# Patient Record
Sex: MALE | Race: WHITE | NOT HISPANIC OR LATINO | URBAN - METROPOLITAN AREA
[De-identification: names, ages, dates, MRNs, and addresses within clinical notes are randomized per-mention and may not be internally consistent; named-entity substitution may affect disease eponyms.]

---

## 2017-02-03 ENCOUNTER — EMERGENCY (EMERGENCY)
Facility: HOSPITAL | Age: 62
LOS: 1 days | Discharge: HOME | End: 2017-02-03
Attending: EMERGENCY MEDICINE | Admitting: EMERGENCY MEDICINE
Payer: MEDICARE

## 2017-02-03 VITALS
RESPIRATION RATE: 14 BRPM | HEIGHT: 65 IN | DIASTOLIC BLOOD PRESSURE: 56 MMHG | TEMPERATURE: 99 F | WEIGHT: 179.9 LBS | HEART RATE: 111 BPM | SYSTOLIC BLOOD PRESSURE: 106 MMHG

## 2017-02-03 VITALS
HEART RATE: 100 BPM | DIASTOLIC BLOOD PRESSURE: 67 MMHG | OXYGEN SATURATION: 96 % | RESPIRATION RATE: 14 BRPM | SYSTOLIC BLOOD PRESSURE: 137 MMHG

## 2017-02-03 DIAGNOSIS — M25.562 PAIN IN LEFT KNEE: ICD-10-CM

## 2017-02-03 DIAGNOSIS — L40.50 ARTHROPATHIC PSORIASIS, UNSPECIFIED: ICD-10-CM

## 2017-02-03 DIAGNOSIS — Z88.2 ALLERGY STATUS TO SULFONAMIDES: ICD-10-CM

## 2017-02-03 DIAGNOSIS — F72 SEVERE INTELLECTUAL DISABILITIES: ICD-10-CM

## 2017-02-03 DIAGNOSIS — Z88.0 ALLERGY STATUS TO PENICILLIN: ICD-10-CM

## 2017-02-03 LAB
ALBUMIN SERPL ELPH-MCNC: 3 G/DL — LOW (ref 3.3–5)
ALP SERPL-CCNC: 75 U/L — SIGNIFICANT CHANGE UP (ref 40–120)
ALT FLD-CCNC: 33 U/L — SIGNIFICANT CHANGE UP (ref 12–78)
ANION GAP SERPL CALC-SCNC: 10 MMOL/L — SIGNIFICANT CHANGE UP (ref 5–17)
AST SERPL-CCNC: 44 U/L — HIGH (ref 15–37)
BASOPHILS # BLD AUTO: 0.1 K/UL — SIGNIFICANT CHANGE UP (ref 0–0.2)
BASOPHILS NFR BLD AUTO: 1.5 % — SIGNIFICANT CHANGE UP (ref 0–2)
BILIRUB SERPL-MCNC: 0.4 MG/DL — SIGNIFICANT CHANGE UP (ref 0.2–1.2)
BUN SERPL-MCNC: 14 MG/DL — SIGNIFICANT CHANGE UP (ref 7–23)
CALCIUM SERPL-MCNC: 8.5 MG/DL — SIGNIFICANT CHANGE UP (ref 8.5–10.1)
CHLORIDE SERPL-SCNC: 103 MMOL/L — SIGNIFICANT CHANGE UP (ref 96–108)
CO2 SERPL-SCNC: 25 MMOL/L — SIGNIFICANT CHANGE UP (ref 22–31)
CREAT SERPL-MCNC: 0.89 MG/DL — SIGNIFICANT CHANGE UP (ref 0.5–1.3)
CRP SERPL-MCNC: 0.27 MG/DL — SIGNIFICANT CHANGE UP (ref 0–0.4)
EOSINOPHIL # BLD AUTO: 0.2 K/UL — SIGNIFICANT CHANGE UP (ref 0–0.5)
EOSINOPHIL NFR BLD AUTO: 2.3 % — SIGNIFICANT CHANGE UP (ref 0–6)
ERYTHROCYTE [SEDIMENTATION RATE] IN BLOOD: 19 MM/HR — SIGNIFICANT CHANGE UP (ref 0–20)
GLUCOSE SERPL-MCNC: 121 MG/DL — HIGH (ref 70–99)
HCT VFR BLD CALC: 43.7 % — SIGNIFICANT CHANGE UP (ref 39–50)
HGB BLD-MCNC: 14.4 G/DL — SIGNIFICANT CHANGE UP (ref 13–17)
INR BLD: 1.21 RATIO — HIGH (ref 0.88–1.16)
LACTATE SERPL-SCNC: 1.8 MMOL/L — SIGNIFICANT CHANGE UP (ref 0.7–2)
LYMPHOCYTES # BLD AUTO: 3.8 K/UL — HIGH (ref 1–3.3)
LYMPHOCYTES # BLD AUTO: 47.9 % — HIGH (ref 13–44)
MCHC RBC-ENTMCNC: 30.7 PG — SIGNIFICANT CHANGE UP (ref 27–34)
MCHC RBC-ENTMCNC: 33 GM/DL — SIGNIFICANT CHANGE UP (ref 32–36)
MCV RBC AUTO: 93.1 FL — SIGNIFICANT CHANGE UP (ref 80–100)
MONOCYTES # BLD AUTO: 0.9 K/UL — SIGNIFICANT CHANGE UP (ref 0–0.9)
MONOCYTES NFR BLD AUTO: 11.6 % — HIGH (ref 1–9)
NEUTROPHILS # BLD AUTO: 2.9 K/UL — SIGNIFICANT CHANGE UP (ref 1.8–7.4)
NEUTROPHILS NFR BLD AUTO: 36.7 % — LOW (ref 43–77)
PLATELET # BLD AUTO: 197 K/UL — SIGNIFICANT CHANGE UP (ref 150–400)
POTASSIUM SERPL-MCNC: 4.2 MMOL/L — SIGNIFICANT CHANGE UP (ref 3.5–5.3)
POTASSIUM SERPL-SCNC: 4.2 MMOL/L — SIGNIFICANT CHANGE UP (ref 3.5–5.3)
PROT SERPL-MCNC: 7.8 G/DL — SIGNIFICANT CHANGE UP (ref 6–8.3)
PROTHROM AB SERPL-ACNC: 13.5 SEC — HIGH (ref 10–13.1)
RBC # BLD: 4.69 M/UL — SIGNIFICANT CHANGE UP (ref 4.2–5.8)
RBC # FLD: 12.8 % — SIGNIFICANT CHANGE UP (ref 10.3–14.5)
SODIUM SERPL-SCNC: 138 MMOL/L — SIGNIFICANT CHANGE UP (ref 135–145)
WBC # BLD: 7.9 K/UL — SIGNIFICANT CHANGE UP (ref 3.8–10.5)
WBC # FLD AUTO: 7.9 K/UL — SIGNIFICANT CHANGE UP (ref 3.8–10.5)

## 2017-02-03 PROCEDURE — 87040 BLOOD CULTURE FOR BACTERIA: CPT

## 2017-02-03 PROCEDURE — 85610 PROTHROMBIN TIME: CPT

## 2017-02-03 PROCEDURE — 99284 EMERGENCY DEPT VISIT MOD MDM: CPT

## 2017-02-03 PROCEDURE — 73562 X-RAY EXAM OF KNEE 3: CPT | Mod: 26,LT

## 2017-02-03 PROCEDURE — 80053 COMPREHEN METABOLIC PANEL: CPT

## 2017-02-03 PROCEDURE — 73562 X-RAY EXAM OF KNEE 3: CPT

## 2017-02-03 PROCEDURE — 83605 ASSAY OF LACTIC ACID: CPT

## 2017-02-03 PROCEDURE — 85652 RBC SED RATE AUTOMATED: CPT

## 2017-02-03 PROCEDURE — 99284 EMERGENCY DEPT VISIT MOD MDM: CPT | Mod: 25

## 2017-02-03 PROCEDURE — 86140 C-REACTIVE PROTEIN: CPT

## 2017-02-03 PROCEDURE — 85027 COMPLETE CBC AUTOMATED: CPT

## 2017-02-03 NOTE — ED PROVIDER NOTE - OBJECTIVE STATEMENT
61 male presents to ER with aide, h/o MR, non verbal, information obtained from aide and chart. Patient had a fall last week, has a broken nose, noted to have a rash on his right knee 3 days ago, seems to be worsening and came to ER. Patient has been ambulatory, no report of fever. Patient currently on humara for arthritis.

## 2017-02-08 LAB
CULTURE RESULTS: SIGNIFICANT CHANGE UP
CULTURE RESULTS: SIGNIFICANT CHANGE UP
SPECIMEN SOURCE: SIGNIFICANT CHANGE UP
SPECIMEN SOURCE: SIGNIFICANT CHANGE UP

## 2017-07-27 NOTE — ED ADULT NURSE NOTE - AS TEMP SITE
November 15, 2021    Patient: Micheal Mcmahan   Date of Visit: 11/15/2021       To Whom It May Concern:    Bon Hassan was seen and treated in our emergency department on 11/15/2021. She should not return to work until 11/22/21.     If you have any
Abdomen soft, non-tender, no guarding.
forehead

## 2017-07-29 ENCOUNTER — EMERGENCY (EMERGENCY)
Facility: HOSPITAL | Age: 62
LOS: 1 days | Discharge: HOME | End: 2017-07-29
Attending: EMERGENCY MEDICINE | Admitting: EMERGENCY MEDICINE
Payer: MEDICARE

## 2017-07-29 VITALS
WEIGHT: 166.89 LBS | OXYGEN SATURATION: 96 % | SYSTOLIC BLOOD PRESSURE: 154 MMHG | TEMPERATURE: 99 F | RESPIRATION RATE: 16 BRPM | HEART RATE: 112 BPM | DIASTOLIC BLOOD PRESSURE: 80 MMHG

## 2017-07-29 VITALS
TEMPERATURE: 99 F | DIASTOLIC BLOOD PRESSURE: 78 MMHG | SYSTOLIC BLOOD PRESSURE: 138 MMHG | HEART RATE: 102 BPM | RESPIRATION RATE: 16 BRPM

## 2017-07-29 DIAGNOSIS — F72 SEVERE INTELLECTUAL DISABILITIES: ICD-10-CM

## 2017-07-29 DIAGNOSIS — Z88.2 ALLERGY STATUS TO SULFONAMIDES: ICD-10-CM

## 2017-07-29 DIAGNOSIS — Z88.0 ALLERGY STATUS TO PENICILLIN: ICD-10-CM

## 2017-07-29 DIAGNOSIS — R50.9 FEVER, UNSPECIFIED: ICD-10-CM

## 2017-07-29 DIAGNOSIS — J06.9 ACUTE UPPER RESPIRATORY INFECTION, UNSPECIFIED: ICD-10-CM

## 2017-07-29 LAB
ALBUMIN SERPL ELPH-MCNC: 3.1 G/DL — LOW (ref 3.3–5)
ALP SERPL-CCNC: 86 U/L — SIGNIFICANT CHANGE UP (ref 40–120)
ALT FLD-CCNC: 17 U/L — SIGNIFICANT CHANGE UP (ref 12–78)
ANION GAP SERPL CALC-SCNC: 13 MMOL/L — SIGNIFICANT CHANGE UP (ref 5–17)
APPEARANCE UR: CLEAR — SIGNIFICANT CHANGE UP
AST SERPL-CCNC: 21 U/L — SIGNIFICANT CHANGE UP (ref 15–37)
BASOPHILS # BLD AUTO: 0.1 K/UL — SIGNIFICANT CHANGE UP (ref 0–0.2)
BASOPHILS NFR BLD AUTO: 1.5 % — SIGNIFICANT CHANGE UP (ref 0–2)
BILIRUB SERPL-MCNC: 0.2 MG/DL — SIGNIFICANT CHANGE UP (ref 0.2–1.2)
BILIRUB UR-MCNC: NEGATIVE — SIGNIFICANT CHANGE UP
BUN SERPL-MCNC: 8 MG/DL — SIGNIFICANT CHANGE UP (ref 7–23)
CALCIUM SERPL-MCNC: 8.8 MG/DL — SIGNIFICANT CHANGE UP (ref 8.5–10.1)
CHLORIDE SERPL-SCNC: 103 MMOL/L — SIGNIFICANT CHANGE UP (ref 96–108)
CO2 SERPL-SCNC: 24 MMOL/L — SIGNIFICANT CHANGE UP (ref 22–31)
COLOR SPEC: SIGNIFICANT CHANGE UP
CREAT SERPL-MCNC: 1 MG/DL — SIGNIFICANT CHANGE UP (ref 0.5–1.3)
DIFF PNL FLD: NEGATIVE — SIGNIFICANT CHANGE UP
EOSINOPHIL # BLD AUTO: 0.3 K/UL — SIGNIFICANT CHANGE UP (ref 0–0.5)
EOSINOPHIL NFR BLD AUTO: 3 % — SIGNIFICANT CHANGE UP (ref 0–6)
GLUCOSE SERPL-MCNC: 136 MG/DL — HIGH (ref 70–99)
GLUCOSE UR QL: NEGATIVE — SIGNIFICANT CHANGE UP
HCT VFR BLD CALC: 44.4 % — SIGNIFICANT CHANGE UP (ref 39–50)
HGB BLD-MCNC: 15.3 G/DL — SIGNIFICANT CHANGE UP (ref 13–17)
KETONES UR-MCNC: NEGATIVE — SIGNIFICANT CHANGE UP
LACTATE SERPL-SCNC: 1.1 MMOL/L — SIGNIFICANT CHANGE UP (ref 0.7–2)
LACTATE SERPL-SCNC: 3.5 MMOL/L — HIGH (ref 0.7–2)
LEUKOCYTE ESTERASE UR-ACNC: NEGATIVE — SIGNIFICANT CHANGE UP
LYMPHOCYTES # BLD AUTO: 2.3 K/UL — SIGNIFICANT CHANGE UP (ref 1–3.3)
LYMPHOCYTES # BLD AUTO: 25.8 % — SIGNIFICANT CHANGE UP (ref 13–44)
MCHC RBC-ENTMCNC: 33.6 PG — SIGNIFICANT CHANGE UP (ref 27–34)
MCHC RBC-ENTMCNC: 34.5 GM/DL — SIGNIFICANT CHANGE UP (ref 32–36)
MCV RBC AUTO: 97.4 FL — SIGNIFICANT CHANGE UP (ref 80–100)
MONOCYTES # BLD AUTO: 1 K/UL — HIGH (ref 0–0.9)
MONOCYTES NFR BLD AUTO: 11 % — HIGH (ref 1–9)
NEUTROPHILS # BLD AUTO: 5.2 K/UL — SIGNIFICANT CHANGE UP (ref 1.8–7.4)
NEUTROPHILS NFR BLD AUTO: 58.7 % — SIGNIFICANT CHANGE UP (ref 43–77)
NITRITE UR-MCNC: NEGATIVE — SIGNIFICANT CHANGE UP
PH UR: 8 — SIGNIFICANT CHANGE UP (ref 5–8)
PLATELET # BLD AUTO: 237 K/UL — SIGNIFICANT CHANGE UP (ref 150–400)
POTASSIUM SERPL-MCNC: 3.9 MMOL/L — SIGNIFICANT CHANGE UP (ref 3.5–5.3)
POTASSIUM SERPL-SCNC: 3.9 MMOL/L — SIGNIFICANT CHANGE UP (ref 3.5–5.3)
PROT SERPL-MCNC: 8.3 G/DL — SIGNIFICANT CHANGE UP (ref 6–8.3)
PROT UR-MCNC: NEGATIVE — SIGNIFICANT CHANGE UP
RBC # BLD: 4.56 M/UL — SIGNIFICANT CHANGE UP (ref 4.2–5.8)
RBC # FLD: 11.6 % — SIGNIFICANT CHANGE UP (ref 10.3–14.5)
SODIUM SERPL-SCNC: 140 MMOL/L — SIGNIFICANT CHANGE UP (ref 135–145)
SP GR SPEC: 1.01 — SIGNIFICANT CHANGE UP (ref 1.01–1.02)
UROBILINOGEN FLD QL: NEGATIVE — SIGNIFICANT CHANGE UP
WBC # BLD: 8.9 K/UL — SIGNIFICANT CHANGE UP (ref 3.8–10.5)
WBC # FLD AUTO: 8.9 K/UL — SIGNIFICANT CHANGE UP (ref 3.8–10.5)

## 2017-07-29 PROCEDURE — 80053 COMPREHEN METABOLIC PANEL: CPT

## 2017-07-29 PROCEDURE — 71046 X-RAY EXAM CHEST 2 VIEWS: CPT

## 2017-07-29 PROCEDURE — 81003 URINALYSIS AUTO W/O SCOPE: CPT

## 2017-07-29 PROCEDURE — 96365 THER/PROPH/DIAG IV INF INIT: CPT

## 2017-07-29 PROCEDURE — 99284 EMERGENCY DEPT VISIT MOD MDM: CPT | Mod: 25

## 2017-07-29 PROCEDURE — 71020: CPT | Mod: 26

## 2017-07-29 PROCEDURE — 99285 EMERGENCY DEPT VISIT HI MDM: CPT

## 2017-07-29 PROCEDURE — 83605 ASSAY OF LACTIC ACID: CPT

## 2017-07-29 PROCEDURE — 87040 BLOOD CULTURE FOR BACTERIA: CPT

## 2017-07-29 PROCEDURE — 87086 URINE CULTURE/COLONY COUNT: CPT

## 2017-07-29 PROCEDURE — 85027 COMPLETE CBC AUTOMATED: CPT

## 2017-07-29 RX ORDER — ACETAMINOPHEN 500 MG
650 TABLET ORAL ONCE
Qty: 0 | Refills: 0 | Status: COMPLETED | OUTPATIENT
Start: 2017-07-29 | End: 2017-07-29

## 2017-07-29 RX ORDER — SODIUM CHLORIDE 9 MG/ML
3 INJECTION INTRAMUSCULAR; INTRAVENOUS; SUBCUTANEOUS ONCE
Qty: 0 | Refills: 0 | Status: COMPLETED | OUTPATIENT
Start: 2017-07-29 | End: 2017-07-29

## 2017-07-29 RX ORDER — SODIUM CHLORIDE 9 MG/ML
1000 INJECTION INTRAMUSCULAR; INTRAVENOUS; SUBCUTANEOUS
Qty: 0 | Refills: 0 | Status: COMPLETED | OUTPATIENT
Start: 2017-07-29 | End: 2017-07-29

## 2017-07-29 RX ADMIN — Medication 650 MILLIGRAM(S): at 08:50

## 2017-07-29 RX ADMIN — SODIUM CHLORIDE 1000 MILLILITER(S): 9 INJECTION INTRAMUSCULAR; INTRAVENOUS; SUBCUTANEOUS at 10:33

## 2017-07-29 RX ADMIN — SODIUM CHLORIDE 1000 MILLILITER(S): 9 INJECTION INTRAMUSCULAR; INTRAVENOUS; SUBCUTANEOUS at 08:51

## 2017-07-29 RX ADMIN — SODIUM CHLORIDE 3 MILLILITER(S): 9 INJECTION INTRAMUSCULAR; INTRAVENOUS; SUBCUTANEOUS at 08:52

## 2017-07-29 RX ADMIN — SODIUM CHLORIDE 1000 MILLILITER(S): 9 INJECTION INTRAMUSCULAR; INTRAVENOUS; SUBCUTANEOUS at 08:52

## 2017-07-29 NOTE — ED ADULT NURSE NOTE - OBJECTIVE STATEMENT
Pt nonverbal resident from [facility], as per male staff member accompanying pt, the pt had a cough for past 4-5 days, cough worsen, and woke this am with a oral temp of 100.4 at the facility. Staff member confirmed that pt didn't have nausea, vomiting, diarrhea, chills, sob, or dyspnea and only symptoms were fever cough, and running nose. No acute s/s of distress noted.

## 2017-07-29 NOTE — ED PROVIDER NOTE - OBJECTIVE STATEMENT
Referred from [facility] for cough , runny nose x 3-4 days, c fever today as per aide. [facility] , run[facility] nose x 3-4 days, c fever today as per aide. pt is nonverbal , ambulate s assistance.

## 2017-07-30 LAB
CULTURE RESULTS: SIGNIFICANT CHANGE UP
SPECIMEN SOURCE: SIGNIFICANT CHANGE UP

## 2017-08-01 ENCOUNTER — EMERGENCY (EMERGENCY)
Facility: HOSPITAL | Age: 62
LOS: 1 days | Discharge: HOME | End: 2017-08-01
Attending: EMERGENCY MEDICINE | Admitting: EMERGENCY MEDICINE
Payer: MEDICARE

## 2017-08-01 VITALS
HEIGHT: 65 IN | HEART RATE: 94 BPM | RESPIRATION RATE: 12 BRPM | WEIGHT: 145.06 LBS | TEMPERATURE: 98 F | OXYGEN SATURATION: 96 % | SYSTOLIC BLOOD PRESSURE: 118 MMHG | DIASTOLIC BLOOD PRESSURE: 69 MMHG

## 2017-08-01 VITALS
SYSTOLIC BLOOD PRESSURE: 115 MMHG | OXYGEN SATURATION: 98 % | DIASTOLIC BLOOD PRESSURE: 70 MMHG | HEART RATE: 76 BPM | TEMPERATURE: 98 F | RESPIRATION RATE: 14 BRPM

## 2017-08-01 DIAGNOSIS — Z88.2 ALLERGY STATUS TO SULFONAMIDES: ICD-10-CM

## 2017-08-01 DIAGNOSIS — I10 ESSENTIAL (PRIMARY) HYPERTENSION: ICD-10-CM

## 2017-08-01 DIAGNOSIS — R05 COUGH: ICD-10-CM

## 2017-08-01 DIAGNOSIS — F72 SEVERE INTELLECTUAL DISABILITIES: ICD-10-CM

## 2017-08-01 DIAGNOSIS — S70.02XA CONTUSION OF LEFT HIP, INITIAL ENCOUNTER: ICD-10-CM

## 2017-08-01 DIAGNOSIS — J18.9 PNEUMONIA, UNSPECIFIED ORGANISM: ICD-10-CM
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
## 2017-08-01 DIAGNOSIS — Z88.0 ALLERGY STATUS TO PENICILLIN: ICD-10-CM

## 2017-08-01 LAB
ANISOCYTOSIS BLD QL: SLIGHT — SIGNIFICANT CHANGE UP
BASO STIPL BLD QL SMEAR: PRESENT — SIGNIFICANT CHANGE UP
EOSINOPHIL NFR BLD AUTO: 1 % — SIGNIFICANT CHANGE UP (ref 0–6)
HCT VFR BLD CALC: 40.6 % — SIGNIFICANT CHANGE UP (ref 39–50)
HGB BLD-MCNC: 13.9 G/DL — SIGNIFICANT CHANGE UP (ref 13–17)
HYPOCHROMIA BLD QL: SLIGHT — SIGNIFICANT CHANGE UP
LACTATE SERPL-SCNC: 1.6 MMOL/L — SIGNIFICANT CHANGE UP (ref 0.7–2)
LYMPHOCYTES # BLD AUTO: 42 % — SIGNIFICANT CHANGE UP (ref 13–44)
MCHC RBC-ENTMCNC: 33.3 PG — SIGNIFICANT CHANGE UP (ref 27–34)
MCHC RBC-ENTMCNC: 34.2 GM/DL — SIGNIFICANT CHANGE UP (ref 32–36)
MCV RBC AUTO: 97.3 FL — SIGNIFICANT CHANGE UP (ref 80–100)
MONOCYTES NFR BLD AUTO: 10 % — HIGH (ref 1–9)
NEUTROPHILS NFR BLD AUTO: 43 % — SIGNIFICANT CHANGE UP (ref 43–77)
NEUTS BAND # BLD: 3 % — SIGNIFICANT CHANGE UP (ref 0–8)
PLAT MORPH BLD: NORMAL — SIGNIFICANT CHANGE UP
PLATELET # BLD AUTO: 210 K/UL — SIGNIFICANT CHANGE UP (ref 150–400)
POIKILOCYTOSIS BLD QL AUTO: SLIGHT — SIGNIFICANT CHANGE UP
POLYCHROMASIA BLD QL SMEAR: SLIGHT — SIGNIFICANT CHANGE UP
RBC # BLD: 4.17 M/UL — LOW (ref 4.2–5.8)
RBC # FLD: 11.7 % — SIGNIFICANT CHANGE UP (ref 10.3–14.5)
RBC BLD AUTO: ABNORMAL
SMUDGE CELLS # BLD: PRESENT — SIGNIFICANT CHANGE UP
VARIANT LYMPHS # BLD: 1 % — SIGNIFICANT CHANGE UP (ref 0–6)
WBC # BLD: 9.8 K/UL — SIGNIFICANT CHANGE UP (ref 3.8–10.5)
WBC # FLD AUTO: 9.8 K/UL — SIGNIFICANT CHANGE UP (ref 3.8–10.5)

## 2017-08-01 PROCEDURE — 85027 COMPLETE CBC AUTOMATED: CPT

## 2017-08-01 PROCEDURE — 99284 EMERGENCY DEPT VISIT MOD MDM: CPT | Mod: 25

## 2017-08-01 PROCEDURE — 71046 X-RAY EXAM CHEST 2 VIEWS: CPT

## 2017-08-01 PROCEDURE — 83605 ASSAY OF LACTIC ACID: CPT

## 2017-08-01 PROCEDURE — 71020: CPT | Mod: 26

## 2017-08-01 PROCEDURE — 73502 X-RAY EXAM HIP UNI 2-3 VIEWS: CPT

## 2017-08-01 PROCEDURE — 99284 EMERGENCY DEPT VISIT MOD MDM: CPT

## 2017-08-01 PROCEDURE — 73502 X-RAY EXAM HIP UNI 2-3 VIEWS: CPT | Mod: 26,LT

## 2017-08-01 PROCEDURE — 87040 BLOOD CULTURE FOR BACTERIA: CPT

## 2017-08-01 RX ORDER — SODIUM CHLORIDE 9 MG/ML
1000 INJECTION INTRAMUSCULAR; INTRAVENOUS; SUBCUTANEOUS ONCE
Qty: 0 | Refills: 0 | Status: COMPLETED | OUTPATIENT
Start: 2017-08-01 | End: 2017-08-01

## 2017-08-01 RX ORDER — CIPROFLOXACIN LACTATE 400MG/40ML
1 VIAL (ML) INTRAVENOUS
Qty: 7 | Refills: 0 | OUTPATIENT
Start: 2017-08-01 | End: 2017-08-08

## 2017-08-01 RX ORDER — AZITHROMYCIN 500 MG/1
1 TABLET, FILM COATED ORAL
Qty: 1 | Refills: 0 | OUTPATIENT
Start: 2017-08-01 | End: 2017-08-06

## 2017-08-01 RX ORDER — AZITHROMYCIN 500 MG/1
500 TABLET, FILM COATED ORAL ONCE
Qty: 0 | Refills: 0 | Status: DISCONTINUED | OUTPATIENT
Start: 2017-08-01 | End: 2017-08-01

## 2017-08-01 RX ADMIN — SODIUM CHLORIDE 1000 MILLILITER(S): 9 INJECTION INTRAMUSCULAR; INTRAVENOUS; SUBCUTANEOUS at 18:45

## 2017-08-01 NOTE — ED PROVIDER NOTE - OBJECTIVE STATEMENT
63 y/o M presents from [facility] with cough over the past few days.  Aide also states they noticed a bruise on his L hip today.  Unsure if he fell.   Ambulating without difficulty.   Staff denies fever, SOB, or any other complaints.

## 2017-08-01 NOTE — ED ADULT NURSE NOTE - CHIEF COMPLAINT QUOTE
patient with persistent cough and left hip bruise patient with persistent productive white phlagm cough and left hip bruise  ecchymosis left hip

## 2017-08-01 NOTE — ED PROVIDER NOTE - MEDICAL DECISION MAKING DETAILS
Pt with ? RUL infiltrate on CXR.  No respiratory distress. Afebrile.  Normal vital signs and exam.  Will give ABX.  F/U with pmd. Pt with ? RUL infiltrate on CXR.  No respiratory distress. Afebrile.  Normal vital signs and exam.  Normal WBC and lactate. Will give ABX.  F/U with pmd.

## 2017-08-01 NOTE — ED ADULT NURSE NOTE - PMH
Blind  right eye  DD (diastrophic dysplasia)    HTN (hypertension)    MR (mental retardation), severe    Psoriasis    Seizure

## 2018-07-21 ENCOUNTER — INPATIENT (INPATIENT)
Facility: HOSPITAL | Age: 63
LOS: 9 days | Discharge: NURSING, REHAB OR LONG-TERM CARE | DRG: 871 | End: 2018-07-31
Attending: FAMILY MEDICINE | Admitting: FAMILY MEDICINE
Payer: MEDICARE

## 2018-07-21 VITALS
HEART RATE: 125 BPM | SYSTOLIC BLOOD PRESSURE: 118 MMHG | RESPIRATION RATE: 17 BRPM | OXYGEN SATURATION: 98 % | TEMPERATURE: 100 F | DIASTOLIC BLOOD PRESSURE: 70 MMHG
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
## 2018-07-21 LAB
ALBUMIN SERPL ELPH-MCNC: 3.4 G/DL — SIGNIFICANT CHANGE UP (ref 3.3–5)
ALP SERPL-CCNC: 79 U/L — SIGNIFICANT CHANGE UP (ref 40–120)
ALT FLD-CCNC: 26 U/L — SIGNIFICANT CHANGE UP (ref 12–78)
ANION GAP SERPL CALC-SCNC: 9 MMOL/L — SIGNIFICANT CHANGE UP (ref 5–17)
APPEARANCE UR: CLEAR — SIGNIFICANT CHANGE UP
AST SERPL-CCNC: 32 U/L — SIGNIFICANT CHANGE UP (ref 15–37)
BACTERIA # UR AUTO: NEGATIVE — SIGNIFICANT CHANGE UP
BASOPHILS # BLD AUTO: 0.02 K/UL — SIGNIFICANT CHANGE UP (ref 0–0.2)
BASOPHILS NFR BLD AUTO: 0.4 % — SIGNIFICANT CHANGE UP (ref 0–2)
BILIRUB SERPL-MCNC: 0.2 MG/DL — SIGNIFICANT CHANGE UP (ref 0.2–1.2)
BILIRUB UR-MCNC: NEGATIVE — SIGNIFICANT CHANGE UP
BUN SERPL-MCNC: 11 MG/DL — SIGNIFICANT CHANGE UP (ref 7–23)
CALCIUM SERPL-MCNC: 9 MG/DL — SIGNIFICANT CHANGE UP (ref 8.5–10.1)
CHLORIDE SERPL-SCNC: 103 MMOL/L — SIGNIFICANT CHANGE UP (ref 96–108)
CO2 SERPL-SCNC: 29 MMOL/L — SIGNIFICANT CHANGE UP (ref 22–31)
COLOR SPEC: YELLOW — SIGNIFICANT CHANGE UP
CREAT SERPL-MCNC: 1 MG/DL — SIGNIFICANT CHANGE UP (ref 0.5–1.3)
DIFF PNL FLD: ABNORMAL
EOSINOPHIL # BLD AUTO: 0.02 K/UL — SIGNIFICANT CHANGE UP (ref 0–0.5)
EOSINOPHIL NFR BLD AUTO: 0.4 % — SIGNIFICANT CHANGE UP (ref 0–6)
EPI CELLS # UR: NEGATIVE — SIGNIFICANT CHANGE UP
GLUCOSE SERPL-MCNC: 115 MG/DL — HIGH (ref 70–99)
GLUCOSE UR QL: NEGATIVE — SIGNIFICANT CHANGE UP
HCT VFR BLD CALC: 41.9 % — SIGNIFICANT CHANGE UP (ref 39–50)
HGB BLD-MCNC: 14.9 G/DL — SIGNIFICANT CHANGE UP (ref 13–17)
IMM GRANULOCYTES NFR BLD AUTO: 0.4 % — SIGNIFICANT CHANGE UP (ref 0–1.5)
KETONES UR-MCNC: NEGATIVE — SIGNIFICANT CHANGE UP
LACTATE SERPL-SCNC: 1.7 MMOL/L — SIGNIFICANT CHANGE UP (ref 0.7–2)
LACTATE SERPL-SCNC: 2.6 MMOL/L — HIGH (ref 0.7–2)
LEUKOCYTE ESTERASE UR-ACNC: NEGATIVE — SIGNIFICANT CHANGE UP
LYMPHOCYTES # BLD AUTO: 1.22 K/UL — SIGNIFICANT CHANGE UP (ref 1–3.3)
LYMPHOCYTES # BLD AUTO: 25.8 % — SIGNIFICANT CHANGE UP (ref 13–44)
MCHC RBC-ENTMCNC: 33.2 PG — SIGNIFICANT CHANGE UP (ref 27–34)
MCHC RBC-ENTMCNC: 35.6 GM/DL — SIGNIFICANT CHANGE UP (ref 32–36)
MCV RBC AUTO: 93.3 FL — SIGNIFICANT CHANGE UP (ref 80–100)
MONOCYTES # BLD AUTO: 0.66 K/UL — SIGNIFICANT CHANGE UP (ref 0–0.9)
MONOCYTES NFR BLD AUTO: 14 % — SIGNIFICANT CHANGE UP (ref 2–14)
NEUTROPHILS # BLD AUTO: 2.78 K/UL — SIGNIFICANT CHANGE UP (ref 1.8–7.4)
NEUTROPHILS NFR BLD AUTO: 59 % — SIGNIFICANT CHANGE UP (ref 43–77)
NITRITE UR-MCNC: NEGATIVE — SIGNIFICANT CHANGE UP
NRBC # BLD: 0 /100 WBCS — SIGNIFICANT CHANGE UP (ref 0–0)
PH UR: 9 — HIGH (ref 5–8)
PLATELET # BLD AUTO: 182 K/UL — SIGNIFICANT CHANGE UP (ref 150–400)
POTASSIUM SERPL-MCNC: 4 MMOL/L — SIGNIFICANT CHANGE UP (ref 3.5–5.3)
POTASSIUM SERPL-SCNC: 4 MMOL/L — SIGNIFICANT CHANGE UP (ref 3.5–5.3)
PROT SERPL-MCNC: 8 G/DL — SIGNIFICANT CHANGE UP (ref 6–8.3)
PROT UR-MCNC: NEGATIVE — SIGNIFICANT CHANGE UP
RBC # BLD: 4.49 M/UL — SIGNIFICANT CHANGE UP (ref 4.2–5.8)
RBC # FLD: 12.3 % — SIGNIFICANT CHANGE UP (ref 10.3–14.5)
RBC CASTS # UR COMP ASSIST: SIGNIFICANT CHANGE UP /HPF (ref 0–4)
SODIUM SERPL-SCNC: 141 MMOL/L — SIGNIFICANT CHANGE UP (ref 135–145)
SP GR SPEC: 1.01 — SIGNIFICANT CHANGE UP (ref 1.01–1.02)
UROBILINOGEN FLD QL: NEGATIVE — SIGNIFICANT CHANGE UP
WBC # BLD: 4.72 K/UL — SIGNIFICANT CHANGE UP (ref 3.8–10.5)
WBC # FLD AUTO: 4.72 K/UL — SIGNIFICANT CHANGE UP (ref 3.8–10.5)
WBC UR QL: NEGATIVE — SIGNIFICANT CHANGE UP

## 2018-07-21 PROCEDURE — 71045 X-RAY EXAM CHEST 1 VIEW: CPT | Mod: 26

## 2018-07-21 PROCEDURE — 70450 CT HEAD/BRAIN W/O DYE: CPT | Mod: 26

## 2018-07-21 PROCEDURE — 93010 ELECTROCARDIOGRAM REPORT: CPT

## 2018-07-21 PROCEDURE — 72125 CT NECK SPINE W/O DYE: CPT | Mod: 26

## 2018-07-21 PROCEDURE — 74177 CT ABD & PELVIS W/CONTRAST: CPT | Mod: 26

## 2018-07-21 PROCEDURE — 99285 EMERGENCY DEPT VISIT HI MDM: CPT

## 2018-07-21 RX ORDER — SODIUM CHLORIDE 9 MG/ML
1000 INJECTION INTRAMUSCULAR; INTRAVENOUS; SUBCUTANEOUS
Qty: 0 | Refills: 0 | Status: COMPLETED | OUTPATIENT
Start: 2018-07-21 | End: 2018-07-21

## 2018-07-21 RX ORDER — KETOROLAC TROMETHAMINE 30 MG/ML
30 SYRINGE (ML) INJECTION ONCE
Qty: 0 | Refills: 0 | Status: DISCONTINUED | OUTPATIENT
Start: 2018-07-21 | End: 2018-07-21

## 2018-07-21 RX ORDER — ACETAMINOPHEN 500 MG
650 TABLET ORAL ONCE
Qty: 0 | Refills: 0 | Status: COMPLETED | OUTPATIENT
Start: 2018-07-21 | End: 2018-07-21

## 2018-07-21 RX ORDER — CEFTRIAXONE 500 MG/1
1 INJECTION, POWDER, FOR SOLUTION INTRAMUSCULAR; INTRAVENOUS ONCE
Qty: 0 | Refills: 0 | Status: COMPLETED | OUTPATIENT
Start: 2018-07-21 | End: 2018-07-21

## 2018-07-21 RX ADMIN — Medication 650 MILLIGRAM(S): at 21:22

## 2018-07-21 RX ADMIN — SODIUM CHLORIDE 2000 MILLILITER(S): 9 INJECTION INTRAMUSCULAR; INTRAVENOUS; SUBCUTANEOUS at 21:30

## 2018-07-21 RX ADMIN — CEFTRIAXONE 100 GRAM(S): 500 INJECTION, POWDER, FOR SOLUTION INTRAMUSCULAR; INTRAVENOUS at 21:31

## 2018-07-21 RX ADMIN — SODIUM CHLORIDE 2000 MILLILITER(S): 9 INJECTION INTRAMUSCULAR; INTRAVENOUS; SUBCUTANEOUS at 21:10

## 2018-07-21 RX ADMIN — Medication 30 MILLIGRAM(S): at 23:58

## 2018-07-21 NOTE — H&P ADULT - HISTORY OF PRESENT ILLNESS
fell at home  with fever 101  brought in fell at home  with fever 101  brought in  nurse states he has had episode of diarrhea

## 2018-07-21 NOTE — ED PROVIDER NOTE - CONSTITUTIONAL, MLM
normal... mrcp, well nourished, awake, alert, oriented to person, place, time/situation and in no apparent distress. fights exam

## 2018-07-21 NOTE — ED ADULT NURSE REASSESSMENT NOTE - NS ED NURSE REASSESS COMMENT FT1
JOCELYNN pt was seen at bedside by Dr Mendez.  IV inserted, blood work drawn and sent to lab as ordered.  PO tylenol given with applesauce without incident.  pt voided freely into urinal, urine obtained and sent to lab as ordered.  pt given IVF and IV Ceftriaxone.  lab called with elevated lactate 2.6.  repeat lactate to be drawn at completion of IVF.  will continue to monitor.

## 2018-07-21 NOTE — ED PROVIDER NOTE - MEDICAL DECISION MAKING DETAILS
pt with unwitnessed fall, flank hematoma with fever and tachy,  elevated lactate, culture,abx, cts ro injury neg, admit for iv abx remote tele

## 2018-07-21 NOTE — ED ADULT NURSE NOTE - OBJECTIVE STATEMENT
[facility] sustained an unwitnessed fall +abrasion to left flank.  incidental finding, pt febrile 100.9 R 125HR

## 2018-07-21 NOTE — ED PROVIDER NOTE - OBJECTIVE STATEMENT
Pt is a 62 yo male hx mrdd. pt sp unwitnessed fall at [facility].  pt Pt is a 64 yo male hx mrdd. pt sp unwitnessed fall at [facility].  pt with bruise on back and low grade temp and sent to er. pt acting normally

## 2018-07-21 NOTE — H&P ADULT - NSHPPHYSICALEXAM_GEN_ALL_CORE
temp 100 temp 100  lungs dec bs bases  hrt reg abd soft  ext no c/c/e  moving all ext  alert  in no distress

## 2018-07-22 PROBLEM — Q77.5: Chronic | Status: ACTIVE | Noted: 2017-08-01

## 2018-07-22 PROBLEM — H54.0 BLINDNESS, BOTH EYES: Chronic | Status: ACTIVE | Noted: 2017-08-01

## 2018-07-22 PROBLEM — L40.9 PSORIASIS, UNSPECIFIED: Chronic | Status: ACTIVE | Noted: 2017-08-01

## 2018-07-22 PROBLEM — I10 ESSENTIAL (PRIMARY) HYPERTENSION: Chronic | Status: ACTIVE | Noted: 2017-08-01

## 2018-07-22 PROBLEM — R56.9 UNSPECIFIED CONVULSIONS: Chronic | Status: ACTIVE | Noted: 2017-08-01

## 2018-07-22 LAB
ALBUMIN SERPL ELPH-MCNC: 2.7 G/DL — LOW (ref 3.3–5)
ALP SERPL-CCNC: 65 U/L — SIGNIFICANT CHANGE UP (ref 40–120)
ALT FLD-CCNC: 28 U/L — SIGNIFICANT CHANGE UP (ref 12–78)
ANION GAP SERPL CALC-SCNC: 7 MMOL/L — SIGNIFICANT CHANGE UP (ref 5–17)
AST SERPL-CCNC: 37 U/L — SIGNIFICANT CHANGE UP (ref 15–37)
BASOPHILS # BLD AUTO: 0.01 K/UL — SIGNIFICANT CHANGE UP (ref 0–0.2)
BASOPHILS # BLD AUTO: 0.01 K/UL — SIGNIFICANT CHANGE UP (ref 0–0.2)
BASOPHILS NFR BLD AUTO: 0.2 % — SIGNIFICANT CHANGE UP (ref 0–2)
BASOPHILS NFR BLD AUTO: 0.3 % — SIGNIFICANT CHANGE UP (ref 0–2)
BILIRUB SERPL-MCNC: 0.2 MG/DL — SIGNIFICANT CHANGE UP (ref 0.2–1.2)
BLD GP AB SCN SERPL QL: SIGNIFICANT CHANGE UP
BUN SERPL-MCNC: 9 MG/DL — SIGNIFICANT CHANGE UP (ref 7–23)
CALCIUM SERPL-MCNC: 8.3 MG/DL — LOW (ref 8.5–10.1)
CHLORIDE SERPL-SCNC: 111 MMOL/L — HIGH (ref 96–108)
CK SERPL-CCNC: 228 U/L — SIGNIFICANT CHANGE UP (ref 26–308)
CK SERPL-CCNC: 263 U/L — SIGNIFICANT CHANGE UP (ref 26–308)
CO2 SERPL-SCNC: 27 MMOL/L — SIGNIFICANT CHANGE UP (ref 22–31)
CREAT SERPL-MCNC: 0.9 MG/DL — SIGNIFICANT CHANGE UP (ref 0.5–1.3)
CULTURE RESULTS: NO GROWTH — SIGNIFICANT CHANGE UP
EOSINOPHIL # BLD AUTO: 0.01 K/UL — SIGNIFICANT CHANGE UP (ref 0–0.5)
EOSINOPHIL # BLD AUTO: 0.04 K/UL — SIGNIFICANT CHANGE UP (ref 0–0.5)
EOSINOPHIL NFR BLD AUTO: 0.3 % — SIGNIFICANT CHANGE UP (ref 0–6)
EOSINOPHIL NFR BLD AUTO: 1 % — SIGNIFICANT CHANGE UP (ref 0–6)
GLUCOSE SERPL-MCNC: 95 MG/DL — SIGNIFICANT CHANGE UP (ref 70–99)
HCT VFR BLD CALC: 35.1 % — LOW (ref 39–50)
HCT VFR BLD CALC: 38 % — LOW (ref 39–50)
HGB BLD-MCNC: 12.6 G/DL — LOW (ref 13–17)
HGB BLD-MCNC: 13.7 G/DL — SIGNIFICANT CHANGE UP (ref 13–17)
IMM GRANULOCYTES NFR BLD AUTO: 1 % — SIGNIFICANT CHANGE UP (ref 0–1.5)
IMM GRANULOCYTES NFR BLD AUTO: 1 % — SIGNIFICANT CHANGE UP (ref 0–1.5)
LYMPHOCYTES # BLD AUTO: 0.87 K/UL — LOW (ref 1–3.3)
LYMPHOCYTES # BLD AUTO: 1.12 K/UL — SIGNIFICANT CHANGE UP (ref 1–3.3)
LYMPHOCYTES # BLD AUTO: 21.6 % — SIGNIFICANT CHANGE UP (ref 13–44)
LYMPHOCYTES # BLD AUTO: 28 % — SIGNIFICANT CHANGE UP (ref 13–44)
MCHC RBC-ENTMCNC: 33.4 PG — SIGNIFICANT CHANGE UP (ref 27–34)
MCHC RBC-ENTMCNC: 33.7 PG — SIGNIFICANT CHANGE UP (ref 27–34)
MCHC RBC-ENTMCNC: 35.9 GM/DL — SIGNIFICANT CHANGE UP (ref 32–36)
MCHC RBC-ENTMCNC: 36.1 GM/DL — HIGH (ref 32–36)
MCV RBC AUTO: 93.1 FL — SIGNIFICANT CHANGE UP (ref 80–100)
MCV RBC AUTO: 93.4 FL — SIGNIFICANT CHANGE UP (ref 80–100)
MONOCYTES # BLD AUTO: 0.51 K/UL — SIGNIFICANT CHANGE UP (ref 0–0.9)
MONOCYTES # BLD AUTO: 0.55 K/UL — SIGNIFICANT CHANGE UP (ref 0–0.9)
MONOCYTES NFR BLD AUTO: 12.7 % — SIGNIFICANT CHANGE UP (ref 2–14)
MONOCYTES NFR BLD AUTO: 13.8 % — SIGNIFICANT CHANGE UP (ref 2–14)
NEUTROPHILS # BLD AUTO: 2.27 K/UL — SIGNIFICANT CHANGE UP (ref 1.8–7.4)
NEUTROPHILS # BLD AUTO: 2.56 K/UL — SIGNIFICANT CHANGE UP (ref 1.8–7.4)
NEUTROPHILS NFR BLD AUTO: 56.6 % — SIGNIFICANT CHANGE UP (ref 43–77)
NEUTROPHILS NFR BLD AUTO: 63.5 % — SIGNIFICANT CHANGE UP (ref 43–77)
NRBC # BLD: 0 /100 WBCS — SIGNIFICANT CHANGE UP (ref 0–0)
NRBC # BLD: 0 /100 WBCS — SIGNIFICANT CHANGE UP (ref 0–0)
PLATELET # BLD AUTO: 143 K/UL — LOW (ref 150–400)
PLATELET # BLD AUTO: 146 K/UL — LOW (ref 150–400)
POTASSIUM SERPL-MCNC: 4.1 MMOL/L — SIGNIFICANT CHANGE UP (ref 3.5–5.3)
POTASSIUM SERPL-SCNC: 4.1 MMOL/L — SIGNIFICANT CHANGE UP (ref 3.5–5.3)
PROT SERPL-MCNC: 6.4 G/DL — SIGNIFICANT CHANGE UP (ref 6–8.3)
RAPID RVP RESULT: SIGNIFICANT CHANGE UP
RBC # BLD: 3.77 M/UL — LOW (ref 4.2–5.8)
RBC # BLD: 4.07 M/UL — LOW (ref 4.2–5.8)
RBC # FLD: 12.2 % — SIGNIFICANT CHANGE UP (ref 10.3–14.5)
RBC # FLD: 12.3 % — SIGNIFICANT CHANGE UP (ref 10.3–14.5)
SODIUM SERPL-SCNC: 145 MMOL/L — SIGNIFICANT CHANGE UP (ref 135–145)
SPECIMEN SOURCE: SIGNIFICANT CHANGE UP
TROPONIN I SERPL-MCNC: <.015 NG/ML — SIGNIFICANT CHANGE UP (ref 0.01–0.04)
TROPONIN I SERPL-MCNC: <.015 NG/ML — SIGNIFICANT CHANGE UP (ref 0.01–0.04)
WBC # BLD: 4 K/UL — SIGNIFICANT CHANGE UP (ref 3.8–10.5)
WBC # BLD: 4.03 K/UL — SIGNIFICANT CHANGE UP (ref 3.8–10.5)
WBC # FLD AUTO: 4 K/UL — SIGNIFICANT CHANGE UP (ref 3.8–10.5)
WBC # FLD AUTO: 4.03 K/UL — SIGNIFICANT CHANGE UP (ref 3.8–10.5)

## 2018-07-22 PROCEDURE — 93010 ELECTROCARDIOGRAM REPORT: CPT

## 2018-07-22 PROCEDURE — 73522 X-RAY EXAM HIPS BI 3-4 VIEWS: CPT | Mod: 26

## 2018-07-22 RX ORDER — ACETAMINOPHEN 500 MG
650 TABLET ORAL EVERY 4 HOURS
Qty: 0 | Refills: 0 | Status: COMPLETED | OUTPATIENT
Start: 2018-07-22 | End: 2018-07-23

## 2018-07-22 RX ORDER — FLUVOXAMINE MALEATE 25 MG/1
25 TABLET ORAL DAILY
Qty: 0 | Refills: 0 | Status: DISCONTINUED | OUTPATIENT
Start: 2018-07-22 | End: 2018-07-27

## 2018-07-22 RX ORDER — AZTREONAM 2 G
1000 VIAL (EA) INJECTION EVERY 8 HOURS
Qty: 0 | Refills: 0 | Status: DISCONTINUED | OUTPATIENT
Start: 2018-07-22 | End: 2018-07-24

## 2018-07-22 RX ORDER — AZTREONAM 2 G
1000 VIAL (EA) INJECTION ONCE
Qty: 0 | Refills: 0 | Status: COMPLETED | OUTPATIENT
Start: 2018-07-22 | End: 2018-07-22

## 2018-07-22 RX ORDER — OLANZAPINE 15 MG/1
20 TABLET, FILM COATED ORAL DAILY
Qty: 0 | Refills: 0 | Status: DISCONTINUED | OUTPATIENT
Start: 2018-07-22 | End: 2018-07-27

## 2018-07-22 RX ORDER — ACETAMINOPHEN 500 MG
325 TABLET ORAL EVERY 4 HOURS
Qty: 0 | Refills: 0 | Status: DISCONTINUED | OUTPATIENT
Start: 2018-07-22 | End: 2018-07-31

## 2018-07-22 RX ORDER — AZTREONAM 2 G
VIAL (EA) INJECTION
Qty: 0 | Refills: 0 | Status: DISCONTINUED | OUTPATIENT
Start: 2018-07-22 | End: 2018-07-24

## 2018-07-22 RX ORDER — SODIUM CHLORIDE 9 MG/ML
1000 INJECTION INTRAMUSCULAR; INTRAVENOUS; SUBCUTANEOUS
Qty: 0 | Refills: 0 | Status: DISCONTINUED | OUTPATIENT
Start: 2018-07-22 | End: 2018-07-23

## 2018-07-22 RX ORDER — SODIUM CHLORIDE 9 MG/ML
500 INJECTION INTRAMUSCULAR; INTRAVENOUS; SUBCUTANEOUS ONCE
Qty: 0 | Refills: 0 | Status: COMPLETED | OUTPATIENT
Start: 2018-07-22 | End: 2018-07-22

## 2018-07-22 RX ORDER — IBUPROFEN 200 MG
400 TABLET ORAL ONCE
Qty: 0 | Refills: 0 | Status: COMPLETED | OUTPATIENT
Start: 2018-07-22 | End: 2018-07-22

## 2018-07-22 RX ORDER — LOSARTAN POTASSIUM 100 MG/1
25 TABLET, FILM COATED ORAL DAILY
Qty: 0 | Refills: 0 | Status: DISCONTINUED | OUTPATIENT
Start: 2018-07-22 | End: 2018-07-24

## 2018-07-22 RX ORDER — VANCOMYCIN HCL 1 G
1000 VIAL (EA) INTRAVENOUS ONCE
Qty: 0 | Refills: 0 | Status: COMPLETED | OUTPATIENT
Start: 2018-07-22 | End: 2018-07-22

## 2018-07-22 RX ORDER — PANTOPRAZOLE SODIUM 20 MG/1
40 TABLET, DELAYED RELEASE ORAL
Qty: 0 | Refills: 0 | Status: DISCONTINUED | OUTPATIENT
Start: 2018-07-22 | End: 2018-07-31

## 2018-07-22 RX ADMIN — OLANZAPINE 20 MILLIGRAM(S): 15 TABLET, FILM COATED ORAL at 12:03

## 2018-07-22 RX ADMIN — Medication 250 MILLIGRAM(S): at 23:06

## 2018-07-22 RX ADMIN — SODIUM CHLORIDE 500 MILLILITER(S): 9 INJECTION INTRAMUSCULAR; INTRAVENOUS; SUBCUTANEOUS at 21:23

## 2018-07-22 RX ADMIN — PANTOPRAZOLE SODIUM 40 MILLIGRAM(S): 20 TABLET, DELAYED RELEASE ORAL at 05:48

## 2018-07-22 RX ADMIN — Medication 400 MILLIGRAM(S): at 21:25

## 2018-07-22 RX ADMIN — Medication 400 MILLIGRAM(S): at 22:00

## 2018-07-22 RX ADMIN — Medication 650 MILLIGRAM(S): at 21:25

## 2018-07-22 RX ADMIN — LOSARTAN POTASSIUM 25 MILLIGRAM(S): 100 TABLET, FILM COATED ORAL at 05:49

## 2018-07-22 RX ADMIN — Medication 30 MILLIGRAM(S): at 00:28

## 2018-07-22 RX ADMIN — Medication 50 MILLIGRAM(S): at 02:00

## 2018-07-22 RX ADMIN — Medication 50 MILLIGRAM(S): at 14:12

## 2018-07-22 RX ADMIN — Medication 50 MILLIGRAM(S): at 06:30

## 2018-07-22 RX ADMIN — Medication 50 MILLIGRAM(S): at 21:25

## 2018-07-22 RX ADMIN — Medication 325 MILLIGRAM(S): at 12:03

## 2018-07-22 RX ADMIN — SODIUM CHLORIDE 100 MILLILITER(S): 9 INJECTION INTRAMUSCULAR; INTRAVENOUS; SUBCUTANEOUS at 05:51

## 2018-07-22 RX ADMIN — FLUVOXAMINE MALEATE 25 MILLIGRAM(S): 25 TABLET ORAL at 12:03

## 2018-07-22 NOTE — ED ADULT NURSE REASSESSMENT NOTE - NS ED NURSE REASSESS COMMENT FT1
0130 No reaction to antibiotic therapy. Pt requesting to urinate, provided urinal, but refused, pt assisted out of stretcher by his aides & ambulated to bathroom with his aides

## 2018-07-22 NOTE — ED ADULT NURSE REASSESSMENT NOTE - NS ED NURSE REASSESS COMMENT FT1
[id number] Pt in no distress, comfortable, vital signs stable, afebrile. Azactam 1 G IVBP in progress, verbal report to  Nichol RN

## 2018-07-22 NOTE — ED ADULT NURSE REASSESSMENT NOTE - NS ED NURSE REASSESS COMMENT FT1
2315 -2320 [facility] pt in ED s/p fall, noted large red & purple blue abrasion to left back ( mid/lower). Pt awake & alert, on cardiopulmonary monitor, -460, vital signs taken. Repeat Lactate after second liter 0.9 NS drawn & sent to lab.  Neuro checks done, Pt moves all extremities. Safety precautions observed. Further evaluation, pt with blotches to arms & legs  Hx Psoriasis. Personnel, nurses aides from facility at bedside. Monitored.

## 2018-07-22 NOTE — ED ADULT NURSE REASSESSMENT NOTE - NS ED NURSE REASSESS COMMENT FT1
[id number] Dr. Mendez was notified regarding temp & prescribed Toradol 30 mg ivp & Levaquin 500mg IVPB, both meds administered as prescribed.  Monitored.

## 2018-07-22 NOTE — PROGRESS NOTE ADULT - SUBJECTIVE AND OBJECTIVE BOX
SYLVAIN VELOZ [facility]    ALLERGY Pcn sulfa   CONTACT Naima Bryant  self    REASON FOR VISIT   Initial evaluation/Int medicine coverage  requested 7/22/2018   by Dr Kamille Meyer  from Dr Damon   Patient examined chart reviewed  HOSPITAL ADMISSION [facility] 7/21/2018   PATIENT CAME  FROM (if information available)      PATIENT DESCRIPTION 7/21/2018 ADMISSION [facility]   64 yo male hx mrdd. pt sp unwitnessed fall at [facility].  pt with bruise on back and low grade temp and sent to er. pt acting normally     Past Medical History:  Blind  right eye  DD (diastrophic dysplasia)    HTN (hypertension)    MR (mental retardation), severe    Psoriasis    Seizure.       IVF  1/2  (7/22)     VITALS/LABS                           7/22/2018 102f  102 20 91% 70  7/22/2018 W 4 Hb 12.6 Plt 143 Na 145 K 4.1 CO2 27 Cr .9     REVIEW OF SYMPTOMS    Able to give ROS   NO     PHYSICAL EXAM    HEENT Unremarkable PERRLA atraumatic   RESP Fair air entry EXP prolonged    Harsh breath sound Resp distres mild   CARDIAC S1 S2 No S3     NO JVD    ABDOMEN SOFT BS PRESENT NOT DISTENDED No hepatosplenomegaly PEDAL EDEMA present No calf tenderness  NO rash   GENERAL Not TOXIC looking    PATIENT DATA ASSESSMENT RECOMMENDATIONS 7/21/2018   ADMISSION   [facility]                        FEVER   7/22/2018 102f  102 20 91% 70  7/22/2018 W 4   7/22/2018 CTAP IC No organ injury   7/21/2018 CXR Mild opacn l diaphragm   Azactam 1.3 (7/22)   ASSESSMENT/RECOMMENDATIONS    7/22/2018 Cont Rx  Check pct in am If fever add Flagyl for anerobic coverage       CAD   7/22/2018 Tr 1 n   Losartan 25 (7/22)     HO FALL  7/21/2018 CT H n CT Neck n     NEUROPSYCHIATRIC MEDS   Luvox 25 (7/22)   Olanzapine 20 (7/22)     GLOBAL ISSUE/BEST PRACTICE:      PROBLEM: HOB elevation:   y            PROBLEM: Stress ulcer proph:    protonix 40 (7/22)                       PROBLEM: VTE prophylaxis:      Compr device (7/22)   PROBLEM: Glycemic control:    na  PROBLEM: Nutrition:    Dys 2 mech soft  Honey (7/22)   PROBLEM: Advanced directive: na     PROBLEM: Allergies:  na      TIME SPENT Over 25 minutes aggregate care time spent on encounter; activities included   direct patient care, counseling and/or coordinating care reviewing notes, lab data/ imaging , discussion with multidisciplinary team/ patient  /family

## 2018-07-23 DIAGNOSIS — R50.9 FEVER, UNSPECIFIED: ICD-10-CM

## 2018-07-23 DIAGNOSIS — R13.10 DYSPHAGIA, UNSPECIFIED: ICD-10-CM
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
## 2018-07-23 LAB
ALBUMIN SERPL ELPH-MCNC: 2.7 G/DL — LOW (ref 3.3–5)
ALP SERPL-CCNC: 74 U/L — SIGNIFICANT CHANGE UP (ref 40–120)
ALT FLD-CCNC: 58 U/L — SIGNIFICANT CHANGE UP (ref 12–78)
ANION GAP SERPL CALC-SCNC: 8 MMOL/L — SIGNIFICANT CHANGE UP (ref 5–17)
APPEARANCE UR: CLEAR — SIGNIFICANT CHANGE UP
AST SERPL-CCNC: 74 U/L — HIGH (ref 15–37)
BACTERIA # UR AUTO: NEGATIVE — SIGNIFICANT CHANGE UP
BILIRUB SERPL-MCNC: 0.3 MG/DL — SIGNIFICANT CHANGE UP (ref 0.2–1.2)
BILIRUB UR-MCNC: NEGATIVE — SIGNIFICANT CHANGE UP
BUN SERPL-MCNC: 9 MG/DL — SIGNIFICANT CHANGE UP (ref 7–23)
CALCIUM SERPL-MCNC: 8 MG/DL — LOW (ref 8.5–10.1)
CHLORIDE SERPL-SCNC: 111 MMOL/L — HIGH (ref 96–108)
CK SERPL-CCNC: 289 U/L — SIGNIFICANT CHANGE UP (ref 26–308)
CO2 SERPL-SCNC: 27 MMOL/L — SIGNIFICANT CHANGE UP (ref 22–31)
COLOR SPEC: YELLOW — SIGNIFICANT CHANGE UP
CREAT SERPL-MCNC: 0.91 MG/DL — SIGNIFICANT CHANGE UP (ref 0.5–1.3)
CULTURE RESULTS: NO GROWTH — SIGNIFICANT CHANGE UP
DIFF PNL FLD: ABNORMAL
EPI CELLS # UR: NEGATIVE — SIGNIFICANT CHANGE UP
GLUCOSE SERPL-MCNC: 91 MG/DL — SIGNIFICANT CHANGE UP (ref 70–99)
GLUCOSE UR QL: NEGATIVE — SIGNIFICANT CHANGE UP
HCT VFR BLD CALC: 36.1 % — LOW (ref 39–50)
HGB BLD-MCNC: 12.8 G/DL — LOW (ref 13–17)
INR BLD: 1.27 RATIO — HIGH (ref 0.88–1.16)
KETONES UR-MCNC: NEGATIVE — SIGNIFICANT CHANGE UP
LEUKOCYTE ESTERASE UR-ACNC: NEGATIVE — SIGNIFICANT CHANGE UP
MCHC RBC-ENTMCNC: 32.6 PG — SIGNIFICANT CHANGE UP (ref 27–34)
MCHC RBC-ENTMCNC: 35.5 GM/DL — SIGNIFICANT CHANGE UP (ref 32–36)
MCV RBC AUTO: 91.9 FL — SIGNIFICANT CHANGE UP (ref 80–100)
NITRITE UR-MCNC: NEGATIVE — SIGNIFICANT CHANGE UP
NRBC # BLD: 0 /100 WBCS — SIGNIFICANT CHANGE UP (ref 0–0)
PH UR: 8 — SIGNIFICANT CHANGE UP (ref 5–8)
PLATELET # BLD AUTO: 129 K/UL — LOW (ref 150–400)
POTASSIUM SERPL-MCNC: 3.7 MMOL/L — SIGNIFICANT CHANGE UP (ref 3.5–5.3)
POTASSIUM SERPL-SCNC: 3.7 MMOL/L — SIGNIFICANT CHANGE UP (ref 3.5–5.3)
PROCALCITONIN SERPL-MCNC: 0.14 NG/ML — HIGH (ref 0–0.04)
PROT SERPL-MCNC: 6.5 G/DL — SIGNIFICANT CHANGE UP (ref 6–8.3)
PROT UR-MCNC: NEGATIVE — SIGNIFICANT CHANGE UP
PROTHROM AB SERPL-ACNC: 13.9 SEC — HIGH (ref 9.8–12.7)
RBC # BLD: 3.93 M/UL — LOW (ref 4.2–5.8)
RBC # FLD: 12.2 % — SIGNIFICANT CHANGE UP (ref 10.3–14.5)
RBC CASTS # UR COMP ASSIST: ABNORMAL /HPF (ref 0–4)
SODIUM SERPL-SCNC: 146 MMOL/L — HIGH (ref 135–145)
SP GR SPEC: 1.01 — SIGNIFICANT CHANGE UP (ref 1.01–1.02)
SPECIMEN SOURCE: SIGNIFICANT CHANGE UP
TROPONIN I SERPL-MCNC: <.015 NG/ML — SIGNIFICANT CHANGE UP (ref 0.01–0.04)
UROBILINOGEN FLD QL: NEGATIVE — SIGNIFICANT CHANGE UP
WBC # BLD: 4.49 K/UL — SIGNIFICANT CHANGE UP (ref 3.8–10.5)
WBC # FLD AUTO: 4.49 K/UL — SIGNIFICANT CHANGE UP (ref 3.8–10.5)
WBC UR QL: NEGATIVE — SIGNIFICANT CHANGE UP

## 2018-07-23 RX ORDER — IBUPROFEN 200 MG
200 TABLET ORAL ONCE
Qty: 0 | Refills: 0 | Status: COMPLETED | OUTPATIENT
Start: 2018-07-23 | End: 2018-07-23

## 2018-07-23 RX ORDER — SODIUM CHLORIDE 9 MG/ML
1000 INJECTION, SOLUTION INTRAVENOUS
Qty: 0 | Refills: 0 | Status: DISCONTINUED | OUTPATIENT
Start: 2018-07-23 | End: 2018-07-24

## 2018-07-23 RX ORDER — ACETAMINOPHEN 500 MG
650 TABLET ORAL EVERY 6 HOURS
Qty: 0 | Refills: 0 | Status: COMPLETED | OUTPATIENT
Start: 2018-07-23 | End: 2018-07-24

## 2018-07-23 RX ORDER — HEPARIN SODIUM 5000 [USP'U]/ML
5000 INJECTION INTRAVENOUS; SUBCUTANEOUS EVERY 12 HOURS
Qty: 0 | Refills: 0 | Status: DISCONTINUED | OUTPATIENT
Start: 2018-07-23 | End: 2018-07-31

## 2018-07-23 RX ADMIN — PANTOPRAZOLE SODIUM 40 MILLIGRAM(S): 20 TABLET, DELAYED RELEASE ORAL at 05:05

## 2018-07-23 RX ADMIN — SODIUM CHLORIDE 40 MILLILITER(S): 9 INJECTION, SOLUTION INTRAVENOUS at 17:13

## 2018-07-23 RX ADMIN — Medication 50 MILLIGRAM(S): at 21:14

## 2018-07-23 RX ADMIN — Medication 650 MILLIGRAM(S): at 17:22

## 2018-07-23 RX ADMIN — HEPARIN SODIUM 5000 UNIT(S): 5000 INJECTION INTRAVENOUS; SUBCUTANEOUS at 17:22

## 2018-07-23 RX ADMIN — LOSARTAN POTASSIUM 25 MILLIGRAM(S): 100 TABLET, FILM COATED ORAL at 05:06

## 2018-07-23 RX ADMIN — Medication 650 MILLIGRAM(S): at 12:03

## 2018-07-23 RX ADMIN — OLANZAPINE 20 MILLIGRAM(S): 15 TABLET, FILM COATED ORAL at 12:05

## 2018-07-23 RX ADMIN — Medication 50 MILLIGRAM(S): at 14:14

## 2018-07-23 RX ADMIN — SODIUM CHLORIDE 100 MILLILITER(S): 9 INJECTION INTRAMUSCULAR; INTRAVENOUS; SUBCUTANEOUS at 02:51

## 2018-07-23 RX ADMIN — Medication 650 MILLIGRAM(S): at 02:51

## 2018-07-23 RX ADMIN — Medication 50 MILLIGRAM(S): at 05:06

## 2018-07-23 RX ADMIN — Medication 650 MILLIGRAM(S): at 21:14

## 2018-07-23 RX ADMIN — Medication 200 MILLIGRAM(S): at 21:14

## 2018-07-23 RX ADMIN — Medication 650 MILLIGRAM(S): at 06:05

## 2018-07-23 RX ADMIN — FLUVOXAMINE MALEATE 25 MILLIGRAM(S): 25 TABLET ORAL at 12:05

## 2018-07-23 NOTE — CONSULT NOTE ADULT - SUBJECTIVE AND OBJECTIVE BOX
HPI:  62 YO M nonverbal MRDD admitted from [facility] sp fall. Found to be febrile.  Work up including CT imaging and cultures unremarkable. No documented n/v/d.  Pt unable to provide history. Spiked temp to 104 last night. No rash. No wounds  or trauma.    Infectious Disease consult was called to evaluate pt due to fevers.    Past Medical & Surgical Hx:  PAST MEDICAL & SURGICAL HISTORY:  Blind: right eye  DD (diastrophic dysplasia)  Psoriasis  HTN (hypertension)  Seizure  MR (mental retardation), severe  No significant past surgical history      Social History--  EtOH: denies   Tobacco: denies   Drug Use: denies   Resident of [facility]    FAMILY HISTORY:  No pertinent family history in first degree relatives      Allergies  penicillin (Unknown)  sulfa drugs (Unknown)    Home Medications:  losartan 25 mg oral tablet: 1 tab(s) orally once a day (14 Jul 2016 22:11)  ZyPREXA 20 mg oral tablet: 1 tab(s) orally once a day (14 Jul 2016 22:11)      Current Inpatient Medications :    ANTIBIOTICS:   aztreonam  IVPB 1000 milliGRAM(s) IV Intermittent every 8 hours  aztreonam  IVPB          OTHER RELEVANT MEDICATIONS :  acetaminophen   Tablet 325 milliGRAM(s) Oral every 4 hours PRN  acetaminophen   Tablet 650 milliGRAM(s) Oral every 4 hours  fluvoxaMINE 25 milliGRAM(s) Oral daily  losartan 25 milliGRAM(s) Oral daily  OLANZapine 20 milliGRAM(s) Oral daily  pantoprazole    Tablet 40 milliGRAM(s) Oral before breakfast  sodium chloride 0.9%. 1000 milliLiter(s) IV Continuous <Continuous>      ROS:  Unable to obtain due to : NONVERBAL MRDD      I&O's Detail    22 Jul 2018 07:01  -  23 Jul 2018 07:00  --------------------------------------------------------  IN:    Oral Fluid: 120 mL    sodium chloride 0.9%.: 2300 mL    Solution: 250 mL    Solution: 100 mL  Total IN: 2770 mL    OUT:  Total OUT: 0 mL    Total NET: 2770 mL      23 Jul 2018 07:01  -  23 Jul 2018 13:04  --------------------------------------------------------  IN:    Oral Fluid: 480 mL  Total IN: 480 mL    OUT:  Total OUT: 0 mL    Total NET: 480 mL    Physical Exam:  Vital Signs Last 24 Hrs  T(C): 37.4 (23 Jul 2018 12:28), Max: 40.1 (22 Jul 2018 20:11)  T(F): 99.4 (23 Jul 2018 12:28), Max: 104.1 (22 Jul 2018 20:11)  HR: 81 (23 Jul 2018 12:28) (70 - 101)  BP: 116/72 (23 Jul 2018 12:28) (111/68 - 151/87)  BP(mean): --  RR: 15 (23 Jul 2018 12:28) (15 - 18)  SpO2: 91% (23 Jul 2018 12:28) (91% - 97%)      General:  no acute distress nonerbal  Eyes: sclera anicteric, pupils equal and reactive to light  ENMT: buccal mucosa moist, pharynx not injected  Neck: supple, trachea midline  Lungs: clear, no wheeze/rhonchi  Cardiovascular: regular rate and rhythm, S1 S2  Abdomen: soft, suprpubic area tense and full with tenderness, no organomegaly present, bowel sounds normal  Neurological:  awake MRDD  Skin:no increased ecchymosis/petechiae/purpura  Lymph Nodes: no palpable cervical/supraclavicular lymph nodes enlargements  Extremities: no cyanosis/clubbing/edema    Labs:                           12.8   4.49  )-----------( 129      ( 23 Jul 2018 04:48 )             36.1   07-23    146<H>  |  111<H>  |  9   ----------------------------<  91  3.7   |  27  |  0.91    Ca    8.0<L>      23 Jul 2018 04:48    TPro  6.5  /  Alb  2.7<L>  /  TBili  0.3  /  DBili  x   /  AST  74<H>  /  ALT  58  /  AlkPhos  74  07-23    RECENT CULTURES:    Culture - Blood (collected 22 Jul 2018 10:30)  Source: .Blood Blood-Peripheral  Preliminary Report (23 Jul 2018 11:01):    No growth to date.    Culture - Blood (collected 22 Jul 2018 10:30)  Source: .Blood Blood-Venous  Preliminary Report (23 Jul 2018 11:01):    No growth to date.    Culture - Blood (collected 22 Jul 2018 10:29)  Source: .Blood Blood-Venous  Preliminary Report (23 Jul 2018 11:01):    No growth to date.    Culture - Urine (collected 22 Jul 2018 10:08)  Source: .Urine Clean Catch (Midstream)  Final Report (23 Jul 2018 11:27):    No growth    Culture - Urine (collected 22 Jul 2018 00:26)  Source: .Urine Clean Catch (Midstream)  Final Report (22 Jul 2018 23:17):    No growth      RADIOLOGY & ADDITIONAL STUDIES:  EXAM:  CT ABDOMEN AND PELVIS IC                            PROCEDURE DATE:  07/21/2018          INTERPRETATION:  CLINICAL INFORMATION: Trauma. Fall. Fever    TECHNIQUE: CT imaging of the abdomen and pelvis was performed with IV and   oral contrast.90 mL of Omnipaque 350 was injected intravenously. 10 mL   was discarded.    COMPARISON: There are no prior studies available for comparison.      FINDINGS:    The heart and lung bases are unremarkable.    The liver, spleen, pancreas, and biliary tree are unremarkable. There is   cholelithiasis.    The kidneys enhance symmetrically without hydronephrosis. Subcentimeter   hypodense lesions are too small to characterize. The adrenal glands are   unremarkable.    There is no bowel obstruction or ascites. The appendix is normal.    The urinary bladder is unremarkable. There is no pelvic mass.    There is no abdominal or pelvic lymphadenopathy.    The aorta and IVC are unremarkable.    Chronic rib fractures.      IMPRESSION:    No visceral organ injury.        EXAM:  CT CERVICAL SPINE                          EXAM:  CT BRAIN                            PROCEDURE DATE:  07/21/2018          INTERPRETATION:  CLINICAL INFORMATION: Fever. Trauma. Fall.    TECHNIQUE:    Axial CT images were acquired through thehead and cervical spine.  Intravenous contrast: None  Two-dimensional reformats were generated.    COMPARISON STUDY: None    FINDINGS:     Diagnostic accuracy is limited secondary to patient motion.    CT head:    There is no CT evidence of acute intracranial hemorrhage, mass effect,   midline shift, or acute territorial infarct.    The ventricles and sulci   are prominent consistent with age-related parenchymal volume loss. The   basal cisterns are patent.  There are periventricular white matter  hypodensities that are nonspecific in nature but may reflect chronic   ischemic microvascular disease.      The visualized paranasal sinuses are clear. Right ocular prosthesis.    The mastoid air cells and middle ear cavities are grossly clear.    The calvarium, skull base and extracranial soft tissues are grossly   intact.      CT cervical spine:    There is nonspecific straightening of the normal cervical lordosis.  There is no CT evidence of an acute cervical spine fracture or traumatic   malalignment.  There is no suspicious lytic or blastic lesion.  The paraspinous soft tissues are unremarkable within limits of CT scan.    Degenerative changes:  There are multilevel degenerative changes characterized by disc   osteophyte complexes and facet and uncinate hypertrophy as well as   ossification of the posterior longitudinal ligament with resultant   moderate multilevel central canal and neural foraminal stenosis.    Incidental findings:  Visualized soft tissues of the neck are unremarkable.  Visualized lung apices are unremarkable.        Assessment :   62 YO M nonverbal MRDD admitted from group home sp fall with persisent fevers.  Rule out urinary retention as pt has pain supra pubic region and its distended and  tense  No evidence for pna  Cultures thus far ngtd    Plan :   Azactam for now  Bladder scan now  Fu cultures  Trend temps and wbc  Aspiration precautions    D/w Nursing  Dr Dr Meyer    Continue with present regime .  Approptiate use of antibiotics and adverse effects reviewed.      I have discussed the above plan of care with patient/family in detail. They expressed understanding of the treatment plan . Risks, benefits and alternatives discussed in detail. I have asked if they have any questions or concerns and appropriately addressed them to the best of my ability .      > 45 minutes spent in direct patient care reviewing  the notes, lab data/ imaging , discussion with multidisciplinary team. All questions were addressed and answered to the best of my capacity .    Thank you for allowing me to participate in the care of your patient .      Olivier Daniels MD  Infectious Disease  [phone]

## 2018-07-23 NOTE — DIETITIAN INITIAL EVALUATION ADULT. - ADHERENCE
no history available no admit papers patient tolerating mech soft dysphagia 2 honey thick diet at this time/n/a

## 2018-07-23 NOTE — DIETITIAN INITIAL EVALUATION ADULT. - OTHER INFO
patient seen with good PO intake at breakfast. from [facility] with fall and fever.

## 2018-07-23 NOTE — PROGRESS NOTE ADULT - SUBJECTIVE AND OBJECTIVE BOX
PAST MEDICAL & SURGICAL HISTORY:  Blind: right eye  DD (diastrophic dysplasia)  Psoriasis  HTN (hypertension)  Seizure  MR (mental retardation), severe  No significant past surgical history      MEDICATIONS  (STANDING):  acetaminophen   Tablet 650 milliGRAM(s) Oral every 4 hours  aztreonam  IVPB 1000 milliGRAM(s) IV Intermittent every 8 hours  aztreonam  IVPB      fluvoxaMINE 25 milliGRAM(s) Oral daily  losartan 25 milliGRAM(s) Oral daily  OLANZapine 20 milliGRAM(s) Oral daily  pantoprazole    Tablet 40 milliGRAM(s) Oral before breakfast  sodium chloride 0.9%. 1000 milliLiter(s) (100 mL/Hr) IV Continuous <Continuous>    MEDICATIONS  (PRN):  acetaminophen   Tablet 325 milliGRAM(s) Oral every 4 hours PRN For Temp greater than 38 C (100.4 F)      Patient is a 63y old  Male who presents with a chief complaint of fever   fall (2018 23:57)      Vital Signs Last 24 Hrs  T(C): 38 (2018 14:21), Max: 40.1 (2018 20:11)  T(F): 100.4 (2018 14:21), Max: 104.1 (2018 20:11)  HR: 81 (2018 12:28) (70 - 101)  BP: 116/72 (2018 12:28) (111/68 - 151/87)  BP(mean): --  RR: 15 (2018 12:28) (15 - 18)  SpO2: 91% (2018 12:28) (91% - 97%)           @ : @ 07:00  --------------------------------------------------------  IN: 2770 mL / OUT: 0 mL / NET: 2770 mL     @ 07: @ 15:08  --------------------------------------------------------  IN: 480 mL / OUT: 1050 mL / NET: -570 mL        REVIEW OF SYSTEMS:    Constitutional: No fever, weight loss or fatigue  Eyes: No eye pain, visual disturbances, or discharge  ENT:  No difficulty hearing, tinnitus, vertigo; No sinus or throat pain  Neck: No pain or stiffness  Breasts: No pain, masses or nipple discharge  Respiratory: No cough, wheezing, chills or hemoptysis  Cardiovascular: No chest pain, palpitations, shortness of breath, dizziness or leg swelling  Gastrointestinal: No abdominal or epigastric pain. No nausea, vomiting or hematemesis; No diarrhea or constipation. No melena or hematochezia.  Genitourinary: No dysuria, frequency, hematuria or incontinence  Rectal: No pain, hemorrhoids or incontinence  Neurological: No headaches, memory loss, loss of strength, numbness or tremors  Skin: No itching, burning, rashes or lesions   Lymph Nodes: No enlarged glands  Endocrine: No heat or cold intolerance; No hair loss  Musculoskeletal: No joint pain or swelling; No muscle, back or extremity pain  Psychiatric: No depression, anxiety, mood swings or difficulty sleeping  Heme/Lymph: No easy bruising or bleeding gums  Allergy and Immunologic: No hives or eczema    PHYSICAL EXAM:  alert,,,,tmax 104  in no distress,,,ate all his breakfast  some lunch  urine output 1000cc  rodriguez was inserted  Constitutional: NAD  Neck: No LAD, No JVD  Respiratory: dec bs bases   Cardiovascular: S1 and S2, RRR, no M/G/R  Gastrointestinal: BS+, soft, NT/ND  Extremities: No peripheral edema  Neurological: A/O  no focal deficits  Skin: eccymosis post skin smaller      decubiti: none                          12.8   4.49  )-----------( 129      ( 2018 04:48 )             36.1         146<H>  |  111<H>  |  9   ----------------------------<  91  3.7   |  27  |  0.91    Ca    8.0<L>      2018 04:48    TPro  6.5  /  Alb  2.7<L>  /  TBili  0.3  /  DBili  x   /  AST  74<H>  /  ALT  58  /  AlkPhos  74      PT/INR - ( 2018 04:48 )   PT: 13.9 sec;   INR: 1.27 ratio           Urinalysis Basic - ( 2018 13:22 )    Color: Yellow / Appearance: Clear / S.010 / pH: x  Gluc: x / Ketone: Negative  / Bili: Negative / Urobili: Negative   Blood: x / Protein: Negative / Nitrite: Negative   Leuk Esterase: Negative / RBC: 3-5 /HPF / WBC Negative   Sq Epi: x / Non Sq Epi: Negative / Bacteria: Negative      CARDIAC MARKERS ( 2018 04:48 )  <.015 ng/mL / x     / 289 U/L / x     / x      CARDIAC MARKERS ( 2018 20:49 )  <.015 ng/mL / x     / 263 U/L / x     / x      CARDIAC MARKERS ( 2018 12:36 )  <.015 ng/mL / x     / 228 U/L / x     / x           @ 04:48    TSH: --  B12:  --   Folate: --   SUE: --   Rheumatoid: --   Ammonia:  --   CPK:  --  Total PSA:  --  Free PSA: --  Cortisol: --  C-Diff:  --  BNP:  --  SPEP: --  Troponin:  <.015  CKMB: --  Valproic acid level:  --  tegretol level: --  dilantin level:  --  phenobarb level: --  keppra level:  --   @ 20:49    TSH: --  B12:  --   Folate: --   SUE: --   Rheumatoid: --   Ammonia:  --   CPK:  --  Total PSA:  --  Free PSA: --  Cortisol: --  C-Diff:  --  BNP:  --  SPEP: --  Troponin:  <.015  CKMB: --  Valproic acid level:  --  tegretol level: --  dilantin level:  --  phenobarb level: --  keppra level:  --    .Blood Blood-Venous   @ 10:30   No growth to date.  --  --      .Blood Blood-Venous   @ 10:29   No growth to date.  --  --      .Urine Clean Catch (Midstream)   @ 10:08   No growth  --  --      .Urine Clean Catch (Midstream)   @ 00:26   No growth  --  --        Urine Culture:   @ 10:30    --        No growth to date.  Urine Culture:   @ 10:29    --        No growth to date.  Urine Culture:   @ 10:08    --        No growth  Urine Culture:   @ 00:26    --        No growth        Radiology:

## 2018-07-24 DIAGNOSIS — R33.9 RETENTION OF URINE, UNSPECIFIED: ICD-10-CM

## 2018-07-24 DIAGNOSIS — A41.9 SEPSIS, UNSPECIFIED ORGANISM: ICD-10-CM

## 2018-07-24 LAB
ALBUMIN SERPL ELPH-MCNC: 2.9 G/DL — LOW (ref 3.3–5)
ALP SERPL-CCNC: 94 U/L — SIGNIFICANT CHANGE UP (ref 40–120)
ALT FLD-CCNC: 94 U/L — HIGH (ref 12–78)
ANION GAP SERPL CALC-SCNC: 9 MMOL/L — SIGNIFICANT CHANGE UP (ref 5–17)
AST SERPL-CCNC: 101 U/L — HIGH (ref 15–37)
BASOPHILS # BLD AUTO: 0 K/UL — SIGNIFICANT CHANGE UP (ref 0–0.2)
BASOPHILS NFR BLD AUTO: 0 % — SIGNIFICANT CHANGE UP (ref 0–2)
BILIRUB SERPL-MCNC: 0.3 MG/DL — SIGNIFICANT CHANGE UP (ref 0.2–1.2)
BUN SERPL-MCNC: 10 MG/DL — SIGNIFICANT CHANGE UP (ref 7–23)
CALCIUM SERPL-MCNC: 8.6 MG/DL — SIGNIFICANT CHANGE UP (ref 8.5–10.1)
CHLORIDE SERPL-SCNC: 106 MMOL/L — SIGNIFICANT CHANGE UP (ref 96–108)
CO2 SERPL-SCNC: 29 MMOL/L — SIGNIFICANT CHANGE UP (ref 22–31)
CREAT SERPL-MCNC: 0.99 MG/DL — SIGNIFICANT CHANGE UP (ref 0.5–1.3)
CULTURE RESULTS: NO GROWTH — SIGNIFICANT CHANGE UP
EOSINOPHIL # BLD AUTO: 0.16 K/UL — SIGNIFICANT CHANGE UP (ref 0–0.5)
EOSINOPHIL NFR BLD AUTO: 3 % — SIGNIFICANT CHANGE UP (ref 0–6)
GIANT PLATELETS BLD QL SMEAR: PRESENT — SIGNIFICANT CHANGE UP
GLUCOSE SERPL-MCNC: 98 MG/DL — SIGNIFICANT CHANGE UP (ref 70–99)
HCT VFR BLD CALC: 40.6 % — SIGNIFICANT CHANGE UP (ref 39–50)
HGB BLD-MCNC: 14.5 G/DL — SIGNIFICANT CHANGE UP (ref 13–17)
LYMPHOCYTES # BLD AUTO: 2.23 K/UL — SIGNIFICANT CHANGE UP (ref 1–3.3)
LYMPHOCYTES # BLD AUTO: 42 % — SIGNIFICANT CHANGE UP (ref 13–44)
MANUAL SMEAR VERIFICATION: YES — SIGNIFICANT CHANGE UP
MCHC RBC-ENTMCNC: 32.7 PG — SIGNIFICANT CHANGE UP (ref 27–34)
MCHC RBC-ENTMCNC: 35.7 GM/DL — SIGNIFICANT CHANGE UP (ref 32–36)
MCV RBC AUTO: 91.6 FL — SIGNIFICANT CHANGE UP (ref 80–100)
MONOCYTES # BLD AUTO: 0.43 K/UL — SIGNIFICANT CHANGE UP (ref 0–0.9)
MONOCYTES NFR BLD AUTO: 8 % — SIGNIFICANT CHANGE UP (ref 2–14)
NEUTROPHILS # BLD AUTO: 2.5 K/UL — SIGNIFICANT CHANGE UP (ref 1.8–7.4)
NEUTROPHILS NFR BLD AUTO: 44 % — SIGNIFICANT CHANGE UP (ref 43–77)
NEUTS BAND # BLD: 3 % — SIGNIFICANT CHANGE UP (ref 0–8)
NRBC # BLD: 0 — SIGNIFICANT CHANGE UP
NRBC # BLD: SIGNIFICANT CHANGE UP /100 WBCS (ref 0–0)
NT-PROBNP SERPL-SCNC: 68 PG/ML — SIGNIFICANT CHANGE UP (ref 0–125)
PLAT MORPH BLD: NORMAL — SIGNIFICANT CHANGE UP
PLATELET # BLD AUTO: 143 K/UL — LOW (ref 150–400)
POTASSIUM SERPL-MCNC: 3.7 MMOL/L — SIGNIFICANT CHANGE UP (ref 3.5–5.3)
POTASSIUM SERPL-SCNC: 3.7 MMOL/L — SIGNIFICANT CHANGE UP (ref 3.5–5.3)
PROT SERPL-MCNC: 7.3 G/DL — SIGNIFICANT CHANGE UP (ref 6–8.3)
PSA FREE FLD-MCNC: 0.64 NG/ML — SIGNIFICANT CHANGE UP
PSA FREE FLD-MCNC: 42.4 — SIGNIFICANT CHANGE UP
PSA FREE MFR FLD: 1.51 NG/ML — SIGNIFICANT CHANGE UP (ref 0–4)
RBC # BLD: 4.43 M/UL — SIGNIFICANT CHANGE UP (ref 4.2–5.8)
RBC # FLD: 12.4 % — SIGNIFICANT CHANGE UP (ref 10.3–14.5)
RBC BLD AUTO: SIGNIFICANT CHANGE UP
SODIUM SERPL-SCNC: 144 MMOL/L — SIGNIFICANT CHANGE UP (ref 135–145)
SPECIMEN SOURCE: SIGNIFICANT CHANGE UP
WBC # BLD: 5.32 K/UL — SIGNIFICANT CHANGE UP (ref 3.8–10.5)
WBC # FLD AUTO: 5.32 K/UL — SIGNIFICANT CHANGE UP (ref 3.8–10.5)

## 2018-07-24 PROCEDURE — 93010 ELECTROCARDIOGRAM REPORT: CPT

## 2018-07-24 PROCEDURE — 71045 X-RAY EXAM CHEST 1 VIEW: CPT | Mod: 26

## 2018-07-24 RX ORDER — VANCOMYCIN HCL 1 G
1000 VIAL (EA) INTRAVENOUS ONCE
Qty: 0 | Refills: 0 | Status: COMPLETED | OUTPATIENT
Start: 2018-07-24 | End: 2018-07-24

## 2018-07-24 RX ORDER — IBUPROFEN 200 MG
200 TABLET ORAL ONCE
Qty: 0 | Refills: 0 | Status: COMPLETED | OUTPATIENT
Start: 2018-07-24 | End: 2018-07-24

## 2018-07-24 RX ORDER — METOPROLOL TARTRATE 50 MG
12.5 TABLET ORAL DAILY
Qty: 0 | Refills: 0 | Status: DISCONTINUED | OUTPATIENT
Start: 2018-07-24 | End: 2018-07-30

## 2018-07-24 RX ORDER — DIPHENHYDRAMINE HCL 50 MG
25 CAPSULE ORAL ONCE
Qty: 0 | Refills: 0 | Status: COMPLETED | OUTPATIENT
Start: 2018-07-24 | End: 2018-07-24

## 2018-07-24 RX ORDER — IPRATROPIUM/ALBUTEROL SULFATE 18-103MCG
3 AEROSOL WITH ADAPTER (GRAM) INHALATION EVERY 8 HOURS
Qty: 0 | Refills: 0 | Status: DISCONTINUED | OUTPATIENT
Start: 2018-07-24 | End: 2018-07-31

## 2018-07-24 RX ORDER — DOXAZOSIN MESYLATE 4 MG
1 TABLET ORAL AT BEDTIME
Qty: 0 | Refills: 0 | Status: DISCONTINUED | OUTPATIENT
Start: 2018-07-25 | End: 2018-07-27

## 2018-07-24 RX ORDER — BUDESONIDE, MICRONIZED 100 %
0.5 POWDER (GRAM) MISCELLANEOUS
Qty: 0 | Refills: 0 | Status: DISCONTINUED | OUTPATIENT
Start: 2018-07-24 | End: 2018-07-31

## 2018-07-24 RX ADMIN — Medication 325 MILLIGRAM(S): at 21:33

## 2018-07-24 RX ADMIN — Medication 250 MILLIGRAM(S): at 21:40

## 2018-07-24 RX ADMIN — FLUVOXAMINE MALEATE 25 MILLIGRAM(S): 25 TABLET ORAL at 12:36

## 2018-07-24 RX ADMIN — Medication 25 MILLIGRAM(S): at 23:59

## 2018-07-24 RX ADMIN — Medication 50 MILLIGRAM(S): at 21:35

## 2018-07-24 RX ADMIN — Medication 50 MILLIGRAM(S): at 14:21

## 2018-07-24 RX ADMIN — Medication 650 MILLIGRAM(S): at 03:36

## 2018-07-24 RX ADMIN — Medication 650 MILLIGRAM(S): at 09:28

## 2018-07-24 RX ADMIN — OLANZAPINE 20 MILLIGRAM(S): 15 TABLET, FILM COATED ORAL at 12:38

## 2018-07-24 RX ADMIN — Medication 12.5 MILLIGRAM(S): at 17:41

## 2018-07-24 RX ADMIN — Medication 200 MILLIGRAM(S): at 21:33

## 2018-07-24 RX ADMIN — LOSARTAN POTASSIUM 25 MILLIGRAM(S): 100 TABLET, FILM COATED ORAL at 06:35

## 2018-07-24 RX ADMIN — Medication 50 MILLIGRAM(S): at 06:35

## 2018-07-24 RX ADMIN — HEPARIN SODIUM 5000 UNIT(S): 5000 INJECTION INTRAVENOUS; SUBCUTANEOUS at 17:41

## 2018-07-24 RX ADMIN — Medication 40 MILLIGRAM(S): at 23:59

## 2018-07-24 RX ADMIN — Medication 3 MILLILITER(S): at 23:08

## 2018-07-24 RX ADMIN — Medication 650 MILLIGRAM(S): at 14:21

## 2018-07-24 RX ADMIN — PANTOPRAZOLE SODIUM 40 MILLIGRAM(S): 20 TABLET, DELAYED RELEASE ORAL at 06:35

## 2018-07-24 RX ADMIN — HEPARIN SODIUM 5000 UNIT(S): 5000 INJECTION INTRAVENOUS; SUBCUTANEOUS at 06:35

## 2018-07-24 NOTE — PROGRESS NOTE ADULT - SUBJECTIVE AND OBJECTIVE BOX
ROSELIA NARAYAN is a 63yMale , patient examined and chart reviewed.     INTERVAL HPI/ OVERNIGHT EVENTS:   Wade placed sec urinary retention yesterday.  Afebrile. Eating by himself.  Nonverbal.    PAST MEDICAL & SURGICAL HISTORY:  Blind: right eye  DD (diastrophic dysplasia)  Psoriasis  HTN (hypertension)  Seizure  MR (mental retardation), severe  No significant past surgical history      For details regarding the patient's social history, family history, and other miscellaneous elements, please refer the initial infectious diseases consultation and/or the admitting history and physical examination for this admission.    ROS:  Unable to obtain due to : Nonverbal MRDD    ALLERGIES  penicillin (Unknown)  sulfa drugs (Unknown)      Current inpatient medications :    ANTIBIOTICS/RELEVANT:  aztreonam  IVPB 1000 milliGRAM(s) IV Intermittent every 8 hours  aztreonam  IVPB          acetaminophen   Tablet 325 milliGRAM(s) Oral every 4 hours PRN  dextrose 5%. 1000 milliLiter(s) IV Continuous <Continuous>  fluvoxaMINE 25 milliGRAM(s) Oral daily  heparin  Injectable 5000 Unit(s) SubCutaneous every 12 hours  losartan 25 milliGRAM(s) Oral daily  OLANZapine 20 milliGRAM(s) Oral daily  pantoprazole    Tablet 40 milliGRAM(s) Oral before breakfast      Objective:     @ :  -   @ 07:00  --------------------------------------------------------  IN: 1920 mL / OUT: 3150 mL / NET: -1230 mL     @ 07:  -   @ 16:56  --------------------------------------------------------  IN: 320 mL / OUT: 1000 mL / NET: -680 mL      T(C): 36.8 (18 @ 16:27), Max: 38.8 (18 @ 20:29)  HR: 90 (18 @ 16:27) (73 - 101)  BP: 135/89 (18 @ 16:27) (124/78 - 135/89)  RR: 16 (18 @ 16:27) (16 - 17)  SpO2: 94% (18 @ 16:27) (92% - 97%)  Wt(kg): --      Physical Exam:  General: no acute distress  Eyes: sclera anicteric, pupils equal and reactive to light  ENMT: buccal mucosa moist, pharynx not injected  Neck: supple, trachea midline  Lungs: clear, no wheeze/rhonchi  Cardiovascular: regular rate and rhythm, S1 S2  Abdomen: soft, nontender, no organomegaly present, bowel sounds normal  Neurological: awake MRDD  Skin: no increased ecchymosis/petechiae/purpura  Lymph Nodes: no palpable cervical/supraclavicular lymph nodes enlargements  Extremities: no cyanosis/clubbing/edema      LABS:                          14.5   5.32  )-----------( 143      ( 2018 05:50 )             40.6           144  |  106  |  10  ----------------------------<  98  3.7   |  29  |  0.99    Ca    8.6      2018 05:50    TPro  7.3  /  Alb  2.9<L>  /  TBili  0.3  /  DBili  x   /  AST  101<H>  /  ALT  94<H>  /  AlkPhos  94        PT/INR - ( 2018 04:48 )   PT: 13.9 sec;   INR: 1.27 ratio           Urinalysis Basic - ( 2018 13:22 )    Color: Yellow / Appearance: Clear / S.010 / pH: x  Gluc: x / Ketone: Negative  / Bili: Negative / Urobili: Negative   Blood: x / Protein: Negative / Nitrite: Negative   Leuk Esterase: Negative / RBC: 3-5 /HPF / WBC Negative   Sq Epi: x / Non Sq Epi: Negative / Bacteria: Negative    MICROBIOLOGY:    Culture - Urine (collected 2018 18:57)  Source: .Urine Catheterized  Final Report (2018 15:27):    No growth    Culture - Blood (collected 2018 08:36)  Source: .Blood Blood  Preliminary Report (2018 09:00):    No growth to date.    Culture - Blood (collected 2018 08:36)  Source: .Blood Blood  Preliminary Report (2018 09:00):    No growth to date.    Culture - Blood (collected 2018 10:30)  Source: .Blood Blood-Peripheral  Preliminary Report (2018 11:01):    No growth to date.    Culture - Blood (collected 2018 10:30)  Source: .Blood Blood-Venous  Preliminary Report (2018 11:01):    No growth to date.    Culture - Blood (collected 2018 10:29)  Source: .Blood Blood-Venous  Preliminary Report (2018 11:01):    No growth to date.    Culture - Urine (collected 2018 10:08)  Source: .Urine Clean Catch (Midstream)  Final Report (2018 11:27):    No growth    Culture - Urine (collected 2018 00:26)  Source: .Urine Clean Catch (Midstream)  Final Report (2018 23:17):    No growth    RADIOLOGY & ADDITIONAL STUDIES:  EXAM:  CT ABDOMEN AND PELVIS IC                            PROCEDURE DATE:  2018          INTERPRETATION:  CLINICAL INFORMATION: Trauma. Fall. Fever    TECHNIQUE: CT imaging of the abdomen and pelvis was performed with IV and   oral contrast.90 mL of Omnipaque 350 was injected intravenously. 10 mL   was discarded.    COMPARISON: There are no prior studies available for comparison.      FINDINGS:    The heart and lung bases are unremarkable.    The liver, spleen, pancreas, and biliary tree are unremarkable. There is   cholelithiasis.    The kidneys enhance symmetrically without hydronephrosis. Subcentimeter   hypodense lesions are too small to characterize. The adrenal glands are   unremarkable.    There is no bowel obstruction or ascites. The appendix is normal.    The urinary bladder is unremarkable. There is no pelvic mass.    There is no abdominal or pelvic lymphadenopathy.    The aorta and IVC are unremarkable.    Chronic rib fractures.      IMPRESSION:    No visceral organ injury.        EXAM:  CT CERVICAL SPINE                          EXAM:  CT BRAIN                            PROCEDURE DATE:  2018          INTERPRETATION:  CLINICAL INFORMATION: Fever. Trauma. Fall.    TECHNIQUE:    Axial CT images were acquired through thehead and cervical spine.  Intravenous contrast: None  Two-dimensional reformats were generated.    COMPARISON STUDY: None    FINDINGS:     Diagnostic accuracy is limited secondary to patient motion.    CT head:    There is no CT evidence of acute intracranial hemorrhage, mass effect,   midline shift, or acute territorial infarct.    The ventricles and sulci   are prominent consistent with age-related parenchymal volume loss. The   basal cisterns are patent.  There are periventricular white matter  hypodensities that are nonspecific in nature but may reflect chronic   ischemic microvascular disease.      The visualized paranasal sinuses are clear. Right ocular prosthesis.    The mastoid air cells and middle ear cavities are grossly clear.    The calvarium, skull base and extracranial soft tissues are grossly   intact.      CT cervical spine:    There is nonspecific straightening of the normal cervical lordosis.  There is no CT evidence of an acute cervical spine fracture or traumatic   malalignment.  There is no suspicious lytic or blastic lesion.  The paraspinous soft tissues are unremarkable within limits of CT scan.    Degenerative changes:  There are multilevel degenerative changes characterized by disc   osteophyte complexes and facet and uncinate hypertrophy as well as   ossification of the posterior longitudinal ligament with resultant   moderate multilevel central canal and neural foraminal stenosis.    Incidental findings:  Visualized soft tissues of the neck are unremarkable.  Visualized lung apices are unremarkable.      Assessment :   62 YO M nonverbal MRDD admitted from group home sp fall with persisent fevers.  Sec urinary retention   No evidence for pna  Cultures thus far ngtd  CT unimpressive    Plan :   Azactam for now  If cultures remains negative dc Azactam  Trend temps and wbc  Wade per primary team  Aspiration precautions    Continue with present regiment.  Appropriate use of antibiotics and adverse effects reviewed.      I have discussed the above plan of care with patient's caregiver in detail. They expressed understanding of the  treatment plan . Risks, benefits and alternatives discussed in detail. I have asked if they have any questions or concerns and appropriately addressed them to the best of my ability .    > 35 minutes were spent in direct patient care reviewing notes, medications ,labs data/ imaging , discussion with multidisciplinary team.    Thank you for allowing me to participate in care of your patient .    Olivier Daniels MD  Infectious Disease  [phone]

## 2018-07-24 NOTE — PROGRESS NOTE ADULT - SUBJECTIVE AND OBJECTIVE BOX
PAST MEDICAL & SURGICAL HISTORY:  Blind: right eye  DD (diastrophic dysplasia)  Psoriasis  HTN (hypertension)  Seizure  MR (mental retardation), severe  No significant past surgical history      MEDICATIONS  (STANDING):  ALBUTerol/ipratropium for Nebulization 3 milliLiter(s) Nebulizer every 8 hours  aztreonam  IVPB 1000 milliGRAM(s) IV Intermittent every 8 hours  aztreonam  IVPB      buDESOnide   0.5 milliGRAM(s) Respule 0.5 milliGRAM(s) Inhalation two times a day  fluvoxaMINE 25 milliGRAM(s) Oral daily  heparin  Injectable 5000 Unit(s) SubCutaneous every 12 hours  metoprolol tartrate 12.5 milliGRAM(s) Oral daily  OLANZapine 20 milliGRAM(s) Oral daily  pantoprazole    Tablet 40 milliGRAM(s) Oral before breakfast  vancomycin  IVPB 1000 milliGRAM(s) IV Intermittent once    MEDICATIONS  (PRN):  acetaminophen   Tablet 325 milliGRAM(s) Oral every 4 hours PRN For Temp greater than 38 C (100.4 F)      Patient is a 63y old  Male who presents with a chief complaint of fever   fall (2018 23:57)      Vital Signs Last 24 Hrs  T(C): 38.9 (2018 19:22), Max: 38.9 (2018 19:22)  T(F): 102.1 (2018 19:22), Max: 102.1 (2018 19:22)  HR: 109 (2018 19:22) (73 - 109)  BP: 130/77 (2018 19:22) (124/78 - 135/89)  BP(mean): --  RR: 16 (2018 19:22) (16 - 17)  SpO2: 93% (2018 19:22) (92% - 97%)           @ 07: @ 07:00  --------------------------------------------------------  IN: 1920 mL / OUT: 3150 mL / NET: -1230 mL     @ 07: @ 20:19  --------------------------------------------------------  IN: 520 mL / OUT: 1000 mL / NET: -480 mL        REVIEW OF SYSTEMS:    Constitutional: No fever, weight loss or fatigue  Eyes: No eye pain, visual disturbances, or discharge  ENT:  No difficulty hearing, tinnitus, vertigo; No sinus or throat pain  Neck: No pain or stiffness  Breasts: No pain, masses or nipple discharge  Respiratory: No cough, wheezing, chills or hemoptysis  Cardiovascular: No chest pain, palpitations, shortness of breath, dizziness or leg swelling  Gastrointestinal: No abdominal or epigastric pain. No nausea, vomiting or hematemesis; No diarrhea or constipation. No melena or hematochezia.  Genitourinary: No dysuria, frequency, hematuria or incontinence  Rectal: No pain, hemorrhoids or incontinence  Neurological: No headaches, memory loss, loss of strength, numbness or tremors  Skin: No itching, burning, rashes or lesions   Lymph Nodes: No enlarged glands  Endocrine: No heat or cold intolerance; No hair loss  Musculoskeletal: No joint pain or swelling; No muscle, back or extremity pain  Psychiatric: No depression, anxiety, mood swings or difficulty sleeping  Heme/Lymph: No easy bruising or bleeding gums  Allergy and Immunologic: No hives or eczema    PHYSICAL EXAM:  temp 102  Constitutional: diaphoretic  Neck: No LAD, No JVD  Back: Normal spine flexure, No CVA tenderness  Respiratory: dec bs bases   Cardiovascular: S1 and S2, RRR, no M/G/R  Gastrointestinal: BS+, soft, NT/ND  Extremities: No peripheral edema  Neurological: A/O no focal deficits  Skin: No rashes      decubiti: none                          14.5   5.32  )-----------( 143      ( 2018 05:50 )             40.6     07-    144  |  106  |  10  ----------------------------<  98  3.7   |  29  |  0.99    Ca    8.6      2018 05:50    TPro  7.3  /  Alb  2.9<L>  /  TBili  0.3  /  DBili  x   /  AST  101<H>  /  ALT  94<H>  /  AlkPhos  94      PT/INR - ( 2018 04:48 )   PT: 13.9 sec;   INR: 1.27 ratio           Urinalysis Basic - ( 2018 13:22 )    Color: Yellow / Appearance: Clear / S.010 / pH: x  Gluc: x / Ketone: Negative  / Bili: Negative / Urobili: Negative   Blood: x / Protein: Negative / Nitrite: Negative   Leuk Esterase: Negative / RBC: 3-5 /HPF / WBC Negative   Sq Epi: x / Non Sq Epi: Negative / Bacteria: Negative      CARDIAC MARKERS ( 2018 04:48 )  <.015 ng/mL / x     / 289 U/L / x     / x      CARDIAC MARKERS ( 2018 20:49 )  <.015 ng/mL / x     / 263 U/L / x     / x          07 @ 07:38    TSH: --  B12:  --   Folate: --   SUE: --   Rheumatoid: --   Ammonia:  --   CPK:  --  Total PSA:  1.51  Free PSA: 42.4  Cortisol: --  C-Diff:  --  BNP:  --  SPEP: --  Troponin:  --  CKMB: --  Valproic acid level:  --  tegretol level: --  dilantin level:  --  phenobarb level: --  keppra level:  --    .Urine Catheterized   @ 18:57   No growth  --  --      .Blood Blood   @ 08:36   No growth to date.  --  --      .Blood Blood-Venous   @ 10:30   No growth to date.  --  --      .Blood Blood-Venous   @ 10:29   No growth to date.  --  --      .Urine Clean Catch (Midstream)   @ 10:08   No growth  --  --      .Urine Clean Catch (Midstream)   @ 00:26   No growth  --  --        Urine Culture:   @ 18:57    --        No growth  Urine Culture:   @ 08:36    --        No growth to date.  Urine Culture:   @ 10:30    --        No growth to date.  Urine Culture:   @ 10:29    --        No growth to date.  Urine Culture:   @ 10:08    --        No growth  Urine Culture:   @ 00:26    --        No growth        Radiology:  < from: Xray Chest 1 View- PORTABLE-Routine (18 @ 17:29) >  EXAM:  XR CHEST PORTABLE ROUTINE 1V                            PROCEDURE DATE:  2018          INTERPRETATION:  Clinical information: Pneumonia    Portable study, 5:25 PM    Comparison exam dated 2018    Elevated diaphragm, shallow inspiration. Vague streaky airspace disease   visible, left lower lung. Right lung shows no evidence of airspace   disease. No vascular congestion or effusion evident. Heart size cannot be   evaluated due to shallow inspiration and portable technique. No acute   osseous abnormalities. Patient's chin obscures portions of lung apices.   Cardiac monitor leads overlie the lower chest.    IMPRESSION: See above report    < end of copied text >

## 2018-07-24 NOTE — CHART NOTE - NSCHARTNOTEFT_GEN_A_CORE
Called by RN for patient with new rash. Patient seen and examined at bedside. Pt recently completed dose of Vancomycin. Rash is papular macular diffuse on patient's back. Patient is MRDD, nonverbal and unable to communicate symptoms. Suspect red man syndrome. RN called attending, Dr. Anne. Discussed giving patient benadryl 25mg po x1, solumderol 40mg x1,  d/c'ing Azactam as patient has continued to have fevers while on Azactam with negative cultures and starting Levaquin 250mg qd to begin tomorrow.     Vital Signs Last 24 Hrs  T(C): 38.9 (24 Jul 2018 19:22), Max: 38.9 (24 Jul 2018 19:22)  T(F): 102.1 (24 Jul 2018 19:22), Max: 102.1 (24 Jul 2018 19:22)  HR: 110 (24 Jul 2018 23:08) (73 - 112)  BP: 130/77 (24 Jul 2018 19:22) (124/78 - 135/89)  RR: 16 (24 Jul 2018 19:22) (16 - 17)  SpO2: 98% (24 Jul 2018 23:08) (92% - 98%)    physical exam:   Skin: diffuse macular papular rash on back Called by RN for patient with new rash. Patient seen and examined at bedside. Pt recently completed dose of Vancomycin. Rash is papular macular diffuse on patient's back. Patient is MRDD, nonverbal and unable to communicate symptoms. Suspect drug reaction. RN called attending, Dr. Anne. Discussed giving patient benadryl 25mg po x1, solumderol 40mg x1,  d/c'ing Azactam as patient has continued to have fevers while on Azactam with negative cultures and starting Levaquin 250mg qd to begin tomorrow.     Vital Signs Last 24 Hrs  T(C): 38.9 (24 Jul 2018 19:22), Max: 38.9 (24 Jul 2018 19:22)  T(F): 102.1 (24 Jul 2018 19:22), Max: 102.1 (24 Jul 2018 19:22)  HR: 110 (24 Jul 2018 23:08) (73 - 112)  BP: 130/77 (24 Jul 2018 19:22) (124/78 - 135/89)  RR: 16 (24 Jul 2018 19:22) (16 - 17)  SpO2: 98% (24 Jul 2018 23:08) (92% - 98%)    physical exam:   Skin: diffuse macular papular rash on back Called by RN for patient with new rash. Patient seen and examined at bedside. Pt recently completed dose of Vancomycin. Rash is papular macular diffuse on patient's back. Patient is MRDD, nonverbal and unable to communicate symptoms. Suspect drug reaction. RN called attending, Dr. Anne. Discussed giving patient benadryl 25mg po x1, solumedrol 40mg x1,  d/c'ing Azactam as patient has continued to have fevers while on Azactam with negative cultures and starting Levaquin 250mg qd to begin tomorrow.     Vital Signs Last 24 Hrs  T(C): 38.9 (24 Jul 2018 19:22), Max: 38.9 (24 Jul 2018 19:22)  T(F): 102.1 (24 Jul 2018 19:22), Max: 102.1 (24 Jul 2018 19:22)  HR: 110 (24 Jul 2018 23:08) (73 - 112)  BP: 130/77 (24 Jul 2018 19:22) (124/78 - 135/89)  RR: 16 (24 Jul 2018 19:22) (16 - 17)  SpO2: 98% (24 Jul 2018 23:08) (92% - 98%)    physical exam:   Skin: diffuse macular papular rash on back

## 2018-07-24 NOTE — SWALLOW BEDSIDE ASSESSMENT ADULT - ORAL PREPARATORY PHASE
Reduced oral grading/Decreased mastication ability/Within functional limits Decreased mastication ability/Reduced oral grading

## 2018-07-24 NOTE — SWALLOW BEDSIDE ASSESSMENT ADULT - COMMENTS
62 y/o male admitted c fever ,c PMHX of MR, Blindness, HTN c cognitive-linguistic deficits, Pt c slow bolus prep/propulsion c untimely trigger of swallow c thin and nectar thick liquids c delayed/ weak throat clear and cough reflex response indicative of aspiration

## 2018-07-24 NOTE — SWALLOW BEDSIDE ASSESSMENT ADULT - PHARYNGEAL PHASE
Within functional limits Delayed pharyngeal swallow Cough post oral intake/Delayed pharyngeal swallow/Multiple swallows

## 2018-07-25 DIAGNOSIS — Z88.9 ALLERGY STATUS TO UNSPECIFIED DRUGS, MEDICAMENTS AND BIOLOGICAL SUBSTANCES: ICD-10-CM

## 2018-07-25 DIAGNOSIS — R00.0 TACHYCARDIA, UNSPECIFIED: ICD-10-CM

## 2018-07-25 LAB
ALBUMIN SERPL ELPH-MCNC: 2.9 G/DL — LOW (ref 3.3–5)
ALP SERPL-CCNC: 92 U/L — SIGNIFICANT CHANGE UP (ref 40–120)
ALT FLD-CCNC: 88 U/L — HIGH (ref 12–78)
ANION GAP SERPL CALC-SCNC: 8 MMOL/L — SIGNIFICANT CHANGE UP (ref 5–17)
AST SERPL-CCNC: 70 U/L — HIGH (ref 15–37)
BASOPHILS # BLD AUTO: 0.02 K/UL — SIGNIFICANT CHANGE UP (ref 0–0.2)
BASOPHILS NFR BLD AUTO: 0.2 % — SIGNIFICANT CHANGE UP (ref 0–2)
BILIRUB SERPL-MCNC: 0.2 MG/DL — SIGNIFICANT CHANGE UP (ref 0.2–1.2)
BUN SERPL-MCNC: 18 MG/DL — SIGNIFICANT CHANGE UP (ref 7–23)
CALCIUM SERPL-MCNC: 8.3 MG/DL — LOW (ref 8.5–10.1)
CHLORIDE SERPL-SCNC: 109 MMOL/L — HIGH (ref 96–108)
CO2 SERPL-SCNC: 26 MMOL/L — SIGNIFICANT CHANGE UP (ref 22–31)
CREAT SERPL-MCNC: 1.1 MG/DL — SIGNIFICANT CHANGE UP (ref 0.5–1.3)
EOSINOPHIL # BLD AUTO: 0.03 K/UL — SIGNIFICANT CHANGE UP (ref 0–0.5)
EOSINOPHIL NFR BLD AUTO: 0.3 % — SIGNIFICANT CHANGE UP (ref 0–6)
GLUCOSE SERPL-MCNC: 105 MG/DL — HIGH (ref 70–99)
HCT VFR BLD CALC: 37.1 % — LOW (ref 39–50)
HGB BLD-MCNC: 13.1 G/DL — SIGNIFICANT CHANGE UP (ref 13–17)
IMM GRANULOCYTES NFR BLD AUTO: 0.3 % — SIGNIFICANT CHANGE UP (ref 0–1.5)
LYMPHOCYTES # BLD AUTO: 3.55 K/UL — HIGH (ref 1–3.3)
LYMPHOCYTES # BLD AUTO: 35.6 % — SIGNIFICANT CHANGE UP (ref 13–44)
MCHC RBC-ENTMCNC: 32.6 PG — SIGNIFICANT CHANGE UP (ref 27–34)
MCHC RBC-ENTMCNC: 35.3 GM/DL — SIGNIFICANT CHANGE UP (ref 32–36)
MCV RBC AUTO: 92.3 FL — SIGNIFICANT CHANGE UP (ref 80–100)
MONOCYTES # BLD AUTO: 1.07 K/UL — HIGH (ref 0–0.9)
MONOCYTES NFR BLD AUTO: 10.7 % — SIGNIFICANT CHANGE UP (ref 2–14)
NEUTROPHILS # BLD AUTO: 5.28 K/UL — SIGNIFICANT CHANGE UP (ref 1.8–7.4)
NEUTROPHILS NFR BLD AUTO: 52.9 % — SIGNIFICANT CHANGE UP (ref 43–77)
NRBC # BLD: 0 /100 WBCS — SIGNIFICANT CHANGE UP (ref 0–0)
PLATELET # BLD AUTO: 198 K/UL — SIGNIFICANT CHANGE UP (ref 150–400)
POTASSIUM SERPL-MCNC: 4.3 MMOL/L — SIGNIFICANT CHANGE UP (ref 3.5–5.3)
POTASSIUM SERPL-SCNC: 4.3 MMOL/L — SIGNIFICANT CHANGE UP (ref 3.5–5.3)
PROT SERPL-MCNC: 7.2 G/DL — SIGNIFICANT CHANGE UP (ref 6–8.3)
RBC # BLD: 4.02 M/UL — LOW (ref 4.2–5.8)
RBC # FLD: 12.4 % — SIGNIFICANT CHANGE UP (ref 10.3–14.5)
SODIUM SERPL-SCNC: 143 MMOL/L — SIGNIFICANT CHANGE UP (ref 135–145)
TSH SERPL-MCNC: 2.57 UIU/ML — SIGNIFICANT CHANGE UP (ref 0.36–3.74)
WBC # BLD: 9.98 K/UL — SIGNIFICANT CHANGE UP (ref 3.8–10.5)
WBC # FLD AUTO: 9.98 K/UL — SIGNIFICANT CHANGE UP (ref 3.8–10.5)

## 2018-07-25 PROCEDURE — 73630 X-RAY EXAM OF FOOT: CPT | Mod: 26,50

## 2018-07-25 PROCEDURE — 93010 ELECTROCARDIOGRAM REPORT: CPT

## 2018-07-25 PROCEDURE — 73562 X-RAY EXAM OF KNEE 3: CPT | Mod: 26,50

## 2018-07-25 RX ORDER — SODIUM CHLORIDE 9 MG/ML
1000 INJECTION INTRAMUSCULAR; INTRAVENOUS; SUBCUTANEOUS
Qty: 0 | Refills: 0 | Status: DISCONTINUED | OUTPATIENT
Start: 2018-07-25 | End: 2018-07-25

## 2018-07-25 RX ORDER — DIPHENHYDRAMINE HCL 50 MG
25 CAPSULE ORAL ONCE
Qty: 0 | Refills: 0 | Status: COMPLETED | OUTPATIENT
Start: 2018-07-25 | End: 2018-07-25

## 2018-07-25 RX ORDER — IBUPROFEN 200 MG
200 TABLET ORAL
Qty: 0 | Refills: 0 | Status: DISCONTINUED | OUTPATIENT
Start: 2018-07-25 | End: 2018-07-27

## 2018-07-25 RX ORDER — SODIUM CHLORIDE 9 MG/ML
1000 INJECTION INTRAMUSCULAR; INTRAVENOUS; SUBCUTANEOUS
Qty: 0 | Refills: 0 | Status: DISCONTINUED | OUTPATIENT
Start: 2018-07-25 | End: 2018-07-26

## 2018-07-25 RX ADMIN — Medication 0.5 MILLIGRAM(S): at 07:31

## 2018-07-25 RX ADMIN — FLUVOXAMINE MALEATE 25 MILLIGRAM(S): 25 TABLET ORAL at 12:15

## 2018-07-25 RX ADMIN — Medication 1 MILLIGRAM(S): at 21:59

## 2018-07-25 RX ADMIN — Medication 325 MILLIGRAM(S): at 20:52

## 2018-07-25 RX ADMIN — Medication 25 MILLIGRAM(S): at 22:00

## 2018-07-25 RX ADMIN — HEPARIN SODIUM 5000 UNIT(S): 5000 INJECTION INTRAVENOUS; SUBCUTANEOUS at 17:41

## 2018-07-25 RX ADMIN — OLANZAPINE 20 MILLIGRAM(S): 15 TABLET, FILM COATED ORAL at 12:15

## 2018-07-25 RX ADMIN — Medication 200 MILLIGRAM(S): at 20:52

## 2018-07-25 RX ADMIN — Medication 325 MILLIGRAM(S): at 12:16

## 2018-07-25 RX ADMIN — Medication 0.5 MILLIGRAM(S): at 23:29

## 2018-07-25 RX ADMIN — PANTOPRAZOLE SODIUM 40 MILLIGRAM(S): 20 TABLET, DELAYED RELEASE ORAL at 06:35

## 2018-07-25 RX ADMIN — HEPARIN SODIUM 5000 UNIT(S): 5000 INJECTION INTRAVENOUS; SUBCUTANEOUS at 06:36

## 2018-07-25 RX ADMIN — Medication 3 MILLILITER(S): at 23:29

## 2018-07-25 RX ADMIN — Medication 3 MILLILITER(S): at 07:31

## 2018-07-25 RX ADMIN — SODIUM CHLORIDE 75 MILLILITER(S): 9 INJECTION INTRAMUSCULAR; INTRAVENOUS; SUBCUTANEOUS at 22:25

## 2018-07-25 RX ADMIN — Medication 3 MILLILITER(S): at 16:06

## 2018-07-25 RX ADMIN — SODIUM CHLORIDE 150 MILLILITER(S): 9 INJECTION INTRAMUSCULAR; INTRAVENOUS; SUBCUTANEOUS at 12:18

## 2018-07-25 NOTE — PROGRESS NOTE ADULT - PROBLEM SELECTOR PLAN 2
component of aspiration post fall  s/p speech swallow  continue dysphagia diet  finish levaquin  chest p.t.

## 2018-07-25 NOTE — CONSULT NOTE ADULT - ASSESSMENT
Patient is a 63y Male with MR, admitted with fever, urinary retention, sepsis.  With tachycardia 120-140 started in past 24 hours.    -  Sepsis,   Getting Abx  Probable allergic rxn.    -  Tachycardia  -140 bpm range.  Most likely secondary to infectious process and/or allergic rxn.  Re-check EKG and check echo.  Check trop to make sure there is no demand ischemia.  Consider IVF support      -  Urinary retention Patient is a 63y Male with MR, admitted with fever, urinary retention, sepsis.  With tachycardia 120-140 started in past 24 hours.    -  Sepsis,   Getting Abx  Probable allergic rxn.    -  Tachycardia  -140 bpm range.  Most likely secondary to infectious process and/or allergic rxn.  Re-check EKG and check echo.  Check trop to make sure there is no demand ischemia.  Had received some IVF.  Recommend resume IVF.      -  Urinary retention

## 2018-07-25 NOTE — CONSULT NOTE ADULT - SUBJECTIVE AND OBJECTIVE BOX
CARDIOLOGY CONSULT NOTE    Patient is a 63y Male with a known history of :  Urinary retention (R33.9)  Sepsis, due to unspecified organism (A41.9)  Dysphagia, unspecified type (R13.10)  Fever, unspecified fever cause (R50.9)  Fall, initial encounter (W19.XXXA)        HPI:  fell at home  with fever 101  brought in (2018 23:57)    Since admission, pt still with waxing waning temp.  Last night and this am, HR has spiked up to 130-140 bpm.  Pt unable to provide information.  No known cardiac hx.      REVIEW OF SYSTEMS:    CONSTITUTIONAL: No weight loss, or fatigue  NECK: No pain or stiffness  RESPIRATORY: No cough, wheezing, chills or hemoptysis; No shortness of breath  CARDIOVASCULAR: No apparent chest pain or leg swelling  GASTROINTESTINAL: No abdominal pain. No nausea, vomiting, or hematemesis  MUSCULOSKELETAL: No apparent joint pain or swelling;  ALLERGY AND IMMUNOLOGIC: No hives or eczema      MEDICATIONS  (STANDING):  ALBUTerol/ipratropium for Nebulization 3 milliLiter(s) Nebulizer every 8 hours  buDESOnide   0.5 milliGRAM(s) Respule 0.5 milliGRAM(s) Inhalation two times a day  doxazosin 1 milliGRAM(s) Oral at bedtime  fluvoxaMINE 25 milliGRAM(s) Oral daily  heparin  Injectable 5000 Unit(s) SubCutaneous every 12 hours  levoFLOXacin  Tablet 250 milliGRAM(s) Oral every 24 hours  metoprolol tartrate 12.5 milliGRAM(s) Oral daily  OLANZapine 20 milliGRAM(s) Oral daily  pantoprazole    Tablet 40 milliGRAM(s) Oral before breakfast    MEDICATIONS  (PRN):  acetaminophen   Tablet 325 milliGRAM(s) Oral every 4 hours PRN For Temp greater than 38 C (100.4 F)      ALLERGIES: penicillin (Unknown)  sulfa drugs (Unknown)      FAMILY HISTORY:  No pertinent family history in first degree relatives      PHYSICAL EXAMINATION:  -----------------------------  T(C): 37.2 (18 @ 07:41), Max: 38.9 (18 @ 19:22)  HR: 95 (18 @ 07:41) (70 - 112)  BP: 137/83 (18 @ 07:41) (115/77 - 146/83)  RR: 19 (18 @ 07:41) (16 - 19)  SpO2: 97% (18 @ 07:41) (92% - 98%)  Wt(kg): --     @ 07:01  -   @ 07:00  --------------------------------------------------------  IN:    Oral Fluid: 690 mL    Solution: 250 mL    Solution: 100 mL  Total IN: 1040 mL    OUT:    Indwelling Catheter - Urethral: 1600 mL    Voided: 200 mL  Total OUT: 1800 mL    Total NET: -760 mL      Constitutional: diaphoretic  Neck: No LAD, No JVD  Back: Normal spine flexure, No CVA tenderness  Respiratory: dec bs bases  Cardiovascular: S1 and S2, RRR, BECCA  Gastrointestinal: BS+, soft, NT/ND  Extremities: No peripheral edema  Neurological: A/O no focal deficits  Skin: No rashes        LABS:   --------      144  |  106  |  10  ----------------------------<  98  3.7   |  29  |  0.99    Ca    8.6      2018 05:50    TPro  7.3  /  Alb  2.9<L>  /  TBili  0.3  /  DBili  x   /  AST  101<H>  /  ALT  94<H>  /  AlkPhos  94                           14.5   5.32  )-----------( 143      ( 2018 05:50 )             40.6        @ 20:18 BNP: 68 pg/mL        Culture Results:   No growth ( @ 18:57)  Culture Results:   No growth to date. ( @ 08:36)  Culture Results:   No growth to date. ( @ 08:36)      RADIOLOGY:  -----------------        EC2018 NSR no acute abn

## 2018-07-25 NOTE — PROGRESS NOTE ADULT - SUBJECTIVE AND OBJECTIVE BOX
SYLVAIN ROSELIA is a 63yMale , patient examined and chart reviewed.     INTERVAL HPI/ OVERNIGHT EVENTS:   Events noted. Azactam dc due to rash,  Also with fevers.    PAST MEDICAL & SURGICAL HISTORY:  Blind: right eye  DD (diastrophic dysplasia)  Psoriasis  HTN (hypertension)  Seizure  MR (mental retardation), severe  No significant past surgical history      For details regarding the patient's social history, family history, and other miscellaneous elements, please refer the initial infectious diseases consultation and/or the admitting history and physical examination for this admission.    ROS:  Unable to obtain due to : Nonverbal MRDD    ALLERGIES  aztreonam (Rash)  penicillin (Unknown)  sulfa drugs (Unknown)      Current inpatient medications :    ANTIBIOTICS/RELEVANT:  levoFLOXacin  Tablet 250 milliGRAM(s) Oral every 24 hours      acetaminophen   Tablet 325 milliGRAM(s) Oral every 4 hours PRN  ALBUTerol/ipratropium for Nebulization 3 milliLiter(s) Nebulizer every 8 hours  buDESOnide   0.5 milliGRAM(s) Respule 0.5 milliGRAM(s) Inhalation two times a day  doxazosin 1 milliGRAM(s) Oral at bedtime  fluvoxaMINE 25 milliGRAM(s) Oral daily  heparin  Injectable 5000 Unit(s) SubCutaneous every 12 hours  ibuprofen  Tablet 200 milliGRAM(s) Oral two times a day  metoprolol tartrate 12.5 milliGRAM(s) Oral daily  OLANZapine 20 milliGRAM(s) Oral daily  pantoprazole    Tablet 40 milliGRAM(s) Oral before breakfast  sodium chloride 0.9%. 1000 milliLiter(s) IV Continuous <Continuous>      Objective:    07-24 @ 07:01 - 07-25 @ 07:00  --------------------------------------------------------  IN: 1040 mL / OUT: 1800 mL / NET: -760 mL    07-25 @ 07:01 - 07-25 @ 23:43  --------------------------------------------------------  IN: 1035 mL / OUT: 575 mL / NET: 460 mL      T(C): 38.3 (07-25-18 @ 20:08), Max: 38.7 (07-25-18 @ 00:35)  HR: 89 (07-25-18 @ 23:29) (70 - 111)  BP: 159/68 (07-25-18 @ 19:35) (115/77 - 159/68)  RR: 16 (07-25-18 @ 19:35) (16 - 19)  SpO2: 98% (07-25-18 @ 23:29) (94% - 98%)  Wt(kg): --      Physical Exam:  General: no acute distress  Eyes: sclera anicteric, pupils equal and reactive to light  ENMT: buccal mucosa moist, pharynx not injected  Neck: supple, trachea midline  Lungs: Decreased, no wheeze/rhonchi  Cardiovascular: regular rate and rhythm, S1 S2  Abdomen: soft, nontender, no organomegaly present, bowel sounds normal  Neurological: awake nonverbal MRDD  Skin: no increased ecchymosis/petechiae/purpura  Lymph Nodes: no palpable cervical/supraclavicular lymph nodes enlargements  Extremities: no cyanosis/clubbing/edema            LABS:                          13.1   9.98  )-----------( 198      ( 25 Jul 2018 22:55 )             37.1       07-25    143  |  109<H>  |  18  ----------------------------<  105<H>  4.3   |  26  |  1.10    Ca    8.3<L>      25 Jul 2018 23:09    TPro  7.2  /  Alb  2.9<L>  /  TBili  0.2  /  DBili  x   /  AST  70<H>  /  ALT  88<H>  /  AlkPhos  92  07-25      MICROBIOLOGY:    Culture - Urine (collected 23 Jul 2018 18:57)  Source: .Urine Catheterized  Final Report (24 Jul 2018 15:27):    No growth    Culture - Blood (collected 23 Jul 2018 08:36)  Source: .Blood Blood  Preliminary Report (24 Jul 2018 09:00):    No growth to date.    Culture - Blood (collected 23 Jul 2018 08:36)  Source: .Blood Blood  Preliminary Report (24 Jul 2018 09:00):    No growth to date.    RADIOLOGY & ADDITIONAL STUDIES:      EXAM:  XR CHEST PORTABLE ROUTINE 1V                            PROCEDURE DATE:  07/24/2018          INTERPRETATION:  Clinical information: Pneumonia    Portable study, 5:25 PM    Comparison exam dated 7/21/2018    Elevated diaphragm, shallow inspiration. Vague streaky airspace disease   visible, left lower lung. Right lung shows no evidence of airspace   disease. No vascular congestion or effusion evident. Heart size cannot be   evaluated due to shallow inspiration and portable technique. No acute   osseous abnormalities. Patient's chin obscures portions of lung apices.   Cardiac monitor leads overlie the lower chest.    IMPRESSION: See above report    Assessment :  62 YO M nonverbal MRDD admitted from group home sp fall with  fevers  Sec urinary retention Sp rdoriguez  Rodriguez dc this am  Now febrile again ?aspiration   Azactam stopped due to rash but only limited to his back ? contact dermatitis    Plan :   On Levaquin  Trend temps and wbc  Aspiration precautions  Bladder scan  Aspiration precautions      Continue with present regiment.  Appropriate use of antibiotics and adverse effects reviewed.      > 35 minutes were spent in direct patient care reviewing notes, medications ,labs data/ imaging , discussion with multidisciplinary team.    Thank you for allowing me to participate in care of your patient .    Olivier Daniels MD  Infectious Disease  [phone]

## 2018-07-25 NOTE — PROGRESS NOTE ADULT - PROBLEM SELECTOR PLAN 3
psa wnl  urine culture negative  added cardura  cannot add flomax due to sulfur allergy  rodriguez to dc  check bladder scan

## 2018-07-25 NOTE — PROGRESS NOTE ADULT - PROBLEM SELECTOR PLAN 1
p.t. eval  will xray feet and knees  neck hips all wnl  patient scared to walk post fall  motrin for pain

## 2018-07-26 PROCEDURE — 71045 X-RAY EXAM CHEST 1 VIEW: CPT | Mod: 26

## 2018-07-26 RX ORDER — SENNA PLUS 8.6 MG/1
2 TABLET ORAL AT BEDTIME
Qty: 0 | Refills: 0 | Status: DISCONTINUED | OUTPATIENT
Start: 2018-07-26 | End: 2018-07-31

## 2018-07-26 RX ORDER — LACTULOSE 10 G/15ML
10 SOLUTION ORAL
Qty: 0 | Refills: 0 | Status: DISCONTINUED | OUTPATIENT
Start: 2018-07-26 | End: 2018-07-31

## 2018-07-26 RX ORDER — GLYCERIN ADULT
1 SUPPOSITORY, RECTAL RECTAL ONCE
Qty: 0 | Refills: 0 | Status: COMPLETED | OUTPATIENT
Start: 2018-07-26 | End: 2018-07-26

## 2018-07-26 RX ADMIN — FLUVOXAMINE MALEATE 25 MILLIGRAM(S): 25 TABLET ORAL at 11:32

## 2018-07-26 RX ADMIN — Medication 0.5 MILLIGRAM(S): at 07:21

## 2018-07-26 RX ADMIN — Medication 1 SUPPOSITORY(S): at 11:32

## 2018-07-26 RX ADMIN — PANTOPRAZOLE SODIUM 40 MILLIGRAM(S): 20 TABLET, DELAYED RELEASE ORAL at 05:18

## 2018-07-26 RX ADMIN — LACTULOSE 10 GRAM(S): 10 SOLUTION ORAL at 19:25

## 2018-07-26 RX ADMIN — Medication 200 MILLIGRAM(S): at 11:32

## 2018-07-26 RX ADMIN — HEPARIN SODIUM 5000 UNIT(S): 5000 INJECTION INTRAVENOUS; SUBCUTANEOUS at 05:18

## 2018-07-26 RX ADMIN — Medication 3 MILLILITER(S): at 07:21

## 2018-07-26 RX ADMIN — Medication 12.5 MILLIGRAM(S): at 05:18

## 2018-07-26 RX ADMIN — OLANZAPINE 20 MILLIGRAM(S): 15 TABLET, FILM COATED ORAL at 11:32

## 2018-07-26 RX ADMIN — Medication 0.5 MILLIGRAM(S): at 20:04

## 2018-07-26 RX ADMIN — SENNA PLUS 2 TABLET(S): 8.6 TABLET ORAL at 21:32

## 2018-07-26 RX ADMIN — HEPARIN SODIUM 5000 UNIT(S): 5000 INJECTION INTRAVENOUS; SUBCUTANEOUS at 17:39

## 2018-07-26 RX ADMIN — Medication 3 MILLILITER(S): at 15:24

## 2018-07-26 RX ADMIN — Medication 200 MILLIGRAM(S): at 12:02

## 2018-07-26 RX ADMIN — Medication 1 MILLIGRAM(S): at 21:32

## 2018-07-26 RX ADMIN — Medication 200 MILLIGRAM(S): at 17:39

## 2018-07-26 RX ADMIN — Medication 3 MILLILITER(S): at 20:04

## 2018-07-26 RX ADMIN — Medication 200 MILLIGRAM(S): at 18:06

## 2018-07-26 NOTE — PROGRESS NOTE ADULT - SUBJECTIVE AND OBJECTIVE BOX
[facility] Cardiology    CHIEF COMPLAINT: Patient is a 63y old  Male who presents with a chief complaint of fever   fall (21 Jul 2018 23:57)      Follow Up: [ ] Chest Pain      [ ] Dyspnea     [ ] Palpitations    [ ] Atrial Fibrillation     [ ] Ventricular Dysrhythmia    [ ] Abnormal EKG                      [ ] Abnormal Cardiac Enzymes     [ ] Valvular Disease    HPI:  fell at home  with fever 101  brought in (21 Jul 2018 23:57)    mild sinus tach on tele overnight    PAST MEDICAL & SURGICAL HISTORY:  Blind: right eye  DD (diastrophic dysplasia)  Psoriasis  HTN (hypertension)  Seizure  MR (mental retardation), severe  No significant past surgical history      MEDICATIONS  (STANDING):  ALBUTerol/ipratropium for Nebulization 3 milliLiter(s) Nebulizer every 8 hours  buDESOnide   0.5 milliGRAM(s) Respule 0.5 milliGRAM(s) Inhalation two times a day  doxazosin 1 milliGRAM(s) Oral at bedtime  fluvoxaMINE 25 milliGRAM(s) Oral daily  glycerin Suppository - Adult 1 Suppository(s) Rectal once  heparin  Injectable 5000 Unit(s) SubCutaneous every 12 hours  ibuprofen  Tablet 200 milliGRAM(s) Oral two times a day  lactulose Syrup 10 Gram(s) Oral two times a day  levoFLOXacin  Tablet 250 milliGRAM(s) Oral every 24 hours  metoprolol tartrate 12.5 milliGRAM(s) Oral daily  OLANZapine 20 milliGRAM(s) Oral daily  pantoprazole    Tablet 40 milliGRAM(s) Oral before breakfast  senna 2 Tablet(s) Oral at bedtime  sodium chloride 0.9%. 1000 milliLiter(s) (75 mL/Hr) IV Continuous <Continuous>    MEDICATIONS  (PRN):  acetaminophen   Tablet 325 milliGRAM(s) Oral every 4 hours PRN For Temp greater than 38 C (100.4 F)      Allergies    aztreonam (Rash)  penicillin (Unknown)  sulfa drugs (Unknown)    Intolerances        REVIEW OF SYSTEMS:    CONSTITUTIONAL: No weakness, fevers or chills.   EYES/ENT: No visual changes;  No vertigo or throat pain   NECK: No pain or stiffness  RESPIRATORY: No cough, wheezing, hemoptysis; No shortness of breath  CARDIOVASCULAR: No chest pain or palpitations  GASTROINTESTINAL: No abdominal or epigastric pain. No nausea, vomiting, or hematemesis; No diarrhea or constipation. No melena or hematochezia.  GENITOURINARY: No dysuria, frequency or hematuria  NEUROLOGICAL: No numbness or weakness  SKIN: No itching, burning, rashes, or lesions   All other review of systems is negative unless indicated above    Vital Signs Last 24 Hrs  T(C): 37.3 (26 Jul 2018 08:44), Max: 38.3 (25 Jul 2018 20:08)  T(F): 99.1 (26 Jul 2018 08:44), Max: 100.9 (25 Jul 2018 20:08)  HR: 104 (26 Jul 2018 08:44) (85 - 111)  BP: 139/60 (26 Jul 2018 08:44) (128/72 - 159/68)  BP(mean): --  RR: 20 (26 Jul 2018 08:44) (16 - 20)  SpO2: 92% (26 Jul 2018 08:44) (92% - 98%)    I&O's Summary    25 Jul 2018 07:01  -  26 Jul 2018 07:00  --------------------------------------------------------  IN: 1635 mL / OUT: 1389 mL / NET: 246 mL    26 Jul 2018 07:01  -  26 Jul 2018 09:10  --------------------------------------------------------  IN: 480 mL / OUT: 0 mL / NET: 480 mL        PHYSICAL EXAM:  Constitutional: diaphoretic  Neck: No LAD, No JVD  Back: Normal spine flexure, No CVA tenderness  Respiratory: dec bs bases  Cardiovascular: S1 and S2, RRR, BECCA  Gastrointestinal: BS+, soft, NT/ND  Extremities: No peripheral edema  Neurological: A/O no focal deficits  Skin: No rashes    LABS: All Labs Reviewed:                        13.1   9.98  )-----------( 198      ( 25 Jul 2018 22:55 )             37.1     07-25    143  |  109<H>  |  18  ----------------------------<  105<H>  4.3   |  26  |  1.10    Ca    8.3<L>      25 Jul 2018 23:09    TPro  7.2  /  Alb  2.9<L>  /  TBili  0.2  /  DBili  x   /  AST  70<H>  /  ALT  88<H>  /  AlkPhos  92  07-25      CARDIAC MARKERS ( 25 Jul 2018 08:21 )  <.015 ng/mL / x     / x     / x     / x          Organism --  Gram Stain Blood -- Gram Stain --  Specimen Source .Blood Blood-Venous  Culture-Blood --    Organism --  Gram Stain Blood -- Gram Stain --  Specimen Source .Blood Blood-Venous  Culture-Blood --    Organism --  Gram Stain Blood -- Gram Stain --  Specimen Source .Urine Clean Catch (Midstream)  Culture-Blood --    Organism --  Gram Stain Blood -- Gram Stain --  Specimen Source .Urine Clean Catch (Midstream)  Culture-Blood --    · Assessment		  Patient is a 63y Male with MR, admitted with fever, urinary retention, sepsis.  With tachycardia 110-120 past 24 hours.    -  Sepsis,   Getting Abx  Probable allergic rxn.    -  Tachycardia   bpm range.  Most likely secondary to infectious process and/or allergic rxn.  Re-peat EKG OK, echo ordered.   troponin negative, no demand ischemia.  Had received some IVF.     -  Urinary retention     07-24 @ 20:18  Pro Bnp 68    07-25 @ 08:21  TSH: 2.57    RADIOLOGY/EKG:Diagnosis Line Sinus tachycardia  premature ventricular complexes 7/25/2018 8:20:55 PM    2D ECHO:pending

## 2018-07-26 NOTE — PROGRESS NOTE ADULT - SUBJECTIVE AND OBJECTIVE BOX
SYLVAINROSELIA is a 63yMale , patient examined and chart reviewed.     INTERVAL HPI/ OVERNIGHT EVENTS:   Low grade temps. Wade reinserted due to retention.    PAST MEDICAL & SURGICAL HISTORY:  Blind: right eye  DD (diastrophic dysplasia)  Psoriasis  HTN (hypertension)  Seizure  MR (mental retardation), severe  No significant past surgical history      For details regarding the patient's social history, family history, and other miscellaneous elements, please refer the initial infectious diseases consultation and/or the admitting history and physical examination for this admission.    ROS:  Unable to obtain due to : Nonverbal MRDD    ALLERGIES:  aztreonam (Rash)  penicillin (Unknown)  sulfa drugs (Unknown)      Current inpatient medications :    ANTIBIOTICS/RELEVANT:  levoFLOXacin  Tablet 250 milliGRAM(s) Oral every 24 hours      acetaminophen   Tablet 325 milliGRAM(s) Oral every 4 hours PRN  ALBUTerol/ipratropium for Nebulization 3 milliLiter(s) Nebulizer every 8 hours  buDESOnide   0.5 milliGRAM(s) Respule 0.5 milliGRAM(s) Inhalation two times a day  doxazosin 1 milliGRAM(s) Oral at bedtime  fluvoxaMINE 25 milliGRAM(s) Oral daily  heparin  Injectable 5000 Unit(s) SubCutaneous every 12 hours  ibuprofen  Tablet 200 milliGRAM(s) Oral two times a day  lactulose Syrup 10 Gram(s) Oral two times a day  metoprolol tartrate 12.5 milliGRAM(s) Oral daily  OLANZapine 20 milliGRAM(s) Oral daily  pantoprazole    Tablet 40 milliGRAM(s) Oral before breakfast  senna 2 Tablet(s) Oral at bedtime      Objective:    07-25 @ 07:01 - 07-26 @ 07:00  --------------------------------------------------------  IN: 1635 mL / OUT: 1389 mL / NET: 246 mL    07-26 @ 07:01 - 07-26 @ 22:55  --------------------------------------------------------  IN: 1080 mL / OUT: 1900 mL / NET: -820 mL      T(C): 36.7 (07-26-18 @ 19:33), Max: 38.3 (07-26-18 @ 11:50)  HR: 107 (07-26-18 @ 20:05) (83 - 109)  BP: 138/71 (07-26-18 @ 19:33) (123/72 - 144/80)  RR: 17 (07-26-18 @ 19:33) (16 - 20)  SpO2: 95% (07-26-18 @ 20:05) (92% - 98%)  Wt(kg): --      Physical Exam:  General: no acute distress  Eyes: sclera anicteric, pupils equal and reactive to light  ENMT: buccal mucosa moist, pharynx not injected  Neck: supple, trachea midline  Lungs: clear, no wheeze/rhonchi  Cardiovascular: regular rate and rhythm, S1 S2  Abdomen: soft, nontender, no organomegaly present, bowel sounds normal  Neurological: awake nonverbal MRDD  Skin: no increased ecchymosis/petechiae/purpura  Lymph Nodes: no palpable cervical/supraclavicular lymph nodes enlargements  Extremities: no cyanosis/clubbing/edema      LABS:                          13.1   9.98  )-----------( 198      ( 25 Jul 2018 22:55 )             37.1       07-25    143  |  109<H>  |  18  ----------------------------<  105<H>  4.3   |  26  |  1.10    Ca    8.3<L>      25 Jul 2018 23:09    TPro  7.2  /  Alb  2.9<L>  /  TBili  0.2  /  DBili  x   /  AST  70<H>  /  ALT  88<H>  /  AlkPhos  92  07-25    MICROBIOLOGY:    Culture - Urine (07.23.18 @ 18:57)    Specimen Source: .Urine Catheterized    Culture Results:   No growth    Culture - Blood (07.23.18 @ 08:36)    Specimen Source: .Blood Blood    Culture Results:   No growth to date.    RADIOLOGY & ADDITIONAL STUDIES:  EXAM:  XR CHEST PORTABLE ROUTINE 1V                            PROCEDURE DATE:  07/26/2018          INTERPRETATION:  Portable AP chest    Clinical information:     20/4/2018.    Findings: Low lung volumes limit evaluation. Cardiomediastinal silhouette   remains enlarged. No pleural effusion. Mild pulmonary vascular   congestion. Question airspace opacity in the left lung base.    IMPRESSION:    Question developing left lower lobe opacity, atelectasis versus pneumonia.    Mild pulmonary vascular congestion.    Assessment :  62 YO M nonverbal MRDD admitted from group home sp fall with  fevers  Sec urinary retention  Wade reinserted as he failed TOV  Fevers persistent ?Asp Pna     Plan :   Cont Levaquin  Trend temps and wbc  Wade per primary team   Urology to see  Aspiration precautions    Continue with present regiment.  Appropriate use of antibiotics and adverse effects reviewed.    > 35 minutes were spent in direct patient care reviewing notes, medications ,labs data/ imaging , discussion with multidisciplinary team.    Thank you for allowing me to participate in care of your patient .    Olivier Daniels MD  Infectious Disease  [phone]

## 2018-07-26 NOTE — PROGRESS NOTE ADULT - SUBJECTIVE AND OBJECTIVE BOX
PAST MEDICAL & SURGICAL HISTORY:  Blind: right eye  DD (diastrophic dysplasia)  Psoriasis  HTN (hypertension)  Seizure  MR (mental retardation), severe  No significant past surgical history      MEDICATIONS  (STANDING):  ALBUTerol/ipratropium for Nebulization 3 milliLiter(s) Nebulizer every 8 hours  buDESOnide   0.5 milliGRAM(s) Respule 0.5 milliGRAM(s) Inhalation two times a day  doxazosin 1 milliGRAM(s) Oral at bedtime  fluvoxaMINE 25 milliGRAM(s) Oral daily  glycerin Suppository - Adult 1 Suppository(s) Rectal once  heparin  Injectable 5000 Unit(s) SubCutaneous every 12 hours  ibuprofen  Tablet 200 milliGRAM(s) Oral two times a day  lactulose Syrup 10 Gram(s) Oral two times a day  levoFLOXacin  Tablet 250 milliGRAM(s) Oral every 24 hours  metoprolol tartrate 12.5 milliGRAM(s) Oral daily  OLANZapine 20 milliGRAM(s) Oral daily  pantoprazole    Tablet 40 milliGRAM(s) Oral before breakfast  senna 2 Tablet(s) Oral at bedtime  sodium chloride 0.9%. 1000 milliLiter(s) (75 mL/Hr) IV Continuous <Continuous>    MEDICATIONS  (PRN):  acetaminophen   Tablet 325 milliGRAM(s) Oral every 4 hours PRN For Temp greater than 38 C (100.4 F)      Patient is a 63y old  Male who presents with a chief complaint of fever   fall (21 Jul 2018 23:57)      Vital Signs Last 24 Hrs  T(C): 37.1 (26 Jul 2018 05:15), Max: 38.3 (25 Jul 2018 20:08)  T(F): 98.7 (26 Jul 2018 05:15), Max: 100.9 (25 Jul 2018 20:08)  HR: 104 (26 Jul 2018 07:23) (85 - 111)  BP: 144/80 (26 Jul 2018 04:47) (128/72 - 159/68)  BP(mean): --  RR: 16 (26 Jul 2018 04:47) (16 - 16)  SpO2: 94% (26 Jul 2018 07:23) (94% - 98%)          07-25 @ 07:01  -  07-26 @ 07:00  --------------------------------------------------------  IN: 1635 mL / OUT: 1389 mL / NET: 246 mL        REVIEW OF SYSTEMS:    Constitutional: No fever, weight loss or fatigue  Eyes: No eye pain, visual disturbances, or discharge  ENT:  No difficulty hearing, tinnitus, vertigo; No sinus or throat pain  Neck: No pain or stiffness  Breasts: No pain, masses or nipple discharge  Respiratory: No cough, wheezing, chills or hemoptysis  Cardiovascular: No chest pain, palpitations, shortness of breath, dizziness or leg swelling  Gastrointestinal: No abdominal or epigastric pain. No nausea, vomiting or hematemesis; No diarrhea or constipation. No melena or hematochezia.  Genitourinary: No dysuria, frequency, hematuria or incontinence  Rectal: No pain, hemorrhoids or incontinence  Neurological: No headaches, memory loss, loss of strength, numbness or tremors  Skin: No itching, burning, rashes or lesions   Lymph Nodes: No enlarged glands  Endocrine: No heat or cold intolerance; No hair loss  Musculoskeletal: No joint pain or swelling; No muscle, back or extremity pain  Psychiatric: No depression, anxiety, mood swings or difficulty sleeping  Heme/Lymph: No easy bruising or bleeding gums  Allergy and Immunologic: No hives or eczema    PHYSICAL EXAM:pulse 100 or lower  urinary retention over night 850cc after trial with out rodriguez  Constitutional: NAD  Neck: No LAD, No JVD  Back: Normal spine flexure, No CVA tenderness  Respiratory: CTAB/L upper lobes   Cardiovascular: S1 and S2, RRR, no M/G/R  Gastrointestinal: BS+, soft, NT/ND  Extremities: No peripheral edema  Neurological: A/O x  no focal deficits  Skin: No rashes      decubiti: none                          13.1   9.98  )-----------( 198      ( 25 Jul 2018 22:55 )             37.1     07-25    143  |  109<H>  |  18  ----------------------------<  105<H>  4.3   |  26  |  1.10    Ca    8.3<L>      25 Jul 2018 23:09    TPro  7.2  /  Alb  2.9<L>  /  TBili  0.2  /  DBili  x   /  AST  70<H>  /  ALT  88<H>  /  AlkPhos  92  07-25        CARDIAC MARKERS ( 25 Jul 2018 08:21 )  <.015 ng/mL / x     / x     / x     / x          07-25 @ 08:21    TSH: --  B12:  --   Folate: --   SUE: --   Rheumatoid: --   Ammonia:  --   CPK:  --  Total PSA:  --  Free PSA: --  Cortisol: --  C-Diff:  --  BNP:  --  SPEP: --  Troponin:  <.015  CKMB: --  Valproic acid level:  --  tegretol level: --  dilantin level:  --  phenobarb level: --  keppra level:  --    .Urine Catheterized  07-23 @ 18:57   No growth  --  --      .Blood Blood  07-23 @ 08:36   No growth to date.  --  --      .Blood Blood-Venous  07-22 @ 10:30   No growth to date.  --  --      .Blood Blood-Venous  07-22 @ 10:29   No growth to date.  --  --      .Urine Clean Catch (Midstream)  07-22 @ 10:08   No growth  --  --      .Urine Clean Catch (Midstream)  07-22 @ 00:26   No growth  --  --        Urine Culture:  07-23 @ 18:57    --        No growth  Urine Culture:  07-23 @ 08:36    --        No growth to date.  Urine Culture:  07-22 @ 10:30    --        No growth to date.  Urine Culture:  07-22 @ 10:29    --        No growth to date.  Urine Culture:  07-22 @ 10:08    --        No growth  Urine Culture:  07-22 @ 00:26    --        No growth        Radiology:

## 2018-07-27 DIAGNOSIS — R79.89 OTHER SPECIFIED ABNORMAL FINDINGS OF BLOOD CHEMISTRY: ICD-10-CM

## 2018-07-27 LAB
ALBUMIN SERPL ELPH-MCNC: 3.1 G/DL — LOW (ref 3.3–5)
ALP SERPL-CCNC: 97 U/L — SIGNIFICANT CHANGE UP (ref 40–120)
ALT FLD-CCNC: 105 U/L — HIGH (ref 12–78)
ANION GAP SERPL CALC-SCNC: 9 MMOL/L — SIGNIFICANT CHANGE UP (ref 5–17)
AST SERPL-CCNC: 82 U/L — HIGH (ref 15–37)
BASOPHILS # BLD AUTO: 0.08 K/UL — SIGNIFICANT CHANGE UP (ref 0–0.2)
BASOPHILS NFR BLD AUTO: 1 % — SIGNIFICANT CHANGE UP (ref 0–2)
BILIRUB SERPL-MCNC: 0.3 MG/DL — SIGNIFICANT CHANGE UP (ref 0.2–1.2)
BUN SERPL-MCNC: 17 MG/DL — SIGNIFICANT CHANGE UP (ref 7–23)
CALCIUM SERPL-MCNC: 9 MG/DL — SIGNIFICANT CHANGE UP (ref 8.5–10.1)
CHLORIDE SERPL-SCNC: 108 MMOL/L — SIGNIFICANT CHANGE UP (ref 96–108)
CO2 SERPL-SCNC: 26 MMOL/L — SIGNIFICANT CHANGE UP (ref 22–31)
CREAT SERPL-MCNC: 0.98 MG/DL — SIGNIFICANT CHANGE UP (ref 0.5–1.3)
CULTURE RESULTS: NO GROWTH — SIGNIFICANT CHANGE UP
CULTURE RESULTS: SIGNIFICANT CHANGE UP
EOSINOPHIL # BLD AUTO: 0.16 K/UL — SIGNIFICANT CHANGE UP (ref 0–0.5)
EOSINOPHIL NFR BLD AUTO: 2 % — SIGNIFICANT CHANGE UP (ref 0–6)
GLUCOSE SERPL-MCNC: 99 MG/DL — SIGNIFICANT CHANGE UP (ref 70–99)
HCT VFR BLD CALC: 40 % — SIGNIFICANT CHANGE UP (ref 39–50)
HGB BLD-MCNC: 14 G/DL — SIGNIFICANT CHANGE UP (ref 13–17)
LYMPHOCYTES # BLD AUTO: 2.26 K/UL — SIGNIFICANT CHANGE UP (ref 1–3.3)
LYMPHOCYTES # BLD AUTO: 28 % — SIGNIFICANT CHANGE UP (ref 13–44)
MANUAL SMEAR VERIFICATION: SIGNIFICANT CHANGE UP
MCHC RBC-ENTMCNC: 32.6 PG — SIGNIFICANT CHANGE UP (ref 27–34)
MCHC RBC-ENTMCNC: 35 GM/DL — SIGNIFICANT CHANGE UP (ref 32–36)
MCV RBC AUTO: 93.2 FL — SIGNIFICANT CHANGE UP (ref 80–100)
MONOCYTES # BLD AUTO: 0.56 K/UL — SIGNIFICANT CHANGE UP (ref 0–0.9)
MONOCYTES NFR BLD AUTO: 7 % — SIGNIFICANT CHANGE UP (ref 2–14)
NEUTROPHILS # BLD AUTO: 4.36 K/UL — SIGNIFICANT CHANGE UP (ref 1.8–7.4)
NEUTROPHILS NFR BLD AUTO: 48 % — SIGNIFICANT CHANGE UP (ref 43–77)
NEUTS BAND # BLD: 6 % — SIGNIFICANT CHANGE UP (ref 0–8)
NRBC # BLD: 0 — SIGNIFICANT CHANGE UP
NRBC # BLD: SIGNIFICANT CHANGE UP /100 WBCS (ref 0–0)
PLAT MORPH BLD: NORMAL — SIGNIFICANT CHANGE UP
PLATELET # BLD AUTO: 237 K/UL — SIGNIFICANT CHANGE UP (ref 150–400)
POTASSIUM SERPL-MCNC: 4.4 MMOL/L — SIGNIFICANT CHANGE UP (ref 3.5–5.3)
POTASSIUM SERPL-SCNC: 4.4 MMOL/L — SIGNIFICANT CHANGE UP (ref 3.5–5.3)
PROT SERPL-MCNC: 7.7 G/DL — SIGNIFICANT CHANGE UP (ref 6–8.3)
RBC # BLD: 4.29 M/UL — SIGNIFICANT CHANGE UP (ref 4.2–5.8)
RBC # FLD: 12.7 % — SIGNIFICANT CHANGE UP (ref 10.3–14.5)
RBC BLD AUTO: SIGNIFICANT CHANGE UP
SODIUM SERPL-SCNC: 143 MMOL/L — SIGNIFICANT CHANGE UP (ref 135–145)
SPECIMEN SOURCE: SIGNIFICANT CHANGE UP
VARIANT LYMPHS # BLD: 8 % — HIGH (ref 0–6)
WBC # BLD: 8.07 K/UL — SIGNIFICANT CHANGE UP (ref 3.8–10.5)
WBC # FLD AUTO: 8.07 K/UL — SIGNIFICANT CHANGE UP (ref 3.8–10.5)

## 2018-07-27 RX ORDER — OLANZAPINE 15 MG/1
10 TABLET, FILM COATED ORAL DAILY
Qty: 0 | Refills: 0 | Status: DISCONTINUED | OUTPATIENT
Start: 2018-07-27 | End: 2018-07-31

## 2018-07-27 RX ORDER — DOXAZOSIN MESYLATE 4 MG
2 TABLET ORAL AT BEDTIME
Qty: 0 | Refills: 0 | Status: DISCONTINUED | OUTPATIENT
Start: 2018-07-27 | End: 2018-07-31

## 2018-07-27 RX ORDER — IBUPROFEN 200 MG
400 TABLET ORAL
Qty: 0 | Refills: 0 | Status: DISCONTINUED | OUTPATIENT
Start: 2018-07-27 | End: 2018-07-31

## 2018-07-27 RX ORDER — TAMSULOSIN HYDROCHLORIDE 0.4 MG/1
0.4 CAPSULE ORAL AT BEDTIME
Qty: 0 | Refills: 0 | Status: DISCONTINUED | OUTPATIENT
Start: 2018-07-27 | End: 2018-07-31

## 2018-07-27 RX ORDER — NYSTATIN CREAM 100000 [USP'U]/G
1 CREAM TOPICAL
Qty: 0 | Refills: 0 | Status: DISCONTINUED | OUTPATIENT
Start: 2018-07-27 | End: 2018-07-31

## 2018-07-27 RX ORDER — GLYCERIN ADULT
1 SUPPOSITORY, RECTAL RECTAL DAILY
Qty: 0 | Refills: 0 | Status: DISCONTINUED | OUTPATIENT
Start: 2018-07-27 | End: 2018-07-31

## 2018-07-27 RX ORDER — CLONAZEPAM 1 MG
0.5 TABLET ORAL AT BEDTIME
Qty: 0 | Refills: 0 | Status: DISCONTINUED | OUTPATIENT
Start: 2018-07-27 | End: 2018-07-31

## 2018-07-27 RX ORDER — OXYBUTYNIN CHLORIDE 5 MG
5 TABLET ORAL
Qty: 0 | Refills: 0 | Status: DISCONTINUED | OUTPATIENT
Start: 2018-07-27 | End: 2018-07-30

## 2018-07-27 RX ADMIN — Medication 200 MILLIGRAM(S): at 05:14

## 2018-07-27 RX ADMIN — Medication 200 MILLIGRAM(S): at 05:29

## 2018-07-27 RX ADMIN — NYSTATIN CREAM 1 APPLICATION(S): 100000 CREAM TOPICAL at 17:43

## 2018-07-27 RX ADMIN — Medication 0.5 MILLIGRAM(S): at 19:31

## 2018-07-27 RX ADMIN — Medication 3 MILLILITER(S): at 19:30

## 2018-07-27 RX ADMIN — HEPARIN SODIUM 5000 UNIT(S): 5000 INJECTION INTRAVENOUS; SUBCUTANEOUS at 17:42

## 2018-07-27 RX ADMIN — Medication 325 MILLIGRAM(S): at 08:26

## 2018-07-27 RX ADMIN — Medication 12.5 MILLIGRAM(S): at 05:22

## 2018-07-27 RX ADMIN — PANTOPRAZOLE SODIUM 40 MILLIGRAM(S): 20 TABLET, DELAYED RELEASE ORAL at 05:14

## 2018-07-27 RX ADMIN — LACTULOSE 10 GRAM(S): 10 SOLUTION ORAL at 17:43

## 2018-07-27 RX ADMIN — Medication 0.5 MILLIGRAM(S): at 23:58

## 2018-07-27 RX ADMIN — Medication 2 MILLIGRAM(S): at 21:12

## 2018-07-27 RX ADMIN — LACTULOSE 10 GRAM(S): 10 SOLUTION ORAL at 05:16

## 2018-07-27 RX ADMIN — OLANZAPINE 20 MILLIGRAM(S): 15 TABLET, FILM COATED ORAL at 11:43

## 2018-07-27 RX ADMIN — SENNA PLUS 2 TABLET(S): 8.6 TABLET ORAL at 21:12

## 2018-07-27 RX ADMIN — FLUVOXAMINE MALEATE 25 MILLIGRAM(S): 25 TABLET ORAL at 11:43

## 2018-07-27 RX ADMIN — Medication 3 MILLILITER(S): at 15:06

## 2018-07-27 RX ADMIN — HEPARIN SODIUM 5000 UNIT(S): 5000 INJECTION INTRAVENOUS; SUBCUTANEOUS at 05:14

## 2018-07-27 RX ADMIN — TAMSULOSIN HYDROCHLORIDE 0.4 MILLIGRAM(S): 0.4 CAPSULE ORAL at 23:58

## 2018-07-27 NOTE — PROGRESS NOTE ADULT - PROBLEM SELECTOR PLAN 3
dc luvox  decrease zyprexa  behaviouraly stable  labs for ferritin lina esr heopatitis  s/p abd ct gall bladder normal continue rodriguez  on terazosin inc dose  allergy to sulfur cannot be on flomax  I called urology  either way will need dc home with rodriguez and f/u urology out patient continue rodriguez  on terazosin,, inc dose  add flomax and detrol  will make sure has bowel movemnets  I called urology  either way will need dc home with rodriguez and f/u urology out patient if by monday still unable to urinate on his own

## 2018-07-27 NOTE — PROGRESS NOTE ADULT - PROBLEM SELECTOR PLAN 1
resolved  atelectasis vs aspiration  complete oral levaquin s/p fall  no fractures  patient hesitant to ambulate  physical therapy tried,,patient refused  may need rehab  add motrin bid for pain  may need low dose klonopin for anxiety

## 2018-07-27 NOTE — PROGRESS NOTE ADULT - PROBLEM SELECTOR PLAN 4
resolved
due to fever  seen by cardilogy  echo ordered  iv fluids  s/p drug allergy to azactam,,was dc
dc luvox  decrease zyprexa  behaviouraly stable  labs for ferritin lina esr heopatitis  s/p abd ct gall bladder normal

## 2018-07-27 NOTE — PROGRESS NOTE ADULT - SUBJECTIVE AND OBJECTIVE BOX
PAST MEDICAL & SURGICAL HISTORY:  Blind: right eye  DD (diastrophic dysplasia)  Psoriasis  HTN (hypertension)  Seizure  MR (mental retardation), severe  No significant past surgical history      MEDICATIONS  (STANDING):  ALBUTerol/ipratropium for Nebulization 3 milliLiter(s) Nebulizer every 8 hours  buDESOnide   0.5 milliGRAM(s) Respule 0.5 milliGRAM(s) Inhalation two times a day  doxazosin 1 milliGRAM(s) Oral at bedtime  heparin  Injectable 5000 Unit(s) SubCutaneous every 12 hours  lactulose Syrup 10 Gram(s) Oral two times a day  levoFLOXacin  Tablet 250 milliGRAM(s) Oral every 24 hours  metoprolol tartrate 12.5 milliGRAM(s) Oral daily  nystatin Cream 1 Application(s) Topical two times a day  OLANZapine 10 milliGRAM(s) Oral daily  pantoprazole    Tablet 40 milliGRAM(s) Oral before breakfast  senna 2 Tablet(s) Oral at bedtime    MEDICATIONS  (PRN):  acetaminophen   Tablet 325 milliGRAM(s) Oral every 4 hours PRN For Temp greater than 38 C (100.4 F)      Patient is a 63y old  Male who presents with a chief complaint of fever   fall (21 Jul 2018 23:57)      Vital Signs Last 24 Hrs  T(C): 36.9 (27 Jul 2018 15:33), Max: 38.3 (27 Jul 2018 08:05)  T(F): 98.4 (27 Jul 2018 15:33), Max: 100.9 (27 Jul 2018 08:05)  HR: 90 (27 Jul 2018 15:33) (79 - 109)  BP: 131/77 (27 Jul 2018 15:33) (120/78 - 150/76)  BP(mean): --  RR: 16 (27 Jul 2018 15:33) (16 - 20)  SpO2: 92% (27 Jul 2018 15:33) (92% - 95%)          07-26 @ 07:01  -  07-27 @ 07:00  --------------------------------------------------------  IN: 1080 mL / OUT: 2400 mL / NET: -1320 mL    07-27 @ 07:01 - 07-27 @ 17:23  --------------------------------------------------------  IN: 540 mL / OUT: 650 mL / NET: -110 mL        REVIEW OF SYSTEMS:    Constitutional: No fever, weight loss or fatigue  Eyes: No eye pain, visual disturbances, or discharge  ENT:  No difficulty hearing, tinnitus, vertigo; No sinus or throat pain  Neck: No pain or stiffness  Breasts: No pain, masses or nipple discharge  Respiratory: No cough, wheezing, chills or hemoptysis  Cardiovascular: No chest pain, palpitations, shortness of breath, dizziness or leg swelling  Gastrointestinal: No abdominal or epigastric pain. No nausea, vomiting or hematemesis; No diarrhea or constipation. No melena or hematochezia.  Genitourinary: No dysuria, frequency, hematuria or incontinence  Rectal: No pain, hemorrhoids or incontinence  Neurological: No headaches, memory loss, loss of strength, numbness or tremors  Skin: No itching, burning, rashes or lesions   Lymph Nodes: No enlarged glands  Endocrine: No heat or cold intolerance; No hair loss  Musculoskeletal: No joint pain or swelling; No muscle, back or extremity pain  Psychiatric: No depression, anxiety, mood swings or difficulty sleeping  Heme/Lymph: No easy bruising or bleeding gums  Allergy and Immunologic: No hives or eczema    PHYSICAL EXAM:  eating dinner  alert rodriguez draining  interactive  Constitutional: NAD  Neck: No LAD, No JVD  Back: Normal spine flexure, No CVA tenderness  Respiratory: CTAB/L upper lobes   Cardiovascular: S1 and S2, RRR, no M/G/R  Gastrointestinal: BS+, soft, NT/ND  Extremities: No peripheral edema  Neurological: A/O  no focal deficits  Skin: No rashes moves all ext      decubiti: none                          14.0   8.07  )-----------( 237      ( 27 Jul 2018 09:01 )             40.0     07-27    143  |  108  |  17  ----------------------------<  99  4.4   |  26  |  0.98    Ca    9.0      27 Jul 2018 09:01    TPro  7.7  /  Alb  3.1<L>  /  TBili  0.3  /  DBili  x   /  AST  82<H>  /  ALT  105<H>  /  AlkPhos  97  07-27              .Urine Catheterized  07-26 @ 12:14   No growth  --  --      .Urine Catheterized  07-23 @ 18:57   No growth  --  --      .Blood Blood  07-23 @ 08:36   No growth to date.  --  --      .Blood Blood-Venous  07-22 @ 10:30   No growth at 5 days.  --  --      .Blood Blood-Venous  07-22 @ 10:29   No growth at 5 days.  --  --      .Urine Clean Catch (Midstream)  07-22 @ 10:08   No growth  --  --      .Urine Clean Catch (Midstream)  07-22 @ 00:26   No growth  --  --        Urine Culture:  07-26 @ 12:14    --        No growth  Urine Culture:  07-23 @ 18:57    --        No growth  Urine Culture:  07-23 @ 08:36    --        No growth to date.  Urine Culture:  07-22 @ 10:30    --        No growth at 5 days.  Urine Culture:  07-22 @ 10:29    --        No growth at 5 days.  Urine Culture:  07-22 @ 10:08    --        No growth  Urine Culture:  07-22 @ 00:26    --        No growth        Radiology: PAST MEDICAL & SURGICAL HISTORY:  Blind: right eye  DD (diastrophic dysplasia)  Psoriasis  HTN (hypertension)  Seizure  MR (mental retardation), severe  No significant past surgical history      MEDICATIONS  (STANDING):  ALBUTerol/ipratropium for Nebulization 3 milliLiter(s) Nebulizer every 8 hours  buDESOnide   0.5 milliGRAM(s) Respule 0.5 milliGRAM(s) Inhalation two times a day  doxazosin 1 milliGRAM(s) Oral at bedtime  heparin  Injectable 5000 Unit(s) SubCutaneous every 12 hours  lactulose Syrup 10 Gram(s) Oral two times a day  levoFLOXacin  Tablet 250 milliGRAM(s) Oral every 24 hours  metoprolol tartrate 12.5 milliGRAM(s) Oral daily  nystatin Cream 1 Application(s) Topical two times a day  OLANZapine 10 milliGRAM(s) Oral daily  pantoprazole    Tablet 40 milliGRAM(s) Oral before breakfast  senna 2 Tablet(s) Oral at bedtime    MEDICATIONS  (PRN):  acetaminophen   Tablet 325 milliGRAM(s) Oral every 4 hours PRN For Temp greater than 38 C (100.4 F)      Patient is a 63y old  Male who presents with a chief complaint of fever   fall (21 Jul 2018 23:57)      Vital Signs Last 24 Hrs  T(C): 36.9 (27 Jul 2018 15:33), Max: 38.3 (27 Jul 2018 08:05)  T(F): 98.4 (27 Jul 2018 15:33), Max: 100.9 (27 Jul 2018 08:05)  HR: 90 (27 Jul 2018 15:33) (79 - 109)  BP: 131/77 (27 Jul 2018 15:33) (120/78 - 150/76)  BP(mean): --  RR: 16 (27 Jul 2018 15:33) (16 - 20)  SpO2: 92% (27 Jul 2018 15:33) (92% - 95%)          07-26 @ 07:01  -  07-27 @ 07:00  --------------------------------------------------------  IN: 1080 mL / OUT: 2400 mL / NET: -1320 mL    07-27 @ 07:01 - 07-27 @ 17:23  --------------------------------------------------------  IN: 540 mL / OUT: 650 mL / NET: -110 mL        REVIEW OF SYSTEMS:    Constitutional: No fever, weight loss or fatigue  Eyes: No eye pain, visual disturbances, or discharge  ENT:  No difficulty hearing, tinnitus, vertigo; No sinus or throat pain  Neck: No pain or stiffness  Breasts: No pain, masses or nipple discharge  Respiratory: No cough, wheezing, chills or hemoptysis  Cardiovascular: No chest pain, palpitations, shortness of breath, dizziness or leg swelling  Gastrointestinal: No abdominal or epigastric pain. No nausea, vomiting or hematemesis; No diarrhea or constipation. No melena or hematochezia.  Genitourinary: No dysuria, frequency, hematuria or incontinence  Rectal: No pain, hemorrhoids or incontinence  Neurological: No headaches, memory loss, loss of strength, numbness or tremors  Skin: No itching, burning, rashes or lesions   Lymph Nodes: No enlarged glands  Endocrine: No heat or cold intolerance; No hair loss  Musculoskeletal: No joint pain or swelling; No muscle, back or extremity pain  Psychiatric: No depression, anxiety, mood swings or difficulty sleeping  Heme/Lymph: No easy bruising or bleeding gums  Allergy and Immunologic: No hives or eczema    PHYSICAL EXAM:  eating dinner  alert rodriguez draining  interactive  Constitutional: NAD  Neck: No LAD, No JVD  Back: Normal spine flexure, No CVA tenderness  Respiratory: CTAB/L upper lobes   Cardiovascular: S1 and S2, RRR, no M/G/R  Gastrointestinal: BS+, soft, NT/ND  Extremities: No peripheral edema  Neurological: A/O  no focal deficits  Skin: No rashes moves all ext      decubiti: none                          14.0   8.07  )-----------( 237      ( 27 Jul 2018 09:01 )             40.0     07-27    143  |  108  |  17  ----------------------------<  99  4.4   |  26  |  0.98    Ca    9.0      27 Jul 2018 09:01    TPro  7.7  /  Alb  3.1<L>  /  TBili  0.3  /  DBili  x   /  AST  82<H>  /  ALT  105<H>  /  AlkPhos  97  07-27              .Urine Catheterized  07-26 @ 12:14   No growth  --  --      .Urine Catheterized  07-23 @ 18:57   No growth  --  --      .Blood Blood  07-23 @ 08:36   No growth to date.  --  --      .Blood Blood-Venous  07-22 @ 10:30   No growth at 5 days.  --  --      .Blood Blood-Venous  07-22 @ 10:29   No growth at 5 days.  --  --      .Urine Clean Catch (Midstream)  07-22 @ 10:08   No growth  --  --      .Urine Clean Catch (Midstream)  07-22 @ 00:26   No growth  --  --        Urine Culture:  07-26 @ 12:14    --        No growth  Urine Culture:  07-23 @ 18:57    --        No growth  Urine Culture:  07-23 @ 08:36    --        No growth to date.  Urine Culture:  07-22 @ 10:30    --        No growth at 5 days.  Urine Culture:  07-22 @ 10:29    --        No growth at 5 days.  Urine Culture:  07-22 @ 10:08    --        No growth  Urine Culture:  07-22 @ 00:26    --        No growth        Radiology:  < from: Xray Foot AP + Lateral + Oblique, Bilat (07.25.18 @ 21:29) >  EXAM:  FOOT BILATERAL (MINIMUM 3 V)                            PROCEDURE DATE:  07/25/2018          INTERPRETATION:  Clinical information: Bilateral foot pain following fall.    3 views each of the right and left foot.    There are hammertoe deformities of the bilateral second through fifth   toes limiting evaluation.    The alignment at the tarsometatarsal joints remains within normal limits.  No acute fracture is noted.    Calcaneal enthesopathy is present.    There are soft tissue vascular calcifications.    Impression:    Findings as discussed above.      < end of copied text >  < from: Xray Knee 3 Views, Bilateral (07.25.18 @ 21:28) >    EXAM:  KNEE AP LAT & OBL. BILAT                            PROCEDURE DATE:  07/25/2018          INTERPRETATION:  Clinical information: Bilateral knee pain following fall.    3 views each of the right and left knee.    No acute fracture or dislocation is noted.    Impression:    Findings as discussed above.      < end of copied text >

## 2018-07-27 NOTE — PROGRESS NOTE ADULT - SUBJECTIVE AND OBJECTIVE BOX
[facility] Cardiology    CHIEF COMPLAINT: Patient is a 63y old  Male who presents with a chief complaint of fever   fall (21 Jul 2018 23:57)      Follow Up: [ ] Chest Pain      [ ] Dyspnea     [ ] Palpitations    [ ] Atrial Fibrillation     [ ] Ventricular Dysrhythmia    [ ] Abnormal EKG                      [ ] Abnormal Cardiac Enzymes     [ ] Valvular Disease    HPI:  fell at home  with fever 101  brought in (21 Jul 2018 23:57)    mild sinus tach on tele overnight      PAST MEDICAL & SURGICAL HISTORY:  Blind: right eye  DD (diastrophic dysplasia)  Psoriasis  HTN (hypertension)  Seizure  MR (mental retardation), severe  No significant past surgical history      MEDICATIONS  (STANDING):  ALBUTerol/ipratropium for Nebulization 3 milliLiter(s) Nebulizer every 8 hours  buDESOnide   0.5 milliGRAM(s) Respule 0.5 milliGRAM(s) Inhalation two times a day  doxazosin 1 milliGRAM(s) Oral at bedtime  fluvoxaMINE 25 milliGRAM(s) Oral daily  heparin  Injectable 5000 Unit(s) SubCutaneous every 12 hours  ibuprofen  Tablet 200 milliGRAM(s) Oral two times a day  lactulose Syrup 10 Gram(s) Oral two times a day  levoFLOXacin  Tablet 250 milliGRAM(s) Oral every 24 hours  metoprolol tartrate 12.5 milliGRAM(s) Oral daily  OLANZapine 20 milliGRAM(s) Oral daily  pantoprazole    Tablet 40 milliGRAM(s) Oral before breakfast  senna 2 Tablet(s) Oral at bedtime    MEDICATIONS  (PRN):  acetaminophen   Tablet 325 milliGRAM(s) Oral every 4 hours PRN For Temp greater than 38 C (100.4 F)      Allergies    aztreonam (Rash)  penicillin (Unknown)  sulfa drugs (Unknown)    Intolerances        REVIEW OF SYSTEMS:    CONSTITUTIONAL: No weakness, fevers or chills.   EYES/ENT: No visual changes;  No vertigo or throat pain   NECK: No pain or stiffness  RESPIRATORY: No cough, wheezing, hemoptysis; No shortness of breath  CARDIOVASCULAR: No chest pain or palpitations  GASTROINTESTINAL: No abdominal or epigastric pain. No nausea, vomiting, or hematemesis; No diarrhea or constipation. No melena or hematochezia.  GENITOURINARY: No dysuria, frequency or hematuria  NEUROLOGICAL: No numbness or weakness  SKIN: No itching, burning, rashes, or lesions   All other review of systems is negative unless indicated above    Vital Signs Last 24 Hrs  T(C): 36.8 (27 Jul 2018 11:45), Max: 38.3 (27 Jul 2018 08:05)  T(F): 98.2 (27 Jul 2018 11:45), Max: 100.9 (27 Jul 2018 08:05)  HR: 88 (27 Jul 2018 11:45) (79 - 109)  BP: 120/78 (27 Jul 2018 11:45) (120/78 - 150/76)  BP(mean): --  RR: 16 (27 Jul 2018 11:45) (16 - 20)  SpO2: 94% (27 Jul 2018 11:45) (92% - 95%)    I&O's Summary    26 Jul 2018 07:01  -  27 Jul 2018 07:00  --------------------------------------------------------  IN: 1080 mL / OUT: 2400 mL / NET: -1320 mL    27 Jul 2018 07:01  -  27 Jul 2018 11:51  --------------------------------------------------------  IN: 360 mL / OUT: 0 mL / NET: 360 mL    PHYSICAL EXAM:  Constitutional: diaphoretic  Neck: No LAD, No JVD  Back: Normal spine flexure, No CVA tenderness  Respiratory: dec bs bases  Cardiovascular: S1 and S2, RRR, BECCA  Gastrointestinal: BS+, soft, NT/ND  Extremities: No peripheral edema  Neurological: A/O no focal deficits  Skin: No rashes    LABS: All Labs Reviewed:                        14.0   8.07  )-----------( 237      ( 27 Jul 2018 09:01 )             40.0     07-27    143  |  108  |  17  ----------------------------<  99  4.4   |  26  |  0.98    Ca    9.0      27 Jul 2018 09:01    TPro  7.7  /  Alb  3.1<L>  /  TBili  0.3  /  DBili  x   /  AST  82<H>  /  ALT  105<H>  /  AlkPhos  97  07-27    Organism --  Gram Stain Blood -- Gram Stain --  Specimen Source .Blood Blood  Culture-Blood --    · Assessment		  Patient is a 63y Male with MR, admitted with fever, urinary retention, sepsis.  With tachycardia 110-120 past 24 hours.    -  Sepsis,   Getting Abx  Probable allergic rxn.    -  Tachycardia   bpm range.  Most likely secondary to infectious process and/or allergic rxn.  Re-peat EKG OK, echo; Normal LV SF, no significant valve disease   troponin negative, no demand ischemia.  s/p IVF.     -  Urinary retention     07-24 @ 20:18  Pro Bnp 68    07-25 @ 08:21  TSH: 2.57    RADIOLOGY/EKG:Diagnosis Line Sinus tachycardia  premature ventricular complexes 7/25/2018 8:20:55 PM    2D ECHO: OK    07-24 @ 20:18  Pro Bnp 68    07-25 @ 08:21  TSH: 2.57    Please reconsult if needed.  Dr. Wiggins covering this weekend

## 2018-07-27 NOTE — PROGRESS NOTE ADULT - SUBJECTIVE AND OBJECTIVE BOX
SYLVAINROSELIA is a 63yMale , patient examined and chart reviewed.     INTERVAL HPI/ OVERNIGHT EVENTS:   Pt seen earlier. Fevers better.   No events.    PAST MEDICAL & SURGICAL HISTORY:  Blind: right eye  DD (diastrophic dysplasia)  Psoriasis  HTN (hypertension)  Seizure  MR (mental retardation), severe  No significant past surgical history      For details regarding the patient's social history, family history, and other miscellaneous elements, please refer the initial infectious diseases consultation and/or the admitting history and physical examination for this admission.    ROS:  Unable to obtain due to : nonverbal MRDD    ALLERGIES  aztreonam (Rash)  penicillin (Unknown)  sulfa drugs (Unknown)      Current inpatient medications :    ANTIBIOTICS/RELEVANT:  levoFLOXacin  Tablet 250 milliGRAM(s) Oral every 24 hours      acetaminophen   Tablet 325 milliGRAM(s) Oral every 4 hours PRN  ALBUTerol/ipratropium for Nebulization 3 milliLiter(s) Nebulizer every 8 hours  buDESOnide   0.5 milliGRAM(s) Respule 0.5 milliGRAM(s) Inhalation two times a day  doxazosin 2 milliGRAM(s) Oral at bedtime  heparin  Injectable 5000 Unit(s) SubCutaneous every 12 hours  lactulose Syrup 10 Gram(s) Oral two times a day  metoprolol tartrate 12.5 milliGRAM(s) Oral daily  nystatin Cream 1 Application(s) Topical two times a day  OLANZapine 10 milliGRAM(s) Oral daily  pantoprazole    Tablet 40 milliGRAM(s) Oral before breakfast  senna 2 Tablet(s) Oral at bedtime      Objective:    07-26 @ 07:01 - 07-27 @ 07:00  --------------------------------------------------------  IN: 1080 mL / OUT: 2400 mL / NET: -1320 mL    07-27 @ 07:01 - 07-27 @ 21:58  --------------------------------------------------------  IN: 540 mL / OUT: 650 mL / NET: -110 mL      T(C): 36.8 (07-27-18 @ 20:07), Max: 38.3 (07-27-18 @ 08:05)  HR: 90 (07-27-18 @ 20:07) (79 - 106)  BP: 157/89 (07-27-18 @ 20:07) (120/78 - 157/89)  RR: 16 (07-27-18 @ 20:07) (16 - 20)  SpO2: 93% (07-27-18 @ 20:07) (92% - 94%)  Wt(kg): --      Physical Exam:  General:  no acute distress  Eyes: sclera anicteric, pupils equal and reactive to light  ENMT: buccal mucosa moist, pharynx not injected  Neck: supple, trachea midline  Lungs: clear, no wheeze/rhonchi  Cardiovascular: regular rate and rhythm, S1 S2  Abdomen: soft, nontender, no organomegaly present, bowel sounds normal  Neurological: awake MRDD nonverbal  Skin: no increased ecchymosis/petechiae/purpura  Lymph Nodes: no palpable cervical/supraclavicular lymph nodes enlargements  Extremities: no cyanosis/clubbing/edema      LABS:                          14.0   8.07  )-----------( 237      ( 27 Jul 2018 09:01 )             40.0       07-27    143  |  108  |  17  ----------------------------<  99  4.4   |  26  |  0.98    Ca    9.0      27 Jul 2018 09:01    TPro  7.7  /  Alb  3.1<L>  /  TBili  0.3  /  DBili  x   /  AST  82<H>  /  ALT  105<H>  /  AlkPhos  97  07-27    MICROBIOLOGY:    Culture - Urine (collected 26 Jul 2018 12:14)  Source: .Urine Catheterized  Final Report (27 Jul 2018 14:08):    No growth    RADIOLOGY & ADDITIONAL STUDIES:  EXAM:  XR CHEST PORTABLE ROUTINE 1V                            PROCEDURE DATE:  07/26/2018      INTERPRETATION:  Portable AP chest    Clinical information: MR.    20/4/2018.    Findings: Low lung volumes limit evaluation. Cardiomediastinal silhouette   remains enlarged. No pleural effusion. Mild pulmonary vascular   congestion. Question airspace opacity in the left lung base.    IMPRESSION:    Question developing left lower lobe opacity, atelectasis versus pneumonia.    Mild pulmonary vascular congestion.    Assessment :  62 YO M nonverbal MRDD admitted from group home sp fall with  fevers  Sec urinary retention  Wade reinserted as he failed TOV  Fevers persistent ?Asp Pna     Plan :   Cont Levaquin x 5 days  Trend temps and wbc  Wade per primary team   Urology to see  Aspiration precautions        Continue with present regiment.  Appropriate use of antibiotics and adverse effects reviewed.      I have discussed the above plan of care with patient/ family in detail. They expressed understanding of the  treatment plan . Risks, benefits and alternatives discussed in detail. I have asked if they have any questions or concerns and appropriately addressed them to the best of my ability .    > 35 minutes were spent in direct patient care reviewing notes, medications ,labs data/ imaging , discussion with multidisciplinary team.    Thank you for allowing me to participate in care of your patient .    Olivier Daniels MD  Infectious Disease  [phone]

## 2018-07-27 NOTE — PROGRESS NOTE ADULT - PROBLEM SELECTOR PLAN 2
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

## 2018-07-28 LAB
ALBUMIN SERPL ELPH-MCNC: 3.4 G/DL — SIGNIFICANT CHANGE UP (ref 3.3–5)
ALP SERPL-CCNC: 100 U/L — SIGNIFICANT CHANGE UP (ref 40–120)
ALT FLD-CCNC: 100 U/L — HIGH (ref 12–78)
ANION GAP SERPL CALC-SCNC: 9 MMOL/L — SIGNIFICANT CHANGE UP (ref 5–17)
AST SERPL-CCNC: 76 U/L — HIGH (ref 15–37)
BASOPHILS # BLD AUTO: 0.04 K/UL — SIGNIFICANT CHANGE UP (ref 0–0.2)
BASOPHILS NFR BLD AUTO: 0.5 % — SIGNIFICANT CHANGE UP (ref 0–2)
BILIRUB SERPL-MCNC: 0.4 MG/DL — SIGNIFICANT CHANGE UP (ref 0.2–1.2)
BUN SERPL-MCNC: 14 MG/DL — SIGNIFICANT CHANGE UP (ref 7–23)
CALCIUM SERPL-MCNC: 9.4 MG/DL — SIGNIFICANT CHANGE UP (ref 8.5–10.1)
CHLORIDE SERPL-SCNC: 106 MMOL/L — SIGNIFICANT CHANGE UP (ref 96–108)
CO2 SERPL-SCNC: 28 MMOL/L — SIGNIFICANT CHANGE UP (ref 22–31)
CREAT SERPL-MCNC: 1 MG/DL — SIGNIFICANT CHANGE UP (ref 0.5–1.3)
CULTURE RESULTS: SIGNIFICANT CHANGE UP
CULTURE RESULTS: SIGNIFICANT CHANGE UP
EOSINOPHIL # BLD AUTO: 0.36 K/UL — SIGNIFICANT CHANGE UP (ref 0–0.5)
EOSINOPHIL NFR BLD AUTO: 4.1 % — SIGNIFICANT CHANGE UP (ref 0–6)
ERYTHROCYTE [SEDIMENTATION RATE] IN BLOOD: 40 MM/HR — HIGH (ref 0–20)
FERRITIN SERPL-MCNC: 474 NG/ML — HIGH (ref 30–400)
GLUCOSE SERPL-MCNC: 103 MG/DL — HIGH (ref 70–99)
HAV IGM SER-ACNC: SIGNIFICANT CHANGE UP
HBV CORE IGM SER-ACNC: SIGNIFICANT CHANGE UP
HBV SURFACE AG SER-ACNC: SIGNIFICANT CHANGE UP
HCT VFR BLD CALC: 41.7 % — SIGNIFICANT CHANGE UP (ref 39–50)
HCV AB S/CO SERPL IA: 0.29 S/CO — SIGNIFICANT CHANGE UP
HCV AB SERPL-IMP: SIGNIFICANT CHANGE UP
HGB BLD-MCNC: 14.4 G/DL — SIGNIFICANT CHANGE UP (ref 13–17)
IMM GRANULOCYTES NFR BLD AUTO: 0.6 % — SIGNIFICANT CHANGE UP (ref 0–1.5)
LYMPHOCYTES # BLD AUTO: 3.9 K/UL — HIGH (ref 1–3.3)
LYMPHOCYTES # BLD AUTO: 44.4 % — HIGH (ref 13–44)
MCHC RBC-ENTMCNC: 32.3 PG — SIGNIFICANT CHANGE UP (ref 27–34)
MCHC RBC-ENTMCNC: 34.5 GM/DL — SIGNIFICANT CHANGE UP (ref 32–36)
MCV RBC AUTO: 93.5 FL — SIGNIFICANT CHANGE UP (ref 80–100)
MONOCYTES # BLD AUTO: 0.71 K/UL — SIGNIFICANT CHANGE UP (ref 0–0.9)
MONOCYTES NFR BLD AUTO: 8.1 % — SIGNIFICANT CHANGE UP (ref 2–14)
NEUTROPHILS # BLD AUTO: 3.73 K/UL — SIGNIFICANT CHANGE UP (ref 1.8–7.4)
NEUTROPHILS NFR BLD AUTO: 42.3 % — LOW (ref 43–77)
NRBC # BLD: 0 /100 WBCS — SIGNIFICANT CHANGE UP (ref 0–0)
PLATELET # BLD AUTO: 324 K/UL — SIGNIFICANT CHANGE UP (ref 150–400)
POTASSIUM SERPL-MCNC: 3.9 MMOL/L — SIGNIFICANT CHANGE UP (ref 3.5–5.3)
POTASSIUM SERPL-SCNC: 3.9 MMOL/L — SIGNIFICANT CHANGE UP (ref 3.5–5.3)
PROT SERPL-MCNC: 8.2 G/DL — SIGNIFICANT CHANGE UP (ref 6–8.3)
RBC # BLD: 4.46 M/UL — SIGNIFICANT CHANGE UP (ref 4.2–5.8)
RBC # FLD: 12.4 % — SIGNIFICANT CHANGE UP (ref 10.3–14.5)
SODIUM SERPL-SCNC: 143 MMOL/L — SIGNIFICANT CHANGE UP (ref 135–145)
SPECIMEN SOURCE: SIGNIFICANT CHANGE UP
SPECIMEN SOURCE: SIGNIFICANT CHANGE UP
WBC # BLD: 8.79 K/UL — SIGNIFICANT CHANGE UP (ref 3.8–10.5)
WBC # FLD AUTO: 8.79 K/UL — SIGNIFICANT CHANGE UP (ref 3.8–10.5)

## 2018-07-28 RX ORDER — URSODIOL 250 MG/1
300 TABLET, FILM COATED ORAL EVERY 12 HOURS
Qty: 0 | Refills: 0 | Status: DISCONTINUED | OUTPATIENT
Start: 2018-07-28 | End: 2018-07-31

## 2018-07-28 RX ADMIN — Medication 5 MILLIGRAM(S): at 05:11

## 2018-07-28 RX ADMIN — Medication 5 MILLIGRAM(S): at 17:19

## 2018-07-28 RX ADMIN — Medication 3 MILLILITER(S): at 15:13

## 2018-07-28 RX ADMIN — NYSTATIN CREAM 1 APPLICATION(S): 100000 CREAM TOPICAL at 17:19

## 2018-07-28 RX ADMIN — Medication 12.5 MILLIGRAM(S): at 05:11

## 2018-07-28 RX ADMIN — Medication 3 MILLILITER(S): at 07:01

## 2018-07-28 RX ADMIN — LACTULOSE 10 GRAM(S): 10 SOLUTION ORAL at 05:12

## 2018-07-28 RX ADMIN — OLANZAPINE 10 MILLIGRAM(S): 15 TABLET, FILM COATED ORAL at 11:21

## 2018-07-28 RX ADMIN — Medication 400 MILLIGRAM(S): at 06:20

## 2018-07-28 RX ADMIN — Medication 10 MILLIGRAM(S): at 06:38

## 2018-07-28 RX ADMIN — TAMSULOSIN HYDROCHLORIDE 0.4 MILLIGRAM(S): 0.4 CAPSULE ORAL at 21:28

## 2018-07-28 RX ADMIN — Medication 0.5 MILLIGRAM(S): at 07:01

## 2018-07-28 RX ADMIN — SENNA PLUS 2 TABLET(S): 8.6 TABLET ORAL at 21:28

## 2018-07-28 RX ADMIN — NYSTATIN CREAM 1 APPLICATION(S): 100000 CREAM TOPICAL at 06:38

## 2018-07-28 RX ADMIN — PANTOPRAZOLE SODIUM 40 MILLIGRAM(S): 20 TABLET, DELAYED RELEASE ORAL at 05:11

## 2018-07-28 RX ADMIN — HEPARIN SODIUM 5000 UNIT(S): 5000 INJECTION INTRAVENOUS; SUBCUTANEOUS at 17:19

## 2018-07-28 RX ADMIN — Medication 3 MILLILITER(S): at 23:05

## 2018-07-28 RX ADMIN — Medication 400 MILLIGRAM(S): at 17:19

## 2018-07-28 RX ADMIN — Medication 400 MILLIGRAM(S): at 05:11

## 2018-07-28 RX ADMIN — URSODIOL 300 MILLIGRAM(S): 250 TABLET, FILM COATED ORAL at 17:19

## 2018-07-28 RX ADMIN — Medication 0.5 MILLIGRAM(S): at 21:28

## 2018-07-28 RX ADMIN — Medication 0.5 MILLIGRAM(S): at 23:05

## 2018-07-28 RX ADMIN — Medication 2 MILLIGRAM(S): at 21:28

## 2018-07-28 RX ADMIN — HEPARIN SODIUM 5000 UNIT(S): 5000 INJECTION INTRAVENOUS; SUBCUTANEOUS at 05:12

## 2018-07-28 NOTE — CHART NOTE - NSCHARTNOTEFT_GEN_A_CORE
Assessment:     Factors impacting intake: [ ] none [ ] nausea  [ ] vomiting [ ] diarrhea [ ] constipation  [ ]chewing problems [ ] swallowing issues  [ ] other:     Diet Presciption: Diet, Dysphagia 2 Mechanical Soft-Honey Consistency Fluid (07-22-18 @ 00:09)    Intake:     Current Weight: Weight (kg): 70 (07-22 @ 00:12)  % Weight Change    Pertinent Medications: MEDICATIONS  (STANDING):  ALBUTerol/ipratropium for Nebulization 3 milliLiter(s) Nebulizer every 8 hours  buDESOnide   0.5 milliGRAM(s) Respule 0.5 milliGRAM(s) Inhalation two times a day  clonazePAM Tablet 0.5 milliGRAM(s) Oral at bedtime  doxazosin 2 milliGRAM(s) Oral at bedtime  heparin  Injectable 5000 Unit(s) SubCutaneous every 12 hours  ibuprofen  Tablet 400 milliGRAM(s) Oral two times a day  lactulose Syrup 10 Gram(s) Oral two times a day  levoFLOXacin  Tablet 250 milliGRAM(s) Oral every 24 hours  metoprolol tartrate 12.5 milliGRAM(s) Oral daily  nystatin Cream 1 Application(s) Topical two times a day  OLANZapine 10 milliGRAM(s) Oral daily  oxybutynin 5 milliGRAM(s) Oral two times a day  pantoprazole    Tablet 40 milliGRAM(s) Oral before breakfast  senna 2 Tablet(s) Oral at bedtime  tamsulosin 0.4 milliGRAM(s) Oral at bedtime    MEDICATIONS  (PRN):  acetaminophen   Tablet 325 milliGRAM(s) Oral every 4 hours PRN For Temp greater than 38 C (100.4 F)  glycerin Suppository - Adult 1 Suppository(s) Rectal daily PRN Constipation    Pertinent Labs: 07-28 Na143 mmol/L Glu 103 mg/dL<H> K+ 3.9 mmol/L Cr  1.00 mg/dL BUN 14 mg/dL 07-28 Alb 3.4 g/dL     CAPILLARY BLOOD GLUCOSE      POCT Blood Glucose.: 119 mg/dL (28 Jul 2018 07:15)    Skin:     Estimated Needs:   [ ] no change since previous assessment  [ ] recalculated:     Previous Nutrition Diagnosis:   [ ] Inadequate Energy Intake [ ]Inadequate Oral Intake [ ] Excessive Energy Intake   [ ] Underweight [ ] Increased Nutrient Needs [ ] Overweight/Obesity   [ ] Altered GI Function [ ] Unintended Weight Loss [ ] Food & Nutrition Related Knowledge Deficit [ ] Malnutrition     Nutrition Diagnosis is [ ] ongoing  [ ] resolved [ ] not applicable     New Nutrition Diagnosis: [ ] not applicable       Interventions:   Recommend  [ ] Change Diet To:  [ ] Nutrition Supplement  [ ] Nutrition Support  [ ] Other:     Monitoring and Evaluation:   [ ] PO intake [ x ] Tolerance to diet prescription [ x ] weights [ x ] labs[ x ] follow up per protocol  [ ] other: Assessment: 63 yr old pt with DDMR dysphagia     Factors impacting intake: [ ] none [ ] nausea  [ ] vomiting [ ] diarrhea [ ] constipation  [ ]chewing problems [ ] swallowing issues  [ ] other:     Diet Presciption: Diet, Dysphagia 2 Mechanical Soft-Honey Consistency Fluid (07-22-18 @ 00:09)    Intake:     Current Weight: Weight (kg): 70 (07-22 @ 00:12)  % Weight Change    Pertinent Medications: MEDICATIONS  (STANDING):  ALBUTerol/ipratropium for Nebulization 3 milliLiter(s) Nebulizer every 8 hours  buDESOnide   0.5 milliGRAM(s) Respule 0.5 milliGRAM(s) Inhalation two times a day  clonazePAM Tablet 0.5 milliGRAM(s) Oral at bedtime  doxazosin 2 milliGRAM(s) Oral at bedtime  heparin  Injectable 5000 Unit(s) SubCutaneous every 12 hours  ibuprofen  Tablet 400 milliGRAM(s) Oral two times a day  lactulose Syrup 10 Gram(s) Oral two times a day  levoFLOXacin  Tablet 250 milliGRAM(s) Oral every 24 hours  metoprolol tartrate 12.5 milliGRAM(s) Oral daily  nystatin Cream 1 Application(s) Topical two times a day  OLANZapine 10 milliGRAM(s) Oral daily  oxybutynin 5 milliGRAM(s) Oral two times a day  pantoprazole    Tablet 40 milliGRAM(s) Oral before breakfast  senna 2 Tablet(s) Oral at bedtime  tamsulosin 0.4 milliGRAM(s) Oral at bedtime    MEDICATIONS  (PRN):  acetaminophen   Tablet 325 milliGRAM(s) Oral every 4 hours PRN For Temp greater than 38 C (100.4 F)  glycerin Suppository - Adult 1 Suppository(s) Rectal daily PRN Constipation    Pertinent Labs: 07-28 Na143 mmol/L Glu 103 mg/dL<H> K+ 3.9 mmol/L Cr  1.00 mg/dL BUN 14 mg/dL 07-28 Alb 3.4 g/dL     CAPILLARY BLOOD GLUCOSE      POCT Blood Glucose.: 119 mg/dL (28 Jul 2018 07:15)    Skin:     Estimated Needs:   [ ] no change since previous assessment  [ ] recalculated:     Previous Nutrition Diagnosis:   [ ] Inadequate Energy Intake [ ]Inadequate Oral Intake [ ] Excessive Energy Intake   [ ] Underweight [ ] Increased Nutrient Needs [ ] Overweight/Obesity   [ ] Altered GI Function [ ] Unintended Weight Loss [ ] Food & Nutrition Related Knowledge Deficit [ ] Malnutrition     Nutrition Diagnosis is [ ] ongoing  [ ] resolved [ ] not applicable     New Nutrition Diagnosis: [ ] not applicable       Interventions:   Recommend  [ ] Change Diet To:  [ ] Nutrition Supplement  [ ] Nutrition Support  [ ] Other:     Monitoring and Evaluation:   [ ] PO intake [ x ] Tolerance to diet prescription [ x ] weights [ x ] labs[ x ] follow up per protocol  [ ] other: Assessment: 63 yr old pt with severe MRDD admitted s/p fall resulting in no fractures also was treated for sepsis, now resolved. Per agency staff, pt ate all of cereal this morning and thickened coffee, was not interested in eggs, juice or applesauce .    Factors impacting intake: [ ] none [ ] nausea  [ ] vomiting [ ] diarrhea [ ] constipation  [ ]chewing problems [ X] swallowing issues  [ ] other:     Diet Prescription: Diet, Dysphagia 2 Mechanical Soft-Honey Consistency Fluid (07-22-18 @ 00:09)    Intake: this am ~ 35% served, flowsheets over the last few days document 75-95% served     Current Weight: Weight (kg): 70 (07-22 @ 00:12) 7-28-18 wt 153.2#  % Weight Change - 1/2 % ( if above accurate), not significant, possible decrease due to accuracy in weighing    Pertinent Medications: MEDICATIONS  (STANDING):  ALBUTerol/ipratropium for Nebulization 3 milliLiter(s) Nebulizer every 8 hours  buDESOnide   0.5 milliGRAM(s) Respule 0.5 milliGRAM(s) Inhalation two times a day  clonazePAM Tablet 0.5 milliGRAM(s) Oral at bedtime  doxazosin 2 milliGRAM(s) Oral at bedtime  heparin  Injectable 5000 Unit(s) SubCutaneous every 12 hours  ibuprofen  Tablet 400 milliGRAM(s) Oral two times a day  lactulose Syrup 10 Gram(s) Oral two times a day  levoFLOXacin  Tablet 250 milliGRAM(s) Oral every 24 hours  metoprolol tartrate 12.5 milliGRAM(s) Oral daily  nystatin Cream 1 Application(s) Topical two times a day  OLANZapine 10 milliGRAM(s) Oral daily  oxybutynin 5 milliGRAM(s) Oral two times a day  pantoprazole    Tablet 40 milliGRAM(s) Oral before breakfast  senna 2 Tablet(s) Oral at bedtime  tamsulosin 0.4 milliGRAM(s) Oral at bedtime    MEDICATIONS  (PRN):  acetaminophen   Tablet 325 milliGRAM(s) Oral every 4 hours PRN For Temp greater than 38 C (100.4 F)  glycerin Suppository - Adult 1 Suppository(s) Rectal daily PRN Constipation    Pertinent Labs: 07-28 Na143 mmol/L Glu 103 mg/dL<H> K+ 3.9 mmol/L Cr  1.00 mg/dL BUN 14 mg/dL 07-28 Alb 3.4 g/dL     CAPILLARY BLOOD GLUCOSE      POCT Blood Glucose.: 119 mg/dL (28 Jul 2018 07:15)    Skin: ecchymosis, L elbow abrasion    Estimated Needs:   [X ] no change since previous assessment  [ ] recalculated:     Previous Nutrition Diagnosis:   [ ] Inadequate Energy Intake [ ]Inadequate Oral Intake [ ] Excessive Energy Intake   [ ] Underweight [ ] Increased Nutrient Needs [ ] Overweight/Obesity   [ ] Altered GI Function [ ] Unintended Weight Loss [ ] Food & Nutrition Related Knowledge Deficit [ ] Malnutrition   [x} none identified    Nutrition Diagnosis is [ ] ongoing  [ ] resolved [ ] not applicable   [x] dysphagia  per 7-24-18 swallow eval as evidenced slow bolus prep/propulsion c untimely trigger of swallow c thin and nectar thick liquids c delayed/ weak throat clear and cough reflex response        Nutrition Diagnosis: [ ] not applicable       Interventions:   Recommend  [ ] Change Diet To:  [ ] Nutrition Supplement  [ ] Nutrition Support  [X ] Other: continue present diet     Monitoring and Evaluation:   [X ] PO intake [ x ] Tolerance to diet prescription [ x ] weights [ x ] labs[ x ] follow up per protocol  [ ] other:

## 2018-07-28 NOTE — PROGRESS NOTE ADULT - PROBLEM SELECTOR PLAN 3
will follow  labs numbers  may be transient hepatitis will follow  labs numbers  may be transient hepatitis  add actigal 300mg bid

## 2018-07-28 NOTE — PROGRESS NOTE ADULT - PROBLEM SELECTOR PLAN 1
will continue attempting oob chair ambulate  I added small dose klonopin bed time  if no success ,may need acute rehab

## 2018-07-28 NOTE — PROGRESS NOTE ADULT - SUBJECTIVE AND OBJECTIVE BOX
PAST MEDICAL & SURGICAL HISTORY:  Blind: right eye  DD (diastrophic dysplasia)  Psoriasis  HTN (hypertension)  Seizure  MR (mental retardation), severe  No significant past surgical history      MEDICATIONS  (STANDING):  ALBUTerol/ipratropium for Nebulization 3 milliLiter(s) Nebulizer every 8 hours  buDESOnide   0.5 milliGRAM(s) Respule 0.5 milliGRAM(s) Inhalation two times a day  clonazePAM Tablet 0.5 milliGRAM(s) Oral at bedtime  doxazosin 2 milliGRAM(s) Oral at bedtime  heparin  Injectable 5000 Unit(s) SubCutaneous every 12 hours  ibuprofen  Tablet 400 milliGRAM(s) Oral two times a day  lactulose Syrup 10 Gram(s) Oral two times a day  levoFLOXacin  Tablet 250 milliGRAM(s) Oral every 24 hours  metoprolol tartrate 12.5 milliGRAM(s) Oral daily  nystatin Cream 1 Application(s) Topical two times a day  OLANZapine 10 milliGRAM(s) Oral daily  oxybutynin 5 milliGRAM(s) Oral two times a day  pantoprazole    Tablet 40 milliGRAM(s) Oral before breakfast  senna 2 Tablet(s) Oral at bedtime  tamsulosin 0.4 milliGRAM(s) Oral at bedtime    MEDICATIONS  (PRN):  acetaminophen   Tablet 325 milliGRAM(s) Oral every 4 hours PRN For Temp greater than 38 C (100.4 F)  glycerin Suppository - Adult 1 Suppository(s) Rectal daily PRN Constipation      Patient is a 63y old  Male who presents with a chief complaint of fever   fall (21 Jul 2018 23:57)      Vital Signs Last 24 Hrs  T(C): 36.9 (28 Jul 2018 11:15), Max: 37.4 (28 Jul 2018 00:25)  T(F): 98.4 (28 Jul 2018 11:15), Max: 99.3 (28 Jul 2018 00:25)  HR: 89 (28 Jul 2018 11:15) (89 - 140)  BP: 127/72 (28 Jul 2018 11:15) (121/73 - 178/92)  BP(mean): --  RR: 18 (28 Jul 2018 11:15) (16 - 22)  SpO2: 93% (28 Jul 2018 11:15) (92% - 97%)          07-27 @ 07:01  -  07-28 @ 07:00  --------------------------------------------------------  IN: 540 mL / OUT: 1300 mL / NET: -760 mL        REVIEW OF SYSTEMS:    Constitutional: No fever, weight loss or fatigue  Eyes: No eye pain, visual disturbances, or discharge  ENT:  No difficulty hearing, tinnitus, vertigo; No sinus or throat pain  Neck: No pain or stiffness  Breasts: No pain, masses or nipple discharge  Respiratory: No cough, wheezing, chills or hemoptysis  Cardiovascular: No chest pain, palpitations, shortness of breath, dizziness or leg swelling  Gastrointestinal: No abdominal or epigastric pain. No nausea, vomiting or hematemesis; No diarrhea or constipation. No melena or hematochezia.  Genitourinary: No dysuria, frequency, hematuria or incontinence  Rectal: No pain, hemorrhoids or incontinence  Neurological: No headaches, memory loss, loss of strength, numbness or tremors  Skin: No itching, burning, rashes or lesions   Lymph Nodes: No enlarged glands  Endocrine: No heat or cold intolerance; No hair loss  Musculoskeletal: No joint pain or swelling; No muscle, back or extremity pain  Psychiatric: No depression, anxiety, mood swings or difficulty sleeping  Heme/Lymph: No easy bruising or bleeding gums  Allergy and Immunologic: No hives or eczema    PHYSICAL EXAM:  alert  eating very well  good large bm  super urine out put via rodriguez  Constitutional: NAD  Neck: No LAD, No JVD  Back: Normal spine flexure, No CVA tenderness  Respiratory: CTAB/L   Cardiovascular: S1 and S2, RRR, no M/G/R  Gastrointestinal: BS+, soft, NT/ND  Extremities: No peripheral edema  Neurological: A/O  no focal deficits  Skin: No rashes      decubiti: none                          14.4   8.79  )-----------( 324      ( 28 Jul 2018 08:12 )             41.7     07-28    143  |  106  |  14  ----------------------------<  103<H>  3.9   |  28  |  1.00    Ca    9.4      28 Jul 2018 08:12    TPro  8.2  /  Alb  3.4  /  TBili  0.4  /  DBili  x   /  AST  76<H>  /  ALT  100<H>  /  AlkPhos  100  07-28              .Urine Catheterized  07-26 @ 12:14   No growth  --  --      .Urine Catheterized  07-23 @ 18:57   No growth  --  --      .Blood Blood  07-23 @ 08:36   No growth at 5 days.  --  --      .Blood Blood-Venous  07-22 @ 10:30   No growth at 5 days.  --  --      .Blood Blood-Venous  07-22 @ 10:29   No growth at 5 days.  --  --      .Urine Clean Catch (Midstream)  07-22 @ 10:08   No growth  --  --      .Urine Clean Catch (Midstream)  07-22 @ 00:26   No growth  --  --        Urine Culture:  07-26 @ 12:14    --        No growth  Urine Culture:  07-23 @ 18:57    --        No growth  Urine Culture:  07-23 @ 08:36    --        No growth at 5 days.  Urine Culture:  07-22 @ 10:30    --        No growth at 5 days.  Urine Culture:  07-22 @ 10:29    --        No growth at 5 days.  Urine Culture:  07-22 @ 10:08    --        No growth  Urine Culture:  07-22 @ 00:26    --        No growth        Radiology:

## 2018-07-28 NOTE — PROGRESS NOTE ADULT - SUBJECTIVE AND OBJECTIVE BOX
SYLVAIN ROSELIA is a 63yMale , patient examined and chart reviewed.      INTERVAL HPI/ OVERNIGHT EVENTS:   Afebrile. Awake.  Nonverbal    PAST MEDICAL & SURGICAL HISTORY:  Blind: right eye  DD (diastrophic dysplasia)  Psoriasis  HTN (hypertension)  Seizure  MR (mental retardation), severe  No significant past surgical history      For details regarding the patient's social history, family history, and other miscellaneous elements, please refer the initial infectious diseases consultation and/or the admitting history and physical examination for this admission.    ROS:  Unable to obtain due to : Nonverbal MRDD    ALLERGIES  aztreonam (Rash)  penicillin (Unknown)  sulfa drugs (Unknown)      Current inpatient medications :    ANTIBIOTICS/RELEVANT:  levoFLOXacin  Tablet 250 milliGRAM(s) Oral every 24 hours      acetaminophen   Tablet 325 milliGRAM(s) Oral every 4 hours PRN  ALBUTerol/ipratropium for Nebulization 3 milliLiter(s) Nebulizer every 8 hours  buDESOnide   0.5 milliGRAM(s) Respule 0.5 milliGRAM(s) Inhalation two times a day  clonazePAM Tablet 0.5 milliGRAM(s) Oral at bedtime  doxazosin 2 milliGRAM(s) Oral at bedtime  glycerin Suppository - Adult 1 Suppository(s) Rectal daily PRN  heparin  Injectable 5000 Unit(s) SubCutaneous every 12 hours  ibuprofen  Tablet 400 milliGRAM(s) Oral two times a day  lactulose Syrup 10 Gram(s) Oral two times a day  metoprolol tartrate 12.5 milliGRAM(s) Oral daily  nystatin Cream 1 Application(s) Topical two times a day  OLANZapine 10 milliGRAM(s) Oral daily  oxybutynin 5 milliGRAM(s) Oral two times a day  pantoprazole    Tablet 40 milliGRAM(s) Oral before breakfast  senna 2 Tablet(s) Oral at bedtime  tamsulosin 0.4 milliGRAM(s) Oral at bedtime  ursodiol Capsule 300 milliGRAM(s) Oral every 12 hours      Objective:    07-27 @ 07:01  -  07-28 @ 07:00  --------------------------------------------------------  IN: 540 mL / OUT: 1300 mL / NET: -760 mL      T(C): 37.3 (07-28-18 @ 20:17), Max: 37.4 (07-28-18 @ 00:25)  HR: 109 (07-28-18 @ 20:17) (74 - 140)  BP: 128/85 (07-28-18 @ 20:17) (109/67 - 178/92)  RR: 19 (07-28-18 @ 20:17) (16 - 22)  SpO2: 94% (07-28-18 @ 20:17) (93% - 97%)  Wt(kg): --      Physical Exam:  General: no acute distress  Eyes: sclera anicteric, pupils equal and reactive to light  ENMT: buccal mucosa moist, pharynx not injected  Neck: supple, trachea midline  Lungs: Decreased, no wheeze/rhonchi  Cardiovascular: regular rate and rhythm, S1 S2  Abdomen: soft, nontender, no organomegaly present, bowel sounds normal  Neurological: awake nonverbal MRDD  Skin: no increased ecchymosis/petechiae/purpura  Lymph Nodes: no palpable cervical/supraclavicular lymph nodes enlargements  Extremities: no cyanosis/clubbing/edema      LABS:                          14.4   8.79  )-----------( 324      ( 28 Jul 2018 08:12 )             41.7       07-28    143  |  106  |  14  ----------------------------<  103<H>  3.9   |  28  |  1.00    Ca    9.4      28 Jul 2018 08:12    TPro  8.2  /  Alb  3.4  /  TBili  0.4  /  DBili  x   /  AST  76<H>  /  ALT  100<H>  /  AlkPhos  100  07-28        Assessment :  62 YO M nonverbal MRDD admitted from group home sp fall with  fevers  Sec urinary retention  Wade reinserted as he failed TOV  Asp Pna  Overall appears better     Plan :   Cont Levaquin x 5 days  Trend temps and wbc  Wade per primary team   Aspiration precautions  Stable from ID standpoint      Continue with present regiment.  Appropriate use of antibiotics and adverse effects reviewed.    > 35 minutes were spent in direct patient care reviewing notes, medications ,labs data/ imaging , discussion with multidisciplinary team.    Thank you for allowing me to participate in care of your patient .    Olivier Daniels MD  Infectious Disease  [phone]

## 2018-07-29 RX ADMIN — PANTOPRAZOLE SODIUM 40 MILLIGRAM(S): 20 TABLET, DELAYED RELEASE ORAL at 05:51

## 2018-07-29 RX ADMIN — NYSTATIN CREAM 1 APPLICATION(S): 100000 CREAM TOPICAL at 17:44

## 2018-07-29 RX ADMIN — Medication 0.5 MILLIGRAM(S): at 22:24

## 2018-07-29 RX ADMIN — Medication 3 MILLILITER(S): at 07:00

## 2018-07-29 RX ADMIN — HEPARIN SODIUM 5000 UNIT(S): 5000 INJECTION INTRAVENOUS; SUBCUTANEOUS at 05:51

## 2018-07-29 RX ADMIN — LACTULOSE 10 GRAM(S): 10 SOLUTION ORAL at 17:43

## 2018-07-29 RX ADMIN — Medication 400 MILLIGRAM(S): at 17:43

## 2018-07-29 RX ADMIN — Medication 0.5 MILLIGRAM(S): at 07:00

## 2018-07-29 RX ADMIN — Medication 2 MILLIGRAM(S): at 22:24

## 2018-07-29 RX ADMIN — HEPARIN SODIUM 5000 UNIT(S): 5000 INJECTION INTRAVENOUS; SUBCUTANEOUS at 17:43

## 2018-07-29 RX ADMIN — Medication 400 MILLIGRAM(S): at 05:51

## 2018-07-29 RX ADMIN — Medication 12.5 MILLIGRAM(S): at 05:51

## 2018-07-29 RX ADMIN — Medication 5 MILLIGRAM(S): at 17:44

## 2018-07-29 RX ADMIN — Medication 3 MILLILITER(S): at 15:01

## 2018-07-29 RX ADMIN — NYSTATIN CREAM 1 APPLICATION(S): 100000 CREAM TOPICAL at 05:52

## 2018-07-29 RX ADMIN — SENNA PLUS 2 TABLET(S): 8.6 TABLET ORAL at 22:24

## 2018-07-29 RX ADMIN — Medication 0.5 MILLIGRAM(S): at 22:32

## 2018-07-29 RX ADMIN — URSODIOL 300 MILLIGRAM(S): 250 TABLET, FILM COATED ORAL at 05:51

## 2018-07-29 RX ADMIN — Medication 3 MILLILITER(S): at 22:32

## 2018-07-29 RX ADMIN — URSODIOL 300 MILLIGRAM(S): 250 TABLET, FILM COATED ORAL at 17:44

## 2018-07-29 RX ADMIN — Medication 400 MILLIGRAM(S): at 06:30

## 2018-07-29 RX ADMIN — LACTULOSE 10 GRAM(S): 10 SOLUTION ORAL at 05:50

## 2018-07-29 RX ADMIN — Medication 5 MILLIGRAM(S): at 05:51

## 2018-07-29 RX ADMIN — TAMSULOSIN HYDROCHLORIDE 0.4 MILLIGRAM(S): 0.4 CAPSULE ORAL at 22:24

## 2018-07-29 RX ADMIN — OLANZAPINE 10 MILLIGRAM(S): 15 TABLET, FILM COATED ORAL at 11:24

## 2018-07-29 NOTE — PROGRESS NOTE ADULT - SUBJECTIVE AND OBJECTIVE BOX
SYLVAIN VELOZ [facility]    ALLERGY Pcn sulfa   CONTACT Naima Bryant  self    REASON FOR VISIT   Initial evaluation/Int medicine coverage  requested 7/29/2018   by Dr Kamille Meyer  from Dr Damon   Patient examined chart reviewed  HOSPITAL ADMISSION [facility]  P 7/21/2018   PATIENT CAME  FROM (if information available)      PATIENT DESCRIPTION 7/21/2018 ADMISSION [facility]   62 yo male hx mrdd. pt sp unwitnessed fall at [facility].  pt with bruise on back and low grade temp and sent to er. pt acting normally Fever was felt to be sec to urine retention Filed TOV and rodriguez reinserted Rxed with Levaquin       VITALS/LABS                           7/29/2018 99f 111 120/70 18 94%   7/28/2018 W 8.7 Hb 14.4 Plt 324k Na 143 K 3.9 CO2 28 Cr 1     REVIEW OF SYMPTOMS    Able to give ROS   NO     PHYSICAL EXAM    HEENT Unremarkable PERRLA atraumatic   RESP Fair air entry EXP prolonged    Harsh breath sound Resp distres mild   CARDIAC S1 S2 No S3     NO JVD    ABDOMEN SOFT BS PRESENT NOT DISTENDED No hepatosplenomegaly PEDAL EDEMA present No calf tenderness  NO rash   GENERAL Not TOXIC looking      PATIENT DATA ASSESSMENT RECOMMENDATIONS 7/21/2018   ADMISSION   [facility]                        FEVER UTI   7/22-7/28/2018 W 4 - 8.9   7/22/2018 CTAP IC No organ injury   7/21/2018 CXR Mild opacn l diaphragm   Azactam 1.3 (7/22) --> Levaqin (7/24)   ASSESSMENT/RECOMMENDATIONS    7/22/2018 Cont Rx        COPD   Duoneb.3 (7/24)   Pulmicort (7/27)     CAD   7/22/2018 Tr 1 n   Losartan 25 (7/22)     HO FALL  7/21/2018 CT H n CT Neck n     NEUROPSYCHIATRIC MEDS   Luvox 25 (7/22)   Olanzapine 20 (7/22) --> Olanzapine 10 (7/27)   Clonazepam .5 HS (7/27)     URINE RETN   Doxazosin 2 (7/27)   Oxybutynin 5.2 (7/27)   Tamsulosin .4 (7/27)   ASSESSMENT/RECOMMENDATIONS    7/29/2018 Voiding trial     ELEVATED LFTS   Ursodiol 300.2 (7/28)     GLOBAL ISSUE/BEST PRACTICE:      PROBLEM: HOB elevation:   y            PROBLEM: Stress ulcer proph:    protonix 40 (7/22)                       PROBLEM: VTE prophylaxis:      Compr device (7/22) hsc (7/23)   PROBLEM: Glycemic control:    na  PROBLEM: Nutrition:    Dys 2 mech soft  Honey (7/22)   PROBLEM: Advanced directive: na     PROBLEM: Allergies:  na      TIME SPENT Over 25 minutes aggregate care time spent on encounter; activities included   direct patient care, counseling and/or coordinating care reviewing notes, lab data/ imaging , discussion with multidisciplinary team/ patient  /family

## 2018-07-29 NOTE — PROGRESS NOTE ADULT - SUBJECTIVE AND OBJECTIVE BOX
SYLVAIN ROSELIA is a 63yMale , patient examined and chart reviewed.     INTERVAL HPI/ OVERNIGHT EVENTS:   Afebrile. No events.    PAST MEDICAL & SURGICAL HISTORY:  Blind: right eye  DD (diastrophic dysplasia)  Psoriasis  HTN (hypertension)  Seizure  MR (mental retardation), severe  No significant past surgical history      For details regarding the patient's social history, family history, and other miscellaneous elements, please refer the initial infectious diseases consultation and/or the admitting history and physical examination for this admission.    ROS:  Unable to obtain due to : Nonverbal MRDD    ALLERGIES:  aztreonam (Rash)  penicillin (Unknown)  sulfa drugs (Unknown)    Current inpatient medications :    ANTIBIOTICS/RELEVANT:  levoFLOXacin  Tablet 250 milliGRAM(s) Oral every 24 hours      acetaminophen   Tablet 325 milliGRAM(s) Oral every 4 hours PRN  ALBUTerol/ipratropium for Nebulization 3 milliLiter(s) Nebulizer every 8 hours  bisacodyl Suppository 10 milliGRAM(s) Rectal daily  buDESOnide   0.5 milliGRAM(s) Respule 0.5 milliGRAM(s) Inhalation two times a day  clonazePAM Tablet 0.5 milliGRAM(s) Oral at bedtime  doxazosin 2 milliGRAM(s) Oral at bedtime  glycerin Suppository - Adult 1 Suppository(s) Rectal daily PRN  heparin  Injectable 5000 Unit(s) SubCutaneous every 12 hours  ibuprofen  Tablet 400 milliGRAM(s) Oral two times a day  lactulose Syrup 10 Gram(s) Oral two times a day  metoprolol tartrate 12.5 milliGRAM(s) Oral daily  nystatin Cream 1 Application(s) Topical two times a day  OLANZapine 10 milliGRAM(s) Oral daily  oxybutynin 5 milliGRAM(s) Oral two times a day  pantoprazole    Tablet 40 milliGRAM(s) Oral before breakfast  senna 2 Tablet(s) Oral at bedtime  tamsulosin 0.4 milliGRAM(s) Oral at bedtime  ursodiol Capsule 300 milliGRAM(s) Oral every 12 hours      Objective:    07-28 @ 07:01  -  07-29 @ 07:00  --------------------------------------------------------  IN: 0 mL / OUT: 1300 mL / NET: -1300 mL      T(C): 37.2 (07-29-18 @ 20:38), Max: 37.4 (07-29-18 @ 11:23)  HR: 85 (07-29-18 @ 22:34) (76 - 118)  BP: 145/90 (07-29-18 @ 20:38) (119/71 - 145/90)  RR: 20 (07-29-18 @ 20:38) (18 - 20)  SpO2: 94% (07-29-18 @ 22:34) (93% - 96%)  Wt(kg): --      Physical Exam:  General: No acute distress  Eyes: sclera anicteric, pupils equal and reactive to light  ENMT: buccal mucosa moist, pharynx not injected  Neck: supple, trachea midline  Lungs: clear, no wheeze/rhonchi  Cardiovascular: regular rate and rhythm, S1 S2  Abdomen: soft, nontender, no organomegaly present, bowel sounds normal  Neurological: awake nonverbal MRDD  Skin: no increased ecchymosis/petechiae/purpura  Lymph Nodes: no palpable cervical/supraclavicular lymph nodes enlargements  Extremities: no cyanosis/clubbing/edema      LABS:                          14.4   8.79  )-----------( 324      ( 28 Jul 2018 08:12 )             41.7       07-28    143  |  106  |  14  ----------------------------<  103<H>  3.9   |  28  |  1.00    Ca    9.4      28 Jul 2018 08:12    TPro  8.2  /  Alb  3.4  /  TBili  0.4  /  DBili  x   /  AST  76<H>  /  ALT  100<H>  /  AlkPhos  100  07-28            Assessment :  64 YO M nonverbal MRDD admitted from group home sp fall with  fevers  Sec urinary retention  Wade dc today  Voiding  Asp Pna  Overall appears better     Plan :   Cont Levaquin x 5 days  Trend temps and wbc  Aspiration precautions  Monitor closely for urinary retention  Stable from ID standpoint    Continue with present regiment.  Appropriate use of antibiotics and adverse effects reviewed.    > 35 minutes were spent in direct patient care reviewing notes, medications ,labs data/ imaging , discussion with multidisciplinary team.    Thank you for allowing me to participate in care of your patient .    Olivier Daniels MD  Infectious Disease  [phone]

## 2018-07-30 RX ORDER — IBUPROFEN 200 MG
200 TABLET ORAL
Qty: 0 | Refills: 0 | Status: DISCONTINUED | OUTPATIENT
Start: 2018-07-30 | End: 2018-07-30

## 2018-07-30 RX ORDER — METOPROLOL TARTRATE 50 MG
25 TABLET ORAL
Qty: 0 | Refills: 0 | Status: DISCONTINUED | OUTPATIENT
Start: 2018-07-30 | End: 2018-07-31

## 2018-07-30 RX ADMIN — HEPARIN SODIUM 5000 UNIT(S): 5000 INJECTION INTRAVENOUS; SUBCUTANEOUS at 17:14

## 2018-07-30 RX ADMIN — Medication 3 MILLILITER(S): at 07:21

## 2018-07-30 RX ADMIN — Medication 2 MILLIGRAM(S): at 21:25

## 2018-07-30 RX ADMIN — Medication 0.5 MILLIGRAM(S): at 20:18

## 2018-07-30 RX ADMIN — NYSTATIN CREAM 1 APPLICATION(S): 100000 CREAM TOPICAL at 17:14

## 2018-07-30 RX ADMIN — Medication 400 MILLIGRAM(S): at 17:14

## 2018-07-30 RX ADMIN — HEPARIN SODIUM 5000 UNIT(S): 5000 INJECTION INTRAVENOUS; SUBCUTANEOUS at 05:28

## 2018-07-30 RX ADMIN — Medication 0.5 MILLIGRAM(S): at 07:21

## 2018-07-30 RX ADMIN — Medication 3 MILLILITER(S): at 15:43

## 2018-07-30 RX ADMIN — PANTOPRAZOLE SODIUM 40 MILLIGRAM(S): 20 TABLET, DELAYED RELEASE ORAL at 05:28

## 2018-07-30 RX ADMIN — URSODIOL 300 MILLIGRAM(S): 250 TABLET, FILM COATED ORAL at 05:28

## 2018-07-30 RX ADMIN — URSODIOL 300 MILLIGRAM(S): 250 TABLET, FILM COATED ORAL at 17:14

## 2018-07-30 RX ADMIN — Medication 5 MILLIGRAM(S): at 05:28

## 2018-07-30 RX ADMIN — OLANZAPINE 10 MILLIGRAM(S): 15 TABLET, FILM COATED ORAL at 11:31

## 2018-07-30 RX ADMIN — Medication 10 MILLIGRAM(S): at 11:31

## 2018-07-30 RX ADMIN — NYSTATIN CREAM 1 APPLICATION(S): 100000 CREAM TOPICAL at 05:39

## 2018-07-30 RX ADMIN — TAMSULOSIN HYDROCHLORIDE 0.4 MILLIGRAM(S): 0.4 CAPSULE ORAL at 21:26

## 2018-07-30 RX ADMIN — Medication 400 MILLIGRAM(S): at 05:28

## 2018-07-30 RX ADMIN — Medication 0.5 MILLIGRAM(S): at 21:25

## 2018-07-30 RX ADMIN — LACTULOSE 10 GRAM(S): 10 SOLUTION ORAL at 05:30

## 2018-07-30 RX ADMIN — Medication 400 MILLIGRAM(S): at 17:15

## 2018-07-30 RX ADMIN — SENNA PLUS 2 TABLET(S): 8.6 TABLET ORAL at 21:26

## 2018-07-30 RX ADMIN — Medication 3 MILLILITER(S): at 20:20

## 2018-07-30 RX ADMIN — LACTULOSE 10 GRAM(S): 10 SOLUTION ORAL at 17:14

## 2018-07-30 RX ADMIN — Medication 25 MILLIGRAM(S): at 17:14

## 2018-07-30 NOTE — PROGRESS NOTE ADULT - SUBJECTIVE AND OBJECTIVE BOX
Patient is a 63y Male with a known history of :  Elevated LFTs (R79.89)  Drug allergy (Z88.9)  Tachycardia (R00.0)  Urinary retention (R33.9)  Sepsis, due to unspecified organism (A41.9)  Dysphagia, unspecified type (R13.10)  Fever, unspecified fever cause (R50.9)  Fall, initial encounter (W19.XXXA)      Follow up today pt appears comfortable with no distress.        HPI:  fell at home  with fever 101  brought in (21 Jul 2018 23:57)      REVIEW OF SYSTEMS:    CONSTITUTIONAL: No weakness, fevers or chills.   EYES/ENT: No visual changes;  No vertigo or throat pain   NECK: No pain or stiffness  RESPIRATORY: No cough, wheezing, hemoptysis; No shortness of breath  CARDIOVASCULAR: No chest pain or palpitations  GASTROINTESTINAL: No abdominal or epigastric pain. No nausea, vomiting, or hematemesis; No diarrhea or constipation. No melena or hematochezia.  GENITOURINARY: No dysuria, frequency or hematuria  NEUROLOGICAL: No numbness or weakness  SKIN: No itching, burning, rashes, or lesions         MEDICATIONS  (STANDING):  ALBUTerol/ipratropium for Nebulization 3 milliLiter(s) Nebulizer every 8 hours  bisacodyl Suppository 10 milliGRAM(s) Rectal daily  buDESOnide   0.5 milliGRAM(s) Respule 0.5 milliGRAM(s) Inhalation two times a day  clonazePAM Tablet 0.5 milliGRAM(s) Oral at bedtime  doxazosin 2 milliGRAM(s) Oral at bedtime  heparin  Injectable 5000 Unit(s) SubCutaneous every 12 hours  ibuprofen  Tablet 400 milliGRAM(s) Oral two times a day  lactulose Syrup 10 Gram(s) Oral two times a day  levoFLOXacin  Tablet 250 milliGRAM(s) Oral every 24 hours  metoprolol tartrate 12.5 milliGRAM(s) Oral daily  nystatin Cream 1 Application(s) Topical two times a day  OLANZapine 10 milliGRAM(s) Oral daily  oxybutynin 5 milliGRAM(s) Oral two times a day  pantoprazole    Tablet 40 milliGRAM(s) Oral before breakfast  senna 2 Tablet(s) Oral at bedtime  tamsulosin 0.4 milliGRAM(s) Oral at bedtime  ursodiol Capsule 300 milliGRAM(s) Oral every 12 hours    MEDICATIONS  (PRN):  acetaminophen   Tablet 325 milliGRAM(s) Oral every 4 hours PRN For Temp greater than 38 C (100.4 F)  glycerin Suppository - Adult 1 Suppository(s) Rectal daily PRN Constipation      ALLERGIES: aztreonam (Rash)  penicillin (Unknown)  sulfa drugs (Unknown)      FAMILY HISTORY:  No pertinent family history in first degree relatives      PHYSICAL EXAMINATION:  -----------------------------  T(C): 37.3 (07-30-18 @ 07:12), Max: 37.4 (07-29-18 @ 11:23)  HR: 110 (07-30-18 @ 07:21) (84 - 124)  BP: 127/76 (07-30-18 @ 07:12) (111/66 - 145/90)  RR: 20 (07-30-18 @ 07:12) (18 - 20)  SpO2: 95% (07-30-18 @ 07:21) (93% - 96%)  Wt(kg): --    07-29 @ 07:01  -  07-30 @ 07:00  --------------------------------------------------------  IN:  Total IN: 0 mL    OUT:    Indwelling Catheter - Urethral: 450 mL  Total OUT: 450 mL    Total NET: -450 mL      Constitutional: diaphoretic  Neck: No LAD, No JVD  Back: Normal spine flexure, No CVA tenderness  Respiratory: dec bs bases  Cardiovascular: S1 and S2, RRR, BECCA  Gastrointestinal: BS+, soft, NT/ND  Extremities: No peripheral edema  Neurological: A/O no focal deficits  Skin: No rashes        LABS:   --------

## 2018-07-30 NOTE — CONSULT NOTE ADULT - SUBJECTIVE AND OBJECTIVE BOX
CHIEF COMPLAINT: urinary retention    HISTORY OF PRESENT ILLNESS: Pt is unable to communicate. he is admitted for pneumonia, and has failed multiple voiding trials despite alpha blocker (flomax & doxazosin). His past  hx is not documented here, but he is on oxybutynin, an anti cholinergic.    PAST MEDICAL & SURGICAL HISTORY:  Blind: right eye  DD (diastrophic dysplasia)  Psoriasis  HTN (hypertension)  Seizure  MR (mental retardation), severe  No significant past surgical history      REVIEW OF SYSTEMS:    The pt is unable to provide 2/2 Mental Retardation        MEDICATIONS  (STANDING):  ALBUTerol/ipratropium for Nebulization 3 milliLiter(s) Nebulizer every 8 hours  bisacodyl Suppository 10 milliGRAM(s) Rectal daily  buDESOnide   0.5 milliGRAM(s) Respule 0.5 milliGRAM(s) Inhalation two times a day  clonazePAM Tablet 0.5 milliGRAM(s) Oral at bedtime  doxazosin 2 milliGRAM(s) Oral at bedtime  heparin  Injectable 5000 Unit(s) SubCutaneous every 12 hours  ibuprofen  Tablet 400 milliGRAM(s) Oral two times a day  lactulose Syrup 10 Gram(s) Oral two times a day  levoFLOXacin  Tablet 250 milliGRAM(s) Oral every 24 hours  metoprolol tartrate 25 milliGRAM(s) Oral two times a day  nystatin Cream 1 Application(s) Topical two times a day  OLANZapine 10 milliGRAM(s) Oral daily  oxybutynin 5 milliGRAM(s) Oral two times a day  pantoprazole    Tablet 40 milliGRAM(s) Oral before breakfast  senna 2 Tablet(s) Oral at bedtime  tamsulosin 0.4 milliGRAM(s) Oral at bedtime  ursodiol Capsule 300 milliGRAM(s) Oral every 12 hours    MEDICATIONS  (PRN):  acetaminophen   Tablet 325 milliGRAM(s) Oral every 4 hours PRN For Temp greater than 38 C (100.4 F)  glycerin Suppository - Adult 1 Suppository(s) Rectal daily PRN Constipation      Allergies    aztreonam (Rash)  penicillin (Unknown)  sulfa drugs (Unknown)    Intolerances        SOCIAL HISTORY:    FAMILY HISTORY:  No pertinent family history in first degree relatives      Vital Signs Last 24 Hrs  T(C): 37.1 (30 Jul 2018 11:21), Max: 37.3 (30 Jul 2018 07:12)  T(F): 98.7 (30 Jul 2018 11:21), Max: 99.1 (30 Jul 2018 07:12)  HR: 114 (30 Jul 2018 11:21) (84 - 124)  BP: 145/77 (30 Jul 2018 11:21) (111/66 - 145/90)  BP(mean): --  RR: 18 (30 Jul 2018 11:21) (18 - 20)  SpO2: 93% (30 Jul 2018 11:21) (93% - 95%)    PHYSICAL EXAM:    The urine is clear via the rodriguez, the pt refused any further exam by pushing me away.    Labs reviewed. Creat 1.0,               Urine Culture:   Culture - Urine (07.26.18 @ 12:14)    Specimen Source: .Urine Catheterized    Culture Results:   No growth      RADIOLOGY & ADDITIONAL STUDIES:

## 2018-07-30 NOTE — PHYSICAL THERAPY INITIAL EVALUATION ADULT - MUSCLE TONE ASSESSMENT, REHAB EVAL
bilateral lower extremities/severely increased tone/rigidity/bilateral upper extremities/associated with being afraid

## 2018-07-30 NOTE — CONSULT NOTE ADULT - PROBLEM SELECTOR RECOMMENDATION 9
It would appear that pt has previous  hx, as his admission labs include oxybutynin and doxazosin. In the setting of retention, the oxybutynin should be stopped. His alpha blockade would be better limited to one Rx, stopping either the doxazosin or the flomax. The dose of the remaining rx can be raised (flomax 8 QHS or doxazosin 4 mg QHS). I understand he will go to rehab soon. he should go with the rodriguez and be given tov there.

## 2018-07-30 NOTE — PROGRESS NOTE ADULT - SUBJECTIVE AND OBJECTIVE BOX
SYLVAINROSELIA is a 63yMale , patient examined and chart reviewed.     INTERVAL HPI/ OVERNIGHT EVENTS:   Had urinary retention again and rodriguez reinserted.  Afebrile.    PAST MEDICAL & SURGICAL HISTORY:  Blind: right eye  DD (diastrophic dysplasia)  Psoriasis  HTN (hypertension)  Seizure  MR (mental retardation), severe  No significant past surgical history      For details regarding the patient's social history, family history, and other miscellaneous elements, please refer the initial infectious diseases consultation and/or the admitting history and physical examination for this admission.    ROS:  Unable to obtain due to : Nonverbal MRDD    ALLERGEIS:  aztreonam (Rash)  penicillin (Unknown)  sulfa drugs (Unknown)      Current inpatient medications :    ANTIBIOTICS/RELEVANT:      acetaminophen   Tablet 325 milliGRAM(s) Oral every 4 hours PRN  ALBUTerol/ipratropium for Nebulization 3 milliLiter(s) Nebulizer every 8 hours  bisacodyl Suppository 10 milliGRAM(s) Rectal daily  buDESOnide   0.5 milliGRAM(s) Respule 0.5 milliGRAM(s) Inhalation two times a day  clonazePAM Tablet 0.5 milliGRAM(s) Oral at bedtime  doxazosin 2 milliGRAM(s) Oral at bedtime  glycerin Suppository - Adult 1 Suppository(s) Rectal daily PRN  heparin  Injectable 5000 Unit(s) SubCutaneous every 12 hours  ibuprofen  Tablet 400 milliGRAM(s) Oral two times a day  lactulose Syrup 10 Gram(s) Oral two times a day  metoprolol tartrate 25 milliGRAM(s) Oral two times a day  nystatin Cream 1 Application(s) Topical two times a day  OLANZapine 10 milliGRAM(s) Oral daily  pantoprazole    Tablet 40 milliGRAM(s) Oral before breakfast  senna 2 Tablet(s) Oral at bedtime  tamsulosin 0.4 milliGRAM(s) Oral at bedtime  ursodiol Capsule 300 milliGRAM(s) Oral every 12 hours      Objective:    07-29 @ 07:01  -  07-30 @ 07:00  --------------------------------------------------------  IN: 0 mL / OUT: 450 mL / NET: -450 mL    07-30 @ 07:01  -  07-30 @ 15:45  --------------------------------------------------------  IN: 250 mL / OUT: 350 mL / NET: -100 mL      T(C): 37.1 (07-30-18 @ 11:21), Max: 37.3 (07-30-18 @ 07:12)  HR: 114 (07-30-18 @ 11:21) (85 - 124)  BP: 145/77 (07-30-18 @ 11:21) (111/66 - 145/90)  RR: 18 (07-30-18 @ 11:21) (18 - 20)  SpO2: 93% (07-30-18 @ 11:21) (93% - 95%)  Wt(kg): --      Physical Exam:  General: well developed well nourished, in no acute distress  Eyes: sclera anicteric, pupils equal and reactive to light  ENMT: buccal mucosa moist, pharynx not injected  Neck: supple, trachea midline  Lungs: Decreased no wheeze/rhonchi  Cardiovascular: regular rate and rhythm, S1 S2  Abdomen: soft, nontender, no organomegaly present, bowel sounds normal  Neurological: awake nonverbal MRDD  Skin: no increased ecchymosis/petechiae/purpura  Lymph Nodes: no palpable cervical/supraclavicular lymph nodes enlargements  Extremities: no cyanosis/clubbing/edema      Assessment :  64 YO M nonverbal MRDD admitted from group home sp fall with  fevers  Recurrent urinary retention with Rodriguez  Sp Asp Pna  Overall stable     Plan :   Off Levaquin   Monitor off antibiotics  Aspiration precautions  Rodriguez per urology  Stable from ID standpoint      Continue with present regiment.  Appropriate use of antibiotics and adverse effects reviewed.      > 35 minutes were spent in direct patient care reviewing notes, medications ,labs data/ imaging , discussion with multidisciplinary team.    Thank you for allowing me to participate in care of your patient .    Olivier Daniels MD  Infectious Disease  [phone]

## 2018-07-30 NOTE — PROGRESS NOTE ADULT - SUBJECTIVE AND OBJECTIVE BOX
PAST MEDICAL & SURGICAL HISTORY:  Blind: right eye  DD (diastrophic dysplasia)  Psoriasis  HTN (hypertension)  Seizure  MR (mental retardation), severe  No significant past surgical history      MEDICATIONS  (STANDING):  ALBUTerol/ipratropium for Nebulization 3 milliLiter(s) Nebulizer every 8 hours  bisacodyl Suppository 10 milliGRAM(s) Rectal daily  buDESOnide   0.5 milliGRAM(s) Respule 0.5 milliGRAM(s) Inhalation two times a day  clonazePAM Tablet 0.5 milliGRAM(s) Oral at bedtime  doxazosin 2 milliGRAM(s) Oral at bedtime  heparin  Injectable 5000 Unit(s) SubCutaneous every 12 hours  ibuprofen  Tablet 400 milliGRAM(s) Oral two times a day  lactulose Syrup 10 Gram(s) Oral two times a day  metoprolol tartrate 25 milliGRAM(s) Oral two times a day  nystatin Cream 1 Application(s) Topical two times a day  OLANZapine 10 milliGRAM(s) Oral daily  pantoprazole    Tablet 40 milliGRAM(s) Oral before breakfast  senna 2 Tablet(s) Oral at bedtime  tamsulosin 0.4 milliGRAM(s) Oral at bedtime  ursodiol Capsule 300 milliGRAM(s) Oral every 12 hours    MEDICATIONS  (PRN):  acetaminophen   Tablet 325 milliGRAM(s) Oral every 4 hours PRN For Temp greater than 38 C (100.4 F)  glycerin Suppository - Adult 1 Suppository(s) Rectal daily PRN Constipation      Patient is a 63y old  Male who presents with a chief complaint of fever   fall (21 Jul 2018 23:57)      Vital Signs Last 24 Hrs  T(C): 37.1 (30 Jul 2018 11:21), Max: 37.3 (30 Jul 2018 07:12)  T(F): 98.7 (30 Jul 2018 11:21), Max: 99.1 (30 Jul 2018 07:12)  HR: 114 (30 Jul 2018 11:21) (85 - 124)  BP: 145/77 (30 Jul 2018 11:21) (111/66 - 145/90)  BP(mean): --  RR: 18 (30 Jul 2018 11:21) (18 - 20)  SpO2: 93% (30 Jul 2018 11:21) (93% - 95%)          07-29 @ 07:01  -  07-30 @ 07:00  --------------------------------------------------------  IN: 0 mL / OUT: 450 mL / NET: -450 mL    07-30 @ 07:01  -  07-30 @ 15:58  --------------------------------------------------------  IN: 250 mL / OUT: 350 mL / NET: -100 mL        REVIEW OF SYSTEMS:    Constitutional: No fever, weight loss or fatigue  Eyes: No eye pain, visual disturbances, or discharge  ENT:  No difficulty hearing, tinnitus, vertigo; No sinus or throat pain  Neck: No pain or stiffness  Breasts: No pain, masses or nipple discharge  Respiratory: No cough, wheezing, chills or hemoptysis  Cardiovascular: No chest pain, palpitations, shortness of breath, dizziness or leg swelling  Gastrointestinal: No abdominal or epigastric pain. No nausea, vomiting or hematemesis; No diarrhea or constipation. No melena or hematochezia.  Genitourinary: No dysuria, frequency, hematuria or incontinence  Rectal: No pain, hemorrhoids or incontinence  Neurological: No headaches, memory loss, loss of strength, numbness or tremors  Skin: No itching, burning, rashes or lesions   Lymph Nodes: No enlarged glands  Endocrine: No heat or cold intolerance; No hair loss  Musculoskeletal: No joint pain or swelling; No muscle, back or extremity pain  Psychiatric: No depression, anxiety, mood swings or difficulty sleeping  Heme/Lymph: No easy bruising or bleeding gums  Allergy and Immunologic: No hives or eczema    PHYSICAL EXAM:  alert  in no distress  attempt 3 tirals off rodriguez no success,,,urine retntion over 1 litre  Constitutional: NAD  walked few steps with 2 aids from his house  Respiratory: CTAB/L   Cardiovascular: S1 and S2, RRR, no M/G/R  Gastrointestinal: BS+, soft, NT/ND  Extremities: No peripheral edema  Neurological: A/O  no focal deficits  Skin: No rashes      decubiti: none                      .Urine Catheterized  07-26 @ 12:14   No growth  --  --      .Urine Catheterized  07-23 @ 18:57   No growth  --  --        Urine Culture:  07-26 @ 12:14    --        No growth  Urine Culture:  07-23 @ 18:57    --        No growth        Radiology:

## 2018-07-30 NOTE — PROGRESS NOTE ADULT - PROVIDER SPECIALTY LIST ADULT
Cardiology
Infectious Disease
Internal Medicine
Infectious Disease
Internal Medicine

## 2018-07-30 NOTE — PROGRESS NOTE ADULT - ASSESSMENT
· Assessment		  Patient is a 63y Male with MR, admitted with fever, urinary retention, sepsis.  With tachycardia 110-120 past 24 hours.    -  Sepsis,   Getting Abx  Probable s/p allergic rxn, should be mostly resolved now.    -  Tachycardia   bpm range.  Most likely secondary to infectious process.    However still with HR>100 without fever and after 5 days abx.  Repeat EKG was OK, echo; Normal LV SF, no significant valve disease  Troponin negative, no demand ischemia.  s/p IVF.  TSH ok.  Possibly from Albuterol neb tx.  On metoprolol 12.5 QD, will increase to BID.      -  Urinary retention

## 2018-07-30 NOTE — PROVIDER CONTACT NOTE (OTHER) - ACTION/TREATMENT ORDERED:
D/C Azactam, Benadryl 25mg po x1, Solumedrol 40mg IVP X1, Levaquin 250mg po starting 07/25/18. Dr Christensen notified and evaluated pt. Will continue to monitor.
Motrin 200mg po x1. Tylenol 650mg po Q6H X24H. Cooling methods enforced. Call bell within reach. Will continue to monitor.
Motrin 400mg po x1 and Vancomycin 1mg po x1 ordered. Tylenol 325mg po given. Will continue to monitor.
Dr Meyer notified. Vancomycin 1gm IVPB X1, Motrin 400mg po X1, Tylenol 650mg po Q4H X24H, NS 500cc bolus x1, Blood cultures x2 and hypothermic blanket as per protocol. No signs of distress noted.
WILL ADD METOPROLOL PO.
Wade to SBD and urine culture ordered. Urology consult to be placed by attending physician. Will continue to monitor.
pt is retaining urine . rodriguez reinserted and 450mls of urine came out. MD made aware - ordered wants to keep the rodriguez in - don't remove - Discharge rehab with rodriguez

## 2018-07-30 NOTE — PHYSICAL THERAPY INITIAL EVALUATION ADULT - GENERAL OBSERVATIONS, REHAB EVAL
Pt presents in semi-[place]rosco position In bed telemetry and Wade cathether intact.. Staff member Mckinley from [facility] came to encourage pt to walk

## 2018-07-30 NOTE — PHYSICAL THERAPY INITIAL EVALUATION ADULT - PERTINENT HX OF CURRENT PROBLEM, REHAB EVAL
Pt admitted 7/21 due to a fall at home and seizures. PMH includes MRDD, chronic seizures, dysphagia, MRDD, blind in one eye

## 2018-07-31 ENCOUNTER — TRANSCRIPTION ENCOUNTER (OUTPATIENT)
Age: 63
End: 2018-07-31

## 2018-07-31 VITALS
SYSTOLIC BLOOD PRESSURE: 144 MMHG | HEART RATE: 108 BPM | RESPIRATION RATE: 18 BRPM | DIASTOLIC BLOOD PRESSURE: 80 MMHG | TEMPERATURE: 98 F | OXYGEN SATURATION: 94 %

## 2018-07-31 LAB
ALBUMIN SERPL ELPH-MCNC: 3.3 G/DL — SIGNIFICANT CHANGE UP (ref 3.3–5)
ALP SERPL-CCNC: 88 U/L — SIGNIFICANT CHANGE UP (ref 40–120)
ALT FLD-CCNC: 68 U/L — SIGNIFICANT CHANGE UP (ref 12–78)
ANION GAP SERPL CALC-SCNC: 8 MMOL/L — SIGNIFICANT CHANGE UP (ref 5–17)
AST SERPL-CCNC: 49 U/L — HIGH (ref 15–37)
BILIRUB SERPL-MCNC: 0.4 MG/DL — SIGNIFICANT CHANGE UP (ref 0.2–1.2)
BUN SERPL-MCNC: 26 MG/DL — HIGH (ref 7–23)
CALCIUM SERPL-MCNC: 8.9 MG/DL — SIGNIFICANT CHANGE UP (ref 8.5–10.1)
CHLORIDE SERPL-SCNC: 110 MMOL/L — HIGH (ref 96–108)
CO2 SERPL-SCNC: 25 MMOL/L — SIGNIFICANT CHANGE UP (ref 22–31)
CREAT SERPL-MCNC: 0.96 MG/DL — SIGNIFICANT CHANGE UP (ref 0.5–1.3)
GLUCOSE SERPL-MCNC: 110 MG/DL — HIGH (ref 70–99)
HCT VFR BLD CALC: 41.4 % — SIGNIFICANT CHANGE UP (ref 39–50)
HGB BLD-MCNC: 14.6 G/DL — SIGNIFICANT CHANGE UP (ref 13–17)
MCHC RBC-ENTMCNC: 32.9 PG — SIGNIFICANT CHANGE UP (ref 27–34)
MCHC RBC-ENTMCNC: 35.3 GM/DL — SIGNIFICANT CHANGE UP (ref 32–36)
MCV RBC AUTO: 93.2 FL — SIGNIFICANT CHANGE UP (ref 80–100)
NRBC # BLD: 0 /100 WBCS — SIGNIFICANT CHANGE UP (ref 0–0)
PLATELET # BLD AUTO: 449 K/UL — HIGH (ref 150–400)
POTASSIUM SERPL-MCNC: 4.3 MMOL/L — SIGNIFICANT CHANGE UP (ref 3.5–5.3)
POTASSIUM SERPL-SCNC: 4.3 MMOL/L — SIGNIFICANT CHANGE UP (ref 3.5–5.3)
PROT SERPL-MCNC: 7.9 G/DL — SIGNIFICANT CHANGE UP (ref 6–8.3)
RBC # BLD: 4.44 M/UL — SIGNIFICANT CHANGE UP (ref 4.2–5.8)
RBC # FLD: 12.6 % — SIGNIFICANT CHANGE UP (ref 10.3–14.5)
SODIUM SERPL-SCNC: 143 MMOL/L — SIGNIFICANT CHANGE UP (ref 135–145)
WBC # BLD: 11.55 K/UL — HIGH (ref 3.8–10.5)
WBC # FLD AUTO: 11.55 K/UL — HIGH (ref 3.8–10.5)

## 2018-07-31 RX ORDER — DOXAZOSIN MESYLATE 4 MG
1 TABLET ORAL
Qty: 0 | Refills: 0 | DISCHARGE
Start: 2018-07-31

## 2018-07-31 RX ORDER — TAMSULOSIN HYDROCHLORIDE 0.4 MG/1
1 CAPSULE ORAL
Qty: 0 | Refills: 0 | DISCHARGE
Start: 2018-07-31

## 2018-07-31 RX ORDER — URSODIOL 250 MG/1
1 TABLET, FILM COATED ORAL
Qty: 0 | Refills: 0 | DISCHARGE
Start: 2018-07-31

## 2018-07-31 RX ORDER — METOPROLOL TARTRATE 50 MG
1 TABLET ORAL
Qty: 0 | Refills: 0 | DISCHARGE
Start: 2018-07-31

## 2018-07-31 RX ADMIN — PANTOPRAZOLE SODIUM 40 MILLIGRAM(S): 20 TABLET, DELAYED RELEASE ORAL at 05:10

## 2018-07-31 RX ADMIN — Medication 0.5 MILLIGRAM(S): at 07:43

## 2018-07-31 RX ADMIN — HEPARIN SODIUM 5000 UNIT(S): 5000 INJECTION INTRAVENOUS; SUBCUTANEOUS at 05:10

## 2018-07-31 RX ADMIN — NYSTATIN CREAM 1 APPLICATION(S): 100000 CREAM TOPICAL at 05:12

## 2018-07-31 RX ADMIN — URSODIOL 300 MILLIGRAM(S): 250 TABLET, FILM COATED ORAL at 05:10

## 2018-07-31 RX ADMIN — Medication 3 MILLILITER(S): at 07:43

## 2018-07-31 RX ADMIN — OLANZAPINE 10 MILLIGRAM(S): 15 TABLET, FILM COATED ORAL at 11:40

## 2018-07-31 RX ADMIN — Medication 400 MILLIGRAM(S): at 05:10

## 2018-07-31 RX ADMIN — LACTULOSE 10 GRAM(S): 10 SOLUTION ORAL at 05:10

## 2018-07-31 RX ADMIN — Medication 10 MILLIGRAM(S): at 11:40

## 2018-07-31 RX ADMIN — Medication 25 MILLIGRAM(S): at 05:10

## 2018-07-31 NOTE — DISCHARGE NOTE ADULT - PATIENT PORTAL LINK FT
You can access the [facility], offered by [facility], by registering with the following website: [email or web address]

## 2018-07-31 NOTE — DISCHARGE NOTE ADULT - INSTRUCTIONS
new medications  cardura 2mg qhs,flomax .4mg qhs lopressor 25mg bid,actigal 300mg bid  stop losartan  all other medications from home to resume  resume same diet and same activity

## 2018-07-31 NOTE — DISCHARGE NOTE ADULT - MEDICATION SUMMARY - MEDICATIONS TO CHANGE
I will SWITCH the dose or number of times a day I take the medications listed below when I get home from the hospital:    fluvoxaMINE  --  by mouth    Humira Pen 40 mg/0.8 mL subcutaneous kit  --  subcutaneous    ZyPREXA 20 mg oral tablet  -- 1 tab(s) by mouth once a day

## 2018-07-31 NOTE — DISCHARGE NOTE ADULT - MEDICATION SUMMARY - MEDICATIONS TO TAKE
I will START or STAY ON the medications listed below when I get home from the hospital:    doxazosin 2 mg oral tablet  -- 1 tab(s) by mouth once a day (at bedtime)  -- Indication: For Fall, initial encounter    tamsulosin 0.4 mg oral capsule  -- 1 cap(s) by mouth once a day (at bedtime)  -- Indication: For Fall    ZyPREXA 20 mg oral tablet  -- 1 tab(s) by mouth once a day  -- Indication: For Fall    metoprolol tartrate 25 mg oral tablet  -- 1 tab(s) by mouth 2 times a day  -- Indication: For Fall    ursodiol 300 mg oral capsule  -- 1 cap(s) by mouth every 12 hours  -- Indication: For Fall

## 2018-07-31 NOTE — DISCHARGE NOTE ADULT - MEDICATION SUMMARY - MEDICATIONS TO STOP TAKING
I will STOP taking the medications listed below when I get home from the hospital:    losartan 25 mg oral tablet  -- 1 tab(s) by mouth once a day    Levaquin 500 mg oral tablet  -- 1 tab(s) by mouth every 24 hours  -- Avoid prolonged or excessive exposure to direct and/or artificial sunlight while taking this medication.  Do not take dairy products, antacids, or iron preparations within one hour of this medication.  Finish all this medication unless otherwise directed by prescriber.  May cause drowsiness or dizziness.  Medication should be taken with plenty of water.

## 2018-07-31 NOTE — DISCHARGE NOTE ADULT - ADDITIONAL INSTRUCTIONS
new medications are  lopressor/cardura/flomax/actigal  dc losartan  resume all other medications at home  resume previous diet and activity  fu with urologist dr rodriguez  continue rodriguez for now

## 2018-07-31 NOTE — DISCHARGE NOTE ADULT - CARE PLAN
Principal Discharge DX:	Fall, initial encounter  Goal:	w/u all negative for fractures  Assessment and plan of treatment:	house staff excellent with the patient  they were able to get him to ambulate  physical therapy ,out patient with house  Secondary Diagnosis:	Sepsis, due to unspecified organism  Goal:	cleared aspiration uti  Assessment and plan of treatment:	completed abxs  Secondary Diagnosis:	Urinary retention  Goal:	dc with rodriguez  Assessment and plan of treatment:	continue cardura flomax  seen by urology   agreed with management  seen out pt  retrial /dc rodriguez out patient  I have tried 3 x in the hospital without success  psa was normall  Secondary Diagnosis:	Elevated LFTs  Goal:	cholestatsis,no cholycystitis  Assessment and plan of treatment:	actigal 300mg bid for 1 month then dc  Secondary Diagnosis:	Hypertension, unspecified type  Goal:	stable  Assessment and plan of treatment:	continue lopressor and cardura  off losartan

## 2018-07-31 NOTE — DISCHARGE NOTE ADULT - HOSPITAL COURSE
patient was sent post fall fever diarrhea  found to have small uti,,atelectasis/pn,,,all resolved completed abxs  had difficulty urination  found to have urinary retention over 1 litre,,placed rodriguez  we attempted 3 trials off rodriguez with no success  ct abd pelvis no pathology,,psa normal  urology saw patient ,no change,,,trial dc rodriguez as out patient in few weeks,,,house nurse states patient has had difficulty urinating,as out pt,,,they had to use much prompting with difficulty  mild elevation lft,,,no hepatitis,no cholycystitis,,all offending meds removed  continue actigal 1 month  stable dc home  send all new meds out pt pharmacy

## 2018-07-31 NOTE — DISCHARGE NOTE ADULT - CARE PROVIDER_API CALL
Kamille Mcelroy (DO), Medicine  [address] [place] [address]  Phone: [phone]  Fax: [phone]

## 2018-07-31 NOTE — DISCHARGE NOTE ADULT - PLAN OF CARE
w/u all negative for fractures house staff excellent with the patient  they were able to get him to ambulate  physical therapy ,out patient with house cleared aspiration uti completed abxs dc with rodriguez continue cardura flomax  seen by urology   agreed with management  seen out pt  retrial /dc rodriguez out patient  I have tried 3 x in the hospital without success  psa was normall cholestatsis,no cholycystitis actigal 300mg bid for 1 month then dc stable continue lopressor and cardura  off losartan

## 2018-08-01 LAB — ANA TITR SER: NEGATIVE — SIGNIFICANT CHANGE UP

## 2018-10-24 PROCEDURE — 73562 X-RAY EXAM OF KNEE 3: CPT

## 2018-10-24 PROCEDURE — 72125 CT NECK SPINE W/O DYE: CPT

## 2018-10-24 PROCEDURE — 83605 ASSAY OF LACTIC ACID: CPT

## 2018-10-24 PROCEDURE — 87798 DETECT AGENT NOS DNA AMP: CPT

## 2018-10-24 PROCEDURE — 86038 ANTINUCLEAR ANTIBODIES: CPT

## 2018-10-24 PROCEDURE — 73522 X-RAY EXAM HIPS BI 3-4 VIEWS: CPT

## 2018-10-24 PROCEDURE — 84153 ASSAY OF PSA TOTAL: CPT

## 2018-10-24 PROCEDURE — 94760 N-INVAS EAR/PLS OXIMETRY 1: CPT

## 2018-10-24 PROCEDURE — 80074 ACUTE HEPATITIS PANEL: CPT

## 2018-10-24 PROCEDURE — 97162 PT EVAL MOD COMPLEX 30 MIN: CPT

## 2018-10-24 PROCEDURE — 82962 GLUCOSE BLOOD TEST: CPT

## 2018-10-24 PROCEDURE — 87086 URINE CULTURE/COLONY COUNT: CPT

## 2018-10-24 PROCEDURE — 87040 BLOOD CULTURE FOR BACTERIA: CPT

## 2018-10-24 PROCEDURE — 70450 CT HEAD/BRAIN W/O DYE: CPT

## 2018-10-24 PROCEDURE — 71045 X-RAY EXAM CHEST 1 VIEW: CPT

## 2018-10-24 PROCEDURE — 73630 X-RAY EXAM OF FOOT: CPT

## 2018-10-24 PROCEDURE — 82550 ASSAY OF CK (CPK): CPT

## 2018-10-24 PROCEDURE — 85610 PROTHROMBIN TIME: CPT

## 2018-10-24 PROCEDURE — 99285 EMERGENCY DEPT VISIT HI MDM: CPT | Mod: 25

## 2018-10-24 PROCEDURE — 87486 CHLMYD PNEUM DNA AMP PROBE: CPT

## 2018-10-24 PROCEDURE — 83880 ASSAY OF NATRIURETIC PEPTIDE: CPT

## 2018-10-24 PROCEDURE — 85027 COMPLETE CBC AUTOMATED: CPT

## 2018-10-24 PROCEDURE — 87633 RESP VIRUS 12-25 TARGETS: CPT

## 2018-10-24 PROCEDURE — 85652 RBC SED RATE AUTOMATED: CPT

## 2018-10-24 PROCEDURE — 94640 AIRWAY INHALATION TREATMENT: CPT

## 2018-10-24 PROCEDURE — 82728 ASSAY OF FERRITIN: CPT

## 2018-10-24 PROCEDURE — 99221 1ST HOSP IP/OBS SF/LOW 40: CPT

## 2018-10-24 PROCEDURE — 84443 ASSAY THYROID STIM HORMONE: CPT

## 2018-10-24 PROCEDURE — 86850 RBC ANTIBODY SCREEN: CPT

## 2018-10-24 PROCEDURE — 81001 URINALYSIS AUTO W/SCOPE: CPT

## 2018-10-24 PROCEDURE — 80053 COMPREHEN METABOLIC PANEL: CPT

## 2018-10-24 PROCEDURE — 84145 PROCALCITONIN (PCT): CPT

## 2018-10-24 PROCEDURE — 84484 ASSAY OF TROPONIN QUANT: CPT

## 2018-10-24 PROCEDURE — 93005 ELECTROCARDIOGRAM TRACING: CPT

## 2018-10-24 PROCEDURE — 86901 BLOOD TYPING SEROLOGIC RH(D): CPT

## 2018-10-24 PROCEDURE — 93306 TTE W/DOPPLER COMPLETE: CPT

## 2018-10-24 PROCEDURE — 94664 DEMO&/EVAL PT USE INHALER: CPT

## 2018-10-24 PROCEDURE — 86900 BLOOD TYPING SEROLOGIC ABO: CPT

## 2018-10-24 PROCEDURE — 84154 ASSAY OF PSA FREE: CPT

## 2018-10-24 PROCEDURE — 99231 SBSQ HOSP IP/OBS SF/LOW 25: CPT

## 2018-10-24 PROCEDURE — 74177 CT ABD & PELVIS W/CONTRAST: CPT

## 2018-10-24 PROCEDURE — 87581 M.PNEUMON DNA AMP PROBE: CPT

## 2018-12-30 ENCOUNTER — TRANSCRIPTION ENCOUNTER (OUTPATIENT)
Age: 63
End: 2018-12-30

## 2019-05-08 ENCOUNTER — TRANSCRIPTION ENCOUNTER (OUTPATIENT)
Age: 64
End: 2019-05-08

## 2019-06-08 NOTE — ED PROVIDER NOTE - ENMT, MLM
Airway patent, Nasal mucosa clear. Mouth with normal mucosa. Throat has no vesicles, no oropharyngeal exudates and uvula is midline.
Attending Only

## 2019-08-23 ENCOUNTER — EMERGENCY (EMERGENCY)
Facility: HOSPITAL | Age: 64
LOS: 1 days | Discharge: HOME | End: 2019-08-23
Attending: EMERGENCY MEDICINE | Admitting: EMERGENCY MEDICINE
Payer: MEDICARE

## 2019-08-23 ENCOUNTER — TRANSCRIPTION ENCOUNTER (OUTPATIENT)
Age: 64
End: 2019-08-23

## 2019-08-23 VITALS
TEMPERATURE: 97 F | HEART RATE: 71 BPM | RESPIRATION RATE: 13 BRPM | DIASTOLIC BLOOD PRESSURE: 98 MMHG | WEIGHT: 179.9 LBS | SYSTOLIC BLOOD PRESSURE: 144 MMHG

## 2019-08-23 VITALS
OXYGEN SATURATION: 100 % | HEART RATE: 70 BPM | DIASTOLIC BLOOD PRESSURE: 78 MMHG | SYSTOLIC BLOOD PRESSURE: 150 MMHG | RESPIRATION RATE: 14 BRPM | TEMPERATURE: 98 F

## 2019-08-23 LAB
ALBUMIN SERPL ELPH-MCNC: 3.3 G/DL — SIGNIFICANT CHANGE UP (ref 3.3–5)
ALP SERPL-CCNC: 76 U/L — SIGNIFICANT CHANGE UP (ref 40–120)
ALT FLD-CCNC: 24 U/L — SIGNIFICANT CHANGE UP (ref 12–78)
AMYLASE P1 CFR SERPL: 44 U/L — SIGNIFICANT CHANGE UP (ref 25–125)
ANION GAP SERPL CALC-SCNC: 9 MMOL/L — SIGNIFICANT CHANGE UP (ref 5–17)
AST SERPL-CCNC: 24 U/L — SIGNIFICANT CHANGE UP (ref 15–37)
BILIRUB SERPL-MCNC: 0.2 MG/DL — SIGNIFICANT CHANGE UP (ref 0.2–1.2)
BUN SERPL-MCNC: 7 MG/DL — SIGNIFICANT CHANGE UP (ref 7–23)
CALCIUM SERPL-MCNC: 9 MG/DL — SIGNIFICANT CHANGE UP (ref 8.5–10.1)
CHLORIDE SERPL-SCNC: 108 MMOL/L — SIGNIFICANT CHANGE UP (ref 96–108)
CO2 SERPL-SCNC: 26 MMOL/L — SIGNIFICANT CHANGE UP (ref 22–31)
CREAT SERPL-MCNC: 0.95 MG/DL — SIGNIFICANT CHANGE UP (ref 0.5–1.3)
GLUCOSE SERPL-MCNC: 90 MG/DL — SIGNIFICANT CHANGE UP (ref 70–99)
HCT VFR BLD CALC: 41.3 % — SIGNIFICANT CHANGE UP (ref 39–50)
HGB BLD-MCNC: 14.4 G/DL — SIGNIFICANT CHANGE UP (ref 13–17)
LACTATE SERPL-SCNC: 1.7 MMOL/L — SIGNIFICANT CHANGE UP (ref 0.7–2)
LIDOCAIN IGE QN: 94 U/L — SIGNIFICANT CHANGE UP (ref 73–393)
MCHC RBC-ENTMCNC: 31.2 PG — SIGNIFICANT CHANGE UP (ref 27–34)
MCHC RBC-ENTMCNC: 34.9 GM/DL — SIGNIFICANT CHANGE UP (ref 32–36)
MCV RBC AUTO: 89.4 FL — SIGNIFICANT CHANGE UP (ref 80–100)
NRBC # BLD: 0 /100 WBCS — SIGNIFICANT CHANGE UP (ref 0–0)
PLATELET # BLD AUTO: 200 K/UL — SIGNIFICANT CHANGE UP (ref 150–400)
POTASSIUM SERPL-MCNC: 4.1 MMOL/L — SIGNIFICANT CHANGE UP (ref 3.5–5.3)
POTASSIUM SERPL-SCNC: 4.1 MMOL/L — SIGNIFICANT CHANGE UP (ref 3.5–5.3)
PROT SERPL-MCNC: 7.7 G/DL — SIGNIFICANT CHANGE UP (ref 6–8.3)
RBC # BLD: 4.62 M/UL — SIGNIFICANT CHANGE UP (ref 4.2–5.8)
RBC # FLD: 12.9 % — SIGNIFICANT CHANGE UP (ref 10.3–14.5)
SODIUM SERPL-SCNC: 143 MMOL/L — SIGNIFICANT CHANGE UP (ref 135–145)
WBC # BLD: 9.47 K/UL — SIGNIFICANT CHANGE UP (ref 3.8–10.5)
WBC # FLD AUTO: 9.47 K/UL — SIGNIFICANT CHANGE UP (ref 3.8–10.5)

## 2019-08-23 PROCEDURE — 36415 COLL VENOUS BLD VENIPUNCTURE: CPT

## 2019-08-23 PROCEDURE — 74176 CT ABD & PELVIS W/O CONTRAST: CPT | Mod: 26

## 2019-08-23 PROCEDURE — 96375 TX/PRO/DX INJ NEW DRUG ADDON: CPT

## 2019-08-23 PROCEDURE — 99284 EMERGENCY DEPT VISIT MOD MDM: CPT | Mod: 25

## 2019-08-23 PROCEDURE — 82150 ASSAY OF AMYLASE: CPT

## 2019-08-23 PROCEDURE — 96374 THER/PROPH/DIAG INJ IV PUSH: CPT

## 2019-08-23 PROCEDURE — 99284 EMERGENCY DEPT VISIT MOD MDM: CPT

## 2019-08-23 PROCEDURE — 85027 COMPLETE CBC AUTOMATED: CPT

## 2019-08-23 PROCEDURE — 80053 COMPREHEN METABOLIC PANEL: CPT

## 2019-08-23 PROCEDURE — 83605 ASSAY OF LACTIC ACID: CPT

## 2019-08-23 PROCEDURE — 83690 ASSAY OF LIPASE: CPT

## 2019-08-23 PROCEDURE — 74176 CT ABD & PELVIS W/O CONTRAST: CPT

## 2019-08-23 RX ORDER — METRONIDAZOLE 500 MG
1 TABLET ORAL
Qty: 30 | Refills: 0
Start: 2019-08-23 | End: 2019-09-01

## 2019-08-23 RX ORDER — CIPROFLOXACIN LACTATE 400MG/40ML
400 VIAL (ML) INTRAVENOUS ONCE
Refills: 0 | Status: COMPLETED | OUTPATIENT
Start: 2019-08-23 | End: 2019-08-23

## 2019-08-23 RX ORDER — IOHEXOL 300 MG/ML
30 INJECTION, SOLUTION INTRAVENOUS ONCE
Refills: 0 | Status: COMPLETED | OUTPATIENT
Start: 2019-08-23 | End: 2019-08-23

## 2019-08-23 RX ORDER — MOXIFLOXACIN HYDROCHLORIDE TABLETS, 400 MG 400 MG/1
1 TABLET, FILM COATED ORAL
Qty: 20 | Refills: 0
Start: 2019-08-23 | End: 2019-09-01

## 2019-08-23 RX ORDER — METRONIDAZOLE 500 MG
500 TABLET ORAL ONCE
Refills: 0 | Status: COMPLETED | OUTPATIENT
Start: 2019-08-23 | End: 2019-08-23

## 2019-08-23 RX ADMIN — Medication 100 MILLIGRAM(S): at 15:51

## 2019-08-23 RX ADMIN — Medication 200 MILLIGRAM(S): at 15:12

## 2019-08-23 RX ADMIN — IOHEXOL 30 MILLILITER(S): 300 INJECTION, SOLUTION INTRAVENOUS at 12:00

## 2019-08-23 NOTE — ED PROVIDER NOTE - ATTENDING CONTRIBUTION TO CARE
I have personally performed a face to face bedside history and physical examination of this patient. I have discussed the history, examination, review of systems, assessment and plan of management with the resident. I have reviewed the electronic medical record and amended it to reflect my history, review of systems, physical exam, assessment and plan. Patient sent for evaluation of diarrhea, distended abdomen, patient awake, non verbal, f/u labs, ct abdomen/pelvis.

## 2019-08-23 NOTE — ED ADULT NURSE NOTE - OBJECTIVE STATEMENT
JOCELYNN from [facility]. Pt is non verbal. As per caregiver, pt is having abdominal pain and diarrhea. Pt was sent by PMD for further evaluation.

## 2019-08-23 NOTE — ED ADULT NURSE NOTE - NSIMPLEMENTINTERV_GEN_ALL_ED
Implemented All Universal Safety Interventions:  [place] to call system. Call bell, personal items and telephone within reach. Instruct patient to call for assistance. Room bathroom lighting operational. Non-slip footwear when patient is off stretcher. Physically safe environment: no spills, clutter or unnecessary equipment. Stretcher in lowest position, wheels locked, appropriate side rails in place.

## 2019-08-23 NOTE — ED PROVIDER NOTE - PMH
Blind  right eye  Constipation, unspecified constipation type    DD (diastrophic dysplasia)    HTN (hypertension)    MR (mental retardation), severe    Psoriasis    Seizure

## 2019-08-23 NOTE — ED PROVIDER NOTE - OBJECTIVE STATEMENT
Pt is a 65 yo M w PMHx mental retardation, blindness, HTN, seizure, constipation who presents with diarrhea for the past two days. Pt is nonverbal and is accompanied by nursing staff from [facility]. The staff states that he has had multiple episodes of loose, watery, yellow brown stools since Wednesday, which they describe as foul smelling. They went to urgent care this morning, and were told to come to the ED due to the patient's symptoms. Patient has never had diarrhea like this before. No blood in stool. No sick contacts or contact with animals. No prior abdominal surgeries. Pt has been eating and drinking well, no episodes of nausea or emesis noted by staff. Takes lactulose for constipation.

## 2019-11-25 NOTE — ED ADULT NURSE NOTE - PLAN OF CARE
Mother reports a fever and cough times two days. Decreased PO intake.
Call bell/Explanation of exam/test

## 2020-07-24 ENCOUNTER — TRANSCRIPTION ENCOUNTER (OUTPATIENT)
Age: 65
End: 2020-07-24

## 2020-07-26 ENCOUNTER — INPATIENT (INPATIENT)
Facility: HOSPITAL | Age: 65
LOS: 8 days | Discharge: HOME | DRG: 853 | End: 2020-08-04
Attending: FAMILY MEDICINE | Admitting: FAMILY MEDICINE
Payer: MEDICARE

## 2020-07-26 VITALS
DIASTOLIC BLOOD PRESSURE: 62 MMHG | TEMPERATURE: 99 F | HEART RATE: 96 BPM | RESPIRATION RATE: 18 BRPM | OXYGEN SATURATION: 89 % | SYSTOLIC BLOOD PRESSURE: 128 MMHG

## 2020-07-26 DIAGNOSIS — R50.9 FEVER, UNSPECIFIED: ICD-10-CM

## 2020-07-26 PROBLEM — K59.00 CONSTIPATION, UNSPECIFIED: Chronic | Status: ACTIVE | Noted: 2019-08-23

## 2020-07-26 LAB
ALBUMIN SERPL ELPH-MCNC: 2.8 G/DL — LOW (ref 3.3–5)
ALP SERPL-CCNC: 60 U/L — SIGNIFICANT CHANGE UP (ref 40–120)
ALT FLD-CCNC: 24 U/L — SIGNIFICANT CHANGE UP (ref 12–78)
ANION GAP SERPL CALC-SCNC: 7 MMOL/L — SIGNIFICANT CHANGE UP (ref 5–17)
APPEARANCE UR: CLEAR — SIGNIFICANT CHANGE UP
APTT BLD: 30.3 SEC — SIGNIFICANT CHANGE UP (ref 27.5–35.5)
AST SERPL-CCNC: 29 U/L — SIGNIFICANT CHANGE UP (ref 15–37)
BACTERIA # UR AUTO: ABNORMAL
BASOPHILS # BLD AUTO: 0.03 K/UL — SIGNIFICANT CHANGE UP (ref 0–0.2)
BASOPHILS NFR BLD AUTO: 0.1 % — SIGNIFICANT CHANGE UP (ref 0–2)
BILIRUB SERPL-MCNC: 0.6 MG/DL — SIGNIFICANT CHANGE UP (ref 0.2–1.2)
BILIRUB UR-MCNC: NEGATIVE — SIGNIFICANT CHANGE UP
BUN SERPL-MCNC: 19 MG/DL — SIGNIFICANT CHANGE UP (ref 7–23)
CALCIUM SERPL-MCNC: 8.7 MG/DL — SIGNIFICANT CHANGE UP (ref 8.5–10.1)
CHLORIDE SERPL-SCNC: 104 MMOL/L — SIGNIFICANT CHANGE UP (ref 96–108)
CK SERPL-CCNC: 529 U/L — HIGH (ref 26–308)
CO2 SERPL-SCNC: 26 MMOL/L — SIGNIFICANT CHANGE UP (ref 22–31)
COLOR SPEC: YELLOW — SIGNIFICANT CHANGE UP
CREAT SERPL-MCNC: 1.2 MG/DL — SIGNIFICANT CHANGE UP (ref 0.5–1.3)
CRP SERPL-MCNC: 27.76 MG/DL — HIGH (ref 0–0.4)
D DIMER BLD IA.RAPID-MCNC: 715 NG/ML DDU — HIGH
DIFF PNL FLD: ABNORMAL
EOSINOPHIL # BLD AUTO: 0.01 K/UL — SIGNIFICANT CHANGE UP (ref 0–0.5)
EOSINOPHIL NFR BLD AUTO: 0 % — SIGNIFICANT CHANGE UP (ref 0–6)
EPI CELLS # UR: SIGNIFICANT CHANGE UP
FERRITIN SERPL-MCNC: 293 NG/ML — SIGNIFICANT CHANGE UP (ref 30–400)
FIBRINOGEN PPP-MCNC: 889 MG/DL — HIGH (ref 290–520)
GLUCOSE SERPL-MCNC: 116 MG/DL — HIGH (ref 70–99)
GLUCOSE UR QL: NEGATIVE — SIGNIFICANT CHANGE UP
HCT VFR BLD CALC: 36.9 % — LOW (ref 39–50)
HGB BLD-MCNC: 13 G/DL — SIGNIFICANT CHANGE UP (ref 13–17)
IMM GRANULOCYTES NFR BLD AUTO: 0.7 % — SIGNIFICANT CHANGE UP (ref 0–1.5)
INR BLD: 1.26 RATIO — HIGH (ref 0.88–1.16)
KETONES UR-MCNC: NEGATIVE — SIGNIFICANT CHANGE UP
LACTATE SERPL-SCNC: 1.9 MMOL/L — SIGNIFICANT CHANGE UP (ref 0.7–2)
LDH SERPL L TO P-CCNC: 236 U/L — SIGNIFICANT CHANGE UP (ref 50–242)
LEUKOCYTE ESTERASE UR-ACNC: NEGATIVE — SIGNIFICANT CHANGE UP
LYMPHOCYTES # BLD AUTO: 14 % — SIGNIFICANT CHANGE UP (ref 13–44)
LYMPHOCYTES # BLD AUTO: 2.88 K/UL — SIGNIFICANT CHANGE UP (ref 1–3.3)
MANUAL SMEAR VERIFICATION: SIGNIFICANT CHANGE UP
MCHC RBC-ENTMCNC: 31.8 PG — SIGNIFICANT CHANGE UP (ref 27–34)
MCHC RBC-ENTMCNC: 35.2 GM/DL — SIGNIFICANT CHANGE UP (ref 32–36)
MCV RBC AUTO: 90.2 FL — SIGNIFICANT CHANGE UP (ref 80–100)
MONOCYTES # BLD AUTO: 2.52 K/UL — HIGH (ref 0–0.9)
MONOCYTES NFR BLD AUTO: 12.3 % — SIGNIFICANT CHANGE UP (ref 2–14)
NEUTROPHILS # BLD AUTO: 14.93 K/UL — HIGH (ref 1.8–7.4)
NEUTROPHILS NFR BLD AUTO: 72.9 % — SIGNIFICANT CHANGE UP (ref 43–77)
NITRITE UR-MCNC: NEGATIVE — SIGNIFICANT CHANGE UP
NRBC # BLD: 0 /100 WBCS — SIGNIFICANT CHANGE UP (ref 0–0)
NT-PROBNP SERPL-SCNC: 1322 PG/ML — HIGH (ref 0–125)
PH UR: 6 — SIGNIFICANT CHANGE UP (ref 5–8)
PLAT MORPH BLD: NORMAL — SIGNIFICANT CHANGE UP
PLATELET # BLD AUTO: 174 K/UL — SIGNIFICANT CHANGE UP (ref 150–400)
POTASSIUM SERPL-MCNC: 3.5 MMOL/L — SIGNIFICANT CHANGE UP (ref 3.5–5.3)
POTASSIUM SERPL-SCNC: 3.5 MMOL/L — SIGNIFICANT CHANGE UP (ref 3.5–5.3)
PROCALCITONIN SERPL-MCNC: 1.1 NG/ML — HIGH (ref 0–0.04)
PROT SERPL-MCNC: 7.3 G/DL — SIGNIFICANT CHANGE UP (ref 6–8.3)
PROT UR-MCNC: 15
PROTHROM AB SERPL-ACNC: 14.6 SEC — HIGH (ref 10.6–13.6)
RBC # BLD: 4.09 M/UL — LOW (ref 4.2–5.8)
RBC # FLD: 12.9 % — SIGNIFICANT CHANGE UP (ref 10.3–14.5)
RBC BLD AUTO: SIGNIFICANT CHANGE UP
RBC CASTS # UR COMP ASSIST: ABNORMAL /HPF (ref 0–4)
SARS-COV-2 IGG SERPL QL IA: POSITIVE
SARS-COV-2 IGM SERPL IA-ACNC: 89.1 INDEX — HIGH
SARS-COV-2 RNA SPEC QL NAA+PROBE: SIGNIFICANT CHANGE UP
SODIUM SERPL-SCNC: 137 MMOL/L — SIGNIFICANT CHANGE UP (ref 135–145)
SP GR SPEC: 1.01 — SIGNIFICANT CHANGE UP (ref 1.01–1.02)
TROPONIN I SERPL-MCNC: <.015 NG/ML — SIGNIFICANT CHANGE UP (ref 0.01–0.04)
UROBILINOGEN FLD QL: NEGATIVE — SIGNIFICANT CHANGE UP
WBC # BLD: 20.51 K/UL — HIGH (ref 3.8–10.5)
WBC # FLD AUTO: 20.51 K/UL — HIGH (ref 3.8–10.5)
WBC UR QL: SIGNIFICANT CHANGE UP

## 2020-07-26 PROCEDURE — 93010 ELECTROCARDIOGRAM REPORT: CPT

## 2020-07-26 PROCEDURE — 93970 EXTREMITY STUDY: CPT | Mod: 26

## 2020-07-26 PROCEDURE — 99223 1ST HOSP IP/OBS HIGH 75: CPT

## 2020-07-26 PROCEDURE — 76705 ECHO EXAM OF ABDOMEN: CPT | Mod: 26

## 2020-07-26 PROCEDURE — 71045 X-RAY EXAM CHEST 1 VIEW: CPT | Mod: 26

## 2020-07-26 PROCEDURE — 71250 CT THORAX DX C-: CPT | Mod: 26

## 2020-07-26 PROCEDURE — 99285 EMERGENCY DEPT VISIT HI MDM: CPT

## 2020-07-26 PROCEDURE — 74176 CT ABD & PELVIS W/O CONTRAST: CPT | Mod: 26

## 2020-07-26 RX ORDER — VANCOMYCIN HCL 1 G
1000 VIAL (EA) INTRAVENOUS ONCE
Refills: 0 | Status: COMPLETED | OUTPATIENT
Start: 2020-07-26 | End: 2020-07-26

## 2020-07-26 RX ORDER — ACETAMINOPHEN 500 MG
650 TABLET ORAL ONCE
Refills: 0 | Status: COMPLETED | OUTPATIENT
Start: 2020-07-26 | End: 2020-07-26

## 2020-07-26 RX ORDER — TAMSULOSIN HYDROCHLORIDE 0.4 MG/1
0.4 CAPSULE ORAL AT BEDTIME
Refills: 0 | Status: DISCONTINUED | OUTPATIENT
Start: 2020-07-26 | End: 2020-07-28

## 2020-07-26 RX ORDER — METOPROLOL TARTRATE 50 MG
25 TABLET ORAL
Refills: 0 | Status: DISCONTINUED | OUTPATIENT
Start: 2020-07-26 | End: 2020-07-28

## 2020-07-26 RX ORDER — DEXTROSE 50 % IN WATER 50 %
25 SYRINGE (ML) INTRAVENOUS ONCE
Refills: 0 | Status: DISCONTINUED | OUTPATIENT
Start: 2020-07-26 | End: 2020-07-28

## 2020-07-26 RX ORDER — FLUVOXAMINE MALEATE 25 MG/1
100 TABLET ORAL
Refills: 0 | Status: DISCONTINUED | OUTPATIENT
Start: 2020-07-26 | End: 2020-07-28

## 2020-07-26 RX ORDER — GENTAMICIN SULFATE 40 MG/ML
80 VIAL (ML) INJECTION ONCE
Refills: 0 | Status: COMPLETED | OUTPATIENT
Start: 2020-07-26 | End: 2020-07-26

## 2020-07-26 RX ORDER — CLONAZEPAM 1 MG
0.5 TABLET ORAL
Refills: 0 | Status: DISCONTINUED | OUTPATIENT
Start: 2020-07-26 | End: 2020-07-28

## 2020-07-26 RX ORDER — SODIUM CHLORIDE 9 MG/ML
1000 INJECTION, SOLUTION INTRAVENOUS
Refills: 0 | Status: DISCONTINUED | OUTPATIENT
Start: 2020-07-26 | End: 2020-07-28

## 2020-07-26 RX ORDER — DEXTROSE 50 % IN WATER 50 %
12.5 SYRINGE (ML) INTRAVENOUS ONCE
Refills: 0 | Status: DISCONTINUED | OUTPATIENT
Start: 2020-07-26 | End: 2020-07-28

## 2020-07-26 RX ORDER — DIVALPROEX SODIUM 500 MG/1
250 TABLET, DELAYED RELEASE ORAL THREE TIMES A DAY
Refills: 0 | Status: DISCONTINUED | OUTPATIENT
Start: 2020-07-26 | End: 2020-07-28

## 2020-07-26 RX ORDER — ASPIRIN/CALCIUM CARB/MAGNESIUM 324 MG
81 TABLET ORAL DAILY
Refills: 0 | Status: DISCONTINUED | OUTPATIENT
Start: 2020-07-26 | End: 2020-07-28

## 2020-07-26 RX ORDER — ACETAMINOPHEN 500 MG
650 TABLET ORAL EVERY 4 HOURS
Refills: 0 | Status: DISCONTINUED | OUTPATIENT
Start: 2020-07-26 | End: 2020-07-28

## 2020-07-26 RX ORDER — ENOXAPARIN SODIUM 100 MG/ML
40 INJECTION SUBCUTANEOUS DAILY
Refills: 0 | Status: DISCONTINUED | OUTPATIENT
Start: 2020-07-26 | End: 2020-07-28

## 2020-07-26 RX ORDER — GLUCAGON INJECTION, SOLUTION 0.5 MG/.1ML
1 INJECTION, SOLUTION SUBCUTANEOUS ONCE
Refills: 0 | Status: DISCONTINUED | OUTPATIENT
Start: 2020-07-26 | End: 2020-07-28

## 2020-07-26 RX ORDER — SODIUM CHLORIDE 9 MG/ML
1000 INJECTION INTRAMUSCULAR; INTRAVENOUS; SUBCUTANEOUS
Refills: 0 | Status: COMPLETED | OUTPATIENT
Start: 2020-07-26 | End: 2020-07-26

## 2020-07-26 RX ORDER — DOXAZOSIN MESYLATE 4 MG
1 TABLET ORAL AT BEDTIME
Refills: 0 | Status: DISCONTINUED | OUTPATIENT
Start: 2020-07-26 | End: 2020-07-28

## 2020-07-26 RX ORDER — PANTOPRAZOLE SODIUM 20 MG/1
40 TABLET, DELAYED RELEASE ORAL DAILY
Refills: 0 | Status: DISCONTINUED | OUTPATIENT
Start: 2020-07-26 | End: 2020-07-28

## 2020-07-26 RX ORDER — MEROPENEM 1 G/30ML
1000 INJECTION INTRAVENOUS EVERY 8 HOURS
Refills: 0 | Status: DISCONTINUED | OUTPATIENT
Start: 2020-07-26 | End: 2020-07-28

## 2020-07-26 RX ORDER — TAMSULOSIN HYDROCHLORIDE 0.4 MG/1
0.4 CAPSULE ORAL AT BEDTIME
Refills: 0 | Status: DISCONTINUED | OUTPATIENT
Start: 2020-07-26 | End: 2020-07-26

## 2020-07-26 RX ORDER — LEVOTHYROXINE SODIUM 125 MCG
75 TABLET ORAL DAILY
Refills: 0 | Status: DISCONTINUED | OUTPATIENT
Start: 2020-07-26 | End: 2020-07-28

## 2020-07-26 RX ORDER — IPRATROPIUM/ALBUTEROL SULFATE 18-103MCG
3 AEROSOL WITH ADAPTER (GRAM) INHALATION EVERY 6 HOURS
Refills: 0 | Status: DISCONTINUED | OUTPATIENT
Start: 2020-07-26 | End: 2020-07-28

## 2020-07-26 RX ORDER — DEXTROSE 50 % IN WATER 50 %
15 SYRINGE (ML) INTRAVENOUS ONCE
Refills: 0 | Status: DISCONTINUED | OUTPATIENT
Start: 2020-07-26 | End: 2020-07-28

## 2020-07-26 RX ADMIN — Medication 1000 MILLIGRAM(S): at 15:40

## 2020-07-26 RX ADMIN — SODIUM CHLORIDE 80 MILLILITER(S): 9 INJECTION, SOLUTION INTRAVENOUS at 18:33

## 2020-07-26 RX ADMIN — DIVALPROEX SODIUM 250 MILLIGRAM(S): 500 TABLET, DELAYED RELEASE ORAL at 21:43

## 2020-07-26 RX ADMIN — MEROPENEM 100 MILLIGRAM(S): 1 INJECTION INTRAVENOUS at 21:43

## 2020-07-26 RX ADMIN — SODIUM CHLORIDE 1000 MILLILITER(S): 9 INJECTION INTRAMUSCULAR; INTRAVENOUS; SUBCUTANEOUS at 12:19

## 2020-07-26 RX ADMIN — Medication 80 MILLIGRAM(S): at 14:46

## 2020-07-26 RX ADMIN — Medication 650 MILLIGRAM(S): at 20:30

## 2020-07-26 RX ADMIN — Medication 25 MILLIGRAM(S): at 18:28

## 2020-07-26 RX ADMIN — Medication 250 MILLIGRAM(S): at 13:45

## 2020-07-26 RX ADMIN — Medication 0.5 MILLIGRAM(S): at 18:28

## 2020-07-26 RX ADMIN — Medication 1 MILLIGRAM(S): at 21:43

## 2020-07-26 RX ADMIN — SODIUM CHLORIDE 1000 MILLILITER(S): 9 INJECTION INTRAMUSCULAR; INTRAVENOUS; SUBCUTANEOUS at 13:45

## 2020-07-26 RX ADMIN — SODIUM CHLORIDE 1000 MILLILITER(S): 9 INJECTION INTRAMUSCULAR; INTRAVENOUS; SUBCUTANEOUS at 13:30

## 2020-07-26 RX ADMIN — MEROPENEM 100 MILLIGRAM(S): 1 INJECTION INTRAVENOUS at 17:24

## 2020-07-26 RX ADMIN — TAMSULOSIN HYDROCHLORIDE 0.4 MILLIGRAM(S): 0.4 CAPSULE ORAL at 21:43

## 2020-07-26 RX ADMIN — Medication 204 MILLIGRAM(S): at 13:45

## 2020-07-26 RX ADMIN — SODIUM CHLORIDE 1000 MILLILITER(S): 9 INJECTION INTRAMUSCULAR; INTRAVENOUS; SUBCUTANEOUS at 15:40

## 2020-07-26 RX ADMIN — FLUVOXAMINE MALEATE 100 MILLIGRAM(S): 25 TABLET ORAL at 18:28

## 2020-07-26 RX ADMIN — Medication 650 MILLIGRAM(S): at 20:26

## 2020-07-26 NOTE — ED PROVIDER NOTE - CARE PLAN
Principal Discharge DX:	Febrile illness, acute  Secondary Diagnosis:	Leukocytosis, unspecified type  Secondary Diagnosis:	Hypoxia

## 2020-07-26 NOTE — ED PROVIDER NOTE - CHPI ED SYMPTOMS NEG
no fever/no headache/no vomiting/no rash/no diarrhea/no abdominal pain/no cough/no decreased eating/drinking

## 2020-07-26 NOTE — H&P ADULT - NSICDXPASTMEDICALHX_GEN_ALL_CORE_FT
PAST MEDICAL HISTORY:  Blind right eye    Constipation, unspecified constipation type     DD (diastrophic dysplasia)     HTN (hypertension)     MR (mental retardation), severe     Psoriasis     Seizure

## 2020-07-26 NOTE — ED ADULT NURSE NOTE - CHIEF COMPLAINT QUOTE
brought in by caregivers from group home for 100.8 temperature and low spO2 92%. caregivers reports two positive COVID cases at nearby [facility]. patient was seen at urgent care of Friday for the same symptoms but not results from COVID swab that was done there

## 2020-07-26 NOTE — CONSULT NOTE ADULT - SUBJECTIVE AND OBJECTIVE BOX
HPI:  66YO M Phx of MRDD, HTN, resident of group home presented to the hospital with CC O fevers x2 days with n/v and poor po intake.. Pt unable to provide history. CT chest/abd/pelv with findings or pneumonia and possible acute cholecystitis.      Infectious Disease consult was called to evaluate pt and for antibiotic management    Past Medical & Surgical Hx:  PAST MEDICAL & SURGICAL HISTORY:  Constipation, unspecified constipation type  Blind: right eye  DD (diastrophic dysplasia)  Psoriasis  HTN (hypertension)  Seizure  MR (mental retardation), severe  No significant past surgical history      Social History--  EtOH: denies   Tobacco: denies  Drug Use: denies     FAMILY HISTORY:  No pertinent family history in first degree relatives      Allergies  Augmentin (Unknown)  aztreonam (Rash)  penicillin (Unknown)  sulfa drugs (Unknown)    Intolerances  NONE    Home Medications:  Aspir 81:  (23 Aug 2019 11:18)  clonazePAM:  (23 Aug 2019 11:18)  Cosentyx:  (23 Aug 2019 11:18)  Depakote:  (23 Aug 2019 11:18)  doxazosin 2 mg oral tablet: 1 tab(s) orally once a day (at bedtime) (23 Aug 2019 11:18)  hydrOXYzine:  (23 Aug 2019 11:18)  levothyroxine:  (23 Aug 2019 11:18)  metoprolol tartrate 25 mg oral tablet: 1 tab(s) orally 2 times a day (23 Aug 2019 11:18)  OLANZapine:  (23 Aug 2019 11:18)  tamsulosin 0.4 mg oral capsule: 1 cap(s) orally once a day (at bedtime) (23 Aug 2019 11:18)  ursodiol 300 mg oral capsule: 1 cap(s) orally every 12 hours (23 Aug 2019 11:18)  ZyPREXA 20 mg oral tablet: 1 tab(s) orally once a day (23 Aug 2019 11:18)      Current Inpatient Medications :    ANTIBIOTICS:   meropenem  IVPB 1000 milliGRAM(s) IV Intermittent every 8 hours      OTHER RELEVANT MEDICATIONS :  acetaminophen   Tablet .. 650 milliGRAM(s) Oral every 4 hours PRN  albuterol/ipratropium for Nebulization 3 milliLiter(s) Nebulizer every 6 hours  aspirin enteric coated 81 milliGRAM(s) Oral daily  clonazePAM  Tablet 0.5 milliGRAM(s) Oral two times a day  dextrose 40% Gel 15 Gram(s) Oral once PRN  dextrose 5% + sodium chloride 0.9%. 1000 milliLiter(s) IV Continuous <Continuous>  dextrose 5%. 1000 milliLiter(s) IV Continuous <Continuous>  dextrose 50% Injectable 12.5 Gram(s) IV Push once  dextrose 50% Injectable 25 Gram(s) IV Push once  dextrose 50% Injectable 25 Gram(s) IV Push once  diVALproex  milliGRAM(s) Oral three times a day  doxazosin 1 milliGRAM(s) Oral at bedtime  enoxaparin Injectable 40 milliGRAM(s) SubCutaneous daily  fluvoxaMINE 100 milliGRAM(s) Oral two times a day  glucagon  Injectable 1 milliGRAM(s) IntraMuscular once PRN  levothyroxine 75 MICROGram(s) Oral daily  metoprolol tartrate 25 milliGRAM(s) Oral two times a day  pantoprazole  Injectable 40 milliGRAM(s) IV Push daily  tamsulosin 0.4 milliGRAM(s) Oral at bedtime      ROS:  Unable to obtain due to : MRDD     I&O's Detail    26 Jul 2020 07:01  -  26 Jul 2020 22:53  --------------------------------------------------------  IN:    dextrose 5% + sodium chloride 0.9%.: 160 mL  Total IN: 160 mL    OUT:  Total OUT: 0 mL    Total NET: 160 mL      Physical Exam:  Vital Signs Last 24 Hrs  T(C): 38.2 (26 Jul 2020 20:45), Max: 38.2 (26 Jul 2020 20:45)  T(F): 100.7 (26 Jul 2020 20:45), Max: 100.7 (26 Jul 2020 20:45)  HR: 86 (26 Jul 2020 20:45) (77 - 96)  BP: 128/69 (26 Jul 2020 20:45) (115/60 - 128/69)  RR: 17 (26 Jul 2020 20:45) (16 - 18)  SpO2: 93% (26 Jul 2020 20:45) (89% - 97%)  Height (cm): 150 (07-26 @ 16:49)  Weight (kg): 70 (07-26 @ 16:49)  BMI (kg/m2): 31.1 (07-26 @ 16:49)  BSA (m2): 1.65 (07-26 @ 16:49)    General: no acute distress  Eyes: sclera anicteric, pupils equal and reactive to light  ENMT: buccal mucosa moist, pharynx not injected  Neck: supple, trachea midline  Lungs: Decreased no wheeze/rhonchi  Cardiovascular: regular rate and rhythm, S1 S2  Abdomen: soft, tender, ND bowel sounds normal  Neurological:  awake MRDD  Skin:no increased ecchymosis/petechiae/purpura  Lymph Nodes: no palpable cervical/supraclavicular lymph nodes enlargements  Extremities: no cyanosis/clubbing/edema    Labs:                        13.0   20.51 )-----------( 174      ( 26 Jul 2020 12:41 )             36.9   07-26    137  |  104  |  19  ----------------------------<  116<H>  3.5   |  26  |  1.20    Ca    8.7      26 Jul 2020 12:41    TPro  7.3  /  Alb  2.8<L>  /  TBili  0.6  /  DBili  x   /  AST  29  /  ALT  24  /  AlkPhos  60  07-26       RECENT CULTURES:  pending      RADIOLOGY & ADDITIONAL STUDIES:    EXAM:  CT ABDOMEN AND PELVIS                          EXAM:  CT CHEST                            PROCEDURE DATE:  07/26/2020          INTERPRETATION:  CLINICAL INFORMATION: Fever, leukocytosis    COMPARISON: CT abdomen and pelvis dated 08/23/2019    PROCEDURE:  CT of the Chest, Abdomen and Pelvis was performed without intravenous contrast.  Intravenous contrast: None.  Oral contrast: None.  Sagittal and coronal reformats were performed.    FINDINGS:  CHEST:  LUNGS AND LARGE AIRWAYS: Suspect bronchial plugging in the lower lobes. Bilateral lower lobe, predominantly right-sided patchy opacities that could be related to atelectasis or possibly infiltrates.  PLEURA: No pleural effusion.  VESSELS: Coronary artery calcification  HEART: Heart sizeis normal. No pericardial effusion.  MEDIASTINUM AND GREYSON: No lymphadenopathy.  CHEST WALL AND LOWER NECK: Within normal limits.    ABDOMEN AND PELVIS:  LIVER: Within normal limits.  BILE DUCTS: Normal caliber.  GALLBLADDER: Evidence of cholelithiasis as well as pericholecystic haziness and stranding consistent with acute cholecystitis.  SPLEEN: Within normal limits.  PANCREAS: Within normal limits.  ADRENALS: Within normal limits.  KIDNEYS/URETERS: Small hyperdensities involving the lower pole cortex of both kidneys that could be related to nephrocalcinosis or hyperdense cysts.    BLADDER: Mild mural thickening concerning for chronic outlet obstruction.  REPRODUCTIVE ORGANS: Prostate is enlarged.    BOWEL: No bowel obstruction. Appendix is normal.  PERITONEUM: No ascites.  VESSELS: Within normal limits.  RETROPERITONEUM/LYMPH NODES: No lymphadenopathy.  ABDOMINAL WALL: Within normal limits.  BONES: Degenerative disc disease and spondylosis lower lumbar spine.    IMPRESSION:  Cholelithiasis and probable acute cholecystitis.    Patchy bilateral lower lobe pulmonary opacities, predominantly involving the right lower lobe.      Assessment :   66YO M Phx of MRDD, HTN, resident of group home presented to the hospital with CC O fevers x2 days with n/v and poor po intake.. Pt unable to provide history. CT chest/abd/pelv with findings or pneumonia and possible acute cholecystitis.  WBC 20K.      Plan :   Cont Meropenam  Trend temps and cbc  Fu cultures  Serial abd exams  Surgery consulted  HIDA scan  Asp precuations    D/w Dr Mcelroy    Continue with present regime .  Approptiate use of antibiotics and adverse effects reviewed.      I have discussed the above plan of care with patient/family in detail. They expressed understanding of the treatment plan . Risks, benefits and alternatives discussed in detail. I have asked if they have any questions or concerns and appropriately addressed them to the best of my ability .      > 45 minutes spent in direct patient care reviewing  the notes, lab data/ imaging , discussion with multidisciplinary team. All questions were addressed and answered to the best of my capacity .    Thank you for allowing me to participate in the care of your patient .      Olivier Daniels MD  Infectious Disease  [phone]

## 2020-07-26 NOTE — ED PROVIDER NOTE - OBJECTIVE STATEMENT
64 yo M p/w BIB staff with fever x 2d. no known cough. no vomiting / diarrhea. pt with reported borderline O2 sat (states 91% ra at home). No evid of pain. 2 sick contacts in nearby [facility]. no known direct contact. no other co.

## 2020-07-26 NOTE — ED ADULT NURSE NOTE - NSIMPLEMENTINTERV_GEN_ALL_ED
Implemented All Fall Risk Interventions:  [place] to call system. Call bell, personal items and telephone within reach. Instruct patient to call for assistance. Room bathroom lighting operational. Non-slip footwear when patient is off stretcher. Physically safe environment: no spills, clutter or unnecessary equipment. Stretcher in lowest position, wheels locked, appropriate side rails in place. Provide visual cue, wrist band, yellow gown, etc. Monitor gait and stability. Monitor for mental status changes and reorient to person, place, and time. Review medications for side effects contributing to fall risk. Reinforce activity limits and safety measures with patient and family.

## 2020-07-26 NOTE — CONSULT NOTE ADULT - SUBJECTIVE AND OBJECTIVE BOX
64 y/o M hx of MR, HTN, brought in from nursing home for work up of fevers x2 days. Pt underwent CT chest/abd/pelv with gallbladder findings concerning for acute cholecystitis. Per bedside aid pt had one episode of emesis Friday and seemed to have decreased PO intake since. Of note pt was noticed to desaturate to the low 90's on room air. Surgery consulted to r/o acute cholecystitis.     PAST MEDICAL & SURGICAL HISTORY:  Constipation, unspecified constipation type  Blind: right eye  DD (diastrophic dysplasia)  Psoriasis  HTN (hypertension)  Seizure  MR (mental retardation), severe  No significant past surgical history      MEDICATIONS  (STANDING):  aspirin enteric coated 81 milliGRAM(s) Oral daily  clonazePAM  Tablet 0.5 milliGRAM(s) Oral two times a day  dextrose 5% + sodium chloride 0.9%. 1000 milliLiter(s) (80 mL/Hr) IV Continuous <Continuous>  dextrose 5%. 1000 milliLiter(s) (50 mL/Hr) IV Continuous <Continuous>  dextrose 50% Injectable 12.5 Gram(s) IV Push once  dextrose 50% Injectable 25 Gram(s) IV Push once  dextrose 50% Injectable 25 Gram(s) IV Push once  diVALproex  milliGRAM(s) Oral three times a day  doxazosin 1 milliGRAM(s) Oral at bedtime  enoxaparin Injectable 40 milliGRAM(s) SubCutaneous daily  fluvoxaMINE 100 milliGRAM(s) Oral two times a day  levothyroxine 75 MICROGram(s) Oral daily  meropenem  IVPB 1000 milliGRAM(s) IV Intermittent every 8 hours  metoprolol tartrate 25 milliGRAM(s) Oral two times a day  pantoprazole  Injectable 40 milliGRAM(s) IV Push daily  tamsulosin Oral Tab/Cap - Peds 0.4 milliGRAM(s) Oral at bedtime    MEDICATIONS  (PRN):  dextrose 40% Gel 15 Gram(s) Oral once PRN Blood Glucose LESS THAN 70 milliGRAM(s)/deciliter  glucagon  Injectable 1 milliGRAM(s) IntraMuscular once PRN Glucose LESS THAN 70 milligrams/deciliter      aspirin enteric coated 81 milliGRAM(s) Oral daily  clonazePAM  Tablet 0.5 milliGRAM(s) Oral two times a day  dextrose 40% Gel 15 Gram(s) Oral once PRN  dextrose 5% + sodium chloride 0.9%. 1000 milliLiter(s) IV Continuous <Continuous>  dextrose 5%. 1000 milliLiter(s) IV Continuous <Continuous>  dextrose 50% Injectable 12.5 Gram(s) IV Push once  dextrose 50% Injectable 25 Gram(s) IV Push once  dextrose 50% Injectable 25 Gram(s) IV Push once  diVALproex  milliGRAM(s) Oral three times a day  doxazosin 1 milliGRAM(s) Oral at bedtime  enoxaparin Injectable 40 milliGRAM(s) SubCutaneous daily  fluvoxaMINE 100 milliGRAM(s) Oral two times a day  glucagon  Injectable 1 milliGRAM(s) IntraMuscular once PRN  levothyroxine 75 MICROGram(s) Oral daily  meropenem  IVPB 1000 milliGRAM(s) IV Intermittent every 8 hours  metoprolol tartrate 25 milliGRAM(s) Oral two times a day  pantoprazole  Injectable 40 milliGRAM(s) IV Push daily  tamsulosin Oral Tab/Cap - Peds 0.4 milliGRAM(s) Oral at bedtime    Augmentin (Unknown)  aztreonam (Rash)  penicillin (Unknown)  sulfa drugs (Unknown)      FAMILY HISTORY:  No pertinent family history in first degree relatives     Vital Signs Last 24 Hrs  T(C): 37.6 (26 Jul 2020 12:20), Max: 37.6 (26 Jul 2020 12:20)  T(F): 99.7 (26 Jul 2020 12:20), Max: 99.7 (26 Jul 2020 12:20)  HR: 96 (26 Jul 2020 11:41) (96 - 96)  BP: 128/62 (26 Jul 2020 11:41) (128/62 - 128/62)  BP(mean): --  RR: 18 (26 Jul 2020 11:41) (18 - 18)  SpO2: 89% (26 Jul 2020 11:41) (89% - 89%)    ROS  Unable to obtain     PHYSICAL EXAM:  GENERAL: NAD  HEAD:  Atraumatic, Normocephalic  EYES: EOMI, PERRLA, conjunctiva and sclera clear  CHEST/LUNG: CTABL, no ronchi, rales or wheezing  HEART: RRR, no MRG  ABDOMEN: Soft, non tenderness to palpation, nondistended, +bs  EXTREMITIES:  2+ Peripheral Pulses, No clubbing, cyanosis, or edema      LABS:                        13.0   20.51 )-----------( 174      ( 26 Jul 2020 12:41 )             36.9     07-26    137  |  104  |  19  ----------------------------<  116<H>  3.5   |  26  |  1.20    Ca    8.7      26 Jul 2020 12:41    TPro  7.3  /  Alb  2.8<L>  /  TBili  0.6  /  DBili  x   /  AST  29  /  ALT  24  /  AlkPhos  60  07-26    PT/INR - ( 26 Jul 2020 12:41 )   PT: 14.6 sec;   INR: 1.26 ratio         PTT - ( 26 Jul 2020 12:41 )  PTT:30.3 sec    Imaging:  < from: CT Abdomen and Pelvis No Cont (07.26.20 @ 14:44) >    EXAM:  CT ABDOMEN AND PELVIS                          EXAM:  CT CHEST                            PROCEDURE DATE:  07/26/2020          INTERPRETATION:  CLINICAL INFORMATION: Fever, leukocytosis    COMPARISON: CT abdomen and pelvis dated 08/23/2019    PROCEDURE:  CT of the Chest, Abdomen and Pelvis was performed without intravenous contrast.  Intravenous contrast: None.  Oral contrast: None.  Sagittal and coronal reformats were performed.    FINDINGS:  CHEST:  LUNGS AND LARGE AIRWAYS: Suspect bronchial plugging in the lower lobes. Bilateral lower lobe, predominantly right-sided patchy opacities that could be related to atelectasis or possibly infiltrates.  PLEURA: No pleural effusion.  VESSELS: Coronary artery calcification  HEART: Heart sizeis normal. No pericardial effusion.  MEDIASTINUM AND GREYSON: No lymphadenopathy.  CHEST WALL AND LOWER NECK: Within normal limits.    ABDOMEN AND PELVIS:  LIVER: Within normal limits.  BILE DUCTS: Normal caliber.  GALLBLADDER: Evidence of cholelithiasis as well as pericholecystic haziness and stranding consistent with acute cholecystitis.  SPLEEN: Within normal limits.  PANCREAS: Within normal limits.  ADRENALS: Within normal limits.  KIDNEYS/URETERS: Small hyperdensities involving the lower pole cortex of both kidneys that could be related to nephrocalcinosis or hyperdense cysts.    BLADDER: Mild mural thickening concerning for chronic outlet obstruction.  REPRODUCTIVE ORGANS: Prostate is enlarged.    BOWEL: No bowel obstruction. Appendix is normal.  PERITONEUM: No ascites.  VESSELS: Within normal limits.  RETROPERITONEUM/LYMPH NODES: No lymphadenopathy.  ABDOMINAL WALL: Within normal limits.  BONES: Degenerative disc disease and spondylosis lower lumbar spine.    IMPRESSION:  Cholelithiasis and probable acute cholecystitis.    Patchy bilateral lower lobe pulmonary opacities, predominantly involving the right lower lobe.    Findings were discussed with Dr. TRUONG KEBEDE [phone] 7/26/2020 3:19 PM by Dr. Elder with read back confirmation.    JUAREZ ELDER M.D., ATTENDING RADIOLOGIST  This document has been electronically signed. Jul 26 2020  3:20PM       end of copied text >    Assessment:  64 y/o M pmhx of MR brought in from nursing home for r/o covid, with CT abd/pelv findings concerning for acute ena,     PlaN:  - GB US ordered, f/u  - admit to medicine, will follow  - care per primary  - NPO, IVF  - pain control, supportive care  - discussed with Dr. Fischer

## 2020-07-26 NOTE — CONSULT NOTE ADULT - SUBJECTIVE AND OBJECTIVE BOX
ROSELIA NARAYAN    V APER 22    Patient is a 65y old  Male who presents with a chief complaint of      Allergies    Augmentin (Unknown)  aztreonam (Rash)  penicillin (Unknown)  sulfa drugs (Unknown)    Intolerances        HPI:      PAST MEDICAL & SURGICAL HISTORY:  Constipation, unspecified constipation type  Blind: right eye  DD (diastrophic dysplasia)  Psoriasis  HTN (hypertension)  Seizure  MR (mental retardation), severe  No significant past surgical history      FAMILY HISTORY:  No pertinent family history in first degree relatives        MEDICATIONS       Vital Signs Last 24 Hrs  T(C): 37.6 (2020 12:20), Max: 37.6 (2020 12:20)  T(F): 99.7 (2020 12:20), Max: 99.7 (2020 12:20)  HR: 96 (2020 11:41) (96 - 96)  BP: 128/62 (2020 11:41) (128/62 - 128/62)  BP(mean): --  RR: 18 (2020 11:41) (18 - 18)  SpO2: 89% (2020 11:41) (89% - 89%)            LABS:                        13.0   20.51 )-----------( 174      ( 2020 12:41 )             36.9     07-26    137  |  104  |  19  ----------------------------<  116<H>  3.5   |  26  |  1.20    Ca    8.7      2020 12:41    TPro  7.3  /  Alb  2.8<L>  /  TBili  0.6  /  DBili  x   /  AST  29  /  ALT  24  /  AlkPhos  60  07-26    PT/INR - ( 2020 12:41 )   PT: 14.6 sec;   INR: 1.26 ratio         PTT - ( 2020 12:41 )  PTT:30.3 sec  Urinalysis Basic - ( 2020 15:37 )    Color: Yellow / Appearance: Clear / S.010 / pH: x  Gluc: x / Ketone: Negative  / Bili: Negative / Urobili: Negative   Blood: x / Protein: 15 / Nitrite: Negative   Leuk Esterase: Negative / RBC: 3-5 /HPF / WBC 3-5   Sq Epi: x / Non Sq Epi: Few / Bacteria: Few            WBC:  WBC Count: 20.51 K/uL ( @ 12:41)      MICROBIOLOGY:  RECENT CULTURES:        CARDIAC MARKERS ( 2020 12:41 )  <.015 ng/mL / x     / 529 U/L / x     / x            PT/INR - ( 2020 12:41 )   PT: 14.6 sec;   INR: 1.26 ratio         PTT - ( 2020 12:41 )  PTT:30.3 sec    Sodium:  Sodium, Serum: 137 mmol/L ( @ 12:41)      1.20 mg/dL  @ 12:41      Hemoglobin:  Hemoglobin: 13.0 g/dL ( @ 12:41)      Platelets: Platelet Count - Automated: 174 K/uL ( @ 12:41)      LIVER FUNCTIONS - ( 2020 12:41 )  Alb: 2.8 g/dL / Pro: 7.3 g/dL / ALK PHOS: 60 U/L / ALT: 24 U/L / AST: 29 U/L / GGT: x             Urinalysis Basic - ( 2020 15:37 )    Color: Yellow / Appearance: Clear / S.010 / pH: x  Gluc: x / Ketone: Negative  / Bili: Negative / Urobili: Negative   Blood: x / Protein: 15 / Nitrite: Negative   Leuk Esterase: Negative / RBC: 3-5 /HPF / WBC 3-5   Sq Epi: x / Non Sq Epi: Few / Bacteria: Few        RADIOLOGY & ADDITIONAL STUDIES:

## 2020-07-26 NOTE — ED ADULT TRIAGE NOTE - CHIEF COMPLAINT QUOTE
brought in by caregivers from group home for 100.8 temperature and low spO2 92%. caregivers reports two positive COVID cases at nearby [facility]. patient was seen at urgent care of Friday for the same symptoms but not results from COVID swab that was done there

## 2020-07-26 NOTE — CONSULT NOTE ADULT - ASSESSMENT
SYLVAIN VELOZ [facility] P  DOA 7/26/2020  ALLERGY Pcn sulfa azactam augmentin   CONTACT Naima Bryant  self              Initial evaluation/Pulmonary Critical Care consultation requested on 7/26/2020   by Dr Dr Meyer from Dr Damon   Patient examined chart reviewed    HOSPITAL ADMISSION   PATIENT CAME  FROM (if information available)      [facility].      65 m brought in by caregivers from group home for 100.8 temperature and low spO2 92%. caregivers reports two positive COVID cases at nearby [facility]. patient was seen at urgent care of Friday for the same symptoms  Ct cap showed basal pneumonia and ac cholecytitis Pt given genta anad vanco in er and alredy got fc 2l   Pulm consulted 7/26/2020   COVID pcr 7/26/2020 pcr Neg-ric     Past Medical History:  Blind  right eye  Constipation, unspecified constipation type    DD (diastrophic dysplasia)    HTN (hypertension)    MR (mental retardation), severe    Psoriasis    Seizure.    home meds  Flagyl 500 Cipro 500 ursodiol 300 metoprolol tartrate 25 tamsulosin 0.4 doxazosin 2 ZyPREXA 20 mg OLANZapine: Cosentyx:   Depakote: Aspir 81: hydrOXYzine: levothyroxine: clonazePAM:       RELEVANT INFO   W 7/26/2020 W 20  PROCAL.  7/26/2020 pc 1.1  CT 7/26/2020 CT ch Probable ac ena Patchy bl lower lobe pulm opac   ELEVATED D-dimr poa 7/26/2020 D-dimr 715     V duplx 7/26/2020 v duplx Neg-ric                               BNP 7/26/2020 BNP 1322          SYLVAIN VELOZ [facility]  DOA 7/26/2020  ALLERGY Pcn sulfa azactam augmentin      REVIEW OF SYMPTOMS      Able to give ROS  NO     PHYSICAL EXAM    HEENT Unremarkable PERRLA atraumatic   RESP Fair air entry EXP prolonged    Harsh breath sound Resp distres mild   CARDIAC S1 S2 No S3     NO JVD    ABDOMEN SOFT BS PRESENT NOT DISTENDED No hepatosplenomegaly PEDAL EDEMA present No calf tenderness  NO rash   GENERAL Not TOXIC looking    PATIENT DATA/ASSESSMENT/RECOMMENDATIONS       INFECTION  AC CHOLECYSTITIS   ASP PNEUMONIA  7/26/2020 Got 1 dose vanco andgenta in er  7/26/2020 Will keep on flagyl and cipro pending id eval  7/26/2020 Will likely need meropenem which can be used in spite of pnclk allergy if ID agrees   7/26/2020 Surgery eval was called   7/26/2020 Doubt incentive spirometry will be done by pt   A/R npo iv fluids surg eval iv ab    ELEVATED D-dimer   ELEVATED D-dimr poa 7/26/2020 D-dimr 715     V duplx 7/26/2020 v duplx Neg-ric                               BNP 7/26/2020 BNP 1322    A/R Likely false pos+ric  If desaturates will get cta ch     RESP   Monitor po   Target po 90-95%    CHF  BNP 7/26/2020 BNP 1322    Check echo           HEMODYNAMICS   Stable                                NEURO PSYCH   Resume meds         TIME SPENT Over 55 minutes aggregate care time spent on encounter; activities included combinations of  direct pt care, counseling and/or coordinating care reviewing notes, lab data/ imaging, discussion with multidisciplinary team/ pt /family. Risks, benefits, alternatives of planned interventions when applicable were discussed in detail and questions answered as best as possible    AYANNA VELOZ [facility]  DOA 7/26/2020  ALLERGY Pcn sulfa azactam augmentin       ADDEND Pt cleared for urgent surgery Is in optimal pulm shape

## 2020-07-26 NOTE — CONSULT NOTE ADULT - ATTENDING COMMENTS
I have personally participated in the care of this patient. I have reviewed all pertinent clinical information, including history, physical exam, plan, and the PA's note and agree except as noted.    Case discussed with Dr Hunter Curran.  Pt to be optimized for cholecystectomy.  Continue NPO, IV abx.

## 2020-07-26 NOTE — ED PROVIDER NOTE - PROGRESS NOTE DETAILS
Pt doing well, no acute changes. Dw Dr Garcia, will see pt to admit. Wants CT CAP, bl LE doppler. States no CTA at this time Chetan harley re CT findings, use on call surgery

## 2020-07-27 ENCOUNTER — TRANSCRIPTION ENCOUNTER (OUTPATIENT)
Age: 65
End: 2020-07-27

## 2020-07-27 DIAGNOSIS — K81.0 ACUTE CHOLECYSTITIS: ICD-10-CM

## 2020-07-27 LAB
A1C WITH ESTIMATED AVERAGE GLUCOSE RESULT: 5.2 % — SIGNIFICANT CHANGE UP (ref 4–5.6)
ALBUMIN SERPL ELPH-MCNC: 2.6 G/DL — LOW (ref 3.3–5)
ALP SERPL-CCNC: 67 U/L — SIGNIFICANT CHANGE UP (ref 40–120)
ALT FLD-CCNC: 24 U/L — SIGNIFICANT CHANGE UP (ref 12–78)
ANION GAP SERPL CALC-SCNC: 6 MMOL/L — SIGNIFICANT CHANGE UP (ref 5–17)
AST SERPL-CCNC: 34 U/L — SIGNIFICANT CHANGE UP (ref 15–37)
BILIRUB SERPL-MCNC: 0.5 MG/DL — SIGNIFICANT CHANGE UP (ref 0.2–1.2)
BLD GP AB SCN SERPL QL: SIGNIFICANT CHANGE UP
BUN SERPL-MCNC: 12 MG/DL — SIGNIFICANT CHANGE UP (ref 7–23)
CALCIUM SERPL-MCNC: 8.5 MG/DL — SIGNIFICANT CHANGE UP (ref 8.5–10.1)
CHLORIDE SERPL-SCNC: 111 MMOL/L — HIGH (ref 96–108)
CO2 SERPL-SCNC: 28 MMOL/L — SIGNIFICANT CHANGE UP (ref 22–31)
CREAT SERPL-MCNC: 0.85 MG/DL — SIGNIFICANT CHANGE UP (ref 0.5–1.3)
CULTURE RESULTS: NO GROWTH — SIGNIFICANT CHANGE UP
ESTIMATED AVERAGE GLUCOSE: 103 MG/DL — SIGNIFICANT CHANGE UP (ref 68–114)
GLUCOSE SERPL-MCNC: 101 MG/DL — HIGH (ref 70–99)
HCT VFR BLD CALC: 37.3 % — LOW (ref 39–50)
HCV AB S/CO SERPL IA: 0.09 S/CO — SIGNIFICANT CHANGE UP (ref 0–0.99)
HCV AB SERPL-IMP: SIGNIFICANT CHANGE UP
HGB BLD-MCNC: 13.1 G/DL — SIGNIFICANT CHANGE UP (ref 13–17)
INR BLD: 1.14 RATIO — SIGNIFICANT CHANGE UP (ref 0.88–1.16)
MCHC RBC-ENTMCNC: 31.7 PG — SIGNIFICANT CHANGE UP (ref 27–34)
MCHC RBC-ENTMCNC: 35.1 GM/DL — SIGNIFICANT CHANGE UP (ref 32–36)
MCV RBC AUTO: 90.3 FL — SIGNIFICANT CHANGE UP (ref 80–100)
NRBC # BLD: 0 /100 WBCS — SIGNIFICANT CHANGE UP (ref 0–0)
PHOSPHATE SERPL-MCNC: 1.6 MG/DL — LOW (ref 2.5–4.5)
PLATELET # BLD AUTO: 156 K/UL — SIGNIFICANT CHANGE UP (ref 150–400)
POTASSIUM SERPL-MCNC: 3.5 MMOL/L — SIGNIFICANT CHANGE UP (ref 3.5–5.3)
POTASSIUM SERPL-SCNC: 3.5 MMOL/L — SIGNIFICANT CHANGE UP (ref 3.5–5.3)
PROT SERPL-MCNC: 7.3 G/DL — SIGNIFICANT CHANGE UP (ref 6–8.3)
PROTHROM AB SERPL-ACNC: 13.2 SEC — SIGNIFICANT CHANGE UP (ref 10.6–13.6)
RBC # BLD: 4.13 M/UL — LOW (ref 4.2–5.8)
RBC # FLD: 13.1 % — SIGNIFICANT CHANGE UP (ref 10.3–14.5)
SODIUM SERPL-SCNC: 145 MMOL/L — SIGNIFICANT CHANGE UP (ref 135–145)
SPECIMEN SOURCE: SIGNIFICANT CHANGE UP
WBC # BLD: 16.04 K/UL — HIGH (ref 3.8–10.5)
WBC # FLD AUTO: 16.04 K/UL — HIGH (ref 3.8–10.5)

## 2020-07-27 PROCEDURE — 99232 SBSQ HOSP IP/OBS MODERATE 35: CPT | Mod: 57

## 2020-07-27 PROCEDURE — 71045 X-RAY EXAM CHEST 1 VIEW: CPT | Mod: 26

## 2020-07-27 RX ORDER — POTASSIUM PHOSPHATE, MONOBASIC POTASSIUM PHOSPHATE, DIBASIC 236; 224 MG/ML; MG/ML
30 INJECTION, SOLUTION INTRAVENOUS ONCE
Refills: 0 | Status: COMPLETED | OUTPATIENT
Start: 2020-07-27 | End: 2020-07-27

## 2020-07-27 RX ADMIN — MEROPENEM 100 MILLIGRAM(S): 1 INJECTION INTRAVENOUS at 22:57

## 2020-07-27 RX ADMIN — Medication 75 MICROGRAM(S): at 05:01

## 2020-07-27 RX ADMIN — Medication 81 MILLIGRAM(S): at 12:09

## 2020-07-27 RX ADMIN — Medication 3 MILLILITER(S): at 13:00

## 2020-07-27 RX ADMIN — Medication 3 MILLILITER(S): at 08:10

## 2020-07-27 RX ADMIN — Medication 25 MILLIGRAM(S): at 05:01

## 2020-07-27 RX ADMIN — Medication 650 MILLIGRAM(S): at 05:01

## 2020-07-27 RX ADMIN — SODIUM CHLORIDE 80 MILLILITER(S): 9 INJECTION, SOLUTION INTRAVENOUS at 12:08

## 2020-07-27 RX ADMIN — PANTOPRAZOLE SODIUM 40 MILLIGRAM(S): 20 TABLET, DELAYED RELEASE ORAL at 12:09

## 2020-07-27 RX ADMIN — Medication 650 MILLIGRAM(S): at 17:29

## 2020-07-27 RX ADMIN — Medication 1 MILLIGRAM(S): at 22:57

## 2020-07-27 RX ADMIN — Medication 0.5 MILLIGRAM(S): at 17:32

## 2020-07-27 RX ADMIN — DIVALPROEX SODIUM 250 MILLIGRAM(S): 500 TABLET, DELAYED RELEASE ORAL at 13:33

## 2020-07-27 RX ADMIN — MEROPENEM 100 MILLIGRAM(S): 1 INJECTION INTRAVENOUS at 05:01

## 2020-07-27 RX ADMIN — Medication 25 MILLIGRAM(S): at 17:29

## 2020-07-27 RX ADMIN — Medication 3 MILLILITER(S): at 19:18

## 2020-07-27 RX ADMIN — ENOXAPARIN SODIUM 40 MILLIGRAM(S): 100 INJECTION SUBCUTANEOUS at 12:09

## 2020-07-27 RX ADMIN — Medication 0.5 MILLIGRAM(S): at 05:00

## 2020-07-27 RX ADMIN — TAMSULOSIN HYDROCHLORIDE 0.4 MILLIGRAM(S): 0.4 CAPSULE ORAL at 22:57

## 2020-07-27 RX ADMIN — DIVALPROEX SODIUM 250 MILLIGRAM(S): 500 TABLET, DELAYED RELEASE ORAL at 22:57

## 2020-07-27 RX ADMIN — MEROPENEM 100 MILLIGRAM(S): 1 INJECTION INTRAVENOUS at 13:32

## 2020-07-27 RX ADMIN — DIVALPROEX SODIUM 250 MILLIGRAM(S): 500 TABLET, DELAYED RELEASE ORAL at 05:01

## 2020-07-27 RX ADMIN — FLUVOXAMINE MALEATE 100 MILLIGRAM(S): 25 TABLET ORAL at 05:00

## 2020-07-27 RX ADMIN — POTASSIUM PHOSPHATE, MONOBASIC POTASSIUM PHOSPHATE, DIBASIC 83.33 MILLIMOLE(S): 236; 224 INJECTION, SOLUTION INTRAVENOUS at 15:56

## 2020-07-27 RX ADMIN — FLUVOXAMINE MALEATE 100 MILLIGRAM(S): 25 TABLET ORAL at 17:29

## 2020-07-27 NOTE — PROGRESS NOTE ADULT - SUBJECTIVE AND OBJECTIVE BOX
SYLVAIN ROSELIA is a 65yMale , patient examined and chart reviewed.      INTERVAL HPI/ OVERNIGHT EVENTS:   Febrile. NAD.    PAST MEDICAL & SURGICAL HISTORY:  Constipation, unspecified constipation type  Blind: right eye  DD (diastrophic dysplasia)  Psoriasis  HTN (hypertension)  Seizure  MR (mental retardation), severe  No significant past surgical history      For details regarding the patient's social history, family history, and other miscellaneous elements, please refer the initial infectious diseases consultation and/or the admitting history and physical examination for this admission.    ROS:  Unable to obtain due to : MRDD    ALLERGIES:  Augmentin (Unknown)  aztreonam (Rash)  penicillin (Unknown)  sulfa drugs (Unknown)      Current inpatient medications :    ANTIBIOTICS/RELEVANT:  meropenem  IVPB 1000 milliGRAM(s) IV Intermittent every 8 hours      acetaminophen   Tablet .. 650 milliGRAM(s) Oral every 4 hours PRN  albuterol/ipratropium for Nebulization 3 milliLiter(s) Nebulizer every 6 hours  aspirin enteric coated 81 milliGRAM(s) Oral daily  clonazePAM  Tablet 0.5 milliGRAM(s) Oral two times a day  dextrose 40% Gel 15 Gram(s) Oral once PRN  dextrose 5% + sodium chloride 0.9%. 1000 milliLiter(s) IV Continuous <Continuous>  dextrose 5%. 1000 milliLiter(s) IV Continuous <Continuous>  dextrose 50% Injectable 12.5 Gram(s) IV Push once  dextrose 50% Injectable 25 Gram(s) IV Push once  dextrose 50% Injectable 25 Gram(s) IV Push once  diVALproex  milliGRAM(s) Oral three times a day  doxazosin 1 milliGRAM(s) Oral at bedtime  enoxaparin Injectable 40 milliGRAM(s) SubCutaneous daily  fluvoxaMINE 100 milliGRAM(s) Oral two times a day  glucagon  Injectable 1 milliGRAM(s) IntraMuscular once PRN  levothyroxine 75 MICROGram(s) Oral daily  metoprolol tartrate 25 milliGRAM(s) Oral two times a day  pantoprazole  Injectable 40 milliGRAM(s) IV Push daily  potassium phosphate IVPB 30 milliMole(s) IV Intermittent once  tamsulosin 0.4 milliGRAM(s) Oral at bedtime      Objective:     @ 07:  -  - @ 07:00  --------------------------------------------------------  IN: 160 mL / OUT: 1200 mL / NET: -1040 mL     @ 07:01  -   @ 15:47  --------------------------------------------------------  IN: 640 mL / OUT: 600 mL / NET: 40 mL      T(C): 37.5 (20 @ 13:11), Max: 38.2 (20 @ 20:45)  HR: 110 (20 @ 13:11) (77 - 110)  BP: 152/75 (20 @ 13:11) (115/60 - 153/79)  RR: 19 (20 @ 13:11) (16 - 20)  SpO2: 91% (20 @ 13:11) (91% - 97%)      Physical Exam:  General: no acute distress  Eyes: sclera anicteric, pupils equal and reactive to light  ENMT: buccal mucosa moist, pharynx not injected  Neck: supple, trachea midline  Lungs: Decreased, no wheeze/rhonchi  Cardiovascular: regular rate and rhythm, S1 S2  Abdomen: soft, tender, no organomegaly present, bowel sounds normal  Neurological: Awake MRDD nonverbal  Skin: no increased ecchymosis/petechiae/purpura  Lymph Nodes: no palpable cervical/supraclavicular lymph nodes enlargements  Extremities: no cyanosis/clubbing/edema      LABS:                          13.1   16.04 )-----------( 156      ( 2020 07:17 )             37.3           145  |  111<H>  |  12  ----------------------------<  101<H>  3.5   |  28  |  0.85    Ca    8.5      2020 07:17  Phos  1.6         TPro  7.3  /  Alb  2.6<L>  /  TBili  0.5  /  DBili  x   /  AST  34  /  ALT  24  /  AlkPhos  67  07-27      PT/INR - ( 2020 07:18 )   PT: 13.2 sec;   INR: 1.14 ratio         PTT - ( 2020 12:41 )  PTT:30.3 sec  Urinalysis Basic - ( 2020 15:37 )    Color: Yellow / Appearance: Clear / S.010 / pH: x  Gluc: x / Ketone: Negative  / Bili: Negative / Urobili: Negative   Blood: x / Protein: 15 / Nitrite: Negative   Leuk Esterase: Negative / RBC: 3-5 /HPF / WBC 3-5   Sq Epi: x / Non Sq Epi: Few / Bacteria: Few    MICROBIOLOGY:  pending    RADIOLOGY & ADDITIONAL STUDIES:    EXAM:  CT ABDOMEN AND PELVIS                          EXAM:  CT CHEST                            PROCEDURE DATE:  2020          INTERPRETATION:  CLINICAL INFORMATION: Fever, leukocytosis    COMPARISON: CT abdomen and pelvis dated 2019    PROCEDURE:  CT of the Chest, Abdomen and Pelvis was performed without intravenous contrast.  Intravenous contrast: None.  Oral contrast: None.  Sagittal and coronal reformats were performed.    FINDINGS:  CHEST:  LUNGS AND LARGE AIRWAYS: Suspect bronchial plugging in the lower lobes. Bilateral lower lobe, predominantly right-sided patchy opacities that could be related to atelectasis or possibly infiltrates.  PLEURA: No pleural effusion.  VESSELS: Coronary artery calcification  HEART: Heart sizeis normal. No pericardial effusion.  MEDIASTINUM AND GREYSON: No lymphadenopathy.  CHEST WALL AND LOWER NECK: Within normal limits.    ABDOMEN AND PELVIS:  LIVER: Within normal limits.  BILE DUCTS: Normal caliber.  GALLBLADDER: Evidence of cholelithiasis as well as pericholecystic haziness and stranding consistent with acute cholecystitis.  SPLEEN: Within normal limits.  PANCREAS: Within normal limits.  ADRENALS: Within normal limits.  KIDNEYS/URETERS: Small hyperdensities involving the lower pole cortex of both kidneys that could be related to nephrocalcinosis or hyperdense cysts.    BLADDER: Mild mural thickening concerning for chronic outlet obstruction.  REPRODUCTIVE ORGANS: Prostate is enlarged.    BOWEL: No bowel obstruction. Appendix is normal.  PERITONEUM: No ascites.  VESSELS: Within normal limits.  RETROPERITONEUM/LYMPH NODES: No lymphadenopathy.  ABDOMINAL WALL: Within normal limits.  BONES: Degenerative disc disease and spondylosis lower lumbar spine.    IMPRESSION:  Cholelithiasis and probable acute cholecystitis.    Patchy bilateral lower lobe pulmonary opacities, predominantly involving the right lower lobe.      EXAM:  US GALLBLADDER                            PROCEDURE DATE:  2020          INTERPRETATION:  EXAMINATION: US GALLBLADDER    DATE OF EXAM: 2020 7:42 PM    HISTORY: Fever and leukocytosis, abnormal gallbladder appearance on CT    COMPARISON: CT performed the same day.    FINDINGS:    The gallbladder is distended with a transverse measurement of 4.9 cm. In the fundus, the gallbladder wall thickness is 3 mm. In the body, the wall thickness measures up to 4 mm. Cholelithiasis in the gallbladder neck. Common duct measures 5-6 mm, which is within normal limits. No right upper quadrant ascites seen.      IMPRESSION:    1.  Near hydropic distention of the gallbladder, in the setting of cholelithiasis and mild mural edema, suggestive of acute cholecystitis. Consider hepatobiliary imaging to confirm cystic duct obstruction.      Assessment :   66YO M Phx of MRDD, HTN, resident of [facility]way admitted with fevers n/v likely sec acute cholecystitis.  RUQ sono noted suggestive of acute cholecystitis. Also with possible asp pneumonia. Still febrile.    Plan :   Cont Meropenam  Trend temps and cbc  Fu cultures  Serial abd exams  Surgery following  Asp precautions    D/w Dr Mcelroy      Continue with present regiment.  Appropriate use of antibiotics and adverse effects reviewed.      I have discussed the above plan of care with patient/ family in detail. They expressed understanding of the  treatment plan . Risks, benefits and alternatives discussed in detail. I have asked if they have any questions or concerns and appropriately addressed them to the best of my ability .    > 35 minutes were spent in direct patient care reviewing notes, medications ,labs data/ imaging , discussion with multidisciplinary team.    Thank you for allowing me to participate in care of your patient .    Olivier Daniels MD  Infectious Disease  [phone]

## 2020-07-27 NOTE — CONSULT NOTE ADULT - ASSESSMENT
65M with HTN. 65M with HTN. Cardiac optimized for ena 65M with HTN. Cardiac optimized for cholecystectomy.      Suggest:  1. Continue with abx    2. DVT prohylaxis    3. IVF as necessary    4. Will follow

## 2020-07-27 NOTE — CONSULT NOTE ADULT - SUBJECTIVE AND OBJECTIVE BOX
[facility] [place] Cardiology Consult - Julieta Portillo Alex Vallejo [phone]    CHIEF COMPLAINT: Patient is a 65y old  Male who presents with a chief complaint of fever 101F and low SPO2 of 92% (27 Jul 2020 18:03).  He is non-verbal and history obtained from the chart.  Patient admitted and found with cholecystitis. He has a known history of MRDD, seizure, HTN, and psoriasis.  He has been found with COVID 19 antibodies.  He is negative for COVID 19 at this time.  He has been found by CT and abdominal ultrasound to have an abnormal gallbladder consistent with cholecystitis.  His BNP was also elevated at 1322.      PAST MEDICAL & SURGICAL HISTORY:  Constipation, unspecified constipation type  Blind: right eye  DD (diastrophic dysplasia)  Psoriasis  HTN (hypertension)  Seizure  MR (mental retardation), severe  No significant past surgical history      SOCIAL HISTORY: Alochol: Denied  Smoking: Nonsmoker  Drug Use: Denied  Marital Status:         FAMILY HISTORY: FAMILY HISTORY:  No pertinent family history in first degree relatives      MEDICATIONS  (STANDING):  albuterol/ipratropium for Nebulization 3 milliLiter(s) Nebulizer every 6 hours  aspirin enteric coated 81 milliGRAM(s) Oral daily  clonazePAM  Tablet 0.5 milliGRAM(s) Oral two times a day  dextrose 5% + sodium chloride 0.9%. 1000 milliLiter(s) (80 mL/Hr) IV Continuous <Continuous>  dextrose 5%. 1000 milliLiter(s) (50 mL/Hr) IV Continuous <Continuous>  dextrose 50% Injectable 12.5 Gram(s) IV Push once  dextrose 50% Injectable 25 Gram(s) IV Push once  dextrose 50% Injectable 25 Gram(s) IV Push once  diVALproex  milliGRAM(s) Oral three times a day  doxazosin 1 milliGRAM(s) Oral at bedtime  enoxaparin Injectable 40 milliGRAM(s) SubCutaneous daily  fluvoxaMINE 100 milliGRAM(s) Oral two times a day  levothyroxine 75 MICROGram(s) Oral daily  meropenem  IVPB 1000 milliGRAM(s) IV Intermittent every 8 hours  metoprolol tartrate 25 milliGRAM(s) Oral two times a day  pantoprazole  Injectable 40 milliGRAM(s) IV Push daily  tamsulosin 0.4 milliGRAM(s) Oral at bedtime    MEDICATIONS  (PRN):  acetaminophen   Tablet .. 650 milliGRAM(s) Oral every 4 hours PRN Temp greater or equal to 38C (100.4F), Mild Pain (1 - 3)  dextrose 40% Gel 15 Gram(s) Oral once PRN Blood Glucose LESS THAN 70 milliGRAM(s)/deciliter  glucagon  Injectable 1 milliGRAM(s) IntraMuscular once PRN Glucose LESS THAN 70 milligrams/deciliter      Allergies    Augmentin (Unknown)  aztreonam (Rash)  penicillin (Unknown)  sulfa drugs (Unknown)    Intolerances        REVIEW OF SYSTEMS:  CONSTITUTIONAL: No weakness, fevers or chills  EYES/ENT: No visual changes;  No vertigo or throat pain   NECK: No pain or stiffness  RESPIRATORY: No cough, wheezing, hemoptysis; No shortness of breath  CARDIOVASCULAR: No chest pain or palpitations  GASTROINTESTINAL: No abdominal pain. No nausea, vomiting, or hematemesis; No diarrhea or constipation. No melena or hematochezia.  GENITOURINARY: No dysuria, frequency or hematuria  NEUROLOGICAL: No numbness or weakness  SKIN: No itching or rash  All other review of systems is negative unless indicated above    VITAL SIGNS:   Vital Signs Last 24 Hrs  T(C): 38.9 (27 Jul 2020 16:11), Max: 38.9 (27 Jul 2020 16:11)  T(F): 102 (27 Jul 2020 16:11), Max: 102 (27 Jul 2020 16:11)  HR: 85 (27 Jul 2020 19:20) (77 - 113)  BP: 143/65 (27 Jul 2020 17:37) (122/63 - 153/79)  BP(mean): --  RR: 19 (27 Jul 2020 16:11) (17 - 20)  SpO2: 91% (27 Jul 2020 19:20) (91% - 96%)    I&O's Summary    26 Jul 2020 07:01  -  27 Jul 2020 07:00  --------------------------------------------------------  IN: 160 mL / OUT: 1200 mL / NET: -1040 mL    27 Jul 2020 07:01  -  27 Jul 2020 20:23  --------------------------------------------------------  IN: 966.6 mL / OUT: 600 mL / NET: 366.6 mL        PHYSICAL EXAM:  Constitutional: Awake in NAD  HEENT:  HERBERTH, EOMI  Neck: No JVD  Pulmonary: CTA B/L No R/R/W  Cardiovascular: PMI not palpable non-displaced Regular S1 and S2, no murmurs, rubs, gallops or clicks  Gastrointestinal: Bowel Sounds present, soft, nontender.   Extremities: Trace peripheral edema.   Neuro: No gross focal deficits    LABS: All Labs Reviewed:                        13.1   16.04 )-----------( 156      ( 27 Jul 2020 07:17 )             37.3                         13.0   20.51 )-----------( 174      ( 26 Jul 2020 12:41 )             36.9     27 Jul 2020 07:17    145    |  111    |  12     ----------------------------<  101    3.5     |  28     |  0.85   26 Jul 2020 12:41    137    |  104    |  19     ----------------------------<  116    3.5     |  26     |  1.20     Ca    8.5        27 Jul 2020 07:17  Ca    8.7        26 Jul 2020 12:41  Phos  1.6       27 Jul 2020 07:17    TPro  7.3    /  Alb  2.6    /  TBili  0.5    /  DBili  x      /  AST  34     /  ALT  24     /  AlkPhos  67     27 Jul 2020 07:17  TPro  7.3    /  Alb  2.8    /  TBili  0.6    /  DBili  x      /  AST  29     /  ALT  24     /  AlkPhos  60     26 Jul 2020 12:41    PT/INR - ( 27 Jul 2020 07:18 )   PT: 13.2 sec;   INR: 1.14 ratio         PTT - ( 26 Jul 2020 12:41 )  PTT:30.3 sec  CARDIAC MARKERS ( 26 Jul 2020 12:41 )  <.015 ng/mL / x     / 529 U/L / x     / x          Blood Culture: Organism --  Gram Stain Blood -- Gram Stain --  Specimen Source .Urine Clean Catch (Midstream)  Culture-Blood --    Organism --  Gram Stain Blood -- Gram Stain --  Specimen Source .Blood Blood-Peripheral  Culture-Blood --      07-26 @ 12:41  Pro Bnp 1322        RADIOLOGY/EKG: [facility] [place] Cardiology Consult - Julieta Portillo Alex Vallejo [phone]    CHIEF COMPLAINT: Patient is a 65y old  Male who presents with a chief complaint of fever 101F and low SPO2 of 92% (27 Jul 2020 18:03).  He is non-verbal and history obtained from the chart.  Patient admitted and found with cholecystitis. He has a known history of MRDD, seizure, HTN, and psoriasis.  He has been found with COVID 19 antibodies.  He is negative for COVID 19 at this time.  He has been found by CT and abdominal ultrasound to have an abnormal gallbladder consistent with cholecystitis.  His BNP was also elevated at 1322.      PAST MEDICAL & SURGICAL HISTORY:  Constipation, unspecified constipation type  Blind: right eye  DD (diastrophic dysplasia)  Psoriasis  HTN (hypertension)  Seizure  MR (mental retardation), severe  No significant past surgical history      SOCIAL HISTORY: Alochol: Denied  Smoking: Nonsmoker  Drug Use: Denied  Marital Status:         FAMILY HISTORY: FAMILY HISTORY:  No pertinent family history in first degree relatives      MEDICATIONS  (STANDING):  albuterol/ipratropium for Nebulization 3 milliLiter(s) Nebulizer every 6 hours  aspirin enteric coated 81 milliGRAM(s) Oral daily  clonazePAM  Tablet 0.5 milliGRAM(s) Oral two times a day  dextrose 5% + sodium chloride 0.9%. 1000 milliLiter(s) (80 mL/Hr) IV Continuous <Continuous>  dextrose 5%. 1000 milliLiter(s) (50 mL/Hr) IV Continuous <Continuous>  dextrose 50% Injectable 12.5 Gram(s) IV Push once  dextrose 50% Injectable 25 Gram(s) IV Push once  dextrose 50% Injectable 25 Gram(s) IV Push once  diVALproex  milliGRAM(s) Oral three times a day  doxazosin 1 milliGRAM(s) Oral at bedtime  enoxaparin Injectable 40 milliGRAM(s) SubCutaneous daily  fluvoxaMINE 100 milliGRAM(s) Oral two times a day  levothyroxine 75 MICROGram(s) Oral daily  meropenem  IVPB 1000 milliGRAM(s) IV Intermittent every 8 hours  metoprolol tartrate 25 milliGRAM(s) Oral two times a day  pantoprazole  Injectable 40 milliGRAM(s) IV Push daily  tamsulosin 0.4 milliGRAM(s) Oral at bedtime    MEDICATIONS  (PRN):  acetaminophen   Tablet .. 650 milliGRAM(s) Oral every 4 hours PRN Temp greater or equal to 38C (100.4F), Mild Pain (1 - 3)  dextrose 40% Gel 15 Gram(s) Oral once PRN Blood Glucose LESS THAN 70 milliGRAM(s)/deciliter  glucagon  Injectable 1 milliGRAM(s) IntraMuscular once PRN Glucose LESS THAN 70 milligrams/deciliter      Allergies    Augmentin (Unknown)  aztreonam (Rash)  penicillin (Unknown)  sulfa drugs (Unknown)    Intolerances        REVIEW OF SYSTEMS:  CONSTITUTIONAL: Weakness, Positive fevers   EYES/ENT: No visual changes;  No vertigo or throat pain   NECK: No pain or stiffness  RESPIRATORY: No cough, wheezing, hemoptysis; No shortness of breath, positive hypoxia  CARDIOVASCULAR: No chest pain or palpitations  GASTROINTESTINAL: No abdominal pain. No nausea, vomiting, or hematemesis; No diarrhea or constipation. No melena or hematochezia.  GENITOURINARY: No dysuria, frequency or hematuria  NEUROLOGICAL: No numbness or weakness  SKIN: No itching or rash  All other review of systems is negative unless indicated above    VITAL SIGNS:   Vital Signs Last 24 Hrs  T(C): 38.9 (27 Jul 2020 16:11), Max: 38.9 (27 Jul 2020 16:11)  T(F): 102 (27 Jul 2020 16:11), Max: 102 (27 Jul 2020 16:11)  HR: 85 (27 Jul 2020 19:20) (77 - 113)  BP: 143/65 (27 Jul 2020 17:37) (122/63 - 153/79)  BP(mean): --  RR: 19 (27 Jul 2020 16:11) (17 - 20)  SpO2: 91% (27 Jul 2020 19:20) (91% - 96%)    I&O's Summary    26 Jul 2020 07:01  -  27 Jul 2020 07:00  --------------------------------------------------------  IN: 160 mL / OUT: 1200 mL / NET: -1040 mL    27 Jul 2020 07:01  -  27 Jul 2020 20:23  --------------------------------------------------------  IN: 966.6 mL / OUT: 600 mL / NET: 366.6 mL        PHYSICAL EXAM:  Constitutional: Awake in NAD  HEENT:  HERBERTH, EOMI  Neck: No JVD  Pulmonary: CTA B/L No R/R/W  Cardiovascular: PMI not palpable non-displaced Regular S1 and S2, no murmurs, rubs, gallops or clicks  Gastrointestinal: Bowel Sounds present, soft, nontender.   Extremities: Trace peripheral edema.   Neuro: No gross focal deficits    LABS: All Labs Reviewed:                        13.1   16.04 )-----------( 156      ( 27 Jul 2020 07:17 )             37.3                         13.0   20.51 )-----------( 174      ( 26 Jul 2020 12:41 )             36.9     27 Jul 2020 07:17    145    |  111    |  12     ----------------------------<  101    3.5     |  28     |  0.85   26 Jul 2020 12:41    137    |  104    |  19     ----------------------------<  116    3.5     |  26     |  1.20     Ca    8.5        27 Jul 2020 07:17  Ca    8.7        26 Jul 2020 12:41  Phos  1.6       27 Jul 2020 07:17    TPro  7.3    /  Alb  2.6    /  TBili  0.5    /  DBili  x      /  AST  34     /  ALT  24     /  AlkPhos  67     27 Jul 2020 07:17  TPro  7.3    /  Alb  2.8    /  TBili  0.6    /  DBili  x      /  AST  29     /  ALT  24     /  AlkPhos  60     26 Jul 2020 12:41    PT/INR - ( 27 Jul 2020 07:18 )   PT: 13.2 sec;   INR: 1.14 ratio         PTT - ( 26 Jul 2020 12:41 )  PTT:30.3 sec  CARDIAC MARKERS ( 26 Jul 2020 12:41 )  <.015 ng/mL / x     / 529 U/L / x     / x          Blood Culture: Organism --  Gram Stain Blood -- Gram Stain --  Specimen Source .Urine Clean Catch (Midstream)  Culture-Blood --    Organism --  Gram Stain Blood -- Gram Stain --  Specimen Source .Blood Blood-Peripheral  Culture-Blood --      07-26 @ 12:41  Pro Bnp 1322        RADIOLOGY/EKG:    SR with non-specific ST T changes [facility] Cardiology Consult - Julieta Portillo Alex Vallejo [phone]    CHIEF COMPLAINT: Patient is a 65y old  Male who presents with a chief complaint of fever 101F and low SPO2 of 92% (27 Jul 2020 18:03).  He is non-verbal and history obtained from the chart.  Patient admitted and found with cholecystitis. He has a known history of MRDD, seizure, HTN, and psoriasis.  He has been found with COVID 19 antibodies.  He is negative for COVID 19 at this time.  He has been found by CT and abdominal ultrasound to have an abnormal gallbladder consistent with cholecystitis.  His BNP was also elevated at 1322.      PAST MEDICAL & SURGICAL HISTORY:  Constipation, unspecified constipation type  Blind: right eye  DD (diastrophic dysplasia)  Psoriasis  HTN (hypertension)  Seizure  MR (mental retardation), severe  No significant past surgical history      SOCIAL HISTORY: Alochol: Denied  Smoking: Nonsmoker  Drug Use: Denied  Marital Status:         FAMILY HISTORY: FAMILY HISTORY:  No pertinent family history in first degree relatives      MEDICATIONS  (STANDING):  albuterol/ipratropium for Nebulization 3 milliLiter(s) Nebulizer every 6 hours  aspirin enteric coated 81 milliGRAM(s) Oral daily  clonazePAM  Tablet 0.5 milliGRAM(s) Oral two times a day  dextrose 5% + sodium chloride 0.9%. 1000 milliLiter(s) (80 mL/Hr) IV Continuous <Continuous>  dextrose 5%. 1000 milliLiter(s) (50 mL/Hr) IV Continuous <Continuous>  dextrose 50% Injectable 12.5 Gram(s) IV Push once  dextrose 50% Injectable 25 Gram(s) IV Push once  dextrose 50% Injectable 25 Gram(s) IV Push once  diVALproex  milliGRAM(s) Oral three times a day  doxazosin 1 milliGRAM(s) Oral at bedtime  enoxaparin Injectable 40 milliGRAM(s) SubCutaneous daily  fluvoxaMINE 100 milliGRAM(s) Oral two times a day  levothyroxine 75 MICROGram(s) Oral daily  meropenem  IVPB 1000 milliGRAM(s) IV Intermittent every 8 hours  metoprolol tartrate 25 milliGRAM(s) Oral two times a day  pantoprazole  Injectable 40 milliGRAM(s) IV Push daily  tamsulosin 0.4 milliGRAM(s) Oral at bedtime    MEDICATIONS  (PRN):  acetaminophen   Tablet .. 650 milliGRAM(s) Oral every 4 hours PRN Temp greater or equal to 38C (100.4F), Mild Pain (1 - 3)  dextrose 40% Gel 15 Gram(s) Oral once PRN Blood Glucose LESS THAN 70 milliGRAM(s)/deciliter  glucagon  Injectable 1 milliGRAM(s) IntraMuscular once PRN Glucose LESS THAN 70 milligrams/deciliter      Allergies    Augmentin (Unknown)  aztreonam (Rash)  penicillin (Unknown)  sulfa drugs (Unknown)    Intolerances        REVIEW OF SYSTEMS:  CONSTITUTIONAL: Weakness, Positive fevers   EYES/ENT: No visual changes;  No vertigo or throat pain   NECK: No pain or stiffness  RESPIRATORY: No cough, wheezing, hemoptysis; No shortness of breath, positive hypoxia  CARDIOVASCULAR: No chest pain or palpitations  GASTROINTESTINAL: No abdominal pain. No nausea, vomiting, or hematemesis; No diarrhea or constipation. No melena or hematochezia.  GENITOURINARY: No dysuria, frequency or hematuria  NEUROLOGICAL: No numbness or weakness  SKIN: No itching or rash  All other review of systems is negative unless indicated above    VITAL SIGNS:   Vital Signs Last 24 Hrs  T(C): 38.9 (27 Jul 2020 16:11), Max: 38.9 (27 Jul 2020 16:11)  T(F): 102 (27 Jul 2020 16:11), Max: 102 (27 Jul 2020 16:11)  HR: 85 (27 Jul 2020 19:20) (77 - 113)  BP: 143/65 (27 Jul 2020 17:37) (122/63 - 153/79)  BP(mean): --  RR: 19 (27 Jul 2020 16:11) (17 - 20)  SpO2: 91% (27 Jul 2020 19:20) (91% - 96%)    I&O's Summary    26 Jul 2020 07:01  -  27 Jul 2020 07:00  --------------------------------------------------------  IN: 160 mL / OUT: 1200 mL / NET: -1040 mL    27 Jul 2020 07:01  -  27 Jul 2020 20:23  --------------------------------------------------------  IN: 966.6 mL / OUT: 600 mL / NET: 366.6 mL        PHYSICAL EXAM:  Constitutional: Awake in NAD  HEENT:  HERBERTH, EOMI  Neck: No JVD  Pulmonary: CTA B/L No R/R/W  Cardiovascular: PMI not palpable non-displaced Regular S1 and S2, no murmurs, rubs, gallops or clicks  Gastrointestinal: Bowel Sounds present, soft, nontender.   Extremities: Trace peripheral edema.   Neuro: No gross focal deficits    LABS: All Labs Reviewed:                        13.1   16.04 )-----------( 156      ( 27 Jul 2020 07:17 )             37.3                         13.0   20.51 )-----------( 174      ( 26 Jul 2020 12:41 )             36.9     27 Jul 2020 07:17    145    |  111    |  12     ----------------------------<  101    3.5     |  28     |  0.85   26 Jul 2020 12:41    137    |  104    |  19     ----------------------------<  116    3.5     |  26     |  1.20     Ca    8.5        27 Jul 2020 07:17  Ca    8.7        26 Jul 2020 12:41  Phos  1.6       27 Jul 2020 07:17    TPro  7.3    /  Alb  2.6    /  TBili  0.5    /  DBili  x      /  AST  34     /  ALT  24     /  AlkPhos  67     27 Jul 2020 07:17  TPro  7.3    /  Alb  2.8    /  TBili  0.6    /  DBili  x      /  AST  29     /  ALT  24     /  AlkPhos  60     26 Jul 2020 12:41    PT/INR - ( 27 Jul 2020 07:18 )   PT: 13.2 sec;   INR: 1.14 ratio         PTT - ( 26 Jul 2020 12:41 )  PTT:30.3 sec  CARDIAC MARKERS ( 26 Jul 2020 12:41 )  <.015 ng/mL / x     / 529 U/L / x     / x          Blood Culture: Organism --  Gram Stain Blood -- Gram Stain --  Specimen Source .Urine Clean Catch (Midstream)  Culture-Blood --    Organism --  Gram Stain Blood -- Gram Stain --  Specimen Source .Blood Blood-Peripheral  Culture-Blood --      07-26 @ 12:41  Pro Bnp 1322        RADIOLOGY/EKG:    SR with non-specific ST T changes    Prelim Echo shows normal LVEF with trace MR

## 2020-07-27 NOTE — PROGRESS NOTE ADULT - ASSESSMENT
HPI/PMH.      65 m brought in by caregivers from group home for 100.8 temperature and low spO2 92%. caregivers reports two positive COVID cases at nearby [facility] house. patient was seen at urgent care of Friday for the same symptoms  Ct cap showed basal pneumonia and ac cholecytitis Pt given genta diogenes steinbergo in er and devonte got fc 2l   Pulm consulted 7/26/2020   COVID pcr 7/26/2020 pcr Neg-ric     Past Medical History:  Blind  right eye  Constipation, unspecified constipation type    DD (diastrophic dysplasia)    HTN (hypertension)    MR (mental retardation), severe    Psoriasis    Seizure.    home meds  Flagyl 500 Cipro 500 ursodiol 300 metoprolol tartrate 25 tamsulosin 0.4 doxazosin 2 ZyPREXA 20 mg OLANZapine: Cosentyx:   Depakote: Aspir 81: hydrOXYzine: levothyroxine: clonazePAM:         OXYGENATION      7/27/2020 4l 91%     VITALS/LABS       7/27/2020 99 90 150/70     RELEVANT INFO   W. 7/26-7/27/2020 w 20 - 16   PROCAL.  7/26/2020 pc 1.1  CT 7/26/2020 CT ch Probable ac ena Patchy bl lower lobe pulm opac  US US abd 7/26 near hydropic distentiob gbin setting of cholelithiasis and mild mural edema    MEROPENEM meropenem 1.3 (7/26)   ELEVATED D-dimr poa 7/26/2020 D-dimr 715     V duplx 7/26/2020 v duplx Neg-ric                               BNP 7/26/2020 BNP 1322          SYLVAIN ROSELIA [facility] P  DOA 7/26/2020  ALLERGY Pcn sulfa azactam augmentin      REVIEW OF SYMPTOMS      Able to give ROS  NO     PHYSICAL EXAM    HEENT Unremarkable PERRLA atraumatic   RESP Fair air entry EXP prolonged    Harsh breath sound Resp distres mild   CARDIAC S1 S2 No S3     NO JVD    ABDOMEN SOFT BS PRESENT NOT DISTENDED No hepatosplenomegaly PEDAL EDEMA present No calf tenderness  NO rash   GENERAL Not TOXIC looking      PATIENT DATA/ASSESSMENT/RECOMMENDATIONS       INFECTION  AC CHOLECYSTITIS   ASP PNEUMONIA  7/26/2020 Surgery eval was called   7/26/2020 Doubt incentive spirometry will be done by pt   Pt has ac chol based on us and ct and is on meropenem (7/26)   A/R npo iv fluids surg eval iv ab    PULMONARY CLEARANCE   7/27/2020 Patient cleared for surgery fromn Pulm standpoint In optimal shape     ELEVATED D-dimer   ELEVATED D-dimr poa 7/26/2020 D-dimr 715     V duplx 7/26/2020 v duplx Neg-ric                               BNP 7/26/2020 BNP 1322    A/R Likely false pos+ric  If desaturates will get cta ch     RESP   Monitor po   Target po 90-95%    CHF  BNP 7/26/2020 BNP 1322    Check echo           HEMODYNAMICS   Stable                                NEURO PSYCH   Resume meds       TIME SPENT Over 25 minutes aggregate care time spent on encounter; activities included combinations of  direct pt care, counseling and/or coordinating care reviewing notes, lab data/ imaging, discussion with multidisciplinary team/ pt /family. Risks, benefits, alternatives of planned interventions when applicable were discussed in detail and questions answered as best as possible    AYANNA VELOZ [facility]  DOA 7/26/2020  ALLERGY Pcn sulfa azactam augmentin

## 2020-07-27 NOTE — PROGRESS NOTE ADULT - SUBJECTIVE AND OBJECTIVE BOX
HOD # 1    SUBJECTIVE:  Patient seen and examined at bedside, no acute overnight events noted. Pt nonverbal    Vital Signs Last 24 Hrs  T(C): 37.7 (2020 07:57), Max: 38.2 (2020 20:45)  T(F): 99.9 (2020 07:57), Max: 100.8 (2020 04:55)  HR: 90 (2020 08:10) (77 - 106)  BP: 153/79 (2020 07:57) (115/60 - 153/79)  BP(mean): --  RR: 20 (2020 07:57) (16 - 20)  SpO2: 96% (2020 08:10) (89% - 97%)    PHYSICAL EXAM:  GENERAL: Pt combative against physical examination    I&O's Summary    2020 07:  -  2020 07:00  --------------------------------------------------------  IN: 160 mL / OUT: 1200 mL / NET: -1040 mL    :  -  2020 10:20  --------------------------------------------------------  IN: 240 mL / OUT: 0 mL / NET: 240 mL      I&O's Detail    :  -  2020 07:00  --------------------------------------------------------  IN:    dextrose 5% + sodium chloride 0.9%.: 160 mL  Total IN: 160 mL    OUT:    Voided: 1200 mL  Total OUT: 1200 mL    Total NET: -1040 mL      2020 07:  -  2020 10:20  --------------------------------------------------------  IN:    dextrose 5% + sodium chloride 0.9%.: 240 mL  Total IN: 240 mL    OUT:  Total OUT: 0 mL    Total NET: 240 mL        MEDICATIONS  (STANDING):  albuterol/ipratropium for Nebulization 3 milliLiter(s) Nebulizer every 6 hours  aspirin enteric coated 81 milliGRAM(s) Oral daily  clonazePAM  Tablet 0.5 milliGRAM(s) Oral two times a day  dextrose 5% + sodium chloride 0.9%. 1000 milliLiter(s) (80 mL/Hr) IV Continuous <Continuous>  dextrose 5%. 1000 milliLiter(s) (50 mL/Hr) IV Continuous <Continuous>  dextrose 50% Injectable 12.5 Gram(s) IV Push once  dextrose 50% Injectable 25 Gram(s) IV Push once  dextrose 50% Injectable 25 Gram(s) IV Push once  diVALproex  milliGRAM(s) Oral three times a day  doxazosin 1 milliGRAM(s) Oral at bedtime  enoxaparin Injectable 40 milliGRAM(s) SubCutaneous daily  fluvoxaMINE 100 milliGRAM(s) Oral two times a day  levothyroxine 75 MICROGram(s) Oral daily  meropenem  IVPB 1000 milliGRAM(s) IV Intermittent every 8 hours  metoprolol tartrate 25 milliGRAM(s) Oral two times a day  pantoprazole  Injectable 40 milliGRAM(s) IV Push daily  tamsulosin 0.4 milliGRAM(s) Oral at bedtime    MEDICATIONS  (PRN):  acetaminophen   Tablet .. 650 milliGRAM(s) Oral every 4 hours PRN Temp greater or equal to 38C (100.4F), Mild Pain (1 - 3)  dextrose 40% Gel 15 Gram(s) Oral once PRN Blood Glucose LESS THAN 70 milliGRAM(s)/deciliter  glucagon  Injectable 1 milliGRAM(s) IntraMuscular once PRN Glucose LESS THAN 70 milligrams/deciliter    LABS:                        13.1   16.04 )-----------( 156      ( 2020 07:17 )             37.3     07-27    145  |  111<H>  |  12  ----------------------------<  101<H>  3.5   |  28  |  0.85    Ca    8.5      2020 07:17  Phos  1.6         TPro  7.3  /  Alb  2.6<L>  /  TBili  0.5  /  DBili  x   /  AST  34  /  ALT  24  /  AlkPhos  67      PT/INR - ( 2020 07:18 )   PT: 13.2 sec;   INR: 1.14 ratio         PTT - ( 2020 12:41 )  PTT:30.3 sec  Urinalysis Basic - ( 2020 15:37 )    Color: Yellow / Appearance: Clear / S.010 / pH: x  Gluc: x / Ketone: Negative  / Bili: Negative / Urobili: Negative   Blood: x / Protein: 15 / Nitrite: Negative   Leuk Esterase: Negative / RBC: 3-5 /HPF / WBC 3-5   Sq Epi: x / Non Sq Epi: Few / Bacteria: Few      RADIOLOGY & ADDITIONAL STUDIES:  < from: US Gallbladder (20 @ 19:42) >    EXAM:  US GALLBLADDER                            PROCEDURE DATE:  2020          INTERPRETATION:  EXAMINATION: US GALLBLADDER    DATE OF EXAM: 2020 7:42 PM    HISTORY: Fever and leukocytosis, abnormal gallbladder appearance on CT    COMPARISON: CT performed the same day.    FINDINGS:    The gallbladder is distended with a transverse measurement of 4.9 cm. In the fundus, the gallbladder wall thickness is 3 mm. In the body, the wall thickness measures up to 4 mm. Cholelithiasis in the gallbladder neck. Common duct measures 5-6 mm, which is within normal limits. No right upper quadrant ascites seen.      IMPRESSION:    1.  Near hydropic distention of the gallbladder, in the setting of cholelithiasis and mild mural edema, suggestive of acute cholecystitis. Consider hepatobiliary imaging to confirm cystic duct obstruction.    DANNIELLE DAVIS M.D., ATTENDING RADIOLOGIST  This document has been electronically signed. 2020  2:12AM          < end of copied text >    ASSESSMENT  66 y/o M HOD # 1, admitted for fevers, with US and CT abd/pelv findings concerning for acute cholecystitis,     PLAN  - planning for OR, will reach out to health care proxy for consent   - pain control, supportive care  - NPO, IVF   - serial abdominal exams  - labs in am  - to be discussed with Dr. Fischer    Surgical Team Contact Information  Spectralink: Ext: [phone] or [phone]  Pager: [phone]

## 2020-07-27 NOTE — PROGRESS NOTE ADULT - SUBJECTIVE AND OBJECTIVE BOX
PAST MEDICAL & SURGICAL HISTORY:  Constipation, unspecified constipation type  Blind: right eye  DD (diastrophic dysplasia)  Psoriasis  HTN (hypertension)  Seizure  MR (mental retardation), severe  No significant past surgical history      MEDICATIONS  (STANDING):  albuterol/ipratropium for Nebulization 3 milliLiter(s) Nebulizer every 6 hours  aspirin enteric coated 81 milliGRAM(s) Oral daily  clonazePAM  Tablet 0.5 milliGRAM(s) Oral two times a day  dextrose 5% + sodium chloride 0.9%. 1000 milliLiter(s) (80 mL/Hr) IV Continuous <Continuous>  dextrose 5%. 1000 milliLiter(s) (50 mL/Hr) IV Continuous <Continuous>  dextrose 50% Injectable 12.5 Gram(s) IV Push once  dextrose 50% Injectable 25 Gram(s) IV Push once  dextrose 50% Injectable 25 Gram(s) IV Push once  diVALproex  milliGRAM(s) Oral three times a day  doxazosin 1 milliGRAM(s) Oral at bedtime  enoxaparin Injectable 40 milliGRAM(s) SubCutaneous daily  fluvoxaMINE 100 milliGRAM(s) Oral two times a day  levothyroxine 75 MICROGram(s) Oral daily  meropenem  IVPB 1000 milliGRAM(s) IV Intermittent every 8 hours  metoprolol tartrate 25 milliGRAM(s) Oral two times a day  pantoprazole  Injectable 40 milliGRAM(s) IV Push daily  tamsulosin 0.4 milliGRAM(s) Oral at bedtime    MEDICATIONS  (PRN):  acetaminophen   Tablet .. 650 milliGRAM(s) Oral every 4 hours PRN Temp greater or equal to 38C (100.4F), Mild Pain (1 - 3)  dextrose 40% Gel 15 Gram(s) Oral once PRN Blood Glucose LESS THAN 70 milliGRAM(s)/deciliter  glucagon  Injectable 1 milliGRAM(s) IntraMuscular once PRN Glucose LESS THAN 70 milligrams/deciliter      Patient is a 65y old  Male who presents with a chief complaint of fever 101 (2020 14:47)      Vital Signs Last 24 Hrs  T(C): 38.9 (2020 16:11), Max: 38.9 (2020 16:11)  T(F): 102 (2020 16:11), Max: 102 (2020 16:11)  HR: 113 (2020 17:37) (77 - 113)  BP: 143/65 (2020 17:37) (122/63 - 153/79)  BP(mean): --  RR: 19 (2020 16:11) (17 - 20)  SpO2: 91% (2020 16:11) (91% - 96%)           @ 07: @ 07:00  --------------------------------------------------------  IN: 160 mL / OUT: 1200 mL / NET: -1040 mL     @ : @ 18:03  --------------------------------------------------------  IN: 966.6 mL / OUT: 600 mL / NET: 366.6 mL        REVIEW OF SYSTEMS:    Constitutional: No fever, weight loss or fatigue  Eyes: No eye pain, visual disturbances, or discharge  ENT:  No difficulty hearing, tinnitus, vertigo; No sinus or throat pain  Neck: No pain or stiffness  Breasts: No pain, masses or nipple discharge  Respiratory: No cough, wheezing, chills or hemoptysis  Cardiovascular: No chest pain, palpitations, shortness of breath, dizziness or leg swelling  Gastrointestinal: No abdominal or epigastric pain. No nausea, vomiting or hematemesis; No diarrhea or constipation. No melena or hematochezia.  Genitourinary: No dysuria, frequency, hematuria or incontinence  Rectal: No pain, hemorrhoids or incontinence  Neurological: No headaches, memory loss, loss of strength, numbness or tremors  Skin: No itching, burning, rashes or lesions   Lymph Nodes: No enlarged glands  Endocrine: No heat or cold intolerance; No hair loss  Musculoskeletal: No joint pain or swelling; No muscle, back or extremity pain  Psychiatric: No depression, anxiety, mood swings or difficulty sleeping  Heme/Lymph: No easy bruising or bleeding gums  Allergy and Immunologic: No hives or eczema    PHYSICAL EXAM:house aid at bedside  temp 102  alert  Constitutional: NAD,  Respiratory: dec bs   Cardiovascular: S1 and S2, RRR, no M/G/R  Gastrointestinal: distended  refusing to allow for palpation  Extremities: No peripheral edema  Neurological: A no focal deficits  moving all ext  Skin: No rashes      decubiti: none                          13.1   16.04 )-----------( 156      ( 2020 07:17 )             37.3         145  |  111<H>  |  12  ----------------------------<  101<H>  3.5   |  28  |  0.85    Ca    8.5      2020 07:17  Phos  1.6         TPro  7.3  /  Alb  2.6<L>  /  TBili  0.5  /  DBili  x   /  AST  34  /  ALT  24  /  AlkPhos  67  27    PT/INR - ( 2020 07:18 )   PT: 13.2 sec;   INR: 1.14 ratio         PTT - ( 2020 12:41 )  PTT:30.3 sec  Urinalysis Basic - ( 2020 15:37 )    Color: Yellow / Appearance: Clear / S.010 / pH: x  Gluc: x / Ketone: Negative  / Bili: Negative / Urobili: Negative   Blood: x / Protein: 15 / Nitrite: Negative   Leuk Esterase: Negative / RBC: 3-5 /HPF / WBC 3-5   Sq Epi: x / Non Sq Epi: Few / Bacteria: Few      CARDIAC MARKERS ( 2020 12:41 )  <.015 ng/mL / x     / 529 U/L / x     / x            .Blood Blood-Peripheral   @ 17:55   No growth to date.  --  --        Urine Culture:   @ 17:55    --        No growth to date.        Radiology:  < from: US Gallbladder (20 @ 19:42) >    EXAM:  US GALLBLADDER                            PROCEDURE DATE:  2020          INTERPRETATION:  EXAMINATION: US GALLBLADDER    DATE OF EXAM: 2020 7:42 PM    HISTORY: Fever and leukocytosis, abnormal gallbladder appearance on CT    COMPARISON: CT performed the same day.    FINDINGS:    The gallbladder is distended with a transverse measurement of 4.9 cm. In the fundus, the gallbladder wall thickness is 3 mm. In the body, the wall thickness measures up to 4 mm. Cholelithiasis in the gallbladder neck. Common duct measures 5-6 mm, which is within normal limits. No right upper quadrant ascites seen.      IMPRESSION:    1.  Near hydropic distention of the gallbladder, in the setting of cholelithiasis and mild mural edema, suggestive of acute cholecystitis. Consider hepatobiliary imaging to confirm cystic duct obstruction.          < end of copied text >  < from: CT Abdomen and Pelvis No Cont (20 @ 14:44) >    EXAM:  CT ABDOMEN AND PELVIS                          EXAM:  CT CHEST                            PROCEDURE DATE:  2020          INTERPRETATION:  CLINICAL INFORMATION: Fever, leukocytosis    COMPARISON: CT abdomen and pelvis dated 2019    PROCEDURE:  CT of the Chest, Abdomen and Pelvis was performed without intravenous contrast.  Intravenous contrast: None.  Oral contrast: None.  Sagittal and coronal reformats were performed.    FINDINGS:  CHEST:  LUNGS AND LARGE AIRWAYS: Suspect bronchial plugging in the lower lobes. Bilateral lower lobe, predominantly right-sided patchy opacities that could be related to atelectasis or possibly infiltrates.  PLEURA: No pleural effusion.  VESSELS: Coronary artery calcification  HEART: Heart sizeis normal. No pericardial effusion.  MEDIASTINUM AND GREYSON: No lymphadenopathy.  CHEST WALL AND LOWER NECK: Within normal limits.    ABDOMEN AND PELVIS:  LIVER: Within normal limits.  BILE DUCTS: Normal caliber.  GALLBLADDER: Evidence of cholelithiasis as well as pericholecystic haziness and stranding consistent with acute cholecystitis.  SPLEEN: Within normal limits.  PANCREAS: Within normal limits.  ADRENALS: Within normal limits.  KIDNEYS/URETERS: Small hyperdensities involving the lower pole cortex of both kidneys that could be related to nephrocalcinosis or hyperdense cysts.    BLADDER: Mild mural thickening concerning for chronic outlet obstruction.  REPRODUCTIVE ORGANS: Prostate is enlarged.    BOWEL: No bowel obstruction. Appendix is normal.  PERITONEUM: No ascites.  VESSELS: Within normal limits.  RETROPERITONEUM/LYMPH NODES: No lymphadenopathy.  ABDOMINAL WALL: Within normal limits.  BONES: Degenerative disc disease and spondylosis lower lumbar spine.    IMPRESSION:    < end of copied text >  < from: US Duplex Venous Lower Ext Complete, Bilateral (20 @ 15:01) >    EXAM:  US DPLX LWR EXT VEINS COMPL BI                            PROCEDURE DATE:  2020          INTERPRETATION:  CLINICAL INFORMATION: Fever and leukocytosis, evaluate for DVT.    COMPARISON: None available.    TECHNIQUE: Duplex sonography ofthe BILATERAL LOWER extremity veins with color and spectral Doppler, with and without compression.    FINDINGS:    There is normal compressibility of the bilateral common femoral, femoral and popliteal veins.  Doppler examination shows normal spontaneous and phasic flow.    No calf vein thrombosis is detected.    IMPRESSION:  No evidence of deep venous thrombosis in either lower extremity.    < end of copied text >

## 2020-07-27 NOTE — PROGRESS NOTE ADULT - PROBLEM SELECTOR PLAN 1
scheduled for the OR tomorrow  at this point its an emegency   otherwise risk peritonitis/death  I spoke to  Shar Doyle [phone]  states they understand clearly  there is no family for consent  continue iv fluids  continue iv antibiotics  I have spoken to dr braeden huston  cardiac clearance scheduled for the OR tomorrow  at this point its an emegency   otherwise risk peritonitis/death  I spoke to  Shar Doyle [phone]  states they understand clearly  there is no family for consent  continue iv fluids  continue iv antibiotics  I have spoken to dr braeden huston  cardiac clearance  I spoke to Rita from social work  to please look into the matter,tomorrow

## 2020-07-28 ENCOUNTER — RESULT REVIEW (OUTPATIENT)
Age: 65
End: 2020-07-28

## 2020-07-28 LAB
ALBUMIN SERPL ELPH-MCNC: 2.3 G/DL — LOW (ref 3.3–5)
ALP SERPL-CCNC: 71 U/L — SIGNIFICANT CHANGE UP (ref 40–120)
ALT FLD-CCNC: 33 U/L — SIGNIFICANT CHANGE UP (ref 12–78)
ANION GAP SERPL CALC-SCNC: 4 MMOL/L — LOW (ref 5–17)
AST SERPL-CCNC: 45 U/L — HIGH (ref 15–37)
BILIRUB SERPL-MCNC: 0.6 MG/DL — SIGNIFICANT CHANGE UP (ref 0.2–1.2)
BUN SERPL-MCNC: 13 MG/DL — SIGNIFICANT CHANGE UP (ref 7–23)
CALCIUM SERPL-MCNC: 8.5 MG/DL — SIGNIFICANT CHANGE UP (ref 8.5–10.1)
CHLORIDE SERPL-SCNC: 112 MMOL/L — HIGH (ref 96–108)
CO2 SERPL-SCNC: 30 MMOL/L — SIGNIFICANT CHANGE UP (ref 22–31)
CREAT SERPL-MCNC: 0.78 MG/DL — SIGNIFICANT CHANGE UP (ref 0.5–1.3)
GLUCOSE SERPL-MCNC: 91 MG/DL — SIGNIFICANT CHANGE UP (ref 70–99)
HCT VFR BLD CALC: 35.1 % — LOW (ref 39–50)
HGB BLD-MCNC: 12.2 G/DL — LOW (ref 13–17)
MCHC RBC-ENTMCNC: 31.8 PG — SIGNIFICANT CHANGE UP (ref 27–34)
MCHC RBC-ENTMCNC: 34.8 GM/DL — SIGNIFICANT CHANGE UP (ref 32–36)
MCV RBC AUTO: 91.4 FL — SIGNIFICANT CHANGE UP (ref 80–100)
NRBC # BLD: 0 /100 WBCS — SIGNIFICANT CHANGE UP (ref 0–0)
PLATELET # BLD AUTO: 188 K/UL — SIGNIFICANT CHANGE UP (ref 150–400)
POTASSIUM SERPL-MCNC: 3.6 MMOL/L — SIGNIFICANT CHANGE UP (ref 3.5–5.3)
POTASSIUM SERPL-SCNC: 3.6 MMOL/L — SIGNIFICANT CHANGE UP (ref 3.5–5.3)
PROT SERPL-MCNC: 6.8 G/DL — SIGNIFICANT CHANGE UP (ref 6–8.3)
RBC # BLD: 3.84 M/UL — LOW (ref 4.2–5.8)
RBC # FLD: 13 % — SIGNIFICANT CHANGE UP (ref 10.3–14.5)
SARS-COV-2 RNA SPEC QL NAA+PROBE: SIGNIFICANT CHANGE UP
SODIUM SERPL-SCNC: 146 MMOL/L — HIGH (ref 135–145)
WBC # BLD: 11.95 K/UL — HIGH (ref 3.8–10.5)
WBC # FLD AUTO: 11.95 K/UL — HIGH (ref 3.8–10.5)

## 2020-07-28 PROCEDURE — 47562 LAPAROSCOPIC CHOLECYSTECTOMY: CPT

## 2020-07-28 PROCEDURE — 99231 SBSQ HOSP IP/OBS SF/LOW 25: CPT | Mod: 57

## 2020-07-28 PROCEDURE — 47562 LAPAROSCOPIC CHOLECYSTECTOMY: CPT | Mod: 80

## 2020-07-28 PROCEDURE — 88304 TISSUE EXAM BY PATHOLOGIST: CPT | Mod: 26

## 2020-07-28 RX ORDER — HYDROMORPHONE HYDROCHLORIDE 2 MG/ML
0.5 INJECTION INTRAMUSCULAR; INTRAVENOUS; SUBCUTANEOUS
Refills: 0 | Status: DISCONTINUED | OUTPATIENT
Start: 2020-07-28 | End: 2020-07-28

## 2020-07-28 RX ORDER — ASPIRIN/CALCIUM CARB/MAGNESIUM 324 MG
81 TABLET ORAL DAILY
Refills: 0 | Status: DISCONTINUED | OUTPATIENT
Start: 2020-07-29 | End: 2020-08-04

## 2020-07-28 RX ORDER — VALPROIC ACID (AS SODIUM SALT) 250 MG/5ML
250 SOLUTION, ORAL ORAL THREE TIMES A DAY
Refills: 0 | Status: DISCONTINUED | OUTPATIENT
Start: 2020-07-28 | End: 2020-08-03

## 2020-07-28 RX ORDER — ACETAMINOPHEN 500 MG
650 TABLET ORAL EVERY 4 HOURS
Refills: 0 | Status: DISCONTINUED | OUTPATIENT
Start: 2020-07-28 | End: 2020-08-04

## 2020-07-28 RX ORDER — CLONAZEPAM 1 MG
0.5 TABLET ORAL
Refills: 0 | Status: COMPLETED | OUTPATIENT
Start: 2020-07-28 | End: 2020-08-04

## 2020-07-28 RX ORDER — GLUCAGON INJECTION, SOLUTION 0.5 MG/.1ML
1 INJECTION, SOLUTION SUBCUTANEOUS ONCE
Refills: 0 | Status: DISCONTINUED | OUTPATIENT
Start: 2020-07-28 | End: 2020-08-04

## 2020-07-28 RX ORDER — VALPROIC ACID (AS SODIUM SALT) 250 MG/5ML
250 SOLUTION, ORAL ORAL ONCE
Refills: 0 | Status: COMPLETED | OUTPATIENT
Start: 2020-07-28 | End: 2020-07-28

## 2020-07-28 RX ORDER — ENOXAPARIN SODIUM 100 MG/ML
40 INJECTION SUBCUTANEOUS DAILY
Refills: 0 | Status: DISCONTINUED | OUTPATIENT
Start: 2020-07-29 | End: 2020-08-04

## 2020-07-28 RX ORDER — FLUVOXAMINE MALEATE 25 MG/1
100 TABLET ORAL
Refills: 0 | Status: DISCONTINUED | OUTPATIENT
Start: 2020-07-28 | End: 2020-08-04

## 2020-07-28 RX ORDER — LEVOTHYROXINE SODIUM 125 MCG
37.5 TABLET ORAL AT BEDTIME
Refills: 0 | Status: DISCONTINUED | OUTPATIENT
Start: 2020-07-28 | End: 2020-08-03

## 2020-07-28 RX ORDER — VALPROIC ACID (AS SODIUM SALT) 250 MG/5ML
250 SOLUTION, ORAL ORAL THREE TIMES A DAY
Refills: 0 | Status: DISCONTINUED | OUTPATIENT
Start: 2020-07-28 | End: 2020-07-28

## 2020-07-28 RX ORDER — ONDANSETRON 8 MG/1
4 TABLET, FILM COATED ORAL ONCE
Refills: 0 | Status: DISCONTINUED | OUTPATIENT
Start: 2020-07-28 | End: 2020-07-28

## 2020-07-28 RX ORDER — LEVOTHYROXINE SODIUM 125 MCG
37.5 TABLET ORAL AT BEDTIME
Refills: 0 | Status: DISCONTINUED | OUTPATIENT
Start: 2020-07-28 | End: 2020-07-28

## 2020-07-28 RX ORDER — METOPROLOL TARTRATE 50 MG
25 TABLET ORAL
Refills: 0 | Status: DISCONTINUED | OUTPATIENT
Start: 2020-07-28 | End: 2020-07-31

## 2020-07-28 RX ORDER — DOXAZOSIN MESYLATE 4 MG
1 TABLET ORAL AT BEDTIME
Refills: 0 | Status: DISCONTINUED | OUTPATIENT
Start: 2020-07-28 | End: 2020-08-04

## 2020-07-28 RX ORDER — SODIUM CHLORIDE 9 MG/ML
1000 INJECTION, SOLUTION INTRAVENOUS
Refills: 0 | Status: DISCONTINUED | OUTPATIENT
Start: 2020-07-28 | End: 2020-07-28

## 2020-07-28 RX ORDER — PANTOPRAZOLE SODIUM 20 MG/1
40 TABLET, DELAYED RELEASE ORAL DAILY
Refills: 0 | Status: DISCONTINUED | OUTPATIENT
Start: 2020-07-28 | End: 2020-08-04

## 2020-07-28 RX ORDER — MEROPENEM 1 G/30ML
1000 INJECTION INTRAVENOUS EVERY 8 HOURS
Refills: 0 | Status: DISCONTINUED | OUTPATIENT
Start: 2020-07-28 | End: 2020-08-03

## 2020-07-28 RX ORDER — TAMSULOSIN HYDROCHLORIDE 0.4 MG/1
0.4 CAPSULE ORAL AT BEDTIME
Refills: 0 | Status: DISCONTINUED | OUTPATIENT
Start: 2020-07-28 | End: 2020-08-04

## 2020-07-28 RX ADMIN — Medication 0.5 MILLIGRAM(S): at 06:47

## 2020-07-28 RX ADMIN — Medication 3 MILLILITER(S): at 14:19

## 2020-07-28 RX ADMIN — MEROPENEM 100 MILLIGRAM(S): 1 INJECTION INTRAVENOUS at 23:31

## 2020-07-28 RX ADMIN — Medication 26.25 MILLIGRAM(S): at 23:32

## 2020-07-28 RX ADMIN — PANTOPRAZOLE SODIUM 40 MILLIGRAM(S): 20 TABLET, DELAYED RELEASE ORAL at 12:43

## 2020-07-28 RX ADMIN — Medication 25 MILLIGRAM(S): at 06:42

## 2020-07-28 RX ADMIN — MEROPENEM 100 MILLIGRAM(S): 1 INJECTION INTRAVENOUS at 06:41

## 2020-07-28 RX ADMIN — SODIUM CHLORIDE 75 MILLILITER(S): 9 INJECTION, SOLUTION INTRAVENOUS at 22:12

## 2020-07-28 RX ADMIN — Medication 3 MILLILITER(S): at 08:06

## 2020-07-28 RX ADMIN — MEROPENEM 100 MILLIGRAM(S): 1 INJECTION INTRAVENOUS at 14:15

## 2020-07-28 RX ADMIN — Medication 37.5 MICROGRAM(S): at 23:32

## 2020-07-28 RX ADMIN — SODIUM CHLORIDE 80 MILLILITER(S): 9 INJECTION, SOLUTION INTRAVENOUS at 09:58

## 2020-07-28 RX ADMIN — Medication 26.25 MILLIGRAM(S): at 16:33

## 2020-07-28 RX ADMIN — FLUVOXAMINE MALEATE 100 MILLIGRAM(S): 25 TABLET ORAL at 06:43

## 2020-07-28 RX ADMIN — DIVALPROEX SODIUM 250 MILLIGRAM(S): 500 TABLET, DELAYED RELEASE ORAL at 06:45

## 2020-07-28 RX ADMIN — Medication 75 MICROGRAM(S): at 06:47

## 2020-07-28 NOTE — PROGRESS NOTE ADULT - SUBJECTIVE AND OBJECTIVE BOX
SYLVAIN ROSELIA is a 65yMale , patient examined and chart reviewed.     INTERVAL HPI/ OVERNIGHT EVENTS:   Afebrile. No events.    PAST MEDICAL & SURGICAL HISTORY:  Constipation, unspecified constipation type  Blind: right eye  DD (diastrophic dysplasia)  Psoriasis  HTN (hypertension)  Seizure  MR (mental retardation), severe  No significant past surgical history      For details regarding the patient's social history, family history, and other miscellaneous elements, please refer the initial infectious diseases consultation and/or the admitting history and physical examination for this admission.    ROS:  Unable to obtain due to : MRDD nonverbal    ALLERGIES:  Augmentin (Unknown)  aztreonam (Rash)  penicillin (Unknown)  sulfa drugs (Unknown)      Current inpatient medications :    ANTIBIOTICS/RELEVANT:  clindamycin IVPB 900 milliGRAM(s) IV Intermittent once  meropenem  IVPB 1000 milliGRAM(s) IV Intermittent every 8 hours      acetaminophen   Tablet .. 650 milliGRAM(s) Oral every 4 hours PRN  albuterol/ipratropium for Nebulization 3 milliLiter(s) Nebulizer every 6 hours  aspirin enteric coated 81 milliGRAM(s) Oral daily  clonazePAM  Tablet 0.5 milliGRAM(s) Oral two times a day  dextrose 40% Gel 15 Gram(s) Oral once PRN  dextrose 5% + sodium chloride 0.9%. 1000 milliLiter(s) IV Continuous <Continuous>  dextrose 5%. 1000 milliLiter(s) IV Continuous <Continuous>  dextrose 50% Injectable 12.5 Gram(s) IV Push once  dextrose 50% Injectable 25 Gram(s) IV Push once  dextrose 50% Injectable 25 Gram(s) IV Push once  doxazosin 1 milliGRAM(s) Oral at bedtime  enoxaparin Injectable 40 milliGRAM(s) SubCutaneous daily  fluvoxaMINE 100 milliGRAM(s) Oral two times a day  glucagon  Injectable 1 milliGRAM(s) IntraMuscular once PRN  levothyroxine 75 MICROGram(s) Oral daily  metoprolol tartrate 25 milliGRAM(s) Oral two times a day  pantoprazole  Injectable 40 milliGRAM(s) IV Push daily  tamsulosin 0.4 milliGRAM(s) Oral at bedtime      Objective:    07-27 @ 07:01  -  07-28 @ 07:00  --------------------------------------------------------  IN: 1046.6 mL / OUT: 1500 mL / NET: -453.4 mL    07-28 @ 07:01  -  07-28 @ 17:14  --------------------------------------------------------  IN: 530 mL / OUT: 150 mL / NET: 380 mL      T(C): 36.9 (07-28-20 @ 15:44), Max: 37.1 (07-28-20 @ 05:05)  HR: 87 (07-28-20 @ 15:44) (71 - 113)  BP: 135/71 (07-28-20 @ 15:44) (121/69 - 148/71)  RR: 18 (07-28-20 @ 15:44) (18 - 19)  SpO2: 92% (07-28-20 @ 15:44) (91% - 95%)      Physical Exam:  General:  no acute distress  Eyes: sclera anicteric, pupils equal and reactive to light  ENMT: buccal mucosa moist, pharynx not injected  Neck: supple, trachea midline  Lungs: clear, no wheeze/rhonchi  Cardiovascular: regular rate and rhythm, S1 S2  Abdomen: soft, tender, distended, bowel sounds normal  Neurological: awake MRDD nonverbal  Skin: no increased ecchymosis/petechiae/purpura  Lymph Nodes: no palpable cervical/supraclavicular lymph nodes enlargements  Extremities: no cyanosis/clubbing/edema      LABS:                        12.2   11.95 )-----------( 188      ( 28 Jul 2020 07:06 )             35.1       07-28    146<H>  |  112<H>  |  13  ----------------------------<  91  3.6   |  30  |  0.78    Ca    8.5      28 Jul 2020 07:06  Phos  1.6     07-27    TPro  6.8  /  Alb  2.3<L>  /  TBili  0.6  /  DBili  x   /  AST  45<H>  /  ALT  33  /  AlkPhos  71  07-28      PT/INR - ( 27 Jul 2020 07:18 )   PT: 13.2 sec;   INR: 1.14 ratio        MICROBIOLOGY:    Culture - Urine (collected 26 Jul 2020 21:04)  Source: .Urine Clean Catch (Midstream)  Final Report (27 Jul 2020 18:17):    No growth    Culture - Blood (collected 26 Jul 2020 17:55)  Source: .Blood Blood-Peripheral  Preliminary Report (27 Jul 2020 18:01):    No growth to date.    Culture - Blood (collected 26 Jul 2020 17:55)  Source: .Blood Blood-Peripheral  Preliminary Report (27 Jul 2020 18:01):    No growth to date.    RADIOLOGY & ADDITIONAL STUDIES:    EXAM:  CT ABDOMEN AND PELVIS                          EXAM:  CT CHEST                            PROCEDURE DATE:  07/26/2020          INTERPRETATION:  CLINICAL INFORMATION: Fever, leukocytosis    COMPARISON: CT abdomen and pelvis dated 08/23/2019    PROCEDURE:  CT of the Chest, Abdomen and Pelvis was performed without intravenous contrast.  Intravenous contrast: None.  Oral contrast: None.  Sagittal and coronal reformats were performed.    FINDINGS:  CHEST:  LUNGS AND LARGE AIRWAYS: Suspect bronchial plugging in the lower lobes. Bilateral lower lobe, predominantly right-sided patchy opacities that could be related to atelectasis or possibly infiltrates.  PLEURA: No pleural effusion.  VESSELS: Coronary artery calcification  HEART: Heart sizeis normal. No pericardial effusion.  MEDIASTINUM AND GREYSON: No lymphadenopathy.  CHEST WALL AND LOWER NECK: Within normal limits.    ABDOMEN AND PELVIS:  LIVER: Within normal limits.  BILE DUCTS: Normal caliber.  GALLBLADDER: Evidence of cholelithiasis as well as pericholecystic haziness and stranding consistent with acute cholecystitis.  SPLEEN: Within normal limits.  PANCREAS: Within normal limits.  ADRENALS: Within normal limits.  KIDNEYS/URETERS: Small hyperdensities involving the lower pole cortex of both kidneys that could be related to nephrocalcinosis or hyperdense cysts.    BLADDER: Mild mural thickening concerning for chronic outlet obstruction.  REPRODUCTIVE ORGANS: Prostate is enlarged.    BOWEL: No bowel obstruction. Appendix is normal.  PERITONEUM: No ascites.  VESSELS: Within normal limits.  RETROPERITONEUM/LYMPH NODES: No lymphadenopathy.  ABDOMINAL WALL: Within normal limits.  BONES: Degenerative disc disease and spondylosis lower lumbar spine.    IMPRESSION:  Cholelithiasis and probable acute cholecystitis.    Patchy bilateral lower lobe pulmonary opacities, predominantly involving the right lower lobe.      EXAM:  US GALLBLADDER                            PROCEDURE DATE:  07/26/2020          INTERPRETATION:  EXAMINATION: US GALLBLADDER    DATE OF EXAM: 7/26/2020 7:42 PM    HISTORY: Fever and leukocytosis, abnormal gallbladder appearance on CT    COMPARISON: CT performed the same day.    FINDINGS:    The gallbladder is distended with a transverse measurement of 4.9 cm. In the fundus, the gallbladder wall thickness is 3 mm. In the body, the wall thickness measures up to 4 mm. Cholelithiasis in the gallbladder neck. Common duct measures 5-6 mm, which is within normal limits. No right upper quadrant ascites seen.      IMPRESSION:    1.  Near hydropic distention of the gallbladder, in the setting of cholelithiasis and mild mural edema, suggestive of acute cholecystitis. Consider hepatobiliary imaging to confirm cystic duct obstruction.      Assessment :   66YO M Phx of MRDD, HTN, [facility] admitted with fevers n/v likely sec acute cholecystitis.  RUQ sono noted suggestive of acute cholecystitis. Also with possible asp pneumonia. Sp fever. For lap ena today.    Plan :   Cont Meropenam  Trend temps and cbc  Fu cultures  Serial abd exams  Surgery following- for lap ena  Asp precautions    D/w Dr Mcelroy    Continue with present regiment.  Appropriate use of antibiotics and adverse effects reviewed.      I have discussed the above plan of care with patient/ family in detail. They expressed understanding of the  treatment plan . Risks, benefits and alternatives discussed in detail. I have asked if they have any questions or concerns and appropriately addressed them to the best of my ability .    > 35 minutes were spent in direct patient care reviewing notes, medications ,labs data/ imaging , discussion with multidisciplinary team.    Thank you for allowing me to participate in care of your patient .    Olivier Daniels MD  Infectious Disease  [phone]

## 2020-07-28 NOTE — CHART NOTE - NSCHARTNOTEFT_GEN_A_CORE
II have reviewed this record.  I concur with the determination that this is an emergent procedure and that a delay would potentially negatively affect this patients health.  I concur with proceeding with a cholecystectomy  Roddy Martel MD, Medical Director

## 2020-07-28 NOTE — PROGRESS NOTE ADULT - SUBJECTIVE AND OBJECTIVE BOX
Admitted with fever, RUQ pain, found to have Distended with stones consistent with acute cholecystitis.    Vital Signs Last 24 Hrs  T(C): 36.7 (28 Jul 2020 12:35), Max: 38.9 (27 Jul 2020 16:11)  T(F): 98.1 (28 Jul 2020 12:35), Max: 102 (27 Jul 2020 16:11)  HR: 71 (28 Jul 2020 12:35) (71 - 113)  BP: 121/69 (28 Jul 2020 12:35) (121/69 - 149/82)  BP(mean): --  RR: 18 (28 Jul 2020 12:35) (18 - 19)  SpO2: 92% (28 Jul 2020 12:35) (91% - 95%)    07-27 @ 07:01  -  07-28 @ 07:00  --------------------------------------------------------  IN: 966.6 mL / OUT: 1500 mL / NET: -533.4 mL    07-28 @ 07:01  -  07-28 @ 14:08  --------------------------------------------------------  IN: 0 mL / OUT: 150 mL / NET: -150 mL                              12.2   11.95 )-----------( 188      ( 28 Jul 2020 07:06 )             35.1                         13.1   16.04 )-----------( 156      ( 27 Jul 2020 07:17 )             37.3                         13.0   20.51 )-----------( 174      ( 26 Jul 2020 12:41 )             36.9       07-28    146<H>  |  112<H>  |  13  ----------------------------<  91  3.6   |  30  |  0.78    Ca    8.5      28 Jul 2020 07:06  Phos  1.6     07-27    TPro  6.8  /  Alb  2.3<L>  /  TBili  0.6  /  DBili  x   /  AST  45<H>  /  ALT  33  /  AlkPhos  71  07-28      Physical Exam:  Baseline MR.  Alert.  Blind in one eye.  Lungs clear bilaterally without wheezes rhonchi or rales.  Regular rate and rhythm  Abdomen soft, RUQ tender, Rebound and guarding.  Extremities without edema or tenderness.

## 2020-07-28 NOTE — PROGRESS NOTE ADULT - ASSESSMENT
cont rx cont rx      HPI/PMH.      65 m brought in by caregivers from group home for 100.8 temperature and low spO2 92%. caregivers reports two positive COVID cases at nearby [facility] house. patient was seen at urgent care of Friday for the same symptoms  Ct cap showed basal pneumonia and ac cholecytitis Pt given genta diogenes steinbergo in er and alredy got fc 2l   Pulm consulted 7/26/2020   COVID pcr 7/26/2020 pcr Neg-ric     Past Medical History:  Blind  right eye  Constipation, unspecified constipation type    DD (diastrophic dysplasia)    HTN (hypertension)    MR (mental retardation), severe    Psoriasis    Seizure.    home meds  Flagyl 500 Cipro 500 ursodiol 300 metoprolol tartrate 25 tamsulosin 0.4 doxazosin 2 ZyPREXA 20 mg OLANZapine: Cosentyx:   Depakote: Aspir 81: hydrOXYzine: levothyroxine: clonazePAM:         OXYGENATION      7/27/2020 4l 91%     VITALS/LABS       7/27/2020 99 90 150/70     RELEVANT INFO   W. 7/26-7/27-7/28/2020 w 20 - 16 - 11   PROCAL.  7/26/2020 pc 1.1  CXR 7/72 basal oapc   CT 7/26/2020 CT ch Probable ac ena Patchy bl lower lobe pulm opac  US US abd 7/26 near hydropic distentiob gbin setting of cholelithiasis and mild mural edema    MEROPENEM meropenem 1.3 (7/26)   ECHO 7/27 ef 60% dd   ELEVATED D-dimr poa 7/26/2020 D-dimr 715     V duplx 7/26/2020 v duplx Neg-ric                               BNP 7/26/2020 BNP 1322            SYLVAIN VELOZ [facility]  DOA 7/26/2020  ALLERGY Pcn sulfa azactam augmentin      REVIEW OF SYMPTOMS      Able to give ROS  NO     PHYSICAL EXAM    HEENT Unremarkable PERRLA atraumatic   RESP Fair air entry EXP prolonged    Harsh breath sound Resp distres mild   CARDIAC S1 S2 No S3     NO JVD    ABDOMEN SOFT BS PRESENT NOT DISTENDED No hepatosplenomegaly PEDAL EDEMA present No calf tenderness  NO rash   GENERAL Not TOXIC looking    PT DATA/BEST PRACTICE  ALLERGY  Pcn sulfa azactam augmentin                   WT  7/27/2020 70                                    BMI    7/27/2020 31                         CrCl                                  ADVANCED DIRECTIVE     LINES TUBES POA.               HEAD OF BED ELEVATION Yes      DVT PROPHYLAXIS           SAUER PROPHYLAXIS          DYSPHAGIA EVAL     DIET.     npo (7/27/2020)                                                                       IV F          d5 ns 80 (7/26)     PATIENT DATA/ASSESSMENT/RECOMMENDATIONS       INFECTION  AC CHOLECYSTITIS   ASP PNEUMONIA  7/26/2020 Surgery eval was called   7/26/2020 Doubt incentive spirometry will be done by pt   Pt has ac chol based on us and ct and is on meropenem (7/26)   A/R npo iv fluids surg eval iv ab    PULMONARY CLEARANCE   7/27/2020 Patient cleared for surgery fromn Pulm standpoint In optimal shape     ELEVATED D-dimer   ELEVATED D-dimr poa 7/26/2020 D-dimr 715     V duplx 7/26/2020 v duplx Neg-ric                               BNP 7/26/2020 BNP 1322    A/R Likely false pos+ric  If desaturates will get cta ch     RESP   Monitor po   Target po 90-95%    CHF  BNP 7/26/2020 BNP 1322    Check echo           HEMODYNAMICS   Stable                                NEURO PSYCH   Resume meds       TIME SPENT Over 25 minutes aggregate care time spent on encounter; activities included combinations of  direct pt care, counseling and/or coordinating care reviewing notes, lab data/ imaging, discussion with multidisciplinary team/ pt /family. Risks, benefits, alternatives of planned interventions when applicable were discussed in detail and questions answered as best as possible    AYANNA VELOZ [facility]  DOA 7/26/2020  ALLERGY Pcn sulfa azactam augmentin

## 2020-07-28 NOTE — BRIEF OPERATIVE NOTE - NSICDXBRIEFPREOP_GEN_ALL_CORE_FT
PRE-OP DIAGNOSIS:  Cholelithiasis with acute on chronic cholecystitis without biliary obstruction 28-Jul-2020 21:59:56  Bandar Fischer

## 2020-07-28 NOTE — PROGRESS NOTE ADULT - ASSESSMENT
65M with HTN. Cardiac optimized for cholecystectomy.  Prelim Echo shows normal LVEF with trace MR    Suggest:  1. Continue with abx    2. DVT prohylaxis    3. IVF as necessary    4. Will follow

## 2020-07-28 NOTE — BRIEF OPERATIVE NOTE - NSICDXBRIEFPOSTOP_GEN_ALL_CORE_FT
POST-OP DIAGNOSIS:  Cholelithiasis with acute on chronic cholecystitis without biliary obstruction 28-Jul-2020 22:00:06  Bandar Fischer

## 2020-07-28 NOTE — PROGRESS NOTE ADULT - SUBJECTIVE AND OBJECTIVE BOX
Post Operative Note  Patient: ROSELIA NARAYAN 65y (1955) Male   MRN: [id number]  Location: [facility] [place] [facility] [address]  Visit: 07-26-20 Inpatient  Date: 07-28-20 @ 23:39    Procedure: S/P laparoscopic cholecystectomy     Subjective:   Patient seen and examined at bedside. As per RN, patient doing well. Patient nonverbal.     Objective:  Vitals: T(F): 99 (07-28-20 @ 23:23), Max: 102.1 (07-28-20 @ 09:22)  HR: 89 (07-28-20 @ 23:23)  BP: 131/74 (07-28-20 @ 23:23) (121/69 - 148/71)  RR: 20 (07-28-20 @ 23:23)  SpO2: 93% (07-28-20 @ 23:23)  Vent Settings:     In:   07-27-20 @ 07:01  -  07-28-20 @ 07:00  --------------------------------------------------------  IN: 1046.6 mL    07-28-20 @ 07:01  -  07-28-20 @ 23:39  --------------------------------------------------------  IN: 1055 mL      IV Fluids:     Out:   07-27-20 @ 07:01  -  07-28-20 @ 07:00  --------------------------------------------------------  OUT: 1500 mL    07-28-20 @ 07:01  -  07-28-20 @ 23:39  --------------------------------------------------------  OUT: 550 mL      EBL:     Voided Urine:   07-27-20 @ 07:01  -  07-28-20 @ 07:00  --------------------------------------------------------  OUT: 1500 mL    07-28-20 @ 07:01  -  07-28-20 @ 23:39  --------------------------------------------------------  OUT: 550 mL      PHYSICAL EXAM  GENERAL: NAD, resting in bed, appears comfortable  HEAD:  Normocephalic, atraumatic  ABDOMEN: Surgical incisions with steri-strips and bandaids - clean, dry and intact. Abdomen soft, nondistended. Appears nontender at this time.      Medications: [Standing]  acetaminophen   Tablet .. 650 milliGRAM(s) Oral every 4 hours PRN  clonazePAM  Tablet 0.5 milliGRAM(s) Oral two times a day  doxazosin 1 milliGRAM(s) Oral at bedtime  fluvoxaMINE 100 milliGRAM(s) Oral two times a day  glucagon  Injectable 1 milliGRAM(s) IntraMuscular once PRN  levothyroxine Injectable 37.5 MICROGram(s) IV Push at bedtime  meropenem  IVPB 1000 milliGRAM(s) IV Intermittent every 8 hours  metoprolol tartrate 25 milliGRAM(s) Oral two times a day  pantoprazole  Injectable 40 milliGRAM(s) IV Push daily  tamsulosin 0.4 milliGRAM(s) Oral at bedtime  valproate sodium IVPB 250 milliGRAM(s) IV Intermittent three times a day    Medications: [PRN]  acetaminophen   Tablet .. 650 milliGRAM(s) Oral every 4 hours PRN  clonazePAM  Tablet 0.5 milliGRAM(s) Oral two times a day  doxazosin 1 milliGRAM(s) Oral at bedtime  fluvoxaMINE 100 milliGRAM(s) Oral two times a day  glucagon  Injectable 1 milliGRAM(s) IntraMuscular once PRN  levothyroxine Injectable 37.5 MICROGram(s) IV Push at bedtime  meropenem  IVPB 1000 milliGRAM(s) IV Intermittent every 8 hours  metoprolol tartrate 25 milliGRAM(s) Oral two times a day  pantoprazole  Injectable 40 milliGRAM(s) IV Push daily  tamsulosin 0.4 milliGRAM(s) Oral at bedtime  valproate sodium IVPB 250 milliGRAM(s) IV Intermittent three times a day    Assessment:  65yMale patient S/P laparoscopic cholecystectomy     Plan:  - Continue current care as per medicine  - Advance diet as tolerated  - Continue IV abx  - Pain control, supportive care, OOB/Ambulate as tolerated with assistance   - SCDs, DVT ppx  - Follow up AM labs  - Will continue to monitor

## 2020-07-28 NOTE — PROGRESS NOTE ADULT - SUBJECTIVE AND OBJECTIVE BOX
[facility] Cardiology Progress Note [phone] (Dr. Portillo, Julieta, Genevieve, Alex)    CHIEF COMPLAINT: Patient is a 65y old  Male who presents with a chief complaint of fever 101 (27 Jul 2020 20:23)      Follow Up Today: The patient denies any chest discomfort or shortness of breath.    HPI:      PAST MEDICAL & SURGICAL HISTORY:  Constipation, unspecified constipation type  Blind: right eye  DD (diastrophic dysplasia)  Psoriasis  HTN (hypertension)  Seizure  MR (mental retardation), severe  No significant past surgical history      MEDICATIONS  (STANDING):  albuterol/ipratropium for Nebulization 3 milliLiter(s) Nebulizer every 6 hours  aspirin enteric coated 81 milliGRAM(s) Oral daily  clindamycin IVPB 900 milliGRAM(s) IV Intermittent once  clonazePAM  Tablet 0.5 milliGRAM(s) Oral two times a day  dextrose 5% + sodium chloride 0.9%. 1000 milliLiter(s) (80 mL/Hr) IV Continuous <Continuous>  dextrose 5%. 1000 milliLiter(s) (50 mL/Hr) IV Continuous <Continuous>  dextrose 50% Injectable 12.5 Gram(s) IV Push once  dextrose 50% Injectable 25 Gram(s) IV Push once  dextrose 50% Injectable 25 Gram(s) IV Push once  diVALproex  milliGRAM(s) Oral three times a day  doxazosin 1 milliGRAM(s) Oral at bedtime  enoxaparin Injectable 40 milliGRAM(s) SubCutaneous daily  fluvoxaMINE 100 milliGRAM(s) Oral two times a day  levothyroxine 75 MICROGram(s) Oral daily  meropenem  IVPB 1000 milliGRAM(s) IV Intermittent every 8 hours  metoprolol tartrate 25 milliGRAM(s) Oral two times a day  pantoprazole  Injectable 40 milliGRAM(s) IV Push daily  tamsulosin 0.4 milliGRAM(s) Oral at bedtime    MEDICATIONS  (PRN):  acetaminophen   Tablet .. 650 milliGRAM(s) Oral every 4 hours PRN Temp greater or equal to 38C (100.4F), Mild Pain (1 - 3)  dextrose 40% Gel 15 Gram(s) Oral once PRN Blood Glucose LESS THAN 70 milliGRAM(s)/deciliter  glucagon  Injectable 1 milliGRAM(s) IntraMuscular once PRN Glucose LESS THAN 70 milligrams/deciliter      Allergies    Augmentin (Unknown)  aztreonam (Rash)  penicillin (Unknown)  sulfa drugs (Unknown)    Intolerances        REVIEW OF SYSTEMS:    All other review of systems is negative unless indicated above    Vital Signs Last 24 Hrs  T(C): 36.8 (28 Jul 2020 08:07), Max: 38.9 (27 Jul 2020 16:11)  T(F): 98.2 (28 Jul 2020 08:07), Max: 102 (27 Jul 2020 16:11)  HR: 71 (28 Jul 2020 08:07) (71 - 113)  BP: 148/71 (28 Jul 2020 08:07) (127/77 - 152/75)  BP(mean): --  RR: 18 (28 Jul 2020 08:07) (18 - 19)  SpO2: 92% (28 Jul 2020 08:07) (91% - 95%)    I&O's Summary    27 Jul 2020 07:01  -  28 Jul 2020 07:00  --------------------------------------------------------  IN: 966.6 mL / OUT: 1500 mL / NET: -533.4 mL        PHYSICAL EXAM:    Constitutional: NAD, awake and alert, well-developed  Eyes:  EOMI,  Pupils round, No oral cyanosis.  HEENT: No exudate or erythema  Pulmonary: Non-labored, breath sounds are clear bilaterally, No wheezing, rales or rhonchi  Cardiovascular: Regular, S1 and S2, No murmurs, rubs, gallops oir clicks  Gastrointestinal: Bowel Sounds present, soft, nontender.   Ext: No significant LE edema with good pulses x 4  Neurological: Alert, no gross focal motor deficits  Skin: No rashes.  Psych:  Mood & affect appropriate    LABS: All Labs Reviewed:                        12.2   11.95 )-----------( 188      ( 28 Jul 2020 07:06 )             35.1                         13.1   16.04 )-----------( 156      ( 27 Jul 2020 07:17 )             37.3                         13.0   20.51 )-----------( 174      ( 26 Jul 2020 12:41 )             36.9     28 Jul 2020 07:06    146    |  112    |  13     ----------------------------<  91     3.6     |  30     |  0.78   27 Jul 2020 07:17    145    |  111    |  12     ----------------------------<  101    3.5     |  28     |  0.85   26 Jul 2020 12:41    137    |  104    |  19     ----------------------------<  116    3.5     |  26     |  1.20     Ca    8.5        28 Jul 2020 07:06  Ca    8.5        27 Jul 2020 07:17  Ca    8.7        26 Jul 2020 12:41  Phos  1.6       27 Jul 2020 07:17    TPro  6.8    /  Alb  2.3    /  TBili  0.6    /  DBili  x      /  AST  45     /  ALT  33     /  AlkPhos  71     28 Jul 2020 07:06  TPro  7.3    /  Alb  2.6    /  TBili  0.5    /  DBili  x      /  AST  34     /  ALT  24     /  AlkPhos  67     27 Jul 2020 07:17  TPro  7.3    /  Alb  2.8    /  TBili  0.6    /  DBili  x      /  AST  29     /  ALT  24     /  AlkPhos  60     26 Jul 2020 12:41    PT/INR - ( 27 Jul 2020 07:18 )   PT: 13.2 sec;   INR: 1.14 ratio         PTT - ( 26 Jul 2020 12:41 )  PTT:30.3 sec  CARDIAC MARKERS ( 26 Jul 2020 12:41 )  <.015 ng/mL / x     / 529 U/L / x     / x          Blood Culture: Organism --  Gram Stain Blood -- Gram Stain --  Specimen Source .Urine Clean Catch (Midstream)  Culture-Blood --    Organism --  Gram Stain Blood -- Gram Stain --  Specimen Source .Blood Blood-Peripheral  Culture-Blood --      07-26 @ 12:41  Pro Bnp 1322        RADIOLOGY/EKG:    Attending Attestation:   20 minutes spent on total encounter; more than 50% of the visit was spent counseling and/or coordinating care by the attending physician.     ASSESSMENT AND PLAN [facility] Card[facility] Progress Note [phone] (Dr. Portillo, Julieta, Genevieve, Alex)    CHIEF COMPLAINT: Patient is a 65y old  Male who presents with a chief complaint of fever 101 (27 Jul 2020 20:23)      Follow Up Today: The patient is non-verbal        PAST MEDICAL & SURGICAL HISTORY:  Constipation, unspecified constipation type  Blind: right eye  DD (diastrophic dysplasia)  Psoriasis  HTN (hypertension)  Seizure  MR (mental retardation), severe  No significant past surgical history      MEDICATIONS  (STANDING):  albuterol/ipratropium for Nebulization 3 milliLiter(s) Nebulizer every 6 hours  aspirin enteric coated 81 milliGRAM(s) Oral daily  clindamycin IVPB 900 milliGRAM(s) IV Intermittent once  clonazePAM  Tablet 0.5 milliGRAM(s) Oral two times a day  dextrose 5% + sodium chloride 0.9%. 1000 milliLiter(s) (80 mL/Hr) IV Continuous <Continuous>  dextrose 5%. 1000 milliLiter(s) (50 mL/Hr) IV Continuous <Continuous>  dextrose 50% Injectable 12.5 Gram(s) IV Push once  dextrose 50% Injectable 25 Gram(s) IV Push once  dextrose 50% Injectable 25 Gram(s) IV Push once  diVALproex  milliGRAM(s) Oral three times a day  doxazosin 1 milliGRAM(s) Oral at bedtime  enoxaparin Injectable 40 milliGRAM(s) SubCutaneous daily  fluvoxaMINE 100 milliGRAM(s) Oral two times a day  levothyroxine 75 MICROGram(s) Oral daily  meropenem  IVPB 1000 milliGRAM(s) IV Intermittent every 8 hours  metoprolol tartrate 25 milliGRAM(s) Oral two times a day  pantoprazole  Injectable 40 milliGRAM(s) IV Push daily  tamsulosin 0.4 milliGRAM(s) Oral at bedtime    MEDICATIONS  (PRN):  acetaminophen   Tablet .. 650 milliGRAM(s) Oral every 4 hours PRN Temp greater or equal to 38C (100.4F), Mild Pain (1 - 3)  dextrose 40% Gel 15 Gram(s) Oral once PRN Blood Glucose LESS THAN 70 milliGRAM(s)/deciliter  glucagon  Injectable 1 milliGRAM(s) IntraMuscular once PRN Glucose LESS THAN 70 milligrams/deciliter      Allergies    Augmentin (Unknown)  aztreonam (Rash)  penicillin (Unknown)  sulfa drugs (Unknown)    Intolerances        REVIEW OF SYSTEMS:    All other review of systems is negative unless indicated above    Vital Signs Last 24 Hrs  T(C): 36.8 (28 Jul 2020 08:07), Max: 38.9 (27 Jul 2020 16:11)  T(F): 98.2 (28 Jul 2020 08:07), Max: 102 (27 Jul 2020 16:11)  HR: 71 (28 Jul 2020 08:07) (71 - 113)  BP: 148/71 (28 Jul 2020 08:07) (127/77 - 152/75)  BP(mean): --  RR: 18 (28 Jul 2020 08:07) (18 - 19)  SpO2: 92% (28 Jul 2020 08:07) (91% - 95%)    I&O's Summary    27 Jul 2020 07:01  -  28 Jul 2020 07:00  --------------------------------------------------------  IN: 966.6 mL / OUT: 1500 mL / NET: -533.4 mL        PHYSICAL EXAM:    Constitutional: NAD, awake and alert, well-developed  Eyes:  EOMI,  Pupils round, No oral cyanosis.  HEENT: No exudate or erythema  Pulmonary: Non-labored, breath sounds are clear bilaterally, No wheezing, rales or rhonchi  Cardiovascular: Regular, S1 and S2, No murmurs  Gastrointestinal: Bowel Sounds present, soft, nontender.   Ext: No significant LE edema with good pulses x 4  Neurological: Alert, no gross focal motor deficits  Skin: No rashes.  Psych:  Mood & affect appropriate    LABS: All Labs Reviewed:                        12.2   11.95 )-----------( 188      ( 28 Jul 2020 07:06 )             35.1                         13.1   16.04 )-----------( 156      ( 27 Jul 2020 07:17 )             37.3                         13.0   20.51 )-----------( 174      ( 26 Jul 2020 12:41 )             36.9     28 Jul 2020 07:06    146    |  112    |  13     ----------------------------<  91     3.6     |  30     |  0.78   27 Jul 2020 07:17    145    |  111    |  12     ----------------------------<  101    3.5     |  28     |  0.85   26 Jul 2020 12:41    137    |  104    |  19     ----------------------------<  116    3.5     |  26     |  1.20     Ca    8.5        28 Jul 2020 07:06  Ca    8.5        27 Jul 2020 07:17  Ca    8.7        26 Jul 2020 12:41  Phos  1.6       27 Jul 2020 07:17    TPro  6.8    /  Alb  2.3    /  TBili  0.6    /  DBili  x      /  AST  45     /  ALT  33     /  AlkPhos  71     28 Jul 2020 07:06  TPro  7.3    /  Alb  2.6    /  TBili  0.5    /  DBili  x      /  AST  34     /  ALT  24     /  AlkPhos  67     27 Jul 2020 07:17  TPro  7.3    /  Alb  2.8    /  TBili  0.6    /  DBili  x      /  AST  29     /  ALT  24     /  AlkPhos  60     26 Jul 2020 12:41    PT/INR - ( 27 Jul 2020 07:18 )   PT: 13.2 sec;   INR: 1.14 ratio         PTT - ( 26 Jul 2020 12:41 )  PTT:30.3 sec  CARDIAC MARKERS ( 26 Jul 2020 12:41 )  <.015 ng/mL / x     / 529 U/L / x     / x          Blood Culture: Organism --  Gram Stain Blood -- Gram Stain --  Specimen Source .Urine Clean Catch (Midstream)  Culture-Blood --    Organism --  Gram Stain Blood -- Gram Stain --  Specimen Source .Blood Blood-Peripheral  Culture-Blood --      07-26 @ 12:41  Pro Bnp 1322        RADIOLOGY/EKG:    Attending Attestation:   20 minutes spent on total encounter; more than 50% of the visit was spent counseling and/or coordinating care by the attending physician.     ASSESSMENT AND PLAN

## 2020-07-28 NOTE — PROVIDER CONTACT NOTE (OTHER) - DATE AND TIME:
Reason for Call:  Other note    Detailed comments: patient is calling to ask provider to fax a note allowing her to return to work without restrictions  Fax number is [phone] attn: Veronica Keita    Phone Number Patient can be reached at: Home number on file [phone] (home)    Best Time: any    Can we leave a detailed message on this number? YES    Call taken on 3/3/2020 at 12:21 PM by Antonella Pichardo     28-Jul-2020 08:00

## 2020-07-28 NOTE — PROGRESS NOTE ADULT - SUBJECTIVE AND OBJECTIVE BOX
ROSELIA NARAYAN    [facility] [address]    Patient is a 65y old  Male who presents with a chief complaint of fever 101 (2020 08:17)       Allergies    Augmentin (Unknown)  aztreonam (Rash)  penicillin (Unknown)  sulfa drugs (Unknown)    Intolerances        HPI:      PAST MEDICAL & SURGICAL HISTORY:  Constipation, unspecified constipation type  Blind: right eye  DD (diastrophic dysplasia)  Psoriasis  HTN (hypertension)  Seizure  MR (mental retardation), severe  No significant past surgical history      FAMILY HISTORY:  No pertinent family history in first degree relatives        MEDICATIONS   acetaminophen   Tablet .. 650 milliGRAM(s) Oral every 4 hours PRN  albuterol/ipratropium for Nebulization 3 milliLiter(s) Nebulizer every 6 hours  aspirin enteric coated 81 milliGRAM(s) Oral daily  clindamycin IVPB 900 milliGRAM(s) IV Intermittent once  clonazePAM  Tablet 0.5 milliGRAM(s) Oral two times a day  dextrose 40% Gel 15 Gram(s) Oral once PRN  dextrose 5% + sodium chloride 0.9%. 1000 milliLiter(s) IV Continuous <Continuous>  dextrose 5%. 1000 milliLiter(s) IV Continuous <Continuous>  dextrose 50% Injectable 12.5 Gram(s) IV Push once  dextrose 50% Injectable 25 Gram(s) IV Push once  dextrose 50% Injectable 25 Gram(s) IV Push once  diVALproex  milliGRAM(s) Oral three times a day  doxazosin 1 milliGRAM(s) Oral at bedtime  enoxaparin Injectable 40 milliGRAM(s) SubCutaneous daily  fluvoxaMINE 100 milliGRAM(s) Oral two times a day  glucagon  Injectable 1 milliGRAM(s) IntraMuscular once PRN  levothyroxine 75 MICROGram(s) Oral daily  meropenem  IVPB 1000 milliGRAM(s) IV Intermittent every 8 hours  metoprolol tartrate 25 milliGRAM(s) Oral two times a day  pantoprazole  Injectable 40 milliGRAM(s) IV Push daily  tamsulosin 0.4 milliGRAM(s) Oral at bedtime      Vital Signs Last 24 Hrs  T(C): 36.7 (2020 12:35), Max: 38.9 (2020 16:11)  T(F): 98.1 (2020 12:35), Max: 102 (2020 16:11)  HR: 71 (2020 12:35) (71 - 113)  BP: 121/69 (2020 12:35) (121/69 - 152/75)  BP(mean): --  RR: 18 (2020 12:35) (18 - 19)  SpO2: 92% (2020 12:35) (91% - 95%)      20 @ 07:01  -  20 @ 07:00  --------------------------------------------------------  IN: 966.6 mL / OUT: 1500 mL / NET: -533.4 mL    20 @ 07:01  -  20 @ 12:48  --------------------------------------------------------  IN: 0 mL / OUT: 150 mL / NET: -150 mL            LABS:                        12.2   11.95 )-----------( 188      ( 2020 07:06 )             35.1     07-28    146<H>  |  112<H>  |  13  ----------------------------<  91  3.6   |  30  |  0.78    Ca    8.5      2020 07:06  Phos  1.6     07-27    TPro  6.8  /  Alb  2.3<L>  /  TBili  0.6  /  DBili  x   /  AST  45<H>  /  ALT  33  /  AlkPhos  71  07-28    PT/INR - ( 2020 07:18 )   PT: 13.2 sec;   INR: 1.14 ratio           Urinalysis Basic - ( 2020 15:37 )    Color: Yellow / Appearance: Clear / S.010 / pH: x  Gluc: x / Ketone: Negative  / Bili: Negative / Urobili: Negative   Blood: x / Protein: 15 / Nitrite: Negative   Leuk Esterase: Negative / RBC: 3-5 /HPF / WBC 3-5   Sq Epi: x / Non Sq Epi: Few / Bacteria: Few            WBC:  WBC Count: 11.95 K/uL ( @ 07:06)  WBC Count: 16.04 K/uL ( @ 07:17)  WBC Count: 20.51 K/uL ( @ 12:41)      MICROBIOLOGY:  RECENT CULTURES:   .Urine Clean Catch (Midstream) XXXX XXXX   No growth     .Blood Blood-Peripheral XXXX XXXX   No growth to date.                PT/INR - ( 2020 07:18 )   PT: 13.2 sec;   INR: 1.14 ratio             Sodium:  Sodium, Serum: 146 mmol/L ( @ 07:06)  Sodium, Serum: 145 mmol/L ( @ 07:17)  Sodium, Serum: 137 mmol/L ( @ 12:41)      0.78 mg/dL  @ 07:06  0.85 mg/dL  @ 07:17  1.20 mg/dL  @ 12:41      Hemoglobin:  Hemoglobin: 12.2 g/dL ( @ 07:06)  Hemoglobin: 13.1 g/dL ( @ 07:17)  Hemoglobin: 13.0 g/dL ( @ 12:41)      Platelets: Platelet Count - Automated: 188 K/uL ( @ 07:06)  Platelet Count - Automated: 156 K/uL ( @ 07:17)  Platelet Count - Automated: 174 K/uL ( @ 12:41)      LIVER FUNCTIONS - ( 2020 07:06 )  Alb: 2.3 g/dL / Pro: 6.8 g/dL / ALK PHOS: 71 U/L / ALT: 33 U/L / AST: 45 U/L / GGT: x             Urinalysis Basic - ( 2020 15:37 )    Color: Yellow / Appearance: Clear / S.010 / pH: x  Gluc: x / Ketone: Negative  / Bili: Negative / Urobili: Negative   Blood: x / Protein: 15 / Nitrite: Negative   Leuk Esterase: Negative / RBC: 3-5 /HPF / WBC 3-5   Sq Epi: x / Non Sq Epi: Few / Bacteria: Few        RADIOLOGY & ADDITIONAL STUDIES:

## 2020-07-28 NOTE — PROGRESS NOTE ADULT - ASSESSMENT
Acute cholecystitis with cholelithiasis.  Will need Laparoscopic Cholecystectomy.  Attempted to gain consent from [facility] agency, unfortunately unable to provide appropriate documentation for ability to consent on patient's behalf.  Given radiographic finding, physical examination and laboratory findings, this patient needs Cholecystectomy urgently as delay in care can lead to worsening cholecystitis, gangrenous gallbladder, perforation and sepsis.  Will proceed with surgery this afternoon.

## 2020-07-29 LAB
ALBUMIN SERPL ELPH-MCNC: 2.2 G/DL — LOW (ref 3.3–5)
ALP SERPL-CCNC: 62 U/L — SIGNIFICANT CHANGE UP (ref 40–120)
ALT FLD-CCNC: 65 U/L — SIGNIFICANT CHANGE UP (ref 12–78)
ANION GAP SERPL CALC-SCNC: 4 MMOL/L — LOW (ref 5–17)
AST SERPL-CCNC: 75 U/L — HIGH (ref 15–37)
BASOPHILS # BLD AUTO: 0.01 K/UL — SIGNIFICANT CHANGE UP (ref 0–0.2)
BASOPHILS NFR BLD AUTO: 0.1 % — SIGNIFICANT CHANGE UP (ref 0–2)
BILIRUB SERPL-MCNC: 0.4 MG/DL — SIGNIFICANT CHANGE UP (ref 0.2–1.2)
BUN SERPL-MCNC: 25 MG/DL — HIGH (ref 7–23)
CALCIUM SERPL-MCNC: 8.3 MG/DL — LOW (ref 8.5–10.1)
CHLORIDE SERPL-SCNC: 116 MMOL/L — HIGH (ref 96–108)
CO2 SERPL-SCNC: 29 MMOL/L — SIGNIFICANT CHANGE UP (ref 22–31)
CREAT SERPL-MCNC: 0.97 MG/DL — SIGNIFICANT CHANGE UP (ref 0.5–1.3)
EOSINOPHIL # BLD AUTO: 0 K/UL — SIGNIFICANT CHANGE UP (ref 0–0.5)
EOSINOPHIL NFR BLD AUTO: 0 % — SIGNIFICANT CHANGE UP (ref 0–6)
GLUCOSE SERPL-MCNC: 132 MG/DL — HIGH (ref 70–99)
HCT VFR BLD CALC: 32.2 % — LOW (ref 39–50)
HGB BLD-MCNC: 11.1 G/DL — LOW (ref 13–17)
IMM GRANULOCYTES NFR BLD AUTO: 0.5 % — SIGNIFICANT CHANGE UP (ref 0–1.5)
LYMPHOCYTES # BLD AUTO: 1.31 K/UL — SIGNIFICANT CHANGE UP (ref 1–3.3)
LYMPHOCYTES # BLD AUTO: 12.2 % — LOW (ref 13–44)
MCHC RBC-ENTMCNC: 31.5 PG — SIGNIFICANT CHANGE UP (ref 27–34)
MCHC RBC-ENTMCNC: 34.5 GM/DL — SIGNIFICANT CHANGE UP (ref 32–36)
MCV RBC AUTO: 91.5 FL — SIGNIFICANT CHANGE UP (ref 80–100)
MONOCYTES # BLD AUTO: 1.01 K/UL — HIGH (ref 0–0.9)
MONOCYTES NFR BLD AUTO: 9.4 % — SIGNIFICANT CHANGE UP (ref 2–14)
NEUTROPHILS # BLD AUTO: 8.36 K/UL — HIGH (ref 1.8–7.4)
NEUTROPHILS NFR BLD AUTO: 77.8 % — HIGH (ref 43–77)
NRBC # BLD: 0 /100 WBCS — SIGNIFICANT CHANGE UP (ref 0–0)
PLATELET # BLD AUTO: 204 K/UL — SIGNIFICANT CHANGE UP (ref 150–400)
POTASSIUM SERPL-MCNC: 4 MMOL/L — SIGNIFICANT CHANGE UP (ref 3.5–5.3)
POTASSIUM SERPL-SCNC: 4 MMOL/L — SIGNIFICANT CHANGE UP (ref 3.5–5.3)
PROT SERPL-MCNC: 6.9 G/DL — SIGNIFICANT CHANGE UP (ref 6–8.3)
RBC # BLD: 3.52 M/UL — LOW (ref 4.2–5.8)
RBC # FLD: 13.1 % — SIGNIFICANT CHANGE UP (ref 10.3–14.5)
SODIUM SERPL-SCNC: 149 MMOL/L — HIGH (ref 135–145)
WBC # BLD: 10.74 K/UL — HIGH (ref 3.8–10.5)
WBC # FLD AUTO: 10.74 K/UL — HIGH (ref 3.8–10.5)

## 2020-07-29 RX ORDER — SODIUM CHLORIDE 9 MG/ML
1000 INJECTION, SOLUTION INTRAVENOUS
Refills: 0 | Status: DISCONTINUED | OUTPATIENT
Start: 2020-07-29 | End: 2020-07-30

## 2020-07-29 RX ADMIN — TAMSULOSIN HYDROCHLORIDE 0.4 MILLIGRAM(S): 0.4 CAPSULE ORAL at 22:32

## 2020-07-29 RX ADMIN — ENOXAPARIN SODIUM 40 MILLIGRAM(S): 100 INJECTION SUBCUTANEOUS at 12:17

## 2020-07-29 RX ADMIN — Medication 26.25 MILLIGRAM(S): at 13:41

## 2020-07-29 RX ADMIN — Medication 25 MILLIGRAM(S): at 05:48

## 2020-07-29 RX ADMIN — Medication 1 MILLIGRAM(S): at 22:32

## 2020-07-29 RX ADMIN — MEROPENEM 100 MILLIGRAM(S): 1 INJECTION INTRAVENOUS at 22:31

## 2020-07-29 RX ADMIN — Medication 26.25 MILLIGRAM(S): at 22:32

## 2020-07-29 RX ADMIN — PANTOPRAZOLE SODIUM 40 MILLIGRAM(S): 20 TABLET, DELAYED RELEASE ORAL at 12:16

## 2020-07-29 RX ADMIN — Medication 25 MILLIGRAM(S): at 18:02

## 2020-07-29 RX ADMIN — Medication 37.5 MICROGRAM(S): at 22:34

## 2020-07-29 RX ADMIN — SODIUM CHLORIDE 75 MILLILITER(S): 9 INJECTION, SOLUTION INTRAVENOUS at 13:09

## 2020-07-29 RX ADMIN — Medication 650 MILLIGRAM(S): at 18:15

## 2020-07-29 RX ADMIN — Medication 0.5 MILLIGRAM(S): at 18:05

## 2020-07-29 RX ADMIN — FLUVOXAMINE MALEATE 100 MILLIGRAM(S): 25 TABLET ORAL at 05:49

## 2020-07-29 RX ADMIN — SODIUM CHLORIDE 75 MILLILITER(S): 9 INJECTION, SOLUTION INTRAVENOUS at 06:07

## 2020-07-29 RX ADMIN — Medication 81 MILLIGRAM(S): at 12:17

## 2020-07-29 RX ADMIN — Medication 650 MILLIGRAM(S): at 12:22

## 2020-07-29 RX ADMIN — Medication 0.5 MILLIGRAM(S): at 05:44

## 2020-07-29 RX ADMIN — FLUVOXAMINE MALEATE 100 MILLIGRAM(S): 25 TABLET ORAL at 18:02

## 2020-07-29 RX ADMIN — Medication 26.25 MILLIGRAM(S): at 05:48

## 2020-07-29 RX ADMIN — MEROPENEM 100 MILLIGRAM(S): 1 INJECTION INTRAVENOUS at 13:09

## 2020-07-29 RX ADMIN — MEROPENEM 100 MILLIGRAM(S): 1 INJECTION INTRAVENOUS at 06:07

## 2020-07-29 NOTE — DIETITIAN INITIAL EVALUATION ADULT. - ADD RECOMMEND
1. advance to clear liquids per surgery  2. follow diet tolerance and diet advancement per MD discretion will need cut up dash tlc restrictions as diet advanced

## 2020-07-29 NOTE — PROGRESS NOTE ADULT - ASSESSMENT
65M with HTN.  Echo shows normal LVEF.   S/p cholecystectomy POD 1.      Suggest:  1. Continue with abx.  Low grade temp today.  Stable HR and BP.   2. Continue ASA, metoprolol 25 mg BID; DVT prohylaxis  3. IVF as necessary    4. Will follow

## 2020-07-29 NOTE — PROGRESS NOTE ADULT - SUBJECTIVE AND OBJECTIVE BOX
SYLVAIN ROSELIA is a 65yMale , patient examined and chart reviewed.     INTERVAL HPI/ OVERNIGHT EVENTS:   Afebrile. Sp Lap ena pod 1.  NAD. Has AUR,    PAST MEDICAL & SURGICAL HISTORY:  Constipation, unspecified constipation type  Blind: right eye  DD (diastrophic dysplasia)  Psoriasis  HTN (hypertension)  Seizure  MR (mental retardation), severe  No significant past surgical history      For details regarding the patient's social history, family history, and other miscellaneous elements, please refer the initial infectious diseases consultation and/or the admitting history and physical examination for this admission.    ROS:  Unable to obtain due to : MRDD nonverbal    ALLERGIES:  Augmentin (Unknown)  aztreonam (Rash)  penicillin (Unknown)  sulfa drugs (Unknown)      Current inpatient medications :    ANTIBIOTICS/RELEVANT:  meropenem  IVPB 1000 milliGRAM(s) IV Intermittent every 8 hours    MEDICATIONS  (STANDING):  aspirin enteric coated 81 milliGRAM(s) Oral daily  clonazePAM  Tablet 0.5 milliGRAM(s) Oral two times a day  doxazosin 1 milliGRAM(s) Oral at bedtime  enoxaparin Injectable 40 milliGRAM(s) SubCutaneous daily  fluvoxaMINE 100 milliGRAM(s) Oral two times a day  lactated ringers. 1000 milliLiter(s) (75 mL/Hr) IV Continuous <Continuous>  levothyroxine Injectable 37.5 MICROGram(s) IV Push at bedtime  metoprolol tartrate 25 milliGRAM(s) Oral two times a day  pantoprazole  Injectable 40 milliGRAM(s) IV Push daily  tamsulosin 0.4 milliGRAM(s) Oral at bedtime  valproate sodium IVPB 250 milliGRAM(s) IV Intermittent three times a day    MEDICATIONS  (PRN):  acetaminophen   Tablet .. 650 milliGRAM(s) Oral every 4 hours PRN Temp greater or equal to 38C (100.4F), Mild Pain (1 - 3)  glucagon  Injectable 1 milliGRAM(s) IntraMuscular once PRN Glucose LESS THAN 70 milligrams/deciliter      Objective:    Vital Signs Last 24 Hrs  T(C): 38.3 (29 Jul 2020 12:26), Max: 38.3 (29 Jul 2020 12:26)  T(F): 100.9 (29 Jul 2020 12:26), Max: 100.9 (29 Jul 2020 12:26)  HR: 71 (29 Jul 2020 12:26) (71 - 90)  BP: 132/74 (29 Jul 2020 12:26) (124/52 - 145/68)  RR: 18 (29 Jul 2020 12:26) (15 - 20)  SpO2: 90% (29 Jul 2020 12:26) (90% - 96%)    Physical Exam:  General:  no acute distress  Eyes: sclera anicteric, pupils equal and reactive to light  ENMT: buccal mucosa moist, pharynx not injected  Neck: supple, trachea midline  Lungs: clear, no wheeze/rhonchi  Cardiovascular: regular rate and rhythm, S1 S2  Abdomen: soft, tender, distended, bowel sounds normal  Neurological: awake MRDD nonverbal  Skin: no increased ecchymosis/petechiae/purpura  Lymph Nodes: no palpable cervical/supraclavicular lymph nodes enlargements  Extremities: no cyanosis/clubbing/edema      LABS:                        12.2   11.95 )-----------( 188      ( 28 Jul 2020 07:06 )             35.1       07-28    146<H>  |  112<H>  |  13  ----------------------------<  91  3.6   |  30  |  0.78    Ca    8.5      28 Jul 2020 07:06  Phos  1.6     07-27    TPro  6.8  /  Alb  2.3<L>  /  TBili  0.6  /  DBili  x   /  AST  45<H>  /  ALT  33  /  AlkPhos  71  07-28      PT/INR - ( 27 Jul 2020 07:18 )   PT: 13.2 sec;   INR: 1.14 ratio        MICROBIOLOGY:    Culture - Urine (collected 26 Jul 2020 21:04)  Source: .Urine Clean Catch (Midstream)  Final Report (27 Jul 2020 18:17):    No growth    Culture - Blood (collected 26 Jul 2020 17:55)  Source: .Blood Blood-Peripheral  Preliminary Report (27 Jul 2020 18:01):    No growth to date.    Culture - Blood (collected 26 Jul 2020 17:55)  Source: .Blood Blood-Peripheral  Preliminary Report (27 Jul 2020 18:01):    No growth to date.    RADIOLOGY & ADDITIONAL STUDIES:    EXAM:  CT ABDOMEN AND PELVIS                          EXAM:  CT CHEST                            PROCEDURE DATE:  07/26/2020          INTERPRETATION:  CLINICAL INFORMATION: Fever, leukocytosis    COMPARISON: CT abdomen and pelvis dated 08/23/2019    PROCEDURE:  CT of the Chest, Abdomen and Pelvis was performed without intravenous contrast.  Intravenous contrast: None.  Oral contrast: None.  Sagittal and coronal reformats were performed.    FINDINGS:  CHEST:  LUNGS AND LARGE AIRWAYS: Suspect bronchial plugging in the lower lobes. Bilateral lower lobe, predominantly right-sided patchy opacities that could be related to atelectasis or possibly infiltrates.  PLEURA: No pleural effusion.  VESSELS: Coronary artery calcification  HEART: Heart sizeis normal. No pericardial effusion.  MEDIASTINUM AND GREYSON: No lymphadenopathy.  CHEST WALL AND LOWER NECK: Within normal limits.    ABDOMEN AND PELVIS:  LIVER: Within normal limits.  BILE DUCTS: Normal caliber.  GALLBLADDER: Evidence of cholelithiasis as well as pericholecystic haziness and stranding consistent with acute cholecystitis.  SPLEEN: Within normal limits.  PANCREAS: Within normal limits.  ADRENALS: Within normal limits.  KIDNEYS/URETERS: Small hyperdensities involving the lower pole cortex of both kidneys that could be related to nephrocalcinosis or hyperdense cysts.    BLADDER: Mild mural thickening concerning for chronic outlet obstruction.  REPRODUCTIVE ORGANS: Prostate is enlarged.    BOWEL: No bowel obstruction. Appendix is normal.  PERITONEUM: No ascites.  VESSELS: Within normal limits.  RETROPERITONEUM/LYMPH NODES: No lymphadenopathy.  ABDOMINAL WALL: Within normal limits.  BONES: Degenerative disc disease and spondylosis lower lumbar spine.    IMPRESSION:  Cholelithiasis and probable acute cholecystitis.    Patchy bilateral lower lobe pulmonary opacities, predominantly involving the right lower lobe.      EXAM:  US GALLBLADDER                            PROCEDURE DATE:  07/26/2020          INTERPRETATION:  EXAMINATION: US GALLBLADDER    DATE OF EXAM: 7/26/2020 7:42 PM    HISTORY: Fever and leukocytosis, abnormal gallbladder appearance on CT    COMPARISON: CT performed the same day.    FINDINGS:    The gallbladder is distended with a transverse measurement of 4.9 cm. In the fundus, the gallbladder wall thickness is 3 mm. In the body, the wall thickness measures up to 4 mm. Cholelithiasis in the gallbladder neck. Common duct measures 5-6 mm, which is within normal limits. No right upper quadrant ascites seen.      IMPRESSION:    1.  Near hydropic distention of the gallbladder, in the setting of cholelithiasis and mild mural edema, suggestive of acute cholecystitis. Consider hepatobiliary imaging to confirm cystic duct obstruction.      Assessment :   64YO M Phx of MRDD, HTN, resident of [facility] admitted with fevers n/v likely sec acute cholecystitis.  RUQ sono noted suggestive of acute cholecystitis. Also with possible asp pneumonia. Sp fever. Sp lap ena POD 1. Now with AUR.    Plan :   Cont Meropenam x 48 hours then dc  Trend temps and cbc  Serial abd exams  Asp precautions  AUR management per primary team    D/w Dr Hunter Curran    Continue with present regiment.  Appropriate use of antibiotics and adverse effects reviewed.      I have discussed the above plan of care with patient/ family in detail. They expressed understanding of the  treatment plan . Risks, benefits and alternatives discussed in detail. I have asked if they have any questions or concerns and appropriately addressed them to the best of my ability .    > 35 minutes were spent in direct patient care reviewing notes, medications ,labs data/ imaging , discussion with multidisciplinary team.    Thank you for allowing me to participate in care of your patient .    Olivier Daniels MD  Infectious Disease  [phone]

## 2020-07-29 NOTE — DIETITIAN INITIAL EVALUATION ADULT. - PHYSICAL APPEARANCE
other (specify)/bmi 31 Based on given ht and wt at admit appears well nourished . no pressure injury

## 2020-07-29 NOTE — PROGRESS NOTE ADULT - SUBJECTIVE AND OBJECTIVE BOX
PAST MEDICAL & SURGICAL HISTORY:  Constipation, unspecified constipation type  Blind: right eye  DD (diastrophic dysplasia)  Psoriasis  HTN (hypertension)  Seizure  MR (mental retardation), severe  No significant past surgical history      MEDICATIONS  (STANDING):  aspirin enteric coated 81 milliGRAM(s) Oral daily  clonazePAM  Tablet 0.5 milliGRAM(s) Oral two times a day  doxazosin 1 milliGRAM(s) Oral at bedtime  enoxaparin Injectable 40 milliGRAM(s) SubCutaneous daily  fluvoxaMINE 100 milliGRAM(s) Oral two times a day  lactated ringers. 1000 milliLiter(s) (75 mL/Hr) IV Continuous <Continuous>  levothyroxine Injectable 37.5 MICROGram(s) IV Push at bedtime  meropenem  IVPB 1000 milliGRAM(s) IV Intermittent every 8 hours  metoprolol tartrate 25 milliGRAM(s) Oral two times a day  pantoprazole  Injectable 40 milliGRAM(s) IV Push daily  tamsulosin 0.4 milliGRAM(s) Oral at bedtime  valproate sodium IVPB 250 milliGRAM(s) IV Intermittent three times a day    MEDICATIONS  (PRN):  acetaminophen   Tablet .. 650 milliGRAM(s) Oral every 4 hours PRN Temp greater or equal to 38C (100.4F), Mild Pain (1 - 3)  glucagon  Injectable 1 milliGRAM(s) IntraMuscular once PRN Glucose LESS THAN 70 milligrams/deciliter      Patient is a 65y old  Male who presents with a chief complaint of fever 101 (29 Jul 2020 13:21)      Vital Signs Last 24 Hrs  T(C): 38.3 (29 Jul 2020 12:26), Max: 38.3 (29 Jul 2020 12:26)  T(F): 100.9 (29 Jul 2020 12:26), Max: 100.9 (29 Jul 2020 12:26)  HR: 71 (29 Jul 2020 12:26) (71 - 90)  BP: 132/74 (29 Jul 2020 12:26) (124/52 - 145/68)  BP(mean): --  RR: 18 (29 Jul 2020 12:26) (15 - 20)  SpO2: 90% (29 Jul 2020 12:26) (90% - 96%)          07-28 @ 07:01  -  07-29 @ 07:00  --------------------------------------------------------  IN: 1480 mL / OUT: 550 mL / NET: 930 mL        REVIEW OF SYSTEMS:    Constitutional: No fever, weight loss or fatigue  Eyes: No eye pain, visual disturbances, or discharge  ENT:  No difficulty hearing, tinnitus, vertigo; No sinus or throat pain  Neck: No pain or stiffness  Breasts: No pain, masses or nipple discharge  Respiratory: No cough, wheezing, chills or hemoptysis  Cardiovascular: No chest pain, palpitations, shortness of breath, dizziness or leg swelling  Gastrointestinal: No abdominal or epigastric pain. No nausea, vomiting or hematemesis; No diarrhea or constipation. No melena or hematochezia.  Genitourinary: No dysuria, frequency, hematuria or incontinence  Rectal: No pain, hemorrhoids or incontinence  Neurological: No headaches, memory loss, loss of strength, numbness or tremors  Skin: No itching, burning, rashes or lesions   Lymph Nodes: No enlarged glands  Endocrine: No heat or cold intolerance; No hair loss  Musculoskeletal: No joint pain or swelling; No muscle, back or extremity pain  Psychiatric: No depression, anxiety, mood swings or difficulty sleeping  Heme/Lymph: No easy bruising or bleeding gums  Allergy and Immunologic: No hives or eczema    PHYSICAL EXAM:    Constitutional: NAD, well-groomed, well-developed  HEENT: PERRLA, EOMI, Normal Hearing, MMM  Neck: No LAD, No JVD  Back: Normal spine flexure, No CVA tenderness  Respiratory: CTAB/L   Cardiovascular: S1 and S2, RRR, no M/G/R  Gastrointestinal: BS+, soft, NT/ND  Extremities: No peripheral edema  Vascular: 2+ peripheral pulses  Neurological: A/O x 3, no focal deficits  Skin: No rashes      decubiti:                           12.2   11.95 )-----------( 188      ( 28 Jul 2020 07:06 )             35.1     07-28    146<H>  |  112<H>  |  13  ----------------------------<  91  3.6   |  30  |  0.78    Ca    8.5      28 Jul 2020 07:06    TPro  6.8  /  Alb  2.3<L>  /  TBili  0.6  /  DBili  x   /  AST  45<H>  /  ALT  33  /  AlkPhos  71  07-28              .Urine Clean Catch (Midstream)  07-26 @ 21:04   No growth  --  --      .Blood Blood-Peripheral  07-26 @ 17:55   No growth to date.  --  --        Urine Culture:  07-26 @ 21:04    --        No growth  Urine Culture:  07-26 @ 17:55    --        No growth to date.        Radiology:

## 2020-07-29 NOTE — PROGRESS NOTE ADULT - SUBJECTIVE AND OBJECTIVE BOX
Patient is a 65y Male with a known history of :  Acute cholecystitis (K81.0)    HPI:      REVIEW OF SYSTEMS:      MEDICATIONS  (STANDING):  aspirin enteric coated 81 milliGRAM(s) Oral daily  clonazePAM  Tablet 0.5 milliGRAM(s) Oral two times a day  doxazosin 1 milliGRAM(s) Oral at bedtime  enoxaparin Injectable 40 milliGRAM(s) SubCutaneous daily  fluvoxaMINE 100 milliGRAM(s) Oral two times a day  lactated ringers. 1000 milliLiter(s) (75 mL/Hr) IV Continuous <Continuous>  levothyroxine Injectable 37.5 MICROGram(s) IV Push at bedtime  meropenem  IVPB 1000 milliGRAM(s) IV Intermittent every 8 hours  metoprolol tartrate 25 milliGRAM(s) Oral two times a day  pantoprazole  Injectable 40 milliGRAM(s) IV Push daily  tamsulosin 0.4 milliGRAM(s) Oral at bedtime  valproate sodium IVPB 250 milliGRAM(s) IV Intermittent three times a day    MEDICATIONS  (PRN):  acetaminophen   Tablet .. 650 milliGRAM(s) Oral every 4 hours PRN Temp greater or equal to 38C (100.4F), Mild Pain (1 - 3)  glucagon  Injectable 1 milliGRAM(s) IntraMuscular once PRN Glucose LESS THAN 70 milligrams/deciliter      ALLERGIES: Augmentin (Unknown)  aztreonam (Rash)  penicillin (Unknown)  sulfa drugs (Unknown)      FAMILY HISTORY:  No pertinent family history in first degree relatives      PHYSICAL EXAMINATION:  -----------------------------  T(C): 38.3 (07-29-20 @ 12:26), Max: 38.3 (07-29-20 @ 12:26)  HR: 71 (07-29-20 @ 12:26) (71 - 90)  BP: 132/74 (07-29-20 @ 12:26) (124/52 - 145/68)  RR: 18 (07-29-20 @ 12:26) (15 - 20)  SpO2: 90% (07-29-20 @ 12:26) (90% - 96%)  Wt(kg): --    07-28 @ 07:01  -  07-29 @ 07:00  --------------------------------------------------------  IN:    dextrose 5% + sodium chloride 0.9%: 880 mL    lactated ringers.: 150 mL    lactated ringers.: 150 mL    Solution: 150 mL    Solution: 150 mL  Total IN: 1480 mL    OUT:    Incontinent per Condom Catheter: 550 mL  Total OUT: 550 mL    Total NET: 930 mL      07-29 @ 07:01  -  07-29 @ 17:10  --------------------------------------------------------  IN:  Total IN: 0 mL    OUT:    Intermittent Catheterization - Urethral: 650 mL  Total OUT: 650 mL    Total NET: -650 mL          Weight (kg): 70 (07-28 @ 19:33)      LABS:   --------  07-29    149<H>  |  116<H>  |  25<H>  ----------------------------<  132<H>  4.0   |  29  |  0.97    Ca    8.3<L>      29 Jul 2020 14:43    TPro  6.9  /  Alb  2.2<L>  /  TBili  0.4  /  DBili  x   /  AST  75<H>  /  ALT  65  /  AlkPhos  62  07-29                         11.1   10.74 )-----------( 204      ( 29 Jul 2020 14:43 )             32.2 Patient is a 65y Male with a known history of :  Acute cholecystitis (K81.0)    HPI:    follow up today pt is awake and alert with no distress.    REVIEW OF SYSTEMS:    All other review of systems is negative unless indicated above    MEDICATIONS  (STANDING):  aspirin enteric coated 81 milliGRAM(s) Oral daily  clonazePAM  Tablet 0.5 milliGRAM(s) Oral two times a day  doxazosin 1 milliGRAM(s) Oral at bedtime  enoxaparin Injectable 40 milliGRAM(s) SubCutaneous daily  fluvoxaMINE 100 milliGRAM(s) Oral two times a day  lactated ringers. 1000 milliLiter(s) (75 mL/Hr) IV Continuous <Continuous>  levothyroxine Injectable 37.5 MICROGram(s) IV Push at bedtime  meropenem  IVPB 1000 milliGRAM(s) IV Intermittent every 8 hours  metoprolol tartrate 25 milliGRAM(s) Oral two times a day  pantoprazole  Injectable 40 milliGRAM(s) IV Push daily  tamsulosin 0.4 milliGRAM(s) Oral at bedtime  valproate sodium IVPB 250 milliGRAM(s) IV Intermittent three times a day    MEDICATIONS  (PRN):  acetaminophen   Tablet .. 650 milliGRAM(s) Oral every 4 hours PRN Temp greater or equal to 38C (100.4F), Mild Pain (1 - 3)  glucagon  Injectable 1 milliGRAM(s) IntraMuscular once PRN Glucose LESS THAN 70 milligrams/deciliter      ALLERGIES: Augmentin (Unknown)  aztreonam (Rash)  penicillin (Unknown)  sulfa drugs (Unknown)      FAMILY HISTORY:  No pertinent family history in first degree relatives      PHYSICAL EXAMINATION:  -----------------------------  T(C): 38.3 (07-29-20 @ 12:26), Max: 38.3 (07-29-20 @ 12:26)  HR: 71 (07-29-20 @ 12:26) (71 - 90)  BP: 132/74 (07-29-20 @ 12:26) (124/52 - 145/68)  RR: 18 (07-29-20 @ 12:26) (15 - 20)  SpO2: 90% (07-29-20 @ 12:26) (90% - 96%)  Wt(kg): --    07-28 @ 07:01  -  07-29 @ 07:00  --------------------------------------------------------  IN:    dextrose 5% + sodium chloride 0.9%: 880 mL    lactated ringers.: 150 mL    lactated ringers.: 150 mL    Solution: 150 mL    Solution: 150 mL  Total IN: 1480 mL    OUT:    Incontinent per Condom Catheter: 550 mL  Total OUT: 550 mL    Total NET: 930 mL      07-29 @ 07:01 - 07-29 @ 17:10  --------------------------------------------------------  IN:  Total IN: 0 mL    OUT:    Intermittent Catheterization - Urethral: 650 mL  Total OUT: 650 mL    Total NET: -650 mL    PHYSICAL EXAM:    Constitutional: NAD, awake and alert, well-developed  Eyes:  EOMI,  Pupils round, No oral cyanosis.  HEENT: No exudate or erythema  Pulmonary: Non-labored, breath sounds are clear bilaterally, No wheezing, rales or rhonchi  Cardiovascular: Regular, S1 and S2, No murmurs  Gastrointestinal: Bowel Sounds present, soft, nontender.   Ext: No significant LE edema with good pulses x 4  Neurological: Alert, no gross focal motor deficits  Skin: No rashes.  Psych:  Mood & affect appropriate    LABS: All Labs Reviewed:                 Weight (kg): 70 (07-28 @ 19:33)      LABS:   --------  07-29    149<H>  |  116<H>  |  25<H>  ----------------------------<  132<H>  4.0   |  29  |  0.97    Ca    8.3<L>      29 Jul 2020 14:43    TPro  6.9  /  Alb  2.2<L>  /  TBili  0.4  /  DBili  x   /  AST  75<H>  /  ALT  65  /  AlkPhos  62  07-29                         11.1   10.74 )-----------( 204 ( 29 Jul 2020 14:43 )             32.2

## 2020-07-29 NOTE — PROGRESS NOTE ADULT - ASSESSMENT
cont rx cont rx      HPI/PMH.      65 m brought in by caregivers from group home for 100.8 temperature and low spO2 92%. caregivers reports two positive COVID cases at nearby [facility] house. patient was seen at urgent care of Friday for the same symptoms  Ct cap showed basal pneumonia and ac cholecytitis Pt given genta linad vanco in er and alredy got fc 2l   Pulm consulted 7/26/2020   COVID pcr 7/26/2020 pcr Neg-ric     Past Medical History:  Blind  right eye  Constipation, unspecified constipation type    DD (diastrophic dysplasia)    HTN (hypertension)    MR (mental retardation), severe    Psoriasis    Seizure.    home meds  Flagyl 500 Cipro 500 ursodiol 300 metoprolol tartrate 25 tamsulosin 0.4 doxazosin 2 ZyPREXA 20 mg OLANZapine: Cosentyx:   Depakote: Aspir 81: hydrOXYzine: levothyroxine: clonazePAM:         OXYGENATION      7/27/2020 4l 91%     VITALS/LABS       7/29/2020 100f 71 130/70     RELEVANT INFO   W. 7/26-7/27-7/28/2020 w 20 - 16 - 11   PROCAL.  7/26/2020 pc 1.1  CXR 7/72 basal oapc   CT 7/26/2020 CT ch Probable ac dorie Patchy bl lower lobe pulm opac  US US abd 7/26 near hydropic distentiob gbin setting of cholelithiasis and mild mural edema    MEROPENEM meropenem 1.3 (7/26)   ECHO 7/27 ef 60% dd   ELEVATED D-dimr poa 7/26/2020 D-dimr 715     V duplx 7/26/2020 v duplx Neg-ric                               BNP 7/26/2020 BNP 1322    Na 7/29/2020 Na 149         SYLVAIN BRICEREY [facility]  DOA 7/26/2020  ALLERGY Pcn sulfa azactam augmentin      REVIEW OF SYMPTOMS      Able to give ROS  NO     PHYSICAL EXAM    HEENT Unremarkable PERRLA atraumatic   RESP Fair air entry EXP prolonged    Harsh breath sound Resp distres mild   CARDIAC S1 S2 No S3     NO JVD    ABDOMEN SOFT BS PRESENT NOT DISTENDED No hepatosplenomegaly PEDAL EDEMA present No calf tenderness  NO rash   GENERAL Not TOXIC looking    PT DATA/BEST PRACTICE  ALLERGY  Pcn sulfa azactam augmentin                   WT  7/27/2020 70                                    BMI    7/27/2020 31                         CrCl                                  ADVANCED DIRECTIVE     LINES TUBES POA.               HEAD OF BED ELEVATION Yes      INFECTION PPLX        DVT PROPHYLAXIS           SAUER PROPHYLAXIS          DYSPHAGIA EVAL     DIET.     npo (7/27/2020)  --> clear (7/29)                                                                      IV F     lr 75 (7/29)     PATIENT DATA/ASSESSMENT/RECOMMENDATIONS       INFECTION  AC CHOLECYSTITIS SP DORIE 7/28  ASP PNEUMONIA  7/26/2020 Surgery eval was called   7/26/2020 Doubt incentive spirometry will be done by pt   Pt has ac chol based on us and ct and is on meropenem (7/26)   A/R npo iv fluids surg eval iv ab      ELEVATED D-dimer   ELEVATED D-dimr poa 7/26/2020 D-dimr 715     V duplx 7/26/2020 v duplx Neg-ric                               BNP 7/26/2020 BNP 1322    A/R Likely false pos+ric  If desaturates will get cta ch     RESP   Monitor po   Target po 90-95%    CHF  BNP 7/26/2020 BNP 1322    Check echo           HEMODYNAMICS   Stable                                NEURO PSYCH   Resume meds         TIME SPENT Over 25 minutes aggregate care time spent on encounter; activities included combinations of  direct pt care, counseling and/or coordinating care reviewing notes, lab data/ imaging, discussion with multidisciplinary team/ pt /family. Risks, benefits, alternatives of planned interventions when applicable were discussed in detail and questions answered as best as possible    AYANNA VELOZ [facility]  DOA 7/26/2020  ALLERGY Pcn sulfa azactam augmentin

## 2020-07-29 NOTE — DIETITIAN INITIAL EVALUATION ADULT. - OTHER INFO
65 year old male seen in bed with aide from facility at bedside. patient is NPO status post lap ena yesterday. patient is Non verbal with history MRDD, HTN  per group home patient on dash tlc 1" cut up thin liquids diet PTA. no edema no food allergies on LR 75ml hr Na 146.

## 2020-07-29 NOTE — PROGRESS NOTE ADULT - SUBJECTIVE AND OBJECTIVE BOX
ROSELIA NARAYAN    \[facility] [place] [facility]\"" TELN [address]    Patient is a 65y old  Male who presents with a chief complaint of fever 101 (28 Jul 2020 23:39)       Allergies    Augmentin (Unknown)  aztreonam (Rash)  penicillin (Unknown)  sulfa drugs (Unknown)    Intolerances        HPI:      PAST MEDICAL & SURGICAL HISTORY:  Constipation, unspecified constipation type  Blind: right eye  DD (diastrophic dysplasia)  Psoriasis  HTN (hypertension)  Seizure  MR (mental retardation), severe  No significant past surgical history      FAMILY HISTORY:  No pertinent family history in first degree relatives        MEDICATIONS   acetaminophen   Tablet .. 650 milliGRAM(s) Oral every 4 hours PRN  aspirin enteric coated 81 milliGRAM(s) Oral daily  clonazePAM  Tablet 0.5 milliGRAM(s) Oral two times a day  doxazosin 1 milliGRAM(s) Oral at bedtime  enoxaparin Injectable 40 milliGRAM(s) SubCutaneous daily  fluvoxaMINE 100 milliGRAM(s) Oral two times a day  glucagon  Injectable 1 milliGRAM(s) IntraMuscular once PRN  lactated ringers. 1000 milliLiter(s) IV Continuous <Continuous>  levothyroxine Injectable 37.5 MICROGram(s) IV Push at bedtime  meropenem  IVPB 1000 milliGRAM(s) IV Intermittent every 8 hours  metoprolol tartrate 25 milliGRAM(s) Oral two times a day  pantoprazole  Injectable 40 milliGRAM(s) IV Push daily  tamsulosin 0.4 milliGRAM(s) Oral at bedtime  valproate sodium IVPB 250 milliGRAM(s) IV Intermittent three times a day      Vital Signs Last 24 Hrs  T(C): 36.8 (29 Jul 2020 04:56), Max: 38.9 (28 Jul 2020 09:22)  T(F): 98.2 (29 Jul 2020 04:56), Max: 102.1 (28 Jul 2020 09:22)  HR: 72 (29 Jul 2020 04:56) (71 - 90)  BP: 136/77 (29 Jul 2020 04:56) (121/69 - 148/71)  BP(mean): --  RR: 20 (29 Jul 2020 04:56) (15 - 20)  SpO2: 92% (29 Jul 2020 04:56) (89% - 96%)      07-27-20 @ 07:01  -  07-28-20 @ 07:00  --------------------------------------------------------  IN: 1046.6 mL / OUT: 1500 mL / NET: -453.4 mL    07-28-20 @ 07:01  -  07-29-20 @ 06:54  --------------------------------------------------------  IN: 1480 mL / OUT: 550 mL / NET: 930 mL            LABS:                        12.2   11.95 )-----------( 188      ( 28 Jul 2020 07:06 )             35.1     07-28    146<H>  |  112<H>  |  13  ----------------------------<  91  3.6   |  30  |  0.78    Ca    8.5      28 Jul 2020 07:06  Phos  1.6     07-27    TPro  6.8  /  Alb  2.3<L>  /  TBili  0.6  /  DBili  x   /  AST  45<H>  /  ALT  33  /  AlkPhos  71  07-28    PT/INR - ( 27 Jul 2020 07:18 )   PT: 13.2 sec;   INR: 1.14 ratio                   WBC:  WBC Count: 11.95 K/uL (07-28 @ 07:06)  WBC Count: 16.04 K/uL (07-27 @ 07:17)  WBC Count: 20.51 K/uL (07-26 @ 12:41)      MICROBIOLOGY:  RECENT CULTURES:  07-26 .Urine Clean Catch (Midstream) XXXX XXXX   No growth    07-26 .Blood Blood-Peripheral XXXX XXXX   No growth to date.                PT/INR - ( 27 Jul 2020 07:18 )   PT: 13.2 sec;   INR: 1.14 ratio             Sodium:  Sodium, Serum: 146 mmol/L (07-28 @ 07:06)  Sodium, Serum: 145 mmol/L (07-27 @ 07:17)  Sodium, Serum: 137 mmol/L (07-26 @ 12:41)      0.78 mg/dL 07-28 @ 07:06  0.85 mg/dL 07-27 @ 07:17  1.20 mg/dL 07-26 @ 12:41      Hemoglobin:  Hemoglobin: 12.2 g/dL (07-28 @ 07:06)  Hemoglobin: 13.1 g/dL (07-27 @ 07:17)  Hemoglobin: 13.0 g/dL (07-26 @ 12:41)      Platelets: Platelet Count - Automated: 188 K/uL (07-28 @ 07:06)  Platelet Count - Automated: 156 K/uL (07-27 @ 07:17)  Platelet Count - Automated: 174 K/uL (07-26 @ 12:41)      LIVER FUNCTIONS - ( 28 Jul 2020 07:06 )  Alb: 2.3 g/dL / Pro: 6.8 g/dL / ALK PHOS: 71 U/L / ALT: 33 U/L / AST: 45 U/L / GGT: x                 RADIOLOGY & ADDITIONAL STUDIES:

## 2020-07-29 NOTE — PROGRESS NOTE ADULT - SUBJECTIVE AND OBJECTIVE BOX
SURGERY    SUBJECTIVE:   Admitted with fever, RUQ pain, found to have distended gallbladder with stones consistent with acute cholecystitis.  POD #1 for lap cholecystectomy.  Patient with severe MR, non-contributory to ROS.    OBJECTIVE:   T(F): 100.9 (07-29-20 @ 12:26), Max: 100.9 (07-29-20 @ 12:26)  HR: 71 (07-29-20 @ 12:26) (71 - 90)  BP: 132/74 (07-29-20 @ 12:26) (124/52 - 145/68)  RR: 18 (07-29-20 @ 12:26) (15 - 20)  SpO2: 90% (07-29-20 @ 12:26) (90% - 96%)  CAPILLARY BLOOD GLUCOSE      POCT Blood Glucose.: 137 mg/dL (29 Jul 2020 11:51)  POCT Blood Glucose.: 158 mg/dL (29 Jul 2020 07:52)  POCT Blood Glucose.: 145 mg/dL (28 Jul 2020 23:54)  POCT Blood Glucose.: 108 mg/dL (28 Jul 2020 17:56)    I&O's Detail    28 Jul 2020 07:01  -  29 Jul 2020 07:00  --------------------------------------------------------  IN:    dextrose 5% + sodium chloride 0.9%: 880 mL    lactated ringers.: 150 mL    lactated ringers.: 150 mL    Solution: 150 mL    Solution: 150 mL  Total IN: 1480 mL    OUT:    Incontinent per Condom Catheter: 550 mL  Total OUT: 550 mL    Total NET: 930 mL          Physical exam:  General: NAD  Abdomen: soft, distended (though does not represent a change per nursing), +BS  Incision: clean, dry and intact, no s/sx of infection  Extremities: no edema noted, warm,  no calf tenderness     MEDICATIONS  (STANDING):  aspirin enteric coated 81 milliGRAM(s) Oral daily  clonazePAM  Tablet 0.5 milliGRAM(s) Oral two times a day  doxazosin 1 milliGRAM(s) Oral at bedtime  enoxaparin Injectable 40 milliGRAM(s) SubCutaneous daily  fluvoxaMINE 100 milliGRAM(s) Oral two times a day  lactated ringers. 1000 milliLiter(s) (75 mL/Hr) IV Continuous <Continuous>  levothyroxine Injectable 37.5 MICROGram(s) IV Push at bedtime  meropenem  IVPB 1000 milliGRAM(s) IV Intermittent every 8 hours  metoprolol tartrate 25 milliGRAM(s) Oral two times a day  pantoprazole  Injectable 40 milliGRAM(s) IV Push daily  tamsulosin 0.4 milliGRAM(s) Oral at bedtime  valproate sodium IVPB 250 milliGRAM(s) IV Intermittent three times a day    MEDICATIONS  (PRN):  acetaminophen   Tablet .. 650 milliGRAM(s) Oral every 4 hours PRN Temp greater or equal to 38C (100.4F), Mild Pain (1 - 3)  glucagon  Injectable 1 milliGRAM(s) IntraMuscular once PRN Glucose LESS THAN 70 milligrams/deciliter      LABS:                        12.2   11.95 )-----------( 188      ( 28 Jul 2020 07:06 )             35.1     07-28    146<H>  |  112<H>  |  13  ----------------------------<  91  3.6   |  30  |  0.78    Ca    8.5      28 Jul 2020 07:06    TPro  6.8  /  Alb  2.3<L>  /  TBili  0.6  /  DBili  x   /  AST  45<H>  /  ALT  33  /  AlkPhos  71  07-28      Assessment  Patient is a 65y old  Male POD #1 for laparoscopic cholecystectomy  - continue DVT prophylaxis  - continue abx as per ID  - Ok to start diet from surgical standpoint, recommend speech and swallow evaluation.  -Case D/W Dr. Fischer    Surgical Team Contact Information [phone]    SUBJECTIVE:   Admitted with fever, RUQ pain, found to have distended gallbladder with stones consistent with acute cholecystitis.  POD #1 for lap cholecystectomy.  Patient with severe MR, non-contributory to ROS.    OBJECTIVE:   T(F): 100.9 (07-29-20 @ 12:26), Max: 100.9 (07-29-20 @ 12:26)  HR: 71 (07-29-20 @ 12:26) (71 - 90)  BP: 132/74 (07-29-20 @ 12:26) (124/52 - 145/68)  RR: 18 (07-29-20 @ 12:26) (15 - 20)  SpO2: 90% (07-29-20 @ 12:26) (90% - 96%)  CAPILLARY BLOOD GLUCOSE      POCT Blood Glucose.: 137 mg/dL (29 Jul 2020 11:51)  POCT Blood Glucose.: 158 mg/dL (29 Jul 2020 07:52)  POCT Blood Glucose.: 145 mg/dL (28 Jul 2020 23:54)  POCT Blood Glucose.: 108 mg/dL (28 Jul 2020 17:56)    I&O's Detail    28 Jul 2020 07:01  -  29 Jul 2020 07:00  --------------------------------------------------------  IN:    dextrose 5% + sodium chloride 0.9%: 880 mL    lactated ringers.: 150 mL    lactated ringers.: 150 mL    Solution: 150 mL    Solution: 150 mL  Total IN: 1480 mL    OUT:    Incontinent per Condom Catheter: 550 mL  Total OUT: 550 mL    Total NET: 930 mL          Physical exam:  General: NAD  Abdomen: soft, distended (though does not represent a change per nursing), +BS  Incision: clean, dry and intact, no s/sx of infection  Extremities: no edema noted, warm,  no calf tenderness     MEDICATIONS  (STANDING):  aspirin enteric coated 81 milliGRAM(s) Oral daily  clonazePAM  Tablet 0.5 milliGRAM(s) Oral two times a day  doxazosin 1 milliGRAM(s) Oral at bedtime  enoxaparin Injectable 40 milliGRAM(s) SubCutaneous daily  fluvoxaMINE 100 milliGRAM(s) Oral two times a day  lactated ringers. 1000 milliLiter(s) (75 mL/Hr) IV Continuous <Continuous>  levothyroxine Injectable 37.5 MICROGram(s) IV Push at bedtime  meropenem  IVPB 1000 milliGRAM(s) IV Intermittent every 8 hours  metoprolol tartrate 25 milliGRAM(s) Oral two times a day  pantoprazole  Injectable 40 milliGRAM(s) IV Push daily  tamsulosin 0.4 milliGRAM(s) Oral at bedtime  valproate sodium IVPB 250 milliGRAM(s) IV Intermittent three times a day    MEDICATIONS  (PRN):  acetaminophen   Tablet .. 650 milliGRAM(s) Oral every 4 hours PRN Temp greater or equal to 38C (100.4F), Mild Pain (1 - 3)  glucagon  Injectable 1 milliGRAM(s) IntraMuscular once PRN Glucose LESS THAN 70 milligrams/deciliter      LABS:                        12.2   11.95 )-----------( 188      ( 28 Jul 2020 07:06 )             35.1     07-28    146<H>  |  112<H>  |  13  ----------------------------<  91  3.6   |  30  |  0.78    Ca    8.5      28 Jul 2020 07:06    TPro  6.8  /  Alb  2.3<L>  /  TBili  0.6  /  DBili  x   /  AST  45<H>  /  ALT  33  /  AlkPhos  71  07-28      Assessment  Patient is a 65y old  Male POD #1 for laparoscopic cholecystectomy  - continue DVT prophylaxis  - continue abx as per ID  - Ok to start diet from surgical standpoint  -Case D/W Dr. Fischer    Surgical Team Contact Information [phone]

## 2020-07-29 NOTE — DIETITIAN INITIAL EVALUATION ADULT. - REASON INDICATOR FOR ASSESSMENT
October 26, 2017      Elsy Melgoza MD  [address] [facility] [address] [place] [address]           [facility]  [address] [facility] [address] [place] [address]  Phone: [phone]  Fax: [phone]          Patient: Rhona An   MR Number: [id number]   YOB: 1954   Date of Visit: 10/26/2017       Dear Dr. Elsy Melgoza:    Thank you for referring Rhona An to me for evaluation. Attached you will find relevant portions of my assessment and plan of care.    If you have questions, please do not hesitate to call me. I look forward to following Rhona An along with you.    Sincerely,    DO Rashi Augustineosure  CC:  No Recipients    If you would like to receive this communication electronically, please contact [email or web address] or [phone] to request more information on [employer] access.    For providers and/or their staff who would like to refer a patient to [facility], please contact us through our one-stop-shop provider referral line, [facility], at [phone].    If you feel you have received this communication in error or would no longer like to receive these types of communications, please e-mail [email or web address]         
NPO for 3 days

## 2020-07-30 LAB
ALBUMIN SERPL ELPH-MCNC: 2.2 G/DL — LOW (ref 3.3–5)
ALP SERPL-CCNC: 60 U/L — SIGNIFICANT CHANGE UP (ref 40–120)
ALT FLD-CCNC: 58 U/L — SIGNIFICANT CHANGE UP (ref 12–78)
ANION GAP SERPL CALC-SCNC: 6 MMOL/L — SIGNIFICANT CHANGE UP (ref 5–17)
AST SERPL-CCNC: 60 U/L — HIGH (ref 15–37)
BILIRUB SERPL-MCNC: 0.3 MG/DL — SIGNIFICANT CHANGE UP (ref 0.2–1.2)
BUN SERPL-MCNC: 29 MG/DL — HIGH (ref 7–23)
CALCIUM SERPL-MCNC: 8.4 MG/DL — LOW (ref 8.5–10.1)
CHLORIDE SERPL-SCNC: 111 MMOL/L — HIGH (ref 96–108)
CO2 SERPL-SCNC: 27 MMOL/L — SIGNIFICANT CHANGE UP (ref 22–31)
CREAT SERPL-MCNC: 0.91 MG/DL — SIGNIFICANT CHANGE UP (ref 0.5–1.3)
CULTURE RESULTS: NO GROWTH — SIGNIFICANT CHANGE UP
GLUCOSE SERPL-MCNC: 123 MG/DL — HIGH (ref 70–99)
POTASSIUM SERPL-MCNC: 3.7 MMOL/L — SIGNIFICANT CHANGE UP (ref 3.5–5.3)
POTASSIUM SERPL-SCNC: 3.7 MMOL/L — SIGNIFICANT CHANGE UP (ref 3.5–5.3)
PROT SERPL-MCNC: 6.8 G/DL — SIGNIFICANT CHANGE UP (ref 6–8.3)
SODIUM SERPL-SCNC: 144 MMOL/L — SIGNIFICANT CHANGE UP (ref 135–145)
SPECIMEN SOURCE: SIGNIFICANT CHANGE UP

## 2020-07-30 RX ORDER — IPRATROPIUM/ALBUTEROL SULFATE 18-103MCG
1.5 AEROSOL WITH ADAPTER (GRAM) INHALATION EVERY 8 HOURS
Refills: 0 | Status: DISCONTINUED | OUTPATIENT
Start: 2020-07-30 | End: 2020-08-04

## 2020-07-30 RX ADMIN — PANTOPRAZOLE SODIUM 40 MILLIGRAM(S): 20 TABLET, DELAYED RELEASE ORAL at 11:30

## 2020-07-30 RX ADMIN — FLUVOXAMINE MALEATE 100 MILLIGRAM(S): 25 TABLET ORAL at 17:22

## 2020-07-30 RX ADMIN — Medication 650 MILLIGRAM(S): at 06:15

## 2020-07-30 RX ADMIN — Medication 650 MILLIGRAM(S): at 20:36

## 2020-07-30 RX ADMIN — Medication 25 MILLIGRAM(S): at 17:22

## 2020-07-30 RX ADMIN — Medication 37.5 MICROGRAM(S): at 22:00

## 2020-07-30 RX ADMIN — MEROPENEM 100 MILLIGRAM(S): 1 INJECTION INTRAVENOUS at 05:46

## 2020-07-30 RX ADMIN — FLUVOXAMINE MALEATE 100 MILLIGRAM(S): 25 TABLET ORAL at 05:46

## 2020-07-30 RX ADMIN — TAMSULOSIN HYDROCHLORIDE 0.4 MILLIGRAM(S): 0.4 CAPSULE ORAL at 22:00

## 2020-07-30 RX ADMIN — Medication 1 MILLIGRAM(S): at 22:00

## 2020-07-30 RX ADMIN — MEROPENEM 100 MILLIGRAM(S): 1 INJECTION INTRAVENOUS at 13:50

## 2020-07-30 RX ADMIN — Medication 26.25 MILLIGRAM(S): at 05:46

## 2020-07-30 RX ADMIN — Medication 26.25 MILLIGRAM(S): at 13:50

## 2020-07-30 RX ADMIN — Medication 81 MILLIGRAM(S): at 11:30

## 2020-07-30 RX ADMIN — Medication 1.5 MILLILITER(S): at 20:19

## 2020-07-30 RX ADMIN — Medication 26.25 MILLIGRAM(S): at 22:00

## 2020-07-30 RX ADMIN — Medication 650 MILLIGRAM(S): at 05:46

## 2020-07-30 RX ADMIN — Medication 0.5 MILLIGRAM(S): at 05:46

## 2020-07-30 RX ADMIN — ENOXAPARIN SODIUM 40 MILLIGRAM(S): 100 INJECTION SUBCUTANEOUS at 11:30

## 2020-07-30 RX ADMIN — Medication 0.5 MILLIGRAM(S): at 17:22

## 2020-07-30 RX ADMIN — MEROPENEM 100 MILLIGRAM(S): 1 INJECTION INTRAVENOUS at 22:00

## 2020-07-30 NOTE — PROGRESS NOTE ADULT - ASSESSMENT
65M with HTN.  Echo shows normal LVEF.   S/p cholecystectomy POD 2.      Suggest:  1. Continue with abx.  Low grade temp 100.1.  Stable HR and BP.   2. Continue ASA, metoprolol 25 mg BID; DVT prohylaxis  3. IVF as necessary    4. Will follow

## 2020-07-30 NOTE — PROGRESS NOTE ADULT - ATTENDING COMMENTS
I have personally seen, examined, and participated in the care of this patient. I have reviewed all pertinent clinical information, including history, physical exam, plan, and the PA's note and agree except as noted.    S/P Lap Tram.  Appears to have returned to baseline.  Tolerating clears.  Did not want solid food today as per aide at bedside.  Surgical sites remain clean.  Labs appropriate.    Stable from surgical standpoint.  Advance diet as tolerated  No contraindication to discharge home when deemed appropriate by Medical service.  Return to [facility].
I have personally seen, examined, and participated in the care of this patient. I have reviewed all pertinent clinical information, including history, physical exam, plan, and the PA's note and agree except as noted.    Pt seen and examined by me this afternoon.  Uncooperative with examination but not different than baseline.  Surgical sites clean and dry.  No erythema, induration or fluctuance.   Vitals remain stable.  Abdomen soft.  Good bowel sounds.  WBC and Chemistry stable.  Labs as expected without significant increase.    Advance diet as tolerated.  Antibiotic management as per ID.  With regards to cholecystitis, he should not need additional antibiotics beyond today's doses.  No indication additional surgical intervention is indicated.    Discharge planning.
lidds number [id number]

## 2020-07-30 NOTE — PROGRESS NOTE ADULT - PROBLEM SELECTOR PLAN 1
post op day 2  eating  low grade temp  most likely atelectasis  physical therapy saw patient,,,refused to get out bed  will reattempt tomorrow post op day 2  eating  low grade temp  most likely atelectasis  albuterol half strength 3x day  physical therapy saw patient,,,refused to get out bed  will reattempt tomorrow

## 2020-07-30 NOTE — PROGRESS NOTE ADULT - SUBJECTIVE AND OBJECTIVE BOX
SURGERY PA NOTE ON BEHALF OF DR. PRETTY / GENERAL SURGERY:    S: Patient seen and examined at bedside.  Unable to elicit responses 2/2   Appears comfortable, in NAD.      MEDICATIONS:  acetaminophen   Tablet .. 650 milliGRAM(s) Oral every 4 hours PRN  aspirin enteric coated 81 milliGRAM(s) Oral daily  clonazePAM  Tablet 0.5 milliGRAM(s) Oral two times a day  doxazosin 1 milliGRAM(s) Oral at bedtime  enoxaparin Injectable 40 milliGRAM(s) SubCutaneous daily  fluvoxaMINE 100 milliGRAM(s) Oral two times a day  glucagon  Injectable 1 milliGRAM(s) IntraMuscular once PRN  levothyroxine Injectable 37.5 MICROGram(s) IV Push at bedtime  meropenem  IVPB 1000 milliGRAM(s) IV Intermittent every 8 hours  metoprolol tartrate 25 milliGRAM(s) Oral two times a day  pantoprazole  Injectable 40 milliGRAM(s) IV Push daily  tamsulosin 0.4 milliGRAM(s) Oral at bedtime  valproate sodium IVPB 250 milliGRAM(s) IV Intermittent three times a day      O:  Vital Signs Last 24 Hrs  T(C): 36.6 (30 Jul 2020 12:05), Max: 38.1 (29 Jul 2020 18:13)  T(F): 97.8 (30 Jul 2020 12:05), Max: 100.6 (29 Jul 2020 18:13)  HR: 80 (30 Jul 2020 12:05) (60 - 96)  BP: 138/78 (30 Jul 2020 12:05) (105/63 - 141/83)  BP(mean): --  RR: 18 (30 Jul 2020 12:05) (18 - 18)  SpO2: 94% (30 Jul 2020 12:05) (91% - 94%)    I&O SUMMARY:    07-29-20 @ 07:01  -  07-30-20 @ 07:00  --------------------------------------------------------  IN: 1990 mL / OUT: 650 mL / NET: 1340 mL        PHYSICAL EXAM:  Lungs: CTA bilat  Card: S1S2  Abd: Soft, NT, ND.  Wounds C/D/I without discoloration/drainage/erythema.  +BS x 4.   Ext: Calves soft, NT without edema bilat LE    LABS:                        10.8   10.66 )-----------( 212      ( 30 Jul 2020 08:42 )             31.7     07-30    144  |  111<H>  |  29<H>  ----------------------------<  123<H>  3.7   |  27  |  0.91    Ca    8.4<L>      30 Jul 2020 07:28    TPro  6.8  /  Alb  2.2<L>  /  TBili  0.3  /  DBili  x   /  AST  60<H>  /  ALT  58  /  AlkPhos  60  07-30            A: 65-yo male with severe MR, POD#2 s/p laparoscopic cholecystectomy    P: Stable & doing well from surgical perspective - no further intervention necessary      S&S consult pending - diet per medicine      Continue care per medicine      Discussed with Dr. Pretty       D/C planning

## 2020-07-30 NOTE — PROGRESS NOTE ADULT - SUBJECTIVE AND OBJECTIVE BOX
PAST MEDICAL & SURGICAL HISTORY:  Constipation, unspecified constipation type  Blind: right eye  DD (diastrophic dysplasia)  Psoriasis  HTN (hypertension)  Seizure  MR (mental retardation), severe  No significant past surgical history      MEDICATIONS  (STANDING):  aspirin enteric coated 81 milliGRAM(s) Oral daily  clonazePAM  Tablet 0.5 milliGRAM(s) Oral two times a day  doxazosin 1 milliGRAM(s) Oral at bedtime  enoxaparin Injectable 40 milliGRAM(s) SubCutaneous daily  fluvoxaMINE 100 milliGRAM(s) Oral two times a day  levothyroxine Injectable 37.5 MICROGram(s) IV Push at bedtime  meropenem  IVPB 1000 milliGRAM(s) IV Intermittent every 8 hours  metoprolol tartrate 25 milliGRAM(s) Oral two times a day  pantoprazole  Injectable 40 milliGRAM(s) IV Push daily  tamsulosin 0.4 milliGRAM(s) Oral at bedtime  valproate sodium IVPB 250 milliGRAM(s) IV Intermittent three times a day    MEDICATIONS  (PRN):  acetaminophen   Tablet .. 650 milliGRAM(s) Oral every 4 hours PRN Temp greater or equal to 38C (100.4F), Mild Pain (1 - 3)  glucagon  Injectable 1 milliGRAM(s) IntraMuscular once PRN Glucose LESS THAN 70 milligrams/deciliter      Patient is a 65y old  Male who presents with a chief complaint of fever 101 (30 Jul 2020 17:51)      Vital Signs Last 24 Hrs  T(C): 37.8 (30 Jul 2020 15:50), Max: 38.1 (30 Jul 2020 05:55)  T(F): 100.1 (30 Jul 2020 15:50), Max: 100.5 (30 Jul 2020 05:55)  HR: 111 (30 Jul 2020 15:50) (60 - 111)  BP: 151/77 (30 Jul 2020 15:50) (107/56 - 151/77)  BP(mean): --  RR: 18 (30 Jul 2020 15:50) (18 - 18)  SpO2: 94% (30 Jul 2020 15:50) (91% - 94%)          07-29 @ 07:01  -  07-30 @ 07:00  --------------------------------------------------------  IN: 1990 mL / OUT: 650 mL / NET: 1340 mL    07-30 @ 07:01 - 07-30 @ 18:36  --------------------------------------------------------  IN: 650 mL / OUT: 500 mL / NET: 150 mL        REVIEW OF SYSTEMS:    Constitutional: No fever, weight loss or fatigue  Eyes: No eye pain, visual disturbances, or discharge  ENT:  No difficulty hearing, tinnitus, vertigo; No sinus or throat pain  Neck: No pain or stiffness  Breasts: No pain, masses or nipple discharge  Respiratory: No cough, wheezing, chills or hemoptysis  Cardiovascular: No chest pain, palpitations, shortness of breath, dizziness or leg swelling  Gastrointestinal: No abdominal or epigastric pain. No nausea, vomiting or hematemesis; No diarrhea or constipation. No melena or hematochezia.  Genitourinary: No dysuria, frequency, hematuria or incontinence  Rectal: No pain, hemorrhoids or incontinence  Neurological: No headaches, memory loss, loss of strength, numbness or tremors  Skin: No itching, burning, rashes or lesions   Lymph Nodes: No enlarged glands  Endocrine: No heat or cold intolerance; No hair loss  Musculoskeletal: No joint pain or swelling; No muscle, back or extremity pain  Psychiatric: No depression, anxiety, mood swings or difficulty sleeping  Heme/Lymph: No easy bruising or bleeding gums  Allergy and Immunologic: No hives or eczema    PHYSICAL EXAM:    Constitutional: NAD, well-groomed, well-developed  HEENT: PERRLA, EOMI, Normal Hearing, MMM  Neck: No LAD, No JVD  Back: Normal spine flexure, No CVA tenderness  Respiratory: CTAB/L   Cardiovascular: S1 and S2, RRR, no M/G/R  Gastrointestinal: BS+, soft, NT/ND  Extremities: No peripheral edema  Vascular: 2+ peripheral pulses  Neurological: A/O x 3, no focal deficits  Skin: No rashes      decubiti:                           10.8   10.66 )-----------( 212      ( 30 Jul 2020 08:42 )             31.7     07-30    144  |  111<H>  |  29<H>  ----------------------------<  123<H>  3.7   |  27  |  0.91    Ca    8.4<L>      30 Jul 2020 07:28    TPro  6.8  /  Alb  2.2<L>  /  TBili  0.3  /  DBili  x   /  AST  60<H>  /  ALT  58  /  AlkPhos  60  07-30              .Urine Catheterized  07-29 @ 21:49   No growth  --  --      .Urine Clean Catch (Midstream)  07-26 @ 21:04   No growth  --  --      .Blood Blood-Peripheral  07-26 @ 17:55   No growth to date.  --  --        Urine Culture:  07-29 @ 21:49    --        No growth  Urine Culture:  07-26 @ 21:04    --        No growth  Urine Culture:  07-26 @ 17:55    --        No growth to date.        Radiology: PAST MEDICAL & SURGICAL HISTORY:  Constipation, unspecified constipation type  Blind: right eye  DD (diastrophic dysplasia)  Psoriasis  HTN (hypertension)  Seizure  MR (mental retardation), severe  No significant past surgical history      MEDICATIONS  (STANDING):  aspirin enteric coated 81 milliGRAM(s) Oral daily  clonazePAM  Tablet 0.5 milliGRAM(s) Oral two times a day  doxazosin 1 milliGRAM(s) Oral at bedtime  enoxaparin Injectable 40 milliGRAM(s) SubCutaneous daily  fluvoxaMINE 100 milliGRAM(s) Oral two times a day  levothyroxine Injectable 37.5 MICROGram(s) IV Push at bedtime  meropenem  IVPB 1000 milliGRAM(s) IV Intermittent every 8 hours  metoprolol tartrate 25 milliGRAM(s) Oral two times a day  pantoprazole  Injectable 40 milliGRAM(s) IV Push daily  tamsulosin 0.4 milliGRAM(s) Oral at bedtime  valproate sodium IVPB 250 milliGRAM(s) IV Intermittent three times a day    MEDICATIONS  (PRN):  acetaminophen   Tablet .. 650 milliGRAM(s) Oral every 4 hours PRN Temp greater or equal to 38C (100.4F), Mild Pain (1 - 3)  glucagon  Injectable 1 milliGRAM(s) IntraMuscular once PRN Glucose LESS THAN 70 milligrams/deciliter      Patient is a 65y old  Male who presents with a chief complaint of fever 101 (30 Jul 2020 17:51)      Vital Signs Last 24 Hrs  T(C): 37.8 (30 Jul 2020 15:50), Max: 38.1 (30 Jul 2020 05:55)  T(F): 100.1 (30 Jul 2020 15:50), Max: 100.5 (30 Jul 2020 05:55)  HR: 111 (30 Jul 2020 15:50) (60 - 111)  BP: 151/77 (30 Jul 2020 15:50) (107/56 - 151/77)  BP(mean): --  RR: 18 (30 Jul 2020 15:50) (18 - 18)  SpO2: 94% (30 Jul 2020 15:50) (91% - 94%)          07-29 @ 07:01  -  07-30 @ 07:00  --------------------------------------------------------  IN: 1990 mL / OUT: 650 mL / NET: 1340 mL    07-30 @ 07:01 - 07-30 @ 18:36  --------------------------------------------------------  IN: 650 mL / OUT: 500 mL / NET: 150 mL        REVIEW OF SYSTEMS:    Constitutional: No fever, weight loss or fatigue  Eyes: No eye pain, visual disturbances, or discharge  ENT:  No difficulty hearing, tinnitus, vertigo; No sinus or throat pain  Neck: No pain or stiffness  Breasts: No pain, masses or nipple discharge  Respiratory: No cough, wheezing, chills or hemoptysis  Cardiovascular: No chest pain, palpitations, shortness of breath, dizziness or leg swelling  Gastrointestinal: No abdominal or epigastric pain. No nausea, vomiting or hematemesis; No diarrhea or constipation. No melena or hematochezia.  Genitourinary: No dysuria, frequency, hematuria or incontinence  Rectal: No pain, hemorrhoids or incontinence  Neurological: No headaches, memory loss, loss of strength, numbness or tremors  Skin: No itching, burning, rashes or lesions   Lymph Nodes: No enlarged glands  Endocrine: No heat or cold intolerance; No hair loss  Musculoskeletal: No joint pain or swelling; No muscle, back or extremity pain  Psychiatric: No depression, anxiety, mood swings or difficulty sleeping  Heme/Lymph: No easy bruising or bleeding gums  Allergy and Immunologic: No hives or eczema    PHYSICAL EXAM:  temp 100.1  refusing to get outbed  ate his lunch dinner///voraciously  Constitutional: NAD,   Respiratory: dec breath sounds   Cardiovascular: S1 and S2, RRR, no M/G/R  Gastrointestinal: BS+, soft, NT/ND  Extremities: No peripheral edema  Neurological: A, no focal deficits  Skin: No rashes      decubiti: none                          10.8   10.66 )-----------( 212      ( 30 Jul 2020 08:42 )             31.7     07-30    144  |  111<H>  |  29<H>  ----------------------------<  123<H>  3.7   |  27  |  0.91    Ca    8.4<L>      30 Jul 2020 07:28    TPro  6.8  /  Alb  2.2<L>  /  TBili  0.3  /  DBili  x   /  AST  60<H>  /  ALT  58  /  AlkPhos  60  07-30              .Urine Catheterized  07-29 @ 21:49   No growth  --  --      .Urine Clean Catch (Midstream)  07-26 @ 21:04   No growth  --  --      .Blood Blood-Peripheral  07-26 @ 17:55   No growth to date.  --  --        Urine Culture:  07-29 @ 21:49    --        No growth  Urine Culture:  07-26 @ 21:04    --        No growth  Urine Culture:  07-26 @ 17:55    --        No growth to date.        Radiology:

## 2020-07-30 NOTE — PROGRESS NOTE ADULT - SUBJECTIVE AND OBJECTIVE BOX
SYLVAINROSELIA is a 65yMale , patient examined and chart reviewed.     INTERVAL HPI/ OVERNIGHT EVENTS:   Febrile. Tmax 100.6. NAD.     PAST MEDICAL & SURGICAL HISTORY:  Constipation, unspecified constipation type  Blind: right eye  DD (diastrophic dysplasia)  Psoriasis  HTN (hypertension)  Seizure  MR (mental retardation), severe  No significant past surgical history      For details regarding the patient's social history, family history, and other miscellaneous elements, please refer the initial infectious diseases consultation and/or the admitting history and physical examination for this admission.    ROS:  Unable to obtain due to : MRDD nonverbal    ALLERGIES:  Augmentin (Unknown)  aztreonam (Rash)  penicillin (Unknown)  sulfa drugs (Unknown)      Current inpatient medications :    ANTIBIOTICS/RELEVANT:  meropenem  IVPB 1000 milliGRAM(s) IV Intermittent every 8 hours    MEDICATIONS  (STANDING):  aspirin enteric coated 81 milliGRAM(s) Oral daily  clonazePAM  Tablet 0.5 milliGRAM(s) Oral two times a day  doxazosin 1 milliGRAM(s) Oral at bedtime  enoxaparin Injectable 40 milliGRAM(s) SubCutaneous daily  fluvoxaMINE 100 milliGRAM(s) Oral two times a day  lactated ringers. 1000 milliLiter(s) (75 mL/Hr) IV Continuous <Continuous>  levothyroxine Injectable 37.5 MICROGram(s) IV Push at bedtime  metoprolol tartrate 25 milliGRAM(s) Oral two times a day  pantoprazole  Injectable 40 milliGRAM(s) IV Push daily  tamsulosin 0.4 milliGRAM(s) Oral at bedtime  valproate sodium IVPB 250 milliGRAM(s) IV Intermittent three times a day    MEDICATIONS  (PRN):  acetaminophen   Tablet .. 650 milliGRAM(s) Oral every 4 hours PRN Temp greater or equal to 38C (100.4F), Mild Pain (1 - 3)  glucagon  Injectable 1 milliGRAM(s) IntraMuscular once PRN Glucose LESS THAN 70 milligrams/deciliter      Objective:  Vital Signs Last 24 Hrs  T(C): 36.6 (30 Jul 2020 12:05), Max: 38.1 (29 Jul 2020 18:13)  T(F): 97.8 (30 Jul 2020 12:05), Max: 100.6 (29 Jul 2020 18:13)  HR: 80 (30 Jul 2020 12:05) (60 - 96)  BP: 138/78 (30 Jul 2020 12:05) (105/63 - 141/83)  RR: 18 (30 Jul 2020 12:05) (18 - 18)  SpO2: 94% (30 Jul 2020 12:05) (91% - 94%)    Physical Exam:  General:  no acute distress  Eyes: sclera anicteric, pupils equal and reactive to light  ENMT: buccal mucosa moist, pharynx not injected  Neck: supple, trachea midline  Lungs: clear, no wheeze/rhonchi  Cardiovascular: regular rate and rhythm, S1 S2  Abdomen: soft, tender, distended, bowel sounds normal  Neurological: awake MRDD nonverbal  Skin: no increased ecchymosis/petechiae/purpura  Lymph Nodes: no palpable cervical/supraclavicular lymph nodes enlargements  Extremities: no cyanosis/clubbing/edema      LABS:                        12.2   11.95 )-----------( 188      ( 28 Jul 2020 07:06 )             35.1       07-28    146<H>  |  112<H>  |  13  ----------------------------<  91  3.6   |  30  |  0.78    Ca    8.5      28 Jul 2020 07:06  Phos  1.6     07-27    TPro  6.8  /  Alb  2.3<L>  /  TBili  0.6  /  DBili  x   /  AST  45<H>  /  ALT  33  /  AlkPhos  71  07-28      PT/INR - ( 27 Jul 2020 07:18 )   PT: 13.2 sec;   INR: 1.14 ratio        MICROBIOLOGY:    Culture - Urine (collected 26 Jul 2020 21:04)  Source: .Urine Clean Catch (Midstream)  Final Report (27 Jul 2020 18:17):    No growth    Culture - Blood (collected 26 Jul 2020 17:55)  Source: .Blood Blood-Peripheral  Preliminary Report (27 Jul 2020 18:01):    No growth to date.    Culture - Blood (collected 26 Jul 2020 17:55)  Source: .Blood Blood-Peripheral  Preliminary Report (27 Jul 2020 18:01):    No growth to date.    RADIOLOGY & ADDITIONAL STUDIES:    EXAM:  CT ABDOMEN AND PELVIS                          EXAM:  CT CHEST                            PROCEDURE DATE:  07/26/2020          INTERPRETATION:  CLINICAL INFORMATION: Fever, leukocytosis    COMPARISON: CT abdomen and pelvis dated 08/23/2019    PROCEDURE:  CT of the Chest, Abdomen and Pelvis was performed without intravenous contrast.  Intravenous contrast: None.  Oral contrast: None.  Sagittal and coronal reformats were performed.    FINDINGS:  CHEST:  LUNGS AND LARGE AIRWAYS: Suspect bronchial plugging in the lower lobes. Bilateral lower lobe, predominantly right-sided patchy opacities that could be related to atelectasis or possibly infiltrates.  PLEURA: No pleural effusion.  VESSELS: Coronary artery calcification  HEART: Heart sizeis normal. No pericardial effusion.  MEDIASTINUM AND GREYSON: No lymphadenopathy.  CHEST WALL AND LOWER NECK: Within normal limits.    ABDOMEN AND PELVIS:  LIVER: Within normal limits.  BILE DUCTS: Normal caliber.  GALLBLADDER: Evidence of cholelithiasis as well as pericholecystic haziness and stranding consistent with acute cholecystitis.  SPLEEN: Within normal limits.  PANCREAS: Within normal limits.  ADRENALS: Within normal limits.  KIDNEYS/URETERS: Small hyperdensities involving the lower pole cortex of both kidneys that could be related to nephrocalcinosis or hyperdense cysts.    BLADDER: Mild mural thickening concerning for chronic outlet obstruction.  REPRODUCTIVE ORGANS: Prostate is enlarged.    BOWEL: No bowel obstruction. Appendix is normal.  PERITONEUM: No ascites.  VESSELS: Within normal limits.  RETROPERITONEUM/LYMPH NODES: No lymphadenopathy.  ABDOMINAL WALL: Within normal limits.  BONES: Degenerative disc disease and spondylosis lower lumbar spine.    IMPRESSION:  Cholelithiasis and probable acute cholecystitis.    Patchy bilateral lower lobe pulmonary opacities, predominantly involving the right lower lobe.      EXAM:  US GALLBLADDER                            PROCEDURE DATE:  07/26/2020          INTERPRETATION:  EXAMINATION: US GALLBLADDER    DATE OF EXAM: 7/26/2020 7:42 PM    HISTORY: Fever and leukocytosis, abnormal gallbladder appearance on CT    COMPARISON: CT performed the same day.    FINDINGS:    The gallbladder is distended with a transverse measurement of 4.9 cm. In the fundus, the gallbladder wall thickness is 3 mm. In the body, the wall thickness measures up to 4 mm. Cholelithiasis in the gallbladder neck. Common duct measures 5-6 mm, which is within normal limits. No right upper quadrant ascites seen.      IMPRESSION:    1.  Near hydropic distention of the gallbladder, in the setting of cholelithiasis and mild mural edema, suggestive of acute cholecystitis. Consider hepatobiliary imaging to confirm cystic duct obstruction.      Assessment :   66YO M Phx of MRDD, HTN, resident of [facility] admitted with fevers n/v likely sec acute cholecystitis.  RUQ sono noted suggestive of acute cholecystitis. Also with possible asp pneumonia. Sp fever. Sp lap ena 7/28/2020. Now with AUR.    Plan :   Cont Meropenam day 2  Trend temps and cbc   Serial abd exams  Asp precautions  AUR management per primary team    D/w Dr Hunter Curran    Continue with present regiment.  Appropriate use of antibiotics and adverse effects reviewed.      I have discussed the above plan of care with patient/ family in detail. They expressed understanding of the  treatment plan . Risks, benefits and alternatives discussed in detail. I have asked if they have any questions or concerns and appropriately addressed them to the best of my ability .    > 35 minutes were spent in direct patient care reviewing notes, medications ,labs data/ imaging , discussion with multidisciplinary team.    Thank you for allowing me to participate in care of your patient .    Olivier Daniels MD  Infectious Disease  [phone]

## 2020-07-30 NOTE — PROGRESS NOTE ADULT - SUBJECTIVE AND OBJECTIVE BOX
Patient is a 65y Male with a known history of :  Acute cholecystitis (K81.0)    HPI:    Pt in no distress. Awake and alert    REVIEW OF SYSTEMS:    All other review of systems is negative unless indicated above    MEDICATIONS  (STANDING):  aspirin enteric coated 81 milliGRAM(s) Oral daily  clonazePAM  Tablet 0.5 milliGRAM(s) Oral two times a day  doxazosin 1 milliGRAM(s) Oral at bedtime  enoxaparin Injectable 40 milliGRAM(s) SubCutaneous daily  fluvoxaMINE 100 milliGRAM(s) Oral two times a day  levothyroxine Injectable 37.5 MICROGram(s) IV Push at bedtime  meropenem  IVPB 1000 milliGRAM(s) IV Intermittent every 8 hours  metoprolol tartrate 25 milliGRAM(s) Oral two times a day  pantoprazole  Injectable 40 milliGRAM(s) IV Push daily  tamsulosin 0.4 milliGRAM(s) Oral at bedtime  valproate sodium IVPB 250 milliGRAM(s) IV Intermittent three times a day    MEDICATIONS  (PRN):  acetaminophen   Tablet .. 650 milliGRAM(s) Oral every 4 hours PRN Temp greater or equal to 38C (100.4F), Mild Pain (1 - 3)  glucagon  Injectable 1 milliGRAM(s) IntraMuscular once PRN Glucose LESS THAN 70 milligrams/deciliter      ALLERGIES: Augmentin (Unknown)  aztreonam (Rash)  penicillin (Unknown)  sulfa drugs (Unknown)      FAMILY HISTORY:  No pertinent family history in first degree relatives      PHYSICAL EXAMINATION:  -----------------------------  T(C): 37.8 (07-30-20 @ 15:50), Max: 38.1 (07-29-20 @ 18:13)  HR: 111 (07-30-20 @ 15:50) (60 - 111)  BP: 151/77 (07-30-20 @ 15:50) (107/56 - 151/77)  RR: 18 (07-30-20 @ 15:50) (18 - 18)  SpO2: 94% (07-30-20 @ 15:50) (91% - 94%)  Wt(kg): --    07-29 @ 07:01  -  07-30 @ 07:00  --------------------------------------------------------  IN:    lactated ringers.: 750 mL    Oral Fluid: 1040 mL    Solution: 100 mL    Solution: 100 mL  Total IN: 1990 mL    OUT:    Intermittent Catheterization - Urethral: 650 mL  Total OUT: 650 mL    Total NET: 1340 mL      07-30 @ 07:01  -  07-30 @ 17:51  --------------------------------------------------------  IN:    Oral Fluid: 500 mL    Solution: 100 mL    Solution: 50 mL  Total IN: 650 mL    OUT:    Incontinent per Condom Catheter: 500 mL  Total OUT: 500 mL    Total NET: 150 mL      PHYSICAL EXAM:    Constitutional: NAD, awake and alert, well-developed  Eyes:  EOMI,  Pupils round, No oral cyanosis.  HEENT: No exudate or erythema  Pulmonary: Non-labored, breath sounds are clear bilaterally, No wheezing, rales or rhonchi  Cardiovascular: Regular, S1 and S2, No murmurs  Gastrointestinal: Bowel Sounds present, soft, nontender.   Ext: No significant LE edema with good pulses x 4  Neurological: Alert, no gross focal motor deficits  Skin: No rashes.  Psych:  Mood & affect appropriate    LABS: All Labs Reviewed:        LABS:   --------  07-30    144  |  111<H>  |  29<H>  ----------------------------<  123<H>  3.7   |  27  |  0.91    Ca    8.4<L>      30 Jul 2020 07:28    TPro  6.8  /  Alb  2.2<L>  /  TBili  0.3  /  DBili  x   /  AST  60<H>  /  ALT  58  /  AlkPhos  60  07-30                         10.8   10.66 )-----------( 212      ( 30 Jul 2020 08:42 )             31.7             Culture Results:   No growth (07-29 @ 21:49)

## 2020-07-30 NOTE — PROGRESS NOTE ADULT - SUBJECTIVE AND OBJECTIVE BOX
ROSELIA NARAYAN    [facility] [address]    Patient is a 65y old  Male who presents with a chief complaint of fever 101 (29 Jul 2020 17:10)       Allergies    Augmentin (Unknown)  aztreonam (Rash)  penicillin (Unknown)  sulfa drugs (Unknown)    Intolerances        HPI:      PAST MEDICAL & SURGICAL HISTORY:  Constipation, unspecified constipation type  Blind: right eye  DD (diastrophic dysplasia)  Psoriasis  HTN (hypertension)  Seizure  MR (mental retardation), severe  No significant past surgical history      FAMILY HISTORY:  No pertinent family history in first degree relatives        MEDICATIONS   acetaminophen   Tablet .. 650 milliGRAM(s) Oral every 4 hours PRN  aspirin enteric coated 81 milliGRAM(s) Oral daily  clonazePAM  Tablet 0.5 milliGRAM(s) Oral two times a day  doxazosin 1 milliGRAM(s) Oral at bedtime  enoxaparin Injectable 40 milliGRAM(s) SubCutaneous daily  fluvoxaMINE 100 milliGRAM(s) Oral two times a day  glucagon  Injectable 1 milliGRAM(s) IntraMuscular once PRN  lactated ringers. 1000 milliLiter(s) IV Continuous <Continuous>  levothyroxine Injectable 37.5 MICROGram(s) IV Push at bedtime  meropenem  IVPB 1000 milliGRAM(s) IV Intermittent every 8 hours  metoprolol tartrate 25 milliGRAM(s) Oral two times a day  pantoprazole  Injectable 40 milliGRAM(s) IV Push daily  tamsulosin 0.4 milliGRAM(s) Oral at bedtime  valproate sodium IVPB 250 milliGRAM(s) IV Intermittent three times a day      Vital Signs Last 24 Hrs  T(C): 37.3 (30 Jul 2020 06:45), Max: 38.3 (29 Jul 2020 12:26)  T(F): 99.2 (30 Jul 2020 06:45), Max: 100.9 (29 Jul 2020 12:26)  HR: 93 (30 Jul 2020 05:05) (63 - 96)  BP: 107/56 (30 Jul 2020 05:05) (105/63 - 141/83)  BP(mean): --  RR: 18 (30 Jul 2020 05:05) (18 - 18)  SpO2: 91% (30 Jul 2020 05:05) (90% - 93%)      07-29-20 @ 07:01  -  07-30-20 @ 07:00  --------------------------------------------------------  IN: 1990 mL / OUT: 650 mL / NET: 1340 mL            LABS:                        11.1   10.74 )-----------( 204      ( 29 Jul 2020 14:43 )             32.2     07-30    144  |  111<H>  |  29<H>  ----------------------------<  123<H>  3.7   |  27  |  0.91    Ca    8.4<L>      30 Jul 2020 07:28    TPro  6.8  /  Alb  2.2<L>  /  TBili  0.3  /  DBili  x   /  AST  60<H>  /  ALT  58  /  AlkPhos  60  07-30              WBC:  WBC Count: 10.74 K/uL (07-29 @ 14:43)  WBC Count: 11.95 K/uL (07-28 @ 07:06)  WBC Count: 16.04 K/uL (07-27 @ 07:17)  WBC Count: 20.51 K/uL (07-26 @ 12:41)      MICROBIOLOGY:  RECENT CULTURES:  07-26 .Urine Clean Catch (Midstream) XXXX XXXX   No growth    07-26 .Blood Blood-Peripheral XXXX XXXX   No growth to date.                    Sodium:  Sodium, Serum: 144 mmol/L (07-30 @ 07:28)  Sodium, Serum: 149 mmol/L (07-29 @ 14:43)  Sodium, Serum: 146 mmol/L (07-28 @ 07:06)  Sodium, Serum: 145 mmol/L (07-27 @ 07:17)  Sodium, Serum: 137 mmol/L (07-26 @ 12:41)      0.91 mg/dL 07-30 @ 07:28  0.97 mg/dL 07-29 @ 14:43  0.78 mg/dL 07-28 @ 07:06  0.85 mg/dL 07-27 @ 07:17  1.20 mg/dL 07-26 @ 12:41      Hemoglobin:  Hemoglobin: 11.1 g/dL (07-29 @ 14:43)  Hemoglobin: 12.2 g/dL (07-28 @ 07:06)  Hemoglobin: 13.1 g/dL (07-27 @ 07:17)  Hemoglobin: 13.0 g/dL (07-26 @ 12:41)      Platelets: Platelet Count - Automated: 204 K/uL (07-29 @ 14:43)  Platelet Count - Automated: 188 K/uL (07-28 @ 07:06)  Platelet Count - Automated: 156 K/uL (07-27 @ 07:17)  Platelet Count - Automated: 174 K/uL (07-26 @ 12:41)      LIVER FUNCTIONS - ( 30 Jul 2020 07:28 )  Alb: 2.2 g/dL / Pro: 6.8 g/dL / ALK PHOS: 60 U/L / ALT: 58 U/L / AST: 60 U/L / GGT: x                 RADIOLOGY & ADDITIONAL STUDIES:

## 2020-07-30 NOTE — PROGRESS NOTE ADULT - ASSESSMENT
HPI/PMH.      65 m brought in by caregivers from group home for 100.8 temperature and low spO2 92%. caregivers reports two positive COVID cases at nearby [facility] house. patient was seen at urgent care of Friday for the same symptoms  Ct cap showed basal pneumonia and ac cholecytitis Pt given genta anad vanco in er and alredy got fc 2l   Pulm consulted 7/26/2020   COVID pcr 7/26/2020 pcr Neg-ric     Past Medical History:  Blind  right eye  Constipation, unspecified constipation type    DD (diastrophic dysplasia)    HTN (hypertension)    MR (mental retardation), severe    Psoriasis    Seizure.    home meds  Flagyl 500 Cipro 500 ursodiol 300 metoprolol tartrate 25 tamsulosin 0.4 doxazosin 2 ZyPREXA 20 mg OLANZapine: Cosentyx:   Depakote: Aspir 81: hydrOXYzine: levothyroxine: clonazePAM:         OXYGENATION      7/27/2020 4l 91%     VITALS/LABS       7/30/2020 101 85 150/70     RELEVANT INFO   COVID pcr 7/28 Neg-ric     W. 7/26-7/27-7/28-7/30/2020 w 20 - 16 - 11- 10.6   PROCAL.  7/26/2020 pc 1.1  CXR 7/72 basal oapc   CT 7/26/2020 CT ch Probable ac ena Patchy bl lower lobe pulm opac  US US abd 7/26 near hydropic distentiob gbin setting of cholelithiasis and mild mural edema    MEROPENEM meropenem 1.3 (7/26)     ECHO 7/27 ef 60% dd   BNP 7/26/2020 BNP 1322      ELEVATED D-dimr poa 7/26/2020 D-dimr 715     V duplx 7/26/2020 v duplx Neg-ric         Na 7/29-7/30/2020 Na 149 - 144  Hb 7/30/2020 Hb 10.8      SYLVAIN VELOZ [facility] P  DOA 7/26/2020  ALLERGY Pcn sulfa azactam augmentin      REVIEW OF SYMPTOMS      Able to give ROS  NO     PHYSICAL EXAM    HEENT Unremarkable PERRLA atraumatic   RESP Fair air entry EXP prolonged    Harsh breath sound Resp distres mild   CARDIAC S1 S2 No S3     NO JVD    ABDOMEN SOFT BS PRESENT NOT DISTENDED No hepatosplenomegaly PEDAL EDEMA present No calf tenderness  NO rash   GENERAL Not TOXIC looking    PT DATA/BEST PRACTICE  ALLERGY  Pcn sulfa azactam augmentin                   WT  7/27/2020 70                                    BMI    7/27/2020 31                         CrCl                                  ADVANCED DIRECTIVE     LINES TUBES POA.               HEAD OF BED ELEVATION Yes      INFECTION PPLX     DVT PROPHYLAXIS         lvnx 40 (7/29)   SAUER PROPHYLAXIS          DYSPHAGIA EVAL     DIET.     npo (7/27/2020)  --> clear (7/29)  --> dys 1 purees honey (7/30)                                                                     IV F       PATIENT DATA/ASSESSMENT/RECOMMENDATIONS       INFECTION  AC CHOLECYSTITIS   ASP PNEUMONIA  7/26/2020 Surgery eval was called   7/26/2020 Doubt incentive spirometry will be done by pt   Pt has ac chol based on us and ct and is on meropenem (7/26)   Cholecystectomy done 7/28  on po diet (7/30)     FEVER 7/30/2020  pc cvxr ordered      ELEVATED D-dimer   ELEVATED D-dimr poa 7/26/2020 D-dimr 715     V duplx 7/26/2020 v duplx Neg-ric                               BNP 7/26/2020 BNP 1322    A/R Likely false pos+ric  If desaturates will get cta ch     RESP   Monitor po   Target po 90-95%    CHF  BNP 7/26/2020 BNP 1322    Check echo           HEMODYNAMICS   Stable                                NEURO PSYCH   Resume meds       TIME SPENT Over 25 minutes aggregate care time spent on encounter; activities included combinations of  direct pt care, counseling and/or coordinating care reviewing notes, lab data/ imaging, discussion with multidisciplinary team/ pt /family. Risks, benefits, alternatives of planned interventions when applicable were discussed in detail and questions answered as best as possible    AYANNA VELOZ [facility] P  DOA 7/26/2020  ALLERGY Pcn sulfa azactam augmentin

## 2020-07-31 DIAGNOSIS — A41.9 SEPSIS, UNSPECIFIED ORGANISM: ICD-10-CM

## 2020-07-31 LAB
ALBUMIN SERPL ELPH-MCNC: 2.1 G/DL — LOW (ref 3.3–5)
ALP SERPL-CCNC: 61 U/L — SIGNIFICANT CHANGE UP (ref 40–120)
ALT FLD-CCNC: 51 U/L — SIGNIFICANT CHANGE UP (ref 12–78)
ANION GAP SERPL CALC-SCNC: 5 MMOL/L — SIGNIFICANT CHANGE UP (ref 5–17)
AST SERPL-CCNC: 38 U/L — HIGH (ref 15–37)
BASOPHILS # BLD AUTO: 0.03 K/UL — SIGNIFICANT CHANGE UP (ref 0–0.2)
BASOPHILS NFR BLD AUTO: 0.3 % — SIGNIFICANT CHANGE UP (ref 0–2)
BILIRUB SERPL-MCNC: 0.2 MG/DL — SIGNIFICANT CHANGE UP (ref 0.2–1.2)
BUN SERPL-MCNC: 19 MG/DL — SIGNIFICANT CHANGE UP (ref 7–23)
CALCIUM SERPL-MCNC: 8.2 MG/DL — LOW (ref 8.5–10.1)
CHLORIDE SERPL-SCNC: 110 MMOL/L — HIGH (ref 96–108)
CO2 SERPL-SCNC: 30 MMOL/L — SIGNIFICANT CHANGE UP (ref 22–31)
CREAT SERPL-MCNC: 0.76 MG/DL — SIGNIFICANT CHANGE UP (ref 0.5–1.3)
CULTURE RESULTS: SIGNIFICANT CHANGE UP
CULTURE RESULTS: SIGNIFICANT CHANGE UP
EOSINOPHIL # BLD AUTO: 0.62 K/UL — HIGH (ref 0–0.5)
EOSINOPHIL NFR BLD AUTO: 6.9 % — HIGH (ref 0–6)
GLUCOSE SERPL-MCNC: 98 MG/DL — SIGNIFICANT CHANGE UP (ref 70–99)
HCT VFR BLD CALC: 33.2 % — LOW (ref 39–50)
HGB BLD-MCNC: 11.3 G/DL — LOW (ref 13–17)
IMM GRANULOCYTES NFR BLD AUTO: 1.1 % — SIGNIFICANT CHANGE UP (ref 0–1.5)
LYMPHOCYTES # BLD AUTO: 2.33 K/UL — SIGNIFICANT CHANGE UP (ref 1–3.3)
LYMPHOCYTES # BLD AUTO: 25.9 % — SIGNIFICANT CHANGE UP (ref 13–44)
MCHC RBC-ENTMCNC: 31.5 PG — SIGNIFICANT CHANGE UP (ref 27–34)
MCHC RBC-ENTMCNC: 34 GM/DL — SIGNIFICANT CHANGE UP (ref 32–36)
MCV RBC AUTO: 92.5 FL — SIGNIFICANT CHANGE UP (ref 80–100)
MONOCYTES # BLD AUTO: 0.87 K/UL — SIGNIFICANT CHANGE UP (ref 0–0.9)
MONOCYTES NFR BLD AUTO: 9.7 % — SIGNIFICANT CHANGE UP (ref 2–14)
NEUTROPHILS # BLD AUTO: 5.05 K/UL — SIGNIFICANT CHANGE UP (ref 1.8–7.4)
NEUTROPHILS NFR BLD AUTO: 56.1 % — SIGNIFICANT CHANGE UP (ref 43–77)
NRBC # BLD: 0 /100 WBCS — SIGNIFICANT CHANGE UP (ref 0–0)
PLATELET # BLD AUTO: 267 K/UL — SIGNIFICANT CHANGE UP (ref 150–400)
POTASSIUM SERPL-MCNC: 3.7 MMOL/L — SIGNIFICANT CHANGE UP (ref 3.5–5.3)
POTASSIUM SERPL-SCNC: 3.7 MMOL/L — SIGNIFICANT CHANGE UP (ref 3.5–5.3)
PROCALCITONIN SERPL-MCNC: 0.58 NG/ML — HIGH (ref 0–0.04)
PROT SERPL-MCNC: 6.6 G/DL — SIGNIFICANT CHANGE UP (ref 6–8.3)
RBC # BLD: 3.59 M/UL — LOW (ref 4.2–5.8)
RBC # FLD: 12.8 % — SIGNIFICANT CHANGE UP (ref 10.3–14.5)
SODIUM SERPL-SCNC: 145 MMOL/L — SIGNIFICANT CHANGE UP (ref 135–145)
SPECIMEN SOURCE: SIGNIFICANT CHANGE UP
SPECIMEN SOURCE: SIGNIFICANT CHANGE UP
WBC # BLD: 9 K/UL — SIGNIFICANT CHANGE UP (ref 3.8–10.5)
WBC # FLD AUTO: 9 K/UL — SIGNIFICANT CHANGE UP (ref 3.8–10.5)

## 2020-07-31 PROCEDURE — 71045 X-RAY EXAM CHEST 1 VIEW: CPT | Mod: 26

## 2020-07-31 RX ORDER — METOPROLOL TARTRATE 50 MG
25 TABLET ORAL
Refills: 0 | Status: DISCONTINUED | OUTPATIENT
Start: 2020-07-31 | End: 2020-08-04

## 2020-07-31 RX ADMIN — Medication 37.5 MICROGRAM(S): at 22:06

## 2020-07-31 RX ADMIN — Medication 25 MILLIGRAM(S): at 17:48

## 2020-07-31 RX ADMIN — TAMSULOSIN HYDROCHLORIDE 0.4 MILLIGRAM(S): 0.4 CAPSULE ORAL at 21:35

## 2020-07-31 RX ADMIN — MEROPENEM 100 MILLIGRAM(S): 1 INJECTION INTRAVENOUS at 13:04

## 2020-07-31 RX ADMIN — Medication 1.5 MILLILITER(S): at 15:17

## 2020-07-31 RX ADMIN — PANTOPRAZOLE SODIUM 40 MILLIGRAM(S): 20 TABLET, DELAYED RELEASE ORAL at 13:04

## 2020-07-31 RX ADMIN — Medication 1.5 MILLILITER(S): at 08:41

## 2020-07-31 RX ADMIN — Medication 26.25 MILLIGRAM(S): at 14:17

## 2020-07-31 RX ADMIN — FLUVOXAMINE MALEATE 100 MILLIGRAM(S): 25 TABLET ORAL at 17:48

## 2020-07-31 RX ADMIN — FLUVOXAMINE MALEATE 100 MILLIGRAM(S): 25 TABLET ORAL at 05:55

## 2020-07-31 RX ADMIN — Medication 0.5 MILLIGRAM(S): at 17:48

## 2020-07-31 RX ADMIN — Medication 0.5 MILLIGRAM(S): at 05:55

## 2020-07-31 RX ADMIN — Medication 26.25 MILLIGRAM(S): at 21:35

## 2020-07-31 RX ADMIN — Medication 25 MILLIGRAM(S): at 05:55

## 2020-07-31 RX ADMIN — Medication 81 MILLIGRAM(S): at 13:05

## 2020-07-31 RX ADMIN — Medication 1.5 MILLILITER(S): at 21:05

## 2020-07-31 RX ADMIN — ENOXAPARIN SODIUM 40 MILLIGRAM(S): 100 INJECTION SUBCUTANEOUS at 13:03

## 2020-07-31 RX ADMIN — MEROPENEM 100 MILLIGRAM(S): 1 INJECTION INTRAVENOUS at 21:35

## 2020-07-31 RX ADMIN — Medication 26.25 MILLIGRAM(S): at 05:56

## 2020-07-31 RX ADMIN — Medication 1 MILLIGRAM(S): at 21:35

## 2020-07-31 RX ADMIN — MEROPENEM 100 MILLIGRAM(S): 1 INJECTION INTRAVENOUS at 05:54

## 2020-07-31 NOTE — PROGRESS NOTE ADULT - ASSESSMENT
Clinically improving overall.  Non-suggestive vitals.  Benign abdomen and unremarkable surgical sites.  Reassuring labs.  As such, surgically stable.  Will defer issue of ? pneumonia to Medicine/ Hospitalist services, though overall progress encouraging with toleration of diet.  Surgically stable for discharge once medically optimized.  Follow-up with Dr. RADHA Fischer in ~2 weeks.

## 2020-07-31 NOTE — PROGRESS NOTE ADULT - SUBJECTIVE AND OBJECTIVE BOX
SYLVAIN ROSELIA is a 65yMale , patient examined and chart reviewed.     INTERVAL HPI/ OVERNIGHT EVENTS:   Febrile. Tmax 101.5 last night. NAD.     PAST MEDICAL & SURGICAL HISTORY:  Constipation, unspecified constipation type  Blind: right eye  DD (diastrophic dysplasia)  Psoriasis  HTN (hypertension)  Seizure  MR (mental retardation), severe  No significant past surgical history      For details regarding the patient's social history, family history, and other miscellaneous elements, please refer the initial infectious diseases consultation and/or the admitting history and physical examination for this admission.    ROS:  Unable to obtain due to : MRDD nonverbal    ALLERGIES:  Augmentin (Unknown)  aztreonam (Rash)  penicillin (Unknown)  sulfa drugs (Unknown)      Current inpatient medications :    ANTIBIOTICS/RELEVANT:  meropenem  IVPB 1000 milliGRAM(s) IV Intermittent every 8 hours    MEDICATIONS  (STANDING):  albuterol/ipratropium for Nebulization 1.5 milliLiter(s) Nebulizer every 8 hours  aspirin enteric coated 81 milliGRAM(s) Oral daily  clonazePAM  Tablet 0.5 milliGRAM(s) Oral two times a day  doxazosin 1 milliGRAM(s) Oral at bedtime  enoxaparin Injectable 40 milliGRAM(s) SubCutaneous daily  fluvoxaMINE 100 milliGRAM(s) Oral two times a day  levothyroxine Injectable 37.5 MICROGram(s) IV Push at bedtime  metoprolol tartrate 25 milliGRAM(s) Oral two times a day  pantoprazole  Injectable 40 milliGRAM(s) IV Push daily  tamsulosin 0.4 milliGRAM(s) Oral at bedtime  valproate sodium IVPB 250 milliGRAM(s) IV Intermittent three times a day    MEDICATIONS  (PRN):  acetaminophen   Tablet .. 650 milliGRAM(s) Oral every 4 hours PRN Temp greater or equal to 38C (100.4F), Mild Pain (1 - 3)  glucagon  Injectable 1 milliGRAM(s) IntraMuscular once PRN Glucose LESS THAN 70 milligrams/deciliter      Objective:  Vital Signs Last 24 Hrs  T(C): 36.8 (31 Jul 2020 16:36), Max: 38.6 (30 Jul 2020 20:06)  T(F): 98.3 (31 Jul 2020 16:36), Max: 101.5 (30 Jul 2020 20:06)  HR: 85 (31 Jul 2020 16:36) (64 - 86)  BP: 149/84 (31 Jul 2020 16:36) (133/67 - 169/75)  RR: 17 (31 Jul 2020 16:36) (17 - 18)  SpO2: 92% (31 Jul 2020 16:36) (90% - 96%)    Physical Exam:  General:  no acute distress  Eyes: sclera anicteric, pupils equal and reactive to light  ENMT: buccal mucosa moist, pharynx not injected  Neck: supple, trachea midline  Lungs: clear, no wheeze/rhonchi  Cardiovascular: regular rate and rhythm, S1 S2  Abdomen: soft, tender, distended, bowel sounds normal  Neurological: awake MRDD nonverbal  Skin: no increased ecchymosis/petechiae/purpura  Lymph Nodes: no palpable cervical/supraclavicular lymph nodes enlargements  Extremities: no cyanosis/clubbing/edema      LABS:                            11.3   9.00  )-----------( 267      ( 31 Jul 2020 07:05 )             33.2   07-31    145  |  110<H>  |  19  ----------------------------<  98  3.7   |  30  |  0.76    Ca    8.2<L>      31 Jul 2020 07:05    TPro  6.6  /  Alb  2.1<L>  /  TBili  0.2  /  DBili  x   /  AST  38<H>  /  ALT  51  /  AlkPhos  61  07-31      MICROBIOLOGY:    Culture - Urine (collected 26 Jul 2020 21:04)  Source: .Urine Clean Catch (Midstream)  Final Report (27 Jul 2020 18:17):    No growth    Culture - Blood (collected 26 Jul 2020 17:55)  Source: .Blood Blood-Peripheral  Preliminary Report (27 Jul 2020 18:01):    No growth to date.    Culture - Blood (collected 26 Jul 2020 17:55)  Source: .Blood Blood-Peripheral  Preliminary Report (27 Jul 2020 18:01):    No growth to date.    RADIOLOGY & ADDITIONAL STUDIES:    EXAM:  CT ABDOMEN AND PELVIS                          EXAM:  CT CHEST                            PROCEDURE DATE:  07/26/2020          INTERPRETATION:  CLINICAL INFORMATION: Fever, leukocytosis    COMPARISON: CT abdomen and pelvis dated 08/23/2019    PROCEDURE:  CT of the Chest, Abdomen and Pelvis was performed without intravenous contrast.  Intravenous contrast: None.  Oral contrast: None.  Sagittal and coronal reformats were performed.    FINDINGS:  CHEST:  LUNGS AND LARGE AIRWAYS: Suspect bronchial plugging in the lower lobes. Bilateral lower lobe, predominantly right-sided patchy opacities that could be related to atelectasis or possibly infiltrates.  PLEURA: No pleural effusion.  VESSELS: Coronary artery calcification  HEART: Heart sizeis normal. No pericardial effusion.  MEDIASTINUM AND GREYSON: No lymphadenopathy.  CHEST WALL AND LOWER NECK: Within normal limits.    ABDOMEN AND PELVIS:  LIVER: Within normal limits.  BILE DUCTS: Normal caliber.  GALLBLADDER: Evidence of cholelithiasis as well as pericholecystic haziness and stranding consistent with acute cholecystitis.  SPLEEN: Within normal limits.  PANCREAS: Within normal limits.  ADRENALS: Within normal limits.  KIDNEYS/URETERS: Small hyperdensities involving the lower pole cortex of both kidneys that could be related to nephrocalcinosis or hyperdense cysts.    BLADDER: Mild mural thickening concerning for chronic outlet obstruction.  REPRODUCTIVE ORGANS: Prostate is enlarged.    BOWEL: No bowel obstruction. Appendix is normal.  PERITONEUM: No ascites.  VESSELS: Within normal limits.  RETROPERITONEUM/LYMPH NODES: No lymphadenopathy.  ABDOMINAL WALL: Within normal limits.  BONES: Degenerative disc disease and spondylosis lower lumbar spine.    IMPRESSION:  Cholelithiasis and probable acute cholecystitis.    Patchy bilateral lower lobe pulmonary opacities, predominantly involving the right lower lobe.      EXAM:  US GALLBLADDER                            PROCEDURE DATE:  07/26/2020          INTERPRETATION:  EXAMINATION: US GALLBLADDER    DATE OF EXAM: 7/26/2020 7:42 PM    HISTORY: Fever and leukocytosis, abnormal gallbladder appearance on CT    COMPARISON: CT performed the same day.    FINDINGS:    The gallbladder is distended with a transverse measurement of 4.9 cm. In the fundus, the gallbladder wall thickness is 3 mm. In the body, the wall thickness measures up to 4 mm. Cholelithiasis in the gallbladder neck. Common duct measures 5-6 mm, which is within normal limits. No right upper quadrant ascites seen.      IMPRESSION:    1.  Near hydropic distention of the gallbladder, in the setting of cholelithiasis and mild mural edema, suggestive of acute cholecystitis. Consider hepatobiliary imaging to confirm cystic duct obstruction.      Assessment :   66YO M Phx of MRDD, HTN, resident of [facility] admitted with fevers n/v likely sec acute cholecystitis.  RUQ sono noted suggestive of acute cholecystitis. Also with possible asp pneumonia. Sp fever. Sp lap ena 7/28/2020. Now with AUR. Febrile last night to 101.5 likely post op fever.     Plan :   Cont Meropenam day 3/7  Trend temps and cbc   Serial abd exams  Asp precautions  AUR management per primary team    D/w Dr Hunter Curran    Continue with present regiment.  Appropriate use of antibiotics and adverse effects reviewed.      I have discussed the above plan of care with patient/ family in detail. They expressed understanding of the  treatment plan . Risks, benefits and alternatives discussed in detail. I have asked if they have any questions or concerns and appropriately addressed them to the best of my ability .    > 35 minutes were spent in direct patient care reviewing notes, medications ,labs data/ imaging , discussion with multidisciplinary team.    Thank you for allowing me to participate in care of your patient .    Olivier Daniels MD  Infectious Disease  [phone]

## 2020-07-31 NOTE — SWALLOW BEDSIDE ASSESSMENT ADULT - SWALLOW EVAL: DIAGNOSIS
Pt initially declining PO trials, pushing SLP's hand away with bolus presentation. Pt then agreeable to minimal PO trials of puree and honey thick liquids. Pt then refused additional trials of various PO textures despite max cues of encouragement. When given puree and honey thick liquids, pt p/w 1. Mild to moderate oral dysphagia marked by reduced utensil stripping, adequate containment, prolonged manipulation and transfer, and adequate clearance post swallow. 2. Mild pharyngeal dysphagia marked by suspected delayer swallow onset, +laryngeal elevation, and no overt s/sx of aspiration. 3. Recommend pt resume puree with honey thick liquids +aspiration precautions. Continue to monitor pulmonary status closely; if there is ongoing concern for ? PNA, consider MBS to r/o silent aspiration (however, concern pt may not participate in study given observed presentation today)

## 2020-07-31 NOTE — SWALLOW BEDSIDE ASSESSMENT ADULT - SLP PERTINENT HISTORY OF CURRENT PROBLEM
Per charting, 66 y/o M who p/w Sepsis with acute hypoxic respiratory failure without septic shock, due to unspecified organism from acute cholecystitis

## 2020-07-31 NOTE — PROGRESS NOTE ADULT - SUBJECTIVE AND OBJECTIVE BOX
[facility] Progress Note [phone] (Dr. Portillo, Julieta, Genevieve, Alex)    CHIEF COMPLAINT: Patient is a 65y old  Male who presents with a chief complaint of fever 101 (30 Jul 2020 18:36)      Follow Up Today: The patient denies any chest discomfort or shortness of breath.    HPI:      PAST MEDICAL & SURGICAL HISTORY:  Constipation, unspecified constipation type  Blind: right eye  DD (diastrophic dysplasia)  Psoriasis  HTN (hypertension)  Seizure  MR (mental retardation), severe  No significant past surgical history      MEDICATIONS  (STANDING):  albuterol/ipratropium for Nebulization 1.5 milliLiter(s) Nebulizer every 8 hours  aspirin enteric coated 81 milliGRAM(s) Oral daily  clonazePAM  Tablet 0.5 milliGRAM(s) Oral two times a day  doxazosin 1 milliGRAM(s) Oral at bedtime  enoxaparin Injectable 40 milliGRAM(s) SubCutaneous daily  fluvoxaMINE 100 milliGRAM(s) Oral two times a day  levothyroxine Injectable 37.5 MICROGram(s) IV Push at bedtime  meropenem  IVPB 1000 milliGRAM(s) IV Intermittent every 8 hours  metoprolol tartrate 25 milliGRAM(s) Oral two times a day  pantoprazole  Injectable 40 milliGRAM(s) IV Push daily  tamsulosin 0.4 milliGRAM(s) Oral at bedtime  valproate sodium IVPB 250 milliGRAM(s) IV Intermittent three times a day    MEDICATIONS  (PRN):  acetaminophen   Tablet .. 650 milliGRAM(s) Oral every 4 hours PRN Temp greater or equal to 38C (100.4F), Mild Pain (1 - 3)  glucagon  Injectable 1 milliGRAM(s) IntraMuscular once PRN Glucose LESS THAN 70 milligrams/deciliter      Allergies    Augmentin (Unknown)  aztreonam (Rash)  penicillin (Unknown)  sulfa drugs (Unknown)    Intolerances        REVIEW OF SYSTEMS:    All other review of systems is negative unless indicated above    Vital Signs Last 24 Hrs  T(C): 36.8 (31 Jul 2020 04:37), Max: 38.6 (30 Jul 2020 20:06)  T(F): 98.3 (31 Jul 2020 04:37), Max: 101.5 (30 Jul 2020 20:06)  HR: 78 (31 Jul 2020 04:37) (60 - 111)  BP: 133/67 (31 Jul 2020 04:37) (127/72 - 157/78)  BP(mean): --  RR: 18 (31 Jul 2020 04:37) (18 - 18)  SpO2: 91% (31 Jul 2020 04:37) (90% - 96%)    I&O's Summary    29 Jul 2020 07:01  -  30 Jul 2020 07:00  --------------------------------------------------------  IN: 1990 mL / OUT: 650 mL / NET: 1340 mL    30 Jul 2020 07:01  -  31 Jul 2020 06:39  --------------------------------------------------------  IN: 750 mL / OUT: 1000 mL / NET: -250 mL        PHYSICAL EXAM:    Constitutional: NAD, awake and alert, well-developed  Eyes:  EOMI,  Pupils round, No oral cyanosis.  HEENT: No exudate or erythema  Pulmonary: Non-labored, breath sounds are clear bilaterally, No wheezing, rales or rhonchi  Cardiovascular: Regular, S1 and S2, No murmurs, rubs, gallops oir clicks  Gastrointestinal: Bowel Sounds present, soft, nontender.   Ext: No significant LE edema with good pulses x 4  Neurological: Alert, no gross focal motor deficits  Skin: No rashes.  Psych:  Mood & affect appropriate    LABS: All Labs Reviewed:                        10.8   10.66 )-----------( 212      ( 30 Jul 2020 08:42 )             31.7                         11.1   10.74 )-----------( 204      ( 29 Jul 2020 14:43 )             32.2                         12.2   11.95 )-----------( 188      ( 28 Jul 2020 07:06 )             35.1     30 Jul 2020 07:28    144    |  111    |  29     ----------------------------<  123    3.7     |  27     |  0.91   29 Jul 2020 14:43    149    |  116    |  25     ----------------------------<  132    4.0     |  29     |  0.97   28 Jul 2020 07:06    146    |  112    |  13     ----------------------------<  91     3.6     |  30     |  0.78     Ca    8.4        30 Jul 2020 07:28  Ca    8.3        29 Jul 2020 14:43  Ca    8.5        28 Jul 2020 07:06    TPro  6.8    /  Alb  2.2    /  TBili  0.3    /  DBili  x      /  AST  60     /  ALT  58     /  AlkPhos  60     30 Jul 2020 07:28  TPro  6.9    /  Alb  2.2    /  TBili  0.4    /  DBili  x      /  AST  75     /  ALT  65     /  AlkPhos  62     29 Jul 2020 14:43  TPro  6.8    /  Alb  2.3    /  TBili  0.6    /  DBili  x      /  AST  45     /  ALT  33     /  AlkPhos  71     28 Jul 2020 07:06          Blood Culture: Organism --  Gram Stain Blood -- Gram Stain --  Specimen Source .Urine Catheterized  Culture-Blood --    Organism --  Gram Stain Blood -- Gram Stain --  Specimen Source .Urine Clean Catch (Midstream)  Culture-Blood --    Organism --  Gram Stain Blood -- Gram Stain --  Specimen Source .Blood Blood-Peripheral  Culture-Blood --            RADIOLOGY/EKG:    Attending Attestation:   20 minutes spent on total encounter; more than 50% of the visit was spent counseling and/or coordinating care by the attending physician.     ASSESSMENT AND PLAN [facility] Progress Note [phone] (Dr. Portillo, Julieta, Genevieve, Alex)    CHIEF COMPLAINT: Patient is a 65y old  Male who presents with a chief complaint of fever 101 (30 Jul 2020 18:36)      Follow Up Today: The patient is non-verbal. No events overnight    HPI:      PAST MEDICAL & SURGICAL HISTORY:  Constipation, unspecified constipation type  Blind: right eye  DD (diastrophic dysplasia)  Psoriasis  HTN (hypertension)  Seizure  MR (mental retardation), severe  No significant past surgical history      MEDICATIONS  (STANDING):  albuterol/ipratropium for Nebulization 1.5 milliLiter(s) Nebulizer every 8 hours  aspirin enteric coated 81 milliGRAM(s) Oral daily  clonazePAM  Tablet 0.5 milliGRAM(s) Oral two times a day  doxazosin 1 milliGRAM(s) Oral at bedtime  enoxaparin Injectable 40 milliGRAM(s) SubCutaneous daily  fluvoxaMINE 100 milliGRAM(s) Oral two times a day  levothyroxine Injectable 37.5 MICROGram(s) IV Push at bedtime  meropenem  IVPB 1000 milliGRAM(s) IV Intermittent every 8 hours  metoprolol tartrate 25 milliGRAM(s) Oral two times a day  pantoprazole  Injectable 40 milliGRAM(s) IV Push daily  tamsulosin 0.4 milliGRAM(s) Oral at bedtime  valproate sodium IVPB 250 milliGRAM(s) IV Intermittent three times a day    MEDICATIONS  (PRN):  acetaminophen   Tablet .. 650 milliGRAM(s) Oral every 4 hours PRN Temp greater or equal to 38C (100.4F), Mild Pain (1 - 3)  glucagon  Injectable 1 milliGRAM(s) IntraMuscular once PRN Glucose LESS THAN 70 milligrams/deciliter      Allergies    Augmentin (Unknown)  aztreonam (Rash)  penicillin (Unknown)  sulfa drugs (Unknown)    Intolerances        REVIEW OF SYSTEMS:    All other review of systems is negative unless indicated above    Vital Signs Last 24 Hrs  T(C): 36.8 (31 Jul 2020 04:37), Max: 38.6 (30 Jul 2020 20:06)  T(F): 98.3 (31 Jul 2020 04:37), Max: 101.5 (30 Jul 2020 20:06)  HR: 78 (31 Jul 2020 04:37) (60 - 111)  BP: 133/67 (31 Jul 2020 04:37) (127/72 - 157/78)  BP(mean): --  RR: 18 (31 Jul 2020 04:37) (18 - 18)  SpO2: 91% (31 Jul 2020 04:37) (90% - 96%)    I&O's Summary    29 Jul 2020 07:01  -  30 Jul 2020 07:00  --------------------------------------------------------  IN: 1990 mL / OUT: 650 mL / NET: 1340 mL    30 Jul 2020 07:01  -  31 Jul 2020 06:39  --------------------------------------------------------  IN: 750 mL / OUT: 1000 mL / NET: -250 mL        PHYSICAL EXAM:    Constitutional: NAD, awake and alert, well-developed  Eyes:  EOMI,  Pupils round, No oral cyanosis.  HEENT: No exudate or erythema  Pulmonary: Non-labored, breath sounds are clear bilaterally, No wheezing, rales or rhonchi  Cardiovascular: Regular, S1 and S2, No murmurs  Gastrointestinal: Bowel Sounds present, soft, nontender.   Ext: No significant LE edema  Neurological: Alert, no gross focal motor deficits  Skin: No rashes.  Psych:  Mood & affect appropriate    LABS: All Labs Reviewed:                        10.8   10.66 )-----------( 212      ( 30 Jul 2020 08:42 )             31.7                         11.1   10.74 )-----------( 204      ( 29 Jul 2020 14:43 )             32.2                         12.2   11.95 )-----------( 188      ( 28 Jul 2020 07:06 )             35.1     30 Jul 2020 07:28    144    |  111    |  29     ----------------------------<  123    3.7     |  27     |  0.91   29 Jul 2020 14:43    149    |  116    |  25     ----------------------------<  132    4.0     |  29     |  0.97   28 Jul 2020 07:06    146    |  112    |  13     ----------------------------<  91     3.6     |  30     |  0.78     Ca    8.4        30 Jul 2020 07:28  Ca    8.3        29 Jul 2020 14:43  Ca    8.5        28 Jul 2020 07:06    TPro  6.8    /  Alb  2.2    /  TBili  0.3    /  DBili  x      /  AST  60     /  ALT  58     /  AlkPhos  60     30 Jul 2020 07:28  TPro  6.9    /  Alb  2.2    /  TBili  0.4    /  DBili  x      /  AST  75     /  ALT  65     /  AlkPhos  62     29 Jul 2020 14:43  TPro  6.8    /  Alb  2.3    /  TBili  0.6    /  DBili  x      /  AST  45     /  ALT  33     /  AlkPhos  71     28 Jul 2020 07:06          Blood Culture: Organism --  Gram Stain Blood -- Gram Stain --  Specimen Source .Urine Catheterized  Culture-Blood --    Organism --  Gram Stain Blood -- Gram Stain --  Specimen Source .Urine Clean Catch (Midstream)  Culture-Blood --    Organism --  Gram Stain Blood -- Gram Stain --  Specimen Source .Blood Blood-Peripheral  Culture-Blood --            RADIOLOGY/EKG:    Attending Attestation:   20 minutes spent on total encounter; more than 50% of the visit was spent counseling and/or coordinating care by the attending physician.     ASSESSMENT AND PLAN

## 2020-07-31 NOTE — PROGRESS NOTE ADULT - ASSESSMENT
65M with HTN.  Echo shows normal LVEF.   S/p cholecystectomy POD 2.      Suggest:  1. Continue with abx.  Low grade temp 100.1.  Stable HR and BP.   2. Continue ASA, metoprolol 25 mg BID; DVT prohylaxis  3. IVF as necessary    4. Will follow 65M with HTN.  Echo shows normal LVEF.   S/p cholecystectomy POD 3      Suggest:  1. Continue with abx.   Stable HR and BP.   2. Continue ASA, metoprolol 25 mg BID; DVT prohylaxis  3. IVF as necessary  4. Will follow as needed

## 2020-07-31 NOTE — SWALLOW BEDSIDE ASSESSMENT ADULT - COMMENTS
Pt received upright in bed, awake. Pt was unable to follow low level directives despite max multimodal cues. Pt w/o vocalizations during session.  Per surgical note 7/31, "overall progress encouraging with toleration of diet."    CXR 7/31: "Suboptimal study. Possible resolving bibasilar pneumonia cannot be completely ruled out, lab correlation requested."  Pt's WBC is WFL

## 2020-07-31 NOTE — PROGRESS NOTE ADULT - SUBJECTIVE AND OBJECTIVE BOX
PAST MEDICAL & SURGICAL HISTORY:  Constipation, unspecified constipation type  Blind: right eye  DD (diastrophic dysplasia)  Psoriasis  HTN (hypertension)  Seizure  MR (mental retardation), severe  No significant past surgical history      MEDICATIONS  (STANDING):  albuterol/ipratropium for Nebulization 1.5 milliLiter(s) Nebulizer every 8 hours  aspirin enteric coated 81 milliGRAM(s) Oral daily  clonazePAM  Tablet 0.5 milliGRAM(s) Oral two times a day  doxazosin 1 milliGRAM(s) Oral at bedtime  enoxaparin Injectable 40 milliGRAM(s) SubCutaneous daily  fluvoxaMINE 100 milliGRAM(s) Oral two times a day  levothyroxine Injectable 37.5 MICROGram(s) IV Push at bedtime  meropenem  IVPB 1000 milliGRAM(s) IV Intermittent every 8 hours  metoprolol tartrate 25 milliGRAM(s) Oral two times a day  metoprolol tartrate 25 milliGRAM(s) Oral two times a day  pantoprazole  Injectable 40 milliGRAM(s) IV Push daily  tamsulosin 0.4 milliGRAM(s) Oral at bedtime  valproate sodium IVPB 250 milliGRAM(s) IV Intermittent three times a day    MEDICATIONS  (PRN):  acetaminophen   Tablet .. 650 milliGRAM(s) Oral every 4 hours PRN Temp greater or equal to 38C (100.4F), Mild Pain (1 - 3)  glucagon  Injectable 1 milliGRAM(s) IntraMuscular once PRN Glucose LESS THAN 70 milligrams/deciliter      Patient is a 65y old  Male who presents with a chief complaint of fever 101 (31 Jul 2020 09:15)      Vital Signs Last 24 Hrs  T(C): 37.4 (31 Jul 2020 11:10), Max: 38.6 (30 Jul 2020 20:06)  T(F): 99.4 (31 Jul 2020 11:10), Max: 101.5 (30 Jul 2020 20:06)  HR: 76 (31 Jul 2020 11:10) (64 - 111)  BP: 169/75 (31 Jul 2020 11:10) (133/67 - 169/75)  BP(mean): --  RR: 18 (31 Jul 2020 11:10) (18 - 18)  SpO2: 92% (31 Jul 2020 11:10) (90% - 96%)          07-30 @ 07:01 - 07-31 @ 07:00  --------------------------------------------------------  IN: 750 mL / OUT: 1000 mL / NET: -250 mL    07-31 @ 07:01 - 07-31 @ 14:50  --------------------------------------------------------  IN: 0 mL / OUT: 3 mL / NET: -3 mL        REVIEW OF SYSTEMS:    Constitutional: No fever, weight loss or fatigue  Eyes: No eye pain, visual disturbances, or discharge  ENT:  No difficulty hearing, tinnitus, vertigo; No sinus or throat pain  Neck: No pain or stiffness  Breasts: No pain, masses or nipple discharge  Respiratory: No cough, wheezing, chills or hemoptysis  Cardiovascular: No chest pain, palpitations, shortness of breath, dizziness or leg swelling  Gastrointestinal: No abdominal or epigastric pain. No nausea, vomiting or hematemesis; No diarrhea or constipation. No melena or hematochezia.  Genitourinary: No dysuria, frequency, hematuria or incontinence  Rectal: No pain, hemorrhoids or incontinence  Neurological: No headaches, memory loss, loss of strength, numbness or tremors  Skin: No itching, burning, rashes or lesions   Lymph Nodes: No enlarged glands  Endocrine: No heat or cold intolerance; No hair loss  Musculoskeletal: No joint pain or swelling; No muscle, back or extremity pain  Psychiatric: No depression, anxiety, mood swings or difficulty sleeping  Heme/Lymph: No easy bruising or bleeding gums  Allergy and Immunologic: No hives or eczema    PHYSICAL EXAM:  amazing apetite  Constitutional: NAD, well-groomed, well-developed  HEENT: PERRLA, EOMI, Normal Hearing, MMM  Neck: No LAD, No JVD  Back: Normal spine flexure, No CVA tenderness  Respiratory: CTAB/L   Cardiovascular: S1 and S2, RRR, no M/G/R  Gastrointestinal: BS+, soft, NT/ND  Extremities: No peripheral edema  Vascular: 2+ peripheral pulses  Neurological: A/O x 3, no focal deficits  Skin: No rashes      decubiti:                           11.3   9.00  )-----------( 267      ( 31 Jul 2020 07:05 )             33.2     07-31    145  |  110<H>  |  19  ----------------------------<  98  3.7   |  30  |  0.76    Ca    8.2<L>      31 Jul 2020 07:05    TPro  6.6  /  Alb  2.1<L>  /  TBili  0.2  /  DBili  x   /  AST  38<H>  /  ALT  51  /  AlkPhos  61  07-31              .Urine Catheterized  07-29 @ 21:49   No growth  --  --      .Urine Clean Catch (Midstream)  07-26 @ 21:04   No growth  --  --      .Blood Blood-Peripheral  07-26 @ 17:55   No growth to date.  --  --        Urine Culture:  07-29 @ 21:49    --        No growth  Urine Culture:  07-26 @ 21:04    --        No growth  Urine Culture:  07-26 @ 17:55    --        No growth to date.        Radiology:

## 2020-07-31 NOTE — PROGRESS NOTE ADULT - SUBJECTIVE AND OBJECTIVE BOX
ROSELIA NARAYAN    [facility] TELN [address]    Patient is a 65y old  Male who presents with a chief complaint of fever 101 (31 Jul 2020 06:39)       Allergies    Augmentin (Unknown)  aztreonam (Rash)  penicillin (Unknown)  sulfa drugs (Unknown)    Intolerances        HPI:      PAST MEDICAL & SURGICAL HISTORY:  Constipation, unspecified constipation type  Blind: right eye  DD (diastrophic dysplasia)  Psoriasis  HTN (hypertension)  Seizure  MR (mental retardation), severe  No significant past surgical history      FAMILY HISTORY:  No pertinent family history in first degree relatives        MEDICATIONS   acetaminophen   Tablet .. 650 milliGRAM(s) Oral every 4 hours PRN  albuterol/ipratropium for Nebulization 1.5 milliLiter(s) Nebulizer every 8 hours  aspirin enteric coated 81 milliGRAM(s) Oral daily  clonazePAM  Tablet 0.5 milliGRAM(s) Oral two times a day  doxazosin 1 milliGRAM(s) Oral at bedtime  enoxaparin Injectable 40 milliGRAM(s) SubCutaneous daily  fluvoxaMINE 100 milliGRAM(s) Oral two times a day  glucagon  Injectable 1 milliGRAM(s) IntraMuscular once PRN  levothyroxine Injectable 37.5 MICROGram(s) IV Push at bedtime  meropenem  IVPB 1000 milliGRAM(s) IV Intermittent every 8 hours  metoprolol tartrate 25 milliGRAM(s) Oral two times a day  pantoprazole  Injectable 40 milliGRAM(s) IV Push daily  tamsulosin 0.4 milliGRAM(s) Oral at bedtime  valproate sodium IVPB 250 milliGRAM(s) IV Intermittent three times a day      Vital Signs Last 24 Hrs  T(C): 36.8 (31 Jul 2020 04:37), Max: 38.6 (30 Jul 2020 20:06)  T(F): 98.3 (31 Jul 2020 04:37), Max: 101.5 (30 Jul 2020 20:06)  HR: 78 (31 Jul 2020 04:37) (60 - 111)  BP: 133/67 (31 Jul 2020 04:37) (127/72 - 157/78)  BP(mean): --  RR: 18 (31 Jul 2020 04:37) (18 - 18)  SpO2: 91% (31 Jul 2020 04:37) (90% - 96%)      07-30-20 @ 07:01  -  07-31-20 @ 07:00  --------------------------------------------------------  IN: 750 mL / OUT: 1000 mL / NET: -250 mL            LABS:                        11.3   9.00  )-----------( 267      ( 31 Jul 2020 07:05 )             33.2     07-31    145  |  110<H>  |  19  ----------------------------<  98  3.7   |  30  |  0.76    Ca    8.2<L>      31 Jul 2020 07:05    TPro  6.6  /  Alb  2.1<L>  /  TBili  0.2  /  DBili  x   /  AST  38<H>  /  ALT  51  /  AlkPhos  61  07-31              WBC:  WBC Count: 9.00 K/uL (07-31 @ 07:05)  WBC Count: 10.66 K/uL (07-30 @ 08:42)  WBC Count: 10.74 K/uL (07-29 @ 14:43)  WBC Count: 11.95 K/uL (07-28 @ 07:06)      MICROBIOLOGY:  RECENT CULTURES:  07-29 .Urine Catheterized XXXX XXXX   No growth    07-26 .Urine Clean Catch (Midstream) XXXX XXXX   No growth    07-26 .Blood Blood-Peripheral XXXX XXXX   No growth to date.                    Sodium:  Sodium, Serum: 145 mmol/L (07-31 @ 07:05)  Sodium, Serum: 144 mmol/L (07-30 @ 07:28)  Sodium, Serum: 149 mmol/L (07-29 @ 14:43)  Sodium, Serum: 146 mmol/L (07-28 @ 07:06)      0.76 mg/dL 07-31 @ 07:05  0.91 mg/dL 07-30 @ 07:28  0.97 mg/dL 07-29 @ 14:43  0.78 mg/dL 07-28 @ 07:06      Hemoglobin:  Hemoglobin: 11.3 g/dL (07-31 @ 07:05)  Hemoglobin: 10.8 g/dL (07-30 @ 08:42)  Hemoglobin: 11.1 g/dL (07-29 @ 14:43)  Hemoglobin: 12.2 g/dL (07-28 @ 07:06)      Platelets: Platelet Count - Automated: 267 K/uL (07-31 @ 07:05)  Platelet Count - Automated: 212 K/uL (07-30 @ 08:42)  Platelet Count - Automated: 204 K/uL (07-29 @ 14:43)  Platelet Count - Automated: 188 K/uL (07-28 @ 07:06)      LIVER FUNCTIONS - ( 31 Jul 2020 07:05 )  Alb: 2.1 g/dL / Pro: 6.6 g/dL / ALK PHOS: 61 U/L / ALT: 51 U/L / AST: 38 U/L / GGT: x                 RADIOLOGY & ADDITIONAL STUDIES:

## 2020-07-31 NOTE — SWALLOW BEDSIDE ASSESSMENT ADULT - ASR SWALLOW ASPIRATION MONITOR
upper respiratory infection/change of breathing pattern/cough/throat clearing/oral hygiene/position upright (90Y)/fever/pneumonia/gurgly voice

## 2020-07-31 NOTE — PROGRESS NOTE ADULT - SUBJECTIVE AND OBJECTIVE BOX
STATUS POST:  Laparoscopic Cholecystectomy      POST OPERATIVE DAY #: 3      Vital Signs Last 24 Hrs  T(F): 98.7 (31 Jul 2020 08:05), Max: 101.5 (30 Jul 2020 20:06)  HR: 65 (31 Jul 2020 08:41) (64 - 111)  BP: 155/72 (31 Jul 2020 08:05) (133/67 - 157/78)  RR: 18 (31 Jul 2020 08:05) (18 - 18)  SpO2: 93% (31 Jul 2020 08:41) (90% - 96%)      SUBJECTIVE: Pt seen resting comfortably in bed, non-verbal but responsive to voice and touch, though does not follow commands (reported as baseline).    Ate well for breakfast per RN and Aide  Nausea or Vomiting: NO           Flatus or Bowel Movement: YES                Void: YES      General Appearance: Appears well and in NAD  Neck: Stiff but non-rigid  Chest: Equal expansion bilaterally  CV: Pulse regular presently  Abdomen: Full/ obese, but soft, non tense, appropriate incisional tenderness, dressings clean and dry and intact  Extremities: Grossly symmetric with flexion/ contraction as baseline      I&O's Detail    30 Jul 2020 07:01  -  31 Jul 2020 07:00  --------------------------------------------------------  IN:    Oral Fluid: 500 mL    Solution: 50 mL    Solution: 200 mL  Total IN: 750 mL      OUT:    Incontinent per Condom Catheter: 1000 mL  Total OUT: 1000 mL    Total NET: -250 mL      MEDICATIONS  (STANDING):  albuterol/ipratropium for Nebulization 1.5 milliLiter(s) Nebulizer every 8 hours  aspirin enteric coated 81 milliGRAM(s) Oral daily  clonazePAM  Tablet 0.5 milliGRAM(s) Oral two times a day  doxazosin 1 milliGRAM(s) Oral at bedtime  enoxaparin Injectable 40 milliGRAM(s) SubCutaneous daily  fluvoxaMINE 100 milliGRAM(s) Oral two times a day  levothyroxine Injectable 37.5 MICROGram(s) IV Push at bedtime  meropenem  IVPB 1000 milliGRAM(s) IV Intermittent every 8 hours  metoprolol tartrate 25 milliGRAM(s) Oral two times a day  pantoprazole  Injectable 40 milliGRAM(s) IV Push daily  tamsulosin 0.4 milliGRAM(s) Oral at bedtime  valproate sodium IVPB 250 milliGRAM(s) IV Intermittent three times a day      MEDICATIONS  (PRN):  acetaminophen   Tablet .. 650 milliGRAM(s) Oral every 4 hours PRN Temp greater or equal to 38C (100.4F), Mild Pain (1 - 3)  glucagon  Injectable 1 milliGRAM(s) IntraMuscular once PRN Glucose LESS THAN 70 milligrams/deciliter      LABS:                        11.3   9.00  )-----------( 267      ( 31 Jul 2020 07:05 )             33.2     07-31    145  |  110<H>  |  19  ----------------------------<  98  3.7   |  30  |  0.76    Ca    8.2<L>      31 Jul 2020 07:05    TPro  6.6  /  Alb  2.1<L>  /  TBili  0.2  /  DBili  x   /  AST  38<H>  /  ALT  51  /  AlkPhos  61  07-31      RADIOLOGY & ADDITIONAL STUDIES:    EXAM:  XR CHEST PORTABLE ROUTINE 1V                        PROCEDURE DATE:  07/31/2020    INTERPRETATION:  Chest portable semierect single AP view:  Clinical history:  Low-grade temperature.  Findings:  Cardiac size appears normal. Atherosclerotic aorta. Mild pulmonary venous congestion. Bibasilar ill-defined airspace densities. CP angles appear normal. No pneumothorax. Electronic device vital connect overlying the left chest wall.  Study is partially limited due to kyphosis, body habitus, poor inspiratory effort.  Evidence suggestive cholecystectomy.  Compared to prior radiograph of 7/27/2020, partial resolution of bibasilar densities.  Impression:  Suboptimal study. Possible resolving bibasilar pneumonia cannot be completely ruled out, lab correlation requested. If clinical condition permits, chest PA and lateral views are requested.  JENISE SELBY M.D., ATTENDING RADIOLOGIST  This document has been electronically signed. Jul 31 2020  9:11AM

## 2020-07-31 NOTE — SWALLOW BEDSIDE ASSESSMENT ADULT - SWALLOW EVAL: RECOMMENDED FEEDING/EATING TECHNIQUES
alternate food with liquid/allow for swallow between intakes/check mouth frequently for oral residue/pocketing/crush medication (when feasible)/position upright (90 degrees)/small sips/bites/maintain upright posture during/after eating for 30 mins/oral hygiene

## 2020-07-31 NOTE — PROGRESS NOTE ADULT - ASSESSMENT
cont rx cont rx      HPI/PMH.      65 m brought in by caregivers from group home for 100.8 temperature and low spO2 92%. caregivers reports two positive COVID cases at nearby [facility] house. patient was seen at urgent care of Friday for the same symptoms  Ct cap showed basal pneumonia and ac cholecytitis Pt given genta anad vanco in er and alredy got fc 2l   Pulm consulted 7/26/2020   COVID pcr 7/26/2020 pcr Neg-ric     Past Medical History:  Blind  right eye  Constipation, unspecified constipation type    DD (diastrophic dysplasia)    HTN (hypertension)    MR (mental retardation), severe    Psoriasis    Seizure.    home meds  Flagyl 500 Cipro 500 ursodiol 300 metoprolol tartrate 25 tamsulosin 0.4 doxazosin 2 ZyPREXA 20 mg OLANZapine: Cosentyx:   Depakote: Aspir 81: hydrOXYzine: levothyroxine: clonazePAM:         OXYGENATION      7/27/2020 4l 91%     VITALS/LABS       7/31/2020 afeb 80 160/70     RELEVANT INFO   COVID pcr 7/28 Neg-ric     W. 7/26-7/27-7/28-7/30/2020 w 20 - 16 - 11- 10.6   PROCAL.  7/26/2020 pc 1.1  CXR 7/31/2020 cxr poss resolving basal pneu  CXR 7/72 basal oapc   CT 7/26/2020 CT ch Probable ac ena Patchy bl lower lobe pulm opac  US US abd 7/26 near hydropic distentiob gbin setting of cholelithiasis and mild mural edema    MEROPENEM meropenem 1.3 (7/26)     ECHO 7/27 ef 60% dd   BNP 7/26/2020 BNP 1322    METOPPROLOL metoprolol 25.2 (7/31)     ELEVATED D-dimr poa 7/26/2020 D-dimr 715     V duplx 7/26/2020 v duplx Neg-ric         Na 7/29-7/30/2020 Na 149 - 144  Hb 7/30/2020 Hb 10.8      SYLVAIN VELOZ [facility] P  DOA 7/26/2020  ALLERGY Pcn sulfa azactam augmentin      REVIEW OF SYMPTOMS      Able to give ROS  NO     PHYSICAL EXAM    HEENT Unremarkable PERRLA atraumatic   RESP Fair air entry EXP prolonged    Harsh breath sound Resp distres mild   CARDIAC S1 S2 No S3     NO JVD    ABDOMEN SOFT BS PRESENT NOT DISTENDED No hepatosplenomegaly PEDAL EDEMA present No calf tenderness  NO rash   GENERAL Not TOXIC looking    PT DATA/BEST PRACTICE  ALLERGY  Pcn sulfa azactam augmentin                   WT  7/27/2020 70                                    BMI    7/27/2020 31                         CrCl                                  ADVANCED DIRECTIVE     LINES TUBES POA.               HEAD OF BED ELEVATION Yes      INFECTION PPLX     DVT PROPHYLAXIS         lvnx 40 (7/29)   SAUER PROPHYLAXIS          DYSPHAGIA EVAL     DIET.     npo (7/27/2020)  --> clear (7/29)  --> dys 1 purees honey (7/30)                                                                     IV F       PATIENT DATA/ASSESSMENT/RECOMMENDATIONS       INFECTION  AC CHOLECYSTITIS   ASP PNEUMONIA  7/26/2020 Surgery eval was called   7/26/2020 Doubt incentive spirometry will be done by pt   Pt has ac chol based on us and ct and is on meropenem (7/26)   Cholecystectomy done 7/28  on po diet (7/30)     FEVER 7/30/2020  pc cvxr improved   Fever resolvd       ELEVATED D-dimer   ELEVATED D-dimr poa 7/26/2020 D-dimr 715     V duplx 7/26/2020 v duplx Neg-ric                               BNP 7/26/2020 BNP 1322    A/R Likely false pos+ric  If desaturates will get cta ch     RESP   Monitor po   Target po 90-95%    CHF  BNP 7/26/2020 BNP 1322    Check echo           HEMODYNAMICS   Stable                                NEURO PSYCH   Resume meds           TIME SPENT Over 25 minutes aggregate care time spent on encounter; activities included combinations of  direct pt care, counseling and/or coordinating care reviewing notes, lab data/ imaging, discussion with multidisciplinary team/ pt /family. Risks, benefits, alternatives of planned interventions when applicable were discussed in detail and questions answered as best as possible    AYANNA VELOZ [facility] P  DOA 7/26/2020  ALLERGY Pcn sulfa azactam augmentin

## 2020-07-31 NOTE — SWALLOW BEDSIDE ASSESSMENT ADULT - ASR SWALLOW RECOMMEND DIAG
if there is ongoing concern for ? PNA, consider MBS to r/o silent aspiration (however, suspect pt may not participate in study given observed presentation today

## 2020-08-01 LAB
ALBUMIN SERPL ELPH-MCNC: 2.5 G/DL — LOW (ref 3.3–5)
ALP SERPL-CCNC: 71 U/L — SIGNIFICANT CHANGE UP (ref 40–120)
ALT FLD-CCNC: 42 U/L — SIGNIFICANT CHANGE UP (ref 12–78)
ANION GAP SERPL CALC-SCNC: 7 MMOL/L — SIGNIFICANT CHANGE UP (ref 5–17)
AST SERPL-CCNC: 29 U/L — SIGNIFICANT CHANGE UP (ref 15–37)
BASOPHILS # BLD AUTO: 0 K/UL — SIGNIFICANT CHANGE UP (ref 0–0.2)
BASOPHILS NFR BLD AUTO: 0 % — SIGNIFICANT CHANGE UP (ref 0–2)
BILIRUB SERPL-MCNC: 0.2 MG/DL — SIGNIFICANT CHANGE UP (ref 0.2–1.2)
BUN SERPL-MCNC: 15 MG/DL — SIGNIFICANT CHANGE UP (ref 7–23)
CALCIUM SERPL-MCNC: 9.1 MG/DL — SIGNIFICANT CHANGE UP (ref 8.5–10.1)
CHLORIDE SERPL-SCNC: 106 MMOL/L — SIGNIFICANT CHANGE UP (ref 96–108)
CO2 SERPL-SCNC: 29 MMOL/L — SIGNIFICANT CHANGE UP (ref 22–31)
CREAT SERPL-MCNC: 0.73 MG/DL — SIGNIFICANT CHANGE UP (ref 0.5–1.3)
EOSINOPHIL # BLD AUTO: 0.3 K/UL — SIGNIFICANT CHANGE UP (ref 0–0.5)
EOSINOPHIL NFR BLD AUTO: 4 % — SIGNIFICANT CHANGE UP (ref 0–6)
GLUCOSE SERPL-MCNC: 102 MG/DL — HIGH (ref 70–99)
HCT VFR BLD CALC: 38.9 % — LOW (ref 39–50)
HGB BLD-MCNC: 13.4 G/DL — SIGNIFICANT CHANGE UP (ref 13–17)
LYMPHOCYTES # BLD AUTO: 2.04 K/UL — SIGNIFICANT CHANGE UP (ref 1–3.3)
LYMPHOCYTES # BLD AUTO: 27 % — SIGNIFICANT CHANGE UP (ref 13–44)
MANUAL SMEAR VERIFICATION: SIGNIFICANT CHANGE UP
MCHC RBC-ENTMCNC: 31.5 PG — SIGNIFICANT CHANGE UP (ref 27–34)
MCHC RBC-ENTMCNC: 34.4 GM/DL — SIGNIFICANT CHANGE UP (ref 32–36)
MCV RBC AUTO: 91.3 FL — SIGNIFICANT CHANGE UP (ref 80–100)
MONOCYTES # BLD AUTO: 0.6 K/UL — SIGNIFICANT CHANGE UP (ref 0–0.9)
MONOCYTES NFR BLD AUTO: 8 % — SIGNIFICANT CHANGE UP (ref 2–14)
NEUTROPHILS # BLD AUTO: 4.45 K/UL — SIGNIFICANT CHANGE UP (ref 1.8–7.4)
NEUTROPHILS NFR BLD AUTO: 57 % — SIGNIFICANT CHANGE UP (ref 43–77)
NEUTS BAND # BLD: 2 % — SIGNIFICANT CHANGE UP (ref 0–8)
NRBC # BLD: 0 — SIGNIFICANT CHANGE UP
NRBC # BLD: SIGNIFICANT CHANGE UP /100 WBCS (ref 0–0)
PLAT MORPH BLD: NORMAL — SIGNIFICANT CHANGE UP
PLATELET # BLD AUTO: 355 K/UL — SIGNIFICANT CHANGE UP (ref 150–400)
POLYCHROMASIA BLD QL SMEAR: SLIGHT — SIGNIFICANT CHANGE UP
POTASSIUM SERPL-MCNC: 3.9 MMOL/L — SIGNIFICANT CHANGE UP (ref 3.5–5.3)
POTASSIUM SERPL-SCNC: 3.9 MMOL/L — SIGNIFICANT CHANGE UP (ref 3.5–5.3)
PROT SERPL-MCNC: 7.5 G/DL — SIGNIFICANT CHANGE UP (ref 6–8.3)
RBC # BLD: 4.26 M/UL — SIGNIFICANT CHANGE UP (ref 4.2–5.8)
RBC # FLD: 12.6 % — SIGNIFICANT CHANGE UP (ref 10.3–14.5)
RBC BLD AUTO: SIGNIFICANT CHANGE UP
SODIUM SERPL-SCNC: 142 MMOL/L — SIGNIFICANT CHANGE UP (ref 135–145)
VARIANT LYMPHS # BLD: 2 % — SIGNIFICANT CHANGE UP (ref 0–6)
WBC # BLD: 7.55 K/UL — SIGNIFICANT CHANGE UP (ref 3.8–10.5)
WBC # FLD AUTO: 7.55 K/UL — SIGNIFICANT CHANGE UP (ref 3.8–10.5)

## 2020-08-01 RX ADMIN — Medication 1.5 MILLILITER(S): at 08:22

## 2020-08-01 RX ADMIN — MEROPENEM 100 MILLIGRAM(S): 1 INJECTION INTRAVENOUS at 13:43

## 2020-08-01 RX ADMIN — ENOXAPARIN SODIUM 40 MILLIGRAM(S): 100 INJECTION SUBCUTANEOUS at 11:18

## 2020-08-01 RX ADMIN — Medication 1.5 MILLILITER(S): at 15:20

## 2020-08-01 RX ADMIN — FLUVOXAMINE MALEATE 100 MILLIGRAM(S): 25 TABLET ORAL at 05:43

## 2020-08-01 RX ADMIN — Medication 81 MILLIGRAM(S): at 11:20

## 2020-08-01 RX ADMIN — Medication 25 MILLIGRAM(S): at 17:31

## 2020-08-01 RX ADMIN — MEROPENEM 100 MILLIGRAM(S): 1 INJECTION INTRAVENOUS at 05:43

## 2020-08-01 RX ADMIN — Medication 1.5 MILLILITER(S): at 22:00

## 2020-08-01 RX ADMIN — TAMSULOSIN HYDROCHLORIDE 0.4 MILLIGRAM(S): 0.4 CAPSULE ORAL at 21:14

## 2020-08-01 RX ADMIN — FLUVOXAMINE MALEATE 100 MILLIGRAM(S): 25 TABLET ORAL at 17:31

## 2020-08-01 RX ADMIN — Medication 26.25 MILLIGRAM(S): at 21:15

## 2020-08-01 RX ADMIN — MEROPENEM 100 MILLIGRAM(S): 1 INJECTION INTRAVENOUS at 21:14

## 2020-08-01 RX ADMIN — PANTOPRAZOLE SODIUM 40 MILLIGRAM(S): 20 TABLET, DELAYED RELEASE ORAL at 11:18

## 2020-08-01 RX ADMIN — Medication 1 MILLIGRAM(S): at 21:14

## 2020-08-01 RX ADMIN — Medication 26.25 MILLIGRAM(S): at 13:43

## 2020-08-01 RX ADMIN — Medication 0.5 MILLIGRAM(S): at 17:31

## 2020-08-01 RX ADMIN — Medication 0.5 MILLIGRAM(S): at 05:43

## 2020-08-01 RX ADMIN — Medication 25 MILLIGRAM(S): at 05:43

## 2020-08-01 RX ADMIN — Medication 37.5 MICROGRAM(S): at 21:14

## 2020-08-01 RX ADMIN — Medication 26.25 MILLIGRAM(S): at 05:44

## 2020-08-01 NOTE — PROGRESS NOTE ADULT - ASSESSMENT
cont rx cont rx      HPI/PMH.      65 m brought in by caregivers from group home for 100.8 temperature and low spO2 92%. caregivers reports two positive COVID cases at nearby [facility] house. patient was seen at urgent care of Friday for the same symptoms  Ct cap showed basal pneumonia and ac cholecytitis Pt given genta anad vanco in er and alredy got fc 2l   Pulm consulted 7/26/2020   COVID pcr 7/26/2020 pcr Neg-ric     Past Medical History:  Blind  right eye  Constipation, unspecified constipation type    DD (diastrophic dysplasia)    HTN (hypertension)    MR (mental retardation), severe    Psoriasis    Seizure.    home meds  Flagyl 500 Cipro 500 ursodiol 300 metoprolol tartrate 25 tamsulosin 0.4 doxazosin 2 ZyPREXA 20 mg OLANZapine: Cosentyx:   Depakote: Aspir 81: hydrOXYzine: levothyroxine: clonazePAM:         OXYGENATION      7/27/2020 4l 91%     VITALS/LABS       8/1/2020 100f 9- 120/70     RELEVANT INFO   COVID pcr 7/28 Neg-ric     W. 7/26-7/27-7/28-7/30/2020 w 20 - 16 - 11- 10.6   PROCAL.  7/26/2020 pc 1.1  CXR 7/31/2020 cxr poss resolving basal pneu  CXR 7/72 basal oapc   CT 7/26/2020 CT ch Probable ac ena Patchy bl lower lobe pulm opac  US US abd 7/26 near hydropic distentiob gbin setting of cholelithiasis and mild mural edema    MEROPENEM meropenem 1.3 (7/26)     ECHO 7/27 ef 60% dd   BNP 7/26/2020 BNP 1322    METOPPROLOL metoprolol 25.2 (7/31)     ELEVATED D-dimr poa 7/26/2020 D-dimr 715     V duplx 7/26/2020 v duplx Neg-ric         Na 7/29-7/30/2020 Na 149 - 144  Hb 7/30/2020 Hb 10.8    SYLVAIN VELOZ [facility] P  DOA 7/26/2020  ALLERGY Pcn sulfa azactam augmentin      REVIEW OF SYMPTOMS      Able to give ROS  NO     PHYSICAL EXAM    HEENT Unremarkable PERRLA atraumatic   RESP Fair air entry EXP prolonged    Harsh breath sound Resp distres mild   CARDIAC S1 S2 No S3     NO JVD    ABDOMEN SOFT BS PRESENT NOT DISTENDED No hepatosplenomegaly PEDAL EDEMA present No calf tenderness  NO rash   GENERAL Not TOXIC looking    PATIENT DATA/ASSESSMENT/RECOMMENDATIONS       INFECTION  AC CHOLECYSTITIS   ASP PNEUMONIA   Pt has ac chol based on us and ct and is on meropenem (7/26)   Cholecystectomy done 7/28  on po diet (7/30)     FEVER 7/30/2020  pc cvxr improved   Fever resolvd       ELEVATED D-dimer   ELEVATED D-dimr poa 7/26/2020 D-dimr 715     V duplx 7/26/2020 v duplx Neg-ric                               BNP 7/26/2020 BNP 1322    A/R Likely false pos+ric  If desaturates will get cta ch     RESP   Monitor po   Target po 90-95%    CHF  BNP 7/26/2020 BNP 1322    ECHO 7/27 ef 60% dd          HEMODYNAMICS   Stable                                NEURO PSYCH   Resume meds       TIME SPENT Over 25 minutes aggregate care time spent on encounter; activities included combinations of  direct pt care, counseling and/or coordinating care reviewing notes, lab data/ imaging, discussion with multidisciplinary team/ pt /family. Risks, benefits, alternatives of planned interventions when applicable were discussed in detail and questions answered as best as possible    AYANNA VELOZ [facility] P  DOA 7/26/2020  ALLERGY Pcn sulfa azactam augmentin

## 2020-08-01 NOTE — PROGRESS NOTE ADULT - SUBJECTIVE AND OBJECTIVE BOX
STATUS POST:  Laparoscopic cholecystectomy       POST OPERATIVE DAY #: 4      Vital Signs Last 24 Hrs  T(F): 98.4 (01 Aug 2020 07:56), Max: 100.1 (01 Aug 2020 00:00)  HR: 65 (01 Aug 2020 08:22) (65 - 85)  BP: 155/90 (01 Aug 2020 07:56) (128/82 - 157/83)  RR: 18 (01 Aug 2020 07:56) (17 - 18)  SpO2: 95% (01 Aug 2020 08:22) (92% - 96%)      SUBJECTIVE: Pt seen resting quietly in chair, remains non-verbal and not following commands (though reported as baseline).              Flatus or Bowel Movement: YES                Void: YES      General Appearance: Appears well and in NAD  Neck: Stiff but non-rigid  Chest: Equal expansion bilaterally  CV: Pulse regular presently  Abdomen: Full/ obese, but soft, non tense, appropriate incisional tenderness, dressings clean and dry and intact  Extremities: Grossly symmetric with flexion/ contraction as baseline      I&O's Detail    31 Jul 2020 07:01  -  01 Aug 2020 07:00  --------------------------------------------------------  IN:    Solution: 50 mL  Total IN: 50 mL    OUT:    Incontinent per Condom Catheter: 3 mL  Total OUT: 3 mL    Total NET: 47 mL      MEDICATIONS  (STANDING):  albuterol/ipratropium for Nebulization 1.5 milliLiter(s) Nebulizer every 8 hours  aspirin enteric coated 81 milliGRAM(s) Oral daily  clonazePAM  Tablet 0.5 milliGRAM(s) Oral two times a day  doxazosin 1 milliGRAM(s) Oral at bedtime  enoxaparin Injectable 40 milliGRAM(s) SubCutaneous daily  fluvoxaMINE 100 milliGRAM(s) Oral two times a day  levothyroxine Injectable 37.5 MICROGram(s) IV Push at bedtime  meropenem  IVPB 1000 milliGRAM(s) IV Intermittent every 8 hours  metoprolol tartrate 25 milliGRAM(s) Oral two times a day  pantoprazole  Injectable 40 milliGRAM(s) IV Push daily  tamsulosin 0.4 milliGRAM(s) Oral at bedtime  valproate sodium IVPB 250 milliGRAM(s) IV Intermittent three times a day      MEDICATIONS  (PRN):  acetaminophen   Tablet .. 650 milliGRAM(s) Oral every 4 hours PRN Temp greater or equal to 38C (100.4F), Mild Pain (1 - 3)  glucagon  Injectable 1 milliGRAM(s) IntraMuscular once PRN Glucose LESS THAN 70 milligrams/deciliter      LABS:                        11.3   9.00  )-----------( 267      ( 31 Jul 2020 07:05 )             33.2     07-31    145  |  110<H>  |  19  ----------------------------<  98  3.7   |  30  |  0.76    Ca    8.2<L>      31 Jul 2020 07:05    TPro  6.6  /  Alb  2.1<L>  /  TBili  0.2  /  DBili  x   /  AST  38<H>  /  ALT  51  /  AlkPhos  61  07-31      RADIOLOGY & ADDITIONAL STUDIES:    No new imaging results

## 2020-08-01 NOTE — PROGRESS NOTE ADULT - SUBJECTIVE AND OBJECTIVE BOX
ROSELIA NARAYAN    [facility] TELN [address]    Patient is a 65y old  Male who presents with a chief complaint of fever 101 (31 Jul 2020 15:53)       Allergies    Augmentin (Unknown)  aztreonam (Rash)  penicillin (Unknown)  sulfa drugs (Unknown)    Intolerances        HPI:      PAST MEDICAL & SURGICAL HISTORY:  Constipation, unspecified constipation type  Blind: right eye  DD (diastrophic dysplasia)  Psoriasis  HTN (hypertension)  Seizure  MR (mental retardation), severe  No significant past surgical history      FAMILY HISTORY:  No pertinent family history in first degree relatives        MEDICATIONS   acetaminophen   Tablet .. 650 milliGRAM(s) Oral every 4 hours PRN  albuterol/ipratropium for Nebulization 1.5 milliLiter(s) Nebulizer every 8 hours  aspirin enteric coated 81 milliGRAM(s) Oral daily  clonazePAM  Tablet 0.5 milliGRAM(s) Oral two times a day  doxazosin 1 milliGRAM(s) Oral at bedtime  enoxaparin Injectable 40 milliGRAM(s) SubCutaneous daily  fluvoxaMINE 100 milliGRAM(s) Oral two times a day  glucagon  Injectable 1 milliGRAM(s) IntraMuscular once PRN  levothyroxine Injectable 37.5 MICROGram(s) IV Push at bedtime  meropenem  IVPB 1000 milliGRAM(s) IV Intermittent every 8 hours  metoprolol tartrate 25 milliGRAM(s) Oral two times a day  pantoprazole  Injectable 40 milliGRAM(s) IV Push daily  tamsulosin 0.4 milliGRAM(s) Oral at bedtime  valproate sodium IVPB 250 milliGRAM(s) IV Intermittent three times a day      Vital Signs Last 24 Hrs  T(C): 36.9 (01 Aug 2020 07:56), Max: 37.8 (01 Aug 2020 00:00)  T(F): 98.4 (01 Aug 2020 07:56), Max: 100.1 (01 Aug 2020 00:00)  HR: 66 (01 Aug 2020 07:56) (65 - 85)  BP: 155/90 (01 Aug 2020 07:56) (128/82 - 169/75)  BP(mean): --  RR: 18 (01 Aug 2020 07:56) (17 - 18)  SpO2: 94% (01 Aug 2020 07:56) (92% - 96%)      07-31-20 @ 07:01  -  08-01-20 @ 07:00  --------------------------------------------------------  IN: 50 mL / OUT: 3 mL / NET: 47 mL            LABS:                        11.3   9.00  )-----------( 267      ( 31 Jul 2020 07:05 )             33.2     07-31    145  |  110<H>  |  19  ----------------------------<  98  3.7   |  30  |  0.76    Ca    8.2<L>      31 Jul 2020 07:05    TPro  6.6  /  Alb  2.1<L>  /  TBili  0.2  /  DBili  x   /  AST  38<H>  /  ALT  51  /  AlkPhos  61  07-31              WBC:  WBC Count: 9.00 K/uL (07-31 @ 07:05)  WBC Count: 10.66 K/uL (07-30 @ 08:42)  WBC Count: 10.74 K/uL (07-29 @ 14:43)      MICROBIOLOGY:  RECENT CULTURES:  07-29 .Urine Catheterized XXXX XXXX   No growth    07-26 .Urine Clean Catch (Midstream) XXXX XXXX   No growth    07-26 .Blood Blood-Peripheral XXXX XXXX   No Growth Final                    Sodium:  Sodium, Serum: 145 mmol/L (07-31 @ 07:05)  Sodium, Serum: 144 mmol/L (07-30 @ 07:28)  Sodium, Serum: 149 mmol/L (07-29 @ 14:43)      0.76 mg/dL 07-31 @ 07:05  0.91 mg/dL 07-30 @ 07:28  0.97 mg/dL 07-29 @ 14:43      Hemoglobin:  Hemoglobin: 11.3 g/dL (07-31 @ 07:05)  Hemoglobin: 10.8 g/dL (07-30 @ 08:42)  Hemoglobin: 11.1 g/dL (07-29 @ 14:43)      Platelets: Platelet Count - Automated: 267 K/uL (07-31 @ 07:05)  Platelet Count - Automated: 212 K/uL (07-30 @ 08:42)  Platelet Count - Automated: 204 K/uL (07-29 @ 14:43)      LIVER FUNCTIONS - ( 31 Jul 2020 07:05 )  Alb: 2.1 g/dL / Pro: 6.6 g/dL / ALK PHOS: 61 U/L / ALT: 51 U/L / AST: 38 U/L / GGT: x                 RADIOLOGY & ADDITIONAL STUDIES:

## 2020-08-01 NOTE — PROGRESS NOTE ADULT - ASSESSMENT
Continues to clinically improve overall.  Tolerating diet.  Non-suggestive vitals.  Still with benign abdomen and unremarkable surgical sites.  Remains surgically stable.  Will defer issue of ? pneumonia to Medicine/ Hospitalist services, though overall progress encouraging.  Surgically stable for discharge once medically optimized- awaiting further progress and plans for discharge to rehab versus group home setting.

## 2020-08-02 RX ADMIN — Medication 26.25 MILLIGRAM(S): at 14:12

## 2020-08-02 RX ADMIN — PANTOPRAZOLE SODIUM 40 MILLIGRAM(S): 20 TABLET, DELAYED RELEASE ORAL at 11:10

## 2020-08-02 RX ADMIN — Medication 0.5 MILLIGRAM(S): at 17:32

## 2020-08-02 RX ADMIN — FLUVOXAMINE MALEATE 100 MILLIGRAM(S): 25 TABLET ORAL at 17:32

## 2020-08-02 RX ADMIN — Medication 1.5 MILLILITER(S): at 14:09

## 2020-08-02 RX ADMIN — FLUVOXAMINE MALEATE 100 MILLIGRAM(S): 25 TABLET ORAL at 05:38

## 2020-08-02 RX ADMIN — MEROPENEM 100 MILLIGRAM(S): 1 INJECTION INTRAVENOUS at 05:38

## 2020-08-02 RX ADMIN — Medication 1 MILLIGRAM(S): at 21:19

## 2020-08-02 RX ADMIN — Medication 0.5 MILLIGRAM(S): at 05:38

## 2020-08-02 RX ADMIN — Medication 25 MILLIGRAM(S): at 05:38

## 2020-08-02 RX ADMIN — Medication 81 MILLIGRAM(S): at 11:11

## 2020-08-02 RX ADMIN — MEROPENEM 100 MILLIGRAM(S): 1 INJECTION INTRAVENOUS at 21:19

## 2020-08-02 RX ADMIN — Medication 25 MILLIGRAM(S): at 17:32

## 2020-08-02 RX ADMIN — Medication 1.5 MILLILITER(S): at 08:54

## 2020-08-02 RX ADMIN — Medication 26.25 MILLIGRAM(S): at 06:33

## 2020-08-02 RX ADMIN — Medication 26.25 MILLIGRAM(S): at 21:20

## 2020-08-02 RX ADMIN — Medication 1.5 MILLILITER(S): at 23:25

## 2020-08-02 RX ADMIN — ENOXAPARIN SODIUM 40 MILLIGRAM(S): 100 INJECTION SUBCUTANEOUS at 11:11

## 2020-08-02 RX ADMIN — TAMSULOSIN HYDROCHLORIDE 0.4 MILLIGRAM(S): 0.4 CAPSULE ORAL at 21:19

## 2020-08-02 RX ADMIN — Medication 37.5 MICROGRAM(S): at 21:20

## 2020-08-02 RX ADMIN — MEROPENEM 100 MILLIGRAM(S): 1 INJECTION INTRAVENOUS at 14:11

## 2020-08-02 NOTE — PROGRESS NOTE ADULT - SUBJECTIVE AND OBJECTIVE BOX
ROSELIA NARAYAN    [facility] [place] [address]    Patient is a 65y old  Male who presents with a chief complaint of fever 101 (02 Aug 2020 09:38)       Allergies    Augmentin (Unknown)  aztreonam (Rash)  penicillin (Unknown)  sulfa drugs (Unknown)    Intolerances        HPI:      PAST MEDICAL & SURGICAL HISTORY:  Constipation, unspecified constipation type  Blind: right eye  DD (diastrophic dysplasia)  Psoriasis  HTN (hypertension)  Seizure  MR (mental retardation), severe  No significant past surgical history      FAMILY HISTORY:  No pertinent family history in first degree relatives        MEDICATIONS   acetaminophen   Tablet .. 650 milliGRAM(s) Oral every 4 hours PRN  albuterol/ipratropium for Nebulization 1.5 milliLiter(s) Nebulizer every 8 hours  aspirin enteric coated 81 milliGRAM(s) Oral daily  clonazePAM  Tablet 0.5 milliGRAM(s) Oral two times a day  doxazosin 1 milliGRAM(s) Oral at bedtime  enoxaparin Injectable 40 milliGRAM(s) SubCutaneous daily  fluvoxaMINE 100 milliGRAM(s) Oral two times a day  glucagon  Injectable 1 milliGRAM(s) IntraMuscular once PRN  levothyroxine Injectable 37.5 MICROGram(s) IV Push at bedtime  meropenem  IVPB 1000 milliGRAM(s) IV Intermittent every 8 hours  metoprolol tartrate 25 milliGRAM(s) Oral two times a day  pantoprazole  Injectable 40 milliGRAM(s) IV Push daily  tamsulosin 0.4 milliGRAM(s) Oral at bedtime  valproate sodium IVPB 250 milliGRAM(s) IV Intermittent three times a day      Vital Signs Last 24 Hrs  T(C): 36.7 (02 Aug 2020 16:02), Max: 37.7 (01 Aug 2020 23:54)  T(F): 98.1 (02 Aug 2020 16:02), Max: 99.9 (01 Aug 2020 23:54)  HR: 90 (02 Aug 2020 16:02) (69 - 93)  BP: 151/77 (02 Aug 2020 16:02) (128/86 - 161/89)  BP(mean): --  RR: 18 (02 Aug 2020 16:02) (17 - 18)  SpO2: 92% (02 Aug 2020 16:02) (90% - 96%)      08-01-20 @ 07:01  -  08-02-20 @ 07:00  --------------------------------------------------------  IN: 450 mL / OUT: 0 mL / NET: 450 mL    08-02-20 @ 07:01  -  08-02-20 @ 18:51  --------------------------------------------------------  IN: 605 mL / OUT: 2 mL / NET: 603 mL            LABS:                        13.4   7.55  )-----------( 355      ( 01 Aug 2020 14:51 )             38.9     08-01    142  |  106  |  15  ----------------------------<  102<H>  3.9   |  29  |  0.73    Ca    9.1      01 Aug 2020 14:51    TPro  7.5  /  Alb  2.5<L>  /  TBili  0.2  /  DBili  x   /  AST  29  /  ALT  42  /  AlkPhos  71  08-01              WBC:  WBC Count: 7.55 K/uL (08-01 @ 14:51)  WBC Count: 9.00 K/uL (07-31 @ 07:05)  WBC Count: 10.66 K/uL (07-30 @ 08:42)      MICROBIOLOGY:  RECENT CULTURES:  07-29 .Urine Catheterized XXXX XXXX   No growth    07-26 .Urine Clean Catch (Midstream) XXXX XXXX   No growth                    Sodium:  Sodium, Serum: 142 mmol/L (08-01 @ 14:51)  Sodium, Serum: 145 mmol/L (07-31 @ 07:05)  Sodium, Serum: 144 mmol/L (07-30 @ 07:28)      0.73 mg/dL 08-01 @ 14:51  0.76 mg/dL 07-31 @ 07:05  0.91 mg/dL 07-30 @ 07:28      Hemoglobin:  Hemoglobin: 13.4 g/dL (08-01 @ 14:51)  Hemoglobin: 11.3 g/dL (07-31 @ 07:05)  Hemoglobin: 10.8 g/dL (07-30 @ 08:42)      Platelets: Platelet Count - Automated: 355 K/uL (08-01 @ 14:51)  Platelet Count - Automated: 267 K/uL (07-31 @ 07:05)  Platelet Count - Automated: 212 K/uL (07-30 @ 08:42)      LIVER FUNCTIONS - ( 01 Aug 2020 14:51 )  Alb: 2.5 g/dL / Pro: 7.5 g/dL / ALK PHOS: 71 U/L / ALT: 42 U/L / AST: 29 U/L / GGT: x                 RADIOLOGY & ADDITIONAL STUDIES:

## 2020-08-02 NOTE — PROGRESS NOTE ADULT - ASSESSMENT
HPI/PMH.      65 m brought in by caregivers from group home for 100.8 temperature and low spO2 92%. caregivers reports two positive COVID cases at nearby [facility] house. patient was seen at urgent care of Friday for the same symptoms  Ct cap showed basal pneumonia and ac cholecytitis Pt given genta linad vanco in er and alredy got fc 2l   Pulm consulted 7/26/2020   COVID pcr 7/26/2020 pcr Neg-ric     Past Medical History:  Blind  right eye  Constipation, unspecified constipation type    DD (diastrophic dysplasia)    HTN (hypertension)    MR (mental retardation), severe    Psoriasis    Seizure.    home meds  Flagyl 500 Cipro 500 ursodiol 300 metoprolol tartrate 25 tamsulosin 0.4 doxazosin 2 ZyPREXA 20 mg OLANZapine: Cosentyx:   Depakote: Aspir 81: hydrOXYzine: levothyroxine: clonazePAM:           OXYGENATION      7/27/2020 4l 91%     VITALS/LABS       8/2/2020 afeb 90 150/70     SYLVAIN ROSELIA [facility]  DOA 7/26/2020  ALLERGY Pcn sulfa azactam augmentin      REVIEW OF SYMPTOMS      Able to give ROS  NO     PHYSICAL EXAM    HEENT Unremarkable PERRLA atraumatic   RESP Fair air entry EXP prolonged    Harsh breath sound Resp distres mild   CARDIAC S1 S2 No S3     NO JVD    ABDOMEN SOFT BS PRESENT NOT DISTENDED No hepatosplenomegaly PEDAL EDEMA present No calf tenderness  NO rash   GENERAL Not TOXIC looking    PATIENT DATA/ASSESSMENT/RECOMMENDATIONS       INFECTION  AC CHOLECYSTITIS   ASP PNEUMONIA   Pt has ac chol based on us and ct and is on meropenem (7/26)   Cholecystectomy done 7/28  on po diet (7/30)     FEVER 7/30/2020  pc cvxr improved   Fever resolvd       ELEVATED D-dimer   ELEVATED D-dimr poa 7/26/2020 D-dimr 715     V duplx 7/26/2020 v duplx Neg-ric                               BNP 7/26/2020 BNP 1322    A/R Likely false pos+ric  If desaturates will get cta ch     RESP   Monitor po   Target po 90-95%    CHF  BNP 7/26/2020 BNP 1322    ECHO 7/27 ef 60% dd          HEMODYNAMICS   Stable                                NEURO PSYCH   Resume meds       TIME SPENT Over 25 minutes aggregate care time spent on encounter; activities included combinations of  direct pt care, counseling and/or coordinating care reviewing notes, lab data/ imaging, discussion with multidisciplinary team/ pt /family. Risks, benefits, alternatives of planned interventions when applicable were discussed in detail and questions answered as best as possible    AYANNA VELOZ [facility]  DOA 7/26/2020  ALLERGY Pcn sulfa azactam augmentin

## 2020-08-02 NOTE — PROGRESS NOTE ADULT - SUBJECTIVE AND OBJECTIVE BOX
SUBJECTIVE:  64 y/o M seen and examined at bedside. No overnight events. No changes in status. Nonverbal.     Vital Signs Last 24 Hrs  T(C): 37.2 (02 Aug 2020 09:03), Max: 37.9 (01 Aug 2020 16:11)  T(F): 98.9 (02 Aug 2020 09:03), Max: 100.3 (01 Aug 2020 16:11)  HR: 79 (02 Aug 2020 09:03) (67 - 96)  BP: 128/86 (02 Aug 2020 09:03) (120/70 - 161/89)  BP(mean): --  RR: 18 (02 Aug 2020 09:03) (17 - 18)  SpO2: 91% (02 Aug 2020 09:03) (90% - 96%)    PHYSICAL EXAM:  GENERAL: NAD, resting in bed, appears comfortable  HEAD:  Atraumatic, Normocephalic  ABDOMEN: Bandaids and steri-strips dry and intact. Abdomen soft, nondistended. Appears nontender. +BS      ASSESSMENT  64 y/o Male POD 5 s/p lap ena    PLAN  - continue care as per medicine  - D1 diet  - Surgically stable. Will sign off, but we remain available. Please reconsult if needed.   - Case discussed with Dr. Ortiz (covering for Dr. Fischer today)    Surgical Team Contact Information  Spectralink: Ext: [phone] or [phone]  Pager: [phone] SUBJECTIVE:  64 y/o M seen and examined at bedside. No overnight events. No changes in status. Nonverbal.       Vital Signs Last 24 Hrs  T(F): 98.9 (02 Aug 2020 09:03), Max: 100.3 (01 Aug 2020 16:11)  HR: 79 (02 Aug 2020 09:03) (67 - 96)  BP: 128/86 (02 Aug 2020 09:03) (120/70 - 161/89)  RR: 18 (02 Aug 2020 09:03) (17 - 18)  SpO2: 91% (02 Aug 2020 09:03) (90% - 96%)      PHYSICAL EXAM:  GENERAL: NAD, resting in bed, appears comfortable  HEAD:  Atraumatic, Normocephalic  ABDOMEN: Steri-strips dry and intact. Abdomen soft, nondistended. Appears nontender. +BS      ASSESSMENT  64 y/o Male POD 5 s/p lap ena      PLAN  - continue care as per medicine  - D1 diet  - Surgically stable. Will sign off, but we remain available. Please reconsult if needed.   - Case discussed with Dr. Ortiz (covering for Dr. Fischer today)    Surgical Team Contact Information  Spectralink: Ext: [phone] or [phone]  Pager: [phone]

## 2020-08-02 NOTE — PROGRESS NOTE ADULT - NSHPATTENDINGPLANDISCUSS_GEN_ALL_CORE
patient as able, Aide at bedside and Surgery PA
patient as able, Surgery PA team and today's RN.
patient as able, Surgery PA and today's RN.

## 2020-08-02 NOTE — PROGRESS NOTE ADULT - ASSESSMENT
All as above in PA notation.  Patient personally seen and examined.  He is eating well per staff report.  Vitals overall non-suggestive.  Abdomen soft and benign with unremarkable surgical sites.  Labs reassuring with normal WBCs, normal differential, unremarkable chemistry.  As such, clinically improving overall.  Surgically stable.  Disposition plans pending with ongoing PT support and assessment.  General Surgery remains available; however, will sign off for now...  Please recall, PRN.

## 2020-08-03 ENCOUNTER — TRANSCRIPTION ENCOUNTER (OUTPATIENT)
Age: 65
End: 2020-08-03

## 2020-08-03 RX ORDER — ASPIRIN/CALCIUM CARB/MAGNESIUM 324 MG
0 TABLET ORAL
Qty: 0 | Refills: 0 | DISCHARGE

## 2020-08-03 RX ORDER — FLUVOXAMINE MALEATE 25 MG/1
1 TABLET ORAL
Qty: 0 | Refills: 0 | DISCHARGE
Start: 2020-08-03

## 2020-08-03 RX ORDER — ASPIRIN/CALCIUM CARB/MAGNESIUM 324 MG
1 TABLET ORAL
Qty: 0 | Refills: 0 | DISCHARGE
Start: 2020-08-03

## 2020-08-03 RX ORDER — HYDROXYZINE HCL 10 MG
0 TABLET ORAL
Qty: 0 | Refills: 0 | DISCHARGE

## 2020-08-03 RX ORDER — AMLODIPINE BESYLATE 2.5 MG/1
2.5 TABLET ORAL DAILY
Refills: 0 | Status: DISCONTINUED | OUTPATIENT
Start: 2020-08-03 | End: 2020-08-04

## 2020-08-03 RX ORDER — DIVALPROEX SODIUM 500 MG/1
250 TABLET, DELAYED RELEASE ORAL
Refills: 0 | Status: DISCONTINUED | OUTPATIENT
Start: 2020-08-03 | End: 2020-08-04

## 2020-08-03 RX ORDER — DIVALPROEX SODIUM 500 MG/1
1 TABLET, DELAYED RELEASE ORAL
Qty: 0 | Refills: 0 | DISCHARGE
Start: 2020-08-03

## 2020-08-03 RX ORDER — AMLODIPINE BESYLATE 2.5 MG/1
1 TABLET ORAL
Qty: 0 | Refills: 0 | DISCHARGE
Start: 2020-08-03

## 2020-08-03 RX ORDER — CLONAZEPAM 1 MG
0 TABLET ORAL
Qty: 0 | Refills: 0 | DISCHARGE

## 2020-08-03 RX ORDER — LEVOTHYROXINE SODIUM 125 MCG
0 TABLET ORAL
Qty: 0 | Refills: 0 | DISCHARGE

## 2020-08-03 RX ORDER — CLONAZEPAM 1 MG
1 TABLET ORAL
Qty: 0 | Refills: 0 | DISCHARGE
Start: 2020-08-03

## 2020-08-03 RX ORDER — OLANZAPINE 15 MG/1
0 TABLET, FILM COATED ORAL
Qty: 0 | Refills: 0 | DISCHARGE

## 2020-08-03 RX ORDER — DIVALPROEX SODIUM 500 MG/1
0 TABLET, DELAYED RELEASE ORAL
Qty: 0 | Refills: 0 | DISCHARGE

## 2020-08-03 RX ORDER — SECUKINUMAB 150 MG/ML
0 INJECTION SUBCUTANEOUS
Qty: 0 | Refills: 0 | DISCHARGE

## 2020-08-03 RX ORDER — LEVOTHYROXINE SODIUM 125 MCG
75 TABLET ORAL DAILY
Refills: 0 | Status: DISCONTINUED | OUTPATIENT
Start: 2020-08-03 | End: 2020-08-04

## 2020-08-03 RX ORDER — METRONIDAZOLE 500 MG
500 TABLET ORAL EVERY 8 HOURS
Refills: 0 | Status: DISCONTINUED | OUTPATIENT
Start: 2020-08-03 | End: 2020-08-04

## 2020-08-03 RX ORDER — METRONIDAZOLE 500 MG
1 TABLET ORAL
Qty: 0 | Refills: 0 | DISCHARGE
Start: 2020-08-03

## 2020-08-03 RX ADMIN — Medication 81 MILLIGRAM(S): at 15:22

## 2020-08-03 RX ADMIN — Medication 26.25 MILLIGRAM(S): at 06:07

## 2020-08-03 RX ADMIN — ENOXAPARIN SODIUM 40 MILLIGRAM(S): 100 INJECTION SUBCUTANEOUS at 13:21

## 2020-08-03 RX ADMIN — Medication 1.5 MILLILITER(S): at 08:28

## 2020-08-03 RX ADMIN — Medication 1.5 MILLILITER(S): at 16:02

## 2020-08-03 RX ADMIN — Medication 25 MILLIGRAM(S): at 06:09

## 2020-08-03 RX ADMIN — MEROPENEM 100 MILLIGRAM(S): 1 INJECTION INTRAVENOUS at 06:07

## 2020-08-03 RX ADMIN — DIVALPROEX SODIUM 250 MILLIGRAM(S): 500 TABLET, DELAYED RELEASE ORAL at 18:29

## 2020-08-03 RX ADMIN — Medication 1 MILLIGRAM(S): at 21:53

## 2020-08-03 RX ADMIN — Medication 500 MILLIGRAM(S): at 21:53

## 2020-08-03 RX ADMIN — PANTOPRAZOLE SODIUM 40 MILLIGRAM(S): 20 TABLET, DELAYED RELEASE ORAL at 13:21

## 2020-08-03 RX ADMIN — AMLODIPINE BESYLATE 2.5 MILLIGRAM(S): 2.5 TABLET ORAL at 15:32

## 2020-08-03 RX ADMIN — FLUVOXAMINE MALEATE 100 MILLIGRAM(S): 25 TABLET ORAL at 06:09

## 2020-08-03 RX ADMIN — Medication 0.5 MILLIGRAM(S): at 06:09

## 2020-08-03 RX ADMIN — Medication 0.5 MILLIGRAM(S): at 18:28

## 2020-08-03 RX ADMIN — Medication 100 MILLIGRAM(S): at 18:28

## 2020-08-03 RX ADMIN — Medication 500 MILLIGRAM(S): at 15:22

## 2020-08-03 RX ADMIN — Medication 25 MILLIGRAM(S): at 18:29

## 2020-08-03 RX ADMIN — TAMSULOSIN HYDROCHLORIDE 0.4 MILLIGRAM(S): 0.4 CAPSULE ORAL at 21:53

## 2020-08-03 RX ADMIN — FLUVOXAMINE MALEATE 100 MILLIGRAM(S): 25 TABLET ORAL at 18:28

## 2020-08-03 NOTE — DISCHARGE NOTE PROVIDER - NSDCCPCAREPLAN_GEN_ALL_CORE_FT
PRINCIPAL DISCHARGE DIAGNOSIS  Diagnosis: Sepsis with acute hypoxic respiratory failure without septic shock, due to unspecified organism  Assessment and Plan of Treatment: Sepsis with acute hypoxic respiratory failure without septic shock, due to unspecified organism  due to aspiration pn and acute cholecystitis  he went to the OR,,,successfully  he is to complete doxy and flagyl 5 days  continue lopressor and norvasc for bp  all other home meds diet activity the same  he needs physical therapy at home PRINCIPAL DISCHARGE DIAGNOSIS  Diagnosis: Sepsis with acute hypoxic respiratory failure without septic shock, due to unspecified organism  Assessment and Plan of Treatment: Sepsis with acute hypoxic respiratory failure without septic shock, due to unspecified organism  sepsis poa  due to aspiration pn and acute cholecystitis  he went to the OR,,,successfully  he is to complete doxy and flagyl 5 days  continue lopressor and norvasc for bp  all other home meds diet activity the same  he needs physical therapy at home

## 2020-08-03 NOTE — PROGRESS NOTE ADULT - SUBJECTIVE AND OBJECTIVE BOX
PAST MEDICAL & SURGICAL HISTORY:  Constipation, unspecified constipation type  Blind: right eye  DD (diastrophic dysplasia)  Psoriasis  HTN (hypertension)  Seizure  MR (mental retardation), severe  No significant past surgical history      MEDICATIONS  (STANDING):  albuterol/ipratropium for Nebulization 1.5 milliLiter(s) Nebulizer every 8 hours  amLODIPine   Tablet 2.5 milliGRAM(s) Oral daily  aspirin enteric coated 81 milliGRAM(s) Oral daily  clonazePAM  Tablet 0.5 milliGRAM(s) Oral two times a day  diVALproex  milliGRAM(s) Oral two times a day  doxazosin 1 milliGRAM(s) Oral at bedtime  doxycycline hyclate Capsule 100 milliGRAM(s) Oral every 12 hours  enoxaparin Injectable 40 milliGRAM(s) SubCutaneous daily  fluvoxaMINE 100 milliGRAM(s) Oral two times a day  levothyroxine Injectable 37.5 MICROGram(s) IV Push at bedtime  metoprolol tartrate 25 milliGRAM(s) Oral two times a day  metroNIDAZOLE    Tablet 500 milliGRAM(s) Oral every 8 hours  pantoprazole  Injectable 40 milliGRAM(s) IV Push daily  tamsulosin 0.4 milliGRAM(s) Oral at bedtime    MEDICATIONS  (PRN):  acetaminophen   Tablet .. 650 milliGRAM(s) Oral every 4 hours PRN Temp greater or equal to 38C (100.4F), Mild Pain (1 - 3)  glucagon  Injectable 1 milliGRAM(s) IntraMuscular once PRN Glucose LESS THAN 70 milligrams/deciliter      Patient is a 65y old  Male who presents with a chief complaint of fever 101 (03 Aug 2020 11:26)      Vital Signs Last 24 Hrs  T(C): 36.5 (03 Aug 2020 15:58), Max: 37.4 (03 Aug 2020 12:56)  T(F): 97.7 (03 Aug 2020 15:58), Max: 99.4 (03 Aug 2020 12:56)  HR: 75 (03 Aug 2020 16:03) (69 - 100)  BP: 146/78 (03 Aug 2020 15:58) (133/74 - 149/59)  BP(mean): --  RR: 18 (03 Aug 2020 15:58) (17 - 18)  SpO2: 95% (03 Aug 2020 16:03) (90% - 98%)          08-02 @ 07:01  -  08-03 @ 07:00  --------------------------------------------------------  IN: 605 mL / OUT: 2 mL / NET: 603 mL    08-03 @ 07:01 - 08-03 @ 18:22  --------------------------------------------------------  IN: 0 mL / OUT: 900 mL / NET: -900 mL        REVIEW OF SYSTEMS:    Constitutional: No fever, weight loss or fatigue  Eyes: No eye pain, visual disturbances, or discharge  ENT:  No difficulty hearing, tinnitus, vertigo; No sinus or throat pain  Neck: No pain or stiffness  Breasts: No pain, masses or nipple discharge  Respiratory: No cough, wheezing, chills or hemoptysis  Cardiovascular: No chest pain, palpitations, shortness of breath, dizziness or leg swelling  Gastrointestinal: No abdominal or epigastric pain. No nausea, vomiting or hematemesis; No diarrhea or constipation. No melena or hematochezia.  Genitourinary: No dysuria, frequency, hematuria or incontinence  Rectal: No pain, hemorrhoids or incontinence  Neurological: No headaches, memory loss, loss of strength, numbness or tremors  Skin: No itching, burning, rashes or lesions   Lymph Nodes: No enlarged glands  Endocrine: No heat or cold intolerance; No hair loss  Musculoskeletal: No joint pain or swelling; No muscle, back or extremity pain  Psychiatric: No depression, anxiety, mood swings or difficulty sleeping  Heme/Lymph: No easy bruising or bleeding gums  Allergy and Immunologic: No hives or eczema    PHYSICAL EXAM:  alert  eating  refusing to sit in chair /walk  did walk with pt friday  Constitutional: NAD  Respiratory: CTAB/L   Cardiovascular: S1 and S2, RRR, no M/G/R  Gastrointestinal: BS+, soft, NT/ND  Extremities: No peripheral edema  Neurological: A/, no focal deficits  Skin: No rashes      decubiti: none                      .Urine Catheterized  07-29 @ 21:49   No growth  --  --        Urine Culture:  07-29 @ 21:49    --        No growth        Radiology:

## 2020-08-03 NOTE — PROGRESS NOTE ADULT - ASSESSMENT
65M with HTN.  Echo shows normal LVEF.   S/p cholecystectomy POD 6    Suggest:  1. Continue with abx.   Stable HR and BP.   2. Continue ASA, metoprolol 25 mg BID; DVT prohylaxis  3. IVF as necessary  4. Will follow as needed 65M with HTN.  Echo shows normal LVEF.   S/p cholecystectomy POD 6    Suggest:  1. Continue with abx.   Stable HR and BP.   2. Continue ASA, metoprolol 25 mg BID; DVT prohylaxis  3. IVF as necessary  4. Will follow

## 2020-08-03 NOTE — PROGRESS NOTE ADULT - SUBJECTIVE AND OBJECTIVE BOX
ROSELIA NARAYAN is a 65yMale , patient examined and chart reviewed.     INTERVAL HPI/ OVERNIGHT EVENTS:   Afebrile. NAD.     PAST MEDICAL & SURGICAL HISTORY:  Constipation, unspecified constipation type  Blind: right eye  DD (diastrophic dysplasia)  Psoriasis  HTN (hypertension)  Seizure  MR (mental retardation), severe  No significant past surgical history      For details regarding the patient's social history, family history, and other miscellaneous elements, please refer the initial infectious diseases consultation and/or the admitting history and physical examination for this admission.    ROS:  Unable to obtain due to : MRDD nonverbal    ALLERGIES:  Augmentin (Unknown)  aztreonam (Rash)  penicillin (Unknown)  sulfa drugs (Unknown)      Current inpatient medications :    ANTIBIOTICS/RELEVANT:  doxycycline hyclate Capsule 100 milliGRAM(s) Oral every 12 hours  metroNIDAZOLE    Tablet 500 milliGRAM(s) Oral every 8 hours    MEDICATIONS  (STANDING):  albuterol/ipratropium for Nebulization 1.5 milliLiter(s) Nebulizer every 8 hours  amLODIPine   Tablet 2.5 milliGRAM(s) Oral daily  aspirin enteric coated 81 milliGRAM(s) Oral daily  clonazePAM  Tablet 0.5 milliGRAM(s) Oral two times a day  diVALproex  milliGRAM(s) Oral two times a day  doxazosin 1 milliGRAM(s) Oral at bedtime  enoxaparin Injectable 40 milliGRAM(s) SubCutaneous daily  fluvoxaMINE 100 milliGRAM(s) Oral two times a day  levothyroxine Injectable 37.5 MICROGram(s) IV Push at bedtime  metoprolol tartrate 25 milliGRAM(s) Oral two times a day  pantoprazole  Injectable 40 milliGRAM(s) IV Push daily  tamsulosin 0.4 milliGRAM(s) Oral at bedtime    MEDICATIONS  (PRN):  acetaminophen   Tablet .. 650 milliGRAM(s) Oral every 4 hours PRN Temp greater or equal to 38C (100.4F), Mild Pain (1 - 3)  glucagon  Injectable 1 milliGRAM(s) IntraMuscular once PRN Glucose LESS THAN 70 milligrams/deciliter      Objective:  Vital Signs Last 24 Hrs  T(C): 36.8 (03 Aug 2020 08:09), Max: 37.2 (02 Aug 2020 12:34)  T(F): 98.3 (03 Aug 2020 08:09), Max: 99 (02 Aug 2020 12:34)  HR: 70 (03 Aug 2020 08:29) (69 - 100)  BP: 135/76 (03 Aug 2020 08:09) (135/76 - 151/77)  RR: 18 (03 Aug 2020 08:09) (18 - 18)  SpO2: 95% (03 Aug 2020 08:29) (90% - 95%)    Physical Exam:  General:  no acute distress  Eyes: sclera anicteric, pupils equal and reactive to light  ENMT: buccal mucosa moist, pharynx not injected  Neck: supple, trachea midline  Lungs: clear, no wheeze/rhonchi  Cardiovascular: regular rate and rhythm, S1 S2  Abdomen: soft, tender, distended, bowel sounds normal  Neurological: awake MRDD nonverbal  Skin: no increased ecchymosis/petechiae/purpura  Lymph Nodes: no palpable cervical/supraclavicular lymph nodes enlargements  Extremities: no cyanosis/clubbing/edema      LABS:                        13.4   7.55  )-----------( 355      ( 01 Aug 2020 14:51 )             38.9   08-01    142  |  106  |  15  ----------------------------<  102<H>  3.9   |  29  |  0.73    Ca    9.1      01 Aug 2020 14:51    TPro  7.5  /  Alb  2.5<L>  /  TBili  0.2  /  DBili  x   /  AST  29  /  ALT  42  /  AlkPhos  71  08-01    MICROBIOLOGY:    Culture - Urine (collected 26 Jul 2020 21:04)  Source: .Urine Clean Catch (Midstream)  Final Report (27 Jul 2020 18:17):    No growth    Culture - Blood (collected 26 Jul 2020 17:55)  Source: .Blood Blood-Peripheral  Preliminary Report (27 Jul 2020 18:01):    No growth to date.    Culture - Blood (collected 26 Jul 2020 17:55)  Source: .Blood Blood-Peripheral  Preliminary Report (27 Jul 2020 18:01):    No growth to date.    RADIOLOGY & ADDITIONAL STUDIES:    EXAM:  CT ABDOMEN AND PELVIS                          EXAM:  CT CHEST                            PROCEDURE DATE:  07/26/2020          INTERPRETATION:  CLINICAL INFORMATION: Fever, leukocytosis    COMPARISON: CT abdomen and pelvis dated 08/23/2019    PROCEDURE:  CT of the Chest, Abdomen and Pelvis was performed without intravenous contrast.  Intravenous contrast: None.  Oral contrast: None.  Sagittal and coronal reformats were performed.    FINDINGS:  CHEST:  LUNGS AND LARGE AIRWAYS: Suspect bronchial plugging in the lower lobes. Bilateral lower lobe, predominantly right-sided patchy opacities that could be related to atelectasis or possibly infiltrates.  PLEURA: No pleural effusion.  VESSELS: Coronary artery calcification  HEART: Heart sizeis normal. No pericardial effusion.  MEDIASTINUM AND GREYSON: No lymphadenopathy.  CHEST WALL AND LOWER NECK: Within normal limits.    ABDOMEN AND PELVIS:  LIVER: Within normal limits.  BILE DUCTS: Normal caliber.  GALLBLADDER: Evidence of cholelithiasis as well as pericholecystic haziness and stranding consistent with acute cholecystitis.  SPLEEN: Within normal limits.  PANCREAS: Within normal limits.  ADRENALS: Within normal limits.  KIDNEYS/URETERS: Small hyperdensities involving the lower pole cortex of both kidneys that could be related to nephrocalcinosis or hyperdense cysts.    BLADDER: Mild mural thickening concerning for chronic outlet obstruction.  REPRODUCTIVE ORGANS: Prostate is enlarged.    BOWEL: No bowel obstruction. Appendix is normal.  PERITONEUM: No ascites.  VESSELS: Within normal limits.  RETROPERITONEUM/LYMPH NODES: No lymphadenopathy.  ABDOMINAL WALL: Within normal limits.  BONES: Degenerative disc disease and spondylosis lower lumbar spine.    IMPRESSION:  Cholelithiasis and probable acute cholecystitis.    Patchy bilateral lower lobe pulmonary opacities, predominantly involving the right lower lobe.      EXAM:  US GALLBLADDER                            PROCEDURE DATE:  07/26/2020          INTERPRETATION:  EXAMINATION: US GALLBLADDER    DATE OF EXAM: 7/26/2020 7:42 PM    HISTORY: Fever and leukocytosis, abnormal gallbladder appearance on CT    COMPARISON: CT performed the same day.    FINDINGS:    The gallbladder is distended with a transverse measurement of 4.9 cm. In the fundus, the gallbladder wall thickness is 3 mm. In the body, the wall thickness measures up to 4 mm. Cholelithiasis in the gallbladder neck. Common duct measures 5-6 mm, which is within normal limits. No right upper quadrant ascites seen.      IMPRESSION:    1.  Near hydropic distention of the gallbladder, in the setting of cholelithiasis and mild mural edema, suggestive of acute cholecystitis. Consider hepatobiliary imaging to confirm cystic duct obstruction.      Assessment :   64YO M Phx of MRDD, HTN, resident of senior care admitted with fevers n/v likely sec acute cholecystitis.  RUQ sono noted suggestive of acute cholecystitis. Also with possible asp pneumonia. Sp fever. Sp lap ena 7/28/2020. AUR. Sp post op fever. Overall better    Plan :   Off Meropenam   Doxy and flagyl x 3 days  Trend temps and cbc   Serial abd exams  Asp precautions  AUR management per primary team  Dc planning    D/w Dr Hunter Curran    Continue with present regiment.  Appropriate use of antibiotics and adverse effects reviewed.      I have discussed the above plan of care with patient/ family in detail. They expressed understanding of the  treatment plan . Risks, benefits and alternatives discussed in detail. I have asked if they have any questions or concerns and appropriately addressed them to the best of my ability .    > 35 minutes were spent in direct patient care reviewing notes, medications ,labs data/ imaging , discussion with multidisciplinary team.    Thank you for allowing me to participate in care of your patient .    Olivier Daniels MD  Infectious Disease  [phone]

## 2020-08-03 NOTE — DISCHARGE NOTE PROVIDER - HOSPITAL COURSE
patient came in with fevers    given iv fluids and meropenem    found to have acute cholecystitis with aspiration pn    he was taken to the OR    he was treated for aspiration pn    bp was elevated,,,,he was given lopressor and norvasc    seen by pulmonary cardiology infectious disease surgery    he is to continue doxy and flagyl for 5 days    lopressor and norvasc    all other home medications diet activity to remain the same as before he came in to the hospital    he walked with physical therapy    will need physical therapy at home

## 2020-08-03 NOTE — PROGRESS NOTE ADULT - PROBLEM SELECTOR PROBLEM 1
Acute cholecystitis
Sepsis with acute hypoxic respiratory failure without septic shock, due to unspecified organism
Acute cholecystitis

## 2020-08-03 NOTE — PROGRESS NOTE ADULT - SUBJECTIVE AND OBJECTIVE BOX
[facility] Cardiology Progress Note [phone] (Dr. Portillo, Julieta, Genevieve, Alex)    CHIEF COMPLAINT: Patient is a 65y old  Male who presents with a chief complaint of fever 101 (02 Aug 2020 18:51)      Follow Up Today: The patient denies any chest discomfort or shortness of breath.    HPI:      PAST MEDICAL & SURGICAL HISTORY:  Constipation, unspecified constipation type  Blind: right eye  DD (diastrophic dysplasia)  Psoriasis  HTN (hypertension)  Seizure  MR (mental retardation), severe  No significant past surgical history      MEDICATIONS  (STANDING):  albuterol/ipratropium for Nebulization 1.5 milliLiter(s) Nebulizer every 8 hours  aspirin enteric coated 81 milliGRAM(s) Oral daily  clonazePAM  Tablet 0.5 milliGRAM(s) Oral two times a day  doxazosin 1 milliGRAM(s) Oral at bedtime  enoxaparin Injectable 40 milliGRAM(s) SubCutaneous daily  fluvoxaMINE 100 milliGRAM(s) Oral two times a day  levothyroxine Injectable 37.5 MICROGram(s) IV Push at bedtime  meropenem  IVPB 1000 milliGRAM(s) IV Intermittent every 8 hours  metoprolol tartrate 25 milliGRAM(s) Oral two times a day  pantoprazole  Injectable 40 milliGRAM(s) IV Push daily  tamsulosin 0.4 milliGRAM(s) Oral at bedtime  valproate sodium IVPB 250 milliGRAM(s) IV Intermittent three times a day    MEDICATIONS  (PRN):  acetaminophen   Tablet .. 650 milliGRAM(s) Oral every 4 hours PRN Temp greater or equal to 38C (100.4F), Mild Pain (1 - 3)  glucagon  Injectable 1 milliGRAM(s) IntraMuscular once PRN Glucose LESS THAN 70 milligrams/deciliter      Allergies    Augmentin (Unknown)  aztreonam (Rash)  penicillin (Unknown)  sulfa drugs (Unknown)    Intolerances        REVIEW OF SYSTEMS:    All other review of systems is negative unless indicated above    Vital Signs Last 24 Hrs  T(C): 36.8 (03 Aug 2020 04:36), Max: 37.2 (02 Aug 2020 09:03)  T(F): 98.2 (03 Aug 2020 04:36), Max: 99 (02 Aug 2020 12:34)  HR: 100 (03 Aug 2020 04:36) (69 - 100)  BP: 149/59 (03 Aug 2020 04:36) (128/86 - 151/77)  BP(mean): --  RR: 18 (03 Aug 2020 04:36) (18 - 18)  SpO2: 90% (03 Aug 2020 04:36) (90% - 93%)    I&O's Summary    02 Aug 2020 07:01  -  03 Aug 2020 07:00  --------------------------------------------------------  IN: 605 mL / OUT: 2 mL / NET: 603 mL        PHYSICAL EXAM:    Constitutional: NAD, awake and alert, well-developed  Eyes:  EOMI,  Pupils round, No oral cyanosis.  HEENT: No exudate or erythema  Pulmonary: Non-labored, breath sounds are clear bilaterally, No wheezing, rales or rhonchi  Cardiovascular: Regular, S1 and S2, No murmurs, rubs, gallops oir clicks  Gastrointestinal: Bowel Sounds present, soft, nontender.   Ext: No significant LE edema with good pulses x 4  Neurological: Alert, no gross focal motor deficits  Skin: No rashes.  Psych:  Mood & affect appropriate    LABS: All Labs Reviewed:                        13.4   7.55  )-----------( 355      ( 01 Aug 2020 14:51 )             38.9     01 Aug 2020 14:51    142    |  106    |  15     ----------------------------<  102    3.9     |  29     |  0.73     Ca    9.1        01 Aug 2020 14:51    TPro  7.5    /  Alb  2.5    /  TBili  0.2    /  DBili  x      /  AST  29     /  ALT  42     /  AlkPhos  71     01 Aug 2020 14:51          Blood Culture: Organism --  Gram Stain Blood -- Gram Stain --  Specimen Source .Urine Catheterized  Culture-Blood --            RADIOLOGY/EKG:    Attending Attestation:   20 minutes spent on total encounter; more than 50% of the visit was spent counseling and/or coordinating care by the attending physician.     ASSESSMENT AND PLAN [facility] Progress Note [phone] (Dr. Portillo, Julieta, Genevieve, Alex)    CHIEF COMPLAINT: Patient is a 65y old  Male who presents with a chief complaint of fever 101 (02 Aug 2020 18:51)      Follow Up Today: The patient is non-verbal    HPI:      PAST MEDICAL & SURGICAL HISTORY:  Constipation, unspecified constipation type  Blind: right eye  DD (diastrophic dysplasia)  Psoriasis  HTN (hypertension)  Seizure  MR (mental retardation), severe  No significant past surgical history      MEDICATIONS  (STANDING):  albuterol/ipratropium for Nebulization 1.5 milliLiter(s) Nebulizer every 8 hours  aspirin enteric coated 81 milliGRAM(s) Oral daily  clonazePAM  Tablet 0.5 milliGRAM(s) Oral two times a day  doxazosin 1 milliGRAM(s) Oral at bedtime  enoxaparin Injectable 40 milliGRAM(s) SubCutaneous daily  fluvoxaMINE 100 milliGRAM(s) Oral two times a day  levothyroxine Injectable 37.5 MICROGram(s) IV Push at bedtime  meropenem  IVPB 1000 milliGRAM(s) IV Intermittent every 8 hours  metoprolol tartrate 25 milliGRAM(s) Oral two times a day  pantoprazole  Injectable 40 milliGRAM(s) IV Push daily  tamsulosin 0.4 milliGRAM(s) Oral at bedtime  valproate sodium IVPB 250 milliGRAM(s) IV Intermittent three times a day    MEDICATIONS  (PRN):  acetaminophen   Tablet .. 650 milliGRAM(s) Oral every 4 hours PRN Temp greater or equal to 38C (100.4F), Mild Pain (1 - 3)  glucagon  Injectable 1 milliGRAM(s) IntraMuscular once PRN Glucose LESS THAN 70 milligrams/deciliter      Allergies    Augmentin (Unknown)  aztreonam (Rash)  penicillin (Unknown)  sulfa drugs (Unknown)    Intolerances        REVIEW OF SYSTEMS:    All other review of systems is negative unless indicated above    Vital Signs Last 24 Hrs  T(C): 36.8 (03 Aug 2020 04:36), Max: 37.2 (02 Aug 2020 09:03)  T(F): 98.2 (03 Aug 2020 04:36), Max: 99 (02 Aug 2020 12:34)  HR: 100 (03 Aug 2020 04:36) (69 - 100)  BP: 149/59 (03 Aug 2020 04:36) (128/86 - 151/77)  BP(mean): --  RR: 18 (03 Aug 2020 04:36) (18 - 18)  SpO2: 90% (03 Aug 2020 04:36) (90% - 93%)    I&O's Summary    02 Aug 2020 07:01  -  03 Aug 2020 07:00  --------------------------------------------------------  IN: 605 mL / OUT: 2 mL / NET: 603 mL        PHYSICAL EXAM:    Constitutional: NAD, awake and alert, well-developed  Eyes:  EOMI,  Pupils round, No oral cyanosis.  HEENT: No exudate or erythema  Pulmonary: Non-labored, breath sounds are clear bilaterally, No wheezing, rales or rhonchi  Cardiovascular: Regular, S1 and S2, No murmurs,   Gastrointestinal: Bowel Sounds present, soft, nontender.   Ext: No significant LE edema   Neurological: Alert, no gross focal motor deficits  Skin: No rashes.  Psych:  Mood & affect appropriate    LABS: All Labs Reviewed:                        13.4   7.55  )-----------( 355      ( 01 Aug 2020 14:51 )             38.9     01 Aug 2020 14:51    142    |  106    |  15     ----------------------------<  102    3.9     |  29     |  0.73     Ca    9.1        01 Aug 2020 14:51    TPro  7.5    /  Alb  2.5    /  TBili  0.2    /  DBili  x      /  AST  29     /  ALT  42     /  AlkPhos  71     01 Aug 2020 14:51          Blood Culture: Organism --  Gram Stain Blood -- Gram Stain --  Specimen Source .Urine Catheterized  Culture-Blood --            RADIOLOGY/EKG:    Attending Attestation:   20 minutes spent on total encounter; more than 50% of the visit was spent counseling and/or coordinating care by the attending physician.     ASSESSMENT AND PLAN

## 2020-08-03 NOTE — PROGRESS NOTE ADULT - ASSESSMENT
cont rx cont rx      HPI/PMH.      65 m brought in by caregivers from group home for 100.8 temperature and low spO2 92%. caregivers reports two positive COVID cases at nearby [facility] house. patient was seen at urgent care of Friday for the same symptoms  Ct cap showed basal pneumonia and ac cholecytitis Pt given genta anad vanco in er and alredy got fc 2l   Pulm consulted 7/26/2020   COVID pcr 7/26/2020 pcr Neg-ric     Past Medical History:  Blind  right eye  Constipation, unspecified constipation type    DD (diastrophic dysplasia)    HTN (hypertension)    MR (mental retardation), severe    Psoriasis    Seizure.    home meds  Flagyl 500 Cipro 500 ursodiol 300 metoprolol tartrate 25 tamsulosin 0.4 doxazosin 2 ZyPREXA 20 mg OLANZapine: Cosentyx:   Depakote: Aspir 81: hydrOXYzine: levothyroxine: clonazePAM:         OXYGENATION      7/27/2020 4l 91%     VITALS/LABS      8/3/2020 99f 80 130/70      RELEVANT INFO   COVID pcr 7/28 Neg-ric     W. 7/26-7/27-7/28-7/30/2020 w 20 - 16 - 11- 10.6   PROCAL.  7/26/2020 pc 1.1  CXR 7/31/2020 cxr poss resolving basal pneu  CXR 7/72 basal oapc   CT 7/26/2020 CT ch Probable ac ena Patchy bl lower lobe pulm opac  US US abd 7/26 near hydropic distentiob gbin setting of cholelithiasis and mild mural edema    MEROPENEM meropenem 1.3 (7/26)     ECHO 7/27 ef 60% dd   BNP 7/26/2020 BNP 1322    METOPPROLOL metoprolol 25.2 (7/31)     ELEVATED D-dimr poa 7/26/2020 D-dimr 715     V duplx 7/26/2020 v duplx Neg-ric         Na 7/29-7/30/2020 Na 149 - 144  Hb 7/30/2020 Hb 10.8      SYLVAIN VELOZ [facility] P  DOA 7/26/2020  ALLERGY Pcn sulfa azactam augmentin      REVIEW OF SYMPTOMS      Able to give ROS  NO     PHYSICAL EXAM    HEENT Unremarkable PERRLA atraumatic   RESP Fair air entry EXP prolonged    Harsh breath sound Resp distres mild   CARDIAC S1 S2 No S3     NO JVD    ABDOMEN SOFT BS PRESENT NOT DISTENDED No hepatosplenomegaly PEDAL EDEMA present No calf tenderness  NO rash   GENERAL Not TOXIC looking    PATIENT DATA/ASSESSMENT/RECOMMENDATIONS       INFECTION  AC CHOLECYSTITIS   ASP PNEUMONIA   Pt has ac chol based on us and ct and is on meropenem (7/26)   Cholecystectomy done 7/28  on po diet (7/30)     FEVER 7/30/2020  pc cvxr improved   Fever resolvd       ELEVATED D-dimer   ELEVATED D-dimr poa 7/26/2020 D-dimr 715     V duplx 7/26/2020 v duplx Neg-ric                               BNP 7/26/2020 BNP 1322    A/R Likely false pos+ric  If desaturates will get cta ch     RESP   Monitor po   Target po 90-95%    CHF  BNP 7/26/2020 BNP 1322    ECHO 7/27 ef 60% dd          HEMODYNAMICS   Stable                                NEURO PSYCH   Resume meds     TIME SPENT Over 25 minutes aggregate care time spent on encounter; activities included combinations of  direct pt care, counseling and/or coordinating care reviewing notes, lab data/ imaging, discussion with multidisciplinary team/ pt /family. Risks, benefits, alternatives of planned interventions when applicable were discussed in detail and questions answered as best as possible    AYANNA VELOZ [facility]  DOA 7/26/2020  ALLERGY Pcn sulfa azactam augmentin

## 2020-08-03 NOTE — DISCHARGE NOTE PROVIDER - NSDCMRMEDTOKEN_GEN_ALL_CORE_FT
Aspir 81:   Cipro 500 mg oral tablet: 1 tab(s) orally 2 times a day  clonazePAM:   Cosentyx:   Depakote:   doxazosin 2 mg oral tablet: 1 tab(s) orally once a day (at bedtime)  Flagyl 500 mg oral tablet: 1 tab(s) orally 3 times a day   hydrOXYzine:   levothyroxine:   metoprolol tartrate 25 mg oral tablet: 1 tab(s) orally 2 times a day  OLANZapine:   tamsulosin 0.4 mg oral capsule: 1 cap(s) orally once a day (at bedtime)  ursodiol 300 mg oral capsule: 1 cap(s) orally every 12 hours  ZyPREXA 20 mg oral tablet: 1 tab(s) orally once a day amLODIPine 2.5 mg oral tablet: 1 tab(s) orally once a day  aspirin 81 mg oral delayed release tablet: 1 tab(s) orally once a day  clonazePAM 0.5 mg oral tablet: 1 tab(s) orally 2 times a day  divalproex sodium 250 mg oral delayed release tablet: 1 tab(s) orally 2 times a day  doxazosin 2 mg oral tablet: 1 tab(s) orally once a day (at bedtime)  doxycycline monohydrate 100 mg oral capsule: 1 cap(s) orally every 12 hours 5 days  fluvoxaMINE 100 mg oral tablet: 1 tab(s) orally 2 times a day  levothyroxine: 75mcg po qam  metoprolol tartrate 25 mg oral tablet: 1 tab(s) orally 2 times a day  metroNIDAZOLE 500 mg oral tablet: 1 tab(s) orally every 8 hours 5 days  tamsulosin 0.4 mg oral capsule: 1 cap(s) orally once a day (at bedtime)  ursodiol 300 mg oral capsule: 1 cap(s) orally every 12 hours  ZyPREXA 20 mg oral tablet: 1 tab(s) orally once a day

## 2020-08-04 ENCOUNTER — TRANSCRIPTION ENCOUNTER (OUTPATIENT)
Age: 65
End: 2020-08-04

## 2020-08-04 VITALS — OXYGEN SATURATION: 94 %

## 2020-08-04 LAB — SARS-COV-2 RNA SPEC QL NAA+PROBE: SIGNIFICANT CHANGE UP

## 2020-08-04 PROCEDURE — 36415 COLL VENOUS BLD VENIPUNCTURE: CPT

## 2020-08-04 PROCEDURE — 82962 GLUCOSE BLOOD TEST: CPT

## 2020-08-04 PROCEDURE — 74176 CT ABD & PELVIS W/O CONTRAST: CPT

## 2020-08-04 PROCEDURE — 87086 URINE CULTURE/COLONY COUNT: CPT

## 2020-08-04 PROCEDURE — 86769 SARS-COV-2 COVID-19 ANTIBODY: CPT

## 2020-08-04 PROCEDURE — 87040 BLOOD CULTURE FOR BACTERIA: CPT

## 2020-08-04 PROCEDURE — 86901 BLOOD TYPING SEROLOGIC RH(D): CPT

## 2020-08-04 PROCEDURE — 99285 EMERGENCY DEPT VISIT HI MDM: CPT | Mod: 25

## 2020-08-04 PROCEDURE — 85730 THROMBOPLASTIN TIME PARTIAL: CPT

## 2020-08-04 PROCEDURE — 85379 FIBRIN DEGRADATION QUANT: CPT

## 2020-08-04 PROCEDURE — 85027 COMPLETE CBC AUTOMATED: CPT

## 2020-08-04 PROCEDURE — 84484 ASSAY OF TROPONIN QUANT: CPT

## 2020-08-04 PROCEDURE — 94640 AIRWAY INHALATION TREATMENT: CPT

## 2020-08-04 PROCEDURE — 94760 N-INVAS EAR/PLS OXIMETRY 1: CPT

## 2020-08-04 PROCEDURE — 93005 ELECTROCARDIOGRAM TRACING: CPT

## 2020-08-04 PROCEDURE — 71045 X-RAY EXAM CHEST 1 VIEW: CPT

## 2020-08-04 PROCEDURE — 86803 HEPATITIS C AB TEST: CPT

## 2020-08-04 PROCEDURE — 84100 ASSAY OF PHOSPHORUS: CPT

## 2020-08-04 PROCEDURE — 86900 BLOOD TYPING SEROLOGIC ABO: CPT

## 2020-08-04 PROCEDURE — 83036 HEMOGLOBIN GLYCOSYLATED A1C: CPT

## 2020-08-04 PROCEDURE — 82550 ASSAY OF CK (CPK): CPT

## 2020-08-04 PROCEDURE — 81001 URINALYSIS AUTO W/SCOPE: CPT

## 2020-08-04 PROCEDURE — 83880 ASSAY OF NATRIURETIC PEPTIDE: CPT

## 2020-08-04 PROCEDURE — 96365 THER/PROPH/DIAG IV INF INIT: CPT

## 2020-08-04 PROCEDURE — 96368 THER/DIAG CONCURRENT INF: CPT

## 2020-08-04 PROCEDURE — 83605 ASSAY OF LACTIC ACID: CPT

## 2020-08-04 PROCEDURE — 96361 HYDRATE IV INFUSION ADD-ON: CPT

## 2020-08-04 PROCEDURE — 93306 TTE W/DOPPLER COMPLETE: CPT

## 2020-08-04 PROCEDURE — 86140 C-REACTIVE PROTEIN: CPT

## 2020-08-04 PROCEDURE — 80053 COMPREHEN METABOLIC PANEL: CPT

## 2020-08-04 PROCEDURE — 85384 FIBRINOGEN ACTIVITY: CPT

## 2020-08-04 PROCEDURE — 82728 ASSAY OF FERRITIN: CPT

## 2020-08-04 PROCEDURE — 88304 TISSUE EXAM BY PATHOLOGIST: CPT

## 2020-08-04 PROCEDURE — 93970 EXTREMITY STUDY: CPT

## 2020-08-04 PROCEDURE — 97162 PT EVAL MOD COMPLEX 30 MIN: CPT

## 2020-08-04 PROCEDURE — 86850 RBC ANTIBODY SCREEN: CPT

## 2020-08-04 PROCEDURE — 85610 PROTHROMBIN TIME: CPT

## 2020-08-04 PROCEDURE — 87635 SARS-COV-2 COVID-19 AMP PRB: CPT

## 2020-08-04 PROCEDURE — 83615 LACTATE (LD) (LDH) ENZYME: CPT

## 2020-08-04 PROCEDURE — C1889: CPT

## 2020-08-04 PROCEDURE — 76705 ECHO EXAM OF ABDOMEN: CPT

## 2020-08-04 PROCEDURE — 92610 EVALUATE SWALLOWING FUNCTION: CPT

## 2020-08-04 PROCEDURE — 71250 CT THORAX DX C-: CPT

## 2020-08-04 PROCEDURE — 84145 PROCALCITONIN (PCT): CPT

## 2020-08-04 RX ADMIN — ENOXAPARIN SODIUM 40 MILLIGRAM(S): 100 INJECTION SUBCUTANEOUS at 11:37

## 2020-08-04 RX ADMIN — Medication 100 MILLIGRAM(S): at 05:57

## 2020-08-04 RX ADMIN — Medication 1.5 MILLILITER(S): at 14:54

## 2020-08-04 RX ADMIN — Medication 0.5 MILLIGRAM(S): at 06:54

## 2020-08-04 RX ADMIN — Medication 500 MILLIGRAM(S): at 05:57

## 2020-08-04 RX ADMIN — AMLODIPINE BESYLATE 2.5 MILLIGRAM(S): 2.5 TABLET ORAL at 05:57

## 2020-08-04 RX ADMIN — Medication 1.5 MILLILITER(S): at 08:01

## 2020-08-04 RX ADMIN — Medication 1.5 MILLILITER(S): at 00:09

## 2020-08-04 RX ADMIN — Medication 25 MILLIGRAM(S): at 05:56

## 2020-08-04 RX ADMIN — DIVALPROEX SODIUM 250 MILLIGRAM(S): 500 TABLET, DELAYED RELEASE ORAL at 05:57

## 2020-08-04 RX ADMIN — PANTOPRAZOLE SODIUM 40 MILLIGRAM(S): 20 TABLET, DELAYED RELEASE ORAL at 11:36

## 2020-08-04 RX ADMIN — Medication 81 MILLIGRAM(S): at 11:36

## 2020-08-04 RX ADMIN — Medication 75 MICROGRAM(S): at 05:56

## 2020-08-04 RX ADMIN — FLUVOXAMINE MALEATE 100 MILLIGRAM(S): 25 TABLET ORAL at 05:56

## 2020-08-04 RX ADMIN — Medication 500 MILLIGRAM(S): at 13:41

## 2020-08-04 NOTE — DISCHARGE NOTE NURSING/CASE MANAGEMENT/SOCIAL WORK - PATIENT PORTAL LINK FT
You can access the FollowMyHealth Patient Portal offered by [facility] by registering at the following website: [email or web address]. By joining [facility]’s FollowMyHealth portal, you will also be able to view your health information using other applications (apps) compatible with our system.

## 2020-08-04 NOTE — CHART NOTE - NSCHARTNOTEFT_GEN_A_CORE
Assessment: patient seen for follow up. per private aide at bedside patient ate hot cereal for breakfast did not like sc eggs . status post cholecystectomy. patient from [facility] . speech assessment this admit recommends puree honey thick diet.     Factors impacting intake: [ ] none [ ] nausea  [ ] vomiting [ ] diarrhea [ ] constipation  [ ]chewing problems [x ] swallowing issues  [ ] other: puree honey thick diet    Diet Presciption: Diet, Dysphagia 1 Pureed-Honey Consistency Fluid (07-30-20 @ 12:32)    Intake: 0-100% per flow sheets most meals 50% taken    Current Weight: Weight 8/4 166.8# 7/28 wt 154.3# will follow weights no edema noted  % Weight Change    Pertinent Medications: MEDICATIONS  (STANDING):  albuterol/ipratropium for Nebulization 1.5 milliLiter(s) Nebulizer every 8 hours  amLODIPine   Tablet 2.5 milliGRAM(s) Oral daily  aspirin enteric coated 81 milliGRAM(s) Oral daily  clonazePAM  Tablet 0.5 milliGRAM(s) Oral two times a day  diVALproex  milliGRAM(s) Oral two times a day  doxazosin 1 milliGRAM(s) Oral at bedtime  doxycycline hyclate Capsule 100 milliGRAM(s) Oral every 12 hours  enoxaparin Injectable 40 milliGRAM(s) SubCutaneous daily  fluvoxaMINE 100 milliGRAM(s) Oral two times a day  levothyroxine 75 MICROGram(s) Oral daily  metoprolol tartrate 25 milliGRAM(s) Oral two times a day  metroNIDAZOLE    Tablet 500 milliGRAM(s) Oral every 8 hours  pantoprazole  Injectable 40 milliGRAM(s) IV Push daily  tamsulosin 0.4 milliGRAM(s) Oral at bedtime    MEDICATIONS  (PRN):  acetaminophen   Tablet .. 650 milliGRAM(s) Oral every 4 hours PRN Temp greater or equal to 38C (100.4F), Mild Pain (1 - 3)  glucagon  Injectable 1 milliGRAM(s) IntraMuscular once PRN Glucose LESS THAN 70 milligrams/deciliter    Pertinent Labs: 08-01 Na142 mmol/L Glu 102 mg/dL<H> K+ 3.9 mmol/L Cr  0.73 mg/dL BUN 15 mg/dL 08-01 Alb 2.5 g/dL<L        Skin: no pressure injury     Estimated Needs:   [x ] no change since previous assessment  [ ] recalculated:     Previous Nutrition Diagnosis:   [ ] Inadequate Energy Intake [ ]Inadequate Oral Intake [ ] Excessive Energy Intake   [ ] Underweight [ ] Increased Nutrient Needs [ ] Overweight/Obesity   [x ] Altered GI Function [ ] Unintended Weight Loss [ ] Food & Nutrition Related Knowledge Deficit [ ] Malnutrition     Nutrition Diagnosis is [ ] ongoing  [x ] resolved status post ena  [ ] not applicable     New Nutrition Diagnosis: [x ] swallow difficulty related to motor causes as evidenced by abnormal swallow study with dysphagia      Interventions:   Recommend  [ ] Change Diet To:  [ ] Nutrition Supplement  [ ] Nutrition Support  [x ] Other: continue diet as ordered . recommend add ensure pudding BID if patient to remain in hospital     Monitoring and Evaluation:   [x ] PO intake [ x ] Tolerance to diet prescription [ x ] weights [ x ] labs[ x ] follow up per protocol  [ ] other:

## 2020-08-04 NOTE — PROGRESS NOTE ADULT - ASSESSMENT
cont rx cont rx      HPI/PMH.      65 m brought in by caregivers from group home for 100.8 temperature and low spO2 92%. caregivers reports two positive COVID cases at nearby [facility] house. patient was seen at urgent care of Friday for the same symptoms  Ct cap showed basal pneumonia and ac cholecytitis Pt given genta anad vanco in er and alredy got fc 2l   Pulm consulted 7/26/2020   COVID pcr 7/26/2020 pcr Neg-ric     Past Medical History:  Blind  right eye  Constipation, unspecified constipation type    DD (diastrophic dysplasia)    HTN (hypertension)    MR (mental retardation), severe    Psoriasis    Seizure.    home meds  Flagyl 500 Cipro 500 ursodiol 300 metoprolol tartrate 25 tamsulosin 0.4 doxazosin 2 ZyPREXA 20 mg OLANZapine: Cosentyx:   Depakote: Aspir 81: hydrOXYzine: levothyroxine: clonazePAM:         OXYGENATION      7/27/2020 4l 91%     VITALS/LABS      8/4/2020 afeb 93 147/90     SYLVAIN ROSELIA [facility] P  DOA 7/26/2020  ALLERGY Pcn sulfa azactam augmentin      REVIEW OF SYMPTOMS      Able to give ROS  NO     PHYSICAL EXAM    HEENT Unremarkable PERRLA atraumatic   RESP Fair air entry EXP prolonged    Harsh breath sound Resp distres mild   CARDIAC S1 S2 No S3     NO JVD    ABDOMEN SOFT BS PRESENT NOT DISTENDED No hepatosplenomegaly PEDAL EDEMA present No calf tenderness  NO rash   GENERAL Not TOXIC looking    PATIENT DATA/ASSESSMENT/RECOMMENDATIONS       INFECTION  AC CHOLECYSTITIS   ASP PNEUMONIA   Pt has ac chol based on us and ct and is on meropenem (7/26)   Cholecystectomy done 7/28  on po diet (7/30)     FEVER 7/30/2020  pc cvxr improved   Fever resolvd       ELEVATED D-dimer   ELEVATED D-dimr poa 7/26/2020 D-dimr 715     V duplx 7/26/2020 v duplx Neg-ric                               BNP 7/26/2020 BNP 1322    A/R Likely false pos+ric  If desaturates will get cta ch     RESP   Monitor po   Target po 90-95%    CHF  BNP 7/26/2020 BNP 1322    ECHO 7/27 ef 60% dd          HEMODYNAMICS   Stable                                NEURO PSYCH   Resume meds       TIME SPENT Over 25 minutes aggregate care time spent on encounter; activities included combinations of  direct pt care, counseling and/or coordinating care reviewing notes, lab data/ imaging, discussion with multidisciplinary team/ pt /family. Risks, benefits, alternatives of planned interventions when applicable were discussed in detail and questions answered as best as possible    AYANNA VELOZ [facility] P  DOA 7/26/2020  ALLERGY Pcn sulfa azactam augmentin

## 2020-08-04 NOTE — PROGRESS NOTE ADULT - SUBJECTIVE AND OBJECTIVE BOX
[facility] Cardiology Progress Note [phone] (Dr. Portillo, Julieta, Genevieve, Alex)    CHIEF COMPLAINT: Patient is a 65y old  Male who presents with a chief complaint of fever 101 (03 Aug 2020 18:22)      Follow Up Today: The patient denies any chest discomfort or shortness of breath.    HPI:      PAST MEDICAL & SURGICAL HISTORY:  Constipation, unspecified constipation type  Blind: right eye  DD (diastrophic dysplasia)  Psoriasis  HTN (hypertension)  Seizure  MR (mental retardation), severe  No significant past surgical history      MEDICATIONS  (STANDING):  albuterol/ipratropium for Nebulization 1.5 milliLiter(s) Nebulizer every 8 hours  amLODIPine   Tablet 2.5 milliGRAM(s) Oral daily  aspirin enteric coated 81 milliGRAM(s) Oral daily  clonazePAM  Tablet 0.5 milliGRAM(s) Oral two times a day  diVALproex  milliGRAM(s) Oral two times a day  doxazosin 1 milliGRAM(s) Oral at bedtime  doxycycline hyclate Capsule 100 milliGRAM(s) Oral every 12 hours  enoxaparin Injectable 40 milliGRAM(s) SubCutaneous daily  fluvoxaMINE 100 milliGRAM(s) Oral two times a day  levothyroxine 75 MICROGram(s) Oral daily  metoprolol tartrate 25 milliGRAM(s) Oral two times a day  metroNIDAZOLE    Tablet 500 milliGRAM(s) Oral every 8 hours  pantoprazole  Injectable 40 milliGRAM(s) IV Push daily  tamsulosin 0.4 milliGRAM(s) Oral at bedtime    MEDICATIONS  (PRN):  acetaminophen   Tablet .. 650 milliGRAM(s) Oral every 4 hours PRN Temp greater or equal to 38C (100.4F), Mild Pain (1 - 3)  glucagon  Injectable 1 milliGRAM(s) IntraMuscular once PRN Glucose LESS THAN 70 milligrams/deciliter      Allergies    Augmentin (Unknown)  aztreonam (Rash)  penicillin (Unknown)  sulfa drugs (Unknown)    Intolerances        REVIEW OF SYSTEMS:    All other review of systems is negative unless indicated above    Vital Signs Last 24 Hrs  T(C): 37.2 (04 Aug 2020 07:46), Max: 37.4 (03 Aug 2020 12:56)  T(F): 99 (04 Aug 2020 07:46), Max: 99.4 (03 Aug 2020 12:56)  HR: 91 (04 Aug 2020 08:01) (75 - 93)  BP: 138/76 (04 Aug 2020 07:46) (133/74 - 147/90)  BP(mean): --  RR: 18 (04 Aug 2020 07:46) (17 - 18)  SpO2: 93% (04 Aug 2020 08:01) (92% - 98%)    I&O's Summary    03 Aug 2020 07:01  -  04 Aug 2020 07:00  --------------------------------------------------------  IN: 0 mL / OUT: 1200 mL / NET: -1200 mL        PHYSICAL EXAM:    Constitutional: NAD, awake and alert, well-developed  Eyes:  EOMI,  Pupils round, No oral cyanosis.  HEENT: No exudate or erythema  Pulmonary: Non-labored, breath sounds are clear bilaterally, No wheezing, rales or rhonchi  Cardiovascular: Regular, S1 and S2, No murmurs, rubs, gallops oir clicks  Gastrointestinal: Bowel Sounds present, soft, nontender.   Ext: No significant LE edema with good pulses x 4  Neurological: Alert, no gross focal motor deficits  Skin: No rashes.  Psych:  Mood & affect appropriate    LABS: All Labs Reviewed:                        13.4   7.55  )-----------( 355      ( 01 Aug 2020 14:51 )             38.9     01 Aug 2020 14:51    142    |  106    |  15     ----------------------------<  102    3.9     |  29     |  0.73     Ca    9.1        01 Aug 2020 14:51    TPro  7.5    /  Alb  2.5    /  TBili  0.2    /  DBili  x      /  AST  29     /  ALT  42     /  AlkPhos  71     01 Aug 2020 14:51          Blood Culture:         RADIOLOGY/EKG:    Attending Attestation:   20 minutes spent on total encounter; more than 50% of the visit was spent counseling and/or coordinating care by the attending physician.     ASSESSMENT AND PLAN

## 2020-08-04 NOTE — PROGRESS NOTE ADULT - ASSESSMENT
65M with HTN.  Echo shows normal LVEF.   S/p cholecystectomy POD 6    Suggest:  1. Continue with abx.   Stable HR and BP.   2. Continue ASA, metoprolol 25 mg BID; DVT prohylaxis  3. IVF as necessary  4. Will follow

## 2020-08-04 NOTE — PROGRESS NOTE ADULT - REASON FOR ADMISSION
fever 101

## 2020-08-04 NOTE — PROGRESS NOTE ADULT - SUBJECTIVE AND OBJECTIVE BOX
ROSELIA NARAYAN is a 65yMale , patient examined and chart reviewed.     INTERVAL HPI/ OVERNIGHT EVENTS:   Afebrile. NAD.     PAST MEDICAL & SURGICAL HISTORY:  Constipation, unspecified constipation type  Blind: right eye  DD (diastrophic dysplasia)  Psoriasis  HTN (hypertension)  Seizure  MR (mental retardation), severe  No significant past surgical history      For details regarding the patient's social history, family history, and other miscellaneous elements, please refer the initial infectious diseases consultation and/or the admitting history and physical examination for this admission.    ROS:  Unable to obtain due to : MRDD nonverbal    ALLERGIES:  Augmentin (Unknown)  aztreonam (Rash)  penicillin (Unknown)  sulfa drugs (Unknown)      Current inpatient medications :    ANTIBIOTICS/RELEVANT:  doxycycline hyclate Capsule 100 milliGRAM(s) Oral every 12 hours  metroNIDAZOLE    Tablet 500 milliGRAM(s) Oral every 8 hours    MEDICATIONS  (STANDING):  albuterol/ipratropium for Nebulization 1.5 milliLiter(s) Nebulizer every 8 hours  amLODIPine   Tablet 2.5 milliGRAM(s) Oral daily  aspirin enteric coated 81 milliGRAM(s) Oral daily  clonazePAM  Tablet 0.5 milliGRAM(s) Oral two times a day  diVALproex  milliGRAM(s) Oral two times a day  doxazosin 1 milliGRAM(s) Oral at bedtime  enoxaparin Injectable 40 milliGRAM(s) SubCutaneous daily  fluvoxaMINE 100 milliGRAM(s) Oral two times a day  levothyroxine Injectable 37.5 MICROGram(s) IV Push at bedtime  metoprolol tartrate 25 milliGRAM(s) Oral two times a day  pantoprazole  Injectable 40 milliGRAM(s) IV Push daily  tamsulosin 0.4 milliGRAM(s) Oral at bedtime    MEDICATIONS  (PRN):  acetaminophen   Tablet .. 650 milliGRAM(s) Oral every 4 hours PRN Temp greater or equal to 38C (100.4F), Mild Pain (1 - 3)  glucagon  Injectable 1 milliGRAM(s) IntraMuscular once PRN Glucose LESS THAN 70 milligrams/deciliter      Objective:  Vital Signs Last 24 Hrs  T(C): 37.2 (04 Aug 2020 07:46), Max: 37.2 (04 Aug 2020 07:46)  T(F): 99 (04 Aug 2020 07:46), Max: 99 (04 Aug 2020 07:46)  HR: 91 (04 Aug 2020 08:01) (75 - 93)  BP: 138/76 (04 Aug 2020 07:46) (138/76 - 147/90)  RR: 18 (04 Aug 2020 07:46) (17 - 18)  SpO2: 93% (04 Aug 2020 08:01) (92% - 95%)    Physical Exam:  General:  no acute distress  Eyes: sclera anicteric, pupils equal and reactive to light  ENMT: buccal mucosa moist, pharynx not injected  Neck: supple, trachea midline  Lungs: clear, no wheeze/rhonchi  Cardiovascular: regular rate and rhythm, S1 S2  Abdomen: soft, tender, distended, bowel sounds normal  Neurological: awake MRDD nonverbal  Skin: no increased ecchymosis/petechiae/purpura  Lymph Nodes: no palpable cervical/supraclavicular lymph nodes enlargements  Extremities: no cyanosis/clubbing/edema      LABS:    MICROBIOLOGY:    Culture - Urine (collected 26 Jul 2020 21:04)  Source: .Urine Clean Catch (Midstream)  Final Report (27 Jul 2020 18:17):    No growth    Culture - Blood (collected 26 Jul 2020 17:55)  Source: .Blood Blood-Peripheral  Preliminary Report (27 Jul 2020 18:01):    No growth to date.    Culture - Blood (collected 26 Jul 2020 17:55)  Source: .Blood Blood-Peripheral  Preliminary Report (27 Jul 2020 18:01):    No growth to date.    RADIOLOGY & ADDITIONAL STUDIES:    EXAM:  CT ABDOMEN AND PELVIS                          EXAM:  CT CHEST                            PROCEDURE DATE:  07/26/2020          INTERPRETATION:  CLINICAL INFORMATION: Fever, leukocytosis    COMPARISON: CT abdomen and pelvis dated 08/23/2019    PROCEDURE:  CT of the Chest, Abdomen and Pelvis was performed without intravenous contrast.  Intravenous contrast: None.  Oral contrast: None.  Sagittal and coronal reformats were performed.    FINDINGS:  CHEST:  LUNGS AND LARGE AIRWAYS: Suspect bronchial plugging in the lower lobes. Bilateral lower lobe, predominantly right-sided patchy opacities that could be related to atelectasis or possibly infiltrates.  PLEURA: No pleural effusion.  VESSELS: Coronary artery calcification  HEART: Heart sizeis normal. No pericardial effusion.  MEDIASTINUM AND GREYSON: No lymphadenopathy.  CHEST WALL AND LOWER NECK: Within normal limits.    ABDOMEN AND PELVIS:  LIVER: Within normal limits.  BILE DUCTS: Normal caliber.  GALLBLADDER: Evidence of cholelithiasis as well as pericholecystic haziness and stranding consistent with acute cholecystitis.  SPLEEN: Within normal limits.  PANCREAS: Within normal limits.  ADRENALS: Within normal limits.  KIDNEYS/URETERS: Small hyperdensities involving the lower pole cortex of both kidneys that could be related to nephrocalcinosis or hyperdense cysts.    BLADDER: Mild mural thickening concerning for chronic outlet obstruction.  REPRODUCTIVE ORGANS: Prostate is enlarged.    BOWEL: No bowel obstruction. Appendix is normal.  PERITONEUM: No ascites.  VESSELS: Within normal limits.  RETROPERITONEUM/LYMPH NODES: No lymphadenopathy.  ABDOMINAL WALL: Within normal limits.  BONES: Degenerative disc disease and spondylosis lower lumbar spine.    IMPRESSION:  Cholelithiasis and probable acute cholecystitis.    Patchy bilateral lower lobe pulmonary opacities, predominantly involving the right lower lobe.      EXAM:  US GALLBLADDER                            PROCEDURE DATE:  07/26/2020          INTERPRETATION:  EXAMINATION: US GALLBLADDER    DATE OF EXAM: 7/26/2020 7:42 PM    HISTORY: Fever and leukocytosis, abnormal gallbladder appearance on CT    COMPARISON: CT performed the same day.    FINDINGS:    The gallbladder is distended with a transverse measurement of 4.9 cm. In the fundus, the gallbladder wall thickness is 3 mm. In the body, the wall thickness measures up to 4 mm. Cholelithiasis in the gallbladder neck. Common duct measures 5-6 mm, which is within normal limits. No right upper quadrant ascites seen.      IMPRESSION:    1.  Near hydropic distention of the gallbladder, in the setting of cholelithiasis and mild mural edema, suggestive of acute cholecystitis. Consider hepatobiliary imaging to confirm cystic duct obstruction.      Assessment :   66YO M Phx of MRDD, HTN, resident of [facility]way admitted with fevers n/v likely sec acute cholecystitis.  RUQ sono noted suggestive of acute cholecystitis. Also with possible asp pneumonia. Sp fever. Sp lap ena 7/28/2020. AUR. Sp post op fever. Overall better    Plan :   Off Meropenam   Doxy and flagyl x 3 days  Trend temps and cbc   Serial abd exams  Asp precautions  AUR management per primary team  Dc planning    D/w Dr Hutner Curran    Continue with present regiment.  Appropriate use of antibiotics and adverse effects reviewed.      I have discussed the above plan of care with patient/ family in detail. They expressed understanding of the  treatment plan . Risks, benefits and alternatives discussed in detail. I have asked if they have any questions or concerns and appropriately addressed them to the best of my ability .    > 35 minutes were spent in direct patient care reviewing notes, medications ,labs data/ imaging , discussion with multidisciplinary team.    Thank you for allowing me to participate in care of your patient .    Olivier Daniels MD  Infectious Disease  [phone]

## 2020-08-04 NOTE — PROGRESS NOTE ADULT - SUBJECTIVE AND OBJECTIVE BOX
· With homogeneous pattern, with high titers  · Has elevated sed rate and CRP  · Concerns for vasculitis as a cause for encephalopathy  · Rheumatoid factor high positive  · ANCA panel negative    · Rheumatology considering vasculitis vs  Lupus of elderly  · Started on steroids ROSELIA NARAYAN    [facility] [address]    Patient is a 65y old  Male who presents with a chief complaint of fever 101 (04 Aug 2020 08:53)       Allergies    Augmentin (Unknown)  aztreonam (Rash)  penicillin (Unknown)  sulfa drugs (Unknown)    Intolerances        HPI:      PAST MEDICAL & SURGICAL HISTORY:  Constipation, unspecified constipation type  Blind: right eye  DD (diastrophic dysplasia)  Psoriasis  HTN (hypertension)  Seizure  MR (mental retardation), severe  No significant past surgical history      FAMILY HISTORY:  No pertinent family history in first degree relatives        MEDICATIONS   acetaminophen   Tablet .. 650 milliGRAM(s) Oral every 4 hours PRN  albuterol/ipratropium for Nebulization 1.5 milliLiter(s) Nebulizer every 8 hours  amLODIPine   Tablet 2.5 milliGRAM(s) Oral daily  aspirin enteric coated 81 milliGRAM(s) Oral daily  clonazePAM  Tablet 0.5 milliGRAM(s) Oral two times a day  diVALproex  milliGRAM(s) Oral two times a day  doxazosin 1 milliGRAM(s) Oral at bedtime  doxycycline hyclate Capsule 100 milliGRAM(s) Oral every 12 hours  enoxaparin Injectable 40 milliGRAM(s) SubCutaneous daily  fluvoxaMINE 100 milliGRAM(s) Oral two times a day  glucagon  Injectable 1 milliGRAM(s) IntraMuscular once PRN  levothyroxine 75 MICROGram(s) Oral daily  metoprolol tartrate 25 milliGRAM(s) Oral two times a day  metroNIDAZOLE    Tablet 500 milliGRAM(s) Oral every 8 hours  pantoprazole  Injectable 40 milliGRAM(s) IV Push daily  tamsulosin 0.4 milliGRAM(s) Oral at bedtime      Vital Signs Last 24 Hrs  T(C): 37.2 (04 Aug 2020 07:46), Max: 37.4 (03 Aug 2020 12:56)  T(F): 99 (04 Aug 2020 07:46), Max: 99.4 (03 Aug 2020 12:56)  HR: 91 (04 Aug 2020 08:01) (75 - 93)  BP: 138/76 (04 Aug 2020 07:46) (133/74 - 147/90)  BP(mean): --  RR: 18 (04 Aug 2020 07:46) (17 - 18)  SpO2: 93% (04 Aug 2020 08:01) (92% - 98%)      08-03-20 @ 07:01  -  08-04-20 @ 07:00  --------------------------------------------------------  IN: 0 mL / OUT: 1200 mL / NET: -1200 mL            LABS:                    WBC:  WBC Count: 7.55 K/uL (08-01 @ 14:51)      MICROBIOLOGY:  RECENT CULTURES:  07-29 .Urine Catheterized XXXX XXXX   No growth                    Sodium:  Sodium, Serum: 142 mmol/L (08-01 @ 14:51)      0.73 mg/dL 08-01 @ 14:51      Hemoglobin:  Hemoglobin: 13.4 g/dL (08-01 @ 14:51)      Platelets: Platelet Count - Automated: 355 K/uL (08-01 @ 14:51)              RADIOLOGY & ADDITIONAL STUDIES:

## 2020-10-18 ENCOUNTER — EMERGENCY (EMERGENCY)
Facility: HOSPITAL | Age: 65
LOS: 1 days | Discharge: HOME | End: 2020-10-18
Attending: EMERGENCY MEDICINE | Admitting: EMERGENCY MEDICINE
Payer: MEDICARE

## 2020-10-18 VITALS
HEART RATE: 73 BPM | WEIGHT: 184.97 LBS | DIASTOLIC BLOOD PRESSURE: 65 MMHG | TEMPERATURE: 98 F | OXYGEN SATURATION: 98 % | RESPIRATION RATE: 16 BRPM | HEIGHT: 65 IN | SYSTOLIC BLOOD PRESSURE: 106 MMHG

## 2020-10-18 PROCEDURE — 99284 EMERGENCY DEPT VISIT MOD MDM: CPT | Mod: 25

## 2020-10-18 PROCEDURE — 73080 X-RAY EXAM OF ELBOW: CPT | Mod: 26,RT

## 2020-10-18 PROCEDURE — 73090 X-RAY EXAM OF FOREARM: CPT | Mod: 26,LT

## 2020-10-18 PROCEDURE — 99284 EMERGENCY DEPT VISIT MOD MDM: CPT

## 2020-10-18 PROCEDURE — 73080 X-RAY EXAM OF ELBOW: CPT

## 2020-10-18 PROCEDURE — 73090 X-RAY EXAM OF FOREARM: CPT

## 2020-10-18 PROCEDURE — 90715 TDAP VACCINE 7 YRS/> IM: CPT

## 2020-10-18 PROCEDURE — 90471 IMMUNIZATION ADMIN: CPT

## 2020-10-18 RX ORDER — BACITRACIN ZINC 500 UNIT/G
1 OINTMENT IN PACKET (EA) TOPICAL ONCE
Refills: 0 | Status: COMPLETED | OUTPATIENT
Start: 2020-10-18 | End: 2020-10-18

## 2020-10-18 RX ORDER — TETANUS TOXOID, REDUCED DIPHTHERIA TOXOID AND ACELLULAR PERTUSSIS VACCINE, ADSORBED 5; 2.5; 8; 8; 2.5 [IU]/.5ML; [IU]/.5ML; UG/.5ML; UG/.5ML; UG/.5ML
0.5 SUSPENSION INTRAMUSCULAR ONCE
Refills: 0 | Status: COMPLETED | OUTPATIENT
Start: 2020-10-18 | End: 2020-10-18

## 2020-10-18 RX ADMIN — TETANUS TOXOID, REDUCED DIPHTHERIA TOXOID AND ACELLULAR PERTUSSIS VACCINE, ADSORBED 0.5 MILLILITER(S): 5; 2.5; 8; 8; 2.5 SUSPENSION INTRAMUSCULAR at 22:33

## 2020-10-18 RX ADMIN — Medication 1 APPLICATION(S): at 22:33

## 2020-10-18 NOTE — ED PROVIDER NOTE - PHYSICAL EXAMINATION
+2 radial pulse on R wrist, cap refill , 2 seconds in all fingers, no obvious deformity, superficial abrasion noted to R forearm, bleeding controlled, pelvis stable, moves all extremities but guards R arm    patient awake, nonverbal, acting per baseline per staff

## 2020-10-18 NOTE — ED PROVIDER NOTE - PROGRESS NOTE DETAILS
Xrays negative, arthritic changes noted but no fracture, pt eating crackers  pt to be d/c back to group home with follow up with PCP , staff member aware.

## 2020-10-18 NOTE — ED PROVIDER NOTE - NSFOLLOWUPINSTRUCTIONS_ED_ALL_ED_FT
1. TAKE ALL MEDICATIONS AS DIRECTED.  FOR PAIN YOU CAN TAKE IBUPROFEN (MOTRIN, ADVIL) OR ACETAMINOPHEN (TYLENOL) AS NEEDED, AS DIRECTED ON PACKAGING.  2. FOLLOW UP WITH ___YOUR PRIMARY CARE DOCTOR_______ AS DIRECTED.  YOU WERE GIVEN COPIES OF ALL LABS AND IMAGING RESULTS FROM YOUR ER VISIT--PLEASE TAKE THEM WITH YOU TO YOUR APPOINTMENT.  3. Rest, ICE ARM AS NEEDED. APPLY THIN LAYER OR BACITRACIN TO WOUND TWO TIMES A DAY AS NEEDED.  4. RETURN TO THE ER FOR ANY NEW OR WORSENING SYMPTOMS.

## 2020-10-18 NOTE — ED ADULT TRIAGE NOTE - CHIEF COMPLAINT QUOTE
Pt brought in by staff of group home - fell during fire drill - fell onto right arm - abrasions and bruising to right arm-witnessed by staff -didn't hit head no loc

## 2020-10-18 NOTE — ED PROVIDER NOTE - OBJECTIVE STATEMENT
66 y/o M with PMH MR, psoriasis, seizure, DD, blind in R eye presents to ED s/p fall. Pt brought in by staff of group [place] who states was walking out of building during fire drill when he and the staff member fell. Pt fell onto right arm, did not hit head, no LOC, not on blood thinners per staff member.  Patient acting per baseline per staff. Abrasions noted to R forearm. Did not take any pain meds prior to arrival.

## 2020-10-18 NOTE — ED ADULT NURSE NOTE - OBJECTIVE STATEMENT
65 yr old male presents to the ED with c/o fall. Abrasion noted to right forearm/elbow. 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 Staff denies he hit his head.

## 2020-10-18 NOTE — ED PROVIDER NOTE - ATTENDING CONTRIBUTION TO CARE
hx as per group home, 64yo male with right arm pain s/p fall out of wheelchair, no head trauma no LOC, pt is nonverbal so hx is unable to be obtained  exam: limited ROM to elbow, neurovasc intact, +abrasions to forearm  plan: tdap, xr, r/o fx, ortho follow up  agree with assessment and plan of PA

## 2020-10-18 NOTE — ED ADULT NURSE NOTE - NSIMPLEMENTINTERV_GEN_ALL_ED
Implemented All Fall Risk Interventions:  [place] to call system. Call bell, personal items and telephone within reach. Instruct patient to call for assistance. Room bathroom lighting operational. Non-slip footwear when patient is off stretcher. Physically safe environment: no spills, clutter or unnecessary equipment. Stretcher in lowest position, wheels locked, appropriate side rails in place. Provide visual cue, wrist band, yellow gown, etc. Monitor gait and stability. Monitor for mental status changes and reorient to person, place, and time. Review medications for side effects contributing to fall risk. Reinforce activity limits and safety measures with patient and family.

## 2020-10-18 NOTE — ED PROVIDER NOTE - PATIENT PORTAL LINK FT
You can access the FollowMyHealth Patient Portal offered by [facility] by registering at the following website: [email or web address]. By joining [email or web address]’s FollowMyHealth portal, you will also be able to view your health information using other applications (apps) compatible with our system.

## 2020-10-18 NOTE — ED PROVIDER NOTE - CLINICAL SUMMARY MEDICAL DECISION MAKING FREE TEXT BOX
66 y/o MR patient with abrasion s/p fall onto R arm, + guarding of R arm , no significant tenderness and no obvious deformity, no LOC did not hit head., not on blood thinners- pt at baseline per staff. Plan= xrays , wound care, update tetanus

## 2020-10-18 NOTE — ED PROVIDER NOTE - CARE PLAN
Principal Discharge DX:	Abrasion of right forearm, initial encounter  Secondary Diagnosis:	Fall from standing, initial encounter

## 2020-10-18 NOTE — ED ADULT TRIAGE NOTE - IDEAL BODY WEIGHT(KG)
62 Island Pedicle Flap Text: The defect edges were debeveled with a #15 scalpel blade.  Given the location of the defect, shape of the defect and the proximity to free margins an island pedicle advancement flap was deemed most appropriate.  Using a sterile surgical marker, an appropriate advancement flap was drawn incorporating the defect, outlining the appropriate donor tissue and placing the expected incisions within the relaxed skin tension lines where possible.    The area thus outlined was incised deep to adipose tissue with a #15 scalpel blade.  The skin margins were undermined to an appropriate distance in all directions around the primary defect and laterally outward around the island pedicle utilizing iris scissors.  There was minimal undermining beneath the pedicle flap.

## 2020-12-21 ENCOUNTER — TRANSCRIPTION ENCOUNTER (OUTPATIENT)
Age: 65
End: 2020-12-21

## 2021-01-20 NOTE — PHYSICAL THERAPY INITIAL EVALUATION ADULT - LEVEL OF INDEPENDENCE: STAND/SIT, REHAB EVAL
moderate assist (50% patients effort) Topical Sulfur Applications Pregnancy And Lactation Text: This medication is Pregnancy Category C and has an unknown safety profile during pregnancy. It is unknown if this topical medication is excreted in breast milk.

## 2021-02-09 NOTE — PROGRESS NOTE ADULT - PROBLEM/PLAN-3
DISPLAY PLAN FREE TEXT
n/a

## 2021-04-21 ENCOUNTER — EMERGENCY (EMERGENCY)
Facility: HOSPITAL | Age: 66
LOS: 0 days | Discharge: HOME | End: 2021-04-22
Attending: HOSPITALIST
Payer: MEDICARE

## 2021-04-21 VITALS
WEIGHT: 199.96 LBS | HEIGHT: 65 IN | HEART RATE: 114 BPM | SYSTOLIC BLOOD PRESSURE: 155 MMHG | TEMPERATURE: 98 F | RESPIRATION RATE: 18 BRPM | DIASTOLIC BLOOD PRESSURE: 88 MMHG | OXYGEN SATURATION: 93 %

## 2021-04-21 DIAGNOSIS — S01.111A LACERATION WITHOUT FOREIGN BODY OF RIGHT EYELID AND PERIOCULAR AREA, INITIAL ENCOUNTER: ICD-10-CM

## 2021-04-21 DIAGNOSIS — F72 SEVERE INTELLECTUAL DISABILITIES: ICD-10-CM

## 2021-04-21 DIAGNOSIS — I10 ESSENTIAL (PRIMARY) HYPERTENSION: ICD-10-CM

## 2021-04-21 DIAGNOSIS — Z88.2 ALLERGY STATUS TO SULFONAMIDES: ICD-10-CM

## 2021-04-21 DIAGNOSIS — Z88.0 ALLERGY STATUS TO PENICILLIN: ICD-10-CM

## 2021-04-21 DIAGNOSIS — Z23 ENCOUNTER FOR IMMUNIZATION: ICD-10-CM
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
## 2021-04-21 DIAGNOSIS — Z79.82 LONG TERM (CURRENT) USE OF ASPIRIN: ICD-10-CM

## 2021-04-21 DIAGNOSIS — Z88.1 ALLERGY STATUS TO OTHER ANTIBIOTIC AGENTS STATUS: ICD-10-CM
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
## 2021-04-21 LAB
ALBUMIN SERPL ELPH-MCNC: 3.1 G/DL — LOW (ref 3.3–5)
ALP SERPL-CCNC: 67 U/L — SIGNIFICANT CHANGE UP (ref 40–120)
ALT FLD-CCNC: 31 U/L — SIGNIFICANT CHANGE UP (ref 12–78)
ANION GAP SERPL CALC-SCNC: 5 MMOL/L — SIGNIFICANT CHANGE UP (ref 5–17)
APPEARANCE UR: CLEAR — SIGNIFICANT CHANGE UP
AST SERPL-CCNC: 24 U/L — SIGNIFICANT CHANGE UP (ref 15–37)
BASOPHILS # BLD AUTO: 0.04 K/UL — SIGNIFICANT CHANGE UP (ref 0–0.2)
BASOPHILS NFR BLD AUTO: 0.3 % — SIGNIFICANT CHANGE UP (ref 0–2)
BILIRUB SERPL-MCNC: 0.3 MG/DL — SIGNIFICANT CHANGE UP (ref 0.2–1.2)
BILIRUB UR-MCNC: NEGATIVE — SIGNIFICANT CHANGE UP
BUN SERPL-MCNC: 21 MG/DL — SIGNIFICANT CHANGE UP (ref 7–23)
CALCIUM SERPL-MCNC: 8.8 MG/DL — SIGNIFICANT CHANGE UP (ref 8.5–10.1)
CHLORIDE SERPL-SCNC: 107 MMOL/L — SIGNIFICANT CHANGE UP (ref 96–108)
CO2 SERPL-SCNC: 27 MMOL/L — SIGNIFICANT CHANGE UP (ref 22–31)
COLOR SPEC: YELLOW — SIGNIFICANT CHANGE UP
CREAT SERPL-MCNC: 1.08 MG/DL — SIGNIFICANT CHANGE UP (ref 0.5–1.3)
DIFF PNL FLD: NEGATIVE — SIGNIFICANT CHANGE UP
EOSINOPHIL # BLD AUTO: 0.42 K/UL — SIGNIFICANT CHANGE UP (ref 0–0.5)
EOSINOPHIL NFR BLD AUTO: 3 % — SIGNIFICANT CHANGE UP (ref 0–6)
GLUCOSE SERPL-MCNC: 104 MG/DL — HIGH (ref 70–99)
GLUCOSE UR QL: NEGATIVE MG/DL — SIGNIFICANT CHANGE UP
HCT VFR BLD CALC: 42.3 % — SIGNIFICANT CHANGE UP (ref 39–50)
HGB BLD-MCNC: 14.7 G/DL — SIGNIFICANT CHANGE UP (ref 13–17)
IMM GRANULOCYTES NFR BLD AUTO: 0.6 % — SIGNIFICANT CHANGE UP (ref 0–1.5)
KETONES UR-MCNC: ABNORMAL
LEUKOCYTE ESTERASE UR-ACNC: NEGATIVE — SIGNIFICANT CHANGE UP
LYMPHOCYTES # BLD AUTO: 30.5 % — SIGNIFICANT CHANGE UP (ref 13–44)
LYMPHOCYTES # BLD AUTO: 4.28 K/UL — HIGH (ref 1–3.3)
MCHC RBC-ENTMCNC: 30.9 PG — SIGNIFICANT CHANGE UP (ref 27–34)
MCHC RBC-ENTMCNC: 34.8 GM/DL — SIGNIFICANT CHANGE UP (ref 32–36)
MCV RBC AUTO: 88.9 FL — SIGNIFICANT CHANGE UP (ref 80–100)
MONOCYTES # BLD AUTO: 1.42 K/UL — HIGH (ref 0–0.9)
MONOCYTES NFR BLD AUTO: 10.1 % — SIGNIFICANT CHANGE UP (ref 2–14)
NEUTROPHILS # BLD AUTO: 7.78 K/UL — HIGH (ref 1.8–7.4)
NEUTROPHILS NFR BLD AUTO: 55.5 % — SIGNIFICANT CHANGE UP (ref 43–77)
NITRITE UR-MCNC: NEGATIVE — SIGNIFICANT CHANGE UP
PH UR: 7 — SIGNIFICANT CHANGE UP (ref 5–8)
PLATELET # BLD AUTO: 212 K/UL — SIGNIFICANT CHANGE UP (ref 150–400)
POTASSIUM SERPL-MCNC: 4 MMOL/L — SIGNIFICANT CHANGE UP (ref 3.5–5.3)
POTASSIUM SERPL-SCNC: 4 MMOL/L — SIGNIFICANT CHANGE UP (ref 3.5–5.3)
PROT SERPL-MCNC: 7.7 GM/DL — SIGNIFICANT CHANGE UP (ref 6–8.3)
PROT UR-MCNC: NEGATIVE MG/DL — SIGNIFICANT CHANGE UP
RBC # BLD: 4.76 M/UL — SIGNIFICANT CHANGE UP (ref 4.2–5.8)
RBC # FLD: 13.1 % — SIGNIFICANT CHANGE UP (ref 10.3–14.5)
SODIUM SERPL-SCNC: 139 MMOL/L — SIGNIFICANT CHANGE UP (ref 135–145)
SP GR SPEC: 1.01 — SIGNIFICANT CHANGE UP (ref 1.01–1.02)
TROPONIN I SERPL-MCNC: <0.015 NG/ML — SIGNIFICANT CHANGE UP (ref 0.01–0.04)
UROBILINOGEN FLD QL: NEGATIVE MG/DL — SIGNIFICANT CHANGE UP
WBC # BLD: 14.03 K/UL — HIGH (ref 3.8–10.5)
WBC # FLD AUTO: 14.03 K/UL — HIGH (ref 3.8–10.5)

## 2021-04-21 PROCEDURE — 84484 ASSAY OF TROPONIN QUANT: CPT

## 2021-04-21 PROCEDURE — 81003 URINALYSIS AUTO W/O SCOPE: CPT

## 2021-04-21 PROCEDURE — 36415 COLL VENOUS BLD VENIPUNCTURE: CPT

## 2021-04-21 PROCEDURE — 99285 EMERGENCY DEPT VISIT HI MDM: CPT | Mod: 25

## 2021-04-21 PROCEDURE — 72125 CT NECK SPINE W/O DYE: CPT | Mod: 26

## 2021-04-21 PROCEDURE — 71045 X-RAY EXAM CHEST 1 VIEW: CPT | Mod: 26

## 2021-04-21 PROCEDURE — 93005 ELECTROCARDIOGRAM TRACING: CPT | Mod: XU

## 2021-04-21 PROCEDURE — 12013 RPR F/E/E/N/L/M 2.6-5.0 CM: CPT

## 2021-04-21 PROCEDURE — 80053 COMPREHEN METABOLIC PANEL: CPT

## 2021-04-21 PROCEDURE — 70450 CT HEAD/BRAIN W/O DYE: CPT

## 2021-04-21 PROCEDURE — 93010 ELECTROCARDIOGRAM REPORT: CPT

## 2021-04-21 PROCEDURE — 72125 CT NECK SPINE W/O DYE: CPT

## 2021-04-21 PROCEDURE — 85025 COMPLETE CBC W/AUTO DIFF WBC: CPT

## 2021-04-21 PROCEDURE — 90715 TDAP VACCINE 7 YRS/> IM: CPT

## 2021-04-21 PROCEDURE — 99284 EMERGENCY DEPT VISIT MOD MDM: CPT | Mod: 25

## 2021-04-21 PROCEDURE — 71045 X-RAY EXAM CHEST 1 VIEW: CPT

## 2021-04-21 PROCEDURE — 87086 URINE CULTURE/COLONY COUNT: CPT

## 2021-04-21 PROCEDURE — 70450 CT HEAD/BRAIN W/O DYE: CPT | Mod: 26

## 2021-04-21 PROCEDURE — 90471 IMMUNIZATION ADMIN: CPT

## 2021-04-21 RX ORDER — TETANUS TOXOID, REDUCED DIPHTHERIA TOXOID AND ACELLULAR PERTUSSIS VACCINE, ADSORBED 5; 2.5; 8; 8; 2.5 [IU]/.5ML; [IU]/.5ML; UG/.5ML; UG/.5ML; UG/.5ML
0.5 SUSPENSION INTRAMUSCULAR ONCE
Refills: 0 | Status: COMPLETED | OUTPATIENT
Start: 2021-04-21 | End: 2021-04-21

## 2021-04-21 RX ADMIN — TETANUS TOXOID, REDUCED DIPHTHERIA TOXOID AND ACELLULAR PERTUSSIS VACCINE, ADSORBED 0.5 MILLILITER(S): 5; 2.5; 8; 8; 2.5 SUSPENSION INTRAMUSCULAR at 22:37

## 2021-04-21 NOTE — ED PROVIDER NOTE - CLINICAL SUMMARY MEDICAL DECISION MAKING FREE TEXT BOX
66 y/o s/p unwitnessed fall, no LOC with laceration to forehead, feels warm to touch. Infectious work up and Neuro Alert; CT head C-spine labs, urine, EKG.

## 2021-04-21 NOTE — ED ADULT NURSE NOTE - OBJECTIVE STATEMENT
Pt from group home BIBEMS s/p unwitnessed fall from standing height in bathroom. As per aide, she was outside bathroom and "heard a thud." Pt is on baby ASA, non-verbal at baseline, as per aide pt is acting appropriately. Pt's head wrapped by EMS. Pt from group home BIBEMS s/p unwitnessed fall from standing height in bathroom. As per aide, she was outside bathroom and "heard a thud." Pt is on baby ASA, non-verbal at baseline, as per aide pt is acting appropriately. Pt's head wrapped by EMS. Laceration noted to eyebrow

## 2021-04-21 NOTE — ED PROVIDER NOTE - PRINCIPAL DIAGNOSIS
Med: Proair hfa oral inh  Dosage: 8.5g  Sig:  Inhale 2 puffs by mouth into lungs every 4 hours as need...  Quantity requested:  17    Received via fax    Preferred pharmacy has been set up and verified.  
Pt calling saying that [facility] said they never got her inhalers. Could you please resend them to [facility] on [address]. Pt # [phone]  
This Rx was sent on February 8 to [facility] [address]. Sent again today.  
Head injury

## 2021-04-21 NOTE — ED PROVIDER NOTE - NS_ ATTENDINGSCRIBEDETAILS _ED_A_ED_FT
Christine Haas MD: The history, relevant review of systems, past medical and surgical history, medical decision making, and physical examination was documented by the scribe in my presence and I attest to the accuracy of the documentation.

## 2021-04-21 NOTE — ED PROVIDER NOTE - PROGRESS NOTE DETAILS
yuan: Lac repaired for Dr Haas, stable vitals, cxr grossly unchanged from prior with interstitial lung marking. aides at bedside will take to group home. pt w/o cough, afebrile rectally, no sob, 02 99% Erich Sun: pt endorsed to me from prior physician PENDING: UA, lac repair. LIKELY DISPOSITION: dc to group home

## 2021-04-21 NOTE — ED PROVIDER NOTE - SKIN, MLM
Skin normal color for race, warm, dry and intact. No evidence of rash. +warm to touch, +4cm laceration above right eyebrow, +small abrasion to left wrist, elbow laterally +abrasion to b/l knees

## 2021-04-21 NOTE — ED ADULT TRIAGE NOTE - CHIEF COMPLAINT QUOTE
pt. c/o laceration s/p fall from standing height in bathroom, AID from group home states he was ambulating to bathroom and fell hitting head, no LOC, no blood thinners, pt. is non-verbal at baseline. Neuro alert called in triage. Pt. has deep 3cm laceration to right eyebrow.

## 2021-04-21 NOTE — ED PROVIDER NOTE - NSFOLLOWUPINSTRUCTIONS_ED_ALL_ED_FT
FOLLOW UP WITH PMD  WITHIN 1-2DAYS, CALL TO MAKE APPOINTMENT  COME BACK TO ED IF YOUR CONDITION WORSENS OR IF YOU DEVELOP FEVER GREATER THAN 100.4F, CHEST PAIN,  SHORTNESS OF BREATH OR ANY OTHER SYMPTOMS CONCERNING TO YOU  TAKE TYLENOL (ACETAMINOPHEN) 650 MG EVERY 6 HOURS AS NEEDED FOR PAIN    IF YOU WERE PRESRCIBED ANY MEDICATIONS FROM TODAY'S VISIT, TAKE THEM AS DIRECTED   you have 5 sutures which need to be removed in 3-5 days

## 2021-04-21 NOTE — ED PROVIDER NOTE - OBJECTIVE STATEMENT
64 y/o male with PMHx of diastrophic dysplasia, blindness to right eye, psoriasis, HTN, seizures, severe MR presents to the ED s/p fall from standing height sustaining a laceration above right eyebrow. Denies LOC, pt is on ASA. Pt is non-verbal at baseline.

## 2021-04-21 NOTE — ED PROVIDER NOTE - PATIENT PORTAL LINK FT
You can access the FollowMyHealth Patient Portal offered by [facility] by registering at the following website: [email or web address]. By joining [email or web address]’s FollowMyHealth portal, you will also be able to view your health information using other applications (apps) compatible with our system.

## 2021-04-22 ENCOUNTER — INPATIENT (INPATIENT)
Facility: HOSPITAL | Age: 66
LOS: 4 days | Discharge: HOME | DRG: 866 | End: 2021-04-27
Attending: FAMILY MEDICINE | Admitting: FAMILY MEDICINE
Payer: MEDICARE

## 2021-04-22 VITALS
OXYGEN SATURATION: 92 % | SYSTOLIC BLOOD PRESSURE: 135 MMHG | HEART RATE: 113 BPM | HEIGHT: 65 IN | RESPIRATION RATE: 20 BRPM | WEIGHT: 195.11 LBS | TEMPERATURE: 100 F | DIASTOLIC BLOOD PRESSURE: 84 MMHG

## 2021-04-22 VITALS
DIASTOLIC BLOOD PRESSURE: 81 MMHG | TEMPERATURE: 98 F | RESPIRATION RATE: 20 BRPM | HEART RATE: 91 BPM | SYSTOLIC BLOOD PRESSURE: 140 MMHG | OXYGEN SATURATION: 99 %

## 2021-04-22 DIAGNOSIS — R50.9 FEVER, UNSPECIFIED: ICD-10-CM

## 2021-04-22 LAB
ALBUMIN SERPL ELPH-MCNC: 3.4 G/DL — SIGNIFICANT CHANGE UP (ref 3.3–5)
ALP SERPL-CCNC: 75 U/L — SIGNIFICANT CHANGE UP (ref 40–120)
ALT FLD-CCNC: 22 U/L — SIGNIFICANT CHANGE UP (ref 12–78)
ANION GAP SERPL CALC-SCNC: 8 MMOL/L — SIGNIFICANT CHANGE UP (ref 5–17)
APTT BLD: 28.6 SEC — SIGNIFICANT CHANGE UP (ref 27.5–35.5)
AST SERPL-CCNC: 20 U/L — SIGNIFICANT CHANGE UP (ref 15–37)
BASOPHILS # BLD AUTO: 0.04 K/UL — SIGNIFICANT CHANGE UP (ref 0–0.2)
BASOPHILS NFR BLD AUTO: 0.3 % — SIGNIFICANT CHANGE UP (ref 0–2)
BILIRUB SERPL-MCNC: 0.3 MG/DL — SIGNIFICANT CHANGE UP (ref 0.2–1.2)
BUN SERPL-MCNC: 14 MG/DL — SIGNIFICANT CHANGE UP (ref 7–23)
CALCIUM SERPL-MCNC: 9.1 MG/DL — SIGNIFICANT CHANGE UP (ref 8.5–10.1)
CHLORIDE SERPL-SCNC: 106 MMOL/L — SIGNIFICANT CHANGE UP (ref 96–108)
CO2 SERPL-SCNC: 27 MMOL/L — SIGNIFICANT CHANGE UP (ref 22–31)
CREAT SERPL-MCNC: 0.88 MG/DL — SIGNIFICANT CHANGE UP (ref 0.5–1.3)
D DIMER BLD IA.RAPID-MCNC: 314 NG/ML DDU — HIGH
EOSINOPHIL # BLD AUTO: 0.21 K/UL — SIGNIFICANT CHANGE UP (ref 0–0.5)
EOSINOPHIL NFR BLD AUTO: 1.7 % — SIGNIFICANT CHANGE UP (ref 0–6)
GLUCOSE SERPL-MCNC: 103 MG/DL — HIGH (ref 70–99)
HCT VFR BLD CALC: 44.1 % — SIGNIFICANT CHANGE UP (ref 39–50)
HGB BLD-MCNC: 15.4 G/DL — SIGNIFICANT CHANGE UP (ref 13–17)
IMM GRANULOCYTES NFR BLD AUTO: 0.6 % — SIGNIFICANT CHANGE UP (ref 0–1.5)
INR BLD: 1.13 RATIO — SIGNIFICANT CHANGE UP (ref 0.88–1.16)
LACTATE SERPL-SCNC: 1.8 MMOL/L — SIGNIFICANT CHANGE UP (ref 0.7–2)
LYMPHOCYTES # BLD AUTO: 3.75 K/UL — HIGH (ref 1–3.3)
LYMPHOCYTES # BLD AUTO: 30.2 % — SIGNIFICANT CHANGE UP (ref 13–44)
MCHC RBC-ENTMCNC: 30.9 PG — SIGNIFICANT CHANGE UP (ref 27–34)
MCHC RBC-ENTMCNC: 34.9 GM/DL — SIGNIFICANT CHANGE UP (ref 32–36)
MCV RBC AUTO: 88.4 FL — SIGNIFICANT CHANGE UP (ref 80–100)
MONOCYTES # BLD AUTO: 1.34 K/UL — HIGH (ref 0–0.9)
MONOCYTES NFR BLD AUTO: 10.8 % — SIGNIFICANT CHANGE UP (ref 2–14)
NEUTROPHILS # BLD AUTO: 6.99 K/UL — SIGNIFICANT CHANGE UP (ref 1.8–7.4)
NEUTROPHILS NFR BLD AUTO: 56.4 % — SIGNIFICANT CHANGE UP (ref 43–77)
NRBC # BLD: 0 /100 WBCS — SIGNIFICANT CHANGE UP (ref 0–0)
NT-PROBNP SERPL-SCNC: 102 PG/ML — SIGNIFICANT CHANGE UP (ref 0–125)
PLATELET # BLD AUTO: 227 K/UL — SIGNIFICANT CHANGE UP (ref 150–400)
POTASSIUM SERPL-MCNC: 4 MMOL/L — SIGNIFICANT CHANGE UP (ref 3.5–5.3)
POTASSIUM SERPL-SCNC: 4 MMOL/L — SIGNIFICANT CHANGE UP (ref 3.5–5.3)
PROCALCITONIN SERPL-MCNC: <0.05 — SIGNIFICANT CHANGE UP (ref 0–0.04)
PROT SERPL-MCNC: 8.4 G/DL — HIGH (ref 6–8.3)
PROTHROM AB SERPL-ACNC: 13.1 SEC — SIGNIFICANT CHANGE UP (ref 10.6–13.6)
RBC # BLD: 4.99 M/UL — SIGNIFICANT CHANGE UP (ref 4.2–5.8)
RBC # FLD: 13.2 % — SIGNIFICANT CHANGE UP (ref 10.3–14.5)
SODIUM SERPL-SCNC: 141 MMOL/L — SIGNIFICANT CHANGE UP (ref 135–145)
TROPONIN I SERPL-MCNC: <.015 NG/ML — SIGNIFICANT CHANGE UP (ref 0.01–0.04)
WBC # BLD: 12.41 K/UL — HIGH (ref 3.8–10.5)
WBC # FLD AUTO: 12.41 K/UL — HIGH (ref 3.8–10.5)

## 2021-04-22 PROCEDURE — 71045 X-RAY EXAM CHEST 1 VIEW: CPT | Mod: 26

## 2021-04-22 PROCEDURE — 71275 CT ANGIOGRAPHY CHEST: CPT | Mod: 26,MG

## 2021-04-22 PROCEDURE — G1004: CPT

## 2021-04-22 PROCEDURE — 99285 EMERGENCY DEPT VISIT HI MDM: CPT | Mod: CS

## 2021-04-22 PROCEDURE — 93010 ELECTROCARDIOGRAM REPORT: CPT

## 2021-04-22 RX ORDER — DIVALPROEX SODIUM 500 MG/1
250 TABLET, DELAYED RELEASE ORAL
Refills: 0 | Status: DISCONTINUED | OUTPATIENT
Start: 2021-04-22 | End: 2021-04-27

## 2021-04-22 RX ORDER — DEXTROSE 50 % IN WATER 50 %
15 SYRINGE (ML) INTRAVENOUS ONCE
Refills: 0 | Status: DISCONTINUED | OUTPATIENT
Start: 2021-04-22 | End: 2021-04-25

## 2021-04-22 RX ORDER — FAMOTIDINE 10 MG/ML
20 INJECTION INTRAVENOUS DAILY
Refills: 0 | Status: DISCONTINUED | OUTPATIENT
Start: 2021-04-22 | End: 2021-04-27

## 2021-04-22 RX ORDER — DEXTROSE 50 % IN WATER 50 %
25 SYRINGE (ML) INTRAVENOUS ONCE
Refills: 0 | Status: DISCONTINUED | OUTPATIENT
Start: 2021-04-22 | End: 2021-04-25

## 2021-04-22 RX ORDER — ASPIRIN/CALCIUM CARB/MAGNESIUM 324 MG
81 TABLET ORAL DAILY
Refills: 0 | Status: DISCONTINUED | OUTPATIENT
Start: 2021-04-22 | End: 2021-04-27

## 2021-04-22 RX ORDER — FLUVOXAMINE MALEATE 25 MG/1
100 TABLET ORAL
Refills: 0 | Status: DISCONTINUED | OUTPATIENT
Start: 2021-04-22 | End: 2021-04-27

## 2021-04-22 RX ORDER — METOPROLOL TARTRATE 50 MG
25 TABLET ORAL
Refills: 0 | Status: DISCONTINUED | OUTPATIENT
Start: 2021-04-22 | End: 2021-04-27

## 2021-04-22 RX ORDER — SODIUM CHLORIDE 9 MG/ML
1000 INJECTION, SOLUTION INTRAVENOUS
Refills: 0 | Status: DISCONTINUED | OUTPATIENT
Start: 2021-04-22 | End: 2021-04-23

## 2021-04-22 RX ORDER — OLANZAPINE 15 MG/1
20 TABLET, FILM COATED ORAL DAILY
Refills: 0 | Status: DISCONTINUED | OUTPATIENT
Start: 2021-04-22 | End: 2021-04-27

## 2021-04-22 RX ORDER — ACETAMINOPHEN 500 MG
650 TABLET ORAL ONCE
Refills: 0 | Status: COMPLETED | OUTPATIENT
Start: 2021-04-22 | End: 2021-04-22

## 2021-04-22 RX ORDER — AMLODIPINE BESYLATE 2.5 MG/1
2.5 TABLET ORAL DAILY
Refills: 0 | Status: DISCONTINUED | OUTPATIENT
Start: 2021-04-22 | End: 2021-04-27

## 2021-04-22 RX ORDER — SODIUM CHLORIDE 9 MG/ML
1000 INJECTION, SOLUTION INTRAVENOUS
Refills: 0 | Status: DISCONTINUED | OUTPATIENT
Start: 2021-04-22 | End: 2021-04-25

## 2021-04-22 RX ORDER — URSODIOL 250 MG/1
300 TABLET, FILM COATED ORAL EVERY 12 HOURS
Refills: 0 | Status: DISCONTINUED | OUTPATIENT
Start: 2021-04-22 | End: 2021-04-27

## 2021-04-22 RX ORDER — ACETAMINOPHEN 500 MG
650 TABLET ORAL EVERY 4 HOURS
Refills: 0 | Status: DISCONTINUED | OUTPATIENT
Start: 2021-04-22 | End: 2021-04-27

## 2021-04-22 RX ORDER — DOXAZOSIN MESYLATE 4 MG
2 TABLET ORAL AT BEDTIME
Refills: 0 | Status: DISCONTINUED | OUTPATIENT
Start: 2021-04-22 | End: 2021-04-27

## 2021-04-22 RX ORDER — LEVOTHYROXINE SODIUM 125 MCG
75 TABLET ORAL DAILY
Refills: 0 | Status: DISCONTINUED | OUTPATIENT
Start: 2021-04-22 | End: 2021-04-27

## 2021-04-22 RX ORDER — ENOXAPARIN SODIUM 100 MG/ML
40 INJECTION SUBCUTANEOUS DAILY
Refills: 0 | Status: DISCONTINUED | OUTPATIENT
Start: 2021-04-22 | End: 2021-04-27

## 2021-04-22 RX ORDER — TAMSULOSIN HYDROCHLORIDE 0.4 MG/1
0.4 CAPSULE ORAL AT BEDTIME
Refills: 0 | Status: DISCONTINUED | OUTPATIENT
Start: 2021-04-22 | End: 2021-04-27

## 2021-04-22 RX ORDER — DEXTROSE 50 % IN WATER 50 %
12.5 SYRINGE (ML) INTRAVENOUS ONCE
Refills: 0 | Status: DISCONTINUED | OUTPATIENT
Start: 2021-04-22 | End: 2021-04-25

## 2021-04-22 RX ORDER — GLUCAGON INJECTION, SOLUTION 0.5 MG/.1ML
1 INJECTION, SOLUTION SUBCUTANEOUS ONCE
Refills: 0 | Status: DISCONTINUED | OUTPATIENT
Start: 2021-04-22 | End: 2021-04-25

## 2021-04-22 RX ORDER — CLONAZEPAM 1 MG
0.5 TABLET ORAL
Refills: 0 | Status: DISCONTINUED | OUTPATIENT
Start: 2021-04-22 | End: 2021-04-27

## 2021-04-22 RX ADMIN — Medication 650 MILLIGRAM(S): at 19:19

## 2021-04-22 RX ADMIN — Medication 650 MILLIGRAM(S): at 20:19

## 2021-04-22 NOTE — ED PROVIDER NOTE - ATTENDING CONTRIBUTION TO CARE
Pt seen and examined and d/w PA.  agree with a and p.  pt is a 64 yo male from group home with mrcp. pt p/w sob and fever, pt on exam, with mr, no distrress, lungs cta, cor: rrr  mgr, abd soft nt, no c/c.e, 2+ pulses. pt with wbc 12, ? atypical pn on ct, probnp neg, admit medicine, abx.

## 2021-04-22 NOTE — H&P ADULT - NSHPPHYSICALEXAM_GEN_ALL_CORE
temp 100 temp 100  lungs totally clear  hrt reg  abd soft  ext no c/c/e  right above eye brow sutures

## 2021-04-22 NOTE — H&P ADULT - NSICDXPROBLEMPLAN1_GEN_ALL_CORE_FT
sp covid last april  no s/sx aspiration  no cough /congestion  speech swallow  no s/sx chf  cta negative,,dopplers pending  continue iv abxs ,for now  cardiology pulm and id to see

## 2021-04-22 NOTE — ED PROVIDER NOTE - OBJECTIVE STATEMENT
66 y/o male with PMHx HTN and MR sent by PCP Joanna Price due to fever and hypoxia. PCP advised to rule out COVID, reports abnormal lung sounds. as per aid Gerry, reports patient went to pcp today for follow up on head injury yesterday. Reports patient got stitches to right eyebrow yesterday along with normal head CT. denies vomiting, hematuria, cough.

## 2021-04-22 NOTE — ED PROVIDER NOTE - PHYSICAL EXAMINATION
Constitutional: Awake, Alert, non-toxic. NAD.   HEAD: Normocephalic, atraumatic.   EYES: EOM intact, conjunctiva and sclera are clear bilaterally. (+) right superior eyebrow laceration with sutures   ENT: No rhinorrhea, patent, mucous membranes pink/moist, no drooling or stridor.   NECK: Supple, non-tender  CARDIOVASCULAR: Normal S1, S2; regular rate and rhythm.  RESPIRATORY: Normal respiratory effort; (+) RLL crackles, no wheezes. No accessory muscle use.   ABDOMEN: Soft; non-tender, non-distended.   EXTREMITIES: Full passive and active ROM in all extremities; non-tender to palpation; distal pulses palpable and symmetric  SKIN: Warm, dry; good skin turgor, no apparent lesions or rashes, no ecchymosis, brisk capillary refill.  NEURO: Sensory and motor functions are grossly intact. Speech is normal. Appearance and judgement seem appropriate for gender and age.

## 2021-04-22 NOTE — ED PROVIDER NOTE - CLINICAL SUMMARY MEDICAL DECISION MAKING FREE TEXT BOX
sent by PCP Joanna Price due to fever and hypoxia. PCP advised to rule out COVID, reports abnormal lung sounds. as per aid Gerry, reports patient went to pcp today for follow up on head injury yesterday. Reports patient got stitches to right eyebrow yesterday along with normal head CT. plan includes sepsis work up, labs, CXR r/o pneumonia, COVID, UA r/o UTI, re-assess

## 2021-04-22 NOTE — ED ADULT NURSE NOTE - NSIMPLEMENTINTERV_GEN_ALL_ED
Implemented All Fall with Harm Risk Interventions:  [place] to call system. Call bell, personal items and telephone within reach. Instruct patient to call for assistance. Room bathroom lighting operational. Non-slip footwear when patient is off stretcher. Physically safe environment: no spills, clutter or unnecessary equipment. Stretcher in lowest position, wheels locked, appropriate side rails in place. Provide visual cue, wrist band, yellow gown, etc. Monitor gait and stability. Monitor for mental status changes and reorient to person, place, and time. Review medications for side effects contributing to fall risk. Reinforce activity limits and safety measures with patient and family. Provide visual clues: red socks.

## 2021-04-22 NOTE — ED ADULT NURSE NOTE - OBJECTIVE STATEMENT
Noted fever.  Decreased A/E to bases.  No noted adventitious sounds.  Noted Lac to R eyebrow with sutures C/D/I

## 2021-04-22 NOTE — H&P ADULT - NSHPLABSRESULTS_GEN_ALL_CORE
< from: CT Angio Chest w/ IV Cont (04.22.21 @ 21:24) >    COMPARISON: CT chest 11/26/2020    CONTRAST/COMPLICATIONS:  IV Contrast: Omnipaque 350  55 cc administered   45 cc discarded  Oral Contrast: NONE  Complications: None reported at time of study completion    FINDINGS:    There is no evidence of pulmonary embolism.    The thoracic aorta and main pulmonary artery are normal in caliber. The heart is mildly enlarged.    Bilateral interstitial prominence may reflect mild interstitial edema. Mosaic attenuation due to air trapping. Bibasilar dependent changes are noted.    There is no pericardial or pleural effusion. There is no pneumothorax or pneumomediastinum.    Subcentimeter short axis prevascular mediastinal lymph nodes are noted. No enlarged axillary or hilar nodes.    Limited images of the upper abdomen shows cholecystectomy clips. Cortical defect along the posterior left kidney.    Advanced multilevel thoracic spondylosis with prominent anterior osteophyte formation and endplate irregularity is noted.      < end of copied text >

## 2021-04-22 NOTE — ED ADULT TRIAGE NOTE - NS ED NURSE BANDS TYPE
How Severe Is Your Skin Discoloration?: moderate
Additional History: Mother states it started small has been spreading. She applies moisturizer nightly to his face.
Name band;

## 2021-04-22 NOTE — ED ADULT NURSE REASSESSMENT NOTE - NSIMPLEMENTINTERV_GEN_ALL_ED
Implemented All Fall with Harm Risk Interventions:  [place] to call system. Call bell, personal items and telephone within reach. Instruct patient to call for assistance. Room bathroom lighting operational. Non-slip footwear when patient is off stretcher. Physically safe environment: no spills, clutter or unnecessary equipment. Stretcher in lowest position, wheels locked, appropriate side rails in place. Provide visual cue, wrist band, yellow gown, etc. Monitor gait and stability. Monitor for mental status changes and reorient to person, place, and time. Review medications for side effects contributing to fall risk. Reinforce activity limits and safety measures with patient and family. Provide visual clues: red socks.

## 2021-04-23 LAB
A1C WITH ESTIMATED AVERAGE GLUCOSE RESULT: 5.3 % — SIGNIFICANT CHANGE UP (ref 4–5.6)
ALBUMIN SERPL ELPH-MCNC: 3.1 G/DL — LOW (ref 3.3–5)
ALP SERPL-CCNC: 73 U/L — SIGNIFICANT CHANGE UP (ref 40–120)
ALT FLD-CCNC: 23 U/L — SIGNIFICANT CHANGE UP (ref 12–78)
ANION GAP SERPL CALC-SCNC: 7 MMOL/L — SIGNIFICANT CHANGE UP (ref 5–17)
AST SERPL-CCNC: 29 U/L — SIGNIFICANT CHANGE UP (ref 15–37)
BASOPHILS # BLD AUTO: 0.04 K/UL — SIGNIFICANT CHANGE UP (ref 0–0.2)
BASOPHILS NFR BLD AUTO: 0.4 % — SIGNIFICANT CHANGE UP (ref 0–2)
BILIRUB SERPL-MCNC: 0.3 MG/DL — SIGNIFICANT CHANGE UP (ref 0.2–1.2)
BUN SERPL-MCNC: 7 MG/DL — SIGNIFICANT CHANGE UP (ref 7–23)
CALCIUM SERPL-MCNC: 9.3 MG/DL — SIGNIFICANT CHANGE UP (ref 8.5–10.1)
CHLORIDE SERPL-SCNC: 108 MMOL/L — SIGNIFICANT CHANGE UP (ref 96–108)
CO2 SERPL-SCNC: 27 MMOL/L — SIGNIFICANT CHANGE UP (ref 22–31)
CREAT SERPL-MCNC: 0.83 MG/DL — SIGNIFICANT CHANGE UP (ref 0.5–1.3)
CRP SERPL-MCNC: 34 MG/L — HIGH
CULTURE RESULTS: NO GROWTH — SIGNIFICANT CHANGE UP
EOSINOPHIL # BLD AUTO: 0.24 K/UL — SIGNIFICANT CHANGE UP (ref 0–0.5)
EOSINOPHIL NFR BLD AUTO: 2.6 % — SIGNIFICANT CHANGE UP (ref 0–6)
ESTIMATED AVERAGE GLUCOSE: 105 MG/DL — SIGNIFICANT CHANGE UP (ref 68–114)
FERRITIN SERPL-MCNC: 173 NG/ML — SIGNIFICANT CHANGE UP (ref 30–400)
GLUCOSE SERPL-MCNC: 110 MG/DL — HIGH (ref 70–99)
HCT VFR BLD CALC: 42.5 % — SIGNIFICANT CHANGE UP (ref 39–50)
HGB BLD-MCNC: 14.9 G/DL — SIGNIFICANT CHANGE UP (ref 13–17)
IMM GRANULOCYTES NFR BLD AUTO: 0.6 % — SIGNIFICANT CHANGE UP (ref 0–1.5)
LYMPHOCYTES # BLD AUTO: 2.66 K/UL — SIGNIFICANT CHANGE UP (ref 1–3.3)
LYMPHOCYTES # BLD AUTO: 28.7 % — SIGNIFICANT CHANGE UP (ref 13–44)
MCHC RBC-ENTMCNC: 30.9 PG — SIGNIFICANT CHANGE UP (ref 27–34)
MCHC RBC-ENTMCNC: 35.1 GM/DL — SIGNIFICANT CHANGE UP (ref 32–36)
MCV RBC AUTO: 88.2 FL — SIGNIFICANT CHANGE UP (ref 80–100)
MONOCYTES # BLD AUTO: 1.1 K/UL — HIGH (ref 0–0.9)
MONOCYTES NFR BLD AUTO: 11.9 % — SIGNIFICANT CHANGE UP (ref 2–14)
NEUTROPHILS # BLD AUTO: 5.18 K/UL — SIGNIFICANT CHANGE UP (ref 1.8–7.4)
NEUTROPHILS NFR BLD AUTO: 55.8 % — SIGNIFICANT CHANGE UP (ref 43–77)
NRBC # BLD: 0 /100 WBCS — SIGNIFICANT CHANGE UP (ref 0–0)
NT-PROBNP SERPL-SCNC: 78 PG/ML — SIGNIFICANT CHANGE UP (ref 0–125)
PLATELET # BLD AUTO: 215 K/UL — SIGNIFICANT CHANGE UP (ref 150–400)
POTASSIUM SERPL-MCNC: 3.9 MMOL/L — SIGNIFICANT CHANGE UP (ref 3.5–5.3)
POTASSIUM SERPL-SCNC: 3.9 MMOL/L — SIGNIFICANT CHANGE UP (ref 3.5–5.3)
PROT SERPL-MCNC: 7.7 G/DL — SIGNIFICANT CHANGE UP (ref 6–8.3)
RAPID RVP RESULT: SIGNIFICANT CHANGE UP
RBC # BLD: 4.82 M/UL — SIGNIFICANT CHANGE UP (ref 4.2–5.8)
RBC # FLD: 13.2 % — SIGNIFICANT CHANGE UP (ref 10.3–14.5)
SARS-COV-2 RNA SPEC QL NAA+PROBE: SIGNIFICANT CHANGE UP
SARS-COV-2 RNA SPEC QL NAA+PROBE: SIGNIFICANT CHANGE UP
SODIUM SERPL-SCNC: 142 MMOL/L — SIGNIFICANT CHANGE UP (ref 135–145)
SPECIMEN SOURCE: SIGNIFICANT CHANGE UP
TROPONIN I SERPL-MCNC: <.015 NG/ML — SIGNIFICANT CHANGE UP (ref 0.01–0.04)
TSH SERPL-MCNC: 3.5 UIU/ML — SIGNIFICANT CHANGE UP (ref 0.36–3.74)
WBC # BLD: 9.28 K/UL — SIGNIFICANT CHANGE UP (ref 3.8–10.5)
WBC # FLD AUTO: 9.28 K/UL — SIGNIFICANT CHANGE UP (ref 3.8–10.5)

## 2021-04-23 PROCEDURE — 73030 X-RAY EXAM OF SHOULDER: CPT | Mod: 26,50

## 2021-04-23 PROCEDURE — 73522 X-RAY EXAM HIPS BI 3-4 VIEWS: CPT | Mod: 26

## 2021-04-23 PROCEDURE — 70450 CT HEAD/BRAIN W/O DYE: CPT | Mod: 26

## 2021-04-23 PROCEDURE — 93970 EXTREMITY STUDY: CPT | Mod: 26

## 2021-04-23 PROCEDURE — 74176 CT ABD & PELVIS W/O CONTRAST: CPT | Mod: 26

## 2021-04-23 RX ADMIN — Medication 81 MILLIGRAM(S): at 12:07

## 2021-04-23 RX ADMIN — URSODIOL 300 MILLIGRAM(S): 250 TABLET, FILM COATED ORAL at 17:03

## 2021-04-23 RX ADMIN — TAMSULOSIN HYDROCHLORIDE 0.4 MILLIGRAM(S): 0.4 CAPSULE ORAL at 21:31

## 2021-04-23 RX ADMIN — FAMOTIDINE 20 MILLIGRAM(S): 10 INJECTION INTRAVENOUS at 12:07

## 2021-04-23 RX ADMIN — SODIUM CHLORIDE 50 MILLILITER(S): 9 INJECTION, SOLUTION INTRAVENOUS at 05:06

## 2021-04-23 RX ADMIN — Medication 2 MILLIGRAM(S): at 00:37

## 2021-04-23 RX ADMIN — FLUVOXAMINE MALEATE 100 MILLIGRAM(S): 25 TABLET ORAL at 05:07

## 2021-04-23 RX ADMIN — ENOXAPARIN SODIUM 40 MILLIGRAM(S): 100 INJECTION SUBCUTANEOUS at 12:06

## 2021-04-23 RX ADMIN — Medication 75 MICROGRAM(S): at 05:07

## 2021-04-23 RX ADMIN — Medication 25 MILLIGRAM(S): at 17:03

## 2021-04-23 RX ADMIN — URSODIOL 300 MILLIGRAM(S): 250 TABLET, FILM COATED ORAL at 05:07

## 2021-04-23 RX ADMIN — Medication 0.5 MILLIGRAM(S): at 05:06

## 2021-04-23 RX ADMIN — FLUVOXAMINE MALEATE 100 MILLIGRAM(S): 25 TABLET ORAL at 17:03

## 2021-04-23 RX ADMIN — Medication 0.5 MILLIGRAM(S): at 17:04

## 2021-04-23 RX ADMIN — TAMSULOSIN HYDROCHLORIDE 0.4 MILLIGRAM(S): 0.4 CAPSULE ORAL at 00:37

## 2021-04-23 RX ADMIN — AMLODIPINE BESYLATE 2.5 MILLIGRAM(S): 2.5 TABLET ORAL at 05:06

## 2021-04-23 RX ADMIN — DIVALPROEX SODIUM 250 MILLIGRAM(S): 500 TABLET, DELAYED RELEASE ORAL at 17:03

## 2021-04-23 RX ADMIN — OLANZAPINE 20 MILLIGRAM(S): 15 TABLET, FILM COATED ORAL at 12:06

## 2021-04-23 RX ADMIN — DIVALPROEX SODIUM 250 MILLIGRAM(S): 500 TABLET, DELAYED RELEASE ORAL at 05:07

## 2021-04-23 RX ADMIN — Medication 2 MILLIGRAM(S): at 21:31

## 2021-04-23 RX ADMIN — Medication 25 MILLIGRAM(S): at 05:07

## 2021-04-23 NOTE — CONSULT NOTE ADULT - SUBJECTIVE AND OBJECTIVE BOX
HPI:  66YO M Phx of MRDD, HTN, resident of group home admitted sp fall. Had low grade temps. No n/v/d CP SOB cough. Pt is unable to provide history.    Infectious Disease consult was called to evaluate pt due to fevers.    Past Medical & Surgical Hx:  PAST MEDICAL & SURGICAL HISTORY:  MR (mental retardation), severe  Seizure  HTN (hypertension)  Psoriasis  DD (diastrophic dysplasia)  Blind  right eye  Constipation, unspecified constipation type  Acute cholecystitis -lap ena 7/28/2020.    Social History--  EtOH: denies  Tobacco: denies   Drug Use: denies     FAMILY HISTORY:  No pertinent family history in first degree relatives      Allergies  Augmentin (Unknown)  aztreonam (Rash)  penicillin (Unknown)  sulfa drugs (Unknown)    Intolerances  NONE      Home Medications:  amLODIPine 2.5 mg oral tablet: 1 tab(s) orally once a day (03 Aug 2020 08:19)  aspirin 81 mg oral delayed release tablet: 1 tab(s) orally once a day (03 Aug 2020 08:19)  clonazePAM 0.5 mg oral tablet: 1 tab(s) orally 2 times a day (03 Aug 2020 08:19)  divalproex sodium 250 mg oral delayed release tablet: 1 tab(s) orally 2 times a day (03 Aug 2020 08:19)  doxazosin 2 mg oral tablet: 1 tab(s) orally once a day (at bedtime) (23 Aug 2019 11:18)  doxycycline monohydrate 100 mg oral capsule: 1 cap(s) orally every 12 hours 5 days (03 Aug 2020 08:19)  fluvoxaMINE 100 mg oral tablet: 1 tab(s) orally 2 times a day (03 Aug 2020 08:19)  levothyroxine: 75mcg po qam (03 Aug 2020 08:19)  metoprolol tartrate 25 mg oral tablet: 1 tab(s) orally 2 times a day (23 Aug 2019 11:18)  metroNIDAZOLE 500 mg oral tablet: 1 tab(s) orally every 8 hours 5 days (03 Aug 2020 08:19)  tamsulosin 0.4 mg oral capsule: 1 cap(s) orally once a day (at bedtime) (23 Aug 2019 11:18)  ursodiol 300 mg oral capsule: 1 cap(s) orally every 12 hours (23 Aug 2019 11:18)  ZyPREXA 20 mg oral tablet: 1 tab(s) orally once a day (23 Aug 2019 11:18)    Current Inpatient Medications :    ANTIBIOTICS:       OTHER RELEVANT MEDICATIONS :  acetaminophen   Tablet .. 650 milliGRAM(s) Oral every 4 hours PRN  amLODIPine   Tablet 2.5 milliGRAM(s) Oral daily  aspirin enteric coated 81 milliGRAM(s) Oral daily  clonazePAM  Tablet 0.5 milliGRAM(s) Oral two times a day  dextrose 40% Gel 15 Gram(s) Oral once  dextrose 5%. 1000 milliLiter(s) IV Continuous <Continuous>  dextrose 5%. 1000 milliLiter(s) IV Continuous <Continuous>  dextrose 50% Injectable 25 Gram(s) IV Push once  dextrose 50% Injectable 12.5 Gram(s) IV Push once  dextrose 50% Injectable 25 Gram(s) IV Push once  diVALproex  milliGRAM(s) Oral two times a day  doxazosin 2 milliGRAM(s) Oral at bedtime  enoxaparin Injectable 40 milliGRAM(s) SubCutaneous daily  famotidine    Tablet 20 milliGRAM(s) Oral daily  fluvoxaMINE 100 milliGRAM(s) Oral two times a day  glucagon  Injectable 1 milliGRAM(s) IntraMuscular once  levothyroxine 75 MICROGram(s) Oral daily  metoprolol tartrate 25 milliGRAM(s) Oral two times a day  OLANZapine 20 milliGRAM(s) Oral daily  tamsulosin 0.4 milliGRAM(s) Oral at bedtime  ursodiol Capsule 300 milliGRAM(s) Oral every 12 hours      ROS:  Unable to obtain due to : MRDD nonverbal      Physical Exam:  Vital Signs Last 24 Hrs  T(C): 37.2 (23 Apr 2021 20:49), Max: 37.7 (23 Apr 2021 11:57)  T(F): 99 (23 Apr 2021 20:49), Max: 99.8 (23 Apr 2021 11:57)  HR: 97 (23 Apr 2021 20:49) (82 - 114)  BP: 113/75 (23 Apr 2021 20:49) (113/75 - 147/78)  RR: 17 (23 Apr 2021 20:49) (16 - 18)  SpO2: 92% (23 Apr 2021 20:49) (91% - 95%)  Weight (kg): 69.853 (04-23 @ 02:02)    General: no acute distress  Neck: supple, trachea midline  Lungs: clear, no wheeze/rhonchi  Cardiovascular: regular rate and rhythm, S1 S2  Abdomen: soft, nontender, ND, bowel sounds normal  Neurological:  awake MRDD nonverbal  Skin: no rash  Extremities: no edema    Labs:                         14.9   9.28  )-----------( 215      ( 23 Apr 2021 17:36 )             42.5   04-23    142  |  108  |  7   ----------------------------<  110<H>  3.9   |  27  |  0.83    Ca    9.3      23 Apr 2021 17:36    TPro  7.7  /  Alb  3.1<L>  /  TBili  0.3  /  DBili  x   /  AST  29  /  ALT  23  /  AlkPhos  73  04-23      RECENT CULTURES:  Culture - Urine (collected 21 Apr 2021 21:32)  Source: .Urine Clean Catch (Midstream)  Final Report (23 Apr 2021 07:13):    No growth    RADIOLOGY & ADDITIONAL STUDIES:    EXAM:  CT ABDOMEN AND PELVIS                            PROCEDURE DATE:  04/23/2021          INTERPRETATION:  CLINICAL INFORMATION: Fever.    COMPARISON: 7/26/2020    CONTRAST/COMPLICATIONS:  IV Contrast: NONE  0 cc administered   0 cc discarded  Oral Contrast: NONE  Complications: None reported at time of study completion    PROCEDURE:  CT of the Abdomen and Pelvis was performed.  Sagittal and coronal reformats were performed.    FINDINGS:  Evaluation of solid organs is limited without intravenous contrast.    LOWER CHEST: Mild bibasilar atelectasis; evaluation degraded by motion artifact. Coronary atherosclerosis.    LIVER: Within normal limits.  BILE DUCTS: Normal caliber.  GALLBLADDER: Cholecystectomy.  SPLEEN: Within normal limits.  PANCREAS: Within normal limits.  ADRENALS: Within normal limits.  KIDNEYS/URETERS: No hydronephrosis or urinary tract stone. Small left renal cyst. 1.1 cm hyperdense left upper pole lesion (602, 64), most compatible with hemorrhagic cyst.    BLADDER: Distended.  REPRODUCTIVE ORGANS: Prostate normal in size.    BOWEL: No bowel obstruction. Appendix is normal.  PERITONEUM: No ascites.  VESSELS: Atherosclerotic changes.  RETROPERITONEUM/LYMPH NODES: No lymphadenopathy.  ABDOMINAL WALL: Within normal limits.  BONES: Degenerative changes.    IMPRESSION:  No findings to suggest and infectious process within the limitations of noncontrast technique.  Distended bladder.        Assessment :   66YO M Phx of MRDD, HTN, resident of group home admitted sp fall. Had low grade temps. Tmax 100.4.   CT AP unremarkable except bladder distention  Ucx neg  Blood cultures pending    Plan :   Doubt infectious, thus will monitor off antibiotics  Check bladder scan to rule out AUR  Fu cultures  Trend temps and cbc  Asp precautions    Continue with present regiment .  Approptiate use of antibiotics and adverse effects reviewed.      > 45 minutes spent in direct patient care reviewing  the notes, lab data/ imaging , discussion with multidisciplinary team. All questions were addressed and answered to the best of my capacity .    Thank you for allowing me to participate in the care of your patient .      Olivier Daniels MD  Infectious Disease  [phone]
    ROSELIA NARAYAN    PLV [address]NOR 230 D1    Patient is a 65y old  Male who presents with a chief complaint of fevers (2021 23:17)       Allergies    Augmentin (Unknown)  aztreonam (Rash)  penicillin (Unknown)  sulfa drugs (Unknown)    Intolerances        HPI:  patient fell yesterday  had ct brain and sutures above eyebrow (2021 23:17)      PAST MEDICAL & SURGICAL HISTORY:  MR (mental retardation), severe    Seizure    HTN (hypertension)    Psoriasis    DD (diastrophic dysplasia)    Blind  right eye    Constipation, unspecified constipation type    No significant past surgical history        FAMILY HISTORY:  No pertinent family history in first degree relatives          MEDICATIONS   acetaminophen   Tablet .. 650 milliGRAM(s) Oral every 4 hours PRN  amLODIPine   Tablet 2.5 milliGRAM(s) Oral daily  aspirin enteric coated 81 milliGRAM(s) Oral daily  clonazePAM  Tablet 0.5 milliGRAM(s) Oral two times a day  dextrose 40% Gel 15 Gram(s) Oral once  dextrose 5% + sodium chloride 0.9%. 1000 milliLiter(s) IV Continuous <Continuous>  dextrose 5%. 1000 milliLiter(s) IV Continuous <Continuous>  dextrose 5%. 1000 milliLiter(s) IV Continuous <Continuous>  dextrose 50% Injectable 25 Gram(s) IV Push once  dextrose 50% Injectable 12.5 Gram(s) IV Push once  dextrose 50% Injectable 25 Gram(s) IV Push once  diVALproex  milliGRAM(s) Oral two times a day  doxazosin 2 milliGRAM(s) Oral at bedtime  enoxaparin Injectable 40 milliGRAM(s) SubCutaneous daily  famotidine    Tablet 20 milliGRAM(s) Oral daily  fluvoxaMINE 100 milliGRAM(s) Oral two times a day  glucagon  Injectable 1 milliGRAM(s) IntraMuscular once  levoFLOXacin IVPB 500 milliGRAM(s) IV Intermittent every 24 hours  levothyroxine 75 MICROGram(s) Oral daily  metoprolol tartrate 25 milliGRAM(s) Oral two times a day  OLANZapine 20 milliGRAM(s) Oral daily  tamsulosin 0.4 milliGRAM(s) Oral at bedtime  ursodiol Capsule 300 milliGRAM(s) Oral every 12 hours      Vital Signs Last 24 Hrs  T(C): 36.7 (2021 05:01), Max: 38 (2021 16:21)  T(F): 98.1 (2021 05:01), Max: 100.4 (2021 16:21)  HR: 94 (2021 05:01) (82 - 113)  BP: 128/76 (2021 05:01) (115/63 - 135/84)  BP(mean): --  RR: 18 (2021 05:01) (16 - 20)  SpO2: 92% (2021 05:01) (91% - 92%)            LABS:                        15.4   12.41 )-----------( 227      ( 2021 18:49 )             44.1         141  |  106  |  14  ----------------------------<  103<H>  4.0   |  27  |  0.88    Ca    9.1      2021 18:49    TPro  8.4<H>  /  Alb  3.4  /  TBili  0.3  /  DBili  x   /  AST  20  /  ALT  22  /  AlkPhos  75      PT/INR - ( 2021 18:49 )   PT: 13.1 sec;   INR: 1.13 ratio         PTT - ( 2021 18:49 )  PTT:28.6 sec  Urinalysis Basic - ( 2021 21:32 )    Color: Yellow / Appearance: Clear / S.010 / pH: x  Gluc: x / Ketone: Trace  / Bili: Negative / Urobili: Negative mg/dL   Blood: x / Protein: Negative mg/dL / Nitrite: Negative   Leuk Esterase: Negative / RBC: x / WBC x   Sq Epi: x / Non Sq Epi: x / Bacteria: x            WBC:  WBC Count: 12.41 K/uL ( @ 18:49)  WBC Count: 14.03 K/uL ( @ 21:32)      MICROBIOLOGY:  RECENT CULTURES:   .Urine Clean Catch (Midstream) XXXX XXXX   No growth          CARDIAC MARKERS ( 2021 00:14 )  <.015 ng/mL / x     / x     / x     / x      CARDIAC MARKERS ( 2021 18:49 )  <.015 ng/mL / x     / x     / x     / x      CARDIAC MARKERS ( 2021 21:32 )  <0.015 ng/mL / x     / x     / x     / x            PT/INR - ( 2021 18:49 )   PT: 13.1 sec;   INR: 1.13 ratio         PTT - ( 2021 18:49 )  PTT:28.6 sec    Sodium:  Sodium, Serum: 141 mmol/L ( @ 18:49)  Sodium, Serum: 139 mmol/L ( 21:32)      0.88 mg/dL  @ 18:49  1.08 mg/dL  @ 21:32      Hemoglobin:  Hemoglobin: 15.4 g/dL ( @ 18:49)  Hemoglobin: 14.7 g/dL ( @ 21:32)      Platelets: Platelet Count - Automated: 227 K/uL ( @ 18:49)  Platelet Count - Automated: 212 K/uL ( @ 21:32)      LIVER FUNCTIONS - ( 2021 18:49 )  Alb: 3.4 g/dL / Pro: 8.4 g/dL / ALK PHOS: 75 U/L / ALT: 22 U/L / AST: 20 U/L / GGT: x             Urinalysis Basic - ( 2021 21:32 )    Color: Yellow / Appearance: Clear / S.010 / pH: x  Gluc: x / Ketone: Trace  / Bili: Negative / Urobili: Negative mg/dL   Blood: x / Protein: Negative mg/dL / Nitrite: Negative   Leuk Esterase: Negative / RBC: x / WBC x   Sq Epi: x / Non Sq Epi: x / Bacteria: x        RADIOLOGY & ADDITIONAL STUDIES:  
[facility]    CHIEF COMPLAINT: Patient is a 65y old  Male who presents with a chief complaint of fevers (23 Apr 2021 08:32)      HPI:  patient fell yesterday  had ct brain and sutures above eyebrow (22 Apr 2021 23:17)    PAST MEDICAL & SURGICAL HISTORY:  MR (mental retardation), severe    Seizure    HTN (hypertension)    Psoriasis    DD (diastrophic dysplasia)    Blind  right eye    Constipation, unspecified constipation type    No significant past surgical history      SOCIAL HISTORY: no tobacco  FAMILY HISTORY:  No pertinent family history in first degree relatives     HTN  MEDICATIONS  (STANDING):  amLODIPine   Tablet 2.5 milliGRAM(s) Oral daily  aspirin enteric coated 81 milliGRAM(s) Oral daily  clonazePAM  Tablet 0.5 milliGRAM(s) Oral two times a day  dextrose 40% Gel 15 Gram(s) Oral once  dextrose 5% + sodium chloride 0.9%. 1000 milliLiter(s) (50 mL/Hr) IV Continuous <Continuous>  dextrose 5%. 1000 milliLiter(s) (100 mL/Hr) IV Continuous <Continuous>  dextrose 5%. 1000 milliLiter(s) (50 mL/Hr) IV Continuous <Continuous>  dextrose 50% Injectable 25 Gram(s) IV Push once  dextrose 50% Injectable 12.5 Gram(s) IV Push once  dextrose 50% Injectable 25 Gram(s) IV Push once  diVALproex  milliGRAM(s) Oral two times a day  doxazosin 2 milliGRAM(s) Oral at bedtime  enoxaparin Injectable 40 milliGRAM(s) SubCutaneous daily  famotidine    Tablet 20 milliGRAM(s) Oral daily  fluvoxaMINE 100 milliGRAM(s) Oral two times a day  glucagon  Injectable 1 milliGRAM(s) IntraMuscular once  levoFLOXacin IVPB 500 milliGRAM(s) IV Intermittent every 24 hours  levothyroxine 75 MICROGram(s) Oral daily  metoprolol tartrate 25 milliGRAM(s) Oral two times a day  OLANZapine 20 milliGRAM(s) Oral daily  tamsulosin 0.4 milliGRAM(s) Oral at bedtime  ursodiol Capsule 300 milliGRAM(s) Oral every 12 hours    MEDICATIONS  (PRN):  acetaminophen   Tablet .. 650 milliGRAM(s) Oral every 4 hours PRN Temp greater or equal to 38C (100.4F), Mild Pain (1 - 3)    Allergies    Augmentin (Unknown)  aztreonam (Rash)  penicillin (Unknown)  sulfa drugs (Unknown)    Intolerances        REVIEW OF SYSTEMS:  CONSTITUTIONAL: No weakness, no fevers   EYES/ENT: No visual changes  NECK: No pain or stiffness  RESPIRATORY: No shortness of breath  CARDIOVASCULAR: No chest pain or palpitations  GASTROINTESTINAL: No abdominal pain  GENITOURINARY: No hematuria  NEUROLOGICAL: No weakness  SKIN: No rash  All other review of systems is negative unless indicated above    VITAL SIGNS:   Vital Signs Last 24 Hrs  T(C): 36.7 (23 Apr 2021 05:01), Max: 38 (22 Apr 2021 16:21)  T(F): 98.1 (23 Apr 2021 05:01), Max: 100.4 (22 Apr 2021 16:21)  HR: 94 (23 Apr 2021 05:01) (82 - 113)  BP: 128/76 (23 Apr 2021 05:01) (115/63 - 135/84)  BP(mean): --  RR: 18 (23 Apr 2021 05:01) (16 - 20)  SpO2: 92% (23 Apr 2021 05:01) (91% - 92%)  I&O's Summary    PHYSICAL EXAM:  Constitutional: NAD  Neurological: Alert   calm  HEENT: EOMI, no JVD  Cardiovascular: S1 and S2, no murmur  Pulmonary: breath sounds bilaterally  Gastrointestinal: Bowel Sounds present, soft, nontender  Ext: no peripheral edema  Skin: No rashes, No cyanosis.  Psych:  Mood calm    LABS: All Labs Reviewed:                        15.4   12.41 )-----------( 227      ( 22 Apr 2021 18:49 )             44.1     04-22    141  |  106  |  14  ----------------------------<  103<H>  4.0   |  27  |  0.88    Ca    9.1      22 Apr 2021 18:49    TPro  8.4<H>  /  Alb  3.4  /  TBili  0.3  /  DBili  x   /  AST  20  /  ALT  22  /  AlkPhos  75  04-22    PT/INR - ( 22 Apr 2021 18:49 )   PT: 13.1 sec;   INR: 1.13 ratio         PTT - ( 22 Apr 2021 18:49 )  PTT:28.6 sec  CARDIAC MARKERS ( 23 Apr 2021 09:00 )  <.015 ng/mL / x     / x     / x     / x      CARDIAC MARKERS ( 23 Apr 2021 00:14 )  <.015 ng/mL / x     / x     / x     / x      CARDIAC MARKERS ( 22 Apr 2021 18:49 )  <.015 ng/mL / x     / x     / x     / x      CARDIAC MARKERS ( 21 Apr 2021 21:32 )  <0.015 ng/mL / x     / x     / x     / x          CT Head No Cont:   EXAM:  CT BRAIN                            PROCEDURE DATE:  04/23/2021          INTERPRETATION:  HISTORY: Fall.    COMPARISON: CT head 4/21/2021    TECHNIQUE: Axial noncontrast CT images from the skull base to the vertex were obtained and submittedfor interpretation. Coronal and sagittal reformatted images were performed. Bone and soft tissue windows were evaluated.    FINDINGS:    There is no acute intracranial mass-effect, hemorrhage, midline shift, or abnormal extra-axial fluid collection. Gray-white differentiation is maintained.    Patchy and confluent regions of periventricular and deep cerebral white matter hypoattenuation due to chronic microangiopathic ischemic changes. Atheromatous calcifications along the carotid siphons are present.    Moderate centrally predominant cerebral volume loss noted. No evidence of hydrocephalus. Basal cisterns are patent.    Visualized paranasal sinuses and mastoid air cells are clear. The calvarium is intact.    Increased attenuation in the right globe likely reflects injected silicone material, unchanged.    IMPRESSION:    No interval change. No acute intracranial bleeding.            LATIA STRONG MD; Attending Radiologist  This document has been electronically signed. Apr 23 2021  4:25AM (04-23-21 @ 01:42)  12 Lead ECG:   Ventricular Rate 101 BPM    Atrial Rate 101 BPM    P-R Interval 184 ms    QRS Duration 94 ms    Q-T Interval 342 ms    QTC Calculation(Bazett) 443 ms    P Axis 44 degrees    R Axis -11 degrees    T Axis 32 degrees    Diagnosis Line Sinus tachycardia  Minimal voltage criteria for LVH, may be normal variant  Cannot rule out Anterior infarct , age undetermined  Abnormal ECG  Confirmed by KAROL GOEL MD ([id number]) on 4/22/2021 8:03:48 PM (04-21-21 @ 21:57)  CT Head No Cont:   EXAM:  CT CERVICAL SPINE                          EXAM:  CT BRAIN                            PROCEDURE DATE:  04/21/2021          INTERPRETATION:  HISTORY: Trauma.    COMPARISON: None    TECHNIQUE: Axial noncontrast CT images of the head and cervical spine were obtained and submitted for interpretation. Sagittal and coronal reformatted images of the cervical spine were provided. Bone and soft tissue windows were evaluated.    FINDINGS:    CT BRAIN:    There is no acute intracranial mass-effect,hemorrhage, midline shift, or abnormal extra-axial fluid collection. Gray-white differentiation is maintained.    Patchy and confluent regions of periventricular and deep cerebral white matter hypoattenuation due to chronic microangiopathic ischemic changes. Atheromatous calcifications along the carotid siphons are present.    Moderate centrally predominant cerebral volume loss noted.  No evidence of hydrocephalus. Basal cisterns are patent.    Visualized paranasal sinuses  and mastoid air cells are clear. The calvarium is intact.    Increased attenuation in the right globe likely reflects injected silicone material, unchanged.    CT CERVICAL SPINE:    Straightening of the cervical lordosis due to muscle spasm and/or positioning. There is no prevertebral soft tissue swelling. Vertebral body heights and alignment are maintained.    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 Uncovertebral joint spurring contributes to high-grade right sided foraminal stenosis at C4-C5, C5-C6, and C6-C7. The least mild-to-moderate central canal stenosis noted at C4-C5.    The atlantodental and atlanto-occipital joints are maintained. Articular facets and posterior elements alignment is maintained.    Atheromatous calcification along the aortic arch and carotid bulbs bilaterally.    IMPRESSION:    CT BRAIN: No acute intracranial bleeding.    CT CERVICAL SPINE: No fracture.    Cervical DISH and OPLL contributes to high-grade right C4-C5, C5-C6, C6-C7 foraminal stenosis and C4-C5 central canal stenosis.      LATIA STRONG MD; Attending Radiologist  This document has been electronically signed. Apr 21 2021 10:26PM (04-21-21 @ 21:20)    · Assessment	  65M with PMH HTN, Cholecystectomy, non-verbal a/w fever/fall  r/o sepsis  s/p ABX    Ekg ok  Echo shows normal LVEF.   CT: no pleural effusion, no pulmonary embolism    Stable HR and BP. 2. Continue ASA, metoprolol 25 mg BID;   DVT prohylaxis  Will follow PRN  Thank you for consult

## 2021-04-23 NOTE — PROGRESS NOTE ADULT - SUBJECTIVE AND OBJECTIVE BOX
PAST MEDICAL & SURGICAL HISTORY:  MR (mental retardation), severe    Seizure    HTN (hypertension)    Psoriasis    DD (diastrophic dysplasia)    Blind  right eye    Constipation, unspecified constipation type    No significant past surgical history        MEDICATIONS  (STANDING):  amLODIPine   Tablet 2.5 milliGRAM(s) Oral daily  aspirin enteric coated 81 milliGRAM(s) Oral daily  clonazePAM  Tablet 0.5 milliGRAM(s) Oral two times a day  dextrose 40% Gel 15 Gram(s) Oral once  dextrose 5%. 1000 milliLiter(s) (100 mL/Hr) IV Continuous <Continuous>  dextrose 5%. 1000 milliLiter(s) (50 mL/Hr) IV Continuous <Continuous>  dextrose 50% Injectable 25 Gram(s) IV Push once  dextrose 50% Injectable 12.5 Gram(s) IV Push once  dextrose 50% Injectable 25 Gram(s) IV Push once  diVALproex  milliGRAM(s) Oral two times a day  doxazosin 2 milliGRAM(s) Oral at bedtime  enoxaparin Injectable 40 milliGRAM(s) SubCutaneous daily  famotidine    Tablet 20 milliGRAM(s) Oral daily  fluvoxaMINE 100 milliGRAM(s) Oral two times a day  glucagon  Injectable 1 milliGRAM(s) IntraMuscular once  levoFLOXacin IVPB 500 milliGRAM(s) IV Intermittent every 24 hours  levothyroxine 75 MICROGram(s) Oral daily  metoprolol tartrate 25 milliGRAM(s) Oral two times a day  OLANZapine 20 milliGRAM(s) Oral daily  tamsulosin 0.4 milliGRAM(s) Oral at bedtime  ursodiol Capsule 300 milliGRAM(s) Oral every 12 hours    MEDICATIONS  (PRN):  acetaminophen   Tablet .. 650 milliGRAM(s) Oral every 4 hours PRN Temp greater or equal to 38C (100.4F), Mild Pain (1 - 3)      Patient is a 65y old  Male who presents with a chief complaint of fevers (2021 09:23)      Vital Signs Last 24 Hrs  T(C): 37.7 (2021 11:57), Max: 38 (2021 17:05)  T(F): 99.8 (2021 11:57), Max: 100.4 (2021 17:05)  HR: 94 (2021 11:57) (82 - 108)  BP: 143/69 (2021 11:57) (115/63 - 143/69)  BP(mean): --  RR: 18 (2021 11:57) (16 - 18)  SpO2: 95% (2021 11:57) (91% - 95%)            REVIEW OF SYSTEMS:    Constitutional: No fever, weight loss or fatigue  Eyes: No eye pain, visual disturbances, or discharge  ENT:  No difficulty hearing, tinnitus, vertigo; No sinus or throat pain  Neck: No pain or stiffness  Breasts: No pain, masses or nipple discharge  Respiratory: No cough, wheezing, chills or hemoptysis  Cardiovascular: No chest pain, palpitations, shortness of breath, dizziness or leg swelling  Gastrointestinal: No abdominal or epigastric pain. No nausea, vomiting or hematemesis; No diarrhea or constipation. No melena or hematochezia.  Genitourinary: No dysuria, frequency, hematuria or incontinence  Rectal: No pain, hemorrhoids or incontinence  Neurological: No headaches, memory loss, loss of strength, numbness or tremors  Skin: No itching, burning, rashes or lesions   Lymph Nodes: No enlarged glands  Endocrine: No heat or cold intolerance; No hair loss  Musculoskeletal: No joint pain or swelling; No muscle, back or extremity pain  Psychiatric: No depression, anxiety, mood swings or difficulty sleeping  Heme/Lymph: No easy bruising or bleeding gums  Allergy and Immunologic: No hives or eczema    PHYSICAL EXAM:    Constitutional: NAD, well-groomed, well-developed  HEENT: PERRLA, EOMI, Normal Hearing, MMM  Neck: No LAD, No JVD  Back: Normal spine flexure, No CVA tenderness  Respiratory: CTAB/L   Cardiovascular: S1 and S2, RRR, no M/G/R  Gastrointestinal: BS+, soft, NT/ND  Extremities: No peripheral edema  Vascular: 2+ peripheral pulses  Neurological: A/O x 3, no focal deficits  Skin: No rashes      decubiti:                           15.4   12.41 )-----------( 227      ( 2021 18:49 )             44.1         141  |  106  |  14  ----------------------------<  103<H>  4.0   |  27  |  0.88    Ca    9.1      2021 18:49    TPro  8.4<H>  /  Alb  3.4  /  TBili  0.3  /  DBili  x   /  AST  20  /  ALT  22  /  AlkPhos  75  -    PT/INR - ( 2021 18:49 )   PT: 13.1 sec;   INR: 1.13 ratio         PTT - ( 2021 18:49 )  PTT:28.6 sec  Urinalysis Basic - ( 2021 21:32 )    Color: Yellow / Appearance: Clear / S.010 / pH: x  Gluc: x / Ketone: Trace  / Bili: Negative / Urobili: Negative mg/dL   Blood: x / Protein: Negative mg/dL / Nitrite: Negative   Leuk Esterase: Negative / RBC: x / WBC x   Sq Epi: x / Non Sq Epi: x / Bacteria: x      CARDIAC MARKERS ( 2021 16:17 )  <.015 ng/mL / x     / x     / x     / x      CARDIAC MARKERS ( 2021 09:00 )  <.015 ng/mL / x     / x     / x     / x      CARDIAC MARKERS ( 2021 00:14 )  <.015 ng/mL / x     / x     / x     / x      CARDIAC MARKERS ( 2021 18:49 )  <.015 ng/mL / x     / x     / x     / x      CARDIAC MARKERS ( 2021 21:32 )  <0.015 ng/mL / x     / x     / x     / x           @ 16:17    -   SUE: --Troponin:  <.015  Troponin:  <.015    BNP:  78   --  -  Troponin:  <.015        -    BNP:  102  : --  roponin:  <.015      .Urine Clean Catch (Midstream)   @ 21:32   No growth  --  --        Urine Culture:  < from: Xray Shoulder 2 Views, Bilateral (21 @ 09:05) >    EXAM:  SHOULDER BILAT (MINIMUM 2 V)                            PROCEDURE DATE:  2021          INTERPRETATION:  Radiographs of the LEFT shoulder    CLINICAL INFORMATION: Injury with Pain.    TECHNIQUE:  Frontal views in internal and external rotation of the shoulder were obtained.  A Y-view was also obtained.    FINDINGS:   No prior examinations are available for review.    The osseous structures of the shoulder are intact. No fracture or destructive bone lesion.  There is narrowing of theacromiohumeral interval which may predispose to rotator cuff injury.  The humeral head is located within glenoid fossa. The acromioclavicular joint is aligned and intact.    No pathologic calcifications or soft tissue abnormalities are identified. The visualized chest wall and ribs are intact. No radiopaque foreign body.    IMPRESSION:    No acute radiographic osseous pathology.  Narrowing of the acromiohumeral interval which may predispose to rotator cuff injury.    < end of copied text >   @ 21:32    --        No growth        Radiology:  < from: US Duplex Venous Lower Ext Complete, Bilateral (21 @ 11:04) >  EXAM:  US DPLX LWR EXT VEINS COMPL BI                            PROCEDURE DATE:  2021          INTERPRETATION:  CLINICAL INFORMATION: Leg swelling.    COMPARISON: Bilateral lower extremity venous duplex study dated 2020.    TECHNIQUE: Duplex sonography of the BILATERAL LOWER extremity veins with color and spectral Doppler, with and without compression.    FINDINGS:    RIGHT:  Normal compressibility of the RIGHT common femoral, femoral and popliteal veins.  Doppler examination shows normal spontaneous and phasic flow.  No RIGHT calf vein thrombosis is detected.    LEFT:  Normal compressibility of the LEFT common femoral, femoral and popliteal veins.  Doppler examination shows normal spontaneous and phasic flow.  No LEFT calf veinthrombosis is detected.    IMPRESSION:  No evidence of deep venous thrombosis in either lower extremity.    < end of copied text >  HIP xrays no fracture    < from: CT Head No Cont (21 @ 01:42) >    EXAM:  CT BRAIN                            PROCEDURE DATE:  2021          INTERPRETATION:  HISTORY: Fall.    COMPARISON: CT head 2021    TECHNIQUE: Axial noncontrast CT images from the skull base to the vertex were obtained and submittedfor interpretation. Coronal and sagittal reformatted images were performed. Bone and soft tissue windows were evaluated.    FINDINGS:    There is no acute intracranial mass-effect, hemorrhage, midline shift, or abnormal extra-axial fluid collection. Gray-white differentiation is maintained.    Patchy and confluent regions of periventricular and deep cerebral white matter hypoattenuation due to chronic microangiopathic ischemic changes. Atheromatous calcifications along the carotid siphons are present.    Moderate centrally predominant cerebral volume loss noted. No evidence of hydrocephalus. Basal cisterns are patent.    Visualized paranasal sinuses and mastoid air cells are clear. The calvarium is intact.    Increased attenuation in the right globe likely reflects injected silicone material, unchanged.    IMPRESSION:    No interval change. No acute intracranial bleeding.      < end of copied text >       PAST MEDICAL & SURGICAL HISTORY:  MR (mental retardation), severe    Seizure    HTN (hypertension)    Psoriasis    DD (diastrophic dysplasia)    Blind  right eye    Constipation, unspecified constipation type    No significant past surgical history        MEDICATIONS  (STANDING):  amLODIPine   Tablet 2.5 milliGRAM(s) Oral daily  aspirin enteric coated 81 milliGRAM(s) Oral daily  clonazePAM  Tablet 0.5 milliGRAM(s) Oral two times a day  dextrose 40% Gel 15 Gram(s) Oral once  dextrose 5%. 1000 milliLiter(s) (100 mL/Hr) IV Continuous <Continuous>  dextrose 5%. 1000 milliLiter(s) (50 mL/Hr) IV Continuous <Continuous>  dextrose 50% Injectable 25 Gram(s) IV Push once  dextrose 50% Injectable 12.5 Gram(s) IV Push once  dextrose 50% Injectable 25 Gram(s) IV Push once  diVALproex  milliGRAM(s) Oral two times a day  doxazosin 2 milliGRAM(s) Oral at bedtime  enoxaparin Injectable 40 milliGRAM(s) SubCutaneous daily  famotidine    Tablet 20 milliGRAM(s) Oral daily  fluvoxaMINE 100 milliGRAM(s) Oral two times a day  glucagon  Injectable 1 milliGRAM(s) IntraMuscular once  levoFLOXacin IVPB 500 milliGRAM(s) IV Intermittent every 24 hours  levothyroxine 75 MICROGram(s) Oral daily  metoprolol tartrate 25 milliGRAM(s) Oral two times a day  OLANZapine 20 milliGRAM(s) Oral daily  tamsulosin 0.4 milliGRAM(s) Oral at bedtime  ursodiol Capsule 300 milliGRAM(s) Oral every 12 hours    MEDICATIONS  (PRN):  acetaminophen   Tablet .. 650 milliGRAM(s) Oral every 4 hours PRN Temp greater or equal to 38C (100.4F), Mild Pain (1 - 3)      Patient is a 65y old  Male who presents with a chief complaint of fevers (2021 09:23)      Vital Signs Last 24 Hrs  T(C): 37.7 (2021 11:57), Max: 38 (2021 17:05)  T(F): 99.8 (2021 11:57), Max: 100.4 (2021 17:05)  HR: 94 (2021 11:57) (82 - 108)  BP: 143/69 (2021 11:57) (115/63 - 143/69)  BP(mean): --  RR: 18 (2021 11:57) (16 - 18)  SpO2: 95% (2021 11:57) (91% - 95%)            REVIEW OF SYSTEMS:    Constitutional: No fever, weight loss or fatigue  Eyes: No eye pain, visual disturbances, or discharge  ENT:  No difficulty hearing, tinnitus, vertigo; No sinus or throat pain  Neck: No pain or stiffness  Breasts: No pain, masses or nipple discharge  Respiratory: No cough, wheezing, chills or hemoptysis  Cardiovascular: No chest pain, palpitations, shortness of breath, dizziness or leg swelling  Gastrointestinal: No abdominal or epigastric pain. No nausea, vomiting or hematemesis; No diarrhea or constipation. No melena or hematochezia.  Genitourinary: No dysuria, frequency, hematuria or incontinence  Rectal: No pain, hemorrhoids or incontinence  Neurological: No headaches, memory loss, loss of strength, numbness or tremors  Skin: No itching, burning, rashes or lesions   Lymph Nodes: No enlarged glands  Endocrine: No heat or cold intolerance; No hair loss  Musculoskeletal: No joint pain or swelling; No muscle, back or extremity pain  Psychiatric: No depression, anxiety, mood swings or difficulty sleeping  Heme/Lymph: No easy bruising or bleeding gums  Allergy and Immunologic: No hives or eczema    PHYSICAL EXAM:  house aid at bedside  no cough  no congestion  Constitutional: NAD,   Respiratory: CTAB/L   Cardiovascular: S1 and S2, RRR, no M/G/R  Gastrointestinal: BS+, soft, NT/ND  Extremities: No peripheral edema  Neurological: A/, no focal deficits  Skin: No rashes  7 sutures superior right brow    decubiti: no                          15.4   12.41 )-----------( 227      ( 2021 18:49 )             44.1     04-22    141  |  106  |  14  ----------------------------<  103<H>  4.0   |  27  |  0.88    Ca    9.1      2021 18:49    TPro  8.4<H>  /  Alb  3.4  /  TBili  0.3  /  DBili  x   /  AST  20  /  ALT  22  /  AlkPhos  75  04-    PT/INR - ( 2021 18:49 )   PT: 13.1 sec;   INR: 1.13 ratio         PTT - ( 2021 18:49 )  PTT:28.6 sec  Urinalysis Basic - ( 2021 21:32 )    Color: Yellow / Appearance: Clear / S.010 / pH: x  Gluc: x / Ketone: Trace  / Bili: Negative / Urobili: Negative mg/dL   Blood: x / Protein: Negative mg/dL / Nitrite: Negative   Leuk Esterase: Negative / RBC: x / WBC x   Sq Epi: x / Non Sq Epi: x / Bacteria: x      CARDIAC MARKERS ( 2021 16:17 )  <.015 ng/mL / x     / x     / x     / x      CARDIAC MARKERS ( 2021 09:00 )  <.015 ng/mL / x     / x     / x     / x      CARDIAC MARKERS ( 2021 00:14 )  <.015 ng/mL / x     / x     / x     / x      CARDIAC MARKERS ( 2021 18:49 )  <.015 ng/mL / x     / x     / x     / x      CARDIAC MARKERS ( 2021 21:32 )  <0.015 ng/mL / x     / x     / x     / x          04 @ 16:17    -   SUE: --Troponin:  <.015  Troponin:  <.015    BNP:  78   --  -  Troponin:  <.015        -    BNP:  102  : --  roponin:  <.015      .Urine Clean Catch (Midstream)   @ 21:32   No growth  --  --        Urine Culture:  < from: Xray Shoulder 2 Views, Bilateral (21 @ 09:05) >    EXAM:  SHOULDER BILAT (MINIMUM 2 V)                            PROCEDURE DATE:  2021          INTERPRETATION:  Radiographs of the LEFT shoulder    CLINICAL INFORMATION: Injury with Pain.    TECHNIQUE:  Frontal views in internal and external rotation of the shoulder were obtained.  A Y-view was also obtained.    FINDINGS:   No prior examinations are available for review.    The osseous structures of the shoulder are intact. No fracture or destructive bone lesion.  There is narrowing of theacromiohumeral interval which may predispose to rotator cuff injury.  The humeral head is located within glenoid fossa. The acromioclavicular joint is aligned and intact.    No pathologic calcifications or soft tissue abnormalities are identified. The visualized chest wall and ribs are intact. No radiopaque foreign body.    IMPRESSION:    No acute radiographic osseous pathology.  Narrowing of the acromiohumeral interval which may predispose to rotator cuff injury.    < end of copied text >   @ 21:32    --        No growth        Radiology:  < from: US Duplex Venous Lower Ext Complete, Bilateral (21 @ 11:04) >  EXAM:  US DPLX LWR EXT VEINS COMPL BI                            PROCEDURE DATE:  2021          INTERPRETATION:  CLINICAL INFORMATION: Leg swelling.    COMPARISON: Bilateral lower extremity venous duplex study dated 2020.    TECHNIQUE: Duplex sonography of the BILATERAL LOWER extremity veins with color and spectral Doppler, with and without compression.    FINDINGS:    RIGHT:  Normal compressibility of the RIGHT common femoral, femoral and popliteal veins.  Doppler examination shows normal spontaneous and phasic flow.  No RIGHT calf vein thrombosis is detected.    LEFT:  Normal compressibility of the LEFT common femoral, femoral and popliteal veins.  Doppler examination shows normal spontaneous and phasic flow.  No LEFT calf veinthrombosis is detected.    IMPRESSION:  No evidence of deep venous thrombosis in either lower extremity.    < end of copied text >  HIP xrays no fracture    < from: CT Head No Cont (21 @ 01:42) >    EXAM:  CT BRAIN                            PROCEDURE DATE:  2021          INTERPRETATION:  HISTORY: Fall.    COMPARISON: CT head 2021    TECHNIQUE: Axial noncontrast CT images from the skull base to the vertex were obtained and submittedfor interpretation. Coronal and sagittal reformatted images were performed. Bone and soft tissue windows were evaluated.    FINDINGS:    There is no acute intracranial mass-effect, hemorrhage, midline shift, or abnormal extra-axial fluid collection. Gray-white differentiation is maintained.    Patchy and confluent regions of periventricular and deep cerebral white matter hypoattenuation due to chronic microangiopathic ischemic changes. Atheromatous calcifications along the carotid siphons are present.    Moderate centrally predominant cerebral volume loss noted. No evidence of hydrocephalus. Basal cisterns are patent.    Visualized paranasal sinuses and mastoid air cells are clear. The calvarium is intact.    Increased attenuation in the right globe likely reflects injected silicone material, unchanged.    IMPRESSION:    No interval change. No acute intracranial bleeding.      < end of copied text >

## 2021-04-23 NOTE — PROGRESS NOTE ADULT - NSICDXPROBLEMPLAN1_GEN_ALL_CORE_FT
?silent aspiration  pending id eval for continuation abxs?  cardiac eval ramirez negative  no s/sx chf  seen by speech swallow  will adjust diet  ct brain negative  ordered ct abd to make sure no acute pathology  labs pending ?silent aspiration  no cough /congestion  dc abxs  eating well  oob ambulate  patient walks at home  cardiac eval ramirez negative  no s/sx chf  seen by speech swallow  will adjust diet  ct brain negative  ordered ct abd to make sure no acute pathology  labs pending  dc monday sp covid last april  no s/sx aspiration  no cough /congestion  dc abxs  change diet per sppech swallow  most likley sp viral syndrome  rvp covid negative  eating well  oob ambulate  patient walks at home  cardiac eval ramirez negative  no s/sx chf  will adjust diet  ct brain negative  ordered ct abd to make sure no acute pathology  labs pending  dc monday

## 2021-04-23 NOTE — SWALLOW BEDSIDE ASSESSMENT ADULT - SWALLOW EVAL: DIAGNOSIS
1. The patient demonstrated functional oral management of puree, nectar thick, and thin liquid textures marked by adequate bolus collection, transfer, and posterior transport. 2. The patient demonstrated a mild oral dysphagia for mechanical soft solids marked by delayed bolus collection, transfer, and posterior transport. 3. The patient demonstrated a mild pharyngeal dysphagia for puree, mechanical soft solid, and nectar thick liquids marked by a suspected delayed pharyngeal swallow trigger with hyolaryngeal elevation noted upon digital palpation w/o evidence of airway penetration. 4. The patient demonstrated a severe pharyngeal dysphagia for thin liquids marked by a suspected delayed pharyngeal swallow trigger with hyolaryngeal elevation noted upon digital palpation resulting in coughing suggestive of airway penetration.

## 2021-04-23 NOTE — SWALLOW BEDSIDE ASSESSMENT ADULT - COMMENTS
Consult received and chart reviewed. The patient was seen at bedside this afternoon for an initial assessment of swallow function, at which time he was awake and cooperative. Patient currently receiving supplemental O2 via NC in place. Patient's aide present at bedside and reporting the patient consumes ground, moist solids with 2oz, at a time, of thin liquids. In addition, she further reported that the patient sneaks thin liquids from the bathroom faucet.    Per charting, the patient is a "66 y/o male with PMHx HTN and MR sent by PCP Joanna Price due to fever and hypoxia. PCP advised to rule out COVID, reports abnormal lung sounds. as per aid Gerry, reports patient went to pcp today for follow up on head injury yesterday. Reports patient got stitches to right eyebrow yesterday along with normal head CT. denies vomiting, hematuria, cough."    WBC is WFL. CT angio of the chest revealed, "No central pulmonary embolism. Bilateral interstitial edema with air trapping and cardiomegaly."    Discussed results and recommendations from this evaluation with the patient, patient's aide, RN, and call out to MD. Consult received and chart reviewed. The patient was seen at bedside this afternoon for an initial assessment of swallow function, at which time he was awake and cooperative. Patient currently receiving supplemental O2 via NC in place. Patient's aide present at bedside and reporting the patient consumes ground, moist solids with thin liquids (2oz at a time). In addition, she further reported that the patient sneaks thin liquids from the bathroom faucet.    Per charting, the patient is a "66 y/o male with PMHx HTN and MR sent by PCP Joanna Price due to fever and hypoxia. PCP advised to rule out COVID, reports abnormal lung sounds. as per aid Gerry, reports patient went to pcp today for follow up on head injury yesterday. Reports patient got stitches to right eyebrow yesterday along with normal head CT. denies vomiting, hematuria, cough."    WBC is WFL. CT angio of the chest revealed, "No central pulmonary embolism. Bilateral interstitial edema with air trapping and cardiomegaly."    Discussed results and recommendations from this evaluation with the patient, patient's aide, RN, and call out to MD.

## 2021-04-23 NOTE — ED ADULT NURSE REASSESSMENT NOTE - COMFORT CARE
darkened lights/meal provided/plan of care explained/repositioned
plan of care explained/repositioned/side rails up

## 2021-04-23 NOTE — CONSULT NOTE ADULT - ASSESSMENT
SYLVAIN VELOZ [facility]  4/23/2021 DR TASHA CALIXTO     Initial evaluation/Pulmonary Critical Care consultation requested on  4/23/2021  by Dr PRUETT from Dr Damon   Patient examined chart reviewed    HOSPITAL ADMISSION   PATIENT CAME  FROM (if information available)      SYLVAIN VELOZ [facility]  4/23/2021 DR TASHA CALIXTO     REVIEW OF SYMPTOMS      Able to give (reliable) ROS  NO     PHYSICAL EXAM    HEENT Unremarkable  atraumatic   RESP Fair air entry EXP prolonged    Harsh breath sound Resp distres mild   CARDIAC S1 S2 No S3     NO JVD    ABDOMEN SOFT BS PRESENT NOT DISTENDED No hepatosplenomegaly   PEDAL EDEMA present No calf tenderness  NO rash       PATIENT PRESENTATION.   65M with PMH HTN, Cholecystectomy, non-verbal a/w fever/fall  Pulm consulted 4/23/2021       PROBLEMS  COVID RULED OUT   SEPSIS  ELEVATED D-DIMR poa   FALL       PATIENT DATA                          OXYGENATION.    4/23/2021 ra 91%      ABG.             VITALS/IO/VENT/DRIPS.  4/23/2021 afeb 100 130/70        GLOBAL AND BEST PRACTICE ISSUES                     ALLERGY. augmentin azacta sulfa       WEIGHT.            4/23/2021 69                     BMI.                        4/23/2021 25   ADVANCED DIRECTIVE.                               HEAD OF BED ELEVATION. Yes  DVT PROPHYLAXIS.     lvnx 40 (4/23/2021)                  SAUER PROPHYLAXIS.   APA.        asa 81 (4/23/20210                                                             DYSPHAGIA RECOMM.   DIET.    dys 1 puree honey (4/23/2021)   INFECTION PROPHYLAXIS.   FREE WATER.      ASSESSMENT/RECOMMENDATIONS.    COVID RULED OUT   covid pcr 4/22 covid pcr negv   SEPSIS  W 4/22/2021 w 12.4   4/22/2021 pr negv  4/21 ua nitr negv l estr negv   rvp 4/22 rvp negv   urine c 4/21 u c negv   cxr l base and l perihilar infiltr   LEVAQUIN (4/23/2021)   Doubt bacterial infection Has lo procalc unremarkable ua and cxr   Recommend dc abio   ELEVATED D-dimr poa   4/22/2021 D-dimr 314  cta ch 4/22 cta ch no pe bl is edema air trapping cm   Doubt vte   await v duplx     TIME SPENT   Over 55 minutes aggregate care time spent on encounter; activities included   direct patient care, counseling and/or coordinating care reviewing notes, lab data/ imaging , discussion with multidisciplinary team/ patient  /family and explaining in detail risks, benefits, alternatives  of the recommendations     SYLVAIN VELOZ [facility]  4/23/2021 DR TASHA CALIXTO

## 2021-04-23 NOTE — ED ADULT NURSE REASSESSMENT NOTE - NS ED NURSE REASSESS COMMENT FT1
Pt tolerated bedtime meds whole with apple sauce.
Pt received from RN Vern Araujo. Pt resting comfortably with VSS, no complaints at this time. Abd is distended, wound on R eye.  Patient's bed in the lowest position, explained plan of care to patient and . Pt was turned and repositioned. Will continue to monitor.

## 2021-04-24 LAB
COVID-19 SPIKE DOMAIN AB INTERP: POSITIVE
COVID-19 SPIKE DOMAIN ANTIBODY RESULT: >250 U/ML — HIGH
SARS-COV-2 IGG+IGM SERPL QL IA: >250 U/ML — HIGH
SARS-COV-2 IGG+IGM SERPL QL IA: POSITIVE

## 2021-04-24 RX ADMIN — FLUVOXAMINE MALEATE 100 MILLIGRAM(S): 25 TABLET ORAL at 06:08

## 2021-04-24 RX ADMIN — TAMSULOSIN HYDROCHLORIDE 0.4 MILLIGRAM(S): 0.4 CAPSULE ORAL at 21:33

## 2021-04-24 RX ADMIN — URSODIOL 300 MILLIGRAM(S): 250 TABLET, FILM COATED ORAL at 17:29

## 2021-04-24 RX ADMIN — DIVALPROEX SODIUM 250 MILLIGRAM(S): 500 TABLET, DELAYED RELEASE ORAL at 06:08

## 2021-04-24 RX ADMIN — Medication 25 MILLIGRAM(S): at 17:29

## 2021-04-24 RX ADMIN — ENOXAPARIN SODIUM 40 MILLIGRAM(S): 100 INJECTION SUBCUTANEOUS at 11:14

## 2021-04-24 RX ADMIN — DIVALPROEX SODIUM 250 MILLIGRAM(S): 500 TABLET, DELAYED RELEASE ORAL at 17:29

## 2021-04-24 RX ADMIN — Medication 0.5 MILLIGRAM(S): at 17:28

## 2021-04-24 RX ADMIN — Medication 75 MICROGRAM(S): at 06:08

## 2021-04-24 RX ADMIN — URSODIOL 300 MILLIGRAM(S): 250 TABLET, FILM COATED ORAL at 06:08

## 2021-04-24 RX ADMIN — Medication 0.5 MILLIGRAM(S): at 06:07

## 2021-04-24 RX ADMIN — Medication 25 MILLIGRAM(S): at 06:08

## 2021-04-24 RX ADMIN — Medication 650 MILLIGRAM(S): at 21:50

## 2021-04-24 RX ADMIN — Medication 650 MILLIGRAM(S): at 21:33

## 2021-04-24 RX ADMIN — Medication 2 MILLIGRAM(S): at 21:33

## 2021-04-24 RX ADMIN — FLUVOXAMINE MALEATE 100 MILLIGRAM(S): 25 TABLET ORAL at 17:28

## 2021-04-24 RX ADMIN — OLANZAPINE 20 MILLIGRAM(S): 15 TABLET, FILM COATED ORAL at 11:16

## 2021-04-24 RX ADMIN — Medication 81 MILLIGRAM(S): at 11:14

## 2021-04-24 RX ADMIN — AMLODIPINE BESYLATE 2.5 MILLIGRAM(S): 2.5 TABLET ORAL at 06:09

## 2021-04-24 RX ADMIN — FAMOTIDINE 20 MILLIGRAM(S): 10 INJECTION INTRAVENOUS at 11:14

## 2021-04-24 NOTE — PHYSICAL THERAPY INITIAL EVALUATION ADULT - ADDITIONAL COMMENTS
Pt is from group home which is [facility] with 3STE and B/L rails, caretaker at bedside, pt non verbal, per caretaker, pt independent with transfers, ambulation with no AD and steps, toilets self although fell in bathroom at home, states he's been scared to get out of bed since.

## 2021-04-24 NOTE — PROGRESS NOTE ADULT - SUBJECTIVE AND OBJECTIVE BOX
ROSELIA NARAYAN    PLV 2NOR 230 D1    Patient is a 65y old  Male who presents with a chief complaint of fevers (23 Apr 2021 16:55)       Allergies    Augmentin (Unknown)  aztreonam (Rash)  penicillin (Unknown)  sulfa drugs (Unknown)    Intolerances        HPI:  patient fell yesterday  had ct brain and sutures above eyebrow (22 Apr 2021 23:17)      PAST MEDICAL & SURGICAL HISTORY:  MR (mental retardation), severe    Seizure    HTN (hypertension)    Psoriasis    DD (diastrophic dysplasia)    Blind  right eye    Constipation, unspecified constipation type    No significant past surgical history        FAMILY HISTORY:  No pertinent family history in first degree relatives          MEDICATIONS   acetaminophen   Tablet .. 650 milliGRAM(s) Oral every 4 hours PRN  amLODIPine   Tablet 2.5 milliGRAM(s) Oral daily  aspirin enteric coated 81 milliGRAM(s) Oral daily  clonazePAM  Tablet 0.5 milliGRAM(s) Oral two times a day  dextrose 40% Gel 15 Gram(s) Oral once  dextrose 5%. 1000 milliLiter(s) IV Continuous <Continuous>  dextrose 5%. 1000 milliLiter(s) IV Continuous <Continuous>  dextrose 50% Injectable 25 Gram(s) IV Push once  dextrose 50% Injectable 12.5 Gram(s) IV Push once  dextrose 50% Injectable 25 Gram(s) IV Push once  diVALproex  milliGRAM(s) Oral two times a day  doxazosin 2 milliGRAM(s) Oral at bedtime  enoxaparin Injectable 40 milliGRAM(s) SubCutaneous daily  famotidine    Tablet 20 milliGRAM(s) Oral daily  fluvoxaMINE 100 milliGRAM(s) Oral two times a day  glucagon  Injectable 1 milliGRAM(s) IntraMuscular once  levothyroxine 75 MICROGram(s) Oral daily  metoprolol tartrate 25 milliGRAM(s) Oral two times a day  OLANZapine 20 milliGRAM(s) Oral daily  tamsulosin 0.4 milliGRAM(s) Oral at bedtime  ursodiol Capsule 300 milliGRAM(s) Oral every 12 hours      Vital Signs Last 24 Hrs  T(C): 37 (24 Apr 2021 04:45), Max: 37.7 (23 Apr 2021 11:57)  T(F): 98.6 (24 Apr 2021 04:45), Max: 99.8 (23 Apr 2021 11:57)  HR: 96 (24 Apr 2021 04:45) (94 - 114)  BP: 115/67 (24 Apr 2021 04:45) (113/75 - 147/78)  BP(mean): --  RR: 17 (24 Apr 2021 04:45) (17 - 18)  SpO2: 99% (24 Apr 2021 04:45) (92% - 99%)            LABS:                        14.9   9.28  )-----------( 215      ( 23 Apr 2021 17:36 )             42.5     04-23    142  |  108  |  7   ----------------------------<  110<H>  3.9   |  27  |  0.83    Ca    9.3      23 Apr 2021 17:36    TPro  7.7  /  Alb  3.1<L>  /  TBili  0.3  /  DBili  x   /  AST  29  /  ALT  23  /  AlkPhos  73  04-23    PT/INR - ( 22 Apr 2021 18:49 )   PT: 13.1 sec;   INR: 1.13 ratio         PTT - ( 22 Apr 2021 18:49 )  PTT:28.6 sec          WBC:  WBC Count: 9.28 K/uL (04-23 @ 17:36)  WBC Count: 12.41 K/uL (04-22 @ 18:49)  WBC Count: 14.03 K/uL (04-21 @ 21:32)      MICROBIOLOGY:  RECENT CULTURES:  04-23 .Blood Blood-Peripheral XXXX XXXX   No growth to date.    04-21 .Urine Clean Catch (Midstream) XXXX XXXX   No growth          CARDIAC MARKERS ( 23 Apr 2021 16:17 )  <.015 ng/mL / x     / x     / x     / x      CARDIAC MARKERS ( 23 Apr 2021 09:00 )  <.015 ng/mL / x     / x     / x     / x      CARDIAC MARKERS ( 23 Apr 2021 00:14 )  <.015 ng/mL / x     / x     / x     / x      CARDIAC MARKERS ( 22 Apr 2021 18:49 )  <.015 ng/mL / x     / x     / x     / x            PT/INR - ( 22 Apr 2021 18:49 )   PT: 13.1 sec;   INR: 1.13 ratio         PTT - ( 22 Apr 2021 18:49 )  PTT:28.6 sec    Sodium:  Sodium, Serum: 142 mmol/L (04-23 @ 17:36)  Sodium, Serum: 141 mmol/L (04-22 @ 18:49)  Sodium, Serum: 139 mmol/L (04-21 @ 21:32)      0.83 mg/dL 04-23 @ 17:36  0.88 mg/dL 04-22 @ 18:49  1.08 mg/dL 04-21 @ 21:32      Hemoglobin:  Hemoglobin: 14.9 g/dL (04-23 @ 17:36)  Hemoglobin: 15.4 g/dL (04-22 @ 18:49)  Hemoglobin: 14.7 g/dL (04-21 @ 21:32)      Platelets: Platelet Count - Automated: 215 K/uL (04-23 @ 17:36)  Platelet Count - Automated: 227 K/uL (04-22 @ 18:49)  Platelet Count - Automated: 212 K/uL (04-21 @ 21:32)      LIVER FUNCTIONS - ( 23 Apr 2021 17:36 )  Alb: 3.1 g/dL / Pro: 7.7 g/dL / ALK PHOS: 73 U/L / ALT: 23 U/L / AST: 29 U/L / GGT: x                 RADIOLOGY & ADDITIONAL STUDIES:

## 2021-04-24 NOTE — PHYSICAL THERAPY INITIAL EVALUATION ADULT - GAIT DEVIATIONS NOTED, PT EVAL
decreased hermelinda/decreased step length/decreased stride length/decreased weight-shifting ability

## 2021-04-24 NOTE — PROGRESS NOTE ADULT - ASSESSMENT
SYLVAIN VELOZ [facility] P  4/23/2021 DR TASHA CALIXTO     REVIEW OF SYMPTOMS      Able to give (reliable) ROS  NO     PHYSICAL EXAM    HEENT Unremarkable  atraumatic   RESP Fair air entry EXP prolonged    Harsh breath sound Resp distres mild   CARDIAC S1 S2 No S3     NO JVD    ABDOMEN SOFT BS PRESENT NOT DISTENDED No hepatosplenomegaly   PEDAL EDEMA present No calf tenderness  NO rash       PATIENT PRESENTATION.   65M with PMH HTN, Cholecystectomy, non-verbal a/w fever/fall  Pulm consulted 4/23/2021       PROBLEMS  COVID RULED OUT   SEPSIS UNLIKELY   ELEVATED D-DIMR poa   FALL   URINR RETN 4/23 CTAP         PATIENT DATA                          OXYGENATION.    4/23/2021 ra 91%      ABG.             VITALS/IO/VENT/DRIPS.  4/24/2021 afeb 96 115/67     GLOBAL AND BEST PRACTICE ISSUES                     ALLERGY. augmentin azacta sulfa       WEIGHT.            4/23/2021 69                     BMI.                        4/23/2021 25   ADVANCED DIRECTIVE.                               HEAD OF BED ELEVATION. Yes  DVT PROPHYLAXIS.     lvnx 40 (4/23/2021)                  SAUER PROPHYLAXIS.   APA.        asa 81 (4/23/20210                                                             DYSPHAGIA RECOMM. 4/23 Below diet recommended  DIET.    dys 2 MECH SOFT NECAR (4/23/2021)   INFECTION PROPHYLAXIS.   FREE WATER.      ASSESSMENT/RECOMMENDATIONS.    COVID RULED OUT   covid pcr 4/22 covid pcr negv   covid 4/24 ab posv   SEPSIS  W 4/22/2021 w 12.4   4/22/2021 pr negv  4/21 ua nitr negv l estr negv   rvp 4/22 rvp negv   urine c 4/21 u c negv   cxr l base and l perihilar infiltr   4/23 ctap distended ub   LEVAQUIN (4/23/2021) DCed 4/24   Doubt bacterial infection Has lo procalc unremarkable ua and cxr   Recommend dc abio   ELEVATED D-dimr poa   4/22/2021 D-dimr 314  4/23 v duplx negv  cta ch 4/22 cta ch no pe bl is edema air trapping cm   Doubt vte   MI RULED OUT  4/22-4/23/2021 Tr negv x 2   CHF RULED OUT  bnp 4/23/2021 bnp 78   echo 4/23/2021 echo n lvsf n lvedp  Not in chf   FALL   ct h 4/23/2021 ct h negv    OVERALL PLAN.   Suggest observe off abio     TIME SPENT   Over 25 minutes aggregate care time spent on encounter; activities included   direct patient care, counseling and/or coordinating care reviewing notes, lab data/ imaging , discussion with multidisciplinary team/ patient  /family and explaining in detail risks, benefits, alternatives  of the recommendations     SYLVAIN VELOZ [facility] P  4/23/2021 DR TASHA CALIXTO

## 2021-04-25 LAB
ALBUMIN SERPL ELPH-MCNC: 2.7 G/DL — LOW (ref 3.3–5)
ALP SERPL-CCNC: 77 U/L — SIGNIFICANT CHANGE UP (ref 40–120)
ALT FLD-CCNC: 29 U/L — SIGNIFICANT CHANGE UP (ref 12–78)
ANION GAP SERPL CALC-SCNC: 6 MMOL/L — SIGNIFICANT CHANGE UP (ref 5–17)
AST SERPL-CCNC: 37 U/L — SIGNIFICANT CHANGE UP (ref 15–37)
BILIRUB SERPL-MCNC: 0.3 MG/DL — SIGNIFICANT CHANGE UP (ref 0.2–1.2)
BUN SERPL-MCNC: 14 MG/DL — SIGNIFICANT CHANGE UP (ref 7–23)
CALCIUM SERPL-MCNC: 8.7 MG/DL — SIGNIFICANT CHANGE UP (ref 8.5–10.1)
CHLORIDE SERPL-SCNC: 108 MMOL/L — SIGNIFICANT CHANGE UP (ref 96–108)
CO2 SERPL-SCNC: 27 MMOL/L — SIGNIFICANT CHANGE UP (ref 22–31)
CREAT SERPL-MCNC: 0.9 MG/DL — SIGNIFICANT CHANGE UP (ref 0.5–1.3)
GLUCOSE SERPL-MCNC: 90 MG/DL — SIGNIFICANT CHANGE UP (ref 70–99)
HCT VFR BLD CALC: 42.4 % — SIGNIFICANT CHANGE UP (ref 39–50)
HGB BLD-MCNC: 14.6 G/DL — SIGNIFICANT CHANGE UP (ref 13–17)
MCHC RBC-ENTMCNC: 31 PG — SIGNIFICANT CHANGE UP (ref 27–34)
MCHC RBC-ENTMCNC: 34.4 GM/DL — SIGNIFICANT CHANGE UP (ref 32–36)
MCV RBC AUTO: 90 FL — SIGNIFICANT CHANGE UP (ref 80–100)
NRBC # BLD: 0 /100 WBCS — SIGNIFICANT CHANGE UP (ref 0–0)
PLATELET # BLD AUTO: 208 K/UL — SIGNIFICANT CHANGE UP (ref 150–400)
POTASSIUM SERPL-MCNC: 4.5 MMOL/L — SIGNIFICANT CHANGE UP (ref 3.5–5.3)
POTASSIUM SERPL-SCNC: 4.5 MMOL/L — SIGNIFICANT CHANGE UP (ref 3.5–5.3)
PROT SERPL-MCNC: 7.5 G/DL — SIGNIFICANT CHANGE UP (ref 6–8.3)
RBC # BLD: 4.71 M/UL — SIGNIFICANT CHANGE UP (ref 4.2–5.8)
RBC # FLD: 13.2 % — SIGNIFICANT CHANGE UP (ref 10.3–14.5)
SODIUM SERPL-SCNC: 141 MMOL/L — SIGNIFICANT CHANGE UP (ref 135–145)
WBC # BLD: 8.15 K/UL — SIGNIFICANT CHANGE UP (ref 3.8–10.5)
WBC # FLD AUTO: 8.15 K/UL — SIGNIFICANT CHANGE UP (ref 3.8–10.5)

## 2021-04-25 RX ADMIN — FLUVOXAMINE MALEATE 100 MILLIGRAM(S): 25 TABLET ORAL at 17:40

## 2021-04-25 RX ADMIN — FLUVOXAMINE MALEATE 100 MILLIGRAM(S): 25 TABLET ORAL at 05:13

## 2021-04-25 RX ADMIN — Medication 0.5 MILLIGRAM(S): at 17:42

## 2021-04-25 RX ADMIN — DIVALPROEX SODIUM 250 MILLIGRAM(S): 500 TABLET, DELAYED RELEASE ORAL at 05:13

## 2021-04-25 RX ADMIN — OLANZAPINE 20 MILLIGRAM(S): 15 TABLET, FILM COATED ORAL at 11:31

## 2021-04-25 RX ADMIN — Medication 25 MILLIGRAM(S): at 05:13

## 2021-04-25 RX ADMIN — TAMSULOSIN HYDROCHLORIDE 0.4 MILLIGRAM(S): 0.4 CAPSULE ORAL at 21:12

## 2021-04-25 RX ADMIN — Medication 25 MILLIGRAM(S): at 17:39

## 2021-04-25 RX ADMIN — ENOXAPARIN SODIUM 40 MILLIGRAM(S): 100 INJECTION SUBCUTANEOUS at 11:31

## 2021-04-25 RX ADMIN — URSODIOL 300 MILLIGRAM(S): 250 TABLET, FILM COATED ORAL at 05:13

## 2021-04-25 RX ADMIN — DIVALPROEX SODIUM 250 MILLIGRAM(S): 500 TABLET, DELAYED RELEASE ORAL at 17:39

## 2021-04-25 RX ADMIN — Medication 81 MILLIGRAM(S): at 11:31

## 2021-04-25 RX ADMIN — URSODIOL 300 MILLIGRAM(S): 250 TABLET, FILM COATED ORAL at 17:40

## 2021-04-25 RX ADMIN — FAMOTIDINE 20 MILLIGRAM(S): 10 INJECTION INTRAVENOUS at 11:31

## 2021-04-25 RX ADMIN — AMLODIPINE BESYLATE 2.5 MILLIGRAM(S): 2.5 TABLET ORAL at 05:13

## 2021-04-25 RX ADMIN — Medication 75 MICROGRAM(S): at 05:13

## 2021-04-25 RX ADMIN — Medication 0.5 MILLIGRAM(S): at 05:13

## 2021-04-25 NOTE — DIETITIAN INITIAL EVALUATION ADULT. - PERSON TAUGHT/METHOD
dysphagia 2 MS with NT liquids./verbal instruction/teach back - (Patient repeats in own words)/caregiver

## 2021-04-25 NOTE — PROGRESS NOTE ADULT - ASSESSMENT
SYLVAIN VELOZ [facility] P  4/23/2021 DR TASHA CALIXTO     REVIEW OF SYMPTOMS      Able to give (reliable) ROS  NO     PHYSICAL EXAM    HEENT Unremarkable  atraumatic   RESP Fair air entry EXP prolonged    Harsh breath sound Resp distres mild   CARDIAC S1 S2 No S3     NO JVD    ABDOMEN SOFT BS PRESENT NOT DISTENDED No hepatosplenomegaly   PEDAL EDEMA present No calf tenderness  NO rash       PATIENT PRESENTATION.   65M with PMH HTN, Cholecystectomy, non-verbal a/w fever/fall  Pulm consulted 4/23/2021       PROBLEMS  COVID RULED OUT   SEPSIS UNLIKELY   ELEVATED D-DIMR poa   FALL   URINR RETN 4/23 CTAP         GLOBAL AND BEST PRACTICE ISSUES                     ALLERGY. augmentin azacta sulfa       WEIGHT.            4/23/2021 69                     BMI.                        4/23/2021 25   ADVANCED DIRECTIVE.                               HEAD OF BED ELEVATION. Yes  DVT PROPHYLAXIS.     lvnx 40 (4/23/2021)                  SAUER PROPHYLAXIS.   APA.        asa 81 (4/23/20210                                                             DYSPHAGIA RECOMM. 4/23 Below diet recommended  DIET.    dys 2 MECH SOFT NECAR (4/23/2021)   INFECTION PROPHYLAXIS.   FREE WATER.      ASSESSMENT/RECOMMENDATIONS.    COVID RULED OUT   covid pcr 4/22 covid pcr negv   covid 4/24 ab posv   SEPSIS  W 4/22/2021 w 12.4   4/22/2021 pr negv  4/21 ua nitr negv l estr negv   rvp 4/22 rvp negv   urine c 4/21 u c negv   cxr l base and l perihilar infiltr   4/23 ctap distended ub   LEVAQUIN (4/23/2021) DCed 4/24   Doubt bacterial infection Has lo procalc unremarkable ua and cxr   ELEVATED D-dimr poa   4/22/2021 D-dimr 314  4/23 v duplx negv  cta ch 4/22 cta ch no pe bl is edema air trapping cm   Doubt vte   MI RULED OUT  4/22-4/23/2021 Tr negv x 2   CHF RULED OUT  bnp 4/23/2021 bnp 78   echo 4/23/2021 echo n lvsf n lvedp  Not in chf   FALL   ct h 4/23/2021 ct h negv    OVERALL PLAN.   Suggest observe off abio     TIME SPENT   Over 25 minutes aggregate care time spent on encounter; activities included   direct patient care, counseling and/or coordinating care reviewing notes, lab data/ imaging , discussion with multidisciplinary team/ patient  /family and explaining in detail risks, benefits, alternatives  of the recommendations     SYLVAIN VELOZ [facility]  4/23/2021 DR TASHA CALIXTO

## 2021-04-25 NOTE — PROGRESS NOTE ADULT - SUBJECTIVE AND OBJECTIVE BOX
ROSELIA NARAYAN    PLV 2NOR 230 D1    Patient is a 65y old  Male who presents with a chief complaint of fevers (24 Apr 2021 10:14)       Allergies    Augmentin (Unknown)  aztreonam (Rash)  penicillin (Unknown)  sulfa drugs (Unknown)    Intolerances        HPI:  patient fell yesterday  had ct brain and sutures above eyebrow (22 Apr 2021 23:17)      PAST MEDICAL & SURGICAL HISTORY:  MR (mental retardation), severe    Seizure    HTN (hypertension)    Psoriasis    DD (diastrophic dysplasia)    Blind  right eye    Constipation, unspecified constipation type    No significant past surgical history        FAMILY HISTORY:  No pertinent family history in first degree relatives          MEDICATIONS   acetaminophen   Tablet .. 650 milliGRAM(s) Oral every 4 hours PRN  amLODIPine   Tablet 2.5 milliGRAM(s) Oral daily  aspirin enteric coated 81 milliGRAM(s) Oral daily  clonazePAM  Tablet 0.5 milliGRAM(s) Oral two times a day  dextrose 40% Gel 15 Gram(s) Oral once  dextrose 5%. 1000 milliLiter(s) IV Continuous <Continuous>  dextrose 5%. 1000 milliLiter(s) IV Continuous <Continuous>  dextrose 50% Injectable 25 Gram(s) IV Push once  dextrose 50% Injectable 12.5 Gram(s) IV Push once  dextrose 50% Injectable 25 Gram(s) IV Push once  diVALproex  milliGRAM(s) Oral two times a day  doxazosin 2 milliGRAM(s) Oral at bedtime  enoxaparin Injectable 40 milliGRAM(s) SubCutaneous daily  famotidine    Tablet 20 milliGRAM(s) Oral daily  fluvoxaMINE 100 milliGRAM(s) Oral two times a day  glucagon  Injectable 1 milliGRAM(s) IntraMuscular once  levothyroxine 75 MICROGram(s) Oral daily  metoprolol tartrate 25 milliGRAM(s) Oral two times a day  OLANZapine 20 milliGRAM(s) Oral daily  tamsulosin 0.4 milliGRAM(s) Oral at bedtime  ursodiol Capsule 300 milliGRAM(s) Oral every 12 hours      Vital Signs Last 24 Hrs  T(C): 37 (25 Apr 2021 04:30), Max: 38.7 (24 Apr 2021 21:06)  T(F): 98.6 (25 Apr 2021 04:30), Max: 101.6 (24 Apr 2021 21:06)  HR: 90 (25 Apr 2021 04:30) (81 - 97)  BP: 122/77 (25 Apr 2021 04:30) (98/59 - 122/77)  BP(mean): --  RR: 18 (25 Apr 2021 04:30) (18 - 18)  SpO2: 92% (25 Apr 2021 04:30) (92% - 96%)      04-24-21 @ 07:01  -  04-25-21 @ 07:00  --------------------------------------------------------  IN: 0 mL / OUT: 350 mL / NET: -350 mL            LABS:                        14.6   8.15  )-----------( 208      ( 25 Apr 2021 08:29 )             42.4     04-25    141  |  108  |  14  ----------------------------<  90  4.5   |  27  |  0.90    Ca    8.7      25 Apr 2021 08:29    TPro  7.5  /  Alb  2.7<L>  /  TBili  0.3  /  DBili  x   /  AST  37  /  ALT  29  /  AlkPhos  77  04-25              WBC:  WBC Count: 8.15 K/uL (04-25 @ 08:29)  WBC Count: 9.28 K/uL (04-23 @ 17:36)  WBC Count: 12.41 K/uL (04-22 @ 18:49)  WBC Count: 14.03 K/uL (04-21 @ 21:32)      MICROBIOLOGY:  RECENT CULTURES:  04-23 .Blood Blood-Peripheral XXXX XXXX   No growth to date.    04-21 .Urine Clean Catch (Midstream) XXXX XXXX   No growth          CARDIAC MARKERS ( 23 Apr 2021 16:17 )  <.015 ng/mL / x     / x     / x     / x                Sodium:  Sodium, Serum: 141 mmol/L (04-25 @ 08:29)  Sodium, Serum: 142 mmol/L (04-23 @ 17:36)  Sodium, Serum: 141 mmol/L (04-22 @ 18:49)  Sodium, Serum: 139 mmol/L (04-21 @ 21:32)      0.90 mg/dL 04-25 @ 08:29  0.83 mg/dL 04-23 @ 17:36  0.88 mg/dL 04-22 @ 18:49  1.08 mg/dL 04-21 @ 21:32      Hemoglobin:  Hemoglobin: 14.6 g/dL (04-25 @ 08:29)  Hemoglobin: 14.9 g/dL (04-23 @ 17:36)  Hemoglobin: 15.4 g/dL (04-22 @ 18:49)  Hemoglobin: 14.7 g/dL (04-21 @ 21:32)      Platelets: Platelet Count - Automated: 208 K/uL (04-25 @ 08:29)  Platelet Count - Automated: 215 K/uL (04-23 @ 17:36)  Platelet Count - Automated: 227 K/uL (04-22 @ 18:49)  Platelet Count - Automated: 212 K/uL (04-21 @ 21:32)      LIVER FUNCTIONS - ( 25 Apr 2021 08:29 )  Alb: 2.7 g/dL / Pro: 7.5 g/dL / ALK PHOS: 77 U/L / ALT: 29 U/L / AST: 37 U/L / GGT: x                 RADIOLOGY & ADDITIONAL STUDIES:

## 2021-04-25 NOTE — DIETITIAN INITIAL EVALUATION ADULT. - ORAL INTAKE PTA/DIET HISTORY
Pt's  from [facility] at bedside. Pt was eating a ground diet with thin liquids pta. PO intake decreased slightly before being admitted to hospital.

## 2021-04-25 NOTE — DIETITIAN INITIAL EVALUATION ADULT. - OTHER INFO
64 y/o male adm with fever, atypical pneumonia. PMH constipation, blind, psoriasis, HTN, MR, seizure. Pt nonverbal. Pt visited at bedside this am.  at [facility] with pt. Pt feeding self, needs supervisor.  reminding pt to eat slowly. Pt consumed 100% of breakfast. SLP ileneal completed on 4/23 which rx dysphagia 2 mech soft diet with NT liquids. Fecal incontinence noted.

## 2021-04-26 ENCOUNTER — TRANSCRIPTION ENCOUNTER (OUTPATIENT)
Age: 66
End: 2021-04-26

## 2021-04-26 RX ADMIN — URSODIOL 300 MILLIGRAM(S): 250 TABLET, FILM COATED ORAL at 05:06

## 2021-04-26 RX ADMIN — Medication 25 MILLIGRAM(S): at 17:43

## 2021-04-26 RX ADMIN — TAMSULOSIN HYDROCHLORIDE 0.4 MILLIGRAM(S): 0.4 CAPSULE ORAL at 21:14

## 2021-04-26 RX ADMIN — FAMOTIDINE 20 MILLIGRAM(S): 10 INJECTION INTRAVENOUS at 12:30

## 2021-04-26 RX ADMIN — Medication 81 MILLIGRAM(S): at 12:30

## 2021-04-26 RX ADMIN — FLUVOXAMINE MALEATE 100 MILLIGRAM(S): 25 TABLET ORAL at 05:06

## 2021-04-26 RX ADMIN — URSODIOL 300 MILLIGRAM(S): 250 TABLET, FILM COATED ORAL at 17:43

## 2021-04-26 RX ADMIN — FLUVOXAMINE MALEATE 100 MILLIGRAM(S): 25 TABLET ORAL at 17:42

## 2021-04-26 RX ADMIN — Medication 0.5 MILLIGRAM(S): at 05:06

## 2021-04-26 RX ADMIN — DIVALPROEX SODIUM 250 MILLIGRAM(S): 500 TABLET, DELAYED RELEASE ORAL at 17:44

## 2021-04-26 RX ADMIN — ENOXAPARIN SODIUM 40 MILLIGRAM(S): 100 INJECTION SUBCUTANEOUS at 12:31

## 2021-04-26 RX ADMIN — OLANZAPINE 20 MILLIGRAM(S): 15 TABLET, FILM COATED ORAL at 12:30

## 2021-04-26 RX ADMIN — Medication 0.5 MILLIGRAM(S): at 17:43

## 2021-04-26 RX ADMIN — Medication 75 MICROGRAM(S): at 05:06

## 2021-04-26 RX ADMIN — Medication 2 MILLIGRAM(S): at 21:14

## 2021-04-26 RX ADMIN — DIVALPROEX SODIUM 250 MILLIGRAM(S): 500 TABLET, DELAYED RELEASE ORAL at 05:06

## 2021-04-26 NOTE — DISCHARGE NOTE PROVIDER - NSDCCPCAREPLAN_GEN_ALL_CORE_FT
PRINCIPAL DISCHARGE DIAGNOSIS  Diagnosis: Fever  Assessment and Plan of Treatment: sp viral syndrome  covid/rvp negative  all scans wnl,,no acute changes  no PE/DVT  abxs were dc  all home medications diet actoivity the same  remove sutures right eye brow after wed,,thgis week

## 2021-04-26 NOTE — DISCHARGE NOTE PROVIDER - NSDCMRMEDTOKEN_GEN_ALL_CORE_FT
amLODIPine 2.5 mg oral tablet: 1 tab(s) orally once a day  aspirin 81 mg oral delayed release tablet: 1 tab(s) orally once a day  clonazePAM 0.5 mg oral tablet: 1 tab(s) orally 2 times a day  divalproex sodium 250 mg oral delayed release tablet: 1 tab(s) orally 2 times a day  doxazosin 2 mg oral tablet: 1 tab(s) orally once a day (at bedtime)  fluvoxaMINE 100 mg oral tablet: 1 tab(s) orally 2 times a day  levothyroxine: 75mcg po qam  metoprolol tartrate 25 mg oral tablet: 1 tab(s) orally 2 times a day  tamsulosin 0.4 mg oral capsule: 1 cap(s) orally once a day (at bedtime)  ursodiol 300 mg oral capsule: 1 cap(s) orally every 12 hours  ZyPREXA 20 mg oral tablet: 1 tab(s) orally once a day

## 2021-04-26 NOTE — PROGRESS NOTE ADULT - SUBJECTIVE AND OBJECTIVE BOX
SYLVAINROSELIA is a 65yMale , patient examined and chart reviewed.     INTERVAL HPI/ OVERNIGHT EVENTS:   No events. Afebrile.  NAD.    PAST MEDICAL & SURGICAL HISTORY:  MR (mental retardation), severe  Seizure  HTN (hypertension)  Psoriasis  DD (diastrophic dysplasia)  Blind  right eye  Constipation, unspecified constipation type    For details regarding the patient's social history, family history, and other miscellaneous elements, please refer the initial infectious diseases consultation and/or the admitting history and physical examination for this admission.    ROS:  Unable to obtain due to : MRDD    ALLERGIES  Augmentin (Unknown)  aztreonam (Rash)  penicillin (Unknown)  sulfa drugs (Unknown)      Current inpatient medications :    ANTIBIOTICS/RELEVANT:      acetaminophen   Tablet .. 650 milliGRAM(s) Oral every 4 hours PRN  amLODIPine   Tablet 2.5 milliGRAM(s) Oral daily  aspirin enteric coated 81 milliGRAM(s) Oral daily  clonazePAM  Tablet 0.5 milliGRAM(s) Oral two times a day  diVALproex  milliGRAM(s) Oral two times a day  doxazosin 2 milliGRAM(s) Oral at bedtime  enoxaparin Injectable 40 milliGRAM(s) SubCutaneous daily  famotidine    Tablet 20 milliGRAM(s) Oral daily  fluvoxaMINE 100 milliGRAM(s) Oral two times a day  levothyroxine 75 MICROGram(s) Oral daily  metoprolol tartrate 25 milliGRAM(s) Oral two times a day  OLANZapine 20 milliGRAM(s) Oral daily  tamsulosin 0.4 milliGRAM(s) Oral at bedtime  ursodiol Capsule 300 milliGRAM(s) Oral every 12 hours      Objective:    T(C): 36.8 (04-26-21 @ 13:16), Max: 37.6 (04-25-21 @ 20:29)  HR: 100 (04-26-21 @ 13:16) (72 - 100)  BP: 127/84 (04-26-21 @ 13:16) (111/67 - 127/84)  RR: 18 (04-26-21 @ 13:16) (18 - 18)  SpO2: 92% (04-26-21 @ 13:16) (90% - 92%)      Physical Exam:  General: no acute distress  Neck: supple, trachea midline  Lungs: clear, no wheeze/rhonchi  Cardiovascular: regular rate and rhythm, S1 S2  Abdomen: soft, nontender,  bowel sounds normal  Neurological: awake MRDD  Skin: no rash  Extremities: no edema        LABS:                          14.6   8.15  )-----------( 208      ( 25 Apr 2021 08:29 )             42.4       04-25    141  |  108  |  14  ----------------------------<  90  4.5   |  27  |  0.90    Ca    8.7      25 Apr 2021 08:29    TPro  7.5  /  Alb  2.7<L>  /  TBili  0.3  /  DBili  x   /  AST  37  /  ALT  29  /  AlkPhos  77  04-25      MICROBIOLOGY:  Culture - Blood (04.23.21 @ 00:31)    Specimen Source: .Blood Blood-Peripheral    Culture Results:   No growth to date.    Culture - Urine (04.21.21 @ 21:32)    Specimen Source: .Urine Clean Catch (Midstream)    Culture Results:   No growth    RADIOLOGY & ADDITIONAL STUDIES:  EXAM:  CT ABDOMEN AND PELVIS                            PROCEDURE DATE:  04/23/2021          INTERPRETATION:  CLINICAL INFORMATION: Fever.    COMPARISON: 7/26/2020    CONTRAST/COMPLICATIONS:  IV Contrast: NONE  0 cc administered   0 cc discarded  Oral Contrast: NONE  Complications: None reported at time of study completion    PROCEDURE:  CT of the Abdomen and Pelvis was performed.  Sagittal and coronal reformats were performed.    FINDINGS:  Evaluation of solid organs is limited without intravenous contrast.    LOWER CHEST: Mild bibasilar atelectasis; evaluation degraded by motion artifact. Coronary atherosclerosis.    LIVER: Within normal limits.  BILE DUCTS: Normal caliber.  GALLBLADDER: Cholecystectomy.  SPLEEN: Within normal limits.  PANCREAS: Within normal limits.  ADRENALS: Within normal limits.  KIDNEYS/URETERS: No hydronephrosis or urinary tract stone. Small left renal cyst. 1.1 cm hyperdense left upper pole lesion (602, 64), most compatible with hemorrhagic cyst.    BLADDER: Distended.  REPRODUCTIVE ORGANS: Prostate normal in size.    BOWEL: No bowel obstruction. Appendix is normal.  PERITONEUM: No ascites.  VESSELS: Atherosclerotic changes.  RETROPERITONEUM/LYMPH NODES: No lymphadenopathy.  ABDOMINAL WALL: Within normal limits.  BONES: Degenerative changes.    IMPRESSION:  No findings to suggest and infectious process within the limitations of noncontrast technique.  Distended bladder.        Assessment :   66YO M Phx of MRDD, HTN, resident of group home admitted sp fall. Had low grade temps. Tmax 100.4.   Cultures neg  Doubt infectious process    Plan :   Monitor off antibiotics  Trend temps and cbc  Asp precautions  Stable from ID standpoint      Continue with present regiment.  Appropriate use of antibiotics and adverse effects reviewed.    > 35 minutes were spent in direct patient care reviewing notes, medications ,labs data/ imaging , discussion with multidisciplinary team.    Thank you for allowing me to participate in care of your patient .    Olivier Daniels MD  Infectious Disease  [phone]      SYLVAINROSELIA is a 65yMale , patient examined and chart reviewed.     INTERVAL HPI/ OVERNIGHT EVENTS:   No events. Afebrile.  NAD.    PAST MEDICAL & SURGICAL HISTORY:  MR (mental retardation), severe  Seizure  HTN (hypertension)  Psoriasis  DD (diastrophic dysplasia)  Blind  right eye  Constipation, unspecified constipation type    For details regarding the patient's social history, family history, and other miscellaneous elements, please refer the initial infectious diseases consultation and/or the admitting history and physical examination for this admission.    ROS:  Unable to obtain due to : MRDD    ALLERGIES  Augmentin (Unknown)  aztreonam (Rash)  penicillin (Unknown)  sulfa drugs (Unknown)      Current inpatient medications :    ANTIBIOTICS/RELEVANT:      acetaminophen   Tablet .. 650 milliGRAM(s) Oral every 4 hours PRN  amLODIPine   Tablet 2.5 milliGRAM(s) Oral daily  aspirin enteric coated 81 milliGRAM(s) Oral daily  clonazePAM  Tablet 0.5 milliGRAM(s) Oral two times a day  diVALproex  milliGRAM(s) Oral two times a day  doxazosin 2 milliGRAM(s) Oral at bedtime  enoxaparin Injectable 40 milliGRAM(s) SubCutaneous daily  famotidine    Tablet 20 milliGRAM(s) Oral daily  fluvoxaMINE 100 milliGRAM(s) Oral two times a day  levothyroxine 75 MICROGram(s) Oral daily  metoprolol tartrate 25 milliGRAM(s) Oral two times a day  OLANZapine 20 milliGRAM(s) Oral daily  tamsulosin 0.4 milliGRAM(s) Oral at bedtime  ursodiol Capsule 300 milliGRAM(s) Oral every 12 hours      Objective:    T(C): 36.8 (04-26-21 @ 13:16), Max: 37.6 (04-25-21 @ 20:29)  HR: 100 (04-26-21 @ 13:16) (72 - 100)  BP: 127/84 (04-26-21 @ 13:16) (111/67 - 127/84)  RR: 18 (04-26-21 @ 13:16) (18 - 18)  SpO2: 92% (04-26-21 @ 13:16) (90% - 92%)      Physical Exam:  General: no acute distress  Neck: supple, trachea midline  Lungs: clear, no wheeze/rhonchi  Cardiovascular: regular rate and rhythm, S1 S2  Abdomen: soft, nontender,  bowel sounds normal  Neurological: awake MRDD  Skin: no rash  Extremities: no edema        LABS:                          14.6   8.15  )-----------( 208      ( 25 Apr 2021 08:29 )             42.4       04-25    141  |  108  |  14  ----------------------------<  90  4.5   |  27  |  0.90    Ca    8.7      25 Apr 2021 08:29    TPro  7.5  /  Alb  2.7<L>  /  TBili  0.3  /  DBili  x   /  AST  37  /  ALT  29  /  AlkPhos  77  04-25      MICROBIOLOGY:  Culture - Blood (04.23.21 @ 00:31)    Specimen Source: .Blood Blood-Peripheral    Culture Results:   No growth to date.    Culture - Urine (04.21.21 @ 21:32)    Specimen Source: .Urine Clean Catch (Midstream)    Culture Results:   No growth    RADIOLOGY & ADDITIONAL STUDIES:  EXAM:  CT ABDOMEN AND PELVIS                            PROCEDURE DATE:  04/23/2021          INTERPRETATION:  CLINICAL INFORMATION: Fever.    COMPARISON: 7/26/2020    CONTRAST/COMPLICATIONS:  IV Contrast: NONE  0 cc administered   0 cc discarded  Oral Contrast: NONE  Complications: None reported at time of study completion    PROCEDURE:  CT of the Abdomen and Pelvis was performed.  Sagittal and coronal reformats were performed.    FINDINGS:  Evaluation of solid organs is limited without intravenous contrast.    LOWER CHEST: Mild bibasilar atelectasis; evaluation degraded by motion artifact. Coronary atherosclerosis.    LIVER: Within normal limits.  BILE DUCTS: Normal caliber.  GALLBLADDER: Cholecystectomy.  SPLEEN: Within normal limits.  PANCREAS: Within normal limits.  ADRENALS: Within normal limits.  KIDNEYS/URETERS: No hydronephrosis or urinary tract stone. Small left renal cyst. 1.1 cm hyperdense left upper pole lesion (602, 64), most compatible with hemorrhagic cyst.    BLADDER: Distended.  REPRODUCTIVE ORGANS: Prostate normal in size.    BOWEL: No bowel obstruction. Appendix is normal.  PERITONEUM: No ascites.  VESSELS: Atherosclerotic changes.  RETROPERITONEUM/LYMPH NODES: No lymphadenopathy.  ABDOMINAL WALL: Within normal limits.  BONES: Degenerative changes.    IMPRESSION:  No findings to suggest and infectious process within the limitations of noncontrast technique.  Distended bladder.        Assessment :   66YO M Phx of MRDD, HTN, resident of group home admitted sp fall.  Sp low grade temps   Cultures neg  Doubt infectious process    Plan :   Monitor off antibiotics  Trend temps and cbc  Asp precautions  Stable from ID standpoint      Continue with present regiment.  Appropriate use of antibiotics and adverse effects reviewed.    > 35 minutes were spent in direct patient care reviewing notes, medications ,labs data/ imaging , discussion with multidisciplinary team.    Thank you for allowing me to participate in care of your patient .    Olivier Daniels MD  Infectious Disease  [phone]

## 2021-04-26 NOTE — PROGRESS NOTE ADULT - SUBJECTIVE AND OBJECTIVE BOX
ROSELIA NARAYAN    [address]    Patient is a 65y old  Male who presents with a chief complaint of fevers (26 Apr 2021 07:29)       Allergies    Augmentin (Unknown)  aztreonam (Rash)  penicillin (Unknown)  sulfa drugs (Unknown)    Intolerances        HPI:  patient fell yesterday  had ct brain and sutures above eyebrow (22 Apr 2021 23:17)      PAST MEDICAL & SURGICAL HISTORY:  MR (mental retardation), severe    Seizure    HTN (hypertension)    Psoriasis    DD (diastrophic dysplasia)    Blind  right eye    Constipation, unspecified constipation type    No significant past surgical history        FAMILY HISTORY:  No pertinent family history in first degree relatives          MEDICATIONS   acetaminophen   Tablet .. 650 milliGRAM(s) Oral every 4 hours PRN  amLODIPine   Tablet 2.5 milliGRAM(s) Oral daily  aspirin enteric coated 81 milliGRAM(s) Oral daily  clonazePAM  Tablet 0.5 milliGRAM(s) Oral two times a day  diVALproex  milliGRAM(s) Oral two times a day  doxazosin 2 milliGRAM(s) Oral at bedtime  enoxaparin Injectable 40 milliGRAM(s) SubCutaneous daily  famotidine    Tablet 20 milliGRAM(s) Oral daily  fluvoxaMINE 100 milliGRAM(s) Oral two times a day  levothyroxine 75 MICROGram(s) Oral daily  metoprolol tartrate 25 milliGRAM(s) Oral two times a day  OLANZapine 20 milliGRAM(s) Oral daily  tamsulosin 0.4 milliGRAM(s) Oral at bedtime  ursodiol Capsule 300 milliGRAM(s) Oral every 12 hours      Vital Signs Last 24 Hrs  T(C): 36.9 (26 Apr 2021 05:01), Max: 37.6 (25 Apr 2021 20:29)  T(F): 98.4 (26 Apr 2021 05:01), Max: 99.7 (25 Apr 2021 20:29)  HR: 72 (26 Apr 2021 05:01) (72 - 99)  BP: 111/67 (26 Apr 2021 05:01) (111/67 - 123/84)  BP(mean): --  RR: 18 (26 Apr 2021 05:01) (18 - 18)  SpO2: 91% (26 Apr 2021 05:01) (90% - 92%)            LABS:                        14.6   8.15  )-----------( 208      ( 25 Apr 2021 08:29 )             42.4     04-25    141  |  108  |  14  ----------------------------<  90  4.5   |  27  |  0.90    Ca    8.7      25 Apr 2021 08:29    TPro  7.5  /  Alb  2.7<L>  /  TBili  0.3  /  DBili  x   /  AST  37  /  ALT  29  /  AlkPhos  77  04-25              WBC:  WBC Count: 8.15 K/uL (04-25 @ 08:29)  WBC Count: 9.28 K/uL (04-23 @ 17:36)  WBC Count: 12.41 K/uL (04-22 @ 18:49)      MICROBIOLOGY:  RECENT CULTURES:  04-23 .Blood Blood-Peripheral XXXX XXXX   No growth to date.    04-21 .Urine Clean Catch (Midstream) XXXX XXXX   No growth                    Sodium:  Sodium, Serum: 141 mmol/L (04-25 @ 08:29)  Sodium, Serum: 142 mmol/L (04-23 @ 17:36)  Sodium, Serum: 141 mmol/L (04-22 @ 18:49)      0.90 mg/dL 04-25 @ 08:29  0.83 mg/dL 04-23 @ 17:36  0.88 mg/dL 04-22 @ 18:49      Hemoglobin:  Hemoglobin: 14.6 g/dL (04-25 @ 08:29)  Hemoglobin: 14.9 g/dL (04-23 @ 17:36)  Hemoglobin: 15.4 g/dL (04-22 @ 18:49)      Platelets: Platelet Count - Automated: 208 K/uL (04-25 @ 08:29)  Platelet Count - Automated: 215 K/uL (04-23 @ 17:36)  Platelet Count - Automated: 227 K/uL (04-22 @ 18:49)      LIVER FUNCTIONS - ( 25 Apr 2021 08:29 )  Alb: 2.7 g/dL / Pro: 7.5 g/dL / ALK PHOS: 77 U/L / ALT: 29 U/L / AST: 37 U/L / GGT: x                 RADIOLOGY & ADDITIONAL STUDIES:

## 2021-04-26 NOTE — DISCHARGE NOTE PROVIDER - CARE PROVIDER_API CALL
Naima Hawkins)  Internal Medicine  [address] [place] [address]  Phone: [phone]  Fax: [phone]  Follow Up Time:

## 2021-04-26 NOTE — PROGRESS NOTE ADULT - ASSESSMENT
65M with PMH HTN, Cholecystectomy, non-verbal a/w fever/fall  r/o sepsis  s/p ABX    Ekg ok  Echo shows normal LVEF.   CT: no pleural effusion, no pulmonary embolism    Stable HR and BP. 2. Continue ASA, metoprolol 25 mg BID;   DVT prohylaxis  Will follow PRN 65M with PMH HTN, Cholecystectomy, non-verbal a/w fever/fall  r/o sepsis  s/p ABX    Ekg ok  Echo shows normal LVEF.   CT: no pleural effusion, no pulmonary embolism    Stable HR and BP. 2. Continue ASA, metoprolol 25 mg BID;   DVT prohylaxis with Lovenox  Will follow PRN

## 2021-04-26 NOTE — PROGRESS NOTE ADULT - SUBJECTIVE AND OBJECTIVE BOX
[facility] Cardiology Progress Note [phone] (Dr. Portillo, Julieta, Genevieve, Alex)    CHIEF COMPLAINT: Patient is a 65y old  Male who presents with a chief complaint of fevers (25 Apr 2021 09:24)      Follow Up Today: The patient denies any chest discomfort or shortness of breath.    HPI:  patient fell yesterday  had ct brain and sutures above eyebrow (22 Apr 2021 23:17)      PAST MEDICAL & SURGICAL HISTORY:  MR (mental retardation), severe    Seizure    HTN (hypertension)    Psoriasis    DD (diastrophic dysplasia)    Blind  right eye    Constipation, unspecified constipation type    No significant past surgical history        MEDICATIONS  (STANDING):  amLODIPine   Tablet 2.5 milliGRAM(s) Oral daily  aspirin enteric coated 81 milliGRAM(s) Oral daily  clonazePAM  Tablet 0.5 milliGRAM(s) Oral two times a day  diVALproex  milliGRAM(s) Oral two times a day  doxazosin 2 milliGRAM(s) Oral at bedtime  enoxaparin Injectable 40 milliGRAM(s) SubCutaneous daily  famotidine    Tablet 20 milliGRAM(s) Oral daily  fluvoxaMINE 100 milliGRAM(s) Oral two times a day  levothyroxine 75 MICROGram(s) Oral daily  metoprolol tartrate 25 milliGRAM(s) Oral two times a day  OLANZapine 20 milliGRAM(s) Oral daily  tamsulosin 0.4 milliGRAM(s) Oral at bedtime  ursodiol Capsule 300 milliGRAM(s) Oral every 12 hours    MEDICATIONS  (PRN):  acetaminophen   Tablet .. 650 milliGRAM(s) Oral every 4 hours PRN Temp greater or equal to 38C (100.4F), Mild Pain (1 - 3)      Allergies    Augmentin (Unknown)  aztreonam (Rash)  penicillin (Unknown)  sulfa drugs (Unknown)    Intolerances        REVIEW OF SYSTEMS:    All other review of systems is negative unless indicated above    Vital Signs Last 24 Hrs  T(C): 36.9 (26 Apr 2021 05:01), Max: 37.6 (25 Apr 2021 20:29)  T(F): 98.4 (26 Apr 2021 05:01), Max: 99.7 (25 Apr 2021 20:29)  HR: 72 (26 Apr 2021 05:01) (72 - 99)  BP: 111/67 (26 Apr 2021 05:01) (111/67 - 123/84)  BP(mean): --  RR: 18 (26 Apr 2021 05:01) (18 - 18)  SpO2: 91% (26 Apr 2021 05:01) (90% - 92%)    I&O's Summary    24 Apr 2021 07:01  -  25 Apr 2021 07:00  --------------------------------------------------------  IN: 0 mL / OUT: 350 mL / NET: -350 mL        PHYSICAL EXAM:    Constitutional: NAD, awake and alert, well-developed  Eyes:  EOMI,  Pupils round, No oral cyanosis.  HEENT: No exudate or erythema  Pulmonary: Non-labored, breath sounds are clear bilaterally, No wheezing, rales or rhonchi  Cardiovascular: Regular, S1 and S2, No murmurs, rubs, gallops oir clicks  Gastrointestinal: Bowel Sounds present, soft, nontender.   Ext: No significant LE edema with good pulses x 4  Neurological: Alert, no gross focal motor deficits  Skin: No rashes.  Psych:  Mood & affect appropriate    LABS: All Labs Reviewed:                        14.6   8.15  )-----------( 208      ( 25 Apr 2021 08:29 )             42.4                         14.9   9.28  )-----------( 215      ( 23 Apr 2021 17:36 )             42.5     25 Apr 2021 08:29    141    |  108    |  14     ----------------------------<  90     4.5     |  27     |  0.90   23 Apr 2021 17:36    142    |  108    |  7      ----------------------------<  110    3.9     |  27     |  0.83     Ca    8.7        25 Apr 2021 08:29  Ca    9.3        23 Apr 2021 17:36    TPro  7.5    /  Alb  2.7    /  TBili  0.3    /  DBili  x      /  AST  37     /  ALT  29     /  AlkPhos  77     25 Apr 2021 08:29  TPro  7.7    /  Alb  3.1    /  TBili  0.3    /  DBili  x      /  AST  29     /  ALT  23     /  AlkPhos  73     23 Apr 2021 17:36          Blood Culture: Organism --  Gram Stain Blood -- Gram Stain --  Specimen Source .Blood Blood-Peripheral  Culture-Blood --    Organism --  Gram Stain Blood -- Gram Stain --  Specimen Source .Urine Clean Catch (Midstream)  Culture-Blood --            RADIOLOGY/EKG:    Attending Attestation:   20 minutes spent on total encounter; more than 50% of the visit was spent counseling and/or coordinating care by the attending physician.     ASSESSMENT AND PLAN [facility] Cardiology Progress Note [phone] (Dr. Portillo, Julieta, Genevieve, Alex)    CHIEF COMPLAINT: Patient is a 65y old  Male who presents with a chief complaint of fevers (25 Apr 2021 09:24)      Follow Up Today: The patient is nonverbal. Eating pancakes. No events overnight    HPI:  patient fell yesterday  had ct brain and sutures above eyebrow (22 Apr 2021 23:17)      PAST MEDICAL & SURGICAL HISTORY:  MR (mental retardation), severe    Seizure    HTN (hypertension)    Psoriasis    DD (diastrophic dysplasia)    Blind  right eye    Constipation, unspecified constipation type    No significant past surgical history        MEDICATIONS  (STANDING):  amLODIPine   Tablet 2.5 milliGRAM(s) Oral daily  aspirin enteric coated 81 milliGRAM(s) Oral daily  clonazePAM  Tablet 0.5 milliGRAM(s) Oral two times a day  diVALproex  milliGRAM(s) Oral two times a day  doxazosin 2 milliGRAM(s) Oral at bedtime  enoxaparin Injectable 40 milliGRAM(s) SubCutaneous daily  famotidine    Tablet 20 milliGRAM(s) Oral daily  fluvoxaMINE 100 milliGRAM(s) Oral two times a day  levothyroxine 75 MICROGram(s) Oral daily  metoprolol tartrate 25 milliGRAM(s) Oral two times a day  OLANZapine 20 milliGRAM(s) Oral daily  tamsulosin 0.4 milliGRAM(s) Oral at bedtime  ursodiol Capsule 300 milliGRAM(s) Oral every 12 hours    MEDICATIONS  (PRN):  acetaminophen   Tablet .. 650 milliGRAM(s) Oral every 4 hours PRN Temp greater or equal to 38C (100.4F), Mild Pain (1 - 3)      Allergies    Augmentin (Unknown)  aztreonam (Rash)  penicillin (Unknown)  sulfa drugs (Unknown)    Intolerances        REVIEW OF SYSTEMS:    All other review of systems is negative unless indicated above    Vital Signs Last 24 Hrs  T(C): 36.9 (26 Apr 2021 05:01), Max: 37.6 (25 Apr 2021 20:29)  T(F): 98.4 (26 Apr 2021 05:01), Max: 99.7 (25 Apr 2021 20:29)  HR: 72 (26 Apr 2021 05:01) (72 - 99)  BP: 111/67 (26 Apr 2021 05:01) (111/67 - 123/84)  BP(mean): --  RR: 18 (26 Apr 2021 05:01) (18 - 18)  SpO2: 91% (26 Apr 2021 05:01) (90% - 92%)    I&O's Summary    24 Apr 2021 07:01  -  25 Apr 2021 07:00  --------------------------------------------------------  IN: 0 mL / OUT: 350 mL / NET: -350 mL        PHYSICAL EXAM:    Constitutional: NAD, awake and alert, well-developed  Eyes:  EOMI,  Pupils round, No oral cyanosis.  HEENT: No exudate or erythema  Pulmonary: Non-labored, breath sounds are clear bilaterally, No wheezing, rales or rhonchi  Cardiovascular: Regular, S1 and S2, No murmur  Gastrointestinal: Bowel Sounds present, soft, nontender.   Ext: No significant LE edema   Neurological: Alert, no gross focal motor deficits  Skin: No rashes.  Psych:  Mood & affect MRDD    LABS: All Labs Reviewed:                        14.6   8.15  )-----------( 208      ( 25 Apr 2021 08:29 )             42.4                         14.9   9.28  )-----------( 215      ( 23 Apr 2021 17:36 )             42.5     25 Apr 2021 08:29    141    |  108    |  14     ----------------------------<  90     4.5     |  27     |  0.90   23 Apr 2021 17:36    142    |  108    |  7      ----------------------------<  110    3.9     |  27     |  0.83     Ca    8.7        25 Apr 2021 08:29  Ca    9.3        23 Apr 2021 17:36    TPro  7.5    /  Alb  2.7    /  TBili  0.3    /  DBili  x      /  AST  37     /  ALT  29     /  AlkPhos  77     25 Apr 2021 08:29  TPro  7.7    /  Alb  3.1    /  TBili  0.3    /  DBili  x      /  AST  29     /  ALT  23     /  AlkPhos  73     23 Apr 2021 17:36          Blood Culture: Organism --  Gram Stain Blood -- Gram Stain --  Specimen Source .Blood Blood-Peripheral  Culture-Blood --    Organism --  Gram Stain Blood -- Gram Stain --  Specimen Source .Urine Clean Catch (Midstream)  Culture-Blood --            RADIOLOGY/EKG:    Attending Attestation:   20 minutes spent on total encounter; more than 50% of the visit was spent counseling and/or coordinating care by the attending physician.     ASSESSMENT AND PLAN

## 2021-04-26 NOTE — DISCHARGE NOTE PROVIDER - HOSPITAL COURSE
patient was seen at his doctor's office,,when he had fever,,,was sent out  he fell the night before hospital,,sutures above right eye brow  need to remove after this wed  ramirez all negative  no acute covid,rvp negative  i repeated ct brain,,neg  i xrayed his hips shou;lders,,,wnl  cta chest negative PE/pn,,haziness are post covid ,in the past,,,none acute  echo normal  seen by cardiology  no chf  seen by pulmonary and id,as well  not requiring oxygen  ct abd pelvis negative  venous dopplers negative  all blood work normal  abxs were stopped,,no infection  patient to resume all home medications diet and activity

## 2021-04-26 NOTE — PROGRESS NOTE ADULT - ASSESSMENT
SYLVAIN ROSELIA [facility] P  4/23/2021 DR TASHA CALIXTO     REVIEW OF SYMPTOMS      Able to give (reliable) ROS  NO     PHYSICAL EXAM    HEENT Unremarkable  atraumatic   RESP Fair air entry EXP prolonged    Harsh breath sound Resp distres mild   CARDIAC S1 S2 No S3     NO JVD    ABDOMEN SOFT BS PRESENT NOT DISTENDED No hepatosplenomegaly   PEDAL EDEMA present No calf tenderness  NO rash     PROBLEMS  COVID RULED OUT   SEPSIS UNLIKELY   ELEVATED D-DIMR poa   FALL   URINR RETN 4/23 CTAP           GLOBAL AND BEST PRACTICE ISSUES                     ALLERGY. augmentin azacta sulfa       WEIGHT.            4/23/2021 69                     BMI.                        4/23/2021 25   ADVANCED DIRECTIVE.                               HEAD OF BED ELEVATION. Yes  DVT PROPHYLAXIS.     lvnx 40 (4/23/2021)                  SAUER PROPHYLAXIS.   APA.        asa 81 (4/23/20210                                                             DYSPHAGIA RECOMM. 4/23 Below diet recommended  DIET.    dys 2 MECH SOFT NECAR (4/23/2021)   INFECTION PROPHYLAXIS.   FREE WATER.      ASSESSMENT/RECOMMENDATIONS.    COVID RULED OUT   covid pcr 4/22 covid pcr negv   covid 4/24 ab posv   SEPSIS  W 4/22/2021 w 12.4   4/22/2021 pr negv  4/21 ua nitr negv l estr negv   rvp 4/22 rvp negv   urine c 4/21 u c negv   cxr l base and l perihilar infiltr   4/23 ctap distended ub   LEVAQUIN (4/23/2021) DCed 4/24   Doubt bacterial infection Has lo procalc unremarkable ua and cxr   ELEVATED D-dimr poa   4/22/2021 D-dimr 314  4/23 v duplx negv  cta ch 4/22 cta ch no pe bl is edema air trapping cm   Doubt vte   MI RULED OUT  4/22-4/23/2021 Tr negv x 2   CHF RULED OUT  bnp 4/23/2021 bnp 78   echo 4/23/2021 echo n lvsf n lvedp  Not in chf   FALL   ct h 4/23/2021 ct h negv    OVERALL PLAN.   Suggest observe off abio     TIME SPENT   Over 25 minutes aggregate care time spent on encounter; activities included   direct patient care, counseling and/or coordinating care reviewing notes, lab data/ imaging , discussion with multidisciplinary team/ patient  /family and explaining in detail risks, benefits, alternatives  of the recommendations     SYLVAIN VELOZ [facility] P  4/23/2021 DR TASHA CALIXTO

## 2021-04-27 ENCOUNTER — TRANSCRIPTION ENCOUNTER (OUTPATIENT)
Age: 66
End: 2021-04-27

## 2021-04-27 VITALS
DIASTOLIC BLOOD PRESSURE: 70 MMHG | SYSTOLIC BLOOD PRESSURE: 121 MMHG | OXYGEN SATURATION: 91 % | RESPIRATION RATE: 18 BRPM | TEMPERATURE: 98 F | HEART RATE: 101 BPM

## 2021-04-27 LAB
ALBUMIN SERPL ELPH-MCNC: 2.9 G/DL — LOW (ref 3.3–5)
ALP SERPL-CCNC: 78 U/L — SIGNIFICANT CHANGE UP (ref 40–120)
ALT FLD-CCNC: 27 U/L — SIGNIFICANT CHANGE UP (ref 12–78)
ANION GAP SERPL CALC-SCNC: 4 MMOL/L — LOW (ref 5–17)
AST SERPL-CCNC: 26 U/L — SIGNIFICANT CHANGE UP (ref 15–37)
BILIRUB SERPL-MCNC: 0.2 MG/DL — SIGNIFICANT CHANGE UP (ref 0.2–1.2)
BUN SERPL-MCNC: 17 MG/DL — SIGNIFICANT CHANGE UP (ref 7–23)
CALCIUM SERPL-MCNC: 8.8 MG/DL — SIGNIFICANT CHANGE UP (ref 8.5–10.1)
CHLORIDE SERPL-SCNC: 107 MMOL/L — SIGNIFICANT CHANGE UP (ref 96–108)
CO2 SERPL-SCNC: 30 MMOL/L — SIGNIFICANT CHANGE UP (ref 22–31)
CREAT SERPL-MCNC: 1 MG/DL — SIGNIFICANT CHANGE UP (ref 0.5–1.3)
GLUCOSE SERPL-MCNC: 98 MG/DL — SIGNIFICANT CHANGE UP (ref 70–99)
HCT VFR BLD CALC: 41.5 % — SIGNIFICANT CHANGE UP (ref 39–50)
HGB BLD-MCNC: 14.6 G/DL — SIGNIFICANT CHANGE UP (ref 13–17)
MCHC RBC-ENTMCNC: 31.3 PG — SIGNIFICANT CHANGE UP (ref 27–34)
MCHC RBC-ENTMCNC: 35.2 GM/DL — SIGNIFICANT CHANGE UP (ref 32–36)
MCV RBC AUTO: 88.9 FL — SIGNIFICANT CHANGE UP (ref 80–100)
NRBC # BLD: 0 /100 WBCS — SIGNIFICANT CHANGE UP (ref 0–0)
PLATELET # BLD AUTO: 243 K/UL — SIGNIFICANT CHANGE UP (ref 150–400)
POTASSIUM SERPL-MCNC: 4.4 MMOL/L — SIGNIFICANT CHANGE UP (ref 3.5–5.3)
POTASSIUM SERPL-SCNC: 4.4 MMOL/L — SIGNIFICANT CHANGE UP (ref 3.5–5.3)
PROT SERPL-MCNC: 7.7 G/DL — SIGNIFICANT CHANGE UP (ref 6–8.3)
RBC # BLD: 4.67 M/UL — SIGNIFICANT CHANGE UP (ref 4.2–5.8)
RBC # FLD: 12.9 % — SIGNIFICANT CHANGE UP (ref 10.3–14.5)
SODIUM SERPL-SCNC: 141 MMOL/L — SIGNIFICANT CHANGE UP (ref 135–145)
WBC # BLD: 8.2 K/UL — SIGNIFICANT CHANGE UP (ref 3.8–10.5)
WBC # FLD AUTO: 8.2 K/UL — SIGNIFICANT CHANGE UP (ref 3.8–10.5)

## 2021-04-27 PROCEDURE — 36415 COLL VENOUS BLD VENIPUNCTURE: CPT

## 2021-04-27 PROCEDURE — 93306 TTE W/DOPPLER COMPLETE: CPT

## 2021-04-27 PROCEDURE — 92610 EVALUATE SWALLOWING FUNCTION: CPT

## 2021-04-27 PROCEDURE — 86769 SARS-COV-2 COVID-19 ANTIBODY: CPT

## 2021-04-27 PROCEDURE — 97530 THERAPEUTIC ACTIVITIES: CPT

## 2021-04-27 PROCEDURE — 80053 COMPREHEN METABOLIC PANEL: CPT

## 2021-04-27 PROCEDURE — 93005 ELECTROCARDIOGRAM TRACING: CPT

## 2021-04-27 PROCEDURE — 83605 ASSAY OF LACTIC ACID: CPT

## 2021-04-27 PROCEDURE — 74176 CT ABD & PELVIS W/O CONTRAST: CPT

## 2021-04-27 PROCEDURE — 0225U NFCT DS DNA&RNA 21 SARSCOV2: CPT

## 2021-04-27 PROCEDURE — 73522 X-RAY EXAM HIPS BI 3-4 VIEWS: CPT

## 2021-04-27 PROCEDURE — 71045 X-RAY EXAM CHEST 1 VIEW: CPT

## 2021-04-27 PROCEDURE — 85027 COMPLETE CBC AUTOMATED: CPT

## 2021-04-27 PROCEDURE — 97116 GAIT TRAINING THERAPY: CPT

## 2021-04-27 PROCEDURE — 84145 PROCALCITONIN (PCT): CPT

## 2021-04-27 PROCEDURE — 85025 COMPLETE CBC W/AUTO DIFF WBC: CPT

## 2021-04-27 PROCEDURE — 99285 EMERGENCY DEPT VISIT HI MDM: CPT | Mod: 25

## 2021-04-27 PROCEDURE — 86140 C-REACTIVE PROTEIN: CPT

## 2021-04-27 PROCEDURE — 85730 THROMBOPLASTIN TIME PARTIAL: CPT

## 2021-04-27 PROCEDURE — 73030 X-RAY EXAM OF SHOULDER: CPT

## 2021-04-27 PROCEDURE — 71275 CT ANGIOGRAPHY CHEST: CPT

## 2021-04-27 PROCEDURE — 85379 FIBRIN DEGRADATION QUANT: CPT

## 2021-04-27 PROCEDURE — 85610 PROTHROMBIN TIME: CPT

## 2021-04-27 PROCEDURE — 84443 ASSAY THYROID STIM HORMONE: CPT

## 2021-04-27 PROCEDURE — 83880 ASSAY OF NATRIURETIC PEPTIDE: CPT

## 2021-04-27 PROCEDURE — 87040 BLOOD CULTURE FOR BACTERIA: CPT

## 2021-04-27 PROCEDURE — 84484 ASSAY OF TROPONIN QUANT: CPT

## 2021-04-27 PROCEDURE — 82728 ASSAY OF FERRITIN: CPT

## 2021-04-27 PROCEDURE — 87635 SARS-COV-2 COVID-19 AMP PRB: CPT

## 2021-04-27 PROCEDURE — 70450 CT HEAD/BRAIN W/O DYE: CPT

## 2021-04-27 PROCEDURE — 82962 GLUCOSE BLOOD TEST: CPT

## 2021-04-27 PROCEDURE — 83036 HEMOGLOBIN GLYCOSYLATED A1C: CPT

## 2021-04-27 PROCEDURE — 93970 EXTREMITY STUDY: CPT

## 2021-04-27 PROCEDURE — 96374 THER/PROPH/DIAG INJ IV PUSH: CPT

## 2021-04-27 PROCEDURE — 97162 PT EVAL MOD COMPLEX 30 MIN: CPT

## 2021-04-27 RX ADMIN — FLUVOXAMINE MALEATE 100 MILLIGRAM(S): 25 TABLET ORAL at 05:05

## 2021-04-27 RX ADMIN — Medication 25 MILLIGRAM(S): at 05:05

## 2021-04-27 RX ADMIN — Medication 81 MILLIGRAM(S): at 11:49

## 2021-04-27 RX ADMIN — DIVALPROEX SODIUM 250 MILLIGRAM(S): 500 TABLET, DELAYED RELEASE ORAL at 05:05

## 2021-04-27 RX ADMIN — URSODIOL 300 MILLIGRAM(S): 250 TABLET, FILM COATED ORAL at 05:05

## 2021-04-27 RX ADMIN — AMLODIPINE BESYLATE 2.5 MILLIGRAM(S): 2.5 TABLET ORAL at 05:05

## 2021-04-27 RX ADMIN — Medication 0.5 MILLIGRAM(S): at 05:04

## 2021-04-27 RX ADMIN — Medication 75 MICROGRAM(S): at 05:05

## 2021-04-27 RX ADMIN — ENOXAPARIN SODIUM 40 MILLIGRAM(S): 100 INJECTION SUBCUTANEOUS at 11:49

## 2021-04-27 RX ADMIN — FAMOTIDINE 20 MILLIGRAM(S): 10 INJECTION INTRAVENOUS at 11:49

## 2021-04-27 RX ADMIN — OLANZAPINE 20 MILLIGRAM(S): 15 TABLET, FILM COATED ORAL at 11:49

## 2021-04-27 NOTE — PROGRESS NOTE ADULT - ASSESSMENT
SYLVAIN VELOZ [facility] P  4/23/2021 DR TASHA CALIXTO     REVIEW OF SYMPTOMS      Able to give (reliable) ROS  NO     PHYSICAL EXAM    HEENT Unremarkable  atraumatic   RESP Fair air entry EXP prolonged    Harsh breath sound Resp distres mild   CARDIAC S1 S2 No S3     NO JVD    ABDOMEN SOFT BS PRESENT NOT DISTENDED No hepatosplenomegaly   PEDAL EDEMA present No calf tenderness  NO rash       PATIENT PRESENTATION.   65M with PMH HTN, Cholecystectomy, non-verbal a/w fever/fall  Pulm consulted 4/23/2021       PROBLEMS  COVID RULED OUT   SEPSIS UNLIKELY   ELEVATED D-DIMR poa   FALL   URINR RETN 4/23 CTAP       GLOBAL AND BEST PRACTICE ISSUES                     ALLERGY. augmentin azacta sulfa       WEIGHT.            4/23/2021 69                     BMI.                        4/23/2021 25   ADVANCED DIRECTIVE.                               HEAD OF BED ELEVATION. Yes  DVT PROPHYLAXIS.     lvnx 40 (4/23/2021)                  SAUER PROPHYLAXIS.   APA.        asa 81 (4/23/20210                                                             DYSPHAGIA RECOMM. 4/23 Below diet recommended  DIET.    dys 2 MECH SOFT NECAR (4/23/2021)   INFECTION PROPHYLAXIS.   FREE WATER.      ASSESSMENT/RECOMMENDATIONS.    COVID RULED OUT   covid pcr 4/22 covid pcr negv   covid 4/24 ab posv   SEPSIS  W 4/22/2021 w 12.4   4/22/2021 pr negv  4/21 ua nitr negv l estr negv   rvp 4/22 rvp negv   urine c 4/21 u c negv   cxr l base and l perihilar infiltr   4/23 ctap distended ub   LEVAQUIN (4/23/2021) DCed 4/24   Doubt bacterial infection Has lo procalc unremarkable ua and cxr   ELEVATED D-dimr poa   4/22/2021 D-dimr 314  4/23 v duplx negv  cta ch 4/22 cta ch no pe bl is edema air trapping cm   Doubt vte   MI RULED OUT  4/22-4/23/2021 Tr negv x 2   CHF RULED OUT  bnp 4/23/2021 bnp 78   echo 4/23/2021 echo n lvsf n lvedp  Not in chf   FALL   ct h 4/23/2021 ct h negv    OVERALL PLAN.   Suggest observe off abio     TIME SPENT   Over 25 minutes aggregate care time spent on encounter; activities included   direct patient care, counseling and/or coordinating care reviewing notes, lab data/ imaging , discussion with multidisciplinary team/ patient  /family and explaining in detail risks, benefits, alternatives  of the recommendations     SYLVAIN VELOZ [facility] P  4/23/2021 DR TASHA CALIXTO

## 2021-04-27 NOTE — PROGRESS NOTE ADULT - SUBJECTIVE AND OBJECTIVE BOX
ROSELIA NARAYAN    PLV [address]    Patient is a 65y old  Male who presents with a chief complaint of fevers (27 Apr 2021 06:54)       Allergies    Augmentin (Unknown)  aztreonam (Rash)  penicillin (Unknown)  sulfa drugs (Unknown)    Intolerances        HPI:  patient fell yesterday  had ct brain and sutures above eyebrow (22 Apr 2021 23:17)      PAST MEDICAL & SURGICAL HISTORY:  MR (mental retardation), severe    Seizure    HTN (hypertension)    Psoriasis    DD (diastrophic dysplasia)    Blind  right eye    Constipation, unspecified constipation type    No significant past surgical history        FAMILY HISTORY:  No pertinent family history in first degree relatives          MEDICATIONS   acetaminophen   Tablet .. 650 milliGRAM(s) Oral every 4 hours PRN  amLODIPine   Tablet 2.5 milliGRAM(s) Oral daily  aspirin enteric coated 81 milliGRAM(s) Oral daily  clonazePAM  Tablet 0.5 milliGRAM(s) Oral two times a day  diVALproex  milliGRAM(s) Oral two times a day  doxazosin 2 milliGRAM(s) Oral at bedtime  enoxaparin Injectable 40 milliGRAM(s) SubCutaneous daily  famotidine    Tablet 20 milliGRAM(s) Oral daily  fluvoxaMINE 100 milliGRAM(s) Oral two times a day  levothyroxine 75 MICROGram(s) Oral daily  metoprolol tartrate 25 milliGRAM(s) Oral two times a day  OLANZapine 20 milliGRAM(s) Oral daily  tamsulosin 0.4 milliGRAM(s) Oral at bedtime  ursodiol Capsule 300 milliGRAM(s) Oral every 12 hours      Vital Signs Last 24 Hrs  T(C): 36.6 (27 Apr 2021 04:48), Max: 36.8 (26 Apr 2021 13:16)  T(F): 97.9 (27 Apr 2021 04:48), Max: 98.3 (26 Apr 2021 13:16)  HR: 101 (27 Apr 2021 04:48) (100 - 101)  BP: 133/81 (27 Apr 2021 04:48) (113/74 - 133/81)  BP(mean): --  RR: 18 (27 Apr 2021 04:48) (18 - 18)  SpO2: 98% (27 Apr 2021 04:48) (92% - 98%)            LABS:                        14.6   8.20  )-----------( 243      ( 27 Apr 2021 08:17 )             41.5     04-27    141  |  107  |  17  ----------------------------<  98  4.4   |  30  |  1.00    Ca    8.8      27 Apr 2021 08:17    TPro  7.7  /  Alb  2.9<L>  /  TBili  0.2  /  DBili  x   /  AST  26  /  ALT  27  /  AlkPhos  78  04-27              WBC:  WBC Count: 8.20 K/uL (04-27 @ 08:17)  WBC Count: 8.15 K/uL (04-25 @ 08:29)  WBC Count: 9.28 K/uL (04-23 @ 17:36)      MICROBIOLOGY:  RECENT CULTURES:  04-23 .Blood Blood-Peripheral XXXX XXXX   No growth to date.    04-21 .Urine Clean Catch (Midstream) XXXX XXXX   No growth                    Sodium:  Sodium, Serum: 141 mmol/L (04-27 @ 08:17)  Sodium, Serum: 141 mmol/L (04-25 @ 08:29)  Sodium, Serum: 142 mmol/L (04-23 @ 17:36)      1.00 mg/dL 04-27 @ 08:17  0.90 mg/dL 04-25 @ 08:29  0.83 mg/dL 04-23 @ 17:36      Hemoglobin:  Hemoglobin: 14.6 g/dL (04-27 @ 08:17)  Hemoglobin: 14.6 g/dL (04-25 @ 08:29)  Hemoglobin: 14.9 g/dL (04-23 @ 17:36)      Platelets: Platelet Count - Automated: 243 K/uL (04-27 @ 08:17)  Platelet Count - Automated: 208 K/uL (04-25 @ 08:29)  Platelet Count - Automated: 215 K/uL (04-23 @ 17:36)      LIVER FUNCTIONS - ( 27 Apr 2021 08:17 )  Alb: 2.9 g/dL / Pro: 7.7 g/dL / ALK PHOS: 78 U/L / ALT: 27 U/L / AST: 26 U/L / GGT: x                 RADIOLOGY & ADDITIONAL STUDIES:

## 2021-04-27 NOTE — PROGRESS NOTE ADULT - PROVIDER SPECIALTY LIST ADULT
Cardiology
Internal Medicine
Internal Medicine
Pulmonology
Infectious Disease
Pulmonology
Cardiology
Internal Medicine

## 2021-04-27 NOTE — PROGRESS NOTE ADULT - SUBJECTIVE AND OBJECTIVE BOX
[facility] Cardiology Progress Note [phone] (Dr. Portillo, Julieta, Genevieve, Alex)    CHIEF COMPLAINT: Patient is a 65y old  Male who presents with a chief complaint of fevers (26 Apr 2021 13:28)      Follow Up Today: The patient is non verbal.  No events overnight.    HPI:  patient fell yesterday  had ct brain and sutures above eyebrow (22 Apr 2021 23:17)      PAST MEDICAL & SURGICAL HISTORY:  MR (mental retardation), severe    Seizure    HTN (hypertension)    Psoriasis    DD (diastrophic dysplasia)    Blind  right eye    Constipation, unspecified constipation type    No significant past surgical history        MEDICATIONS  (STANDING):  amLODIPine   Tablet 2.5 milliGRAM(s) Oral daily  aspirin enteric coated 81 milliGRAM(s) Oral daily  clonazePAM  Tablet 0.5 milliGRAM(s) Oral two times a day  diVALproex  milliGRAM(s) Oral two times a day  doxazosin 2 milliGRAM(s) Oral at bedtime  enoxaparin Injectable 40 milliGRAM(s) SubCutaneous daily  famotidine    Tablet 20 milliGRAM(s) Oral daily  fluvoxaMINE 100 milliGRAM(s) Oral two times a day  levothyroxine 75 MICROGram(s) Oral daily  metoprolol tartrate 25 milliGRAM(s) Oral two times a day  OLANZapine 20 milliGRAM(s) Oral daily  tamsulosin 0.4 milliGRAM(s) Oral at bedtime  ursodiol Capsule 300 milliGRAM(s) Oral every 12 hours    MEDICATIONS  (PRN):  acetaminophen   Tablet .. 650 milliGRAM(s) Oral every 4 hours PRN Temp greater or equal to 38C (100.4F), Mild Pain (1 - 3)      Allergies    Augmentin (Unknown)  aztreonam (Rash)  penicillin (Unknown)  sulfa drugs (Unknown)    Intolerances        REVIEW OF SYSTEMS:    All other review of systems is negative unless indicated above    Vital Signs Last 24 Hrs  T(C): 36.6 (27 Apr 2021 04:48), Max: 36.8 (26 Apr 2021 13:16)  T(F): 97.9 (27 Apr 2021 04:48), Max: 98.3 (26 Apr 2021 13:16)  HR: 101 (27 Apr 2021 04:48) (100 - 101)  BP: 133/81 (27 Apr 2021 04:48) (113/74 - 133/81)  BP(mean): --  RR: 18 (27 Apr 2021 04:48) (18 - 18)  SpO2: 98% (27 Apr 2021 04:48) (92% - 98%)    I&O's Summary      PHYSICAL EXAM:    Constitutional: NAD, awake and alert, well-developed  Eyes:  EOMI,  Pupils round, No oral cyanosis.  HEENT: No exudate or erythema  Pulmonary: Non-labored, breath sounds are clear bilaterally, No wheezing, rales or rhonchi  Cardiovascular: Regular, S1 and S2, No murmurs  Gastrointestinal: Bowel Sounds present, soft, nontender.   Ext: No significant LE edema  Neurological: Alert, no gross focal motor deficits  Skin: No rashes.  Psych:  Mood & affect MRDD    LABS: All Labs Reviewed:                        14.6   8.15  )-----------( 208      ( 25 Apr 2021 08:29 )             42.4     25 Apr 2021 08:29    141    |  108    |  14     ----------------------------<  90     4.5     |  27     |  0.90     Ca    8.7        25 Apr 2021 08:29    TPro  7.5    /  Alb  2.7    /  TBili  0.3    /  DBili  x      /  AST  37     /  ALT  29     /  AlkPhos  77     25 Apr 2021 08:29          Blood Culture: Organism --  Gram Stain Blood -- Gram Stain --  Specimen Source .Blood Blood-Peripheral  Culture-Blood --            RADIOLOGY/EKG:    Attending Attestation:   20 minutes spent on total encounter; more than 50% of the visit was spent counseling and/or coordinating care by the attending physician.     ASSESSMENT AND PLAN

## 2021-04-27 NOTE — PROGRESS NOTE ADULT - ASSESSMENT
65M with PMH HTN, Cholecystectomy, non-verbal a/w fever/fall felt not to be from infectious etiology.  Abx stopped. Heart rate and BP do not suggest fransico/tachy arrhythmias. BP stable.      Ekg ok  Echo shows normal LVEF.   CT: no pleural effusion, no pulmonary embolism    Suggest:  Continue ASA, metoprolol 25 mg BID;   DVT prohylaxis with Lovenox  Will follow PRN

## 2021-04-27 NOTE — DISCHARGE NOTE NURSING/CASE MANAGEMENT/SOCIAL WORK - PATIENT PORTAL LINK FT
You can access the FollowMyHealth Patient Portal offered by [facility] by registering at the following website: [email or web address]. By joining [facility]’s FollowMyHealth portal, you will also be able to view your health information using other applications (apps) compatible with our system.

## 2021-05-01 ENCOUNTER — INPATIENT (INPATIENT)
Facility: HOSPITAL | Age: 66
LOS: 9 days | Discharge: HOME | DRG: 871 | End: 2021-05-11
Attending: FAMILY MEDICINE | Admitting: FAMILY MEDICINE
Payer: MEDICARE

## 2021-05-01 VITALS
HEART RATE: 135 BPM | OXYGEN SATURATION: 89 % | SYSTOLIC BLOOD PRESSURE: 104 MMHG | RESPIRATION RATE: 20 BRPM | WEIGHT: 179.9 LBS | DIASTOLIC BLOOD PRESSURE: 70 MMHG | TEMPERATURE: 98 F | HEIGHT: 65 IN

## 2021-05-01 DIAGNOSIS — J96.01 ACUTE RESPIRATORY FAILURE WITH HYPOXIA: ICD-10-CM

## 2021-05-01 DIAGNOSIS — R50.9 FEVER, UNSPECIFIED: ICD-10-CM

## 2021-05-01 DIAGNOSIS — J69.0 PNEUMONITIS DUE TO INHALATION OF FOOD AND VOMIT: ICD-10-CM

## 2021-05-01 LAB
ALBUMIN SERPL ELPH-MCNC: 2.8 G/DL — LOW (ref 3.3–5)
ALP SERPL-CCNC: 69 U/L — SIGNIFICANT CHANGE UP (ref 40–120)
ALT FLD-CCNC: 24 U/L — SIGNIFICANT CHANGE UP (ref 12–78)
ANION GAP SERPL CALC-SCNC: 6 MMOL/L — SIGNIFICANT CHANGE UP (ref 5–17)
APPEARANCE UR: CLEAR — SIGNIFICANT CHANGE UP
AST SERPL-CCNC: 26 U/L — SIGNIFICANT CHANGE UP (ref 15–37)
BASE EXCESS BLDA CALC-SCNC: 1.6 MMOL/L — SIGNIFICANT CHANGE UP (ref -2–2)
BASOPHILS # BLD AUTO: 0.04 K/UL — SIGNIFICANT CHANGE UP (ref 0–0.2)
BASOPHILS NFR BLD AUTO: 0.3 % — SIGNIFICANT CHANGE UP (ref 0–2)
BILIRUB SERPL-MCNC: 0.3 MG/DL — SIGNIFICANT CHANGE UP (ref 0.2–1.2)
BILIRUB UR-MCNC: NEGATIVE — SIGNIFICANT CHANGE UP
BLOOD GAS COMMENTS ARTERIAL: SIGNIFICANT CHANGE UP
BUN SERPL-MCNC: 16 MG/DL — SIGNIFICANT CHANGE UP (ref 7–23)
CALCIUM SERPL-MCNC: 7.9 MG/DL — LOW (ref 8.5–10.1)
CHLORIDE SERPL-SCNC: 110 MMOL/L — HIGH (ref 96–108)
CO2 SERPL-SCNC: 26 MMOL/L — SIGNIFICANT CHANGE UP (ref 22–31)
COLOR SPEC: YELLOW — SIGNIFICANT CHANGE UP
CREAT SERPL-MCNC: 0.89 MG/DL — SIGNIFICANT CHANGE UP (ref 0.5–1.3)
DIFF PNL FLD: NEGATIVE — SIGNIFICANT CHANGE UP
EOSINOPHIL # BLD AUTO: 0.06 K/UL — SIGNIFICANT CHANGE UP (ref 0–0.5)
EOSINOPHIL NFR BLD AUTO: 0.4 % — SIGNIFICANT CHANGE UP (ref 0–6)
GLUCOSE SERPL-MCNC: 105 MG/DL — HIGH (ref 70–99)
GLUCOSE UR QL: NEGATIVE — SIGNIFICANT CHANGE UP
HCO3 BLDA-SCNC: 26 MMOL/L — SIGNIFICANT CHANGE UP (ref 23–27)
HCT VFR BLD CALC: 40.2 % — SIGNIFICANT CHANGE UP (ref 39–50)
HGB BLD-MCNC: 14.3 G/DL — SIGNIFICANT CHANGE UP (ref 13–17)
HOROWITZ INDEX BLDA+IHG-RTO: 24 — SIGNIFICANT CHANGE UP
IMM GRANULOCYTES NFR BLD AUTO: 1.8 % — HIGH (ref 0–1.5)
INR BLD: 1.19 RATIO — HIGH (ref 0.88–1.16)
KETONES UR-MCNC: NEGATIVE — SIGNIFICANT CHANGE UP
LACTATE SERPL-SCNC: 1.1 MMOL/L — SIGNIFICANT CHANGE UP (ref 0.7–2)
LEUKOCYTE ESTERASE UR-ACNC: NEGATIVE — SIGNIFICANT CHANGE UP
LIDOCAIN IGE QN: 63 U/L — LOW (ref 73–393)
LYMPHOCYTES # BLD AUTO: 0.84 K/UL — LOW (ref 1–3.3)
LYMPHOCYTES # BLD AUTO: 5.9 % — LOW (ref 13–44)
MCHC RBC-ENTMCNC: 31.2 PG — SIGNIFICANT CHANGE UP (ref 27–34)
MCHC RBC-ENTMCNC: 35.6 GM/DL — SIGNIFICANT CHANGE UP (ref 32–36)
MCV RBC AUTO: 87.8 FL — SIGNIFICANT CHANGE UP (ref 80–100)
MONOCYTES # BLD AUTO: 1.07 K/UL — HIGH (ref 0–0.9)
MONOCYTES NFR BLD AUTO: 7.5 % — SIGNIFICANT CHANGE UP (ref 2–14)
NEUTROPHILS # BLD AUTO: 11.92 K/UL — HIGH (ref 1.8–7.4)
NEUTROPHILS NFR BLD AUTO: 84.1 % — HIGH (ref 43–77)
NITRITE UR-MCNC: NEGATIVE — SIGNIFICANT CHANGE UP
NRBC # BLD: 0 /100 WBCS — SIGNIFICANT CHANGE UP (ref 0–0)
NT-PROBNP SERPL-SCNC: 159 PG/ML — HIGH (ref 0–125)
PCO2 BLDA: 39 MMHG — SIGNIFICANT CHANGE UP (ref 32–46)
PH BLDA: 7.43 — SIGNIFICANT CHANGE UP (ref 7.35–7.45)
PH UR: 8 — SIGNIFICANT CHANGE UP (ref 5–8)
PLATELET # BLD AUTO: 261 K/UL — SIGNIFICANT CHANGE UP (ref 150–400)
PO2 BLDA: 88 MMHG — SIGNIFICANT CHANGE UP (ref 74–108)
POTASSIUM SERPL-MCNC: 3.3 MMOL/L — LOW (ref 3.5–5.3)
POTASSIUM SERPL-SCNC: 3.3 MMOL/L — LOW (ref 3.5–5.3)
PROCALCITONIN SERPL-MCNC: 4.56 NG/ML — HIGH (ref 0–0.04)
PROT SERPL-MCNC: 7 G/DL — SIGNIFICANT CHANGE UP (ref 6–8.3)
PROT UR-MCNC: NEGATIVE — SIGNIFICANT CHANGE UP
PROTHROM AB SERPL-ACNC: 13.8 SEC — HIGH (ref 10.6–13.6)
RAPID RVP RESULT: SIGNIFICANT CHANGE UP
RBC # BLD: 4.58 M/UL — SIGNIFICANT CHANGE UP (ref 4.2–5.8)
RBC # FLD: 13 % — SIGNIFICANT CHANGE UP (ref 10.3–14.5)
SAO2 % BLDA: 97 % — HIGH (ref 92–96)
SARS-COV-2 RNA SPEC QL NAA+PROBE: SIGNIFICANT CHANGE UP
SODIUM SERPL-SCNC: 142 MMOL/L — SIGNIFICANT CHANGE UP (ref 135–145)
SP GR SPEC: 1.01 — SIGNIFICANT CHANGE UP (ref 1.01–1.02)
UROBILINOGEN FLD QL: NEGATIVE — SIGNIFICANT CHANGE UP
VALPROATE SERPL-MCNC: 56 UG/ML — SIGNIFICANT CHANGE UP (ref 50–100)
WBC # BLD: 14.18 K/UL — HIGH (ref 3.8–10.5)
WBC # FLD AUTO: 14.18 K/UL — HIGH (ref 3.8–10.5)

## 2021-05-01 PROCEDURE — 71250 CT THORAX DX C-: CPT | Mod: 26,MA,59

## 2021-05-01 PROCEDURE — 99285 EMERGENCY DEPT VISIT HI MDM: CPT

## 2021-05-01 PROCEDURE — 93010 ELECTROCARDIOGRAM REPORT: CPT

## 2021-05-01 PROCEDURE — 71045 X-RAY EXAM CHEST 1 VIEW: CPT | Mod: 26

## 2021-05-01 PROCEDURE — 71275 CT ANGIOGRAPHY CHEST: CPT | Mod: 26

## 2021-05-01 PROCEDURE — 74177 CT ABD & PELVIS W/CONTRAST: CPT | Mod: 26

## 2021-05-01 PROCEDURE — 93970 EXTREMITY STUDY: CPT | Mod: 26

## 2021-05-01 RX ORDER — ENOXAPARIN SODIUM 100 MG/ML
40 INJECTION SUBCUTANEOUS DAILY
Refills: 0 | Status: DISCONTINUED | OUTPATIENT
Start: 2021-05-01 | End: 2021-05-11

## 2021-05-01 RX ORDER — SODIUM CHLORIDE 9 MG/ML
1000 INJECTION, SOLUTION INTRAVENOUS
Refills: 0 | Status: DISCONTINUED | OUTPATIENT
Start: 2021-05-01 | End: 2021-05-03

## 2021-05-01 RX ORDER — METOPROLOL TARTRATE 50 MG
25 TABLET ORAL
Refills: 0 | Status: DISCONTINUED | OUTPATIENT
Start: 2021-05-01 | End: 2021-05-11

## 2021-05-01 RX ORDER — OLANZAPINE 15 MG/1
15 TABLET, FILM COATED ORAL AT BEDTIME
Refills: 0 | Status: DISCONTINUED | OUTPATIENT
Start: 2021-05-01 | End: 2021-05-11

## 2021-05-01 RX ORDER — TAMSULOSIN HYDROCHLORIDE 0.4 MG/1
0.8 CAPSULE ORAL AT BEDTIME
Refills: 0 | Status: DISCONTINUED | OUTPATIENT
Start: 2021-05-01 | End: 2021-05-11

## 2021-05-01 RX ORDER — VANCOMYCIN HCL 1 G
1000 VIAL (EA) INTRAVENOUS ONCE
Refills: 0 | Status: DISCONTINUED | OUTPATIENT
Start: 2021-05-01 | End: 2021-05-01

## 2021-05-01 RX ORDER — SODIUM CHLORIDE 9 MG/ML
2500 INJECTION INTRAMUSCULAR; INTRAVENOUS; SUBCUTANEOUS ONCE
Refills: 0 | Status: COMPLETED | OUTPATIENT
Start: 2021-05-01 | End: 2021-05-01

## 2021-05-01 RX ORDER — ASPIRIN/CALCIUM CARB/MAGNESIUM 324 MG
81 TABLET ORAL DAILY
Refills: 0 | Status: DISCONTINUED | OUTPATIENT
Start: 2021-05-01 | End: 2021-05-11

## 2021-05-01 RX ORDER — FLUVOXAMINE MALEATE 25 MG/1
100 TABLET ORAL
Refills: 0 | Status: DISCONTINUED | OUTPATIENT
Start: 2021-05-01 | End: 2021-05-11

## 2021-05-01 RX ORDER — LEVOTHYROXINE SODIUM 125 MCG
75 TABLET ORAL DAILY
Refills: 0 | Status: DISCONTINUED | OUTPATIENT
Start: 2021-05-01 | End: 2021-05-11

## 2021-05-01 RX ORDER — LEVOTHYROXINE SODIUM 125 MCG
0 TABLET ORAL
Qty: 0 | Refills: 0 | DISCHARGE

## 2021-05-01 RX ORDER — LACTULOSE 10 G/15ML
10 SOLUTION ORAL DAILY
Refills: 0 | Status: DISCONTINUED | OUTPATIENT
Start: 2021-05-01 | End: 2021-05-04

## 2021-05-01 RX ORDER — HYDROXYZINE HCL 10 MG
20 TABLET ORAL EVERY 8 HOURS
Refills: 0 | Status: DISCONTINUED | OUTPATIENT
Start: 2021-05-01 | End: 2021-05-11

## 2021-05-01 RX ORDER — CALCIUM CARBONATE 500(1250)
1 TABLET ORAL THREE TIMES A DAY
Refills: 0 | Status: DISCONTINUED | OUTPATIENT
Start: 2021-05-01 | End: 2021-05-01

## 2021-05-01 RX ORDER — ACETAMINOPHEN 500 MG
975 TABLET ORAL ONCE
Refills: 0 | Status: COMPLETED | OUTPATIENT
Start: 2021-05-01 | End: 2021-05-01

## 2021-05-01 RX ORDER — AMLODIPINE BESYLATE 2.5 MG/1
5 TABLET ORAL DAILY
Refills: 0 | Status: DISCONTINUED | OUTPATIENT
Start: 2021-05-01 | End: 2021-05-11

## 2021-05-01 RX ORDER — CLONAZEPAM 1 MG
1 TABLET ORAL
Refills: 0 | Status: DISCONTINUED | OUTPATIENT
Start: 2021-05-01 | End: 2021-05-08

## 2021-05-01 RX ORDER — ACETAMINOPHEN 500 MG
650 TABLET ORAL EVERY 6 HOURS
Refills: 0 | Status: DISCONTINUED | OUTPATIENT
Start: 2021-05-01 | End: 2021-05-11

## 2021-05-01 RX ORDER — IBUPROFEN 200 MG
600 TABLET ORAL ONCE
Refills: 0 | Status: COMPLETED | OUTPATIENT
Start: 2021-05-01 | End: 2021-05-01

## 2021-05-01 RX ORDER — DIVALPROEX SODIUM 500 MG/1
250 TABLET, DELAYED RELEASE ORAL THREE TIMES A DAY
Refills: 0 | Status: DISCONTINUED | OUTPATIENT
Start: 2021-05-01 | End: 2021-05-11

## 2021-05-01 RX ORDER — PANTOPRAZOLE SODIUM 20 MG/1
40 TABLET, DELAYED RELEASE ORAL
Refills: 0 | Status: DISCONTINUED | OUTPATIENT
Start: 2021-05-01 | End: 2021-05-05

## 2021-05-01 RX ADMIN — Medication 1 TABLET(S): at 21:02

## 2021-05-01 RX ADMIN — Medication 1 MILLIGRAM(S): at 17:23

## 2021-05-01 RX ADMIN — SODIUM CHLORIDE 2500 MILLILITER(S): 9 INJECTION INTRAMUSCULAR; INTRAVENOUS; SUBCUTANEOUS at 12:40

## 2021-05-01 RX ADMIN — FLUVOXAMINE MALEATE 100 MILLIGRAM(S): 25 TABLET ORAL at 18:54

## 2021-05-01 RX ADMIN — Medication 650 MILLIGRAM(S): at 16:53

## 2021-05-01 RX ADMIN — DIVALPROEX SODIUM 250 MILLIGRAM(S): 500 TABLET, DELAYED RELEASE ORAL at 21:17

## 2021-05-01 RX ADMIN — Medication 20 MILLIGRAM(S): at 21:02

## 2021-05-01 RX ADMIN — Medication 975 MILLIGRAM(S): at 10:39

## 2021-05-01 RX ADMIN — Medication 25 MILLIGRAM(S): at 17:23

## 2021-05-01 RX ADMIN — SODIUM CHLORIDE 2500 MILLILITER(S): 9 INJECTION INTRAMUSCULAR; INTRAVENOUS; SUBCUTANEOUS at 09:42

## 2021-05-01 RX ADMIN — Medication 650 MILLIGRAM(S): at 17:53

## 2021-05-01 RX ADMIN — Medication 600 MILLIGRAM(S): at 00:10

## 2021-05-01 RX ADMIN — OLANZAPINE 15 MILLIGRAM(S): 15 TABLET, FILM COATED ORAL at 21:02

## 2021-05-01 RX ADMIN — Medication 600 MILLIGRAM(S): at 20:54

## 2021-05-01 RX ADMIN — TAMSULOSIN HYDROCHLORIDE 0.8 MILLIGRAM(S): 0.4 CAPSULE ORAL at 21:02

## 2021-05-01 RX ADMIN — Medication 975 MILLIGRAM(S): at 09:41

## 2021-05-01 NOTE — ED PROVIDER NOTE - PROGRESS NOTE DETAILS
spoke with Dr. Hunter Curran, kindly accepts patient for admission. fever of uncertain etiology. CT of chest discussed. possible early aspiration pneumonia? procalcitonin elevated. levaquin IV given. possible early aspiration pneumonia. Dr. Hunter Curran requesting CTA of chest, CT abdomen/pelvis, and venous dopplers of legs. also would like CPK and trop.

## 2021-05-01 NOTE — ED PROVIDER NOTE - NEUROLOGICAL, MLM
Alert and awake. able to follow some simple commands (sit up, lay down). normal neuro baseline per caregiver

## 2021-05-01 NOTE — CONSULT NOTE ADULT - SUBJECTIVE AND OBJECTIVE BOX
ROSELIA NARAYAN    [facility] APER 02    Patient is a 65y old  Male who presents with a chief complaint of      Allergies    Augmentin (Unknown)  aztreonam (Rash)  penicillin (Unknown)  sulfa drugs (Unknown)    Intolerances        HPI:      PAST MEDICAL & SURGICAL HISTORY:  MR (mental retardation), severe    Seizure    HTN (hypertension)    Psoriasis    DD (diastrophic dysplasia)    Blind  right eye    Constipation, unspecified constipation type    No significant past surgical history        FAMILY HISTORY:  No pertinent family history in first degree relatives          MEDICATIONS       Vital Signs Last 24 Hrs  T(C): 37.1 (01 May 2021 13:38), Max: 39.9 (01 May 2021 09:15)  T(F): 98.8 (01 May 2021 13:38), Max: 103.9 (01 May 2021 09:15)  HR: 94 (01 May 2021 13:38) (94 - 135)  BP: 101/68 (01 May 2021 13:38) (101/68 - 104/70)  BP(mean): --  RR: 16 (01 May 2021 13:38) (16 - 20)  SpO2: 95% (01 May 2021 13:38) (89% - 95%)            LABS:                        14.3   14.18 )-----------( 261      ( 01 May 2021 09:43 )             40.2     05-    142  |  110<H>  |  16  ----------------------------<  105<H>  3.3<L>   |  26  |  0.89    Ca    7.9<L>      01 May 2021 10:56    TPro  7.0  /  Alb  2.8<L>  /  TBili  0.3  /  DBili  x   /  AST  26  /  ALT  24  /  AlkPhos  69  05-01    PT/INR - ( 01 May 2021 10:56 )   PT: 13.8 sec;   INR: 1.19 ratio           Urinalysis Basic - ( 01 May 2021 09:50 )    Color: Yellow / Appearance: Clear / S.010 / pH: x  Gluc: x / Ketone: Negative  / Bili: Negative / Urobili: Negative   Blood: x / Protein: Negative / Nitrite: Negative   Leuk Esterase: Negative / RBC: x / WBC x   Sq Epi: x / Non Sq Epi: x / Bacteria: x        ABG - ( 01 May 2021 13:19 )  pH, Arterial: 7.43  pH, Blood: x     /  pCO2: 39    /  pO2: 88    / HCO3: 26    / Base Excess: 1.6   /  SaO2: 97                  WBC:  WBC Count: 14.18 K/uL ( @ 09:43)      MICROBIOLOGY:  RECENT CULTURES:        CARDIAC MARKERS ( 01 May 2021 10:56 )  <.015 ng/mL / x     / x     / x     / x            PT/INR - ( 01 May 2021 10:56 )   PT: 13.8 sec;   INR: 1.19 ratio             Sodium:  Sodium, Serum: 142 mmol/L ( @ 10:56)      0.89 mg/dL  @ 10:56      Hemoglobin:  Hemoglobin: 14.3 g/dL ( @ 09:43)      Platelets: Platelet Count - Automated: 261 K/uL ( @ 09:43)      LIVER FUNCTIONS - ( 01 May 2021 10:56 )  Alb: 2.8 g/dL / Pro: 7.0 g/dL / ALK PHOS: 69 U/L / ALT: 24 U/L / AST: 26 U/L / GGT: x             Urinalysis Basic - ( 01 May 2021 09:50 )    Color: Yellow / Appearance: Clear / S.010 / pH: x  Gluc: x / Ketone: Negative  / Bili: Negative / Urobili: Negative   Blood: x / Protein: Negative / Nitrite: Negative   Leuk Esterase: Negative / RBC: x / WBC x   Sq Epi: x / Non Sq Epi: x / Bacteria: x        RADIOLOGY & ADDITIONAL STUDIES:

## 2021-05-01 NOTE — ED PROVIDER NOTE - ATTENDING CONTRIBUTION TO CARE
66 yo male with MRDD sent here for evaluation of undetermined fever. Per aide with patient, no cough, vomiting or diarrhea. Exam revealed a MRDD male in NAD with clear lungs, normal heart sounds and benign and non tender abdomen. I agree with plan and management outlined by PA.

## 2021-05-01 NOTE — CONSULT NOTE ADULT - ASSESSMENT
SYLVAIN VELOZ [facility] P  5/1/2021  DR TASHA CALIXTO       Initial evaluation/Pulmonary Critical Care consultation requested on  5/1/2021  by Dr Olson  from Dr Damon   Patient examined chart reviewed    HOSPITAL ADMISSION   PATIENT CAME  FROM (if information available)      SYLVAIN VELOZ [facility] P  5/1/2021  DR TASHA CALIXTO     REVIEW OF SYMPTOMS      Able to give (reliable) ROS  NO     PHYSICAL EXAM    HEENT Unremarkable  atraumatic   RESP Fair air entry EXP prolonged    Harsh breath sound Resp distres mild   CARDIAC S1 S2 No S3     NO JVD    ABDOMEN SOFT BS PRESENT NOT DISTENDED No hepatosplenomegaly   PEDAL EDEMA present No calf tenderness  NO rash       PATIENT PRESENTATION.     65M group home resident with [facility] severe mental retardation HTN, Cholecystectomy, COVID 4/2020 non-verbal recently admitted [facility] P 4/23-4/27/2021  was readmitted 5/1/2021 with fever hypoxia  and pulmonary consulted   In ER pt already got levaquin and ns 2.5 l (5/1/2021 2:13 PM)     RECENT SHARPE   echo 4/23/2021 echo n lvsf n lvedp  4/22/2021 D-dimr 314  4/23 v duplx negv  cta ch 4/22 cta ch no pe bl is edema air trapping cm     Home meds.   amlodipine 5  asa  81 clonazepam 1.2 cosentyx  300q m depakote 250.3 fluvoxamine 100.2 hydroxyzine 20.3 levoxyl 75 metoprolol 25.2 olanzapine 15 omeprazole 20 tamsulosin .4       PATIENT DATA                ABG.   5/1/2021 24% 743/39/88               OXYGENATION.    5/1/2021 2l 95%              VITALS/IO/VENT/DRIPS.  5/1/2021 afeb 94 100/68        ASSESSMENT/RECOMMENDATIONS.    SEPSIS poa   Pt had leukocytosis w 14 poa and procalc 4/5 poa   5/1/2021 CT ch did not show infiltr  5/1/2021 RVP was negv   Cont empiric levaquin pending ID eval   HYPOXIA poa   CTA ch was ordered will follow resiults   O2 to keep po 90-95%  NEUROPSYCHIATRIC MEDS  Cont prev meds   DYSPHAGIA EVAL   Speech consulted 5/1/2021         TIME SPENT   Over 55 minutes aggregate care time spent on encounter; activities included   direct patient care, counseling and/or coordinating care reviewing notes, lab data/ imaging , discussion with multidisciplinary team/ patient  /family and explaining in detail risks, benefits, alternatives  of the recommendations     SYLVAIN VELOZ [facility] P  5/1/2021  DR TASHA CALIXTO

## 2021-05-01 NOTE — ED PROVIDER NOTE - CARE PLAN
Principal Discharge DX:	Fever  Secondary Diagnosis:	Hypoxia  Secondary Diagnosis:	DD (diastrophic dysplasia)  Secondary Diagnosis:	MR (mental retardation), severe

## 2021-05-01 NOTE — PROVIDER CONTACT NOTE (MEDICATION) - ACTION/TREATMENT ORDERED:
MD to "review chart" - further orders pending MD to "review chart" - further orders pending. Ibuprofen 600 mg po STAT

## 2021-05-01 NOTE — PATIENT PROFILE ADULT - VISION (WITH CORRECTIVE LENSES IF THE PATIENT USUALLY WEARS THEM):
Right eye blindness/Partially impaired: cannot see medication labels or newsprint, but can see obstacles in path, and the surrounding layout; can count fingers at arm's length

## 2021-05-01 NOTE — ED PROVIDER NOTE - CLINICAL SUMMARY MEDICAL DECISION MAKING FREE TEXT BOX
fever and hypoxia since this am. admitted last week for fever (diagnosed with viral syndrome). differentials include but not limited to sepsis, pneumonia, bronchitis, viral syndrome, influenza, covid, UTI. will check labs, CXR, EKG, UA. start IVF, tylenol, IV abx

## 2021-05-01 NOTE — ED PROVIDER NOTE - OBJECTIVE STATEMENT
65 year old male with history of MR (severe), constipation, blind right eye, diastrophic dysplasia, psoriasis, HTN, and seizure presents with fever and low oxygen level since this am. Per caregiver, went to get patient out of bed. noticed patient was shivering. took temp on forehead and was 100 and then in ear and was 104. also took pulse ox and was low (in the 80's). per caregiver no coughing, vomiting, diarrhea, or change in mental status. was recently admitted at this hospital April 22nd-April 27th for fever. previous hospital notes reviewed, diagnosed with viral syndrome. CTA showed no evidence of PE, pneumonia. RVP negative. abx were discontinued in hospital. patient has covid last april 2020 and has received both covid vaccinations.   PCP Joanna Price  Group Home resident

## 2021-05-02 LAB
ALBUMIN SERPL ELPH-MCNC: 2.4 G/DL — LOW (ref 3.3–5)
ALP SERPL-CCNC: 61 U/L — SIGNIFICANT CHANGE UP (ref 40–120)
ALT FLD-CCNC: 23 U/L — SIGNIFICANT CHANGE UP (ref 12–78)
ANION GAP SERPL CALC-SCNC: 4 MMOL/L — LOW (ref 5–17)
AST SERPL-CCNC: 25 U/L — SIGNIFICANT CHANGE UP (ref 15–37)
BILIRUB DIRECT SERPL-MCNC: 0.1 MG/DL — SIGNIFICANT CHANGE UP (ref 0.05–0.2)
BILIRUB INDIRECT FLD-MCNC: 0.2 MG/DL — SIGNIFICANT CHANGE UP (ref 0.2–1)
BILIRUB SERPL-MCNC: 0.3 MG/DL — SIGNIFICANT CHANGE UP (ref 0.2–1.2)
BUN SERPL-MCNC: 19 MG/DL — SIGNIFICANT CHANGE UP (ref 7–23)
CALCIUM SERPL-MCNC: 8.1 MG/DL — LOW (ref 8.5–10.1)
CHLORIDE SERPL-SCNC: 112 MMOL/L — HIGH (ref 96–108)
CO2 SERPL-SCNC: 28 MMOL/L — SIGNIFICANT CHANGE UP (ref 22–31)
COVID-19 SPIKE DOMAIN AB INTERP: POSITIVE
COVID-19 SPIKE DOMAIN ANTIBODY RESULT: >250 U/ML — HIGH
CREAT SERPL-MCNC: 0.88 MG/DL — SIGNIFICANT CHANGE UP (ref 0.5–1.3)
CULTURE RESULTS: NO GROWTH — SIGNIFICANT CHANGE UP
D DIMER BLD IA.RAPID-MCNC: 245 NG/ML DDU — HIGH
GLUCOSE SERPL-MCNC: 103 MG/DL — HIGH (ref 70–99)
HCT VFR BLD CALC: 35.1 % — LOW (ref 39–50)
HGB BLD-MCNC: 12.1 G/DL — LOW (ref 13–17)
INR BLD: 1.36 RATIO — HIGH (ref 0.88–1.16)
LEGIONELLA AG UR QL: NEGATIVE — SIGNIFICANT CHANGE UP
MCHC RBC-ENTMCNC: 31 PG — SIGNIFICANT CHANGE UP (ref 27–34)
MCHC RBC-ENTMCNC: 34.5 GM/DL — SIGNIFICANT CHANGE UP (ref 32–36)
MCV RBC AUTO: 90 FL — SIGNIFICANT CHANGE UP (ref 80–100)
MRSA PCR RESULT.: DETECTED
NRBC # BLD: 0 /100 WBCS — SIGNIFICANT CHANGE UP (ref 0–0)
PHOSPHATE SERPL-MCNC: 2.6 MG/DL — SIGNIFICANT CHANGE UP (ref 2.5–4.5)
PLATELET # BLD AUTO: 221 K/UL — SIGNIFICANT CHANGE UP (ref 150–400)
POTASSIUM SERPL-MCNC: 3.5 MMOL/L — SIGNIFICANT CHANGE UP (ref 3.5–5.3)
POTASSIUM SERPL-SCNC: 3.5 MMOL/L — SIGNIFICANT CHANGE UP (ref 3.5–5.3)
PROCALCITONIN SERPL-MCNC: 14.25 NG/ML — HIGH (ref 0–0.04)
PROT SERPL-MCNC: 6.5 G/DL — SIGNIFICANT CHANGE UP (ref 6–8.3)
PROTHROM AB SERPL-ACNC: 15.7 SEC — HIGH (ref 10.6–13.6)
RBC # BLD: 3.9 M/UL — LOW (ref 4.2–5.8)
RBC # FLD: 13.2 % — SIGNIFICANT CHANGE UP (ref 10.3–14.5)
S AUREUS DNA NOSE QL NAA+PROBE: DETECTED
SARS-COV-2 IGG+IGM SERPL QL IA: >250 U/ML — HIGH
SARS-COV-2 IGG+IGM SERPL QL IA: POSITIVE
SODIUM SERPL-SCNC: 144 MMOL/L — SIGNIFICANT CHANGE UP (ref 135–145)
SPECIMEN SOURCE: SIGNIFICANT CHANGE UP
TSH SERPL-MCNC: 1.49 UIU/ML — SIGNIFICANT CHANGE UP (ref 0.36–3.74)
WBC # BLD: 17.82 K/UL — HIGH (ref 3.8–10.5)
WBC # FLD AUTO: 17.82 K/UL — HIGH (ref 3.8–10.5)

## 2021-05-02 RX ORDER — VANCOMYCIN HCL 1 G
1000 VIAL (EA) INTRAVENOUS ONCE
Refills: 0 | Status: COMPLETED | OUTPATIENT
Start: 2021-05-02 | End: 2021-05-02

## 2021-05-02 RX ADMIN — Medication 20 MILLIGRAM(S): at 23:14

## 2021-05-02 RX ADMIN — Medication 1 MILLIGRAM(S): at 17:59

## 2021-05-02 RX ADMIN — LACTULOSE 10 GRAM(S): 10 SOLUTION ORAL at 12:23

## 2021-05-02 RX ADMIN — ENOXAPARIN SODIUM 40 MILLIGRAM(S): 100 INJECTION SUBCUTANEOUS at 12:23

## 2021-05-02 RX ADMIN — Medication 20 MILLIGRAM(S): at 14:19

## 2021-05-02 RX ADMIN — DIVALPROEX SODIUM 250 MILLIGRAM(S): 500 TABLET, DELAYED RELEASE ORAL at 06:15

## 2021-05-02 RX ADMIN — SODIUM CHLORIDE 60 MILLILITER(S): 9 INJECTION, SOLUTION INTRAVENOUS at 10:37

## 2021-05-02 RX ADMIN — Medication 1 TABLET(S): at 06:15

## 2021-05-02 RX ADMIN — Medication 650 MILLIGRAM(S): at 20:30

## 2021-05-02 RX ADMIN — DIVALPROEX SODIUM 250 MILLIGRAM(S): 500 TABLET, DELAYED RELEASE ORAL at 23:14

## 2021-05-02 RX ADMIN — FLUVOXAMINE MALEATE 100 MILLIGRAM(S): 25 TABLET ORAL at 17:57

## 2021-05-02 RX ADMIN — Medication 20 MILLIGRAM(S): at 06:15

## 2021-05-02 RX ADMIN — Medication 1 TABLET(S): at 23:14

## 2021-05-02 RX ADMIN — Medication 81 MILLIGRAM(S): at 12:23

## 2021-05-02 RX ADMIN — Medication 25 MILLIGRAM(S): at 17:57

## 2021-05-02 RX ADMIN — PANTOPRAZOLE SODIUM 40 MILLIGRAM(S): 20 TABLET, DELAYED RELEASE ORAL at 06:15

## 2021-05-02 RX ADMIN — Medication 650 MILLIGRAM(S): at 14:19

## 2021-05-02 RX ADMIN — Medication 1 MILLIGRAM(S): at 06:15

## 2021-05-02 RX ADMIN — TAMSULOSIN HYDROCHLORIDE 0.8 MILLIGRAM(S): 0.4 CAPSULE ORAL at 23:13

## 2021-05-02 RX ADMIN — Medication 75 MICROGRAM(S): at 06:15

## 2021-05-02 RX ADMIN — FLUVOXAMINE MALEATE 100 MILLIGRAM(S): 25 TABLET ORAL at 06:15

## 2021-05-02 RX ADMIN — Medication 1 TABLET(S): at 14:19

## 2021-05-02 RX ADMIN — DIVALPROEX SODIUM 250 MILLIGRAM(S): 500 TABLET, DELAYED RELEASE ORAL at 14:19

## 2021-05-02 RX ADMIN — Medication 650 MILLIGRAM(S): at 20:00

## 2021-05-02 RX ADMIN — OLANZAPINE 15 MILLIGRAM(S): 15 TABLET, FILM COATED ORAL at 23:14

## 2021-05-02 RX ADMIN — Medication 250 MILLIGRAM(S): at 16:30

## 2021-05-02 NOTE — DIETITIAN INITIAL EVALUATION ADULT. - ORAL INTAKE PTA/DIET HISTORY
Per aide pt with good appetite and PO intake PTA. Per transfer records pt was on a Regular diet with 1" pieces of food, thin liquids (noted to dislike eggs, fish, yellow foods, diet office made aware). Supplement use PTA includes MVI, Calcium +Vitamin D, Vitamin D. NKFA.

## 2021-05-02 NOTE — DIETITIAN INITIAL EVALUATION ADULT. - ADD RECOMMEND
1) Continue with current Regular diet with texture and consistency deferred to SLP, 2) Encourage adequate PO intake, 3) Monitor pt's PO intake, weight, skin, edema, GI distress

## 2021-05-02 NOTE — PROGRESS NOTE ADULT - SUBJECTIVE AND OBJECTIVE BOX
ROSELIA NARAYAN    PLV 1EAS 114 W1    Patient is a 65y old  Male who presents with a chief complaint of      Allergies    Augmentin (Unknown)  aztreonam (Rash)  penicillin (Unknown)  sulfa drugs (Unknown)    Intolerances        HPI:      PAST MEDICAL & SURGICAL HISTORY:  MR (mental retardation), severe    Seizure    HTN (hypertension)    Psoriasis    DD (diastrophic dysplasia)    Blind  right eye    Constipation, unspecified constipation type    No significant past surgical history        FAMILY HISTORY:  No pertinent family history in first degree relatives          MEDICATIONS   acetaminophen   Tablet .. 650 milliGRAM(s) Oral every 6 hours PRN  amLODIPine   Tablet 5 milliGRAM(s) Oral daily  aspirin enteric coated 81 milliGRAM(s) Oral daily  calcium carbonate 1250 mG  + Vitamin D (OsCal 500 + D) 1 Tablet(s) Oral three times a day  clonazePAM  Tablet 1 milliGRAM(s) Oral two times a day  dextrose 5% + sodium chloride 0.45%. 1000 milliLiter(s) IV Continuous <Continuous>  diVALproex ER Oral Tab/Cap - Peds 250 milliGRAM(s) Oral three times a day  enoxaparin Injectable 40 milliGRAM(s) SubCutaneous daily  fluvoxaMINE 100 milliGRAM(s) Oral two times a day  hydrOXYzine  Oral Tab/Cap - Peds 20 milliGRAM(s) Oral every 8 hours  lactulose Syrup 10 Gram(s) Oral daily  levoFLOXacin IVPB 500 milliGRAM(s) IV Intermittent every 24 hours  levothyroxine 75 MICROGram(s) Oral daily  metoprolol tartrate 25 milliGRAM(s) Oral two times a day  OLANZapine 15 milliGRAM(s) Oral at bedtime  pantoprazole    Tablet 40 milliGRAM(s) Oral before breakfast  tamsulosin 0.8 milliGRAM(s) Oral at bedtime      Vital Signs Last 24 Hrs  T(C): 37.1 (02 May 2021 05:54), Max: 39.2 (01 May 2021 16:45)  T(F): 98.7 (02 May 2021 05:54), Max: 102.5 (01 May 2021 16:45)  HR: 86 (02 May 2021 05:54) (86 - 100)  BP: 96/53 (02 May 2021 05:54) (96/53 - 102/61)  BP(mean): --  RR: 17 (02 May 2021 05:54) (16 - 17)  SpO2: 93% (02 May 2021 05:54) (93% - 98%)      21 @ 07:01  -  21 @ 07:00  --------------------------------------------------------  IN: 720 mL / OUT: 0 mL / NET: 720 mL            LABS:                        12.1   17.82 )-----------( 221      ( 02 May 2021 06:40 )             35.1     05-    144  |  112<H>  |  19  ----------------------------<  103<H>  3.5   |  28  |  0.88    Ca    8.1<L>      02 May 2021 06:40  Phos  2.6         TPro  6.5  /  Alb  2.4<L>  /  TBili  0.3  /  DBili  .10  /  AST  25  /  ALT  23  /  AlkPhos  61  05-    PT/INR - ( 02 May 2021 06:40 )   PT: 15.7 sec;   INR: 1.36 ratio           Urinalysis Basic - ( 01 May 2021 09:50 )    Color: Yellow / Appearance: Clear / S.010 / pH: x  Gluc: x / Ketone: Negative  / Bili: Negative / Urobili: Negative   Blood: x / Protein: Negative / Nitrite: Negative   Leuk Esterase: Negative / RBC: x / WBC x   Sq Epi: x / Non Sq Epi: x / Bacteria: x        ABG - ( 01 May 2021 13:19 )  pH, Arterial: 7.43  pH, Blood: x     /  pCO2: 39    /  pO2: 88    / HCO3: 26    / Base Excess: 1.6   /  SaO2: 97                  WBC:  WBC Count: 17.82 K/uL ( @ 06:40)  WBC Count: 14.18 K/uL ( @ 09:43)      MICROBIOLOGY:  RECENT CULTURES:        CARDIAC MARKERS ( 01 May 2021 10:56 )  <.015 ng/mL / x     / 126 U/L / x     / x            PT/INR - ( 02 May 2021 06:40 )   PT: 15.7 sec;   INR: 1.36 ratio             Sodium:  Sodium, Serum: 144 mmol/L ( @ 06:40)  Sodium, Serum: 142 mmol/L ( @ 10:56)      0.88 mg/dL  @ 06:40  0.89 mg/dL  @ 10:56      Hemoglobin:  Hemoglobin: 12.1 g/dL ( @ 06:40)  Hemoglobin: 14.3 g/dL ( @ 09:43)      Platelets: Platelet Count - Automated: 221 K/uL ( @ 06:40)  Platelet Count - Automated: 261 K/uL ( @ 09:43)      LIVER FUNCTIONS - ( 02 May 2021 06:40 )  Alb: 2.4 g/dL / Pro: 6.5 g/dL / ALK PHOS: 61 U/L / ALT: 23 U/L / AST: 25 U/L / GGT: x             Urinalysis Basic - ( 01 May 2021 09:50 )    Color: Yellow / Appearance: Clear / S.010 / pH: x  Gluc: x / Ketone: Negative  / Bili: Negative / Urobili: Negative   Blood: x / Protein: Negative / Nitrite: Negative   Leuk Esterase: Negative / RBC: x / WBC x   Sq Epi: x / Non Sq Epi: x / Bacteria: x        RADIOLOGY & ADDITIONAL STUDIES:

## 2021-05-02 NOTE — PROVIDER CONTACT NOTE (OTHER) - RECOMMENDATIONS
will call MELISA GONZALEZ
continue to observe for worsening rashes and report to MD Infectious Disease if it gets worst

## 2021-05-02 NOTE — PROGRESS NOTE ADULT - ASSESSMENT
SYLVAIN VELOZ [facility]  5/1/2021  DR TASHA CALIXTO     REVIEW OF SYMPTOMS      Able to give (reliable) ROS  NO     PHYSICAL EXAM    HEENT Unremarkable  atraumatic   RESP Fair air entry EXP prolonged    Harsh breath sound Resp distres mild   CARDIAC S1 S2 No S3     NO JVD    ABDOMEN SOFT BS PRESENT NOT DISTENDED No hepatosplenomegaly   PEDAL EDEMA present No calf tenderness  NO rash       PATIENT PRESENTATION.     65M group home resident with PMH severe mental retardation HTN, Cholecystectomy, COVID 4/2020 non-verbal recently admitted [facility] 4/23-4/27/2021  was readmitted 5/1/2021 with fever hypoxia  and pulmonary consulted   In ER pt already got levaquin and ns 2.5 l (5/1/2021 2:13 PM)     RECENT SHARPE   echo 4/23/2021 echo n lvsf n lvedp  4/22/2021 D-dimr 314  4/23 v duplx negv  cta ch 4/22 cta ch no pe bl is edema air trapping cm     Home meds.   amlodipine 5  asa  81 clonazepam 1.2 cosentyx  300q m depakote 250.3 fluvoxamine 100.2 hydroxyzine 20.3 levoxyl 75 metoprolol 25.2 olanzapine 15 omeprazole 20 tamsulosin .4     LINES TUBES poa.      PROBLEMS  SEPSIS poa   PROLACTINEMIA 5/2 CA 14   HYPOXIA poa   GROUND GLASS OPAC RLL ON 5/1 CTA   MRSA POSV 5/2/2021   1 dose Vanco on 5/2/2021   LEVAQUIN (5/1)       PATIENT DATA                ABG.   5/1/2021 24% 743/39/88               OXYGENATION.    5/1/2021 2l 95%              VITALS/IO/VENT/DRIPS.  5/2/2021 102f 86 9650     GLOBAL AND BEST PRACTICE ISSUES                     ALLERGY.    azactm augmentn pncl sulfa   WEIGHT.    5/1/2021 81K                            BMI.              5/1/2021 29                ADVANCED DIRECTIVE.                               HEAD OF BED ELEVATION. Yes  DVT PROPHYLAXIS.  lvnx 40 (5/1)                     SAUER PROPHYLAXIS.   APA.   asa 81 (5/1)                                                                  DYSPHAGIA RECOMM.   DIET.   dys 2 mech soft nectar (5/1)    INFECTION PROPHYLAXIS.   FREE WATER.    IV.          D5 1/2 60 (5/1)               ASSESSMENT/RECOMMENDATIONS.    SEPSIS poa   Had fever 102 on 5/2/2021  Pt had leukocytosis w 14 poa and procalc 4/5 poa   5/1/2021 CT ch did not show infiltr  5/1/2021 RVP was negv   Cont empiric levaquin  started 5/1   MRSA is posv 5/2/2021   ID eval appreciated  HYPOXIA poa   O2 to keep po 90-95%  VTE RULED OUT  V duplx 5/1  negv   cta 5/1 cta ch No pe rll ggo fu study recommended in 6 w   GROUND GLASS OPAC RLL  Will need followup ct chend June 2021   NEUROPSYCHIATRIC MEDS  Cont prev meds   DYSPHAGIA EVAL   Speech consulted 5/1/2021         TIME SPENT   Over 25 minutes aggregate care time spent on encounter; activities included   direct patient care, counseling and/or coordinating care reviewing notes, lab data/ imaging , discussion with multidisciplinary team/ patient  /family and explaining in detail risks, benefits, alternatives  of the recommendations     SYLVAIN VELOZ [facility]  5/1/2021  DR TASHA CALIXTO

## 2021-05-02 NOTE — DIETITIAN INITIAL EVALUATION ADULT. - OTHER INFO
As per chart pt is a 65 year old male with a PMH of severe MRDD, constipation, blind right eye, diastrophic dysplasia, psoriasis, HTN, and seizure who presented from group home was admitted for fever and hypoxia.     Pt seen at bedside with group home aide at bedside. Per aide pt currently with good appetite and PO intake, tolerating Dysphagia 2 mechanical soft, nectar thick liquids at this time. Pt's admission weight 179.14lbs. No weight listed in transfer records, aide thinks pt's current weight is ~160lbs. Per previous recent RD note pt's weight (4/25/2021) 154lbs, pt appears closer to this weight and weight stated by aide. Continue to monitor pt's daily weights to obtain more accurate current weight. No GI distress noted at this time, no BM since admission.     No pressure injuries noted at this time

## 2021-05-02 NOTE — CONSULT NOTE ADULT - ASSESSMENT
Patient is a 65 year old male with PMH of severe MR, constipation, blind right eye, diastrophic dysplasia, psoriasis, HTN, and seizure who presented from group home was admitted for fever and hypoxia.     Fever with hypoxia - unclear etiology- ?aspiration   - febrile at group home 104F, febrile in ER to 103.9F with hypoxia 89% on room air - on NC   - leukocytosis trended up to ~17k   - RVP and COVID negative. Legionella ur ag negative    - CT chest with no acute infiltrates reported, CTA chest with no PE, unchanged RLL ggo   - CTAP with no acute pathology reported   - UA with no pyuria   - DVT study negative b/l LE   - S&S evaluation for dysphagia   - Follow blood and urine cultures    - Follow MRSA screen, S pneumonia ur ag   - Continue on empiric levofloxacin pending above (has multiple abx allergies)   - monitor WBC and temps        Covering for Dr. Daniels.  Magda Vieyra M.D.  [facility], Division of Infectious Diseases  [phone]  After 5pm on weekdays and all day on weekends - please call [phone]   Patient is a 65 year old male with PMH of severe MR, constipation, blind right eye, diastrophic dysplasia, psoriasis, HTN, and seizure who presented from group home was admitted for fever and hypoxia.     Fever with hypoxia - unclear etiology- ?aspiration   - febrile at group home 104F, febrile in ER to 103.9F with hypoxia 89% on room air - on NC   - leukocytosis trended up to ~17k   - RVP and COVID negative. Legionella ur ag negative    - CT chest with no acute infiltrates reported, CTA chest with no PE, unchanged RLL ggo   - CTAP with no acute pathology reported   - UA with no pyuria   - DVT study negative b/l LE   - S&S evaluation for dysphagia   - Follow blood and urine cultures    - Follow MRSA screen, S pneumonia ur ag   - Continue on empiric levofloxacin pending above (has multiple abx allergies)   - monitor WBC and temps    - aspiration precautions       Covering for Dr. Daniels.  Magda Vieyra M.D.  [facility], Division of Infectious Diseases  [phone]  After 5pm on weekdays and all day on weekends - please call [phone]

## 2021-05-02 NOTE — PROVIDER CONTACT NOTE (OTHER) - ASSESSMENT
alert, , O2sat 93% 2 L NC, no sign of acute distress
alert,, HR elevated, no shortness of breat. O2 sat 93%, , no sign of acute distress, rashes at back noted

## 2021-05-02 NOTE — CONSULT NOTE ADULT - SUBJECTIVE AND OBJECTIVE BOX
[facility], Division of Infectious Diseases  YARON Donovan, ELSA Botlelo  [phone]  ([phone] - weekdays after 5pm and weekends)    ROSELIA NARAYAN  65y, Male  [id number]    HPI:  Patient is a 65 year old male with PMH of severe MR, constipation, blind right eye, diastrophic dysplasia, psoriasis, HTN, and seizure who presented from group home with fever and low oxygen level yesterday am. Per caregiver, went to get patient out of bed and noticed he was shivering, was noted with temp of 100F on forehead and then from ear had a temp recorded at 104F. They also took pulse ox and was noted in the 80s; per caregiver he has some wheezing at times and was noted with mild wheezes. Patient is able to dress himself and caregiver noticed he was having trouble with shoes. Patient with no noted coughing, vomiting, diarrhea, did not appear to have any pain. Patient is abl to eat on his own, per caregiver he is given small amounts of fluids as he at times drinks too fast; they have noticed he slurps liquids and medications, has not been noted with coughing or choking with eating. He was on a pureed diet on last admission but not at group home per caregiver. He was recently admitted at this hospital - for fever. Previous hospital notes reviewed, diagnosed with viral syndrome. CTA showed no evidence of PE, pneumonia. RVP negative. abx were discontinued in hospital. Patient has covid last 2020 and has received both COVID vaccinations. Patient seen at bedside, awake in bed, appears comfortably. Per caregiver, patient slept most of the day yesterday, he appears better today. Patient had his breakfast on his own.   ROS: unable to obtain, nonverbal    Allergies: Augmentin (Unknown)  aztreonam (Rash)  penicillin (Unknown)  sulfa drugs (Unknown)    PMH -- MR (mental retardation), severe  Seizure  HTN (hypertension)  Psoriasis  DD (diastrophic dysplasia)  Blind  Constipation, unspecified constipation type    PSH -- No significant past surgical history  FH -- No pertinent family history in first degree relatives  Social History -- no tobacco, alcohol or illicit drug use    Physical Exam--  Vital Signs Last 24 Hrs  T(F): 98.7 (02 May 2021 05:54), Max: 102.5 (01 May 2021 16:45)  HR: 86 (02 May 2021 05:54) (86 - 100)  BP: 96/53 (02 May 2021 05:54) (96/53 - 102/61)  RR: 17 (02 May 2021 05:54) (16 - 17)  SpO2: 93% (02 May 2021 05:54) (93% - 98%)  General: nontoxic-appearing, no acute distress, NC  HEENT: NC/AT, anicteric, conjunctiva pink and moist, neck supple  Lungs: decreased breath sounds bilaterally, no rales, wheezes or rhonchi  Heart: S1 and S2 present, RRR, No murmur, rub or gallop appreciated  Abdomen: Soft. Nondistended. Nontender. BS present.  Neuro: awake, nonverbal, not following commands   Extremities: No cyanosis or clubbing. No edema.   Skin: Warm. Dry. Good turgor. psoriasis skin changes on LEs  Lines: PIV    Laboratory & Imaging Data--  CBC:                       12.1   17.82 )-----------( 221      ( 02 May 2021 06:40 )             35.1     WBC trend:  WBC Count: 17.82 K/uL (21 @ 06:40)  WBC Count: 14.18 K/uL (21 @ 09:43)  WBC Count: 8.20 K/uL (21 @ 08:17)    CMP:     144  |  112<H>  |  19  ----------------------------<  103<H>  3.5   |  28  |  0.88    Ca    8.1<L>      02 May 2021 06:40  Phos  2.6     -    TPro  6.5  /  Alb  2.4<L>  /  TBili  0.3  /  DBili  .10  /  AST  25  /  ALT  23  /  AlkPhos  61  05-02    LIVER FUNCTIONS - ( 02 May 2021 06:40 )  Alb: 2.4 g/dL / Pro: 6.5 g/dL / ALK PHOS: 61 U/L / ALT: 23 U/L / AST: 25 U/L / GGT: x           Urinalysis Basic - ( 01 May 2021 09:50 )  Color: Yellow / Appearance: Clear / S.010 / pH: x  Gluc: x / Ketone: Negative  / Bili: Negative / Urobili: Negative   Blood: x / Protein: Negative / Nitrite: Negative   Leuk Esterase: Negative / RBC: x / WBC x   Sq Epi: x / Non Sq Epi: x / Bacteria: x    Microbiology:    - Legionella urine ag - negative    - RVP/COVID-19 - negative     Radiology--  US Duplex Venous Lower Ext Complete, Bilateral (21 @ 16:07) >IMPRESSION: No evidence of deep venous thrombosis in either lower extremity.     CT Angio Chest Abd Pelvis w/ IV Cont (21 @ 14:25) >  Chest:  1.  No pulmonary embolus.  2.  The right lower lobe groundglass opacities are unchanged allowing for differences in technique an respiratory motion. A follow-up study is recommended to evaluate for resolution/change to include prone imaging in 6-8 weeks.  Abdomen and pelvis: 1.  No bowel detection.    CT Chest No Cont (21 @ 10:18) >IMPRESSION: No evidence of acute infiltrates    Xray Chest 1 View AP/PA (21 @ 09:37) >Impression: Patchy airspace opacity in the region of the lingula.    Active Medications--  acetaminophen   Tablet .. 650 milliGRAM(s) Oral every 6 hours PRN  amLODIPine   Tablet 5 milliGRAM(s) Oral daily  aspirin enteric coated 81 milliGRAM(s) Oral daily  calcium carbonate 1250 mG  + Vitamin D (OsCal 500 + D) 1 Tablet(s) Oral three times a day  clonazePAM  Tablet 1 milliGRAM(s) Oral two times a day  dextrose 5% + sodium chloride 0.45%. 1000 milliLiter(s) IV Continuous <Continuous>  diVALproex ER Oral Tab/Cap - Peds 250 milliGRAM(s) Oral three times a day  enoxaparin Injectable 40 milliGRAM(s) SubCutaneous daily  fluvoxaMINE 100 milliGRAM(s) Oral two times a day  hydrOXYzine  Oral Tab/Cap - Peds 20 milliGRAM(s) Oral every 8 hours  lactulose Syrup 10 Gram(s) Oral daily  levoFLOXacin IVPB 500 milliGRAM(s) IV Intermittent every 24 hours  levothyroxine 75 MICROGram(s) Oral daily  metoprolol tartrate 25 milliGRAM(s) Oral two times a day  OLANZapine 15 milliGRAM(s) Oral at bedtime  pantoprazole    Tablet 40 milliGRAM(s) Oral before breakfast  tamsulosin 0.8 milliGRAM(s) Oral at bedtime    Antimicrobials:   levoFLOXacin IVPB 500 milliGRAM(s) IV Intermittent every 24 hours - started

## 2021-05-03 DIAGNOSIS — R09.02 HYPOXEMIA: ICD-10-CM

## 2021-05-03 LAB
ALBUMIN SERPL ELPH-MCNC: 2.3 G/DL — LOW (ref 3.3–5)
ALP SERPL-CCNC: 60 U/L — SIGNIFICANT CHANGE UP (ref 40–120)
ALT FLD-CCNC: 19 U/L — SIGNIFICANT CHANGE UP (ref 12–78)
ANION GAP SERPL CALC-SCNC: 6 MMOL/L — SIGNIFICANT CHANGE UP (ref 5–17)
APPEARANCE UR: CLEAR — SIGNIFICANT CHANGE UP
AST SERPL-CCNC: 19 U/L — SIGNIFICANT CHANGE UP (ref 15–37)
BILIRUB SERPL-MCNC: 0.2 MG/DL — SIGNIFICANT CHANGE UP (ref 0.2–1.2)
BILIRUB UR-MCNC: NEGATIVE — SIGNIFICANT CHANGE UP
BUN SERPL-MCNC: 12 MG/DL — SIGNIFICANT CHANGE UP (ref 7–23)
CALCIUM SERPL-MCNC: 8.1 MG/DL — LOW (ref 8.5–10.1)
CHLORIDE SERPL-SCNC: 111 MMOL/L — HIGH (ref 96–108)
CO2 SERPL-SCNC: 27 MMOL/L — SIGNIFICANT CHANGE UP (ref 22–31)
COLOR SPEC: SIGNIFICANT CHANGE UP
CREAT SERPL-MCNC: 0.88 MG/DL — SIGNIFICANT CHANGE UP (ref 0.5–1.3)
DIFF PNL FLD: NEGATIVE — SIGNIFICANT CHANGE UP
GLUCOSE SERPL-MCNC: 111 MG/DL — HIGH (ref 70–99)
GLUCOSE UR QL: NEGATIVE — SIGNIFICANT CHANGE UP
HCT VFR BLD CALC: 35 % — LOW (ref 39–50)
HGB BLD-MCNC: 12.1 G/DL — LOW (ref 13–17)
KETONES UR-MCNC: NEGATIVE — SIGNIFICANT CHANGE UP
LEUKOCYTE ESTERASE UR-ACNC: NEGATIVE — SIGNIFICANT CHANGE UP
MCHC RBC-ENTMCNC: 30.9 PG — SIGNIFICANT CHANGE UP (ref 27–34)
MCHC RBC-ENTMCNC: 34.6 GM/DL — SIGNIFICANT CHANGE UP (ref 32–36)
MCV RBC AUTO: 89.3 FL — SIGNIFICANT CHANGE UP (ref 80–100)
NITRITE UR-MCNC: NEGATIVE — SIGNIFICANT CHANGE UP
NRBC # BLD: 0 /100 WBCS — SIGNIFICANT CHANGE UP (ref 0–0)
PH UR: 7 — SIGNIFICANT CHANGE UP (ref 5–8)
PLATELET # BLD AUTO: 220 K/UL — SIGNIFICANT CHANGE UP (ref 150–400)
POTASSIUM SERPL-MCNC: 3.5 MMOL/L — SIGNIFICANT CHANGE UP (ref 3.5–5.3)
POTASSIUM SERPL-SCNC: 3.5 MMOL/L — SIGNIFICANT CHANGE UP (ref 3.5–5.3)
PROCALCITONIN SERPL-MCNC: 5.78 NG/ML — HIGH (ref 0–0.04)
PROT SERPL-MCNC: 6.6 G/DL — SIGNIFICANT CHANGE UP (ref 6–8.3)
PROT UR-MCNC: NEGATIVE — SIGNIFICANT CHANGE UP
RBC # BLD: 3.92 M/UL — LOW (ref 4.2–5.8)
RBC # FLD: 13.5 % — SIGNIFICANT CHANGE UP (ref 10.3–14.5)
S PNEUM AG UR QL: NEGATIVE — SIGNIFICANT CHANGE UP
SODIUM SERPL-SCNC: 144 MMOL/L — SIGNIFICANT CHANGE UP (ref 135–145)
SP GR SPEC: 1 — LOW (ref 1.01–1.02)
UROBILINOGEN FLD QL: NEGATIVE — SIGNIFICANT CHANGE UP
WBC # BLD: 13.13 K/UL — HIGH (ref 3.8–10.5)
WBC # FLD AUTO: 13.13 K/UL — HIGH (ref 3.8–10.5)

## 2021-05-03 PROCEDURE — 93970 EXTREMITY STUDY: CPT | Mod: 26

## 2021-05-03 RX ORDER — MULTIVIT-MIN/FERROUS GLUCONATE 9 MG/15 ML
15 LIQUID (ML) ORAL DAILY
Refills: 0 | Status: DISCONTINUED | OUTPATIENT
Start: 2021-05-03 | End: 2021-05-11

## 2021-05-03 RX ORDER — ZINC SULFATE TAB 220 MG (50 MG ZINC EQUIVALENT) 220 (50 ZN) MG
220 TAB ORAL DAILY
Refills: 0 | Status: DISCONTINUED | OUTPATIENT
Start: 2021-05-03 | End: 2021-05-11

## 2021-05-03 RX ORDER — ASCORBIC ACID 60 MG
500 TABLET,CHEWABLE ORAL
Refills: 0 | Status: DISCONTINUED | OUTPATIENT
Start: 2021-05-03 | End: 2021-05-11

## 2021-05-03 RX ORDER — CEFTRIAXONE 500 MG/1
1000 INJECTION, POWDER, FOR SOLUTION INTRAMUSCULAR; INTRAVENOUS EVERY 24 HOURS
Refills: 0 | Status: COMPLETED | OUTPATIENT
Start: 2021-05-03 | End: 2021-05-09

## 2021-05-03 RX ORDER — VANCOMYCIN HCL 1 G
1000 VIAL (EA) INTRAVENOUS ONCE
Refills: 0 | Status: COMPLETED | OUTPATIENT
Start: 2021-05-03 | End: 2021-05-03

## 2021-05-03 RX ORDER — VANCOMYCIN HCL 1 G
1000 VIAL (EA) INTRAVENOUS EVERY 12 HOURS
Refills: 0 | Status: DISCONTINUED | OUTPATIENT
Start: 2021-05-03 | End: 2021-05-07

## 2021-05-03 RX ORDER — VANCOMYCIN HCL 1 G
1250 VIAL (EA) INTRAVENOUS EVERY 12 HOURS
Refills: 0 | Status: DISCONTINUED | OUTPATIENT
Start: 2021-05-03 | End: 2021-05-03

## 2021-05-03 RX ORDER — LORATADINE 10 MG/1
10 TABLET ORAL DAILY
Refills: 0 | Status: DISCONTINUED | OUTPATIENT
Start: 2021-05-03 | End: 2021-05-11

## 2021-05-03 RX ORDER — CHOLECALCIFEROL (VITAMIN D3) 125 MCG
2000 CAPSULE ORAL
Refills: 0 | Status: DISCONTINUED | OUTPATIENT
Start: 2021-05-03 | End: 2021-05-11

## 2021-05-03 RX ADMIN — DIVALPROEX SODIUM 250 MILLIGRAM(S): 500 TABLET, DELAYED RELEASE ORAL at 13:50

## 2021-05-03 RX ADMIN — OLANZAPINE 15 MILLIGRAM(S): 15 TABLET, FILM COATED ORAL at 21:47

## 2021-05-03 RX ADMIN — CEFTRIAXONE 100 MILLIGRAM(S): 500 INJECTION, POWDER, FOR SOLUTION INTRAMUSCULAR; INTRAVENOUS at 16:06

## 2021-05-03 RX ADMIN — FLUVOXAMINE MALEATE 100 MILLIGRAM(S): 25 TABLET ORAL at 17:46

## 2021-05-03 RX ADMIN — Medication 81 MILLIGRAM(S): at 11:44

## 2021-05-03 RX ADMIN — Medication 1 TABLET(S): at 21:47

## 2021-05-03 RX ADMIN — Medication 1 TABLET(S): at 05:28

## 2021-05-03 RX ADMIN — Medication 650 MILLIGRAM(S): at 06:02

## 2021-05-03 RX ADMIN — TAMSULOSIN HYDROCHLORIDE 0.8 MILLIGRAM(S): 0.4 CAPSULE ORAL at 21:47

## 2021-05-03 RX ADMIN — DIVALPROEX SODIUM 250 MILLIGRAM(S): 500 TABLET, DELAYED RELEASE ORAL at 21:47

## 2021-05-03 RX ADMIN — Medication 250 MILLIGRAM(S): at 22:17

## 2021-05-03 RX ADMIN — LACTULOSE 10 GRAM(S): 10 SOLUTION ORAL at 11:44

## 2021-05-03 RX ADMIN — Medication 20 MILLIGRAM(S): at 05:28

## 2021-05-03 RX ADMIN — Medication 75 MICROGRAM(S): at 05:28

## 2021-05-03 RX ADMIN — Medication 250 MILLIGRAM(S): at 13:43

## 2021-05-03 RX ADMIN — FLUVOXAMINE MALEATE 100 MILLIGRAM(S): 25 TABLET ORAL at 05:29

## 2021-05-03 RX ADMIN — Medication 1 TABLET(S): at 13:50

## 2021-05-03 RX ADMIN — DIVALPROEX SODIUM 250 MILLIGRAM(S): 500 TABLET, DELAYED RELEASE ORAL at 05:28

## 2021-05-03 RX ADMIN — ENOXAPARIN SODIUM 40 MILLIGRAM(S): 100 INJECTION SUBCUTANEOUS at 11:44

## 2021-05-03 RX ADMIN — PANTOPRAZOLE SODIUM 40 MILLIGRAM(S): 20 TABLET, DELAYED RELEASE ORAL at 05:28

## 2021-05-03 RX ADMIN — AMLODIPINE BESYLATE 5 MILLIGRAM(S): 2.5 TABLET ORAL at 05:28

## 2021-05-03 RX ADMIN — SODIUM CHLORIDE 60 MILLILITER(S): 9 INJECTION, SOLUTION INTRAVENOUS at 05:31

## 2021-05-03 RX ADMIN — Medication 20 MILLIGRAM(S): at 21:47

## 2021-05-03 RX ADMIN — Medication 25 MILLIGRAM(S): at 17:46

## 2021-05-03 RX ADMIN — Medication 25 MILLIGRAM(S): at 05:28

## 2021-05-03 RX ADMIN — Medication 1 MILLIGRAM(S): at 05:31

## 2021-05-03 RX ADMIN — Medication 650 MILLIGRAM(S): at 05:32

## 2021-05-03 RX ADMIN — Medication 20 MILLIGRAM(S): at 13:50

## 2021-05-03 RX ADMIN — SODIUM CHLORIDE 60 MILLILITER(S): 9 INJECTION, SOLUTION INTRAVENOUS at 03:52

## 2021-05-03 RX ADMIN — LORATADINE 10 MILLIGRAM(S): 10 TABLET ORAL at 11:44

## 2021-05-03 RX ADMIN — Medication 1 MILLIGRAM(S): at 17:46

## 2021-05-03 NOTE — SWALLOW BEDSIDE ASSESSMENT ADULT - SWALLOW EVAL: PROGNOSIS
quantities, which resulted in pt initiated multiple swallows. posterior loss over base of tongue suspected with each swallow, with increased work of breathing noted post PO suggestive of penetration/aspiration.

## 2021-05-03 NOTE — PROVIDER CONTACT NOTE (OTHER) - DATE AND TIME:
03-May-2021 08:53
02-May-2021 14:57
03-May-2021 09:52
03-May-2021 11:55
03-May-2021 16:32
02-May-2021 14:27

## 2021-05-03 NOTE — SWALLOW BEDSIDE ASSESSMENT ADULT - PHARYNGEAL PHASE
Delayed pharyngeal swallow/Decreased laryngeal elevation increased work of breathing noted post PO/Delayed pharyngeal swallow/Decreased laryngeal elevation/Multiple swallows

## 2021-05-03 NOTE — PROVIDER CONTACT NOTE (OTHER) - ACTION/TREATMENT ORDERED:
Dr. Betsy miller. Dr. Nixon aware. bladder scan to be repeated in 1 hour. urine culture and urine analysis to be order. will notified dr. nixon if bladder scan above 500cc

## 2021-05-03 NOTE — SWALLOW BEDSIDE ASSESSMENT ADULT - SWALLOW EVAL: DIAGNOSIS
1- mild oral dysphagia given puree and nectar thick liquids marked by delayed bolus collection, transfer and transport. 2- mild-moderate oral dysphagia given solids marked by prolonged mastication resulting in delayed oral preparation/AP transit with trace lingual residue remaining post swallow. lingual residue is reduced though not eliminated following liquid wash. 3- moderate oral dysphagia given thin liquids marked by rapid, uncontrolled intake resulting in oral holding of thin liquid with suspected posterior loss over base of tongue prior to initiation of pharyngeal swallow trigger. 4- mild pharyngeal dysphagia given solid, puree and nectar thick liquids marked by delayed swallow trigger and reduced hyolaryngeal excursion without any overt s/s of penetration/aspiration. 5- moderate-severe pharyngeal dysphagia given thin liquids marked by delayed swallow trigger and reduced hyolaryngeal excursion. as mentioned above, pt with uncontrolled intake resulting in oral holding of large PO

## 2021-05-03 NOTE — SWALLOW BEDSIDE ASSESSMENT ADULT - SWALLOW EVAL: CURRENT DIET
dysphagia 2 with nectar thick liquids, per MD order
dysphagia 2 with nectar thick liquids, per MD order

## 2021-05-03 NOTE — CONSULT NOTE ADULT - SUBJECTIVE AND OBJECTIVE BOX
[facility] Cardiology Consult - Julieta Portillo Tsiakos Alex [phone]    CHIEF COMPLAINT: Patient is a 65y old  Male who presents with a chief complaint of fevers (02 May 2021 14:23)      HPI:      PAST MEDICAL & SURGICAL HISTORY:  MR (mental retardation), severe    Seizure    HTN (hypertension)    Psoriasis    DD (diastrophic dysplasia)    Blind  right eye    Constipation, unspecified constipation type    No significant past surgical history        SOCIAL HISTORY: Alochol: Denied  Smoking: Nonsmoker  Drug Use: Denied  Marital Status:         FAMILY HISTORY: FAMILY HISTORY:  No pertinent family history in first degree relatives        MEDICATIONS  (STANDING):  amLODIPine   Tablet 5 milliGRAM(s) Oral daily  aspirin enteric coated 81 milliGRAM(s) Oral daily  calcium carbonate 1250 mG  + Vitamin D (OsCal 500 + D) 1 Tablet(s) Oral three times a day  clonazePAM  Tablet 1 milliGRAM(s) Oral two times a day  dextrose 5% + sodium chloride 0.45%. 1000 milliLiter(s) (60 mL/Hr) IV Continuous <Continuous>  diVALproex ER Oral Tab/Cap - Peds 250 milliGRAM(s) Oral three times a day  enoxaparin Injectable 40 milliGRAM(s) SubCutaneous daily  fluvoxaMINE 100 milliGRAM(s) Oral two times a day  hydrOXYzine  Oral Tab/Cap - Peds 20 milliGRAM(s) Oral every 8 hours  lactulose Syrup 10 Gram(s) Oral daily  levoFLOXacin IVPB 500 milliGRAM(s) IV Intermittent every 24 hours  levothyroxine 75 MICROGram(s) Oral daily  metoprolol tartrate 25 milliGRAM(s) Oral two times a day  OLANZapine 15 milliGRAM(s) Oral at bedtime  pantoprazole    Tablet 40 milliGRAM(s) Oral before breakfast  tamsulosin 0.8 milliGRAM(s) Oral at bedtime    MEDICATIONS  (PRN):  acetaminophen   Tablet .. 650 milliGRAM(s) Oral every 6 hours PRN Temp greater or equal to 38C (100.4F)      Allergies    Augmentin (Unknown)  aztreonam (Rash)  penicillin (Unknown)  sulfa drugs (Unknown)    Intolerances        REVIEW OF SYSTEMS:  CONSTITUTIONAL: No weakness, fevers or chills  EYES/ENT: No visual changes;  No vertigo or throat pain   NECK: No pain or stiffness  RESPIRATORY: No cough, wheezing, hemoptysis; No shortness of breath  CARDIOVASCULAR: No chest pain or palpitations  GASTROINTESTINAL: No abdominal pain. No nausea, vomiting, or hematemesis; No diarrhea or constipation. No melena or hematochezia.  GENITOURINARY: No dysuria, frequency or hematuria  NEUROLOGICAL: No numbness or weakness  SKIN: No itching or rash  All other review of systems is negative unless indicated above    VITAL SIGNS:   Vital Signs Last 24 Hrs  T(C): 37.3 (03 May 2021 05:04), Max: 39.3 (02 May 2021 14:18)  T(F): 99.2 (03 May 2021 05:04), Max: 102.8 (02 May 2021 14:18)  HR: 95 (03 May 2021 05:04) (95 - 122)  BP: 143/75 (03 May 2021 05:04) (111/66 - 143/75)  BP(mean): --  RR: 18 (03 May 2021 05:04) (18 - 18)  SpO2: 93% (03 May 2021 05:04) (93% - 98%)    I&O's Summary    02 May 2021 07:01  -  03 May 2021 07:00  --------------------------------------------------------  IN: 720 mL / OUT: 0 mL / NET: 720 mL        PHYSICAL EXAM:  Constitutional: Awake in NAD  HEENT:  HERBERTH, EOMI  Neck: No JVD  Pulmonary: CTA B/L No R/R/W  Cardiovascular: PMI not palpable non-displaced Regular S1 and S2, no murmurs, rubs, gallops or clicks  Gastrointestinal: Bowel Sounds present, soft, nontender.   Extremities: Trace peripheral edema.   Neuro: No gross focal deficits    LABS: All Labs Reviewed:                        12.1   13.13 )-----------( 220      ( 03 May 2021 06:31 )             35.0                         12.1   17.82 )-----------( 221      ( 02 May 2021 06:40 )             35.1                         14.3   14.18 )-----------( 261      ( 01 May 2021 09:43 )             40.2     03 May 2021 06:31    144    |  111    |  12     ----------------------------<  111    3.5     |  27     |  0.88   02 May 2021 06:40    144    |  112    |  19     ----------------------------<  103    3.5     |  28     |  0.88   01 May 2021 10:56    142    |  110    |  16     ----------------------------<  105    3.3     |  26     |  0.89     Ca    8.1        03 May 2021 06:31  Ca    8.1        02 May 2021 06:40  Ca    7.9        01 May 2021 10:56  Phos  2.6       02 May 2021 06:40    TPro  6.6    /  Alb  2.3    /  TBili  0.2    /  DBili  x      /  AST  19     /  ALT  19     /  AlkPhos  60     03 May 2021 06:31  TPro  6.5    /  Alb  2.4    /  TBili  0.3    /  DBili  .10    /  AST  25     /  ALT  23     /  AlkPhos  61     02 May 2021 06:40  TPro  7.0    /  Alb  2.8    /  TBili  0.3    /  DBili  x      /  AST  26     /  ALT  24     /  AlkPhos  69     01 May 2021 10:56    PT/INR - ( 02 May 2021 06:40 )   PT: 15.7 sec;   INR: 1.36 ratio           CARDIAC MARKERS ( 01 May 2021 10:56 )  <.015 ng/mL / x     / 126 U/L / x     / x          Blood Culture: Organism --  Gram Stain Blood -- Gram Stain --  Specimen Source .Blood Blood-Peripheral  Culture-Blood --    Organism --  Gram Stain Blood -- Gram Stain --  Specimen Source .Urine Clean Catch (Midstream)  Culture-Blood --      05-01 @ 10:56  Pro Bnp 159    05-02 @ 06:40  TSH: 1.49      RADIOLOGY/EKG:     [facility] Cardiology Consult - Julieta Portillo TsAlex clark [phone]    CHIEF COMPLAINT: Patient is a 65y old  Male who presents with a chief complaint of fevers (02 May 2021 14:23).  Patient is nonverbal.  Aid at bedside.  No events overnight.  Still with low grade fevers overnight      HPI:      PAST MEDICAL & SURGICAL HISTORY:  MR (mental retardation), severe    Seizure    HTN (hypertension)    Psoriasis    DD (diastrophic dysplasia)    Blind  right eye    Constipation, unspecified constipation type    No significant past surgical history        SOCIAL HISTORY: Alochol: Denied  Smoking: Nonsmoker  Drug Use: Denied  Marital Status:         FAMILY HISTORY: FAMILY HISTORY:  No pertinent family history in first degree relatives        MEDICATIONS  (STANDING):  amLODIPine   Tablet 5 milliGRAM(s) Oral daily  aspirin enteric coated 81 milliGRAM(s) Oral daily  calcium carbonate 1250 mG  + Vitamin D (OsCal 500 + D) 1 Tablet(s) Oral three times a day  clonazePAM  Tablet 1 milliGRAM(s) Oral two times a day  dextrose 5% + sodium chloride 0.45%. 1000 milliLiter(s) (60 mL/Hr) IV Continuous <Continuous>  diVALproex ER Oral Tab/Cap - Peds 250 milliGRAM(s) Oral three times a day  enoxaparin Injectable 40 milliGRAM(s) SubCutaneous daily  fluvoxaMINE 100 milliGRAM(s) Oral two times a day  hydrOXYzine  Oral Tab/Cap - Peds 20 milliGRAM(s) Oral every 8 hours  lactulose Syrup 10 Gram(s) Oral daily  levoFLOXacin IVPB 500 milliGRAM(s) IV Intermittent every 24 hours  levothyroxine 75 MICROGram(s) Oral daily  metoprolol tartrate 25 milliGRAM(s) Oral two times a day  OLANZapine 15 milliGRAM(s) Oral at bedtime  pantoprazole    Tablet 40 milliGRAM(s) Oral before breakfast  tamsulosin 0.8 milliGRAM(s) Oral at bedtime    MEDICATIONS  (PRN):  acetaminophen   Tablet .. 650 milliGRAM(s) Oral every 6 hours PRN Temp greater or equal to 38C (100.4F)      Allergies    Augmentin (Unknown)  aztreonam (Rash)  penicillin (Unknown)  sulfa drugs (Unknown)    Intolerances        REVIEW OF SYSTEMS:  CONSTITUTIONAL: No weakness, fevers or chills  EYES/ENT: No visual changes;  No vertigo or throat pain   NECK: No pain or stiffness  RESPIRATORY: No cough, wheezing, hemoptysis; No shortness of breath  CARDIOVASCULAR: No chest pain or palpitations  GASTROINTESTINAL: No abdominal pain. No nausea, vomiting, or hematemesis; No diarrhea or constipation. No melena or hematochezia.  GENITOURINARY: No dysuria, frequency or hematuria  NEUROLOGICAL: MRDD  SKIN: No itching or rash  All other review of systems is negative unless indicated above    VITAL SIGNS:   Vital Signs Last 24 Hrs  T(C): 37.3 (03 May 2021 05:04), Max: 39.3 (02 May 2021 14:18)  T(F): 99.2 (03 May 2021 05:04), Max: 102.8 (02 May 2021 14:18)  HR: 95 (03 May 2021 05:04) (95 - 122)  BP: 143/75 (03 May 2021 05:04) (111/66 - 143/75)  BP(mean): --  RR: 18 (03 May 2021 05:04) (18 - 18)  SpO2: 93% (03 May 2021 05:04) (93% - 98%)    I&O's Summary    02 May 2021 07:01  -  03 May 2021 07:00  --------------------------------------------------------  IN: 720 mL / OUT: 0 mL / NET: 720 mL        PHYSICAL EXAM:  Constitutional: Awake in NAD  HEENT:  HERBERTH, EOMI  Neck: No JVD  Pulmonary: CTA B/L No R/R/W  Cardiovascular: PMI not palpable non-displaced Regular S1 and S2, no murmur  Gastrointestinal: Bowel Sounds present, soft, nontender.   Extremities: Trace peripheral edema.   Neuro: No gross focal deficits    LABS: All Labs Reviewed:                        12.1   13.13 )-----------( 220      ( 03 May 2021 06:31 )             35.0                         12.1   17.82 )-----------( 221      ( 02 May 2021 06:40 )             35.1                         14.3   14.18 )-----------( 261      ( 01 May 2021 09:43 )             40.2     03 May 2021 06:31    144    |  111    |  12     ----------------------------<  111    3.5     |  27     |  0.88   02 May 2021 06:40    144    |  112    |  19     ----------------------------<  103    3.5     |  28     |  0.88   01 May 2021 10:56    142    |  110    |  16     ----------------------------<  105    3.3     |  26     |  0.89     Ca    8.1        03 May 2021 06:31  Ca    8.1        02 May 2021 06:40  Ca    7.9        01 May 2021 10:56  Phos  2.6       02 May 2021 06:40    TPro  6.6    /  Alb  2.3    /  TBili  0.2    /  DBili  x      /  AST  19     /  ALT  19     /  AlkPhos  60     03 May 2021 06:31  TPro  6.5    /  Alb  2.4    /  TBili  0.3    /  DBili  .10    /  AST  25     /  ALT  23     /  AlkPhos  61     02 May 2021 06:40  TPro  7.0    /  Alb  2.8    /  TBili  0.3    /  DBili  x      /  AST  26     /  ALT  24     /  AlkPhos  69     01 May 2021 10:56    PT/INR - ( 02 May 2021 06:40 )   PT: 15.7 sec;   INR: 1.36 ratio           CARDIAC MARKERS ( 01 May 2021 10:56 )  <.015 ng/mL / x     / 126 U/L / x     / x          Blood Culture: Organism --  Gram Stain Blood -- Gram Stain --  Specimen Source .Blood Blood-Peripheral  Culture-Blood --    Organism --  Gram Stain Blood -- Gram Stain --  Specimen Source .Urine Clean Catch (Midstream)  Culture-Blood --      05-01 @ 10:56  Pro Bnp 159    05-02 @ 06:40  TSH: 1.49      RADIOLOGY/EKG:

## 2021-05-03 NOTE — PROVIDER CONTACT NOTE (OTHER) - ACTION/TREATMENT ORDERED:
Dr. gordon aware. bladder scan in 4 hours at 1600. notify with results greater than 300. will continue to assess and monitor

## 2021-05-03 NOTE — PROGRESS NOTE ADULT - ASSESSMENT
SYLVAIN VELOZ [facility]  5/1/2021  DR TASHA CALIXTO     REVIEW OF SYMPTOMS      Able to give (reliable) ROS  NO     PHYSICAL EXAM    HEENT Unremarkable  atraumatic   RESP Fair air entry EXP prolonged    Harsh breath sound Resp distres mild   CARDIAC S1 S2 No S3     NO JVD    ABDOMEN SOFT BS PRESENT NOT DISTENDED No hepatosplenomegaly   PEDAL EDEMA present No calf tenderness  NO rash     PATIENT PRESENTATION.     65M group home resident with PMH severe mental retardation HTN, Cholecystectomy, COVID 4/2020 non-verbal recently admitted [facility] 4/23-4/27/2021  was readmitted 5/1/2021 with fever hypoxia  and pulmonary consulted   In ER pt already got levaquin and ns 2.5 l (5/1/2021 2:13 PM)     RECENT SHARPE   echo 4/23/2021 echo n lvsf n lvedp  4/22/2021 D-dimr 314  4/23 v duplx negv  cta ch 4/22 cta ch no pe bl is edema air trapping cm     Home meds.   amlodipine 5  asa  81 clonazepam 1.2 cosentyx  300q m depakote 250.3 fluvoxamine 100.2 hydroxyzine 20.3 levoxyl 75 metoprolol 25.2 olanzapine 15 omeprazole 20 tamsulosin .4       PROBLEMS  COVID pcr -ric apb +iv  SEPSIS poa   PROLACTINEMIA 5/2 VT 14   HYPOXIA poa   GROUND GLASS OPAC RLL ON 5/1 CTA   RLE CELLULITIS  MRSA POSV 5/2/2021    URINE RETN 5/3/2021    ROCEPHIN (5/3/2021) (DR MUIR  VANCO (5/3/2021 (DR MUIR)       PATIENT DATA                ABG.   5/1/2021 24% 743/39/88               OXYGENATION.    5/3/2021 2l 93%   5/1/2021 2l 95%              VITALS/IO/VENT/DRIPS.  5/3/2021 99f 85 100/60     GLOBAL AND BEST PRACTICE ISSUES                     ALLERGY.    azactm augmentn pncl sulfa   WEIGHT.    5/1/2021 81K                            BMI.              5/1/2021 29                ADVANCED DIRECTIVE.                               HEAD OF BED ELEVATION. Yes  DVT PROPHYLAXIS.  lvnx 40 (5/1)                     SAUER PROPHYLAXIS.   APA.   asa 81 (5/1)                                                                  DYSPHAGIA RECOMM. 5/3/2021 DYS 2 NECTAR   DIET.   dys 2 mech soft nectar (5/1)    INFECTION PROPHYLAXIS.   FREE WATER.    IV.          D5 1/2 60 (5/1)               ASSESSMENT/RECOMMENDATIONS.    SEPSIS poa   Had fever 102 on 5/2/2021  Pt had leukocytosis w 14 poa and procalc 4/5 poa   5/1/2021 CT ch did not show infiltr  5/1/2021 RVP was negv   ROCEPHIN (5/3/2021) (DR MUIR  VANCO (5/3/2021 (DR MUIR)   MRSA is posv 5/2/2021   ID eval appreciated  HYPOXIA poa   O2 to keep po 90-95%  VTE RULED OUT  V duplx 5/1  negv   cta 5/1 cta ch No pe rll ggo fu study recommended in 6 w   GROUND GLASS OPAC RLL  Will need followup ct chend June 2021   NEUROPSYCHIATRIC MEDS  Cont prev meds   DYSPHAGIA EVAL   Speech consulted 5/1/2021   On dysphagia diet         TIME SPENT   Over 25 minutes aggregate care time spent on encounter; activities included   direct patient care, counseling and/or coordinating care reviewing notes, lab data/ imaging , discussion with multidisciplinary team/ patient  /family and explaining in detail risks, benefits, alternatives  of the recommendations     SYLVAIN VELOZ [facility]  5/1/2021  DR TASHA CALIXTO

## 2021-05-03 NOTE — PROVIDER CONTACT NOTE (OTHER) - SITUATION
Patient right leg swelling and redness noted
patient bladder scan showed 457 cc there is 600cc in the condom catheter bag
Patient bladder scan was 379
Patient had rash on back yesterday antibiotic was held. patient scheduled to get levaquin at 1000 is it okay to given dose
rashes at back and fever, as per group home aide patient doesn't have back rashes before admission to the hospital

## 2021-05-03 NOTE — PROGRESS NOTE ADULT - NSICDXPROBLEMPLAN3_GEN_ALL_CORE_FT
will attempt to ambulate  if still u retention,,will add flomax  and ambulate  urine cx was negative

## 2021-05-03 NOTE — H&P ADULT - NSICDXPROBLEMPLAN1_GEN_ALL_CORE_FT
acte respiratoty failure with hypoxia  most likely aspiration pn  speech swallow  cta chest and venous dopplers pending  pulmonary eval  oxygen  id eval  review home meds  iv abxs  fluids

## 2021-05-03 NOTE — PROGRESS NOTE ADULT - SUBJECTIVE AND OBJECTIVE BOX
SYLVAINROSELIA is a 65yMale , patient examined and chart reviewed.    INTERVAL HPI/ OVERNIGHT EVENTS:   Febrile to 102.8 last night.  Right LE with erythema Also with AUR- 370cc.    PAST MEDICAL & SURGICAL HISTORY:  MR (mental retardation), severe  Seizure  HTN (hypertension)  Psoriasis  DD (diastrophic dysplasia)  Blind  right eye  Constipation, unspecified constipation type      For details regarding the patient's social history, family history, and other miscellaneous elements, please refer the initial infectious diseases consultation and/or the admitting history and physical examination for this admission.    ROS:  Unable to obtain due to MRDD    ALLERGIES  Augmentin (Unknown)  aztreonam (Rash)  penicillin (Unknown)  sulfa drugs (Unknown)      Current inpatient medications :    ANTIBIOTICS/RELEVANT:  levoFLOXacin IVPB 500 milliGRAM(s) IV Intermittent every 24 hours      acetaminophen   Tablet .. 650 milliGRAM(s) Oral every 6 hours PRN  amLODIPine   Tablet 5 milliGRAM(s) Oral daily  aspirin enteric coated 81 milliGRAM(s) Oral daily  calcium carbonate 1250 mG  + Vitamin D (OsCal 500 + D) 1 Tablet(s) Oral three times a day  clonazePAM  Tablet 1 milliGRAM(s) Oral two times a day  dextrose 5% + sodium chloride 0.45%. 1000 milliLiter(s) IV Continuous <Continuous>  diVALproex ER Oral Tab/Cap - Peds 250 milliGRAM(s) Oral three times a day  enoxaparin Injectable 40 milliGRAM(s) SubCutaneous daily  fluvoxaMINE 100 milliGRAM(s) Oral two times a day  hydrOXYzine  Oral Tab/Cap - Peds 20 milliGRAM(s) Oral every 8 hours  lactulose Syrup 10 Gram(s) Oral daily  levothyroxine 75 MICROGram(s) Oral daily  loratadine 10 milliGRAM(s) Oral daily  metoprolol tartrate 25 milliGRAM(s) Oral two times a day  OLANZapine 15 milliGRAM(s) Oral at bedtime  pantoprazole    Tablet 40 milliGRAM(s) Oral before breakfast  tamsulosin 0.8 milliGRAM(s) Oral at bedtime      Objective:    05-02 @ 07:01  -  05-03 @ 07:00  --------------------------------------------------------  IN: 720 mL / OUT: 0 mL / NET: 720 mL      T(C): 37.1 (05-03-21 @ 10:05), Max: 39.3 (05-02-21 @ 14:18)  HR: 95 (05-03-21 @ 05:04) (95 - 122)  BP: 143/75 (05-03-21 @ 05:04) (111/66 - 143/75)  RR: 18 (05-03-21 @ 05:04) (18 - 18)  SpO2: 93% (05-03-21 @ 05:04) (93% - 98%)    Physical Exam:  General: no acute distress  Neck: supple, trachea midline  Lungs: clear, no wheeze/rhonchi  Cardiovascular: regular rate and rhythm, S1 S2  Abdomen: soft, nontender,  bowel sounds normal  Neurological: alert and oriented x3  Skin: no rash  Extremities: Right LE swelling with erythema + warm to touch      LABS:                          12.1   13.13 )-----------( 220      ( 03 May 2021 06:31 )             35.0       05-03    144  |  111<H>  |  12  ----------------------------<  111<H>  3.5   |  27  |  0.88    Ca    8.1<L>      03 May 2021 06:31  Phos  2.6     05-02    TPro  6.6  /  Alb  2.3<L>  /  TBili  0.2  /  DBili  x   /  AST  19  /  ALT  19  /  AlkPhos  60  05-03      PT/INR - ( 02 May 2021 06:40 )   PT: 15.7 sec;   INR: 1.36 ratio          ABG - ( 01 May 2021 13:19 )  pH, Arterial: 7.43  pH, Blood: x     /  pCO2: 39    /  pO2: 88    / HCO3: 26    / Base Excess: 1.6   /  SaO2: 97          MICROBIOLOGY:    Culture - Blood (collected 01 May 2021 13:48)  Source: .Blood Blood-Peripheral  Preliminary Report (02 May 2021 14:01):    No growth to date.    Culture - Blood (collected 01 May 2021 13:48)  Source: .Blood Blood-Peripheral  Preliminary Report (02 May 2021 14:01):    No growth to date.    Culture - Urine (collected 01 May 2021 13:17)  Source: .Urine Clean Catch (Midstream)  Final Report (02 May 2021 13:30):    No growth    RADIOLOGY & ADDITIONAL STUDIES:    EXAM:  CT ANGIO CHEST (W)AW IC                            PROCEDURE DATE:  05/01/2021          INTERPRETATION:  CTA CHEST AND CT ABDOMEN AND PELVIS    INDICATION: History of fever. Hypoxia..    TECHNIQUE: Enhanced helical images were obtained of the chest, abdomen, and pelvis. Coronal and sagittal images were reconstructed.  Images were obtained after the uneventful administration of 90 cc of nonionic intravenous contrast (Omnipaque 350). 10 cc of nonionic intravenous contrast (Omnipaque 350) was discarded. Maximum intensity projection images were generated.    COMPARISON: Radiograph 5/1/2021. CT chest 5/1/2021, 4/22/2021, and 7/26/2020. CT abdomen and pelvis 4/23/2021.    FINDINGS:    Pulmonary Artery:  There is no main, central, lobar, orproximal to mid segmental pulmonary embolus. Distal segmental and subsegmental divisions are not well evaluated.    Tubes/Lines: None.    Lungs, airways and pleura: Since 5/1/2021, the groundglass opacities in the right lower lobe are unchanged. The lungs are otherwise clear allowing for respiratory motion. The airways are unremarkable.    Mediastinum: The visualized thyroid gland and esophagus are unremarkable. The chest lymph nodes measure less than 10 mm in the short axis. Hiatal hernia.    The heart is normal in size. No pericardial effusion. Coronary artery calcifications. Left-sided aortic arch and left-sided descending thoracic aorta.    Bones And Soft Tissues: The bones are unremarkable.  The soft tissues are unremarkable.    Abdomen and pelvis:    Liver: The liver is normal.    Gallbladder: Cholecystectomy.    Spleen: The spleen is normal.    Pancreas: The pancreas is normal.    Adrenal glands: The adrenal glands are normal.    Kidneys: Pelvic right renal cysts. Subcentimeter hypodense right renal lesions are statistically benign and likely represent cysts.    Hypodense left renal lesions are statistically benign and likely represent cysts. No hydronephrosis or hydroureter.    Lymph nodes: No enlarged retroperitoneal or upperabdominal lymph nodes.    Bowel: The stomach is unremarkable. The small bowel and large bowel are normal in caliber. The appendix is normal. No free air. No free fluid.    Bladder: The bladder is distended.    Prostate gland: The prostate gland is unremarkable.    Skeletal: Degenerative change of the spine.      IMPRESSION:    Chest:  1.  No pulmonary embolus.  2.  The right lower lobe groundglass opacities are unchanged allowing for differences in technique an respiratory motion. A follow-up study is recommended to evaluate for resolution/change to include prone imaging in 6-8 weeks.    Abdomen and pelvis:  1.  No bowel detection.      Assessment :  64YO M Phx of MRDD, HTN, resident of alf admitted with fevers  Doubt pneumonia  Likely sec Right LE cellulitis  Also with AUR- bladder scan 370cc     Plan :   Change to Vanc Rocephin  Fu cultures  Trend temps and cbc  Repeat bladder scan in 4 hours- may need Wade  Asp precautions      Continue with present regiment.  Appropriate use of antibiotics and adverse effects reviewed.      > 35 minutes were spent in direct patient care reviewing notes, medications ,labs data/ imaging , discussion with multidisciplinary team.    Thank you for allowing me to participate in care of your patient .    Olivier Daniels MD  Infectious Disease  [phone]

## 2021-05-03 NOTE — SWALLOW BEDSIDE ASSESSMENT ADULT - ORAL PHASE
Decreased anterior-posterior movement of the bolus/Delayed oral transit time/Lingual stasis Decreased anterior-posterior movement of the bolus/Delayed oral transit time

## 2021-05-03 NOTE — PROGRESS NOTE ADULT - SUBJECTIVE AND OBJECTIVE BOX
PAST MEDICAL & SURGICAL HISTORY:  MR (mental retardation), severe    Seizure    HTN (hypertension)    Psoriasis    DD (diastrophic dysplasia)    Blind  right eye    Constipation, unspecified constipation type    No significant past surgical history        MEDICATIONS  (STANDING):  amLODIPine   Tablet 5 milliGRAM(s) Oral daily  aspirin enteric coated 81 milliGRAM(s) Oral daily  calcium carbonate 1250 mG  + Vitamin D (OsCal 500 + D) 1 Tablet(s) Oral three times a day  cefTRIAXone   IVPB 1000 milliGRAM(s) IV Intermittent every 24 hours  clonazePAM  Tablet 1 milliGRAM(s) Oral two times a day  dextrose 5% + sodium chloride 0.45%. 1000 milliLiter(s) (60 mL/Hr) IV Continuous <Continuous>  diVALproex ER Oral Tab/Cap - Peds 250 milliGRAM(s) Oral three times a day  enoxaparin Injectable 40 milliGRAM(s) SubCutaneous daily  fluvoxaMINE 100 milliGRAM(s) Oral two times a day  hydrOXYzine  Oral Tab/Cap - Peds 20 milliGRAM(s) Oral every 8 hours  lactulose Syrup 10 Gram(s) Oral daily  levothyroxine 75 MICROGram(s) Oral daily  loratadine 10 milliGRAM(s) Oral daily  metoprolol tartrate 25 milliGRAM(s) Oral two times a day  OLANZapine 15 milliGRAM(s) Oral at bedtime  pantoprazole    Tablet 40 milliGRAM(s) Oral before breakfast  tamsulosin 0.8 milliGRAM(s) Oral at bedtime  vancomycin  IVPB 1000 milliGRAM(s) IV Intermittent every 12 hours    MEDICATIONS  (PRN):  acetaminophen   Tablet .. 650 milliGRAM(s) Oral every 6 hours PRN Temp greater or equal to 38C (100.4F)      Patient is a 65y old  Male who presents with a chief complaint of fevers (03 May 2021 11:09)      Vital Signs Last 24 Hrs  T(C): 37.4 (03 May 2021 13:09), Max: 38.9 (02 May 2021 20:26)  T(F): 99.3 (03 May 2021 13:09), Max: 102.1 (02 May 2021 20:26)  HR: 80 (03 May 2021 17:45) (80 - 102)  BP: 113/72 (03 May 2021 17:45) (106/65 - 143/75)  BP(mean): --  RR: 17 (03 May 2021 13:09) (17 - 18)  SpO2: 94% (03 May 2021 13:09) (93% - 95%)          05-02 @ 07:01  -  05-03 @ 07:00  --------------------------------------------------------  IN: 720 mL / OUT: 0 mL / NET: 720 mL        REVIEW OF SYSTEMS:    Constitutional: No fever, weight loss or fatigue  Eyes: No eye pain, visual disturbances, or discharge  ENT:  No difficulty hearing, tinnitus, vertigo; No sinus or throat pain  Neck: No pain or stiffness  Breasts: No pain, masses or nipple discharge  Respiratory: No cough, wheezing, chills or hemoptysis  Cardiovascular: No chest pain, palpitations, shortness of breath, dizziness or leg swelling  Gastrointestinal: No abdominal or epigastric pain. No nausea, vomiting or hematemesis; No diarrhea or constipation. No melena or hematochezia.  Genitourinary: No dysuria, frequency, hematuria or incontinence  Rectal: No pain, hemorrhoids or incontinence  Neurological: No headaches, memory loss, loss of strength, numbness or tremors  Skin: No itching, burning, rashes or lesions   Lymph Nodes: No enlarged glands  Endocrine: No heat or cold intolerance; No hair loss  Musculoskeletal: No joint pain or swelling; No muscle, back or extremity pain  Psychiatric: No depression, anxiety, mood swings or difficulty sleeping  Heme/Lymph: No easy bruising or bleeding gums  Allergy and Immunologic: No hives or eczema    PHYSICAL EXAM: had large bm  2 liters 02 above 90  bladder scan 500cc  [place] bag full urine  patient responds to command  sitting up  will stand up to urinate  posterior back skin all normal,,,,no rash  Constitutional: NAD,   Respiratory: dec breath sounds Cardiovascular: reg  Gastrointestinal: BS+, soft, NT/ND  Extremities: right lower ext half way below knee warm to touch/indurated  Neurological: eating moves all ext,,responds appropriately when spoke to         decubiti:no                           12.1   13.13 )-----------( 220      ( 03 May 2021 06:31 )             35.0     05-03    144  |  111<H>  |  12  ----------------------------<  111<H>  3.5   |  27  |  0.88    Ca    8.1<L>      03 May 2021 06:31  Phos  2.6     05-02    TPro  6.6  /  Alb  2.3<L>  /  TBili  0.2  /  DBili  x   /  AST  19  /  ALT  19  /  AlkPhos  60  05-03    PT/INR - ( 02 May 2021 06:40 )   PT: 15.7 sec;   INR: 1.36 ratio                   .Blood Blood-Peripheral  05-01 @ 13:48   No growth to date.  --  --      .Urine Clean Catch (Midstream)  05-01 @ 13:17   No growth  --  --        Urine Culture:  05-01 @ 13:48    --        No growth to date.  Urine Culture:  05-01 @ 13:17    --        No growth        Radiology:  < from: US Duplex Venous Lower Ext Complete, Bilateral (05.03.21 @ 10:47) >    EXAM:  US DPLX LWR EXT VEINS COMPL BI                            PROCEDURE DATE:  05/03/2021          INTERPRETATION:  CLINICAL INFORMATION: Leg edema.    COMPARISON: Doppler venous sonogram 5/1/2021.    TECHNIQUE: Duplex sonography of the BILATERAL LOWER extremity veins with color and spectral Doppler, with and without compression.    FINDINGS:    RIGHT:    Normal compressibility of the RIGHT common femoral, femoral and popliteal veins.  Doppler examination shows normal spontaneous and phasic flow.  No RIGHT calf vein thrombosis is detected.    There is a lymph node in the right groin region with central fatty hilum.    LEFT:    Normal compressibility of the LEFT common femoral, femoral and popliteal veins.  Doppler examination shows normal spontaneous and phasic flow.  No LEFT calf vein thrombosis is detected.    IMPRESSION:    No evidence of deep venous thrombosis in either lower extremity.    < end of copied text >  < from: CT Angio Chest w/ IV Cont (05.01.21 @ 14:25) >    EXAM:  CT ANGIO CHEST (W)AW IC                            PROCEDURE DATE:  05/01/2021          INTERPRETATION:  CTA CHEST AND CT ABDOMEN AND PELVIS    INDICATION: History of fever. Hypoxia..    TECHNIQUE: Enhanced helical images were obtained of the chest, abdomen, and pelvis. Coronal and sagittal images were reconstructed.  Images were obtained after the uneventful administration of 90 cc of nonionic intravenous contrast (Omnipaque 350). 10 cc of nonionic intravenous contrast (Omnipaque 350) was discarded. Maximum intensity projection images were generated.    COMPARISON: Radiograph 5/1/2021. CT chest 5/1/2021, 4/22/2021, and 7/26/2020. CT abdomen and pelvis 4/23/2021.    FINDINGS:    Pulmonary Artery:  There is no main, central, lobar, orproximal to mid segmental pulmonary embolus. Distal segmental and subsegmental divisions are not well evaluated.    Tubes/Lines: None.    Lungs, airways and pleura: Since 5/1/2021, the groundglass opacities in the right lower lobe are unchanged. The lungs are otherwise clear allowing for respiratory motion. The airways are unremarkable.    Mediastinum: The visualized thyroid gland and esophagus are unremarkable. The chest lymph nodes measure less than 10 mm in the short axis. Hiatal hernia.    The heart is normal in size. No pericardial effusion. Coronary artery calcifications. Left-sided aortic arch and left-sided descending thoracic aorta.    Bones And Soft Tissues: The bones are unremarkable.  The soft tissues are unremarkable.    Abdomen and pelvis:    Liver: The liver is normal.    < end of copied text >  < from: CT Abdomen and Pelvis w/ IV Cont (05.01.21 @ 14:24) >    EXAM:  CT ABDOMEN AND PELVIS IC                            PROCEDURE DATE:  05/01/2021          INTERPRETATION:  CTA CHEST AND CT ABDOMEN AND PELVIS    INDICATION: History of fever. Hypoxia..    TECHNIQUE: Enhanced helical images were obtained of the chest, abdomen, and pelvis. Coronal and sagittal images were reconstructed.  Images were obtained after the uneventful administration of 90 cc of nonionic intravenous contrast (Omnipaque 350). 10 cc of nonionic intravenous contrast (Omnipaque 350) was discarded. Maximum intensity projection images were generated.    COMPARISON: Radiograph 5/1/2021. CT chest 5/1/2021, 4/22/2021, and 7/26/2020. CT abdomen and pelvis 4/23/2021.    FINDINGS:    Pulmonary Artery:  There is no main, central, lobar, or proximal to mid segmental pulmonary embolus. Distal segmental and subsegmental divisions are not well evaluated.    Tubes/Lines: None.    Lungs, airways and pleura: Since 5/1/2021, the groundglass opacities in the right lower lobe are unchanged. Thelungs are otherwise clear allowing for respiratory motion. The airways are unremarkable.    Mediastinum: The visualized thyroid gland and esophagus are unremarkable. The chest lymph nodes measure less than 10 mm in the short axis. Hiatal hernia.    The heart is normal in size. No pericardial effusion. Coronary artery calcifications. Left-sided aortic arch and left-sided descending thoracic aorta.    Bones And Soft Tissues: The bones are unremarkable.  The soft tissues are unremarkable.    Abdomenand pelvis:    Liver: The liver is normal.    < end of copied text >

## 2021-05-03 NOTE — PROGRESS NOTE ADULT - SUBJECTIVE AND OBJECTIVE BOX
ROSELIA NARAYAN    PLV 1EAS 114 W1    Patient is a 65y old  Male who presents with a chief complaint of fevers (02 May 2021 14:23)       Allergies    Augmentin (Unknown)  aztreonam (Rash)  penicillin (Unknown)  sulfa drugs (Unknown)    Intolerances        HPI:      PAST MEDICAL & SURGICAL HISTORY:  MR (mental retardation), severe    Seizure    HTN (hypertension)    Psoriasis    DD (diastrophic dysplasia)    Blind  right eye    Constipation, unspecified constipation type    No significant past surgical history        FAMILY HISTORY:  No pertinent family history in first degree relatives          MEDICATIONS   acetaminophen   Tablet .. 650 milliGRAM(s) Oral every 6 hours PRN  amLODIPine   Tablet 5 milliGRAM(s) Oral daily  aspirin enteric coated 81 milliGRAM(s) Oral daily  calcium carbonate 1250 mG  + Vitamin D (OsCal 500 + D) 1 Tablet(s) Oral three times a day  clonazePAM  Tablet 1 milliGRAM(s) Oral two times a day  dextrose 5% + sodium chloride 0.45%. 1000 milliLiter(s) IV Continuous <Continuous>  diVALproex ER Oral Tab/Cap - Peds 250 milliGRAM(s) Oral three times a day  enoxaparin Injectable 40 milliGRAM(s) SubCutaneous daily  fluvoxaMINE 100 milliGRAM(s) Oral two times a day  hydrOXYzine  Oral Tab/Cap - Peds 20 milliGRAM(s) Oral every 8 hours  lactulose Syrup 10 Gram(s) Oral daily  levoFLOXacin IVPB 500 milliGRAM(s) IV Intermittent every 24 hours  levothyroxine 75 MICROGram(s) Oral daily  loratadine 10 milliGRAM(s) Oral daily  metoprolol tartrate 25 milliGRAM(s) Oral two times a day  OLANZapine 15 milliGRAM(s) Oral at bedtime  pantoprazole    Tablet 40 milliGRAM(s) Oral before breakfast  tamsulosin 0.8 milliGRAM(s) Oral at bedtime      Vital Signs Last 24 Hrs  T(C): 37.1 (03 May 2021 10:05), Max: 39.3 (02 May 2021 14:18)  T(F): 98.8 (03 May 2021 10:05), Max: 102.8 (02 May 2021 14:18)  HR: 95 (03 May 2021 05:04) (95 - 122)  BP: 143/75 (03 May 2021 05:04) (111/66 - 143/75)  BP(mean): --  RR: 18 (03 May 2021 05:04) (18 - 18)  SpO2: 93% (03 May 2021 05:04) (93% - 98%)      05-02-21 @ 07:01  -  05-03-21 @ 07:00  --------------------------------------------------------  IN: 720 mL / OUT: 0 mL / NET: 720 mL            LABS:                        12.1   13.13 )-----------( 220      ( 03 May 2021 06:31 )             35.0     05-03    144  |  111<H>  |  12  ----------------------------<  111<H>  3.5   |  27  |  0.88    Ca    8.1<L>      03 May 2021 06:31  Phos  2.6     05-02    TPro  6.6  /  Alb  2.3<L>  /  TBili  0.2  /  DBili  x   /  AST  19  /  ALT  19  /  AlkPhos  60  05-03    PT/INR - ( 02 May 2021 06:40 )   PT: 15.7 sec;   INR: 1.36 ratio               ABG - ( 01 May 2021 13:19 )  pH, Arterial: 7.43  pH, Blood: x     /  pCO2: 39    /  pO2: 88    / HCO3: 26    / Base Excess: 1.6   /  SaO2: 97                  WBC:  WBC Count: 13.13 K/uL (05-03 @ 06:31)  WBC Count: 17.82 K/uL (05-02 @ 06:40)  WBC Count: 14.18 K/uL (05-01 @ 09:43)      MICROBIOLOGY:  RECENT CULTURES:  05-01 .Blood Blood-Peripheral XXXX XXXX   No growth to date.    05-01 .Urine Clean Catch (Midstream) XXXX XXXX   No growth                PT/INR - ( 02 May 2021 06:40 )   PT: 15.7 sec;   INR: 1.36 ratio             Sodium:  Sodium, Serum: 144 mmol/L (05-03 @ 06:31)  Sodium, Serum: 144 mmol/L (05-02 @ 06:40)  Sodium, Serum: 142 mmol/L (05-01 @ 10:56)      0.88 mg/dL 05-03 @ 06:31  0.88 mg/dL 05-02 @ 06:40  0.89 mg/dL 05-01 @ 10:56      Hemoglobin:  Hemoglobin: 12.1 g/dL (05-03 @ 06:31)  Hemoglobin: 12.1 g/dL (05-02 @ 06:40)  Hemoglobin: 14.3 g/dL (05-01 @ 09:43)      Platelets: Platelet Count - Automated: 220 K/uL (05-03 @ 06:31)  Platelet Count - Automated: 221 K/uL (05-02 @ 06:40)  Platelet Count - Automated: 261 K/uL (05-01 @ 09:43)      LIVER FUNCTIONS - ( 03 May 2021 06:31 )  Alb: 2.3 g/dL / Pro: 6.6 g/dL / ALK PHOS: 60 U/L / ALT: 19 U/L / AST: 19 U/L / GGT: x                 RADIOLOGY & ADDITIONAL STUDIES:

## 2021-05-03 NOTE — SWALLOW BEDSIDE ASSESSMENT ADULT - SWALLOW EVAL: BUCCAL STRENGTH AND MOBILITY
pt unable to follow low-level, verbally presented directivespt unable to follow low-level, verbally presented directives

## 2021-05-03 NOTE — PROVIDER CONTACT NOTE (OTHER) - REASON
Bladder scan results
temp 102.5, rashes at the back
Bladder scan results
Patient had rash on back yesterday antibiotic was held
Patient right leg swelling
temp 102.5 and rashes at back

## 2021-05-03 NOTE — CONSULT NOTE ADULT - ASSESSMENT
65M group home resident admitted with fevers and hypoxia after recently hospitalization at [place] with severe mental retardation, cholecystectomy, COVID 4/2020, nonverbal.    Suggest:    1. Abx per ID and Pulm  2. HTN - c/w amlodipine  3. DVT prophylaxis.  4. WIll follow

## 2021-05-03 NOTE — PHARMACOTHERAPY INTERVENTION NOTE - COMMENTS
Patient initiated on vancomycin 1250 mg Q12h for cellulitis, MRSA PCR positive. Discussed dosing and indication with ID Dr. Daniels, recommended decreasing starting dose to vancomycin 1g Q12h if only treating cellulitis, accepted.

## 2021-05-03 NOTE — H&P ADULT - HISTORY OF PRESENT ILLNESS
fever 103 at home and pulse ox 88  was [facility] few days ago for fever  w/u was negative  patient had fallen the day before he was admitted  stitches were removed 2 days ago

## 2021-05-03 NOTE — SWALLOW BEDSIDE ASSESSMENT ADULT - COMMENTS
Attempted to see patient for a clinical assessment of swallow function this AM at which time pt was off unit at ultrasound per RN. Pt's health aide present at bedside who states at baseline, pt is on a ground diet with thin liquids. This dept to continue to f/u as schedule permits for clinical assessment of swallow function.
Pt was alert and cooperative for a clinical assessment of swallow function this PM. Pt presents with supplemental oxygen via nasal cannula. Per charting, pt is a "65 year old male with history of MR (severe), constipation, blind right eye, diastrophic dysplasia, psoriasis, HTN, and seizure presents with fever and low oxygen level since this am. Per caregiver, went to get patient out of bed. noticed patient was shivering. took temp on forehead and was 100 and then in ear and was 104. also took pulse ox and was low (in the 80's). per caregiver no coughing, vomiting, diarrhea, or change in mental status. was recently admitted at this hospital April 22nd-April 27th for fever. previous hospital notes reviewed, diagnosed with viral syndrome. CTA showed no evidence of PE, pneumonia. RVP negative. abx were discontinued in hospital. patient has covid last april 2020 and has received both covid vaccinations. " Recent chest CT revealed "Chest: 1. No pulmonary embolus. 2. The right lower lobe groundglass opacities are unchanged allowing for differences in technique an respiratory motion. A follow-up study is recommended to evaluate for resolution/change to include prone imaging in 6-8 weeks."

## 2021-05-03 NOTE — PROVIDER CONTACT NOTE (OTHER) - ACTION/TREATMENT ORDERED:
Dr. Meyer aware. Can give dose of levaquin. order for speech and swallow to be place. will continue to assess and monitor

## 2021-05-03 NOTE — SWALLOW BEDSIDE ASSESSMENT ADULT - SLP PRECAUTIONS/LIMITATIONS: HEARING
Pt SBP 81. Pt placed in trendelenberg position. Randi notified. Requested 2nd L fluid bolus.    within functional limits

## 2021-05-03 NOTE — SWALLOW BEDSIDE ASSESSMENT ADULT - ORAL PREPARATORY PHASE
Reduced oral grading Refuses to accept bolus into oral cavity/Reduced oral grading/Decreased mastication ability rapid, uncontrolled intake/Reduced oral grading/Decreased mastication ability

## 2021-05-03 NOTE — SWALLOW BEDSIDE ASSESSMENT ADULT - SWALLOW EVAL: RECOMMENDED FEEDING/EATING TECHNIQUES
allow for swallow between intakes/alternate food with liquid/check mouth frequently for oral residue/pocketing/crush medication (when feasible)/hard swallow w/ each bite or sip/maintain upright posture during/after eating for 30 mins/small sips/bites

## 2021-05-04 DIAGNOSIS — J96.01 ACUTE RESPIRATORY FAILURE WITH HYPOXIA: ICD-10-CM

## 2021-05-04 LAB
ALBUMIN SERPL ELPH-MCNC: 2.4 G/DL — LOW (ref 3.3–5)
ALP SERPL-CCNC: 74 U/L — SIGNIFICANT CHANGE UP (ref 40–120)
ALT FLD-CCNC: 14 U/L — SIGNIFICANT CHANGE UP (ref 12–78)
ANION GAP SERPL CALC-SCNC: 7 MMOL/L — SIGNIFICANT CHANGE UP (ref 5–17)
AST SERPL-CCNC: 16 U/L — SIGNIFICANT CHANGE UP (ref 15–37)
BASOPHILS # BLD AUTO: 0.02 K/UL — SIGNIFICANT CHANGE UP (ref 0–0.2)
BASOPHILS NFR BLD AUTO: 0.2 % — SIGNIFICANT CHANGE UP (ref 0–2)
BILIRUB SERPL-MCNC: 0.3 MG/DL — SIGNIFICANT CHANGE UP (ref 0.2–1.2)
BUN SERPL-MCNC: 11 MG/DL — SIGNIFICANT CHANGE UP (ref 7–23)
CALCIUM SERPL-MCNC: 8.6 MG/DL — SIGNIFICANT CHANGE UP (ref 8.5–10.1)
CHLORIDE SERPL-SCNC: 112 MMOL/L — HIGH (ref 96–108)
CO2 SERPL-SCNC: 27 MMOL/L — SIGNIFICANT CHANGE UP (ref 22–31)
CREAT SERPL-MCNC: 0.91 MG/DL — SIGNIFICANT CHANGE UP (ref 0.5–1.3)
CULTURE RESULTS: NO GROWTH — SIGNIFICANT CHANGE UP
EOSINOPHIL # BLD AUTO: 0.65 K/UL — HIGH (ref 0–0.5)
EOSINOPHIL NFR BLD AUTO: 7.2 % — HIGH (ref 0–6)
GLUCOSE SERPL-MCNC: 95 MG/DL — SIGNIFICANT CHANGE UP (ref 70–99)
HCT VFR BLD CALC: 36.4 % — LOW (ref 39–50)
HGB BLD-MCNC: 12.4 G/DL — LOW (ref 13–17)
IMM GRANULOCYTES NFR BLD AUTO: 0.9 % — SIGNIFICANT CHANGE UP (ref 0–1.5)
LYMPHOCYTES # BLD AUTO: 1.22 K/UL — SIGNIFICANT CHANGE UP (ref 1–3.3)
LYMPHOCYTES # BLD AUTO: 13.6 % — SIGNIFICANT CHANGE UP (ref 13–44)
MCHC RBC-ENTMCNC: 30.8 PG — SIGNIFICANT CHANGE UP (ref 27–34)
MCHC RBC-ENTMCNC: 34.1 GM/DL — SIGNIFICANT CHANGE UP (ref 32–36)
MCV RBC AUTO: 90.3 FL — SIGNIFICANT CHANGE UP (ref 80–100)
MONOCYTES # BLD AUTO: 0.72 K/UL — SIGNIFICANT CHANGE UP (ref 0–0.9)
MONOCYTES NFR BLD AUTO: 8 % — SIGNIFICANT CHANGE UP (ref 2–14)
NEUTROPHILS # BLD AUTO: 6.28 K/UL — SIGNIFICANT CHANGE UP (ref 1.8–7.4)
NEUTROPHILS NFR BLD AUTO: 70.1 % — SIGNIFICANT CHANGE UP (ref 43–77)
NRBC # BLD: 0 /100 WBCS — SIGNIFICANT CHANGE UP (ref 0–0)
PLATELET # BLD AUTO: 252 K/UL — SIGNIFICANT CHANGE UP (ref 150–400)
POTASSIUM SERPL-MCNC: 4 MMOL/L — SIGNIFICANT CHANGE UP (ref 3.5–5.3)
POTASSIUM SERPL-SCNC: 4 MMOL/L — SIGNIFICANT CHANGE UP (ref 3.5–5.3)
PROCALCITONIN SERPL-MCNC: 4.54 NG/ML — HIGH (ref 0–0.04)
PROT SERPL-MCNC: 7 G/DL — SIGNIFICANT CHANGE UP (ref 6–8.3)
RBC # BLD: 4.03 M/UL — LOW (ref 4.2–5.8)
RBC # FLD: 13.8 % — SIGNIFICANT CHANGE UP (ref 10.3–14.5)
SODIUM SERPL-SCNC: 146 MMOL/L — HIGH (ref 135–145)
SPECIMEN SOURCE: SIGNIFICANT CHANGE UP
VANCOMYCIN TROUGH SERPL-MCNC: 14.2 UG/ML — SIGNIFICANT CHANGE UP (ref 10–20)
WBC # BLD: 8.97 K/UL — SIGNIFICANT CHANGE UP (ref 3.8–10.5)
WBC # FLD AUTO: 8.97 K/UL — SIGNIFICANT CHANGE UP (ref 3.8–10.5)

## 2021-05-04 PROCEDURE — 74230 X-RAY XM SWLNG FUNCJ C+: CPT | Mod: 26

## 2021-05-04 RX ORDER — SENNA PLUS 8.6 MG/1
2 TABLET ORAL ONCE
Refills: 0 | Status: COMPLETED | OUTPATIENT
Start: 2021-05-04 | End: 2021-05-04

## 2021-05-04 RX ORDER — SENNA PLUS 8.6 MG/1
2 TABLET ORAL AT BEDTIME
Refills: 0 | Status: DISCONTINUED | OUTPATIENT
Start: 2021-05-05 | End: 2021-05-11

## 2021-05-04 RX ORDER — GLYCERIN ADULT
1 SUPPOSITORY, RECTAL RECTAL ONCE
Refills: 0 | Status: COMPLETED | OUTPATIENT
Start: 2021-05-04 | End: 2021-05-04

## 2021-05-04 RX ORDER — LACTULOSE 10 G/15ML
10 SOLUTION ORAL THREE TIMES A DAY
Refills: 0 | Status: DISCONTINUED | OUTPATIENT
Start: 2021-05-04 | End: 2021-05-11

## 2021-05-04 RX ADMIN — CEFTRIAXONE 100 MILLIGRAM(S): 500 INJECTION, POWDER, FOR SOLUTION INTRAMUSCULAR; INTRAVENOUS at 17:33

## 2021-05-04 RX ADMIN — Medication 1 TABLET(S): at 22:03

## 2021-05-04 RX ADMIN — DIVALPROEX SODIUM 250 MILLIGRAM(S): 500 TABLET, DELAYED RELEASE ORAL at 22:02

## 2021-05-04 RX ADMIN — PANTOPRAZOLE SODIUM 40 MILLIGRAM(S): 20 TABLET, DELAYED RELEASE ORAL at 05:42

## 2021-05-04 RX ADMIN — Medication 20 MILLIGRAM(S): at 05:42

## 2021-05-04 RX ADMIN — Medication 1 SUPPOSITORY(S): at 11:53

## 2021-05-04 RX ADMIN — Medication 2000 UNIT(S): at 19:29

## 2021-05-04 RX ADMIN — OLANZAPINE 15 MILLIGRAM(S): 15 TABLET, FILM COATED ORAL at 22:03

## 2021-05-04 RX ADMIN — Medication 75 MICROGRAM(S): at 05:42

## 2021-05-04 RX ADMIN — Medication 500 MILLIGRAM(S): at 05:42

## 2021-05-04 RX ADMIN — Medication 20 MILLIGRAM(S): at 22:03

## 2021-05-04 RX ADMIN — Medication 15 MILLILITER(S): at 11:51

## 2021-05-04 RX ADMIN — Medication 1 MILLIGRAM(S): at 05:44

## 2021-05-04 RX ADMIN — TAMSULOSIN HYDROCHLORIDE 0.8 MILLIGRAM(S): 0.4 CAPSULE ORAL at 22:02

## 2021-05-04 RX ADMIN — Medication 81 MILLIGRAM(S): at 11:51

## 2021-05-04 RX ADMIN — Medication 250 MILLIGRAM(S): at 11:54

## 2021-05-04 RX ADMIN — AMLODIPINE BESYLATE 5 MILLIGRAM(S): 2.5 TABLET ORAL at 05:42

## 2021-05-04 RX ADMIN — LORATADINE 10 MILLIGRAM(S): 10 TABLET ORAL at 11:51

## 2021-05-04 RX ADMIN — Medication 1 TABLET(S): at 14:10

## 2021-05-04 RX ADMIN — Medication 20 MILLIGRAM(S): at 14:08

## 2021-05-04 RX ADMIN — DIVALPROEX SODIUM 250 MILLIGRAM(S): 500 TABLET, DELAYED RELEASE ORAL at 14:08

## 2021-05-04 RX ADMIN — Medication 2000 UNIT(S): at 05:42

## 2021-05-04 RX ADMIN — Medication 1 MILLIGRAM(S): at 19:30

## 2021-05-04 RX ADMIN — Medication 1 TABLET(S): at 05:42

## 2021-05-04 RX ADMIN — LACTULOSE 10 GRAM(S): 10 SOLUTION ORAL at 22:03

## 2021-05-04 RX ADMIN — ZINC SULFATE TAB 220 MG (50 MG ZINC EQUIVALENT) 220 MILLIGRAM(S): 220 (50 ZN) TAB at 11:51

## 2021-05-04 RX ADMIN — ENOXAPARIN SODIUM 40 MILLIGRAM(S): 100 INJECTION SUBCUTANEOUS at 11:51

## 2021-05-04 RX ADMIN — FLUVOXAMINE MALEATE 100 MILLIGRAM(S): 25 TABLET ORAL at 19:30

## 2021-05-04 RX ADMIN — Medication 25 MILLIGRAM(S): at 19:30

## 2021-05-04 RX ADMIN — FLUVOXAMINE MALEATE 100 MILLIGRAM(S): 25 TABLET ORAL at 05:42

## 2021-05-04 RX ADMIN — SENNA PLUS 2 TABLET(S): 8.6 TABLET ORAL at 11:51

## 2021-05-04 RX ADMIN — DIVALPROEX SODIUM 250 MILLIGRAM(S): 500 TABLET, DELAYED RELEASE ORAL at 05:42

## 2021-05-04 RX ADMIN — Medication 250 MILLIGRAM(S): at 23:53

## 2021-05-04 RX ADMIN — LACTULOSE 10 GRAM(S): 10 SOLUTION ORAL at 14:08

## 2021-05-04 RX ADMIN — Medication 25 MILLIGRAM(S): at 05:42

## 2021-05-04 RX ADMIN — Medication 500 MILLIGRAM(S): at 19:30

## 2021-05-04 NOTE — PROGRESS NOTE ADULT - ASSESSMENT
65M group home resident admitted with fevers and hypoxia after recently hospitalization at [place] with severe mental retardation, cholecystectomy, COVID 4/2020, nonverbal.    Suggest:    1. Abx per ID and Pulm  2. HTN - c/w amlodipine  3. DVT prophylaxis.  4. WIll follow   65M group home resident admitted with fevers and hypoxia after recently hospitalization at [place] with severe mental retardation, cholecystectomy, COVID 4/2020, nonverbal likely with aspiration PNA    Suggest:    1. Abx per ID and Pulm  2. HTN - c/w amlodipine  3. DVT prophylaxis.  4. WIll follow as needed

## 2021-05-04 NOTE — SWALLOW VFSS/MBS ASSESSMENT ADULT - RECOMMENDED CONSISTENCY
1. Puree (dysphagia 1) with nectar thick liquids via TEASPOON.  2. Aspiration precautions.  3. Provide 1:1 supervision during all PO intake.  4. Safe swallowing guidelines: feed only when fully awake/alert, position upright 90 degrees, administer all PO via teaspoon, alternate bite/sip, pace meal slowly, and remain upright 15 to 30 minutes post meal.  5. May monitor for solid advancement at bedside, per SLP recommendation.  6. Ongoing oral care.

## 2021-05-04 NOTE — SWALLOW VFSS/MBS ASSESSMENT ADULT - RECOMMENDED FEEDING/EATING TECHNIQUES
all liquid via teaspoon!/alternate food with liquid/maintain upright posture during/after eating for 30 mins/no straws/oral hygiene/position upright (90 degrees)/provide rest periods between swallows/small sips/bites

## 2021-05-04 NOTE — PROGRESS NOTE ADULT - SUBJECTIVE AND OBJECTIVE BOX
PAST MEDICAL & SURGICAL HISTORY:  MR (mental retardation), severe    Seizure    HTN (hypertension)    Psoriasis    DD (diastrophic dysplasia)    Blind  right eye    Constipation, unspecified constipation type    No significant past surgical history        MEDICATIONS  (STANDING):  amLODIPine   Tablet 5 milliGRAM(s) Oral daily  ascorbic acid 500 milliGRAM(s) Oral two times a day  aspirin enteric coated 81 milliGRAM(s) Oral daily  calcium carbonate 1250 mG  + Vitamin D (OsCal 500 + D) 1 Tablet(s) Oral three times a day  cefTRIAXone   IVPB 1000 milliGRAM(s) IV Intermittent every 24 hours  cholecalciferol 2000 Unit(s) Oral two times a day  clonazePAM  Tablet 1 milliGRAM(s) Oral two times a day  diVALproex ER Oral Tab/Cap - Peds 250 milliGRAM(s) Oral three times a day  enoxaparin Injectable 40 milliGRAM(s) SubCutaneous daily  fluvoxaMINE 100 milliGRAM(s) Oral two times a day  hydrOXYzine  Oral Tab/Cap - Peds 20 milliGRAM(s) Oral every 8 hours  lactulose Syrup 10 Gram(s) Oral three times a day  levothyroxine 75 MICROGram(s) Oral daily  loratadine 10 milliGRAM(s) Oral daily  metoprolol tartrate 25 milliGRAM(s) Oral two times a day  multivitamin/minerals/iron Oral Solution (CENTRUM) 15 milliLiter(s) Oral daily  OLANZapine 15 milliGRAM(s) Oral at bedtime  pantoprazole    Tablet 40 milliGRAM(s) Oral before breakfast  tamsulosin 0.8 milliGRAM(s) Oral at bedtime  vancomycin  IVPB 1000 milliGRAM(s) IV Intermittent every 12 hours  zinc sulfate 220 milliGRAM(s) Oral daily    MEDICATIONS  (PRN):  acetaminophen   Tablet .. 650 milliGRAM(s) Oral every 6 hours PRN Temp greater or equal to 38C (100.4F)      Patient is a 65y old  Male who presents with a chief complaint of hypoxia and fevers (04 May 2021 09:46)      Vital Signs Last 24 Hrs  T(C): 36.6 (04 May 2021 04:55), Max: 36.8 (03 May 2021 20:04)  T(F): 97.8 (04 May 2021 04:55), Max: 98.3 (03 May 2021 20:04)  HR: 83 (04 May 2021 04:55) (80 - 90)  BP: 116/73 (04 May 2021 04:55) (105/70 - 116/73)  BP(mean): --  RR: 18 (04 May 2021 04:55) (18 - 18)  SpO2: 96% (04 May 2021 04:55) (93% - 96%)          05-03 @ 07:01  -  05-04 @ 07:00  --------------------------------------------------------  IN: 1310 mL / OUT: 900 mL / NET: 410 mL        REVIEW OF SYSTEMS:    Constitutional: No fever, weight loss or fatigue  Eyes: No eye pain, visual disturbances, or discharge  ENT:  No difficulty hearing, tinnitus, vertigo; No sinus or throat pain  Neck: No pain or stiffness  Breasts: No pain, masses or nipple discharge  Respiratory: No cough, wheezing, chills or hemoptysis  Cardiovascular: No chest pain, palpitations, shortness of breath, dizziness or leg swelling  Gastrointestinal: No abdominal or epigastric pain. No nausea, vomiting or hematemesis; No diarrhea or constipation. No melena or hematochezia.  Genitourinary: No dysuria, frequency, hematuria or incontinence  Rectal: No pain, hemorrhoids or incontinence  Neurological: No headaches, memory loss, loss of strength, numbness or tremors  Skin: No itching, burning, rashes or lesions   Lymph Nodes: No enlarged glands  Endocrine: No heat or cold intolerance; No hair loss  Musculoskeletal: No joint pain or swelling; No muscle, back or extremity pain  Psychiatric: No depression, anxiety, mood swings or difficulty sleeping  Heme/Lymph: No easy bruising or bleeding gums  Allergy and Immunologic: No hives or eczema    PHYSICAL EXAM: urinating on his own,today  had large bm  room air 92 percent oxygen  ambulating more in the room  refused mbs  Constitutional: NAD,  Respiratory: clear bl upper lobes   Cardiovascular: S1 and S2, RRR, no M/G/R  Gastrointestinal: BS+, soft, NT/ND  Extremities: right lower ext edema w mild induration  cleared psoriatic patch  left lower ext old scars psoriasis  Neurological: moves all ext  decubiti: no                          12.4   8.97  )-----------( 252      ( 04 May 2021 07:37 )             36.4     05-    146<H>  |  112<H>  |  11  ----------------------------<  95  4.0   |  27  |  0.91    Ca    8.6      04 May 2021 07:37    TPro  7.0  /  Alb  2.4<L>  /  TBili  0.3  /  DBili  x   /  AST  16  /  ALT  14  /  AlkPhos  74  05-04      Urinalysis Basic - ( 03 May 2021 20:21 )    Color: Pale Yellow / Appearance: Clear / S.005 / pH: x  Gluc: x / Ketone: Negative  / Bili: Negative / Urobili: Negative   Blood: x / Protein: Negative / Nitrite: Negative   Leuk Esterase: Negative / RBC: x / WBC x   Sq Epi: x / Non Sq Epi: x / Bacteria: x            .Blood Blood   @ 21:24   No growth to date.  --  --      .Blood Blood-Peripheral   @ 13:48   No growth to date.  --  --      .Urine Clean Catch (Midstream)   @ 13:17   No growth  --  --        Urine Culture:   @ 21:24    --        No growth to date.  Urine Culture:   @ 13:48    --        No growth to date.  Urine Culture:   @ 13:17    --        No growth        Radiology:

## 2021-05-04 NOTE — PROGRESS NOTE ADULT - SUBJECTIVE AND OBJECTIVE BOX
[facility] Cardiology Progress Note [phone] (Dr. Portillo, Julieta, Genevieve, Alex)    CHIEF COMPLAINT: Patient is a 65y old  Male who presents with a chief complaint of hypoxia and fevers (03 May 2021 18:09)      Follow Up Today: The patient denies any chest discomfort or shortness of breath.    HPI:  fever 103 at home and pulse ox 88  was dc hospital few days ago for fever  w/u was negative  patient had fallen the day before he was admitted  stitches were removed 2 days ago (03 May 2021 18:09)      PAST MEDICAL & SURGICAL HISTORY:  MR (mental retardation), severe    Seizure    HTN (hypertension)    Psoriasis    DD (diastrophic dysplasia)    Blind  right eye    Constipation, unspecified constipation type    No significant past surgical history        MEDICATIONS  (STANDING):  amLODIPine   Tablet 5 milliGRAM(s) Oral daily  ascorbic acid 500 milliGRAM(s) Oral two times a day  aspirin enteric coated 81 milliGRAM(s) Oral daily  calcium carbonate 1250 mG  + Vitamin D (OsCal 500 + D) 1 Tablet(s) Oral three times a day  cefTRIAXone   IVPB 1000 milliGRAM(s) IV Intermittent every 24 hours  cholecalciferol 2000 Unit(s) Oral two times a day  clonazePAM  Tablet 1 milliGRAM(s) Oral two times a day  diVALproex ER Oral Tab/Cap - Peds 250 milliGRAM(s) Oral three times a day  enoxaparin Injectable 40 milliGRAM(s) SubCutaneous daily  fluvoxaMINE 100 milliGRAM(s) Oral two times a day  hydrOXYzine  Oral Tab/Cap - Peds 20 milliGRAM(s) Oral every 8 hours  lactulose Syrup 10 Gram(s) Oral daily  levothyroxine 75 MICROGram(s) Oral daily  loratadine 10 milliGRAM(s) Oral daily  metoprolol tartrate 25 milliGRAM(s) Oral two times a day  multivitamin/minerals/iron Oral Solution (CENTRUM) 15 milliLiter(s) Oral daily  OLANZapine 15 milliGRAM(s) Oral at bedtime  pantoprazole    Tablet 40 milliGRAM(s) Oral before breakfast  tamsulosin 0.8 milliGRAM(s) Oral at bedtime  vancomycin  IVPB 1000 milliGRAM(s) IV Intermittent every 12 hours  zinc sulfate 220 milliGRAM(s) Oral daily    MEDICATIONS  (PRN):  acetaminophen   Tablet .. 650 milliGRAM(s) Oral every 6 hours PRN Temp greater or equal to 38C (100.4F)      Allergies    Augmentin (Unknown)  aztreonam (Rash)  penicillin (Unknown)  sulfa drugs (Unknown)    Intolerances        REVIEW OF SYSTEMS:    All other review of systems is negative unless indicated above    Vital Signs Last 24 Hrs  T(C): 36.6 (04 May 2021 04:55), Max: 37.4 (03 May 2021 13:09)  T(F): 97.8 (04 May 2021 04:55), Max: 99.3 (03 May 2021 13:09)  HR: 83 (04 May 2021 04:55) (80 - 90)  BP: 116/73 (04 May 2021 04:55) (105/70 - 116/73)  BP(mean): --  RR: 18 (04 May 2021 04:55) (17 - 18)  SpO2: 96% (04 May 2021 04:55) (93% - 96%)    I&O's Summary    03 May 2021 07:01  -  04 May 2021 07:00  --------------------------------------------------------  IN: 1310 mL / OUT: 900 mL / NET: 410 mL        PHYSICAL EXAM:    Constitutional: NAD, awake and alert, well-developed  Eyes:  EOMI,  Pupils round, No oral cyanosis.  HEENT: No exudate or erythema  Pulmonary: Non-labored, breath sounds are clear bilaterally, No wheezing, rales or rhonchi  Cardiovascular: Regular, S1 and S2, No murmurs, rubs, gallops oir clicks  Gastrointestinal: Bowel Sounds present, soft, nontender.   Ext: No significant LE edema with good pulses x 4  Neurological: Alert, no gross focal motor deficits  Skin: No rashes.  Psych:  Mood & affect appropriate    LABS: All Labs Reviewed:                        12.1   13.13 )-----------( 220      ( 03 May 2021 06:31 )             35.0                         12.1   17.82 )-----------( 221      ( 02 May 2021 06:40 )             35.1                         14.3   14.18 )-----------( 261      ( 01 May 2021 09:43 )             40.2     03 May 2021 06:31    144    |  111    |  12     ----------------------------<  111    3.5     |  27     |  0.88   02 May 2021 06:40    144    |  112    |  19     ----------------------------<  103    3.5     |  28     |  0.88   01 May 2021 10:56    142    |  110    |  16     ----------------------------<  105    3.3     |  26     |  0.89     Ca    8.1        03 May 2021 06:31  Ca    8.1        02 May 2021 06:40  Ca    7.9        01 May 2021 10:56  Phos  2.6       02 May 2021 06:40    TPro  6.6    /  Alb  2.3    /  TBili  0.2    /  DBili  x      /  AST  19     /  ALT  19     /  AlkPhos  60     03 May 2021 06:31  TPro  6.5    /  Alb  2.4    /  TBili  0.3    /  DBili  .10    /  AST  25     /  ALT  23     /  AlkPhos  61     02 May 2021 06:40  TPro  7.0    /  Alb  2.8    /  TBili  0.3    /  DBili  x      /  AST  26     /  ALT  24     /  AlkPhos  69     01 May 2021 10:56          Blood Culture: Organism --  Gram Stain Blood -- Gram Stain --  Specimen Source .Blood Blood  Culture-Blood --    Organism --  Gram Stain Blood -- Gram Stain --  Specimen Source .Blood Blood-Peripheral  Culture-Blood --    Organism --  Gram Stain Blood -- Gram Stain --  Specimen Source .Urine Clean Catch (Midstream)  Culture-Blood --      05-01 @ 10:56  Pro Bnp 159    05-02 @ 06:40  TSH: 1.49      RADIOLOGY/EKG:    Attending Attestation:   20 minutes spent on total encounter; more than 50% of the visit was spent counseling and/or coordinating care by the attending physician.     ASSESSMENT AND PLAN [facility] Cardiology Progress Note [phone] (Dr. Portillo, Julieta, Genevieve, Alex)    CHIEF COMPLAINT: Patient is a 65y old  Male who presents with a chief complaint of hypoxia and fevers (03 May 2021 18:09)      Follow Up Today: The patient is nonverbal.  Aid at bedside.  Patient still with low grade fevers.  No other events overnight    HPI:  fever 103 at home and pulse ox 88  was dc hospital few days ago for fever  w/u was negative  patient had fallen the day before he was admitted  stitches were removed 2 days ago (03 May 2021 18:09)      PAST MEDICAL & SURGICAL HISTORY:  MR (mental retardation), severe    Seizure    HTN (hypertension)    Psoriasis    DD (diastrophic dysplasia)    Blind  right eye    Constipation, unspecified constipation type    No significant past surgical history        MEDICATIONS  (STANDING):  amLODIPine   Tablet 5 milliGRAM(s) Oral daily  ascorbic acid 500 milliGRAM(s) Oral two times a day  aspirin enteric coated 81 milliGRAM(s) Oral daily  calcium carbonate 1250 mG  + Vitamin D (OsCal 500 + D) 1 Tablet(s) Oral three times a day  cefTRIAXone   IVPB 1000 milliGRAM(s) IV Intermittent every 24 hours  cholecalciferol 2000 Unit(s) Oral two times a day  clonazePAM  Tablet 1 milliGRAM(s) Oral two times a day  diVALproex ER Oral Tab/Cap - Peds 250 milliGRAM(s) Oral three times a day  enoxaparin Injectable 40 milliGRAM(s) SubCutaneous daily  fluvoxaMINE 100 milliGRAM(s) Oral two times a day  hydrOXYzine  Oral Tab/Cap - Peds 20 milliGRAM(s) Oral every 8 hours  lactulose Syrup 10 Gram(s) Oral daily  levothyroxine 75 MICROGram(s) Oral daily  loratadine 10 milliGRAM(s) Oral daily  metoprolol tartrate 25 milliGRAM(s) Oral two times a day  multivitamin/minerals/iron Oral Solution (CENTRUM) 15 milliLiter(s) Oral daily  OLANZapine 15 milliGRAM(s) Oral at bedtime  pantoprazole    Tablet 40 milliGRAM(s) Oral before breakfast  tamsulosin 0.8 milliGRAM(s) Oral at bedtime  vancomycin  IVPB 1000 milliGRAM(s) IV Intermittent every 12 hours  zinc sulfate 220 milliGRAM(s) Oral daily    MEDICATIONS  (PRN):  acetaminophen   Tablet .. 650 milliGRAM(s) Oral every 6 hours PRN Temp greater or equal to 38C (100.4F)      Allergies    Augmentin (Unknown)  aztreonam (Rash)  penicillin (Unknown)  sulfa drugs (Unknown)    Intolerances        REVIEW OF SYSTEMS:    All other review of systems is negative unless indicated above    Vital Signs Last 24 Hrs  T(C): 36.6 (04 May 2021 04:55), Max: 37.4 (03 May 2021 13:09)  T(F): 97.8 (04 May 2021 04:55), Max: 99.3 (03 May 2021 13:09)  HR: 83 (04 May 2021 04:55) (80 - 90)  BP: 116/73 (04 May 2021 04:55) (105/70 - 116/73)  BP(mean): --  RR: 18 (04 May 2021 04:55) (17 - 18)  SpO2: 96% (04 May 2021 04:55) (93% - 96%)    I&O's Summary    03 May 2021 07:01  -  04 May 2021 07:00  --------------------------------------------------------  IN: 1310 mL / OUT: 900 mL / NET: 410 mL        PHYSICAL EXAM:    Constitutional: NAD, awake and alert, well-developed  Eyes:  EOMI,  Pupils round, No oral cyanosis.  HEENT: No exudate or erythema  Pulmonary: Non-labored, breath sounds are clear bilaterally, No wheezing, rales or rhonchi  Cardiovascular: Regular, S1 and S2, No murmurs, rubs, gallops oir clicks  Gastrointestinal: Bowel Sounds present, soft, nontender.   Ext: No significant LE edema with good pulses x 4  Neurological: Alert, no gross focal motor deficits  Skin: No rashes.  Psych:  Mood & affect appropriate    LABS: All Labs Reviewed:                        12.1   13.13 )-----------( 220      ( 03 May 2021 06:31 )             35.0                         12.1   17.82 )-----------( 221      ( 02 May 2021 06:40 )             35.1                         14.3   14.18 )-----------( 261      ( 01 May 2021 09:43 )             40.2     03 May 2021 06:31    144    |  111    |  12     ----------------------------<  111    3.5     |  27     |  0.88   02 May 2021 06:40    144    |  112    |  19     ----------------------------<  103    3.5     |  28     |  0.88   01 May 2021 10:56    142    |  110    |  16     ----------------------------<  105    3.3     |  26     |  0.89     Ca    8.1        03 May 2021 06:31  Ca    8.1        02 May 2021 06:40  Ca    7.9        01 May 2021 10:56  Phos  2.6       02 May 2021 06:40    TPro  6.6    /  Alb  2.3    /  TBili  0.2    /  DBili  x      /  AST  19     /  ALT  19     /  AlkPhos  60     03 May 2021 06:31  TPro  6.5    /  Alb  2.4    /  TBili  0.3    /  DBili  .10    /  AST  25     /  ALT  23     /  AlkPhos  61     02 May 2021 06:40  TPro  7.0    /  Alb  2.8    /  TBili  0.3    /  DBili  x      /  AST  26     /  ALT  24     /  AlkPhos  69     01 May 2021 10:56          Blood Culture: Organism --  Gram Stain Blood -- Gram Stain --  Specimen Source .Blood Blood  Culture-Blood --    Organism --  Gram Stain Blood -- Gram Stain --  Specimen Source .Blood Blood-Peripheral  Culture-Blood --    Organism --  Gram Stain Blood -- Gram Stain --  Specimen Source .Urine Clean Catch (Midstream)  Culture-Blood --      05-01 @ 10:56  Pro Bnp 159    05-02 @ 06:40  TSH: 1.49      RADIOLOGY/EKG:    Attending Attestation:   20 minutes spent on total encounter; more than 50% of the visit was spent counseling and/or coordinating care by the attending physician.     ASSESSMENT AND PLAN

## 2021-05-04 NOTE — SWALLOW VFSS/MBS ASSESSMENT ADULT - DIAGNOSTIC IMPRESSIONS
Pt accepted PO trials of puree, honey thick liquids, and nectar thick liquids. Pt accepted x1 trial of thin liquids, but then became combative, repeatedly punching xray machine and pushing SLP's hand away. Sold trials were deferred given agitation. Study was then terminated.  1. Mild oral dysphagia marked by adequate retrieval and containment, reduced bolus cohesion with liquids resulting in premature spillage to hypopharyxn, reduced posterior transfer, and adequate clearance post swallow.  2. Mild pharyngeal dysphagia marked by brief delay in pharyngeal swallow trigger (at the level of the valleculae with puree, at the level of the pyriforms across all liquids), adequate BOT retraction, adequate hyolaryngeal elevation/excursion, and incomplete epiglottic retroflexion. There was no penetration and/or aspiration pre/during/post swallow. There was adequate pharyngeal clearance post swallow.

## 2021-05-04 NOTE — PROGRESS NOTE ADULT - SUBJECTIVE AND OBJECTIVE BOX
SYLVAIN ROSELIA is a 65yMale , patient examined and chart reviewed.    INTERVAL HPI/ OVERNIGHT EVENTS:   Fevers better.  No events.    PAST MEDICAL & SURGICAL HISTORY:  MR (mental retardation), severe  Seizure  HTN (hypertension)  Psoriasis  DD (diastrophic dysplasia)  Blind  right eye  Constipation, unspecified constipation type      For details regarding the patient's social history, family history, and other miscellaneous elements, please refer the initial infectious diseases consultation and/or the admitting history and physical examination for this admission.    ROS:  Unable to obtain due to MRDD    ALLERGIES  Augmentin (Unknown)  aztreonam (Rash)  penicillin (Unknown)  sulfa drugs (Unknown)      Current inpatient medications :    ANTIBIOTICS/RELEVANT:  cefTRIAXone   IVPB 1000 milliGRAM(s) IV Intermittent every 24 hours  vancomycin  IVPB 1000 milliGRAM(s) IV Intermittent every 12 hours    MEDICATIONS  (STANDING):  amLODIPine   Tablet 5 milliGRAM(s) Oral daily  ascorbic acid 500 milliGRAM(s) Oral two times a day  aspirin enteric coated 81 milliGRAM(s) Oral daily  calcium carbonate 1250 mG  + Vitamin D (OsCal 500 + D) 1 Tablet(s) Oral three times a day  cholecalciferol 2000 Unit(s) Oral two times a day  clonazePAM  Tablet 1 milliGRAM(s) Oral two times a day  diVALproex ER Oral Tab/Cap - Peds 250 milliGRAM(s) Oral three times a day  enoxaparin Injectable 40 milliGRAM(s) SubCutaneous daily  fluvoxaMINE 100 milliGRAM(s) Oral two times a day  hydrOXYzine  Oral Tab/Cap - Peds 20 milliGRAM(s) Oral every 8 hours  lactulose Syrup 10 Gram(s) Oral three times a day  levothyroxine 75 MICROGram(s) Oral daily  loratadine 10 milliGRAM(s) Oral daily  metoprolol tartrate 25 milliGRAM(s) Oral two times a day  multivitamin/minerals/iron Oral Solution (CENTRUM) 15 milliLiter(s) Oral daily  OLANZapine 15 milliGRAM(s) Oral at bedtime  pantoprazole    Tablet 40 milliGRAM(s) Oral before breakfast  tamsulosin 0.8 milliGRAM(s) Oral at bedtime  zinc sulfate 220 milliGRAM(s) Oral daily    MEDICATIONS  (PRN):  acetaminophen   Tablet .. 650 milliGRAM(s) Oral every 6 hours PRN Temp greater or equal to 38C (100.4F)      Objective:  Vital Signs Last 24 Hrs  T(C): 36.6 (04 May 2021 04:55), Max: 36.8 (03 May 2021 20:04)  T(F): 97.8 (04 May 2021 04:55), Max: 98.3 (03 May 2021 20:04)  HR: 83 (04 May 2021 04:55) (80 - 90)  BP: 116/73 (04 May 2021 04:55) (105/70 - 116/73)  RR: 18 (04 May 2021 04:55) (18 - 18)  SpO2: 92% (04 May 2021 15:29) (92% - 96%)    Physical Exam:  General: no acute distress  Neck: supple, trachea midline  Lungs: clear, no wheeze/rhonchi  Cardiovascular: regular rate and rhythm, S1 S2  Abdomen: soft, nontender,  bowel sounds normal  Neurological: alert and oriented x3  Skin: no rash  Extremities: Right LE swelling with decreased erythema + warm to touch      LABS:                        12.4   8.97  )-----------( 252      ( 04 May 2021 07:37 )             36.4   05-04    146<H>  |  112<H>  |  11  ----------------------------<  95  4.0   |  27  |  0.91    Ca    8.6      04 May 2021 07:37    TPro  7.0  /  Alb  2.4<L>  /  TBili  0.3  /  DBili  x   /  AST  16  /  ALT  14  /  AlkPhos  74  05-04    MICROBIOLOGY:    Culture - Blood (collected 01 May 2021 13:48)  Source: .Blood Blood-Peripheral  Preliminary Report (02 May 2021 14:01):    No growth to date.    Culture - Blood (collected 01 May 2021 13:48)  Source: .Blood Blood-Peripheral  Preliminary Report (02 May 2021 14:01):    No growth to date.    Culture - Urine (collected 01 May 2021 13:17)  Source: .Urine Clean Catch (Midstream)  Final Report (02 May 2021 13:30):    No growth    RADIOLOGY & ADDITIONAL STUDIES:    EXAM:  CT ANGIO CHEST (W)AW IC                            PROCEDURE DATE:  05/01/2021          INTERPRETATION:  CTA CHEST AND CT ABDOMEN AND PELVIS    INDICATION: History of fever. Hypoxia..    TECHNIQUE: Enhanced helical images were obtained of the chest, abdomen, and pelvis. Coronal and sagittal images were reconstructed.  Images were obtained after the uneventful administration of 90 cc of nonionic intravenous contrast (Omnipaque 350). 10 cc of nonionic intravenous contrast (Omnipaque 350) was discarded. Maximum intensity projection images were generated.    COMPARISON: Radiograph 5/1/2021. CT chest 5/1/2021, 4/22/2021, and 7/26/2020. CT abdomen and pelvis 4/23/2021.    FINDINGS:    Pulmonary Artery:  There is no main, central, lobar, orproximal to mid segmental pulmonary embolus. Distal segmental and subsegmental divisions are not well evaluated.    Tubes/Lines: None.    Lungs, airways and pleura: Since 5/1/2021, the groundglass opacities in the right lower lobe are unchanged. The lungs are otherwise clear allowing for respiratory motion. The airways are unremarkable.    Mediastinum: The visualized thyroid gland and esophagus are unremarkable. The chest lymph nodes measure less than 10 mm in the short axis. Hiatal hernia.    The heart is normal in size. No pericardial effusion. Coronary artery calcifications. Left-sided aortic arch and left-sided descending thoracic aorta.    Bones And Soft Tissues: The bones are unremarkable.  The soft tissues are unremarkable.    Abdomen and pelvis:    Liver: The liver is normal.    Gallbladder: Cholecystectomy.    Spleen: The spleen is normal.    Pancreas: The pancreas is normal.    Adrenal glands: The adrenal glands are normal.    Kidneys: Pelvic right renal cysts. Subcentimeter hypodense right renal lesions are statistically benign and likely represent cysts.    Hypodense left renal lesions are statistically benign and likely represent cysts. No hydronephrosis or hydroureter.    Lymph nodes: No enlarged retroperitoneal or upperabdominal lymph nodes.    Bowel: The stomach is unremarkable. The small bowel and large bowel are normal in caliber. The appendix is normal. No free air. No free fluid.    Bladder: The bladder is distended.    Prostate gland: The prostate gland is unremarkable.    Skeletal: Degenerative change of the spine.      IMPRESSION:    Chest:  1.  No pulmonary embolus.  2.  The right lower lobe groundglass opacities are unchanged allowing for differences in technique an respiratory motion. A follow-up study is recommended to evaluate for resolution/change to include prone imaging in 6-8 weeks.    Abdomen and pelvis:  1.  No bowel detection.      Assessment :  64YO M Phx of MRDD, HTN, resident of longterm admitted with fevers sec to Right LE cellulitis with lymphangitis  Doubt pneumonia  AUR  Fevers better  WBC downtrending     Plan :   Continue Vancomycin and  Rocephin  Fu cultures  Trend temps and cbc  Asp precautions      Continue with present regiment.  Appropriate use of antibiotics and adverse effects reviewed.      > 35 minutes were spent in direct patient care reviewing notes, medications ,labs data/ imaging , discussion with multidisciplinary team.    Thank you for allowing me to participate in care of your patient .    Olivier Daniels MD  Infectious Disease  [phone]

## 2021-05-04 NOTE — SWALLOW VFSS/MBS ASSESSMENT ADULT - SLP PERTINENT HISTORY OF CURRENT PROBLEM
Per charting, "65 year old male with history of MR (severe), constipation, blind right eye, diastrophic dysplasia, psoriasis, HTN, and seizure presents with fever and low oxygen level since this am."

## 2021-05-04 NOTE — SWALLOW VFSS/MBS ASSESSMENT ADULT - SLP GENERAL OBSERVATIONS
Pt received sitting upright in MBS chair, +lateral view. Pt was awake, though with decreased level of participation and required max cues of encouragement for participation. Pt became combative at conclusion of study, study then terminated. RN made aware. Pt received sitting upright in MBS chair, +lateral view. Pt was awake, though with decreased level of participation and required max cues of encouragement for participation. Pt with frequent upper body movements, required frequent cueing to remain in lateral plane of view. Pt became combative at conclusion of study, study then terminated. RN made aware.

## 2021-05-04 NOTE — PROGRESS NOTE ADULT - ASSESSMENT
SYLVAIN VELOZ [facility] P  5/1/2021  DR TASHA CALIXTO     REVIEW OF SYMPTOMS      Able to give (reliable) ROS  NO     PHYSICAL EXAM    HEENT Unremarkable  atraumatic   RESP Fair air entry EXP prolonged    Harsh breath sound Resp distres mild   CARDIAC S1 S2 No S3     NO JVD    ABDOMEN SOFT BS PRESENT NOT DISTENDED No hepatosplenomegaly   PEDAL EDEMA present No calf tenderness  NO rash       PATIENT PRESENTATION.     65M group home resident with PMH severe mental retardation HTN, Cholecystectomy, COVID 4/2020 non-verbal recently admitted [facility] P 4/23-4/27/2021  was readmitted 5/1/2021 with fever hypoxia  and pulmonary consulted   In ER pt already got levaquin and ns 2.5 l (5/1/2021 2:13 PM)     RECENT SHARPE   echo 4/23/2021 echo n lvsf n lvedp  4/22/2021 D-dimr 314  4/23 v duplx negv  cta ch 4/22 cta ch no pe bl is edema air trapping cm     Home meds.   amlodipine 5  asa  81 clonazepam 1.2 cosentyx  300q m depakote 250.3 fluvoxamine 100.2 hydroxyzine 20.3 levoxyl 75 metoprolol 25.2 olanzapine 15 omeprazole 20 tamsulosin .4       PROBLEMS  COVID pcr -ric apb +iv  SEPSIS poa   PROLACTINEMIA 5/2 TX 14   HYPOXIA poa   GROUND GLASS OPAC RLL ON 5/1 CTA   RLE CELLULITIS  MRSA POSV 5/2/2021    URINE RETN 5/3/2021    ROCEPHIN (5/3/2021) (DR MUIR  VANCO (5/3/2021 (DR MUIR)         PATIENT DATA                ABG.   5/1/2021 24% 743/39/88               OXYGENATION.    5/4/2021 2l 96%   5/3/2021 2l 93%   5/1/2021 2l 95%              VITALS/IO/VENT/DRIPS.  5/4/2021 afeb 90 100/70     GLOBAL AND BEST PRACTICE ISSUES                     ALLERGY.    azactm augmentn pncl sulfa   WEIGHT.    5/1/2021 81K                            BMI.              5/1/2021 29                ADVANCED DIRECTIVE.                               HEAD OF BED ELEVATION. Yes  DVT PROPHYLAXIS.  lvnx 40 (5/1)                     SAUER PROPHYLAXIS.   APA.   asa 81 (5/1)                                                                  DYSPHAGIA RECOMM. 5/3/2021 DYS 2 NECTAR   DIET.   dys 2 mech soft nectar (5/1)    INFECTION PROPHYLAXIS.   FREE WATER.    IV.          D5 1/2 60 (5/1)               ASSESSMENT/RECOMMENDATIONS.    COVID STATUS EVAL  5/2spkab posv   5/1 scv2 negv   SEPSIS poa   Had fever 102 on 5/2/2021  Pt had leukocytosis w 14 poa and procalc 4/5 poa   MRSA is posv 5/2/2021 5/1/2021 CT ch did not show infiltr  5/1/2021 RVP was negv   ROCEPHIN (5/3/2021) (DR MUIR  VANCO (5/3/2021 (DR MUIR)   ID eval appreciated  HYPOXIA poa   O2 to keep po 90-95%  VTE RULED OUT  V duplx 5/1  negv   cta 5/1 cta ch No pe rll ggo fu study recommended in 6 w   GROUND GLASS OPAC RLL  Will need followup ct chend June 2021   NEUROPSYCHIATRIC MEDS  Cont prev meds   DYSPHAGIA EVAL   Speech consulted 5/1/2021   On dysphagia diet         TIME SPENT   Over 25 minutes aggregate care time spent on encounter; activities included   direct patient care, counseling and/or coordinating care reviewing notes, lab data/ imaging , discussion with multidisciplinary team/ patient  /family and explaining in detail risks, benefits, alternatives  of the recommendations     SYLVAIN VELOZ [facility]  5/1/2021  DR TASHA CALIXTO

## 2021-05-04 NOTE — SWALLOW VFSS/MBS ASSESSMENT ADULT - COMMENTS
Clinical swallow assessment completed 5/3, at which time pt was recommended mechanical soft solids with nectar thick liquids.    CT chest 5/1: " Since 5/1/2021, the groundglass opacities in the right lower lobe are unchanged. The lungs are otherwise clear allowing for respiratory motion. The airways are unremarkable."

## 2021-05-04 NOTE — PROGRESS NOTE ADULT - SUBJECTIVE AND OBJECTIVE BOX
ROSELIA NARAYAN    PLV 1EAS 114 W1    Patient is a 65y old  Male who presents with a chief complaint of hypoxia and fevers (04 May 2021 07:09)       Allergies    Augmentin (Unknown)  aztreonam (Rash)  penicillin (Unknown)  sulfa drugs (Unknown)    Intolerances        HPI:  fever 103 at home and pulse ox 88  was dc hospital few days ago for fever  w/u was negative  patient had fallen the day before he was admitted  stitches were removed 2 days ago (03 May 2021 18:09)      PAST MEDICAL & SURGICAL HISTORY:  MR (mental retardation), severe    Seizure    HTN (hypertension)    Psoriasis    DD (diastrophic dysplasia)    Blind  right eye    Constipation, unspecified constipation type    No significant past surgical history        FAMILY HISTORY:  No pertinent family history in first degree relatives          MEDICATIONS   acetaminophen   Tablet .. 650 milliGRAM(s) Oral every 6 hours PRN  amLODIPine   Tablet 5 milliGRAM(s) Oral daily  ascorbic acid 500 milliGRAM(s) Oral two times a day  aspirin enteric coated 81 milliGRAM(s) Oral daily  calcium carbonate 1250 mG  + Vitamin D (OsCal 500 + D) 1 Tablet(s) Oral three times a day  cefTRIAXone   IVPB 1000 milliGRAM(s) IV Intermittent every 24 hours  cholecalciferol 2000 Unit(s) Oral two times a day  clonazePAM  Tablet 1 milliGRAM(s) Oral two times a day  diVALproex ER Oral Tab/Cap - Peds 250 milliGRAM(s) Oral three times a day  enoxaparin Injectable 40 milliGRAM(s) SubCutaneous daily  fluvoxaMINE 100 milliGRAM(s) Oral two times a day  hydrOXYzine  Oral Tab/Cap - Peds 20 milliGRAM(s) Oral every 8 hours  lactulose Syrup 10 Gram(s) Oral daily  levothyroxine 75 MICROGram(s) Oral daily  loratadine 10 milliGRAM(s) Oral daily  metoprolol tartrate 25 milliGRAM(s) Oral two times a day  multivitamin/minerals/iron Oral Solution (CENTRUM) 15 milliLiter(s) Oral daily  OLANZapine 15 milliGRAM(s) Oral at bedtime  pantoprazole    Tablet 40 milliGRAM(s) Oral before breakfast  tamsulosin 0.8 milliGRAM(s) Oral at bedtime  vancomycin  IVPB 1000 milliGRAM(s) IV Intermittent every 12 hours  zinc sulfate 220 milliGRAM(s) Oral daily      Vital Signs Last 24 Hrs  T(C): 36.6 (04 May 2021 04:55), Max: 37.4 (03 May 2021 13:09)  T(F): 97.8 (04 May 2021 04:55), Max: 99.3 (03 May 2021 13:09)  HR: 83 (04 May 2021 04:55) (80 - 90)  BP: 116/73 (04 May 2021 04:55) (105/70 - 116/73)  BP(mean): --  RR: 18 (04 May 2021 04:55) (17 - 18)  SpO2: 96% (04 May 2021 04:55) (93% - 96%)      21 @ 07:01  -  21 @ 07:00  --------------------------------------------------------  IN: 1310 mL / OUT: 900 mL / NET: 410 mL            LABS:                        12.4   8.97  )-----------( 252      ( 04 May 2021 07:37 )             36.4     05-04    146<H>  |  112<H>  |  11  ----------------------------<  95  4.0   |  27  |  0.91    Ca    8.6      04 May 2021 07:37    TPro  7.0  /  Alb  2.4<L>  /  TBili  0.3  /  DBili  x   /  AST  16  /  ALT  14  /  AlkPhos  74  05-04      Urinalysis Basic - ( 03 May 2021 20:21 )    Color: Pale Yellow / Appearance: Clear / S.005 / pH: x  Gluc: x / Ketone: Negative  / Bili: Negative / Urobili: Negative   Blood: x / Protein: Negative / Nitrite: Negative   Leuk Esterase: Negative / RBC: x / WBC x   Sq Epi: x / Non Sq Epi: x / Bacteria: x            WBC:  WBC Count: 8.97 K/uL ( @ 07:37)  WBC Count: 13.13 K/uL ( @ 06:31)  WBC Count: 17.82 K/uL ( @ 06:40)  WBC Count: 14.18 K/uL ( @ 09:43)      MICROBIOLOGY:  RECENT CULTURES:   .Blood Blood XXXX XXXX   No growth to date.     .Blood Blood-Peripheral XXXX XXXX   No growth to date.     .Urine Clean Catch (Midstream) XXXX XXXX   No growth                    Sodium:  Sodium, Serum: 146 mmol/L ( @ 07:37)  Sodium, Serum: 144 mmol/L ( @ 06:31)  Sodium, Serum: 144 mmol/L ( @ 06:40)  Sodium, Serum: 142 mmol/L ( @ 10:56)      0.91 mg/dL  @ :37  0.88 mg/dL  @ 06:31  0.88 mg/dL  @ 06:40  0.89 mg/dL  @ 10:56      Hemoglobin:  Hemoglobin: 12.4 g/dL ( @ 07:37)  Hemoglobin: 12.1 g/dL ( @ 06:31)  Hemoglobin: 12.1 g/dL ( @ 06:40)  Hemoglobin: 14.3 g/dL ( @ 09:43)      Platelets: Platelet Count - Automated: 252 K/uL ( @ 07:37)  Platelet Count - Automated: 220 K/uL ( @ 06:31)  Platelet Count - Automated: 221 K/uL ( @ 06:40)  Platelet Count - Automated: 261 K/uL ( @ 09:43)      LIVER FUNCTIONS - ( 04 May 2021 07:37 )  Alb: 2.4 g/dL / Pro: 7.0 g/dL / ALK PHOS: 74 U/L / ALT: 14 U/L / AST: 16 U/L / GGT: x             Urinalysis Basic - ( 03 May 2021 20:21 )    Color: Pale Yellow / Appearance: Clear / S.005 / pH: x  Gluc: x / Ketone: Negative  / Bili: Negative / Urobili: Negative   Blood: x / Protein: Negative / Nitrite: Negative   Leuk Esterase: Negative / RBC: x / WBC x   Sq Epi: x / Non Sq Epi: x / Bacteria: x        RADIOLOGY & ADDITIONAL STUDIES:

## 2021-05-04 NOTE — SWALLOW VFSS/MBS ASSESSMENT ADULT - SPECIFY REASON(S)
Telephone Encounter by Sofia Díaz MD at 05/23/18 07:14 PM     Author:  Sofia Díaz MD Service:  (none) Author Type:  Physician     Filed:  05/23/18 07:17 PM Encounter Date:  5/23/2018 Status:  Signed     :  Sofia Díaz MD (Physician)            Received call from patient  He is leaving at 4AM tomorrow morning for [place] and will run out of his Gabapentin when he is out of town and requesting refill - he takes 3 caps total per day  Reviewed chart and historical states 1 cap in AM and 2 caps in PM  Refill of Gabapentin sent to [facility] in [place] per patient request  FYI to PCP[BK1.1M]  Electronically Signed by:    Sofia Díaz MD , 5/23/2018[BK1.1T]        Revision History        User Key Date/Time User Provider Type Action    > BK1.1 05/23/18 07:17 PM Sofia Díaz MD Physician Sign    M - Manual, T - Template             to objectively assess oropharyngeal swallow mechanism

## 2021-05-04 NOTE — PROGRESS NOTE ADULT - PROBLEM SELECTOR PLAN 3
resolved  urine cx # 1 was negative  pending repeat  patient ambulated today and was able to urinate on his own

## 2021-05-05 LAB
ALBUMIN SERPL ELPH-MCNC: 2.6 G/DL — LOW (ref 3.3–5)
ALP SERPL-CCNC: 69 U/L — SIGNIFICANT CHANGE UP (ref 40–120)
ALT FLD-CCNC: 23 U/L — SIGNIFICANT CHANGE UP (ref 12–78)
ANION GAP SERPL CALC-SCNC: 8 MMOL/L — SIGNIFICANT CHANGE UP (ref 5–17)
AST SERPL-CCNC: 26 U/L — SIGNIFICANT CHANGE UP (ref 15–37)
BASOPHILS # BLD AUTO: 0.04 K/UL — SIGNIFICANT CHANGE UP (ref 0–0.2)
BASOPHILS NFR BLD AUTO: 0.5 % — SIGNIFICANT CHANGE UP (ref 0–2)
BILIRUB SERPL-MCNC: 0.4 MG/DL — SIGNIFICANT CHANGE UP (ref 0.2–1.2)
BUN SERPL-MCNC: 15 MG/DL — SIGNIFICANT CHANGE UP (ref 7–23)
CALCIUM SERPL-MCNC: 8.8 MG/DL — SIGNIFICANT CHANGE UP (ref 8.5–10.1)
CHLORIDE SERPL-SCNC: 109 MMOL/L — HIGH (ref 96–108)
CO2 SERPL-SCNC: 27 MMOL/L — SIGNIFICANT CHANGE UP (ref 22–31)
CREAT SERPL-MCNC: 0.86 MG/DL — SIGNIFICANT CHANGE UP (ref 0.5–1.3)
EOSINOPHIL # BLD AUTO: 0.89 K/UL — HIGH (ref 0–0.5)
EOSINOPHIL NFR BLD AUTO: 11.8 % — HIGH (ref 0–6)
GLUCOSE SERPL-MCNC: 94 MG/DL — SIGNIFICANT CHANGE UP (ref 70–99)
HCT VFR BLD CALC: 38.3 % — LOW (ref 39–50)
HGB BLD-MCNC: 13.2 G/DL — SIGNIFICANT CHANGE UP (ref 13–17)
IMM GRANULOCYTES NFR BLD AUTO: 1.5 % — SIGNIFICANT CHANGE UP (ref 0–1.5)
LYMPHOCYTES # BLD AUTO: 1.83 K/UL — SIGNIFICANT CHANGE UP (ref 1–3.3)
LYMPHOCYTES # BLD AUTO: 24.3 % — SIGNIFICANT CHANGE UP (ref 13–44)
MCHC RBC-ENTMCNC: 30.8 PG — SIGNIFICANT CHANGE UP (ref 27–34)
MCHC RBC-ENTMCNC: 34.5 GM/DL — SIGNIFICANT CHANGE UP (ref 32–36)
MCV RBC AUTO: 89.5 FL — SIGNIFICANT CHANGE UP (ref 80–100)
MONOCYTES # BLD AUTO: 0.82 K/UL — SIGNIFICANT CHANGE UP (ref 0–0.9)
MONOCYTES NFR BLD AUTO: 10.9 % — SIGNIFICANT CHANGE UP (ref 2–14)
NEUTROPHILS # BLD AUTO: 3.84 K/UL — SIGNIFICANT CHANGE UP (ref 1.8–7.4)
NEUTROPHILS NFR BLD AUTO: 51 % — SIGNIFICANT CHANGE UP (ref 43–77)
NRBC # BLD: 0 /100 WBCS — SIGNIFICANT CHANGE UP (ref 0–0)
PLATELET # BLD AUTO: 266 K/UL — SIGNIFICANT CHANGE UP (ref 150–400)
POTASSIUM SERPL-MCNC: 3.6 MMOL/L — SIGNIFICANT CHANGE UP (ref 3.5–5.3)
POTASSIUM SERPL-SCNC: 3.6 MMOL/L — SIGNIFICANT CHANGE UP (ref 3.5–5.3)
PROCALCITONIN SERPL-MCNC: 2.67 NG/ML — HIGH (ref 0–0.04)
PROT SERPL-MCNC: 7.5 G/DL — SIGNIFICANT CHANGE UP (ref 6–8.3)
RBC # BLD: 4.28 M/UL — SIGNIFICANT CHANGE UP (ref 4.2–5.8)
RBC # FLD: 13.6 % — SIGNIFICANT CHANGE UP (ref 10.3–14.5)
SODIUM SERPL-SCNC: 144 MMOL/L — SIGNIFICANT CHANGE UP (ref 135–145)
WBC # BLD: 7.53 K/UL — SIGNIFICANT CHANGE UP (ref 3.8–10.5)
WBC # FLD AUTO: 7.53 K/UL — SIGNIFICANT CHANGE UP (ref 3.8–10.5)

## 2021-05-05 PROCEDURE — 93925 LOWER EXTREMITY STUDY: CPT | Mod: 26

## 2021-05-05 RX ORDER — PANTOPRAZOLE SODIUM 20 MG/1
40 TABLET, DELAYED RELEASE ORAL
Refills: 0 | Status: DISCONTINUED | OUTPATIENT
Start: 2021-05-05 | End: 2021-05-11

## 2021-05-05 RX ADMIN — Medication 20 MILLIGRAM(S): at 06:02

## 2021-05-05 RX ADMIN — Medication 25 MILLIGRAM(S): at 18:39

## 2021-05-05 RX ADMIN — LACTULOSE 10 GRAM(S): 10 SOLUTION ORAL at 06:02

## 2021-05-05 RX ADMIN — Medication 500 MILLIGRAM(S): at 06:02

## 2021-05-05 RX ADMIN — DIVALPROEX SODIUM 250 MILLIGRAM(S): 500 TABLET, DELAYED RELEASE ORAL at 14:23

## 2021-05-05 RX ADMIN — Medication 20 MILLIGRAM(S): at 22:05

## 2021-05-05 RX ADMIN — Medication 81 MILLIGRAM(S): at 14:22

## 2021-05-05 RX ADMIN — OLANZAPINE 15 MILLIGRAM(S): 15 TABLET, FILM COATED ORAL at 22:04

## 2021-05-05 RX ADMIN — Medication 250 MILLIGRAM(S): at 10:23

## 2021-05-05 RX ADMIN — Medication 500 MILLIGRAM(S): at 18:40

## 2021-05-05 RX ADMIN — Medication 75 MICROGRAM(S): at 06:01

## 2021-05-05 RX ADMIN — LACTULOSE 10 GRAM(S): 10 SOLUTION ORAL at 22:05

## 2021-05-05 RX ADMIN — ZINC SULFATE TAB 220 MG (50 MG ZINC EQUIVALENT) 220 MILLIGRAM(S): 220 (50 ZN) TAB at 14:22

## 2021-05-05 RX ADMIN — AMLODIPINE BESYLATE 5 MILLIGRAM(S): 2.5 TABLET ORAL at 06:02

## 2021-05-05 RX ADMIN — Medication 1 TABLET(S): at 06:02

## 2021-05-05 RX ADMIN — Medication 25 MILLIGRAM(S): at 06:04

## 2021-05-05 RX ADMIN — TAMSULOSIN HYDROCHLORIDE 0.8 MILLIGRAM(S): 0.4 CAPSULE ORAL at 23:01

## 2021-05-05 RX ADMIN — Medication 1 TABLET(S): at 14:23

## 2021-05-05 RX ADMIN — Medication 1 MILLIGRAM(S): at 18:39

## 2021-05-05 RX ADMIN — Medication 1 TABLET(S): at 22:05

## 2021-05-05 RX ADMIN — Medication 2000 UNIT(S): at 18:40

## 2021-05-05 RX ADMIN — Medication 1 MILLIGRAM(S): at 06:19

## 2021-05-05 RX ADMIN — DIVALPROEX SODIUM 250 MILLIGRAM(S): 500 TABLET, DELAYED RELEASE ORAL at 06:02

## 2021-05-05 RX ADMIN — CEFTRIAXONE 100 MILLIGRAM(S): 500 INJECTION, POWDER, FOR SOLUTION INTRAMUSCULAR; INTRAVENOUS at 18:39

## 2021-05-05 RX ADMIN — LORATADINE 10 MILLIGRAM(S): 10 TABLET ORAL at 14:22

## 2021-05-05 RX ADMIN — SENNA PLUS 2 TABLET(S): 8.6 TABLET ORAL at 22:04

## 2021-05-05 RX ADMIN — DIVALPROEX SODIUM 250 MILLIGRAM(S): 500 TABLET, DELAYED RELEASE ORAL at 22:04

## 2021-05-05 RX ADMIN — LACTULOSE 10 GRAM(S): 10 SOLUTION ORAL at 14:22

## 2021-05-05 RX ADMIN — Medication 250 MILLIGRAM(S): at 22:05

## 2021-05-05 RX ADMIN — FLUVOXAMINE MALEATE 100 MILLIGRAM(S): 25 TABLET ORAL at 06:01

## 2021-05-05 RX ADMIN — PANTOPRAZOLE SODIUM 40 MILLIGRAM(S): 20 TABLET, DELAYED RELEASE ORAL at 06:04

## 2021-05-05 RX ADMIN — Medication 2000 UNIT(S): at 06:01

## 2021-05-05 RX ADMIN — FLUVOXAMINE MALEATE 100 MILLIGRAM(S): 25 TABLET ORAL at 18:40

## 2021-05-05 RX ADMIN — Medication 15 MILLILITER(S): at 14:22

## 2021-05-05 RX ADMIN — Medication 20 MILLIGRAM(S): at 14:23

## 2021-05-05 RX ADMIN — ENOXAPARIN SODIUM 40 MILLIGRAM(S): 100 INJECTION SUBCUTANEOUS at 14:22

## 2021-05-05 NOTE — PROGRESS NOTE ADULT - SUBJECTIVE AND OBJECTIVE BOX
ROSELIA NARAYAN    PLV 1EAS 114 W1    Patient is a 65y old  Male who presents with a chief complaint of hypoxia and fevers (04 May 2021 15:29)       Allergies    Augmentin (Unknown)  aztreonam (Rash)  penicillin (Unknown)  sulfa drugs (Unknown)    Intolerances        HPI:  fever 103 at home and pulse ox 88  was dc hospital few days ago for fever  w/u was negative  patient had fallen the day before he was admitted  stitches were removed 2 days ago (03 May 2021 18:09)      PAST MEDICAL & SURGICAL HISTORY:  MR (mental retardation), severe    Seizure    HTN (hypertension)    Psoriasis    DD (diastrophic dysplasia)    Blind  right eye    Constipation, unspecified constipation type    No significant past surgical history        FAMILY HISTORY:  No pertinent family history in first degree relatives          MEDICATIONS   acetaminophen   Tablet .. 650 milliGRAM(s) Oral every 6 hours PRN  amLODIPine   Tablet 5 milliGRAM(s) Oral daily  ascorbic acid 500 milliGRAM(s) Oral two times a day  aspirin enteric coated 81 milliGRAM(s) Oral daily  calcium carbonate 1250 mG  + Vitamin D (OsCal 500 + D) 1 Tablet(s) Oral three times a day  cefTRIAXone   IVPB 1000 milliGRAM(s) IV Intermittent every 24 hours  cholecalciferol 2000 Unit(s) Oral two times a day  clonazePAM  Tablet 1 milliGRAM(s) Oral two times a day  diVALproex ER Oral Tab/Cap - Peds 250 milliGRAM(s) Oral three times a day  enoxaparin Injectable 40 milliGRAM(s) SubCutaneous daily  fluvoxaMINE 100 milliGRAM(s) Oral two times a day  hydrOXYzine  Oral Tab/Cap - Peds 20 milliGRAM(s) Oral every 8 hours  lactulose Syrup 10 Gram(s) Oral three times a day  levothyroxine 75 MICROGram(s) Oral daily  loratadine 10 milliGRAM(s) Oral daily  metoprolol tartrate 25 milliGRAM(s) Oral two times a day  multivitamin/minerals/iron Oral Solution (CENTRUM) 15 milliLiter(s) Oral daily  OLANZapine 15 milliGRAM(s) Oral at bedtime  pantoprazole   Suspension 40 milliGRAM(s) Oral before breakfast  senna 2 Tablet(s) Oral at bedtime  tamsulosin 0.8 milliGRAM(s) Oral at bedtime  vancomycin  IVPB 1000 milliGRAM(s) IV Intermittent every 12 hours  zinc sulfate 220 milliGRAM(s) Oral daily      Vital Signs Last 24 Hrs  T(C): 36.3 (05 May 2021 05:15), Max: 36.7 (04 May 2021 20:51)  T(F): 97.4 (05 May 2021 05:15), Max: 98 (04 May 2021 20:51)  HR: 101 (05 May 2021 05:15) (98 - 116)  BP: 135/78 (05 May 2021 05:15) (118/75 - 136/74)  BP(mean): --  RR: 18 (05 May 2021 05:15) (18 - 18)  SpO2: 93% (04 May 2021 20:51) (92% - 93%)      21 @ 07:01  -  21 @ 07:00  --------------------------------------------------------  IN: 250 mL / OUT: 450 mL / NET: -200 mL            LABS:                        13.2   7.53  )-----------( 266      ( 05 May 2021 07:08 )             38.3     05-    144  |  109<H>  |  15  ----------------------------<  94  3.6   |  27  |  0.86    Ca    8.8      05 May 2021 07:08    TPro  7.5  /  Alb  2.6<L>  /  TBili  0.4  /  DBili  x   /  AST  26  /  ALT  23  /  AlkPhos  69  05-05      Urinalysis Basic - ( 03 May 2021 20:21 )    Color: Pale Yellow / Appearance: Clear / S.005 / pH: x  Gluc: x / Ketone: Negative  / Bili: Negative / Urobili: Negative   Blood: x / Protein: Negative / Nitrite: Negative   Leuk Esterase: Negative / RBC: x / WBC x   Sq Epi: x / Non Sq Epi: x / Bacteria: x            WBC:  WBC Count: 7.53 K/uL ( @ 07:08)  WBC Count: 8.97 K/uL ( @ 07:37)  WBC Count: 13.13 K/uL ( @ 06:31)  WBC Count: 17.82 K/uL ( @ 06:40)  WBC Count: 14.18 K/uL ( @ 09:43)      MICROBIOLOGY:  RECENT CULTURES:   .Urine Clean Catch (Midstream) XXXX XXXX   No growth     .Blood Blood XXXX XXXX   No growth to date.     .Blood Blood-Peripheral XXXX XXXX   No growth to date.     .Urine Clean Catch (Midstream) XXXX XXXX   No growth                    Sodium:  Sodium, Serum: 144 mmol/L ( @ 07:08)  Sodium, Serum: 146 mmol/L ( 07:37)  Sodium, Serum: 144 mmol/L ( @ 06:31)  Sodium, Serum: 144 mmol/L ( @ 06:40)  Sodium, Serum: 142 mmol/L ( 10:56)      0.86 mg/dL  07:08  0.91 mg/dL  @ 07:37  0.88 mg/dL  06:31  0.88 mg/dL  @ 06:40  0.89 mg/dL  @ 10:56      Hemoglobin:  Hemoglobin: 13.2 g/dL ( @ 07:08)  Hemoglobin: 12.4 g/dL ( @ 07:37)  Hemoglobin: 12.1 g/dL ( @ 06:31)  Hemoglobin: 12.1 g/dL ( @ 06:40)  Hemoglobin: 14.3 g/dL ( @ 09:43)      Platelets: Platelet Count - Automated: 266 K/uL ( @ 07:08)  Platelet Count - Automated: 252 K/uL ( @ 07:37)  Platelet Count - Automated: 220 K/uL ( @ 06:31)  Platelet Count - Automated: 221 K/uL ( @ 06:40)  Platelet Count - Automated: 261 K/uL ( @ 09:43)      LIVER FUNCTIONS - ( 05 May 2021 07:08 )  Alb: 2.6 g/dL / Pro: 7.5 g/dL / ALK PHOS: 69 U/L / ALT: 23 U/L / AST: 26 U/L / GGT: x             Urinalysis Basic - ( 03 May 2021 20:21 )    Color: Pale Yellow / Appearance: Clear / S.005 / pH: x  Gluc: x / Ketone: Negative  / Bili: Negative / Urobili: Negative   Blood: x / Protein: Negative / Nitrite: Negative   Leuk Esterase: Negative / RBC: x / WBC x   Sq Epi: x / Non Sq Epi: x / Bacteria: x        RADIOLOGY & ADDITIONAL STUDIES:

## 2021-05-05 NOTE — PROGRESS NOTE ADULT - SUBJECTIVE AND OBJECTIVE BOX
PAST MEDICAL & SURGICAL HISTORY:  MR (mental retardation), severe    Seizure    HTN (hypertension)    Psoriasis    DD (diastrophic dysplasia)    Blind  right eye    Constipation, unspecified constipation type    No significant past surgical history        MEDICATIONS  (STANDING):  amLODIPine   Tablet 5 milliGRAM(s) Oral daily  ascorbic acid 500 milliGRAM(s) Oral two times a day  aspirin enteric coated 81 milliGRAM(s) Oral daily  calcium carbonate 1250 mG  + Vitamin D (OsCal 500 + D) 1 Tablet(s) Oral three times a day  cefTRIAXone   IVPB 1000 milliGRAM(s) IV Intermittent every 24 hours  cholecalciferol 2000 Unit(s) Oral two times a day  clonazePAM  Tablet 1 milliGRAM(s) Oral two times a day  diVALproex ER Oral Tab/Cap - Peds 250 milliGRAM(s) Oral three times a day  enoxaparin Injectable 40 milliGRAM(s) SubCutaneous daily  fluvoxaMINE 100 milliGRAM(s) Oral two times a day  hydrOXYzine  Oral Tab/Cap - Peds 20 milliGRAM(s) Oral every 8 hours  lactulose Syrup 10 Gram(s) Oral three times a day  levothyroxine 75 MICROGram(s) Oral daily  loratadine 10 milliGRAM(s) Oral daily  metoprolol tartrate 25 milliGRAM(s) Oral two times a day  multivitamin/minerals/iron Oral Solution (CENTRUM) 15 milliLiter(s) Oral daily  OLANZapine 15 milliGRAM(s) Oral at bedtime  pantoprazole   Suspension 40 milliGRAM(s) Oral before breakfast  senna 2 Tablet(s) Oral at bedtime  tamsulosin 0.8 milliGRAM(s) Oral at bedtime  vancomycin  IVPB 1000 milliGRAM(s) IV Intermittent every 12 hours  zinc sulfate 220 milliGRAM(s) Oral daily    MEDICATIONS  (PRN):  acetaminophen   Tablet .. 650 milliGRAM(s) Oral every 6 hours PRN Temp greater or equal to 38C (100.4F)      Patient is a 65y old  Male who presents with a chief complaint of hypoxia and fevers (05 May 2021 08:57)      Vital Signs Last 24 Hrs  T(C): 36.3 (05 May 2021 05:15), Max: 36.7 (04 May 2021 20:51)  T(F): 97.4 (05 May 2021 05:15), Max: 98 (04 May 2021 20:51)  HR: 101 (05 May 2021 05:15) (98 - 116)  BP: 135/78 (05 May 2021 05:15) (118/75 - 136/74)  BP(mean): --  RR: 18 (05 May 2021 05:15) (18 - 18)  SpO2: 93% (04 May 2021 20:51) (92% - 93%)           @ 07:01  -   @ 07:00  --------------------------------------------------------  IN: 250 mL / OUT: 450 mL / NET: -200 mL        REVIEW OF SYSTEMS:    Constitutional: No fever, weight loss or fatigue  Eyes: No eye pain, visual disturbances, or discharge  ENT:  No difficulty hearing, tinnitus, vertigo; No sinus or throat pain  Neck: No pain or stiffness  Breasts: No pain, masses or nipple discharge  Respiratory: No cough, wheezing, chills or hemoptysis  Cardiovascular: No chest pain, palpitations, shortness of breath, dizziness or leg swelling  Gastrointestinal: No abdominal or epigastric pain. No nausea, vomiting or hematemesis; No diarrhea or constipation. No melena or hematochezia.  Genitourinary: No dysuria, frequency, hematuria or incontinence  Rectal: No pain, hemorrhoids or incontinence  Neurological: No headaches, memory loss, loss of strength, numbness or tremors  Skin: No itching, burning, rashes or lesions   Lymph Nodes: No enlarged glands  Endocrine: No heat or cold intolerance; No hair loss  Musculoskeletal: No joint pain or swelling; No muscle, back or extremity pain  Psychiatric: No depression, anxiety, mood swings or difficulty sleeping  Heme/Lymph: No easy bruising or bleeding gums  Allergy and Immunologic: No hives or eczema    PHYSICAL EXAM:  alert  eating better  Constitutional: NAD,  Respiratory: CTAB/L   Cardiovascular: S1 and S2, RRR, no M/G/R  Gastrointestinal: BS+, soft, NT/ND  Extremities: No peripheral edema  Vascular: 2+ peripheral pulses  Neurological: A/O x 3, no focal deficits  Skin: No rashes      decubiti:                           13.2   7.53  )-----------( 266      ( 05 May 2021 07:08 )             38.3         144  |  109<H>  |  15  ----------------------------<  94  3.6   |  27  |  0.86    Ca    8.8      05 May 2021 07:08    TPro  7.5  /  Alb  2.6<L>  /  TBili  0.4  /  DBili  x   /  AST  26  /  ALT  23  /  AlkPhos  69  05-      Urinalysis Basic - ( 03 May 2021 20:21 )    Color: Pale Yellow / Appearance: Clear / S.005 / pH: x  Gluc: x / Ketone: Negative  / Bili: Negative / Urobili: Negative   Blood: x / Protein: Negative / Nitrite: Negative   Leuk Esterase: Negative / RBC: x / WBC x   Sq Epi: x / Non Sq Epi: x / Bacteria: x            .Urine Clean Catch (Midstream)   @ 00:48   No growth  --  --      .Blood Blood   @ 21:24   No growth to date.  --  --      .Blood Blood-Peripheral   @ 13:48   No growth to date.  --  --      .Urine Clean Catch (Midstream)   @ 13:17   No growth  --  --        Urine Culture:   @ 00:48    --        No growth  Urine Culture:   @ 21:24    --        No growth to date.  Urine Culture:   @ 13:48    --        No growth to date.  Urine Culture:   @ 13:17    --        No growth        Radiology:

## 2021-05-05 NOTE — PROGRESS NOTE ADULT - ASSESSMENT
SYLVAIN VELOZ [facility]  5/1/2021  DR TASHA CALIXTO     REVIEW OF SYMPTOMS      Able to give (reliable) ROS  NO     PHYSICAL EXAM    HEENT Unremarkable  atraumatic   RESP Fair air entry EXP prolonged    Harsh breath sound Resp distres mild   CARDIAC S1 S2 No S3     NO JVD    ABDOMEN SOFT BS PRESENT NOT DISTENDED No hepatosplenomegaly   PEDAL EDEMA present No calf tenderness  NO rash       PATIENT PRESENTATION.     65M group home resident with PMH severe mental retardation HTN, Cholecystectomy, COVID 4/2020 non-verbal recently admitted [facility] 4/23-4/27/2021  was readmitted 5/1/2021 with fever hypoxia  and pulmonary consulted   In ER pt already got levaquin and ns 2.5 l (5/1/2021 2:13 PM)     RECENT SHARPE   echo 4/23/2021 echo n lvsf n lvedp  4/22/2021 D-dimr 314  4/23 v duplx negv  cta ch 4/22 cta ch no pe bl is edema air trapping cm     Home meds.   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   5/1/2021 24% 743/39/88               OXYGENATION.    5/4/2021 2l 96%   5/3/2021 2l 93%   5/1/2021 2l 95%              VITALS/IO/VENT/DRIPS.  5/5/2021 afeb 660186/70     GLOBAL AND BEST PRACTICE ISSUES                     ALLERGY.    azactm augmentn pncl sulfa   WEIGHT.    5/1/2021 81K                            BMI.              5/1/2021 29                ADVANCED DIRECTIVE.                               HEAD OF BED ELEVATION. Yes  DVT PROPHYLAXIS.  lvnx 40 (5/1)                     SAUER PROPHYLAXIS.   APA.   asa 81 (5/1)                                                                  DYSPHAGIA RECOMM.  5/4 mbs puree nectar  5/3/2021 DYS 2 NECTAR   DIET.   dys 2 mech soft nectar (5/1)    INFECTION PROPHYLAXIS.   FREE WATER.    IV.          D5 1/2 60 (5/1)               ASSESSMENT/RECOMMENDATIONS.    COVID STATUS EVAL  5/2spkab posv   5/1 scv2 negv   SEPSIS poa   Had fever 102 on 5/2/2021  Pt had leukocytosis w 14 poa and procalc 4/5 poa   MRSA is posv 5/2/2021 5/1/2021 CT ch did not show infiltr  5/1/2021 RVP was negv   ROCEPHIN (5/3/2021) (DR MUIR  VANCO (5/3/2021 (DR MUIR)   ID eval appreciated  HYPOXIA poa   O2 to keep po 90-95%  VTE RULED OUT  V duplx 5/1  negv   cta 5/1 cta ch No pe rll ggo fu study recommended in 6 w   GROUND GLASS OPAC RLL  Will need followup ct chenherbert June 2021   NEUROPSYCHIATRIC MEDS  Cont prev meds   DYSPHAGIA EVAL   Speech consulted 5/1/2021   MBS done 5.4   On dysphagia diet       TIME SPENT   Over 25 minutes aggregate care time spent on encounter; activities included   direct patient care, counseling and/or coordinating care reviewing notes, lab data/ imaging , discussion with multidisciplinary team/ patient  /family and explaining in detail risks, benefits, alternatives  of the recommendations     SYLVAIN VELOZ [facility]  5/1/2021  DR TASHA CALIXTO

## 2021-05-05 NOTE — PROGRESS NOTE ADULT - SUBJECTIVE AND OBJECTIVE BOX
SYLVAIN ROSELIA is a 65yMale , patient examined and chart reviewed.    INTERVAL HPI/ OVERNIGHT EVENTS:   Afebrile.  No events.    PAST MEDICAL & SURGICAL HISTORY:  MR (mental retardation), severe  Seizure  HTN (hypertension)  Psoriasis  DD (diastrophic dysplasia)  Blind  right eye  Constipation, unspecified constipation type      For details regarding the patient's social history, family history, and other miscellaneous elements, please refer the initial infectious diseases consultation and/or the admitting history and physical examination for this admission.    ROS:  Unable to obtain due to MRDD    ALLERGIES  Augmentin (Unknown)  aztreonam (Rash)  penicillin (Unknown)  sulfa drugs (Unknown)      Current inpatient medications :    ANTIBIOTICS/RELEVANT:  cefTRIAXone   IVPB 1000 milliGRAM(s) IV Intermittent every 24 hours  vancomycin  IVPB 1000 milliGRAM(s) IV Intermittent every 12 hours    MEDICATIONS  (STANDING):  amLODIPine   Tablet 5 milliGRAM(s) Oral daily  ascorbic acid 500 milliGRAM(s) Oral two times a day  aspirin enteric coated 81 milliGRAM(s) Oral daily  calcium carbonate 1250 mG  + Vitamin D (OsCal 500 + D) 1 Tablet(s) Oral three times a day  cholecalciferol 2000 Unit(s) Oral two times a day  clonazePAM  Tablet 1 milliGRAM(s) Oral two times a day  diVALproex ER Oral Tab/Cap - Peds 250 milliGRAM(s) Oral three times a day  enoxaparin Injectable 40 milliGRAM(s) SubCutaneous daily  fluvoxaMINE 100 milliGRAM(s) Oral two times a day  hydrOXYzine  Oral Tab/Cap - Peds 20 milliGRAM(s) Oral every 8 hours  lactulose Syrup 10 Gram(s) Oral three times a day  levothyroxine 75 MICROGram(s) Oral daily  loratadine 10 milliGRAM(s) Oral daily  metoprolol tartrate 25 milliGRAM(s) Oral two times a day  multivitamin/minerals/iron Oral Solution (CENTRUM) 15 milliLiter(s) Oral daily  OLANZapine 15 milliGRAM(s) Oral at bedtime  pantoprazole   Suspension 40 milliGRAM(s) Oral before breakfast  senna 2 Tablet(s) Oral at bedtime  tamsulosin 0.8 milliGRAM(s) Oral at bedtime  zinc sulfate 220 milliGRAM(s) Oral daily    MEDICATIONS  (PRN):  acetaminophen   Tablet .. 650 milliGRAM(s) Oral every 6 hours PRN Temp greater or equal to 38C (100.4F)      Objective:  Vital Signs Last 24 Hrs  T(C): 36.8 (05 May 2021 13:46), Max: 36.8 (05 May 2021 13:46)  T(F): 98.3 (05 May 2021 13:46), Max: 98.3 (05 May 2021 13:46)  HR: 76 (05 May 2021 13:46) (76 - 116)  BP: 117/71 (05 May 2021 13:46) (117/71 - 136/74)  RR: 17 (05 May 2021 13:46) (17 - 18)  SpO2: 94% (05 May 2021 13:46) (92% - 94%)    Physical Exam:  General: no acute distress  Neck: supple, trachea midline  Lungs: clear, no wheeze/rhonchi  Cardiovascular: regular rate and rhythm, S1 S2  Abdomen: soft, nontender,  bowel sounds normal  Neurological: alert and oriented x3  Skin: no rash  Extremities: Right LE swelling with decreasing erythema       LABS:                        13.2   7.53  )-----------( 266      ( 05 May 2021 07:08 )             38.3   05-05    144  |  109<H>  |  15  ----------------------------<  94  3.6   |  27  |  0.86    Ca    8.8      05 May 2021 07:08    TPro  7.5  /  Alb  2.6<L>  /  TBili  0.4  /  DBili  x   /  AST  26  /  ALT  23  /  AlkPhos  69  05-05    MICROBIOLOGY:    Culture - Blood (collected 01 May 2021 13:48)  Source: .Blood Blood-Peripheral  Preliminary Report (02 May 2021 14:01):    No growth to date.    Culture - Blood (collected 01 May 2021 13:48)  Source: .Blood Blood-Peripheral  Preliminary Report (02 May 2021 14:01):    No growth to date.    Culture - Urine (collected 01 May 2021 13:17)  Source: .Urine Clean Catch (Midstream)  Final Report (02 May 2021 13:30):    No growth    RADIOLOGY & ADDITIONAL STUDIES:    EXAM:  CT ANGIO CHEST (W)AW IC                            PROCEDURE DATE:  05/01/2021          INTERPRETATION:  CTA CHEST AND CT ABDOMEN AND PELVIS    INDICATION: History of fever. Hypoxia..    TECHNIQUE: Enhanced helical images were obtained of the chest, abdomen, and pelvis. Coronal and sagittal images were reconstructed.  Images were obtained after the uneventful administration of 90 cc of nonionic intravenous contrast (Omnipaque 350). 10 cc of nonionic intravenous contrast (Omnipaque 350) was discarded. Maximum intensity projection images were generated.    COMPARISON: Radiograph 5/1/2021. CT chest 5/1/2021, 4/22/2021, and 7/26/2020. CT abdomen and pelvis 4/23/2021.    FINDINGS:    Pulmonary Artery:  There is no main, central, lobar, orproximal to mid segmental pulmonary embolus. Distal segmental and subsegmental divisions are not well evaluated.    Tubes/Lines: None.    Lungs, airways and pleura: Since 5/1/2021, the groundglass opacities in the right lower lobe are unchanged. The lungs are otherwise clear allowing for respiratory motion. The airways are unremarkable.    Mediastinum: The visualized thyroid gland and esophagus are unremarkable. The chest lymph nodes measure less than 10 mm in the short axis. Hiatal hernia.    The heart is normal in size. No pericardial effusion. Coronary artery calcifications. Left-sided aortic arch and left-sided descending thoracic aorta.    Bones And Soft Tissues: The bones are unremarkable.  The soft tissues are unremarkable.    Abdomen and pelvis:    Liver: The liver is normal.    Gallbladder: Cholecystectomy.    Spleen: The spleen is normal.    Pancreas: The pancreas is normal.    Adrenal glands: The adrenal glands are normal.    Kidneys: Pelvic right renal cysts. Subcentimeter hypodense right renal lesions are statistically benign and likely represent cysts.    Hypodense left renal lesions are statistically benign and likely represent cysts. No hydronephrosis or hydroureter.    Lymph nodes: No enlarged retroperitoneal or upperabdominal lymph nodes.    Bowel: The stomach is unremarkable. The small bowel and large bowel are normal in caliber. The appendix is normal. No free air. No free fluid.    Bladder: The bladder is distended.    Prostate gland: The prostate gland is unremarkable.    Skeletal: Degenerative change of the spine.      IMPRESSION:    Chest:  1.  No pulmonary embolus.  2.  The right lower lobe groundglass opacities are unchanged allowing for differences in technique an respiratory motion. A follow-up study is recommended to evaluate for resolution/change to include prone imaging in 6-8 weeks.    Abdomen and pelvis:  1.  No bowel detection.      Assessment :  66YO M Phx of MRDD, HTN, resident of [facility] admitted with fevers sec to Right LE cellulitis with lymphangitis  Doubt pneumonia  AUR  Fevers better  WBC downtrending  Clinically improving     Plan :   Continue Vancomycin and  Rocephin  Will plan to change to poin 48 hours if cont to improve  Trend temps and cbc  Asp precautions      Continue with present regiment.  Appropriate use of antibiotics and adverse effects reviewed.      > 35 minutes were spent in direct patient care reviewing notes, medications ,labs data/ imaging , discussion with multidisciplinary team.    Thank you for allowing me to participate in care of your patient .    Olivier Daniels MD  Infectious Disease  [phone]

## 2021-05-06 LAB
ALBUMIN SERPL ELPH-MCNC: 2.6 G/DL — LOW (ref 3.3–5)
ALP SERPL-CCNC: 76 U/L — SIGNIFICANT CHANGE UP (ref 40–120)
ALT FLD-CCNC: 31 U/L — SIGNIFICANT CHANGE UP (ref 12–78)
ANION GAP SERPL CALC-SCNC: 5 MMOL/L — SIGNIFICANT CHANGE UP (ref 5–17)
AST SERPL-CCNC: 27 U/L — SIGNIFICANT CHANGE UP (ref 15–37)
BASOPHILS # BLD AUTO: 0 K/UL — SIGNIFICANT CHANGE UP (ref 0–0.2)
BASOPHILS NFR BLD AUTO: 0 % — SIGNIFICANT CHANGE UP (ref 0–2)
BILIRUB SERPL-MCNC: 0.2 MG/DL — SIGNIFICANT CHANGE UP (ref 0.2–1.2)
BUN SERPL-MCNC: 15 MG/DL — SIGNIFICANT CHANGE UP (ref 7–23)
CALCIUM SERPL-MCNC: 8.7 MG/DL — SIGNIFICANT CHANGE UP (ref 8.5–10.1)
CHLORIDE SERPL-SCNC: 108 MMOL/L — SIGNIFICANT CHANGE UP (ref 96–108)
CO2 SERPL-SCNC: 30 MMOL/L — SIGNIFICANT CHANGE UP (ref 22–31)
CREAT SERPL-MCNC: 0.74 MG/DL — SIGNIFICANT CHANGE UP (ref 0.5–1.3)
CULTURE RESULTS: SIGNIFICANT CHANGE UP
CULTURE RESULTS: SIGNIFICANT CHANGE UP
EOSINOPHIL # BLD AUTO: 0.65 K/UL — HIGH (ref 0–0.5)
EOSINOPHIL NFR BLD AUTO: 9 % — HIGH (ref 0–6)
GLUCOSE SERPL-MCNC: 103 MG/DL — HIGH (ref 70–99)
HCT VFR BLD CALC: 39.6 % — SIGNIFICANT CHANGE UP (ref 39–50)
HGB BLD-MCNC: 13.4 G/DL — SIGNIFICANT CHANGE UP (ref 13–17)
LG PLATELETS BLD QL AUTO: SLIGHT — SIGNIFICANT CHANGE UP
LYMPHOCYTES # BLD AUTO: 2.17 K/UL — SIGNIFICANT CHANGE UP (ref 1–3.3)
LYMPHOCYTES # BLD AUTO: 30 % — SIGNIFICANT CHANGE UP (ref 13–44)
MANUAL SMEAR VERIFICATION: SIGNIFICANT CHANGE UP
MCHC RBC-ENTMCNC: 30.5 PG — SIGNIFICANT CHANGE UP (ref 27–34)
MCHC RBC-ENTMCNC: 33.8 GM/DL — SIGNIFICANT CHANGE UP (ref 32–36)
MCV RBC AUTO: 90.2 FL — SIGNIFICANT CHANGE UP (ref 80–100)
MONOCYTES # BLD AUTO: 0.94 K/UL — HIGH (ref 0–0.9)
MONOCYTES NFR BLD AUTO: 13 % — SIGNIFICANT CHANGE UP (ref 2–14)
MYELOCYTES NFR BLD: 1 % — HIGH (ref 0–0)
NEUTROPHILS # BLD AUTO: 3.25 K/UL — SIGNIFICANT CHANGE UP (ref 1.8–7.4)
NEUTROPHILS NFR BLD AUTO: 45 % — SIGNIFICANT CHANGE UP (ref 43–77)
NRBC # BLD: 0 — SIGNIFICANT CHANGE UP
NRBC # BLD: SIGNIFICANT CHANGE UP /100 WBCS (ref 0–0)
PLAT MORPH BLD: NORMAL — SIGNIFICANT CHANGE UP
PLATELET # BLD AUTO: 300 K/UL — SIGNIFICANT CHANGE UP (ref 150–400)
POTASSIUM SERPL-MCNC: 3.9 MMOL/L — SIGNIFICANT CHANGE UP (ref 3.5–5.3)
POTASSIUM SERPL-SCNC: 3.9 MMOL/L — SIGNIFICANT CHANGE UP (ref 3.5–5.3)
PROCALCITONIN SERPL-MCNC: 1.17 NG/ML — HIGH (ref 0–0.04)
PROT SERPL-MCNC: 7.5 G/DL — SIGNIFICANT CHANGE UP (ref 6–8.3)
RBC # BLD: 4.39 M/UL — SIGNIFICANT CHANGE UP (ref 4.2–5.8)
RBC # FLD: 13.4 % — SIGNIFICANT CHANGE UP (ref 10.3–14.5)
RBC BLD AUTO: NORMAL — SIGNIFICANT CHANGE UP
SODIUM SERPL-SCNC: 143 MMOL/L — SIGNIFICANT CHANGE UP (ref 135–145)
SPECIMEN SOURCE: SIGNIFICANT CHANGE UP
SPECIMEN SOURCE: SIGNIFICANT CHANGE UP
VANCOMYCIN TROUGH SERPL-MCNC: 13.4 UG/ML — SIGNIFICANT CHANGE UP (ref 10–20)
VARIANT LYMPHS # BLD: 2 % — SIGNIFICANT CHANGE UP (ref 0–6)
WBC # BLD: 7.22 K/UL — SIGNIFICANT CHANGE UP (ref 3.8–10.5)
WBC # FLD AUTO: 7.22 K/UL — SIGNIFICANT CHANGE UP (ref 3.8–10.5)

## 2021-05-06 RX ORDER — SENNA PLUS 8.6 MG/1
2 TABLET ORAL AT BEDTIME
Refills: 0 | Status: DISCONTINUED | OUTPATIENT
Start: 2021-05-06 | End: 2021-05-06

## 2021-05-06 RX ORDER — POLYETHYLENE GLYCOL 3350 17 G/17G
17 POWDER, FOR SOLUTION ORAL DAILY
Refills: 0 | Status: DISCONTINUED | OUTPATIENT
Start: 2021-05-06 | End: 2021-05-06

## 2021-05-06 RX ADMIN — ENOXAPARIN SODIUM 40 MILLIGRAM(S): 100 INJECTION SUBCUTANEOUS at 13:37

## 2021-05-06 RX ADMIN — Medication 250 MILLIGRAM(S): at 10:34

## 2021-05-06 RX ADMIN — PANTOPRAZOLE SODIUM 40 MILLIGRAM(S): 20 TABLET, DELAYED RELEASE ORAL at 06:29

## 2021-05-06 RX ADMIN — Medication 500 MILLIGRAM(S): at 17:08

## 2021-05-06 RX ADMIN — FLUVOXAMINE MALEATE 100 MILLIGRAM(S): 25 TABLET ORAL at 06:29

## 2021-05-06 RX ADMIN — Medication 1 TABLET(S): at 13:37

## 2021-05-06 RX ADMIN — TAMSULOSIN HYDROCHLORIDE 0.8 MILLIGRAM(S): 0.4 CAPSULE ORAL at 21:18

## 2021-05-06 RX ADMIN — SENNA PLUS 2 TABLET(S): 8.6 TABLET ORAL at 21:15

## 2021-05-06 RX ADMIN — Medication 25 MILLIGRAM(S): at 17:08

## 2021-05-06 RX ADMIN — OLANZAPINE 15 MILLIGRAM(S): 15 TABLET, FILM COATED ORAL at 21:14

## 2021-05-06 RX ADMIN — Medication 250 MILLIGRAM(S): at 22:47

## 2021-05-06 RX ADMIN — Medication 15 MILLILITER(S): at 14:53

## 2021-05-06 RX ADMIN — Medication 81 MILLIGRAM(S): at 13:37

## 2021-05-06 RX ADMIN — FLUVOXAMINE MALEATE 100 MILLIGRAM(S): 25 TABLET ORAL at 17:08

## 2021-05-06 RX ADMIN — Medication 25 MILLIGRAM(S): at 06:33

## 2021-05-06 RX ADMIN — Medication 20 MILLIGRAM(S): at 13:37

## 2021-05-06 RX ADMIN — Medication 75 MICROGRAM(S): at 06:33

## 2021-05-06 RX ADMIN — LACTULOSE 10 GRAM(S): 10 SOLUTION ORAL at 13:37

## 2021-05-06 RX ADMIN — Medication 20 MILLIGRAM(S): at 21:14

## 2021-05-06 RX ADMIN — DIVALPROEX SODIUM 250 MILLIGRAM(S): 500 TABLET, DELAYED RELEASE ORAL at 21:14

## 2021-05-06 RX ADMIN — LACTULOSE 10 GRAM(S): 10 SOLUTION ORAL at 06:33

## 2021-05-06 RX ADMIN — Medication 1 MILLIGRAM(S): at 17:08

## 2021-05-06 RX ADMIN — Medication 500 MILLIGRAM(S): at 06:29

## 2021-05-06 RX ADMIN — DIVALPROEX SODIUM 250 MILLIGRAM(S): 500 TABLET, DELAYED RELEASE ORAL at 06:30

## 2021-05-06 RX ADMIN — CEFTRIAXONE 100 MILLIGRAM(S): 500 INJECTION, POWDER, FOR SOLUTION INTRAMUSCULAR; INTRAVENOUS at 16:55

## 2021-05-06 RX ADMIN — Medication 2000 UNIT(S): at 06:29

## 2021-05-06 RX ADMIN — Medication 1 TABLET(S): at 06:30

## 2021-05-06 RX ADMIN — AMLODIPINE BESYLATE 5 MILLIGRAM(S): 2.5 TABLET ORAL at 06:29

## 2021-05-06 RX ADMIN — DIVALPROEX SODIUM 250 MILLIGRAM(S): 500 TABLET, DELAYED RELEASE ORAL at 13:37

## 2021-05-06 RX ADMIN — Medication 2000 UNIT(S): at 17:08

## 2021-05-06 RX ADMIN — LACTULOSE 10 GRAM(S): 10 SOLUTION ORAL at 21:14

## 2021-05-06 RX ADMIN — ZINC SULFATE TAB 220 MG (50 MG ZINC EQUIVALENT) 220 MILLIGRAM(S): 220 (50 ZN) TAB at 13:37

## 2021-05-06 RX ADMIN — Medication 1 MILLIGRAM(S): at 06:29

## 2021-05-06 RX ADMIN — Medication 1 TABLET(S): at 21:14

## 2021-05-06 RX ADMIN — LORATADINE 10 MILLIGRAM(S): 10 TABLET ORAL at 13:37

## 2021-05-06 RX ADMIN — Medication 20 MILLIGRAM(S): at 06:30

## 2021-05-06 NOTE — PROGRESS NOTE ADULT - SUBJECTIVE AND OBJECTIVE BOX
PAST MEDICAL & SURGICAL HISTORY:  MR (mental retardation), severe    Seizure    HTN (hypertension)    Psoriasis    DD (diastrophic dysplasia)    Blind  right eye    Constipation, unspecified constipation type    No significant past surgical history        MEDICATIONS  (STANDING):  amLODIPine   Tablet 5 milliGRAM(s) Oral daily  ascorbic acid 500 milliGRAM(s) Oral two times a day  aspirin enteric coated 81 milliGRAM(s) Oral daily  calcium carbonate 1250 mG  + Vitamin D (OsCal 500 + D) 1 Tablet(s) Oral three times a day  cefTRIAXone   IVPB 1000 milliGRAM(s) IV Intermittent every 24 hours  cholecalciferol 2000 Unit(s) Oral two times a day  clonazePAM  Tablet 1 milliGRAM(s) Oral two times a day  diVALproex ER Oral Tab/Cap - Peds 250 milliGRAM(s) Oral three times a day  enoxaparin Injectable 40 milliGRAM(s) SubCutaneous daily  fluvoxaMINE 100 milliGRAM(s) Oral two times a day  hydrOXYzine  Oral Tab/Cap - Peds 20 milliGRAM(s) Oral every 8 hours  lactulose Syrup 10 Gram(s) Oral three times a day  levothyroxine 75 MICROGram(s) Oral daily  loratadine 10 milliGRAM(s) Oral daily  metoprolol tartrate 25 milliGRAM(s) Oral two times a day  multivitamin/minerals/iron Oral Solution (CENTRUM) 15 milliLiter(s) Oral daily  OLANZapine 15 milliGRAM(s) Oral at bedtime  pantoprazole   Suspension 40 milliGRAM(s) Oral before breakfast  senna 2 Tablet(s) Oral at bedtime  tamsulosin 0.8 milliGRAM(s) Oral at bedtime  vancomycin  IVPB 1000 milliGRAM(s) IV Intermittent every 12 hours  zinc sulfate 220 milliGRAM(s) Oral daily    MEDICATIONS  (PRN):  acetaminophen   Tablet .. 650 milliGRAM(s) Oral every 6 hours PRN Temp greater or equal to 38C (100.4F)      Patient is a 65y old  Male who presents with a chief complaint of hypoxia and fevers (06 May 2021 10:22)      Vital Signs Last 24 Hrs  T(C): 36.9 (06 May 2021 05:18), Max: 37.5 (05 May 2021 20:29)  T(F): 98.5 (06 May 2021 05:18), Max: 99.5 (05 May 2021 20:29)  HR: 100 (06 May 2021 05:18) (76 - 100)  BP: 130/61 (06 May 2021 05:18) (117/71 - 132/73)  BP(mean): --  RR: 18 (06 May 2021 05:18) (17 - 18)  SpO2: 96% (06 May 2021 05:18) (94% - 96%)          05-05 @ 07:01  -  05-06 @ 07:00  --------------------------------------------------------  IN: 250 mL / OUT: 0 mL / NET: 250 mL        REVIEW OF SYSTEMS:    Constitutional: No fever, weight loss or fatigue  Eyes: No eye pain, visual disturbances, or discharge  ENT:  No difficulty hearing, tinnitus, vertigo; No sinus or throat pain  Neck: No pain or stiffness  Breasts: No pain, masses or nipple discharge  Respiratory: No cough, wheezing, chills or hemoptysis  Cardiovascular: No chest pain, palpitations, shortness of breath, dizziness or leg swelling  Gastrointestinal: No abdominal or epigastric pain. No nausea, vomiting or hematemesis; No diarrhea or constipation. No melena or hematochezia.  Genitourinary: No dysuria, frequency, hematuria or incontinence  Rectal: No pain, hemorrhoids or incontinence  Neurological: No headaches, memory loss, loss of strength, numbness or tremors  Skin: No itching, burning, rashes or lesions   Lymph Nodes: No enlarged glands  Endocrine: No heat or cold intolerance; No hair loss  Musculoskeletal: No joint pain or swelling; No muscle, back or extremity pain  Psychiatric: No depression, anxiety, mood swings or difficulty sleeping  Heme/Lymph: No easy bruising or bleeding gums  Allergy and Immunologic: No hives or eczema    PHYSICAL EXAM:  alert  interactive  eating walking +bms  Constitutional: NAD, Respiratory: CTAB/L   Cardiovascular: S1 and S2, RRR, no M/G/R  Gastrointestinal: BS+, soft, NT/ND  Extremities: No peripheral edema  moves all ext  Skin: No rashes      decubiti: no                          13.2   7.53  )-----------( 266      ( 05 May 2021 07:08 )             38.3     05-05    144  |  109<H>  |  15  ----------------------------<  94  3.6   |  27  |  0.86    Ca    8.8      05 May 2021 07:08    TPro  7.5  /  Alb  2.6<L>  /  TBili  0.4  /  DBili  x   /  AST  26  /  ALT  23  /  AlkPhos  69  05-05              .Urine Clean Catch (Midstream)  05-04 @ 00:48   No growth  --  --      .Blood Blood  05-02 @ 21:24   No growth to date.  --  --      .Blood Blood-Peripheral  05-01 @ 13:48   No growth to date.  --  --      .Urine Clean Catch (Midstream)  05-01 @ 13:17   No growth  --  --        Urine Culture:  05-04 @ 00:48    --        No growth  Urine Culture:  05-02 @ 21:24    --        No growth to date.  Urine Culture:  05-01 @ 13:48    --        No growth to date.  Urine Culture:  05-01 @ 13:17    --        No growth        Radiology:

## 2021-05-06 NOTE — PROGRESS NOTE ADULT - SUBJECTIVE AND OBJECTIVE BOX
SYLVAIN ROSELIA is a 65yMale , patient examined and chart reviewed.    INTERVAL HPI/ OVERNIGHT EVENTS:   Afebrile. NAD.  No events.    PAST MEDICAL & SURGICAL HISTORY:  MR (mental retardation), severe  Seizure  HTN (hypertension)  Psoriasis  DD (diastrophic dysplasia)  Blind  right eye  Constipation, unspecified constipation type      For details regarding the patient's social history, family history, and other miscellaneous elements, please refer the initial infectious diseases consultation and/or the admitting history and physical examination for this admission.    ROS:  Unable to obtain due to MRDD    ALLERGIES  Augmentin (Unknown)  aztreonam (Rash)  penicillin (Unknown)  sulfa drugs (Unknown)      Current inpatient medications :    ANTIBIOTICS/RELEVANT:  cefTRIAXone   IVPB 1000 milliGRAM(s) IV Intermittent every 24 hours  vancomycin  IVPB 1000 milliGRAM(s) IV Intermittent every 12 hours    MEDICATIONS  (STANDING):  amLODIPine   Tablet 5 milliGRAM(s) Oral daily  ascorbic acid 500 milliGRAM(s) Oral two times a day  aspirin enteric coated 81 milliGRAM(s) Oral daily  calcium carbonate 1250 mG  + Vitamin D (OsCal 500 + D) 1 Tablet(s) Oral three times a day  cholecalciferol 2000 Unit(s) Oral two times a day  clonazePAM  Tablet 1 milliGRAM(s) Oral two times a day  diVALproex ER Oral Tab/Cap - Peds 250 milliGRAM(s) Oral three times a day  enoxaparin Injectable 40 milliGRAM(s) SubCutaneous daily  fluvoxaMINE 100 milliGRAM(s) Oral two times a day  hydrOXYzine  Oral Tab/Cap - Peds 20 milliGRAM(s) Oral every 8 hours  lactulose Syrup 10 Gram(s) Oral three times a day  levothyroxine 75 MICROGram(s) Oral daily  loratadine 10 milliGRAM(s) Oral daily  metoprolol tartrate 25 milliGRAM(s) Oral two times a day  multivitamin/minerals/iron Oral Solution (CENTRUM) 15 milliLiter(s) Oral daily  OLANZapine 15 milliGRAM(s) Oral at bedtime  pantoprazole   Suspension 40 milliGRAM(s) Oral before breakfast  senna 2 Tablet(s) Oral at bedtime  tamsulosin 0.8 milliGRAM(s) Oral at bedtime  zinc sulfate 220 milliGRAM(s) Oral daily    MEDICATIONS  (PRN):  acetaminophen   Tablet .. 650 milliGRAM(s) Oral every 6 hours PRN Temp greater or equal to 38C (100.4F)        Objective:  Vital Signs Last 24 Hrs  T(C): 36.9 (06 May 2021 13:18), Max: 37.5 (05 May 2021 20:29)  T(F): 98.4 (06 May 2021 13:18), Max: 99.5 (05 May 2021 20:29)  HR: 77 (06 May 2021 13:18) (77 - 100)  BP: 110/69 (06 May 2021 13:18) (110/69 - 132/73)  RR: 17 (06 May 2021 13:18) (17 - 18)  SpO2: 92% (06 May 2021 13:18) (92% - 96%)    Physical Exam:  General: no acute distress  Neck: supple, trachea midline  Lungs: clear, no wheeze/rhonchi  Cardiovascular: regular rate and rhythm, S1 S2  Abdomen: soft, nontender,  bowel sounds normal  Neurological: alert and oriented x3  Skin: no rash  Extremities: Right LE swelling with decreasing erythema - slowly improving      LABS:                        13.4   7.22  )-----------( 300      ( 06 May 2021 14:15 )             39.6   05-06    143  |  108  |  15  ----------------------------<  103<H>  3.9   |  30  |  0.74    Ca    8.7      06 May 2021 14:15    TPro  7.5  /  Alb  2.6<L>  /  TBili  0.2  /  DBili  x   /  AST  27  /  ALT  31  /  AlkPhos  76  05-06    MICROBIOLOGY:    Culture - Blood (collected 01 May 2021 13:48)  Source: .Blood Blood-Peripheral  Preliminary Report (02 May 2021 14:01):    No growth to date.    Culture - Blood (collected 01 May 2021 13:48)  Source: .Blood Blood-Peripheral  Preliminary Report (02 May 2021 14:01):    No growth to date.    Culture - Urine (collected 01 May 2021 13:17)  Source: .Urine Clean Catch (Midstream)  Final Report (02 May 2021 13:30):    No growth    RADIOLOGY & ADDITIONAL STUDIES:    EXAM:  CT ANGIO CHEST (W)AW IC                            PROCEDURE DATE:  05/01/2021          INTERPRETATION:  CTA CHEST AND CT ABDOMEN AND PELVIS    INDICATION: History of fever. Hypoxia..    TECHNIQUE: Enhanced helical images were obtained of the chest, abdomen, and pelvis. Coronal and sagittal images were reconstructed.  Images were obtained after the uneventful administration of 90 cc of nonionic intravenous contrast (Omnipaque 350). 10 cc of nonionic intravenous contrast (Omnipaque 350) was discarded. Maximum intensity projection images were generated.    COMPARISON: Radiograph 5/1/2021. CT chest 5/1/2021, 4/22/2021, and 7/26/2020. CT abdomen and pelvis 4/23/2021.    FINDINGS:    Pulmonary Artery:  There is no main, central, lobar, orproximal to mid segmental pulmonary embolus. Distal segmental and subsegmental divisions are not well evaluated.    Tubes/Lines: None.    Lungs, airways and pleura: Since 5/1/2021, the groundglass opacities in the right lower lobe are unchanged. The lungs are otherwise clear allowing for respiratory motion. The airways are unremarkable.    Mediastinum: The visualized thyroid gland and esophagus are unremarkable. The chest lymph nodes measure less than 10 mm in the short axis. Hiatal hernia.    The heart is normal in size. No pericardial effusion. Coronary artery calcifications. Left-sided aortic arch and left-sided descending thoracic aorta.    Bones And Soft Tissues: The bones are unremarkable.  The soft tissues are unremarkable.    Abdomen and pelvis:    Liver: The liver is normal.    Gallbladder: Cholecystectomy.    Spleen: The spleen is normal.    Pancreas: The pancreas is normal.    Adrenal glands: The adrenal glands are normal.    Kidneys: Pelvic right renal cysts. Subcentimeter hypodense right renal lesions are statistically benign and likely represent cysts.    Hypodense left renal lesions are statistically benign and likely represent cysts. No hydronephrosis or hydroureter.    Lymph nodes: No enlarged retroperitoneal or upperabdominal lymph nodes.    Bowel: The stomach is unremarkable. The small bowel and large bowel are normal in caliber. The appendix is normal. No free air. No free fluid.    Bladder: The bladder is distended.    Prostate gland: The prostate gland is unremarkable.    Skeletal: Degenerative change of the spine.      IMPRESSION:    Chest:  1.  No pulmonary embolus.  2.  The right lower lobe groundglass opacities are unchanged allowing for differences in technique an respiratory motion. A follow-up study is recommended to evaluate for resolution/change to include prone imaging in 6-8 weeks.    Abdomen and pelvis:  1.  No bowel detection.      Assessment :  64YO M Phx of MRDD, HTN, resident of care home admitted with fevers sec to Right LE cellulitis with lymphangitis  Doubt pneumonia  AUR  Fevers better  WBC downtrending  Clinically improving     Plan :   Dc Vancomycin   Cont Rocephin till tmrw then change to po Vantin x 7 days  Trend temps and cbc  Elevate leg  Asp precautions      Continue with present regiment.  Appropriate use of antibiotics and adverse effects reviewed.      > 35 minutes were spent in direct patient care reviewing notes, medications ,labs data/ imaging , discussion with multidisciplinary team.    Thank you for allowing me to participate in care of your patient .    Olivier Daniels MD  Infectious Disease  [phone]

## 2021-05-06 NOTE — PROGRESS NOTE ADULT - SUBJECTIVE AND OBJECTIVE BOX
ROSELIA NARAYAN    PLV 1EAS 114 W1    Patient is a 65y old  Male who presents with a chief complaint of hypoxia and fevers (05 May 2021 15:14)       Allergies    Augmentin (Unknown)  aztreonam (Rash)  penicillin (Unknown)  sulfa drugs (Unknown)    Intolerances        HPI:  fever 103 at home and pulse ox 88  was [facility] few days ago for fever  w/u was negative  patient had fallen the day before he was admitted  stitches were removed 2 days ago (03 May 2021 18:09)      PAST MEDICAL & SURGICAL HISTORY:  MR (mental retardation), severe    Seizure    HTN (hypertension)    Psoriasis    DD (diastrophic dysplasia)    Blind  right eye    Constipation, unspecified constipation type    No significant past surgical history        FAMILY HISTORY:  No pertinent family history in first degree relatives          MEDICATIONS   acetaminophen   Tablet .. 650 milliGRAM(s) Oral every 6 hours PRN  amLODIPine   Tablet 5 milliGRAM(s) Oral daily  ascorbic acid 500 milliGRAM(s) Oral two times a day  aspirin enteric coated 81 milliGRAM(s) Oral daily  calcium carbonate 1250 mG  + Vitamin D (OsCal 500 + D) 1 Tablet(s) Oral three times a day  cefTRIAXone   IVPB 1000 milliGRAM(s) IV Intermittent every 24 hours  cholecalciferol 2000 Unit(s) Oral two times a day  clonazePAM  Tablet 1 milliGRAM(s) Oral two times a day  diVALproex ER Oral Tab/Cap - Peds 250 milliGRAM(s) Oral three times a day  enoxaparin Injectable 40 milliGRAM(s) SubCutaneous daily  fluvoxaMINE 100 milliGRAM(s) Oral two times a day  hydrOXYzine  Oral Tab/Cap - Peds 20 milliGRAM(s) Oral every 8 hours  lactulose Syrup 10 Gram(s) Oral three times a day  levothyroxine 75 MICROGram(s) Oral daily  loratadine 10 milliGRAM(s) Oral daily  metoprolol tartrate 25 milliGRAM(s) Oral two times a day  multivitamin/minerals/iron Oral Solution (CENTRUM) 15 milliLiter(s) Oral daily  OLANZapine 15 milliGRAM(s) Oral at bedtime  pantoprazole   Suspension 40 milliGRAM(s) Oral before breakfast  senna 2 Tablet(s) Oral at bedtime  tamsulosin 0.8 milliGRAM(s) Oral at bedtime  vancomycin  IVPB 1000 milliGRAM(s) IV Intermittent every 12 hours  zinc sulfate 220 milliGRAM(s) Oral daily      Vital Signs Last 24 Hrs  T(C): 36.9 (06 May 2021 05:18), Max: 37.5 (05 May 2021 20:29)  T(F): 98.5 (06 May 2021 05:18), Max: 99.5 (05 May 2021 20:29)  HR: 100 (06 May 2021 05:18) (76 - 100)  BP: 130/61 (06 May 2021 05:18) (117/71 - 132/73)  BP(mean): --  RR: 18 (06 May 2021 05:18) (17 - 18)  SpO2: 96% (06 May 2021 05:18) (94% - 96%)      05-05-21 @ 07:01  -  05-06-21 @ 07:00  --------------------------------------------------------  IN: 250 mL / OUT: 0 mL / NET: 250 mL            LABS:                        13.2   7.53  )-----------( 266      ( 05 May 2021 07:08 )             38.3     05-05    144  |  109<H>  |  15  ----------------------------<  94  3.6   |  27  |  0.86    Ca    8.8      05 May 2021 07:08    TPro  7.5  /  Alb  2.6<L>  /  TBili  0.4  /  DBili  x   /  AST  26  /  ALT  23  /  AlkPhos  69  05-05              WBC:  WBC Count: 7.53 K/uL (05-05 @ 07:08)  WBC Count: 8.97 K/uL (05-04 @ 07:37)  WBC Count: 13.13 K/uL (05-03 @ 06:31)      MICROBIOLOGY:  RECENT CULTURES:  05-04 .Urine Clean Catch (Midstream) XXXX XXXX   No growth    05-02 .Blood Blood XXXX XXXX   No growth to date.    05-01 .Blood Blood-Peripheral XXXX XXXX   No growth to date.    05-01 .Urine Clean Catch (Midstream) XXXX XXXX   No growth                    Sodium:  Sodium, Serum: 144 mmol/L (05-05 @ 07:08)  Sodium, Serum: 146 mmol/L (05-04 @ 07:37)  Sodium, Serum: 144 mmol/L (05-03 @ 06:31)      0.86 mg/dL 05-05 @ 07:08  0.91 mg/dL 05-04 @ 07:37  0.88 mg/dL 05-03 @ 06:31      Hemoglobin:  Hemoglobin: 13.2 g/dL (05-05 @ 07:08)  Hemoglobin: 12.4 g/dL (05-04 @ 07:37)  Hemoglobin: 12.1 g/dL (05-03 @ 06:31)      Platelets: Platelet Count - Automated: 266 K/uL (05-05 @ 07:08)  Platelet Count - Automated: 252 K/uL (05-04 @ 07:37)  Platelet Count - Automated: 220 K/uL (05-03 @ 06:31)      LIVER FUNCTIONS - ( 05 May 2021 07:08 )  Alb: 2.6 g/dL / Pro: 7.5 g/dL / ALK PHOS: 69 U/L / ALT: 23 U/L / AST: 26 U/L / GGT: x                 RADIOLOGY & ADDITIONAL STUDIES:

## 2021-05-06 NOTE — PROGRESS NOTE ADULT - SUBJECTIVE AND OBJECTIVE BOX
[facility]    CHIEF COMPLAINT: Patient is a 65y old  Male who presents with a chief complaint of hypoxia and fevers (06 May 2021 08:59)      Follow Up: [ ] Chest Pain      [ ] Dyspnea     [ ] Palpitations    [ ] Atrial Fibrillation     [ ] Ventricular Dysrhythmia    [ ] Abnormal EKG                      [ ] Abnormal Cardiac Enzymes     [ ] Valvular Disease    HPI:  fever 103 at home and pulse ox 88  [place] [facility] few [facility] ago for [place]  w/u was negative  patient had fallen the day before he was admitted  stitches were removed 2 days ago (03 May 2021 18:09)    PAST MEDICAL & SURGICAL HISTORY:  MR (mental retardation), severe    Seizure    HTN (hypertension)    Psoriasis    DD (diastrophic dysplasia)    Blind  right eye    Constipation, unspecified constipation type    No significant past surgical history      MEDICATIONS  (STANDING):  amLODIPine   Tablet 5 milliGRAM(s) Oral daily  ascorbic acid 500 milliGRAM(s) Oral two times a day  aspirin enteric coated 81 milliGRAM(s) Oral daily  calcium carbonate 1250 mG  + Vitamin D (OsCal 500 + D) 1 Tablet(s) Oral three times a day  cefTRIAXone   IVPB 1000 milliGRAM(s) IV Intermittent every 24 hours  cholecalciferol 2000 Unit(s) Oral two times a day  clonazePAM  Tablet 1 milliGRAM(s) Oral two times a day  diVALproex ER Oral Tab/Cap - Peds 250 milliGRAM(s) Oral three times a day  enoxaparin Injectable 40 milliGRAM(s) SubCutaneous daily  fluvoxaMINE 100 milliGRAM(s) Oral two times a day  hydrOXYzine  Oral Tab/Cap - Peds 20 milliGRAM(s) Oral every 8 hours  lactulose Syrup 10 Gram(s) Oral three times a day  levothyroxine 75 MICROGram(s) Oral daily  loratadine 10 milliGRAM(s) Oral daily  metoprolol tartrate 25 milliGRAM(s) Oral two times a day  multivitamin/minerals/iron Oral Solution (CENTRUM) 15 milliLiter(s) Oral daily  OLANZapine 15 milliGRAM(s) Oral at bedtime  pantoprazole   Suspension 40 milliGRAM(s) Oral before breakfast  senna 2 Tablet(s) Oral at bedtime  tamsulosin 0.8 milliGRAM(s) Oral at bedtime  vancomycin  IVPB 1000 milliGRAM(s) IV Intermittent every 12 hours  zinc sulfate 220 milliGRAM(s) Oral daily    MEDICATIONS  (PRN):  acetaminophen   Tablet .. 650 milliGRAM(s) Oral every 6 hours PRN Temp greater or equal to 38C (100.4F)    Allergies    Augmentin (Unknown)  aztreonam (Rash)  penicillin (Unknown)  sulfa drugs (Unknown)    Intolerances        REVIEW OF SYSTEMS:    CONSTITUTIONAL: No weakness, fevers or chills.   EYES/ENT: No visual changes;    NECK: No pain or stiffness  RESPIRATORY: No cough, wheezing, No shortness of breath  CARDIOVASCULAR: No chest pain or palpitations  GASTROINTESTINAL: No abdominal pain, or hematochezia.  GENITOURINARY: No dysuria orhematuria  NEUROLOGICAL: No numbness or weakness  SKIN: No itching, burning, rashes  All other review of systems is negative unless indicated above    Vital Signs Last 24 Hrs  T(C): 36.9 (06 May 2021 05:18), Max: 37.5 (05 May 2021 20:29)  T(F): 98.5 (06 May 2021 05:18), Max: 99.5 (05 May 2021 20:29)  HR: 100 (06 May 2021 05:18) (76 - 100)  BP: 130/61 (06 May 2021 05:18) (117/71 - 132/73)  BP(mean): --  RR: 18 (06 May 2021 05:18) (17 - 18)  SpO2: 96% (06 May 2021 05:18) (94% - 96%)  I&O's Summary    05 May 2021 07:01  -  06 May 2021 07:00  --------------------------------------------------------  IN: 250 mL / OUT: 0 mL / NET: 250 mL        PHYSICAL EXAM:  Constitutional: NAD  Neurological: Alert,   HEENT: no JVD, EOMI  Cardiovascular: Regular, S1 and S2, no murmur  Pulmonary: breath sounds bilaterally  Gastrointestinal: Bowel Sounds present, soft, nontender  EXT:  no peripheral edema  Skin: No rashes.  Psych:  Mood calm  LABS: All Labs Reviewed:                          13.2   7.53  )-----------( 266      ( 05 May 2021 07:08 )             38.3     05-05    144  |  109<H>  |  15  ----------------------------<  94  3.6   |  27  |  0.86    Ca    8.8      05 May 2021 07:08    TPro  7.5  /  Alb  2.6<L>  /  TBili  0.4  /  DBili  x   /  AST  26  /  ALT  23  /  AlkPhos  69  05-05    · Assessment	  65M group home resident admitted with fevers and hypoxia after recently hospitalization at [facility] with severe mental retardation, cholecystectomy, COVID 4/2020, nonverbal likely with aspiration PNA, cellulitis    Suggest:    1. Abx per ID and Pulm  R leg cellulitis  2. HTN - c/w amlodipine  3. DVT prophylaxis.  4. Will follow as needed

## 2021-05-06 NOTE — PROGRESS NOTE ADULT - ASSESSMENT
SYLVAIN VELOZ [facility]  5/1/2021  DR TASHA CALIXTO     REVIEW OF SYMPTOMS      Able to give (reliable) ROS  NO     PHYSICAL EXAM    HEENT Unremarkable  atraumatic   RESP Fair air entry EXP prolonged    Harsh breath sound Resp distres mild   CARDIAC S1 S2 No S3     NO JVD    ABDOMEN SOFT BS PRESENT NOT DISTENDED No hepatosplenomegaly   PEDAL EDEMA present No calf tenderness  NO rash       PATIENT PRESENTATION.     65M group home resident with PMH severe mental retardation HTN, Cholecystectomy, COVID 4/2020 non-verbal recently admitted [facility] 4/23-4/27/2021  was readmitted 5/1/2021 with fever hypoxia  and pulmonary consulted   In ER pt already got levaquin and ns 2.5 l (5/1/2021 2:13 PM)     RECENT SHARPE   echo 4/23/2021 echo n lvsf n lvedp  4/22/2021 D-dimr 314  4/23 v duplx negv  cta ch 4/22 cta ch no pe bl is edema air trapping cm     Home meds.   amlodipine 5  asa  81 clonazepam 1.2 cosentyx  300q m depakote 250.3 fluvoxamine 100.2 hydroxyzine 20.3 levoxyl 75 metoprolol 25.2 olanzapine 15 omeprazole 20 tamsulosin .4       PROBLEMS  COVID pcr -ric apb +iv  SEPSIS poa   PROCALCITONINEMIA  5/2 Pr 14   HYPOXIA poa   GROUND GLASS OPAC RLL ON 5/1 CTA   RLE CELLULITIS  MRSA POSV 5/2/2021    URINE RETN 5/3/2021    ROCEPHIN (5/3/2021) (DR MUIR  VANCO (5/3/2021 (DR MUIR)       PATIENT DATA                ABG.   5/1/2021 24% 743/39/88               OXYGENATION.    5/4/2021 2l 96%   5/3/2021 2l 93%   5/1/2021 2l 95%              VITALS/IO/VENT/DRIPS.  5/6/2021 afeb 100 130/60     GLOBAL AND BEST PRACTICE ISSUES                     ALLERGY.    azactm augmentn pncl sulfa   WEIGHT.    5/1/2021 81K                            BMI.              5/1/2021 29                ADVANCED DIRECTIVE.                               HEAD OF BED ELEVATION. Yes  DVT PROPHYLAXIS.  lvnx 40 (5/1)                     SAUER PROPHYLAXIS.   APA.   asa 81 (5/1)                                                                  DYSPHAGIA RECOMM.  5/4 mbs puree nectar  5/3/2021 DYS 2 NECTAR   DIET.   dys 2 mech soft nectar (5/1)    INFECTION PROPHYLAXIS.   FREE WATER.    IV.          D5 1/2 60 (5/1)               ASSESSMENT/RECOMMENDATIONS.    COVID STATUS EVAL  5/2spkab posv   5/1 scv2 negv   SEPSIS poa   Had fever 102 on 5/2/2021  Pt had leukocytosis w 14 poa and procalc 4/5 poa   MRSA is posv 5/2/2021 5/1/2021 CT ch did not show infiltr  5/1/2021 RVP was negv   ROCEPHIN (5/3/2021) (DR MUIR  VANCO (5/3/2021 (DR MUIR)   ID eval appreciated  HYPOXIA poa   O2 to keep po 90-95%  VTE RULED OUT  V duplx 5/1  negv   cta 5/1 cta ch No pe rll ggo fu study recommended in 6 w   GROUND GLASS OPAC RLL  Will need followup ct chenherbert June 2021   NEUROPSYCHIATRIC MEDS  Cont prev meds   DYSPHAGIA EVAL   Speech consulted 5/1/2021   MBS done 5.4   On dysphagia diet     TIME SPENT   Over 25 minutes aggregate care time spent on encounter; activities included   direct patient care, counseling and/or coordinating care reviewing notes, lab data/ imaging , discussion with multidisciplinary team/ patient  /family and explaining in detail risks, benefits, alternatives  of the recommendations     SYLVAIN VELOZ [facility]  5/1/2021  DR TASHA CALIXTO

## 2021-05-07 LAB
ALBUMIN SERPL ELPH-MCNC: 2.7 G/DL — LOW (ref 3.3–5)
ALP SERPL-CCNC: 78 U/L — SIGNIFICANT CHANGE UP (ref 40–120)
ALT FLD-CCNC: 30 U/L — SIGNIFICANT CHANGE UP (ref 12–78)
ANION GAP SERPL CALC-SCNC: 7 MMOL/L — SIGNIFICANT CHANGE UP (ref 5–17)
AST SERPL-CCNC: 26 U/L — SIGNIFICANT CHANGE UP (ref 15–37)
BASOPHILS # BLD AUTO: 0.09 K/UL — SIGNIFICANT CHANGE UP (ref 0–0.2)
BASOPHILS NFR BLD AUTO: 1 % — SIGNIFICANT CHANGE UP (ref 0–2)
BILIRUB SERPL-MCNC: 0.2 MG/DL — SIGNIFICANT CHANGE UP (ref 0.2–1.2)
BUN SERPL-MCNC: 18 MG/DL — SIGNIFICANT CHANGE UP (ref 7–23)
CALCIUM SERPL-MCNC: 9.1 MG/DL — SIGNIFICANT CHANGE UP (ref 8.5–10.1)
CHLORIDE SERPL-SCNC: 105 MMOL/L — SIGNIFICANT CHANGE UP (ref 96–108)
CO2 SERPL-SCNC: 29 MMOL/L — SIGNIFICANT CHANGE UP (ref 22–31)
CREAT SERPL-MCNC: 0.78 MG/DL — SIGNIFICANT CHANGE UP (ref 0.5–1.3)
CULTURE RESULTS: SIGNIFICANT CHANGE UP
EOSINOPHIL # BLD AUTO: 1.03 K/UL — HIGH (ref 0–0.5)
EOSINOPHIL NFR BLD AUTO: 11.9 % — HIGH (ref 0–6)
GLUCOSE SERPL-MCNC: 94 MG/DL — SIGNIFICANT CHANGE UP (ref 70–99)
HCT VFR BLD CALC: 39.5 % — SIGNIFICANT CHANGE UP (ref 39–50)
HGB BLD-MCNC: 13.6 G/DL — SIGNIFICANT CHANGE UP (ref 13–17)
IMM GRANULOCYTES NFR BLD AUTO: 4.1 % — HIGH (ref 0–1.5)
LYMPHOCYTES # BLD AUTO: 2.24 K/UL — SIGNIFICANT CHANGE UP (ref 1–3.3)
LYMPHOCYTES # BLD AUTO: 26 % — SIGNIFICANT CHANGE UP (ref 13–44)
MCHC RBC-ENTMCNC: 30.8 PG — SIGNIFICANT CHANGE UP (ref 27–34)
MCHC RBC-ENTMCNC: 34.4 GM/DL — SIGNIFICANT CHANGE UP (ref 32–36)
MCV RBC AUTO: 89.4 FL — SIGNIFICANT CHANGE UP (ref 80–100)
MONOCYTES # BLD AUTO: 0.83 K/UL — SIGNIFICANT CHANGE UP (ref 0–0.9)
MONOCYTES NFR BLD AUTO: 9.6 % — SIGNIFICANT CHANGE UP (ref 2–14)
NEUTROPHILS # BLD AUTO: 4.09 K/UL — SIGNIFICANT CHANGE UP (ref 1.8–7.4)
NEUTROPHILS NFR BLD AUTO: 47.4 % — SIGNIFICANT CHANGE UP (ref 43–77)
NRBC # BLD: 0 /100 WBCS — SIGNIFICANT CHANGE UP (ref 0–0)
PLATELET # BLD AUTO: 310 K/UL — SIGNIFICANT CHANGE UP (ref 150–400)
POTASSIUM SERPL-MCNC: 3.9 MMOL/L — SIGNIFICANT CHANGE UP (ref 3.5–5.3)
POTASSIUM SERPL-SCNC: 3.9 MMOL/L — SIGNIFICANT CHANGE UP (ref 3.5–5.3)
PROT SERPL-MCNC: 7.7 G/DL — SIGNIFICANT CHANGE UP (ref 6–8.3)
RBC # BLD: 4.42 M/UL — SIGNIFICANT CHANGE UP (ref 4.2–5.8)
RBC # FLD: 13.4 % — SIGNIFICANT CHANGE UP (ref 10.3–14.5)
SODIUM SERPL-SCNC: 141 MMOL/L — SIGNIFICANT CHANGE UP (ref 135–145)
SPECIMEN SOURCE: SIGNIFICANT CHANGE UP
WBC # BLD: 8.63 K/UL — SIGNIFICANT CHANGE UP (ref 3.8–10.5)
WBC # FLD AUTO: 8.63 K/UL — SIGNIFICANT CHANGE UP (ref 3.8–10.5)

## 2021-05-07 RX ADMIN — Medication 500 MILLIGRAM(S): at 17:35

## 2021-05-07 RX ADMIN — OLANZAPINE 15 MILLIGRAM(S): 15 TABLET, FILM COATED ORAL at 22:00

## 2021-05-07 RX ADMIN — TAMSULOSIN HYDROCHLORIDE 0.8 MILLIGRAM(S): 0.4 CAPSULE ORAL at 22:00

## 2021-05-07 RX ADMIN — LORATADINE 10 MILLIGRAM(S): 10 TABLET ORAL at 14:47

## 2021-05-07 RX ADMIN — CEFTRIAXONE 100 MILLIGRAM(S): 500 INJECTION, POWDER, FOR SOLUTION INTRAMUSCULAR; INTRAVENOUS at 16:00

## 2021-05-07 RX ADMIN — Medication 15 MILLILITER(S): at 14:49

## 2021-05-07 RX ADMIN — Medication 1 TABLET(S): at 14:47

## 2021-05-07 RX ADMIN — Medication 20 MILLIGRAM(S): at 22:00

## 2021-05-07 RX ADMIN — SENNA PLUS 2 TABLET(S): 8.6 TABLET ORAL at 23:00

## 2021-05-07 RX ADMIN — ZINC SULFATE TAB 220 MG (50 MG ZINC EQUIVALENT) 220 MILLIGRAM(S): 220 (50 ZN) TAB at 14:51

## 2021-05-07 RX ADMIN — Medication 81 MILLIGRAM(S): at 14:47

## 2021-05-07 RX ADMIN — FLUVOXAMINE MALEATE 100 MILLIGRAM(S): 25 TABLET ORAL at 17:34

## 2021-05-07 RX ADMIN — Medication 1 MILLIGRAM(S): at 17:37

## 2021-05-07 RX ADMIN — Medication 2000 UNIT(S): at 17:34

## 2021-05-07 RX ADMIN — LACTULOSE 10 GRAM(S): 10 SOLUTION ORAL at 14:51

## 2021-05-07 RX ADMIN — DIVALPROEX SODIUM 250 MILLIGRAM(S): 500 TABLET, DELAYED RELEASE ORAL at 10:41

## 2021-05-07 RX ADMIN — Medication 25 MILLIGRAM(S): at 17:34

## 2021-05-07 RX ADMIN — LACTULOSE 10 GRAM(S): 10 SOLUTION ORAL at 22:00

## 2021-05-07 RX ADMIN — DIVALPROEX SODIUM 250 MILLIGRAM(S): 500 TABLET, DELAYED RELEASE ORAL at 14:49

## 2021-05-07 RX ADMIN — ENOXAPARIN SODIUM 40 MILLIGRAM(S): 100 INJECTION SUBCUTANEOUS at 14:53

## 2021-05-07 RX ADMIN — Medication 1 MILLIGRAM(S): at 10:40

## 2021-05-07 RX ADMIN — Medication 20 MILLIGRAM(S): at 14:52

## 2021-05-07 RX ADMIN — DIVALPROEX SODIUM 250 MILLIGRAM(S): 500 TABLET, DELAYED RELEASE ORAL at 22:00

## 2021-05-07 NOTE — PROGRESS NOTE ADULT - SUBJECTIVE AND OBJECTIVE BOX
ROSELIA NARAYAN    PLV 1EAS 114 W1    Patient is a 65y old  Male who presents with a chief complaint of hypoxia and fevers (06 May 2021 16:43)       Allergies    Augmentin (Unknown)  aztreonam (Rash)  penicillin (Unknown)  sulfa drugs (Unknown)    Intolerances        HPI:  fever 103 at home and pulse ox 88  was [place] [facility] few days ago for fever  w/u was negative  patient had fallen the day before he was admitted  stitches were removed 2 days ago (03 May 2021 18:09)      PAST MEDICAL & SURGICAL HISTORY:  MR (mental retardation), severe    Seizure    HTN (hypertension)    Psoriasis    DD (diastrophic dysplasia)    Blind  right eye    Constipation, unspecified constipation type    No significant past surgical history        FAMILY HISTORY:  No pertinent family history in first degree relatives          MEDICATIONS   acetaminophen   Tablet .. 650 milliGRAM(s) Oral every 6 hours PRN  amLODIPine   Tablet 5 milliGRAM(s) Oral daily  ascorbic acid 500 milliGRAM(s) Oral two times a day  aspirin enteric coated 81 milliGRAM(s) Oral daily  calcium carbonate 1250 mG  + Vitamin D (OsCal 500 + D) 1 Tablet(s) Oral three times a day  cefTRIAXone   IVPB 1000 milliGRAM(s) IV Intermittent every 24 hours  cholecalciferol 2000 Unit(s) Oral two times a day  clonazePAM  Tablet 1 milliGRAM(s) Oral two times a day  diVALproex ER Oral Tab/Cap - Peds 250 milliGRAM(s) Oral three times a day  enoxaparin Injectable 40 milliGRAM(s) SubCutaneous daily  fluvoxaMINE 100 milliGRAM(s) Oral two times a day  hydrOXYzine  Oral Tab/Cap - Peds 20 milliGRAM(s) Oral every 8 hours  lactulose Syrup 10 Gram(s) Oral three times a day  levothyroxine 75 MICROGram(s) Oral daily  loratadine 10 milliGRAM(s) Oral daily  metoprolol tartrate 25 milliGRAM(s) Oral two times a day  multivitamin/minerals/iron Oral Solution (CENTRUM) 15 milliLiter(s) Oral daily  OLANZapine 15 milliGRAM(s) Oral at bedtime  pantoprazole   Suspension 40 milliGRAM(s) Oral before breakfast  senna 2 Tablet(s) Oral at bedtime  tamsulosin 0.8 milliGRAM(s) Oral at bedtime  zinc sulfate 220 milliGRAM(s) Oral daily      Vital Signs Last 24 Hrs  T(C): 36.6 (07 May 2021 05:20), Max: 36.9 (06 May 2021 13:18)  T(F): 97.8 (07 May 2021 05:20), Max: 98.4 (06 May 2021 13:18)  HR: 78 (07 May 2021 05:20) (77 - 82)  BP: 129/74 (07 May 2021 05:20) (110/69 - 129/74)  BP(mean): --  RR: 18 (07 May 2021 05:20) (17 - 18)  SpO2: 92% (07 May 2021 05:20) (92% - 94%)            LABS:                        13.6   8.63  )-----------( 310      ( 07 May 2021 08:22 )             39.5     05-07    141  |  105  |  18  ----------------------------<  94  3.9   |  29  |  0.78    Ca    9.1      07 May 2021 08:22    TPro  7.7  /  Alb  2.7<L>  /  TBili  0.2  /  DBili  x   /  AST  26  /  ALT  30  /  AlkPhos  78  05-07              WBC:  WBC Count: 8.63 K/uL (05-07 @ 08:22)  WBC Count: 7.22 K/uL (05-06 @ 14:15)  WBC Count: 7.53 K/uL (05-05 @ 07:08)  WBC Count: 8.97 K/uL (05-04 @ 07:37)      MICROBIOLOGY:  RECENT CULTURES:  05-04 .Urine Clean Catch (Midstream) XXXX XXXX   No growth    05-02 .Blood Blood XXXX XXXX   No growth to date.    05-01 .Blood Blood-Peripheral XXXX XXXX   No Growth Final    05-01 .Urine Clean Catch (Midstream) XXXX XXXX   No growth                    Sodium:  Sodium, Serum: 141 mmol/L (05-07 @ 08:22)  Sodium, Serum: 143 mmol/L (05-06 @ 14:15)  Sodium, Serum: 144 mmol/L (05-05 @ 07:08)  Sodium, Serum: 146 mmol/L (05-04 @ 07:37)      0.78 mg/dL 05-07 @ 08:22  0.74 mg/dL 05-06 @ 14:15  0.86 mg/dL 05-05 @ 07:08  0.91 mg/dL 05-04 @ 07:37      Hemoglobin:  Hemoglobin: 13.6 g/dL (05-07 @ 08:22)  Hemoglobin: 13.4 g/dL (05-06 @ 14:15)  Hemoglobin: 13.2 g/dL (05-05 @ 07:08)  Hemoglobin: 12.4 g/dL (05-04 @ 07:37)      Platelets: Platelet Count - Automated: 310 K/uL (05-07 @ 08:22)  Platelet Count - Automated: 300 K/uL (05-06 @ 14:15)  Platelet Count - Automated: 266 K/uL (05-05 @ 07:08)  Platelet Count - Automated: 252 K/uL (05-04 @ 07:37)      LIVER FUNCTIONS - ( 07 May 2021 08:22 )  Alb: 2.7 g/dL / Pro: 7.7 g/dL / ALK PHOS: 78 U/L / ALT: 30 U/L / AST: 26 U/L / GGT: x                 RADIOLOGY & ADDITIONAL STUDIES:

## 2021-05-07 NOTE — PROGRESS NOTE ADULT - SUBJECTIVE AND OBJECTIVE BOX
PAST MEDICAL & SURGICAL HISTORY:  MR (mental retardation), severe    Seizure    HTN (hypertension)    Psoriasis    DD (diastrophic dysplasia)    Blind  right eye    Constipation, unspecified constipation type    No significant past surgical history        MEDICATIONS  (STANDING):  amLODIPine   Tablet 5 milliGRAM(s) Oral daily  ascorbic acid 500 milliGRAM(s) Oral two times a day  aspirin enteric coated 81 milliGRAM(s) Oral daily  calcium carbonate 1250 mG  + Vitamin D (OsCal 500 + D) 1 Tablet(s) Oral three times a day  cefTRIAXone   IVPB 1000 milliGRAM(s) IV Intermittent every 24 hours  cholecalciferol 2000 Unit(s) Oral two times a day  clonazePAM  Tablet 1 milliGRAM(s) Oral two times a day  diVALproex ER Oral Tab/Cap - Peds 250 milliGRAM(s) Oral three times a day  enoxaparin Injectable 40 milliGRAM(s) SubCutaneous daily  fluvoxaMINE 100 milliGRAM(s) Oral two times a day  hydrOXYzine  Oral Tab/Cap - Peds 20 milliGRAM(s) Oral every 8 hours  lactulose Syrup 10 Gram(s) Oral three times a day  levothyroxine 75 MICROGram(s) Oral daily  loratadine 10 milliGRAM(s) Oral daily  metoprolol tartrate 25 milliGRAM(s) Oral two times a day  multivitamin/minerals/iron Oral Solution (CENTRUM) 15 milliLiter(s) Oral daily  OLANZapine 15 milliGRAM(s) Oral at bedtime  pantoprazole   Suspension 40 milliGRAM(s) Oral before breakfast  senna 2 Tablet(s) Oral at bedtime  tamsulosin 0.8 milliGRAM(s) Oral at bedtime  zinc sulfate 220 milliGRAM(s) Oral daily    MEDICATIONS  (PRN):  acetaminophen   Tablet .. 650 milliGRAM(s) Oral every 6 hours PRN Temp greater or equal to 38C (100.4F)      Patient is a 65y old  Male who presents with a chief complaint of hypoxia and fevers (07 May 2021 09:43)      Vital Signs Last 24 Hrs  T(C): 36.7 (07 May 2021 14:18), Max: 36.8 (06 May 2021 20:53)  T(F): 98.1 (07 May 2021 14:18), Max: 98.3 (06 May 2021 20:53)  HR: 112 (07 May 2021 14:18) (78 - 112)  BP: 103/73 (07 May 2021 14:18) (103/73 - 129/74)  BP(mean): --  RR: 18 (07 May 2021 14:18) (18 - 18)  SpO2: 92% (07 May 2021 14:18) (92% - 94%)            REVIEW OF SYSTEMS:    Constitutional: No fever, weight loss or fatigue  Eyes: No eye pain, visual disturbances, or discharge  ENT:  No difficulty hearing, tinnitus, vertigo; No sinus or throat pain  Neck: No pain or stiffness  Breasts: No pain, masses or nipple discharge  Respiratory: No cough, wheezing, chills or hemoptysis  Cardiovascular: No chest pain, palpitations, shortness of breath, dizziness or leg swelling  Gastrointestinal: No abdominal or epigastric pain. No nausea, vomiting or hematemesis; No diarrhea or constipation. No melena or hematochezia.  Genitourinary: No dysuria, frequency, hematuria or incontinence  Rectal: No pain, hemorrhoids or incontinence  Neurological: No headaches, memory loss, loss of strength, numbness or tremors  Skin: No itching, burning, rashes or lesions   Lymph Nodes: No enlarged glands  Endocrine: No heat or cold intolerance; No hair loss  Musculoskeletal: No joint pain or swelling; No muscle, back or extremity pain  Psychiatric: No depression, anxiety, mood swings or difficulty sleeping  Heme/Lymph: No easy bruising or bleeding gums  Allergy and Immunologic: No hives or eczema    PHYSICAL EXAM:  alert  out bed  interactive  Constitutional: NAD,   Respiratory: CTAB/L   Cardiovascular: S1 and S2, RRR, no M/G/R  Gastrointestinal: BS+, soft, NT/ND  Extremities: right lower ext induration all resolved  walking as usual  Skin: No rashes      decubiti: none                          13.6   8.63  )-----------( 310      ( 07 May 2021 08:22 )             39.5     05-07    141  |  105  |  18  ----------------------------<  94  3.9   |  29  |  0.78    Ca    9.1      07 May 2021 08:22    TPro  7.7  /  Alb  2.7<L>  /  TBili  0.2  /  DBili  x   /  AST  26  /  ALT  30  /  AlkPhos  78  05-07              .Urine Clean Catch (Midstream)  05-04 @ 00:48   No growth  --  --      .Blood Blood  05-02 @ 21:24   No growth to date.  --  --      .Blood Blood-Peripheral  05-01 @ 13:48   No Growth Final  --  --      .Urine Clean Catch (Midstream)  05-01 @ 13:17   No growth  --  --        Urine Culture:  05-04 @ 00:48    --        No growth  Urine Culture:  05-02 @ 21:24    --        No growth to date.  Urine Culture:  05-01 @ 13:48    --        No Growth Final  Urine Culture:  05-01 @ 13:17    --        No growth        Radiology:

## 2021-05-07 NOTE — CHART NOTE - NSCHARTNOTEFT_GEN_A_CORE
Assessment: Pt not able to answer questions. Per RN tolerating diet, fair intake noted. SLP not able to assess oral intake, recommended puree/diet deferred to MD. Spoke with dr Hunter Curran, will stay on mechanical soft with nectar liquids as tolerating well at present. BM 5/5. Likely d/c 5/10.    Factors impacting intake: [ ] none [ ] nausea  [ ] vomiting [ ] diarrhea [ ] constipation  [ ]chewing problems [x ] swallowing issues  [ ] other:     Diet Presciption: Diet, Dysphagia 2 Mechanical Soft-Nectar Consistency Fluid:   No Carb Prosource TF     Qty per Day:  2  Supplement Feeding Modality:  Oral  Ensure Enlive Servings Per Day:  2       Frequency:  Two Times a day (05-03-21 @ 18:22)    Intake: 50%    Current Weight: Weight (kg): 81.6 (05-01 @ 08:52)158 #(#)  % Weight Change    Pertinent Medications: MEDICATIONS  (STANDING):  amLODIPine   Tablet 5 milliGRAM(s) Oral daily  ascorbic acid 500 milliGRAM(s) Oral two times a day  aspirin enteric coated 81 milliGRAM(s) Oral daily  calcium carbonate 1250 mG  + Vitamin D (OsCal 500 + D) 1 Tablet(s) Oral three times a day  cefTRIAXone   IVPB 1000 milliGRAM(s) IV Intermittent every 24 hours  cholecalciferol 2000 Unit(s) Oral two times a day  clonazePAM  Tablet 1 milliGRAM(s) Oral two times a day  diVALproex ER Oral Tab/Cap - Peds 250 milliGRAM(s) Oral three times a day  enoxaparin Injectable 40 milliGRAM(s) SubCutaneous daily  fluvoxaMINE 100 milliGRAM(s) Oral two times a day  hydrOXYzine  Oral Tab/Cap - Peds 20 milliGRAM(s) Oral every 8 hours  lactulose Syrup 10 Gram(s) Oral three times a day  levothyroxine 75 MICROGram(s) Oral daily  loratadine 10 milliGRAM(s) Oral daily  metoprolol tartrate 25 milliGRAM(s) Oral two times a day  multivitamin/minerals/iron Oral Solution (CENTRUM) 15 milliLiter(s) Oral daily  OLANZapine 15 milliGRAM(s) Oral at bedtime  pantoprazole   Suspension 40 milliGRAM(s) Oral before breakfast  senna 2 Tablet(s) Oral at bedtime  tamsulosin 0.8 milliGRAM(s) Oral at bedtime  zinc sulfate 220 milliGRAM(s) Oral daily    MEDICATIONS  (PRN):  acetaminophen   Tablet .. 650 milliGRAM(s) Oral every 6 hours PRN Temp greater or equal to 38C (100.4F)    Pertinent Labs: 05-07 Na141 mmol/L Glu 94 mg/dL K+ 3.9 mmol/L Cr  0.78 mg/dL BUN 18 mg/dL 05-02 Phos 2.6 mg/dL 05-07 Alb 2.7 g/dL<L>     CAPILLARY BLOOD GLUCOSE        Skin:     Estimated Needs:   [x ] no change since previous assessment  [ ] recalculated:     Previous Nutrition Diagnosis: [x] swallowing difficulty  [ ] Inadequate Energy Intake [ ]Inadequate Oral Intake [ ] Excessive Energy Intake   [ ] Underweight [ ] Increased Nutrient Needs [ ] Overweight/Obesity   [ ] Altered GI Function [ ] Unintended Weight Loss [ ] Food & Nutrition Related Knowledge Deficit [ ] Malnutrition     Nutrition Diagnosis is [x ] ongoing  [ ] resolved [ ] not applicable     New Nutrition Diagnosis: [x ] not applicable       Interventions: Continue diet. Encourage Ensure intake.  Recommend  [ ] Change Diet To:  [ ] Nutrition Supplement  [ ] Nutrition Support  [ ] Other:     Monitoring and Evaluation:   [ ] PO intake [ x ] Tolerance to diet prescription [ x ] weights [ x ] labs[ x ] follow up per protocol  [ ] other:

## 2021-05-07 NOTE — PROGRESS NOTE ADULT - SUBJECTIVE AND OBJECTIVE BOX
SYLVAINROSELIA is a 65yMale , patient examined and chart reviewed.    INTERVAL HPI/ OVERNIGHT EVENTS:   Afebrile. NAD.  No events.    PAST MEDICAL & SURGICAL HISTORY:  MR (mental retardation), severe  Seizure  HTN (hypertension)  Psoriasis  DD (diastrophic dysplasia)  Blind  right eye  Constipation, unspecified constipation type      For details regarding the patient's social history, family history, and other miscellaneous elements, please refer the initial infectious diseases consultation and/or the admitting history and physical examination for this admission.    ROS:  Unable to obtain due to MRDD    ALLERGIES  Augmentin (Unknown)  aztreonam (Rash)  penicillin (Unknown)  sulfa drugs (Unknown)      Current inpatient medications :    ANTIBIOTICS/RELEVANT:  cefTRIAXone   IVPB 1000 milliGRAM(s) IV Intermittent every 24 hours    MEDICATIONS  (STANDING):  amLODIPine   Tablet 5 milliGRAM(s) Oral daily  ascorbic acid 500 milliGRAM(s) Oral two times a day  aspirin enteric coated 81 milliGRAM(s) Oral daily  calcium carbonate 1250 mG  + Vitamin D (OsCal 500 + D) 1 Tablet(s) Oral three times a day  cholecalciferol 2000 Unit(s) Oral two times a day  clonazePAM  Tablet 1 milliGRAM(s) Oral two times a day  diVALproex ER Oral Tab/Cap - Peds 250 milliGRAM(s) Oral three times a day  enoxaparin Injectable 40 milliGRAM(s) SubCutaneous daily  fluvoxaMINE 100 milliGRAM(s) Oral two times a day  hydrOXYzine  Oral Tab/Cap - Peds 20 milliGRAM(s) Oral every 8 hours  lactulose Syrup 10 Gram(s) Oral three times a day  levothyroxine 75 MICROGram(s) Oral daily  loratadine 10 milliGRAM(s) Oral daily  metoprolol tartrate 25 milliGRAM(s) Oral two times a day  multivitamin/minerals/iron Oral Solution (CENTRUM) 15 milliLiter(s) Oral daily  OLANZapine 15 milliGRAM(s) Oral at bedtime  pantoprazole   Suspension 40 milliGRAM(s) Oral before breakfast  senna 2 Tablet(s) Oral at bedtime  tamsulosin 0.8 milliGRAM(s) Oral at bedtime  zinc sulfate 220 milliGRAM(s) Oral daily    MEDICATIONS  (PRN):  acetaminophen   Tablet .. 650 milliGRAM(s) Oral every 6 hours PRN Temp greater or equal to 38C (100.4F)        Objective:  Vital Signs Last 24 Hrs  T(C): 36.7 (07 May 2021 14:18), Max: 36.8 (06 May 2021 20:53)  T(F): 98.1 (07 May 2021 14:18), Max: 98.3 (06 May 2021 20:53)  HR: 112 (07 May 2021 14:18) (78 - 112)  BP: 103/73 (07 May 2021 14:18) (103/73 - 129/74)  RR: 18 (07 May 2021 14:18) (18 - 18)  SpO2: 92% (07 May 2021 14:18) (92% - 94%)      Physical Exam:  General: no acute distress  Neck: supple, trachea midline  Lungs: clear, no wheeze/rhonchi  Cardiovascular: regular rate and rhythm, S1 S2  Abdomen: soft, nontender,  bowel sounds normal  Neurological: alert and oriented x3  Skin: no rash  Extremities: Right LE swelling with decreasing erythema - slowly improving      LABS:                        13.6   8.63  )-----------( 310      ( 07 May 2021 08:22 )             39.5   05-07    141  |  105  |  18  ----------------------------<  94  3.9   |  29  |  0.78    Ca    9.1      07 May 2021 08:22    TPro  7.7  /  Alb  2.7<L>  /  TBili  0.2  /  DBili  x   /  AST  26  /  ALT  30  /  AlkPhos  78  05-07      MICROBIOLOGY:  Culture - Blood (collected 01 May 2021 13:48)  Source: .Blood Blood-Peripheral  Preliminary Report (02 May 2021 14:01):    No growth to date.    Culture - Blood (collected 01 May 2021 13:48)  Source: .Blood Blood-Peripheral  Preliminary Report (02 May 2021 14:01):    No growth to date.    Culture - Urine (collected 01 May 2021 13:17)  Source: .Urine Clean Catch (Midstream)  Final Report (02 May 2021 13:30):    No growth    RADIOLOGY & ADDITIONAL STUDIES:    EXAM:  CT ANGIO CHEST (W)AW IC                            PROCEDURE DATE:  05/01/2021          INTERPRETATION:  CTA CHEST AND CT ABDOMEN AND PELVIS    INDICATION: History of fever. Hypoxia..    TECHNIQUE: Enhanced helical images were obtained of the chest, abdomen, and pelvis. Coronal and sagittal images were reconstructed.  Images were obtained after the uneventful administration of 90 cc of nonionic intravenous contrast (Omnipaque 350). 10 cc of nonionic intravenous contrast (Omnipaque 350) was discarded. Maximum intensity projection images were generated.    COMPARISON: Radiograph 5/1/2021. CT chest 5/1/2021, 4/22/2021, and 7/26/2020. CT abdomen and pelvis 4/23/2021.    FINDINGS:    Pulmonary Artery:  There is no main, central, lobar, orproximal to mid segmental pulmonary embolus. Distal segmental and subsegmental divisions are not well evaluated.    Tubes/Lines: None.    Lungs, airways and pleura: Since 5/1/2021, the groundglass opacities in the right lower lobe are unchanged. The lungs are otherwise clear allowing for respiratory motion. The airways are unremarkable.    Mediastinum: The visualized thyroid gland and esophagus are unremarkable. The chest lymph nodes measure less than 10 mm in the short axis. Hiatal hernia.    The heart is normal in size. No pericardial effusion. Coronary artery calcifications. Left-sided aortic arch and left-sided descending thoracic aorta.    Bones And Soft Tissues: The bones are unremarkable.  The soft tissues are unremarkable.    Abdomen and pelvis:    Liver: The liver is normal.    Gallbladder: Cholecystectomy.    Spleen: The spleen is normal.    Pancreas: The pancreas is normal.    Adrenal glands: The adrenal glands are normal.    Kidneys: Pelvic right renal cysts. Subcentimeter hypodense right renal lesions are statistically benign and likely represent cysts.    Hypodense left renal lesions are statistically benign and likely represent cysts. No hydronephrosis or hydroureter.    Lymph nodes: No enlarged retroperitoneal or upperabdominal lymph nodes.    Bowel: The stomach is unremarkable. The small bowel and large bowel are normal in caliber. The appendix is normal. No free air. No free fluid.    Bladder: The bladder is distended.    Prostate gland: The prostate gland is unremarkable.    Skeletal: Degenerative change of the spine.      IMPRESSION:    Chest:  1.  No pulmonary embolus.  2.  The right lower lobe groundglass opacities are unchanged allowing for differences in technique an respiratory motion. A follow-up study is recommended to evaluate for resolution/change to include prone imaging in 6-8 weeks.    Abdomen and pelvis:  1.  No bowel detection.      Assessment :  64[facility] with fevers sec to Right LE cellulitis with lymphangitis- likely strep  Doubt pneumonia  AUR  Fevers better  WBC downtrending  Clinically improving     Plan :   Cont Rocephin   Will change to po Vantin x 7 days on Sunday  Trend temps and cbc  Elevate leg  Asp precautions    D/w Dr Hunter Curran    Continue with present regiment.  Appropriate use of antibiotics and adverse effects reviewed.      > 35 minutes were spent in direct patient care reviewing notes, medications ,labs data/ imaging , discussion with multidisciplinary team.    Thank you for allowing me to participate in care of your patient .    Olivier Daniels MD  Infectious Disease  [phone]

## 2021-05-07 NOTE — PROGRESS NOTE ADULT - ASSESSMENT
SYLVAIN VELOZ [facility]  5/1/2021  DR TASHA CALIXTO     REVIEW OF SYMPTOMS      Able to give (reliable) ROS  NO     PHYSICAL EXAM    HEENT Unremarkable  atraumatic   RESP Fair air entry EXP prolonged    Harsh breath sound Resp distres mild   CARDIAC S1 S2 No S3     NO JVD    ABDOMEN SOFT BS PRESENT NOT DISTENDED No hepatosplenomegaly   PEDAL EDEMA present No calf tenderness  NO rash       PATIENT PRESENTATION.     65M group home resident with PMH severe mental retardation HTN, Cholecystectomy, COVID 4/2020 non-verbal recently admitted [facility] 4/23-4/27/2021  was readmitted 5/1/2021 with fever hypoxia  and pulmonary consulted   In ER pt already got levaquin and ns 2.5 l (5/1/2021 2:13 PM)     RECENT SHARPE   echo 4/23/2021 echo n lvsf n lvedp  4/22/2021 D-dimr 314  4/23 v duplx negv  cta ch 4/22 cta ch no pe bl is edema air trapping cm     Home meds.   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   5/1/2021 24% 743/39/88     GLOBAL AND BEST PRACTICE ISSUES                     ALLERGY.    azactm augmentn pncl sulfa   WEIGHT.    5/1/2021 81K                            BMI.              5/1/2021 29                ADVANCED DIRECTIVE.                               HEAD OF BED ELEVATION. Yes  DVT PROPHYLAXIS.  lvnx 40 (5/1)                     SAUER PROPHYLAXIS.   APA.   asa 81 (5/1)                                                                  DYSPHAGIA RECOMM.  5/4 mbs puree nectar  5/3/2021 DYS 2 NECTAR   DIET.   dys 2 mech soft nectar (5/1)    INFECTION PROPHYLAXIS.   FREE WATER.    IV.          D5 1/2 60 (5/1)               ASSESSMENT/RECOMMENDATIONS.    COVID STATUS EVAL  5/2spkab posv   5/1 scv2 negv   SEPSIS poa   Had fever 102 on 5/2/2021  Pt had leukocytosis w 14 poa and procalc 4/5 poa   MRSA is posv 5/2/2021 5/1/2021 CT ch did not show infiltr  5/1/2021 RVP was negv   ROCEPHIN (5/3/2021) (DR MUIR  VANCO (5/3/2021 (DR MUIR)   ID eval appreciated  HYPOXIA poa   O2 to keep po 90-95%  VTE RULED OUT  V duplx 5/1  negv   cta 5/1 cta ch No pe rll ggo fu study recommended in 6 w   GROUND GLASS OPAC RLL  Will need followup ct chend June 2021   NEUROPSYCHIATRIC MEDS  Cont prev meds   DYSPHAGIA EVAL   Speech consulted 5/1/2021   MBS done 5.4   On dysphagia diet         TIME SPENT   Over 25 minutes aggregate care time spent on encounter; activities included   direct patient care, counseling and/or coordinating care reviewing notes, lab data/ imaging , discussion with multidisciplinary team/ patient  /family and explaining in detail risks, benefits, alternatives  of the recommendations     SYLVAIN VELOZ [facility]  5/1/2021  DR TASAH CALIXTO

## 2021-05-07 NOTE — PROGRESS NOTE ADULT - SUBJECTIVE AND OBJECTIVE BOX
[facility] Cardiology    CHIEF COMPLAINT: Patient is a 65y old  Male who presents with a chief complaint of hypoxia and fevers (07 May 2021 09:20)      Follow Up: [ ] Chest Pain      [ ] Dyspnea     [ ] Palpitations    [ ] Atrial Fibrillation     [ ] Ventricular Dysrhythmia    [ ] Abnormal EKG                      [ ] Abnormal Cardiac Enzymes     [ ] Valvular Disease    HPI:  fever 103 at home and pulse ox 88  was dc hospital few days ago for fever  w/u was negative  patient had fallen the day before he was admitted  stitches were removed 2 days ago (03 May 2021 18:09)    PAST MEDICAL & SURGICAL HISTORY:  MR (mental retardation), severe    Seizure    HTN (hypertension)    Psoriasis    DD (diastrophic dysplasia)    Blind  right eye    Constipation, unspecified constipation type    No significant past surgical history      MEDICATIONS  (STANDING):  amLODIPine   Tablet 5 milliGRAM(s) Oral daily  ascorbic acid 500 milliGRAM(s) Oral two times a day  aspirin enteric coated 81 milliGRAM(s) Oral daily  calcium carbonate 1250 mG  + Vitamin D (OsCal 500 + D) 1 Tablet(s) Oral three times a day  cefTRIAXone   IVPB 1000 milliGRAM(s) IV Intermittent every 24 hours  cholecalciferol 2000 Unit(s) Oral two times a day  clonazePAM  Tablet 1 milliGRAM(s) Oral two times a day  diVALproex ER Oral Tab/Cap - Peds 250 milliGRAM(s) Oral three times a day  enoxaparin Injectable 40 milliGRAM(s) SubCutaneous daily  fluvoxaMINE 100 milliGRAM(s) Oral two times a day  hydrOXYzine  Oral Tab/Cap - Peds 20 milliGRAM(s) Oral every 8 hours  lactulose Syrup 10 Gram(s) Oral three times a day  levothyroxine 75 MICROGram(s) Oral daily  loratadine 10 milliGRAM(s) Oral daily  metoprolol tartrate 25 milliGRAM(s) Oral two times a day  multivitamin/minerals/iron Oral Solution (CENTRUM) 15 milliLiter(s) Oral daily  OLANZapine 15 milliGRAM(s) Oral at bedtime  pantoprazole   Suspension 40 milliGRAM(s) Oral before breakfast  senna 2 Tablet(s) Oral at bedtime  tamsulosin 0.8 milliGRAM(s) Oral at bedtime  zinc sulfate 220 milliGRAM(s) Oral daily    MEDICATIONS  (PRN):  acetaminophen   Tablet .. 650 milliGRAM(s) Oral every 6 hours PRN Temp greater or equal to 38C (100.4F)    Allergies    Augmentin (Unknown)  aztreonam (Rash)  penicillin (Unknown)  sulfa drugs (Unknown)    Intolerances        REVIEW OF SYSTEMS:    CONSTITUTIONAL: No weakness, fevers or chills.   EYES/ENT: No visual changes;    NECK: No pain or stiffness  RESPIRATORY: No cough, wheezing, No shortness of breath  CARDIOVASCULAR: No chest pain or palpitations  GASTROINTESTINAL: No abdominal pain, or hematochezia.  GENITOURINARY: No dysuria orhematuria  NEUROLOGICAL: No numbness or weakness  SKIN: No itching, burning, rashes  All other review of systems is negative unless indicated above    Vital Signs Last 24 Hrs  T(C): 36.6 (07 May 2021 05:20), Max: 36.9 (06 May 2021 13:18)  T(F): 97.8 (07 May 2021 05:20), Max: 98.4 (06 May 2021 13:18)  HR: 78 (07 May 2021 05:20) (77 - 82)  BP: 129/74 (07 May 2021 05:20) (110/69 - 129/74)  BP(mean): --  RR: 18 (07 May 2021 05:20) (17 - 18)  SpO2: 92% (07 May 2021 05:20) (92% - 94%)  I&O's Summary    PHYSICAL EXAM:  Constitutional: NAD  Neurological: Alert, no focal deficits  HEENT: no JVD, EOMI  Cardiovascular: Regular, S1 and S2, no murmur  Pulmonary: breath sounds bilaterally  Gastrointestinal: Bowel Sounds present, soft, nontender  EXT:  no peripheral edema  Skin: No rashes.  Psych:  Mood calm  LABS: All Labs Reviewed:                        13.6   8.63  )-----------( 310      ( 07 May 2021 08:22 )             39.5     05-07    141  |  105  |  18  ----------------------------<  94  3.9   |  29  |  0.78    Ca    9.1      07 May 2021 08:22    TPro  7.7  /  Alb  2.7<L>  /  TBili  0.2  /  DBili  x   /  AST  26  /  ALT  30  /  AlkPhos  78  05-07    · Assessment	  65M group home resident admitted with fevers and hypoxia after recently hospitalization at [facility] with severe mental retardation, cholecystectomy, COVID 4/2020, nonverbal likely with aspiration PNA, cellulitis    Suggest:    1. Abx per ID and Pulm  R leg cellulitis  2. HTN - c/w amlodipine  3. DVT prophylaxis.  4. Will follow as needed

## 2021-05-08 RX ADMIN — Medication 1 TABLET(S): at 22:09

## 2021-05-08 RX ADMIN — ZINC SULFATE TAB 220 MG (50 MG ZINC EQUIVALENT) 220 MILLIGRAM(S): 220 (50 ZN) TAB at 12:26

## 2021-05-08 RX ADMIN — Medication 1 MILLIGRAM(S): at 18:44

## 2021-05-08 RX ADMIN — Medication 500 MILLIGRAM(S): at 18:03

## 2021-05-08 RX ADMIN — DIVALPROEX SODIUM 250 MILLIGRAM(S): 500 TABLET, DELAYED RELEASE ORAL at 13:53

## 2021-05-08 RX ADMIN — LORATADINE 10 MILLIGRAM(S): 10 TABLET ORAL at 12:25

## 2021-05-08 RX ADMIN — TAMSULOSIN HYDROCHLORIDE 0.8 MILLIGRAM(S): 0.4 CAPSULE ORAL at 22:09

## 2021-05-08 RX ADMIN — DIVALPROEX SODIUM 250 MILLIGRAM(S): 500 TABLET, DELAYED RELEASE ORAL at 22:09

## 2021-05-08 RX ADMIN — Medication 81 MILLIGRAM(S): at 12:27

## 2021-05-08 RX ADMIN — FLUVOXAMINE MALEATE 100 MILLIGRAM(S): 25 TABLET ORAL at 18:05

## 2021-05-08 RX ADMIN — ENOXAPARIN SODIUM 40 MILLIGRAM(S): 100 INJECTION SUBCUTANEOUS at 12:26

## 2021-05-08 RX ADMIN — DIVALPROEX SODIUM 250 MILLIGRAM(S): 500 TABLET, DELAYED RELEASE ORAL at 05:56

## 2021-05-08 RX ADMIN — Medication 2000 UNIT(S): at 07:01

## 2021-05-08 RX ADMIN — LACTULOSE 10 GRAM(S): 10 SOLUTION ORAL at 05:54

## 2021-05-08 RX ADMIN — PANTOPRAZOLE SODIUM 40 MILLIGRAM(S): 20 TABLET, DELAYED RELEASE ORAL at 06:02

## 2021-05-08 RX ADMIN — OLANZAPINE 15 MILLIGRAM(S): 15 TABLET, FILM COATED ORAL at 22:09

## 2021-05-08 RX ADMIN — Medication 20 MILLIGRAM(S): at 13:53

## 2021-05-08 RX ADMIN — Medication 75 MICROGRAM(S): at 05:51

## 2021-05-08 RX ADMIN — FLUVOXAMINE MALEATE 100 MILLIGRAM(S): 25 TABLET ORAL at 06:00

## 2021-05-08 RX ADMIN — Medication 25 MILLIGRAM(S): at 18:05

## 2021-05-08 RX ADMIN — SENNA PLUS 2 TABLET(S): 8.6 TABLET ORAL at 22:09

## 2021-05-08 RX ADMIN — Medication 1 MILLIGRAM(S): at 06:57

## 2021-05-08 RX ADMIN — LACTULOSE 10 GRAM(S): 10 SOLUTION ORAL at 22:09

## 2021-05-08 RX ADMIN — Medication 20 MILLIGRAM(S): at 05:57

## 2021-05-08 RX ADMIN — Medication 1 TABLET(S): at 05:50

## 2021-05-08 RX ADMIN — Medication 2000 UNIT(S): at 18:05

## 2021-05-08 RX ADMIN — LACTULOSE 10 GRAM(S): 10 SOLUTION ORAL at 13:54

## 2021-05-08 RX ADMIN — Medication 1 TABLET(S): at 13:53

## 2021-05-08 RX ADMIN — Medication 25 MILLIGRAM(S): at 05:52

## 2021-05-08 RX ADMIN — CEFTRIAXONE 100 MILLIGRAM(S): 500 INJECTION, POWDER, FOR SOLUTION INTRAMUSCULAR; INTRAVENOUS at 18:02

## 2021-05-08 RX ADMIN — Medication 15 MILLILITER(S): at 12:24

## 2021-05-08 RX ADMIN — Medication 20 MILLIGRAM(S): at 22:09

## 2021-05-08 RX ADMIN — Medication 500 MILLIGRAM(S): at 05:50

## 2021-05-08 RX ADMIN — AMLODIPINE BESYLATE 5 MILLIGRAM(S): 2.5 TABLET ORAL at 05:51

## 2021-05-08 NOTE — PROGRESS NOTE ADULT - ASSESSMENT
SYLVAIN VELOZ [facility]  5/1/2021  DR TASHA CALIXTO     REVIEW OF SYMPTOMS      Able to give (reliable) ROS  NO     PHYSICAL EXAM    HEENT Unremarkable  atraumatic   RESP Fair air entry EXP prolonged    Harsh breath sound Resp distres mild   CARDIAC S1 S2 No S3     NO JVD    ABDOMEN SOFT BS PRESENT NOT DISTENDED No hepatosplenomegaly   PEDAL EDEMA present No calf tenderness  NO rash     PATIENT PRESENTATION.     65M group home resident with PMH severe mental retardation HTN, Cholecystectomy, COVID 4/2020 non-verbal recently admitted [facility] 4/23-4/27/2021  was readmitted 5/1/2021 with fever hypoxia  and pulmonary consulted   In ER pt already got levaquin and ns 2.5 l (5/1/2021 2:13 PM)     RECENT SHARPE   echo 4/23/2021 echo n lvsf n lvedp  4/22/2021 D-dimr 314  4/23 v duplx negv  cta ch 4/22 cta ch no pe bl is edema air trapping cm     Home meds.   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    azactm augmentn pncl sulfa   WEIGHT.    5/1/2021 81K                            BMI.              5/1/2021 29                ADVANCED DIRECTIVE.                               HEAD OF BED ELEVATION. Yes  DVT PROPHYLAXIS.  lvnx 40 (5/1)                     SAUER PROPHYLAXIS.   APA.   asa 81 (5/1)                                                                  DYSPHAGIA RECOMM.  5/4 mbs puree nectar  5/3/2021 DYS 2 NECTAR   DIET.   dys 2 mech soft nectar (5/1)    INFECTION PROPHYLAXIS.   FREE WATER.    IV.          D5 1/2 60 (5/1)               ASSESSMENT/RECOMMENDATIONS.    COVID STATUS EVAL  5/2spkab posv   5/1 scv2 negv   SEPSIS poa   Had fever 102 on 5/2/2021  Pt had leukocytosis w 14 poa and procalc 4/5 poa   MRSA is posv 5/2/2021 5/1/2021 CT ch did not show infiltr  5/1/2021 RVP was negv   ROCEPHIN (5/3/2021) (DR MUIR  VANCO (5/3-5/8/2021 (DR MUIR)   ID eval appreciated  HYPOXIA poa   O2 to keep po 90-95%  VTE RULED OUT  V duplx 5/1  negv   cta 5/1 cta ch No pe rll ggo fu study recommended in 6 w   GROUND GLASS OPAC RLL  Will need followup ct [facility] June 2021   NEUROPSYCHIATRIC MEDS  Cont prev meds   DYSPHAGIA EVAL   Speech consulted 5/1/2021   MBS done 5.4   On dysphagia diet       TIME SPENT   Over 25 minutes aggregate care time spent on encounter; activities included   direct patient care, counseling and/or coordinating care reviewing notes, lab data/ imaging , discussion with multidisciplinary team/ patient  /family and explaining in detail risks, benefits, alternatives  of the recommendations     SYLVAIN VELOZ [facility]  5/1/2021  DR TASHA CALIXTO

## 2021-05-08 NOTE — PROGRESS NOTE ADULT - SUBJECTIVE AND OBJECTIVE BOX
SYLVAIN ROSELIA is a 65yMale , patient examined and chart reviewed.    INTERVAL HPI/ OVERNIGHT EVENTS:   Afebrile. NAD.  No events.    PAST MEDICAL & SURGICAL HISTORY:  MR (mental retardation), severe  Seizure  HTN (hypertension)  Psoriasis  DD (diastrophic dysplasia)  Blind  right eye  Constipation, unspecified constipation type      For details regarding the patient's social history, family history, and other miscellaneous elements, please refer the initial infectious diseases consultation and/or the admitting history and physical examination for this admission.    ROS:  Unable to obtain due to MRDD    ALLERGIES  Augmentin (Unknown)  aztreonam (Rash)  penicillin (Unknown)  sulfa drugs (Unknown)      Current inpatient medications :    ANTIBIOTICS/RELEVANT:  cefTRIAXone   IVPB 1000 milliGRAM(s) IV Intermittent every 24 hours    MEDICATIONS  (STANDING):  amLODIPine   Tablet 5 milliGRAM(s) Oral daily  ascorbic acid 500 milliGRAM(s) Oral two times a day  aspirin enteric coated 81 milliGRAM(s) Oral daily  calcium carbonate 1250 mG  + Vitamin D (OsCal 500 + D) 1 Tablet(s) Oral three times a day  cholecalciferol 2000 Unit(s) Oral two times a day  diVALproex ER Oral Tab/Cap - Peds 250 milliGRAM(s) Oral three times a day  enoxaparin Injectable 40 milliGRAM(s) SubCutaneous daily  fluvoxaMINE 100 milliGRAM(s) Oral two times a day  hydrOXYzine  Oral Tab/Cap - Peds 20 milliGRAM(s) Oral every 8 hours  lactulose Syrup 10 Gram(s) Oral three times a day  levothyroxine 75 MICROGram(s) Oral daily  loratadine 10 milliGRAM(s) Oral daily  metoprolol tartrate 25 milliGRAM(s) Oral two times a day  multivitamin/minerals/iron Oral Solution (CENTRUM) 15 milliLiter(s) Oral daily  OLANZapine 15 milliGRAM(s) Oral at bedtime  pantoprazole   Suspension 40 milliGRAM(s) Oral before breakfast  senna 2 Tablet(s) Oral at bedtime  tamsulosin 0.8 milliGRAM(s) Oral at bedtime  zinc sulfate 220 milliGRAM(s) Oral daily    MEDICATIONS  (PRN):  acetaminophen   Tablet .. 650 milliGRAM(s) Oral every 6 hours PRN Temp greater or equal to 38C (100.4F)      Objective:  Vital Signs Last 24 Hrs  T(C): 36.7 (08 May 2021 20:37), Max: 36.8 (07 May 2021 22:00)  T(F): 98.1 (08 May 2021 20:37), Max: 98.3 (08 May 2021 05:33)  HR: 96 (08 May 2021 20:37) (92 - 97)  BP: 117/72 (08 May 2021 20:37) (111/71 - 134/84)  RR: 16 (08 May 2021 20:37) (16 - 18)  SpO2: 88% (08 May 2021 20:37) (88% - 93%)    Physical Exam:  General: no acute distress  Neck: supple, trachea midline  Lungs: clear, no wheeze/rhonchi  Cardiovascular: regular rate and rhythm, S1 S2  Abdomen: soft, nontender,  bowel sounds normal  Neurological: alert and oriented x3  Skin: no rash  Extremities: Right LE swelling with decreasing erythema - slowly improving      LABS:                        13.6   8.63  )-----------( 310      ( 07 May 2021 08:22 )             39.5   05-07    141  |  105  |  18  ----------------------------<  94  3.9   |  29  |  0.78    Ca    9.1      07 May 2021 08:22    TPro  7.7  /  Alb  2.7<L>  /  TBili  0.2  /  DBili  x   /  AST  26  /  ALT  30  /  AlkPhos  78  05-07      MICROBIOLOGY:  Culture - Blood (collected 01 May 2021 13:48)  Source: .Blood Blood-Peripheral  Preliminary Report (02 May 2021 14:01):    No growth to date.    Culture - Blood (collected 01 May 2021 13:48)  Source: .Blood Blood-Peripheral  Preliminary Report (02 May 2021 14:01):    No growth to date.    Culture - Urine (collected 01 May 2021 13:17)  Source: .Urine Clean Catch (Midstream)  Final Report (02 May 2021 13:30):    No growth    RADIOLOGY & ADDITIONAL STUDIES:    EXAM:  CT ANGIO CHEST (W)AW IC                            PROCEDURE DATE:  05/01/2021          INTERPRETATION:  CTA CHEST AND CT ABDOMEN AND PELVIS    INDICATION: History of fever. Hypoxia..    TECHNIQUE: Enhanced helical images were obtained of the chest, abdomen, and pelvis. Coronal and sagittal images were reconstructed.  Images were obtained after the uneventful administration of 90 cc of nonionic intravenous contrast (Omnipaque 350). 10 cc of nonionic intravenous contrast (Omnipaque 350) was discarded. Maximum intensity projection images were generated.    COMPARISON: Radiograph 5/1/2021. CT chest 5/1/2021, 4/22/2021, and 7/26/2020. CT abdomen and pelvis 4/23/2021.    FINDINGS:    Pulmonary Artery:  There is no main, central, lobar, orproximal to mid segmental pulmonary embolus. Distal segmental and subsegmental divisions are not well evaluated.    Tubes/Lines: None.    Lungs, airways and pleura: Since 5/1/2021, the groundglass opacities in the right lower lobe are unchanged. The lungs are otherwise clear allowing for respiratory motion. The airways are unremarkable.    Mediastinum: The visualized thyroid gland and esophagus are unremarkable. The chest lymph nodes measure less than 10 mm in the short axis. Hiatal hernia.    The heart is normal in size. No pericardial effusion. Coronary artery calcifications. Left-sided aortic arch and left-sided descending thoracic aorta.    Bones And Soft Tissues: The bones are unremarkable.  The soft tissues are unremarkable.    Abdomen and pelvis:    Liver: The liver is normal.    Gallbladder: Cholecystectomy.    Spleen: The spleen is normal.    Pancreas: The pancreas is normal.    Adrenal glands: The adrenal glands are normal.    Kidneys: Pelvic right renal cysts. Subcentimeter hypodense right renal lesions are statistically benign and likely represent cysts.    Hypodense left renal lesions are statistically benign and likely represent cysts. No hydronephrosis or hydroureter.    Lymph nodes: No enlarged retroperitoneal or upperabdominal lymph nodes.    Bowel: The stomach is unremarkable. The small bowel and large bowel are normal in caliber. The appendix is normal. No free air. No free fluid.    Bladder: The bladder is distended.    Prostate gland: The prostate gland is unremarkable.    Skeletal: Degenerative change of the spine.      IMPRESSION:    Chest:  1.  No pulmonary embolus.  2.  The right lower lobe groundglass opacities are unchanged allowing for differences in technique an respiratory motion. A follow-up study is recommended to evaluate for resolution/change to include prone imaging in 6-8 weeks.    Abdomen and pelvis:  1.  No bowel detection.      Assessment :  64YO M Phx of MRDD, HTN, resident of [facility] admitted with fevers sec to Right LE cellulitis with lymphangitis- likely strep  Doubt pneumonia  AUR  Fevers better  WBC downtrending  Clinically improving     Plan :   Cont Rocephin   Will change to po Vantin x 7 days tmrw  Trend temps and cbc  Elevate leg  Asp precautions    D/w Dr Hunter Curran    Continue with present regiment.  Appropriate use of antibiotics and adverse effects reviewed.      > 35 minutes were spent in direct patient care reviewing notes, medications ,labs data/ imaging , discussion with multidisciplinary team.    Thank you for allowing me to participate in care of your patient .    Olivier Daniels MD  Infectious Disease  [phone]

## 2021-05-08 NOTE — PROGRESS NOTE ADULT - SUBJECTIVE AND OBJECTIVE BOX
ROSELIA NARAYAN    PLV 1EAS 114 W1    Patient is a 65y old  Male who presents with a chief complaint of hypoxia and fevers (07 May 2021 17:04)       Allergies    Augmentin (Unknown)  aztreonam (Rash)  penicillin (Unknown)  sulfa drugs (Unknown)    Intolerances        HPI:  fever 103 at home and pulse ox 88  was [facility] few [facility] ago for fever  w/u was negative  patient had fallen the day before he was admitted  stitches were removed 2 days ago (03 May 2021 18:09)      PAST MEDICAL & SURGICAL HISTORY:  MR (mental retardation), severe    Seizure    HTN (hypertension)    Psoriasis    DD (diastrophic dysplasia)    Blind  right eye    Constipation, unspecified constipation type    No significant past surgical history        FAMILY HISTORY:  No pertinent family history in first degree relatives          MEDICATIONS   acetaminophen   Tablet .. 650 milliGRAM(s) Oral every 6 hours PRN  amLODIPine   Tablet 5 milliGRAM(s) Oral daily  ascorbic acid 500 milliGRAM(s) Oral two times a day  aspirin enteric coated 81 milliGRAM(s) Oral daily  calcium carbonate 1250 mG  + Vitamin D (OsCal 500 + D) 1 Tablet(s) Oral three times a day  cefTRIAXone   IVPB 1000 milliGRAM(s) IV Intermittent every 24 hours  cholecalciferol 2000 Unit(s) Oral two times a day  clonazePAM  Tablet 1 milliGRAM(s) Oral two times a day  diVALproex ER Oral Tab/Cap - Peds 250 milliGRAM(s) Oral three times a day  enoxaparin Injectable 40 milliGRAM(s) SubCutaneous daily  fluvoxaMINE 100 milliGRAM(s) Oral two times a day  hydrOXYzine  Oral Tab/Cap - Peds 20 milliGRAM(s) Oral every 8 hours  lactulose Syrup 10 Gram(s) Oral three times a day  levothyroxine 75 MICROGram(s) Oral daily  loratadine 10 milliGRAM(s) Oral daily  metoprolol tartrate 25 milliGRAM(s) Oral two times a day  multivitamin/minerals/iron Oral Solution (CENTRUM) 15 milliLiter(s) Oral daily  OLANZapine 15 milliGRAM(s) Oral at bedtime  pantoprazole   Suspension 40 milliGRAM(s) Oral before breakfast  senna 2 Tablet(s) Oral at bedtime  tamsulosin 0.8 milliGRAM(s) Oral at bedtime  zinc sulfate 220 milliGRAM(s) Oral daily      Vital Signs Last 24 Hrs  T(C): 36.8 (08 May 2021 05:33), Max: 36.8 (07 May 2021 22:00)  T(F): 98.3 (08 May 2021 05:33), Max: 98.3 (08 May 2021 05:33)  HR: 92 (08 May 2021 05:33) (92 - 112)  BP: 134/84 (08 May 2021 05:33) (103/73 - 134/84)  BP(mean): --  RR: 18 (08 May 2021 05:33) (18 - 18)  SpO2: 93% (08 May 2021 05:33) (92% - 93%)            LABS:                        13.6   8.63  )-----------( 310      ( 07 May 2021 08:22 )             39.5     05-07    141  |  105  |  18  ----------------------------<  94  3.9   |  29  |  0.78    Ca    9.1      07 May 2021 08:22    TPro  7.7  /  Alb  2.7<L>  /  TBili  0.2  /  DBili  x   /  AST  26  /  ALT  30  /  AlkPhos  78  05-07              WBC:  WBC Count: 8.63 K/uL (05-07 @ 08:22)  WBC Count: 7.22 K/uL (05-06 @ 14:15)  WBC Count: 7.53 K/uL (05-05 @ 07:08)      MICROBIOLOGY:  RECENT CULTURES:  05-04 .Urine Clean Catch (Midstream) XXXX XXXX   No growth    05-02 .Blood Blood XXXX XXXX   No Growth Final    05-01 .Blood Blood-Peripheral XXXX XXXX   No Growth Final    05-01 .Urine Clean Catch (Midstream) XXXX XXXX   No growth                    Sodium:  Sodium, Serum: 141 mmol/L (05-07 @ 08:22)  Sodium, Serum: 143 mmol/L (05-06 @ 14:15)  Sodium, Serum: 144 mmol/L (05-05 @ 07:08)      0.78 mg/dL 05-07 @ 08:22  0.74 mg/dL 05-06 @ 14:15  0.86 mg/dL 05-05 @ 07:08      Hemoglobin:  Hemoglobin: 13.6 g/dL (05-07 @ 08:22)  Hemoglobin: 13.4 g/dL (05-06 @ 14:15)  Hemoglobin: 13.2 g/dL (05-05 @ 07:08)      Platelets: Platelet Count - Automated: 310 K/uL (05-07 @ 08:22)  Platelet Count - Automated: 300 K/uL (05-06 @ 14:15)  Platelet Count - Automated: 266 K/uL (05-05 @ 07:08)      LIVER FUNCTIONS - ( 07 May 2021 08:22 )  Alb: 2.7 g/dL / Pro: 7.7 g/dL / ALK PHOS: 78 U/L / ALT: 30 U/L / AST: 26 U/L / GGT: x                 RADIOLOGY & ADDITIONAL STUDIES:

## 2021-05-09 RX ORDER — CEFPODOXIME PROXETIL 100 MG
200 TABLET ORAL EVERY 12 HOURS
Refills: 0 | Status: DISCONTINUED | OUTPATIENT
Start: 2021-05-10 | End: 2021-05-11

## 2021-05-09 RX ORDER — CLONAZEPAM 1 MG
1 TABLET ORAL EVERY 12 HOURS
Refills: 0 | Status: DISCONTINUED | OUTPATIENT
Start: 2021-05-09 | End: 2021-05-11

## 2021-05-09 RX ADMIN — Medication 1 MILLIGRAM(S): at 17:06

## 2021-05-09 RX ADMIN — ENOXAPARIN SODIUM 40 MILLIGRAM(S): 100 INJECTION SUBCUTANEOUS at 11:28

## 2021-05-09 RX ADMIN — LACTULOSE 10 GRAM(S): 10 SOLUTION ORAL at 12:55

## 2021-05-09 RX ADMIN — Medication 2000 UNIT(S): at 06:10

## 2021-05-09 RX ADMIN — Medication 20 MILLIGRAM(S): at 06:10

## 2021-05-09 RX ADMIN — Medication 25 MILLIGRAM(S): at 17:06

## 2021-05-09 RX ADMIN — Medication 1 TABLET(S): at 06:10

## 2021-05-09 RX ADMIN — Medication 15 MILLILITER(S): at 11:28

## 2021-05-09 RX ADMIN — Medication 500 MILLIGRAM(S): at 17:06

## 2021-05-09 RX ADMIN — DIVALPROEX SODIUM 250 MILLIGRAM(S): 500 TABLET, DELAYED RELEASE ORAL at 12:55

## 2021-05-09 RX ADMIN — FLUVOXAMINE MALEATE 100 MILLIGRAM(S): 25 TABLET ORAL at 06:10

## 2021-05-09 RX ADMIN — FLUVOXAMINE MALEATE 100 MILLIGRAM(S): 25 TABLET ORAL at 17:06

## 2021-05-09 RX ADMIN — LACTULOSE 10 GRAM(S): 10 SOLUTION ORAL at 06:09

## 2021-05-09 RX ADMIN — Medication 1 TABLET(S): at 21:19

## 2021-05-09 RX ADMIN — SENNA PLUS 2 TABLET(S): 8.6 TABLET ORAL at 21:19

## 2021-05-09 RX ADMIN — Medication 2000 UNIT(S): at 17:06

## 2021-05-09 RX ADMIN — LACTULOSE 10 GRAM(S): 10 SOLUTION ORAL at 21:20

## 2021-05-09 RX ADMIN — Medication 1 TABLET(S): at 12:55

## 2021-05-09 RX ADMIN — CEFTRIAXONE 100 MILLIGRAM(S): 500 INJECTION, POWDER, FOR SOLUTION INTRAMUSCULAR; INTRAVENOUS at 17:02

## 2021-05-09 RX ADMIN — TAMSULOSIN HYDROCHLORIDE 0.8 MILLIGRAM(S): 0.4 CAPSULE ORAL at 21:19

## 2021-05-09 RX ADMIN — Medication 500 MILLIGRAM(S): at 06:10

## 2021-05-09 RX ADMIN — Medication 75 MICROGRAM(S): at 06:10

## 2021-05-09 RX ADMIN — Medication 20 MILLIGRAM(S): at 21:19

## 2021-05-09 RX ADMIN — PANTOPRAZOLE SODIUM 40 MILLIGRAM(S): 20 TABLET, DELAYED RELEASE ORAL at 06:10

## 2021-05-09 RX ADMIN — OLANZAPINE 15 MILLIGRAM(S): 15 TABLET, FILM COATED ORAL at 21:19

## 2021-05-09 RX ADMIN — Medication 25 MILLIGRAM(S): at 06:10

## 2021-05-09 RX ADMIN — DIVALPROEX SODIUM 250 MILLIGRAM(S): 500 TABLET, DELAYED RELEASE ORAL at 06:10

## 2021-05-09 RX ADMIN — AMLODIPINE BESYLATE 5 MILLIGRAM(S): 2.5 TABLET ORAL at 06:10

## 2021-05-09 RX ADMIN — LORATADINE 10 MILLIGRAM(S): 10 TABLET ORAL at 11:28

## 2021-05-09 RX ADMIN — Medication 20 MILLIGRAM(S): at 12:55

## 2021-05-09 RX ADMIN — Medication 81 MILLIGRAM(S): at 11:28

## 2021-05-09 RX ADMIN — DIVALPROEX SODIUM 250 MILLIGRAM(S): 500 TABLET, DELAYED RELEASE ORAL at 21:19

## 2021-05-09 RX ADMIN — ZINC SULFATE TAB 220 MG (50 MG ZINC EQUIVALENT) 220 MILLIGRAM(S): 220 (50 ZN) TAB at 11:28

## 2021-05-09 NOTE — PROGRESS NOTE ADULT - ASSESSMENT
SYLVAIN VELOZ [facility]  5/1/2021  DR TASHA CALIXTO     REVIEW OF SYMPTOMS      Able to give (reliable) ROS  NO     PHYSICAL EXAM    HEENT Unremarkable  atraumatic   RESP Fair air entry EXP prolonged    Harsh breath sound Resp distres mild   CARDIAC S1 S2 No S3     NO JVD    ABDOMEN SOFT BS PRESENT NOT DISTENDED No hepatosplenomegaly   PEDAL EDEMA present No calf tenderness  NO rash       PATIENT PRESENTATION.     65M group home resident with PMH severe mental retardation HTN, Cholecystectomy, COVID 4/2020 non-verbal recently admitted [facility] 4/23-4/27/2021  was readmitted 5/1/2021 with fever hypoxia  and pulmonary consulted   In ER pt already got levaquin and ns 2.5 l (5/1/2021 2:13 PM)     RECENT SHARPE   echo 4/23/2021 echo n lvsf n lvedp  4/22/2021 D-dimr 314  4/23 v duplx negv  cta ch 4/22 cta ch no pe bl is edema air trapping cm     Home meds.   amlodipine 5  asa  81 clonazepam 1.2 cosentyx  300q m depakote 250.3 fluvoxamine 100.2 hydroxyzine 20.3 levoxyl 75 metoprolol 25.2 olanzapine 15 omeprazole 20 tamsulosin .4       PROBLEMS  COVID pcr -ric   HYPOXIA poa   SEPSIS poa   ASP PNEUM    DYSPHAGIA   RLE CELLULITIS    PATIENT DATA                ABG.   5/1/2021 24% 743/39/88               OXYGENATION.    5/9/2021 ra 90%   5/4/2021 2l 96%              VITALS/IO/VENT/DRIPS.  5/9/2021 afeb 90 100/70     GLOBAL AND BEST PRACTICE ISSUES                     ALLERGY.    azactm augmentn pncl sulfa   WEIGHT.    5/1/2021 81K                            BMI.              5/1/2021 29                ADVANCED DIRECTIVE.                               HEAD OF BED ELEVATION. Yes  DVT PROPHYLAXIS.  lvnx 40 (5/1)                     SAUER PROPHYLAXIS.   APA.   asa 81 (5/1)                                                                  DYSPHAGIA RECOMM.  5/4 mbs puree nectar  5/3/2021 DYS 2 NECTAR   DIET.   dys 2 mech soft nectar (5/1)    INFECTION PROPHYLAXIS.   FREE WATER.      ASSESSMENT/RECOMMENDATIONS.    COVID STATUS EVAL  5/2spkab posv   5/1 scv2 negv   SEPSIS poa   Had fever 102 on 5/2/2021  Pt had leukocytosis w 14 poa and procalc 4/5 poa   MRSA is posv 5/2/2021 5/1/2021 CT ch did not show infiltr  5/1/2021 RVP was negv   ROCEPHIN (5/3/2021) (DR MUIR  VANCO (5/3-5/8/2021 (DR MUIR)   ID eval appreciated  HYPOXIA poa   O2 to keep po 90-95%  VTE RULED OUT  V duplx 5/1  negv   cta 5/1 cta ch No pe rll ggo fu study recommended in 6 w   GROUND GLASS OPAC RLL  Will need followup ct chend June 2021   NEUROPSYCHIATRIC MEDS  Cont prev meds   DYSPHAGIA EVAL   Speech consulted 5/1/2021   On dysphagia diet       TIME SPENT   Over 25 minutes aggregate care time spent on encounter; activities included   direct patient care, counseling and/or coordinating care reviewing notes, lab data/ imaging , discussion with multidisciplinary team/ patient  /family and explaining in detail risks, benefits, alternatives  of the recommendations     SYLVAIN VELOZ [facility]  5/1/2021  DR TASHA CALIXTO

## 2021-05-09 NOTE — PROGRESS NOTE ADULT - SUBJECTIVE AND OBJECTIVE BOX
ROSELIA NARAYAN    PLV 1EAS 114 W1    Patient is a 65y old  Male who presents with a chief complaint of hypoxia and fevers (08 May 2021 15:41)       Allergies    Augmentin (Unknown)  aztreonam (Rash)  penicillin (Unknown)  sulfa drugs (Unknown)    Intolerances        HPI:  fever 103 at home and pulse ox 88  was [facility] few days ago for fever  w/u was negative  patient had fallen the day before he was admitted  stitches were removed 2 days ago (03 May 2021 18:09)      PAST MEDICAL & SURGICAL HISTORY:  MR (mental retardation), severe    Seizure    HTN (hypertension)    Psoriasis    DD (diastrophic dysplasia)    Blind  right eye    Constipation, unspecified constipation type    No significant past surgical history        FAMILY HISTORY:  No pertinent family history in first degree relatives          MEDICATIONS   acetaminophen   Tablet .. 650 milliGRAM(s) Oral every 6 hours PRN  amLODIPine   Tablet 5 milliGRAM(s) Oral daily  ascorbic acid 500 milliGRAM(s) Oral two times a day  aspirin enteric coated 81 milliGRAM(s) Oral daily  calcium carbonate 1250 mG  + Vitamin D (OsCal 500 + D) 1 Tablet(s) Oral three times a day  cefTRIAXone   IVPB 1000 milliGRAM(s) IV Intermittent every 24 hours  cholecalciferol 2000 Unit(s) Oral two times a day  clonazePAM  Tablet 1 milliGRAM(s) Oral every 12 hours  diVALproex ER Oral Tab/Cap - Peds 250 milliGRAM(s) Oral three times a day  enoxaparin Injectable 40 milliGRAM(s) SubCutaneous daily  fluvoxaMINE 100 milliGRAM(s) Oral two times a day  hydrOXYzine  Oral Tab/Cap - Peds 20 milliGRAM(s) Oral every 8 hours  lactulose Syrup 10 Gram(s) Oral three times a day  levothyroxine 75 MICROGram(s) Oral daily  loratadine 10 milliGRAM(s) Oral daily  metoprolol tartrate 25 milliGRAM(s) Oral two times a day  multivitamin/minerals/iron Oral Solution (CENTRUM) 15 milliLiter(s) Oral daily  OLANZapine 15 milliGRAM(s) Oral at bedtime  pantoprazole   Suspension 40 milliGRAM(s) Oral before breakfast  senna 2 Tablet(s) Oral at bedtime  tamsulosin 0.8 milliGRAM(s) Oral at bedtime  zinc sulfate 220 milliGRAM(s) Oral daily      Vital Signs Last 24 Hrs  T(C): 36.8 (09 May 2021 05:12), Max: 36.8 (09 May 2021 05:12)  T(F): 98.3 (09 May 2021 05:12), Max: 98.3 (09 May 2021 05:12)  HR: 86 (09 May 2021 05:12) (86 - 97)  BP: 128/73 (09 May 2021 05:12) (117/72 - 128/73)  BP(mean): --  RR: 17 (09 May 2021 05:12) (16 - 18)  SpO2: 92% (09 May 2021 05:12) (88% - 92%)            LABS:                    WBC:  WBC Count: 8.63 K/uL (05-07 @ 08:22)  WBC Count: 7.22 K/uL (05-06 @ 14:15)      MICROBIOLOGY:  RECENT CULTURES:  05-04 .Urine Clean Catch (Midstream) XXXX XXXX   No growth    05-02 .Blood Blood XXXX XXXX   No Growth Final                    Sodium:  Sodium, Serum: 141 mmol/L (05-07 @ 08:22)  Sodium, Serum: 143 mmol/L (05-06 @ 14:15)      0.78 mg/dL 05-07 @ 08:22  0.74 mg/dL 05-06 @ 14:15      Hemoglobin:  Hemoglobin: 13.6 g/dL (05-07 @ 08:22)  Hemoglobin: 13.4 g/dL (05-06 @ 14:15)      Platelets: Platelet Count - Automated: 310 K/uL (05-07 @ 08:22)  Platelet Count - Automated: 300 K/uL (05-06 @ 14:15)              RADIOLOGY & ADDITIONAL STUDIES:

## 2021-05-09 NOTE — PROGRESS NOTE ADULT - SUBJECTIVE AND OBJECTIVE BOX
SYLVAINROSELIA is a 65yMale , patient examined and chart reviewed.    INTERVAL HPI/ OVERNIGHT EVENTS:   Afebrile. NAD.  No events.    PAST MEDICAL & SURGICAL HISTORY:  MR (mental retardation), severe  Seizure  HTN (hypertension)  Psoriasis  DD (diastrophic dysplasia)  Blind  right eye  Constipation, unspecified constipation type      For details regarding the patient's social history, family history, and other miscellaneous elements, please refer the initial infectious diseases consultation and/or the admitting history and physical examination for this admission.    ROS:  Unable to obtain due to MRDD    ALLERGIES  Augmentin (Unknown)  aztreonam (Rash)  penicillin (Unknown)  sulfa drugs (Unknown)      Current inpatient medications :    ANTIBIOTICS/RELEVANT:  cefTRIAXone   IVPB 1000 milliGRAM(s) IV Intermittent every 24 hours    MEDICATIONS  (STANDING):  amLODIPine   Tablet 5 milliGRAM(s) Oral daily  ascorbic acid 500 milliGRAM(s) Oral two times a day  aspirin enteric coated 81 milliGRAM(s) Oral daily  calcium carbonate 1250 mG  + Vitamin D (OsCal 500 + D) 1 Tablet(s) Oral three times a day  cholecalciferol 2000 Unit(s) Oral two times a day  clonazePAM  Tablet 1 milliGRAM(s) Oral every 12 hours  diVALproex ER Oral Tab/Cap - Peds 250 milliGRAM(s) Oral three times a day  enoxaparin Injectable 40 milliGRAM(s) SubCutaneous daily  fluvoxaMINE 100 milliGRAM(s) Oral two times a day  hydrOXYzine  Oral Tab/Cap - Peds 20 milliGRAM(s) Oral every 8 hours  lactulose Syrup 10 Gram(s) Oral three times a day  levothyroxine 75 MICROGram(s) Oral daily  loratadine 10 milliGRAM(s) Oral daily  metoprolol tartrate 25 milliGRAM(s) Oral two times a day  multivitamin/minerals/iron Oral Solution (CENTRUM) 15 milliLiter(s) Oral daily  OLANZapine 15 milliGRAM(s) Oral at bedtime  pantoprazole   Suspension 40 milliGRAM(s) Oral before breakfast  senna 2 Tablet(s) Oral at bedtime  tamsulosin 0.8 milliGRAM(s) Oral at bedtime  zinc sulfate 220 milliGRAM(s) Oral daily    MEDICATIONS  (PRN):  acetaminophen   Tablet .. 650 milliGRAM(s) Oral every 6 hours PRN Temp greater or equal to 38C (100.4F)      Objective:  Vital Signs Last 24 Hrs  T(C): 36.9 (09 May 2021 14:22), Max: 36.9 (09 May 2021 14:22)  T(F): 98.4 (09 May 2021 14:22), Max: 98.4 (09 May 2021 14:22)  HR: 107 (09 May 2021 17:14) (86 - 107)  BP: 127/75 (09 May 2021 17:14) (117/72 - 128/73)  RR: 18 (09 May 2021 14:22) (16 - 18)  SpO2: 90% (09 May 2021 14:22) (88% - 92%)    Physical Exam:  General: no acute distress  Neck: supple, trachea midline  Lungs: clear, no wheeze/rhonchi  Cardiovascular: regular rate and rhythm, S1 S2  Abdomen: soft, nontender,  bowel sounds normal  Neurological: alert and oriented x3  Skin: no rash  Extremities: Right LE swelling with decreasing erythema - improving      LABS:                        13.6   8.63  )-----------( 310      ( 07 May 2021 08:22 )             39.5   05-07    141  |  105  |  18  ----------------------------<  94  3.9   |  29  |  0.78    Ca    9.1      07 May 2021 08:22    TPro  7.7  /  Alb  2.7<L>  /  TBili  0.2  /  DBili  x   /  AST  26  /  ALT  30  /  AlkPhos  78  05-07      MICROBIOLOGY:  Culture - Blood (collected 01 May 2021 13:48)  Source: .Blood Blood-Peripheral  Preliminary Report (02 May 2021 14:01):    No growth to date.    Culture - Blood (collected 01 May 2021 13:48)  Source: .Blood Blood-Peripheral  Preliminary Report (02 May 2021 14:01):    No growth to date.    Culture - Urine (collected 01 May 2021 13:17)  Source: .Urine Clean Catch (Midstream)  Final Report (02 May 2021 13:30):    No growth    RADIOLOGY & ADDITIONAL STUDIES:    EXAM:  CT ANGIO CHEST (W)AW IC                            PROCEDURE DATE:  05/01/2021          INTERPRETATION:  CTA CHEST AND CT ABDOMEN AND PELVIS    INDICATION: History of fever. Hypoxia..    TECHNIQUE: Enhanced helical images were obtained of the chest, abdomen, and pelvis. Coronal and sagittal images were reconstructed.  Images were obtained after the uneventful administration of 90 cc of nonionic intravenous contrast (Omnipaque 350). 10 cc of nonionic intravenous contrast (Omnipaque 350) was discarded. Maximum intensity projection images were generated.    COMPARISON: Radiograph 5/1/2021. CT chest 5/1/2021, 4/22/2021, and 7/26/2020. CT abdomen and pelvis 4/23/2021.    FINDINGS:    Pulmonary Artery:  There is no main, central, lobar, orproximal to mid segmental pulmonary embolus. Distal segmental and subsegmental divisions are not well evaluated.    Tubes/Lines: None.    Lungs, airways and pleura: Since 5/1/2021, the groundglass opacities in the right lower lobe are unchanged. The lungs are otherwise clear allowing for respiratory motion. The airways are unremarkable.    Mediastinum: The visualized thyroid gland and esophagus are unremarkable. The chest lymph nodes measure less than 10 mm in the short axis. Hiatal hernia.    The heart is normal in size. No pericardial effusion. Coronary artery calcifications. Left-sided aortic arch and left-sided descending thoracic aorta.    Bones And Soft Tissues: The bones are unremarkable.  The soft tissues are unremarkable.    Abdomen and pelvis:    Liver: The liver is normal.    Gallbladder: Cholecystectomy.    Spleen: The spleen is normal.    Pancreas: The pancreas is normal.    Adrenal glands: The adrenal glands are normal.    Kidneys: Pelvic right renal cysts. Subcentimeter hypodense right renal lesions are statistically benign and likely represent cysts.    Hypodense left renal lesions are statistically benign and likely represent cysts. No hydronephrosis or hydroureter.    Lymph nodes: No enlarged retroperitoneal or upperabdominal lymph nodes.    Bowel: The stomach is unremarkable. The small bowel and large bowel are normal in caliber. The appendix is normal. No free air. No free fluid.    Bladder: The bladder is distended.    Prostate gland: The prostate gland is unremarkable.    Skeletal: Degenerative change of the spine.      IMPRESSION:    Chest:  1.  No pulmonary embolus.  2.  The right lower lobe groundglass opacities are unchanged allowing for differences in technique an respiratory motion. A follow-up study is recommended to evaluate for resolution/change to include prone imaging in 6-8 weeks.    Abdomen and pelvis:  1.  No bowel detection.      Assessment :  64YO M Phx of MRDD, HTN, resident of detention admitted with fevers sec to Right LE cellulitis with lymphangitis- likely strep  Doubt pneumonia  AUR  Fevers better  WBC downtrending  Clinically improving     Plan :   On Rocephin   Will change to po Vantin x 7 days tmrw  Trend temps and cbc  Elevate leg  Asp precautions    D/w Dr Hunter Curran    Continue with present regiment.  Appropriate use of antibiotics and adverse effects reviewed.      > 35 minutes were spent in direct patient care reviewing notes, medications ,labs data/ imaging , discussion with multidisciplinary team.    Thank you for allowing me to participate in care of your patient .    Olivier Daniels MD  Infectious Disease  [phone]

## 2021-05-10 ENCOUNTER — TRANSCRIPTION ENCOUNTER (OUTPATIENT)
Age: 66
End: 2021-05-10

## 2021-05-10 LAB
ALBUMIN SERPL ELPH-MCNC: 2.8 G/DL — LOW (ref 3.3–5)
ALP SERPL-CCNC: 80 U/L — SIGNIFICANT CHANGE UP (ref 40–120)
ALT FLD-CCNC: 31 U/L — SIGNIFICANT CHANGE UP (ref 12–78)
ANION GAP SERPL CALC-SCNC: 6 MMOL/L — SIGNIFICANT CHANGE UP (ref 5–17)
AST SERPL-CCNC: 21 U/L — SIGNIFICANT CHANGE UP (ref 15–37)
BASOPHILS # BLD AUTO: 0 K/UL — SIGNIFICANT CHANGE UP (ref 0–0.2)
BASOPHILS NFR BLD AUTO: 0 % — SIGNIFICANT CHANGE UP (ref 0–2)
BILIRUB SERPL-MCNC: 0.1 MG/DL — LOW (ref 0.2–1.2)
BUN SERPL-MCNC: 25 MG/DL — HIGH (ref 7–23)
CALCIUM SERPL-MCNC: 8.4 MG/DL — LOW (ref 8.5–10.1)
CHLORIDE SERPL-SCNC: 107 MMOL/L — SIGNIFICANT CHANGE UP (ref 96–108)
CO2 SERPL-SCNC: 28 MMOL/L — SIGNIFICANT CHANGE UP (ref 22–31)
CREAT SERPL-MCNC: 1.4 MG/DL — HIGH (ref 0.5–1.3)
EOSINOPHIL # BLD AUTO: 0.62 K/UL — HIGH (ref 0–0.5)
EOSINOPHIL NFR BLD AUTO: 6 % — SIGNIFICANT CHANGE UP (ref 0–6)
GLUCOSE SERPL-MCNC: 98 MG/DL — SIGNIFICANT CHANGE UP (ref 70–99)
HCT VFR BLD CALC: 39.6 % — SIGNIFICANT CHANGE UP (ref 39–50)
HGB BLD-MCNC: 13.5 G/DL — SIGNIFICANT CHANGE UP (ref 13–17)
LYMPHOCYTES # BLD AUTO: 3.42 K/UL — HIGH (ref 1–3.3)
LYMPHOCYTES # BLD AUTO: 33 % — SIGNIFICANT CHANGE UP (ref 13–44)
MANUAL SMEAR VERIFICATION: SIGNIFICANT CHANGE UP
MCHC RBC-ENTMCNC: 30.7 PG — SIGNIFICANT CHANGE UP (ref 27–34)
MCHC RBC-ENTMCNC: 34.1 GM/DL — SIGNIFICANT CHANGE UP (ref 32–36)
MCV RBC AUTO: 90 FL — SIGNIFICANT CHANGE UP (ref 80–100)
METAMYELOCYTES # FLD: 1 % — HIGH (ref 0–0)
MONOCYTES # BLD AUTO: 1.14 K/UL — HIGH (ref 0–0.9)
MONOCYTES NFR BLD AUTO: 11 % — SIGNIFICANT CHANGE UP (ref 2–14)
MYELOCYTES NFR BLD: 2 % — HIGH (ref 0–0)
NEUTROPHILS # BLD AUTO: 4.45 K/UL — SIGNIFICANT CHANGE UP (ref 1.8–7.4)
NEUTROPHILS NFR BLD AUTO: 42 % — LOW (ref 43–77)
NEUTS BAND # BLD: 1 % — SIGNIFICANT CHANGE UP (ref 0–8)
NRBC # BLD: 0 — SIGNIFICANT CHANGE UP
NRBC # BLD: SIGNIFICANT CHANGE UP /100 WBCS (ref 0–0)
PLAT MORPH BLD: NORMAL — SIGNIFICANT CHANGE UP
PLATELET # BLD AUTO: 334 K/UL — SIGNIFICANT CHANGE UP (ref 150–400)
POTASSIUM SERPL-MCNC: 3.8 MMOL/L — SIGNIFICANT CHANGE UP (ref 3.5–5.3)
POTASSIUM SERPL-SCNC: 3.8 MMOL/L — SIGNIFICANT CHANGE UP (ref 3.5–5.3)
PROT SERPL-MCNC: 7.7 G/DL — SIGNIFICANT CHANGE UP (ref 6–8.3)
RBC # BLD: 4.4 M/UL — SIGNIFICANT CHANGE UP (ref 4.2–5.8)
RBC # FLD: 13.7 % — SIGNIFICANT CHANGE UP (ref 10.3–14.5)
RBC BLD AUTO: NORMAL — SIGNIFICANT CHANGE UP
SODIUM SERPL-SCNC: 141 MMOL/L — SIGNIFICANT CHANGE UP (ref 135–145)
VARIANT LYMPHS # BLD: 4 % — SIGNIFICANT CHANGE UP (ref 0–6)
WBC # BLD: 10.35 K/UL — SIGNIFICANT CHANGE UP (ref 3.8–10.5)
WBC # FLD AUTO: 10.35 K/UL — SIGNIFICANT CHANGE UP (ref 3.8–10.5)

## 2021-05-10 RX ORDER — ACETAMINOPHEN 500 MG
2 TABLET ORAL
Qty: 0 | Refills: 0 | DISCHARGE

## 2021-05-10 RX ORDER — CEFPODOXIME PROXETIL 100 MG
1 TABLET ORAL
Qty: 0 | Refills: 0 | DISCHARGE
Start: 2021-05-10

## 2021-05-10 RX ORDER — MUPIROCIN 20 MG/G
1 OINTMENT TOPICAL
Qty: 0 | Refills: 0 | DISCHARGE

## 2021-05-10 RX ADMIN — Medication 25 MILLIGRAM(S): at 17:42

## 2021-05-10 RX ADMIN — AMLODIPINE BESYLATE 5 MILLIGRAM(S): 2.5 TABLET ORAL at 06:49

## 2021-05-10 RX ADMIN — Medication 1 TABLET(S): at 14:05

## 2021-05-10 RX ADMIN — Medication 200 MILLIGRAM(S): at 06:49

## 2021-05-10 RX ADMIN — PANTOPRAZOLE SODIUM 40 MILLIGRAM(S): 20 TABLET, DELAYED RELEASE ORAL at 06:48

## 2021-05-10 RX ADMIN — Medication 200 MILLIGRAM(S): at 17:42

## 2021-05-10 RX ADMIN — Medication 1 TABLET(S): at 22:04

## 2021-05-10 RX ADMIN — ZINC SULFATE TAB 220 MG (50 MG ZINC EQUIVALENT) 220 MILLIGRAM(S): 220 (50 ZN) TAB at 12:01

## 2021-05-10 RX ADMIN — DIVALPROEX SODIUM 250 MILLIGRAM(S): 500 TABLET, DELAYED RELEASE ORAL at 06:50

## 2021-05-10 RX ADMIN — SENNA PLUS 2 TABLET(S): 8.6 TABLET ORAL at 22:04

## 2021-05-10 RX ADMIN — Medication 25 MILLIGRAM(S): at 06:49

## 2021-05-10 RX ADMIN — Medication 20 MILLIGRAM(S): at 14:04

## 2021-05-10 RX ADMIN — ENOXAPARIN SODIUM 40 MILLIGRAM(S): 100 INJECTION SUBCUTANEOUS at 12:01

## 2021-05-10 RX ADMIN — LACTULOSE 10 GRAM(S): 10 SOLUTION ORAL at 06:48

## 2021-05-10 RX ADMIN — FLUVOXAMINE MALEATE 100 MILLIGRAM(S): 25 TABLET ORAL at 06:52

## 2021-05-10 RX ADMIN — Medication 1 MILLIGRAM(S): at 17:42

## 2021-05-10 RX ADMIN — DIVALPROEX SODIUM 250 MILLIGRAM(S): 500 TABLET, DELAYED RELEASE ORAL at 22:04

## 2021-05-10 RX ADMIN — Medication 20 MILLIGRAM(S): at 06:49

## 2021-05-10 RX ADMIN — Medication 1 TABLET(S): at 06:52

## 2021-05-10 RX ADMIN — LORATADINE 10 MILLIGRAM(S): 10 TABLET ORAL at 12:01

## 2021-05-10 RX ADMIN — Medication 500 MILLIGRAM(S): at 06:49

## 2021-05-10 RX ADMIN — OLANZAPINE 15 MILLIGRAM(S): 15 TABLET, FILM COATED ORAL at 22:04

## 2021-05-10 RX ADMIN — Medication 1 MILLIGRAM(S): at 06:48

## 2021-05-10 RX ADMIN — Medication 2000 UNIT(S): at 17:42

## 2021-05-10 RX ADMIN — Medication 75 MICROGRAM(S): at 06:49

## 2021-05-10 RX ADMIN — Medication 20 MILLIGRAM(S): at 22:04

## 2021-05-10 RX ADMIN — TAMSULOSIN HYDROCHLORIDE 0.8 MILLIGRAM(S): 0.4 CAPSULE ORAL at 22:04

## 2021-05-10 RX ADMIN — LACTULOSE 10 GRAM(S): 10 SOLUTION ORAL at 22:03

## 2021-05-10 RX ADMIN — Medication 81 MILLIGRAM(S): at 12:01

## 2021-05-10 RX ADMIN — DIVALPROEX SODIUM 250 MILLIGRAM(S): 500 TABLET, DELAYED RELEASE ORAL at 14:05

## 2021-05-10 RX ADMIN — LACTULOSE 10 GRAM(S): 10 SOLUTION ORAL at 14:05

## 2021-05-10 RX ADMIN — Medication 2000 UNIT(S): at 06:49

## 2021-05-10 RX ADMIN — FLUVOXAMINE MALEATE 100 MILLIGRAM(S): 25 TABLET ORAL at 17:42

## 2021-05-10 RX ADMIN — Medication 15 MILLILITER(S): at 12:01

## 2021-05-10 RX ADMIN — Medication 500 MILLIGRAM(S): at 17:42

## 2021-05-10 NOTE — DISCHARGE NOTE PROVIDER - NSDCCPCAREPLAN_GEN_ALL_CORE_FT
PRINCIPAL DISCHARGE DIAGNOSIS  Diagnosis: Hypoxia  Assessment and Plan of Treatment: sp respiratory failure resolved due to aspiration pn  cta r/o pe  dopplers ro dvt   seen by speech  vantin for 7 days  all other home meds diet activity the same      SECONDARY DISCHARGE DIAGNOSES  Diagnosis: Cellulitis of skin  Assessment and Plan of Treatment: resolved  complkete vantin for 7 days     PRINCIPAL DISCHARGE DIAGNOSIS  Diagnosis: Hypoxia  Assessment and Plan of Treatment: sp respiratory failure resolved due to aspiration pn  cta r/o pe  dopplers ro dvt   seen by speech  vantin for 7 days  all other home meds activity the same  diet same ,,mechanical but with nectar thicket      SECONDARY DISCHARGE DIAGNOSES  Diagnosis: Cellulitis of skin  Assessment and Plan of Treatment: resolved  complkete vantin for 7 days

## 2021-05-10 NOTE — PROGRESS NOTE ADULT - ASSESSMENT
SYLVAIN VELOZ [facility]  5/1/2021  DR TASHA CALIXTO     REVIEW OF SYMPTOMS      Able to give (reliable) ROS  NO     PHYSICAL EXAM    HEENT Unremarkable  atraumatic   RESP Fair air entry EXP prolonged    Harsh breath sound Resp distres mild   CARDIAC S1 S2 No S3     NO JVD    ABDOMEN SOFT BS PRESENT NOT DISTENDED No hepatosplenomegaly   PEDAL EDEMA present No calf tenderness  NO rash       PATIENT PRESENTATION.     65M group home resident with PMH severe mental retardation HTN, Cholecystectomy, COVID 4/2020 non-verbal recently admitted [facility] 4/23-4/27/2021  was readmitted 5/1/2021 with fever hypoxia  and pulmonary consulted   In ER pt already got levaquin and ns 2.5 l (5/1/2021 2:13 PM)     RECENT SHARPE   echo 4/23/2021 echo n lvsf n lvedp  4/22/2021 D-dimr 314  4/23 v duplx negv  cta ch 4/22 cta ch no pe bl is edema air trapping cm     Home meds.   amlodipine 5  asa  81 clonazepam 1.2 cosentyx  300q m depakote 250.3 fluvoxamine 100.2 hydroxyzine 20.3 levoxyl 75 metoprolol 25.2 olanzapine 15 omeprazole 20 tamsulosin .4       PROBLEMS  COVID pcr -ric   HYPOXIA poa   SEPSIS poa   ASP PNEUM    DYSPHAGIA   RLE CELLULITIS    GLOBAL AND BEST PRACTICE ISSUES                     ALLERGY.    azactm augmentn pncl sulfa   WEIGHT.    5/1/2021 81K                            BMI.              5/1/2021 29                ADVANCED DIRECTIVE.                               HEAD OF BED ELEVATION. Yes  DVT PROPHYLAXIS.  lvnx 40 (5/1)                     SAUER PROPHYLAXIS.   APA.   asa 81 (5/1)                                                                  DYSPHAGIA RECOMM.  5/4 mbs puree nectar  5/3/2021 DYS 2 NECTAR   DIET.   dys 2 mech soft nectar (5/1)    INFECTION PROPHYLAXIS.   FREE WATER.    IV.          D5 1/2 60 (5/1)               GLOBAL AND BEST PRACTICE ISSUES                     ALLERGY.    azactm augmentn pncl sulfa   WEIGHT.    5/1/2021 81K                            BMI.              5/1/2021 29                ADVANCED DIRECTIVE.                               HEAD OF BED ELEVATION. Yes  DVT PROPHYLAXIS.  lvnx 40 (5/1)                     SAUER PROPHYLAXIS.   APA.   asa 81 (5/1)                                                                  DYSPHAGIA RECOMM.  5/4 mbs puree nectar  5/3/2021 DYS 2 NECTAR   DIET.   dys 2 mech soft nectar (5/1)    INFECTION PROPHYLAXIS.   FREE WATER.    IV.          D5 1/2 60 (5/1)               ASSESSMENT/RECOMMENDATIONS.    COVID STATUS EVAL  5/2spkab posv   5/1 scv2 negv   SEPSIS poa   Had fever 102 on 5/2/2021  Pt had leukocytosis w 14 poa and procalc 4/5 poa   MRSA is posv 5/2/2021 5/1/2021 CT ch did not show infiltr  5/1/2021 RVP was negv   ROCEPHIN (5/3/2021) (DR MUIR  VANCO (5/3-5/8/2021 (DR MUIR)   ID eval appreciated  HYPOXIA poa   O2 to keep po 90-95%  VTE RULED OUT  V duplx 5/1  negv   cta 5/1 cta ch No pe rll ggo fu study recommended in 6 w   GROUND GLASS OPAC RLL  Will need followup ct chend June 2021   NEUROPSYCHIATRIC MEDS  Cont prev meds   DYSPHAGIA EVAL   Speech consulted 5/1/2021   On dysphagia diet   YOLANDA   5/10/2021 Cr 1.4       TIME SPENT   Over 25 minutes aggregate care time spent on encounter; activities included   direct patient care, counseling and/or coordinating care reviewing notes, lab data/ imaging , discussion with multidisciplinary team/ patient  /family and explaining in detail risks, benefits, alternatives  of the recommendations     SYLVAIN VELOZ [facility]  5/1/2021  DR TASHA CALIXTO

## 2021-05-10 NOTE — DISCHARGE NOTE PROVIDER - HOSPITAL COURSE
patient presented with fevers hypoxia  ramirez negative PE DVT ,no abd pathology  + aspiration pneumonia and cellulities of right lower ext from psoriatic patch,,,,resolved  seen by speech,,,no change  patient refused to participate wit mbs,,,was not necessary,none the less  patient walking urinating,,good bowel movements  he was seen by pulmonary infectious disease cardiology  he is to finish abxs for 7 days  all other home medications diet activity the same patient presented with fevers hypoxia  ramirez negative PE DVT ,no abd pathology  + aspiration pneumonia and cellulities of right lower ext from psoriatic patch,,,,resolved  seen by speech,,,no change  patient refused to participate wit mbs,,,was not necessary,none the less  patient walking urinating,,good bowel movements  he was seen by pulmonary infectious disease cardiology  he is to finish abxs for 7 days  all other home medications  activity the same  diet mecahnical ,the same as before, but with nectar thicket patient presented with fevers hypoxia  ramirez negative PE DVT ,no abd pathology  + aspiration pneumonia and cellulities of right lower ext from psoriatic patch,,,,resolved  seen by speech,,,no change  patient refused to participate wit mbs,,,was not necessary,none the less  patient walking urinating,,good bowel movements  he was seen by pulmonary infectious disease cardiology  he is to finish abxs for 7 days  all other home medications  activity the same  diet mecahnical /soft the same as before, but with nectar thicket to all fluids

## 2021-05-10 NOTE — PROGRESS NOTE ADULT - SUBJECTIVE AND OBJECTIVE BOX
ROSELIA NARAYAN    PLV 1EAS 114 W1    Patient is a 65y old  Male who presents with a chief complaint of hypoxia and fevers (10 May 2021 09:06)       Allergies    Augmentin (Unknown)  aztreonam (Rash)  penicillin (Unknown)  sulfa drugs (Unknown)    Intolerances        HPI:  fever 103 at home and pulse ox 88  was [place] [facility] few days ago for fever  w/u was negative  patient had fallen the day before he was admitted  stitches were removed 2 days ago (03 May 2021 18:09)      PAST MEDICAL & SURGICAL HISTORY:  MR (mental retardation), severe    Seizure    HTN (hypertension)    Psoriasis    DD (diastrophic dysplasia)    Blind  right eye    Constipation, unspecified constipation type    No significant past surgical history        FAMILY HISTORY:  No pertinent family history in first degree relatives          MEDICATIONS   acetaminophen   Tablet .. 650 milliGRAM(s) Oral every 6 hours PRN  amLODIPine   Tablet 5 milliGRAM(s) Oral daily  ascorbic acid 500 milliGRAM(s) Oral two times a day  aspirin enteric coated 81 milliGRAM(s) Oral daily  calcium carbonate 1250 mG  + Vitamin D (OsCal 500 + D) 1 Tablet(s) Oral three times a day  cefpodoxime 200 milliGRAM(s) Oral every 12 hours  cholecalciferol 2000 Unit(s) Oral two times a day  clonazePAM  Tablet 1 milliGRAM(s) Oral every 12 hours  diVALproex ER Oral Tab/Cap - Peds 250 milliGRAM(s) Oral three times a day  enoxaparin Injectable 40 milliGRAM(s) SubCutaneous daily  fluvoxaMINE 100 milliGRAM(s) Oral two times a day  hydrOXYzine  Oral Tab/Cap - Peds 20 milliGRAM(s) Oral every 8 hours  lactulose Syrup 10 Gram(s) Oral three times a day  levothyroxine 75 MICROGram(s) Oral daily  loratadine 10 milliGRAM(s) Oral daily  metoprolol tartrate 25 milliGRAM(s) Oral two times a day  multivitamin/minerals/iron Oral Solution (CENTRUM) 15 milliLiter(s) Oral daily  OLANZapine 15 milliGRAM(s) Oral at bedtime  pantoprazole   Suspension 40 milliGRAM(s) Oral before breakfast  senna 2 Tablet(s) Oral at bedtime  tamsulosin 0.8 milliGRAM(s) Oral at bedtime  zinc sulfate 220 milliGRAM(s) Oral daily      Vital Signs Last 24 Hrs  T(C): 36.5 (10 May 2021 05:00), Max: 36.9 (09 May 2021 14:22)  T(F): 97.7 (10 May 2021 05:00), Max: 98.5 (09 May 2021 21:06)  HR: 72 (10 May 2021 05:00) (72 - 107)  BP: 109/73 (10 May 2021 05:00) (109/70 - 127/75)  BP(mean): --  RR: 17 (10 May 2021 05:00) (17 - 18)  SpO2: 95% (10 May 2021 05:00) (90% - 95%)      05-09-21 @ 07:01  -  05-10-21 @ 07:00  --------------------------------------------------------  IN: 50 mL / OUT: 0 mL / NET: 50 mL            LABS:                    WBC:  WBC Count: 8.63 K/uL (05-07 @ 08:22)  WBC Count: 7.22 K/uL (05-06 @ 14:15)      MICROBIOLOGY:  RECENT CULTURES:  05-04 .Urine Clean Catch (Midstream) XXXX XXXX   No growth                    Sodium:  Sodium, Serum: 141 mmol/L (05-07 @ 08:22)  Sodium, Serum: 143 mmol/L (05-06 @ 14:15)      0.78 mg/dL 05-07 @ 08:22  0.74 mg/dL 05-06 @ 14:15      Hemoglobin:  Hemoglobin: 13.6 g/dL (05-07 @ 08:22)  Hemoglobin: 13.4 g/dL (05-06 @ 14:15)      Platelets: Platelet Count - Automated: 310 K/uL (05-07 @ 08:22)  Platelet Count - Automated: 300 K/uL (05-06 @ 14:15)              RADIOLOGY & ADDITIONAL STUDIES:

## 2021-05-10 NOTE — PROGRESS NOTE ADULT - PROBLEM SELECTOR PROBLEM 1
Acute respiratory failure with hypoxia
Pneumonia, aspiration
Acute respiratory failure with hypoxia

## 2021-05-10 NOTE — PROGRESS NOTE ADULT - TIME BILLING
I spoke to patient staff from hospital and home
I spoke to house doctors on the case hospital nursing staff and patient
I have spoken to house team hospital nurses and doctors on the case
I spoke to staff patient
spoke to hospital staff and house

## 2021-05-10 NOTE — DISCHARGE NOTE PROVIDER - NSDCMRMEDTOKEN_GEN_ALL_CORE_FT
amLODIPine 5 mg oral tablet: 1 tab(s) orally once a day  aspirin 81 mg oral tablet: 1 tab(s) orally once a day  calcipotriene 0.005% topical cream: Apply topically to affected area 2 times a day  cefpodoxime 200 mg oral tablet: 1 tab(s) orally every 12 hours until may 16  chlorhexidine 0.12% mucous membrane liquid: application mucous membrane 2 times a day  clonazePAM 1 mg oral tablet: 1 tab(s) orally 2 times a day  Cosentyx 150 mg/mL subcutaneous solution: 300 milligram(s) subcutaneous every 28 days  Depakote  mg oral tablet, extended release: 1 tab(s) orally 3 times a day  fluvoxaMINE 100 mg oral tablet: 1 tab(s) orally 2 times a day  hydrogen peroxide 1.5% mucous membrane solution: application mucous membrane 2 times a day  hydrOXYzine hydrochloride 10 mg oral tablet: 2 tab(s) orally every 8 hours  ketoconazole 2% topical shampoo: Apply topically to affected area two times a week  lactulose 10 g/15 mL oral solution: 30 milliliter(s) orally once a day  levothyroxine 75 mcg (0.075 mg) oral tablet: 1 tab(s) orally once a day  metoprolol tartrate 25 mg oral tablet: 1 tab(s) orally 2 times a day  multivitamin with minerals: 1 tab(s) orally once a day  OLANZapine 15 mg oral tablet: 1 tab(s) orally once a day (at bedtime)  omeprazole 20 mg oral delayed release capsule: 1 cap(s) orally once a day  Oyster Betito 1250 mg (500 mg elemental calcium) oral tablet: 1 tab(s) orally 3 times a day  petrolatum topical ointment: Apply topically to affected area once a day  tamsulosin 0.4 mg oral capsule: 2 cap(s) orally once a day (at bedtime)  tolnaftate 1% topical cream: Apply topically to affected area once a day  Vitamin D2 50,000 intl units (1.25 mg) oral capsule: 1 cap(s) orally every 2 weeks

## 2021-05-10 NOTE — PROGRESS NOTE ADULT - PROBLEM SELECTOR PROBLEM 2
R/O Cellulitis of right lower extremity

## 2021-05-10 NOTE — DISCHARGE NOTE PROVIDER - CARE PROVIDER_API CALL
Naima Hawkins)  Internal Medicine  [address] [place] [address]  Phone: [phone]  Fax: [phone]  Follow Up Time: 2 weeks

## 2021-05-10 NOTE — PROGRESS NOTE ADULT - PROBLEM SELECTOR PLAN 2
resolved  complete rocephin  dc home monday  urinating normally on his own
complete vanco rocephin  from psoriatic plaque  procal trending down  much improved  complete full course until may 10,per id
hx psoriasis  much improved on present regimen of vancomycin and rocephin  procalcitonin much improved  seen by dr gordon
resolved  to complete vantin 7 days

## 2021-05-10 NOTE — PROGRESS NOTE ADULT - SUBJECTIVE AND OBJECTIVE BOX
[facility] Cardiology Progress Note [phone] (Dr. Portillo, Julieta, Genevieve, Alex)    CHIEF COMPLAINT: Patient is a 65y old  Male who presents with a chief complaint of hypoxia and fevers (10 May 2021 07:26)      Follow Up Today: The patient denies any chest discomfort or shortness of breath.    HPI:  fever 103 at home and pulse ox 88  was dc hospital few days ago for fever  w/u was negative  patient had fallen the day before he was admitted  stitches were removed 2 days ago (03 May 2021 18:09)      PAST MEDICAL & SURGICAL HISTORY:  MR (mental retardation), severe    Seizure    HTN (hypertension)    Psoriasis    DD (diastrophic dysplasia)    Blind  right eye    Constipation, unspecified constipation type    No significant past surgical history        MEDICATIONS  (STANDING):  amLODIPine   Tablet 5 milliGRAM(s) Oral daily  ascorbic acid 500 milliGRAM(s) Oral two times a day  aspirin enteric coated 81 milliGRAM(s) Oral daily  calcium carbonate 1250 mG  + Vitamin D (OsCal 500 + D) 1 Tablet(s) Oral three times a day  cefpodoxime 200 milliGRAM(s) Oral every 12 hours  cholecalciferol 2000 Unit(s) Oral two times a day  clonazePAM  Tablet 1 milliGRAM(s) Oral every 12 hours  diVALproex ER Oral Tab/Cap - Peds 250 milliGRAM(s) Oral three times a day  enoxaparin Injectable 40 milliGRAM(s) SubCutaneous daily  fluvoxaMINE 100 milliGRAM(s) Oral two times a day  hydrOXYzine  Oral Tab/Cap - Peds 20 milliGRAM(s) Oral every 8 hours  lactulose Syrup 10 Gram(s) Oral three times a day  levothyroxine 75 MICROGram(s) Oral daily  loratadine 10 milliGRAM(s) Oral daily  metoprolol tartrate 25 milliGRAM(s) Oral two times a day  multivitamin/minerals/iron Oral Solution (CENTRUM) 15 milliLiter(s) Oral daily  OLANZapine 15 milliGRAM(s) Oral at bedtime  pantoprazole   Suspension 40 milliGRAM(s) Oral before breakfast  senna 2 Tablet(s) Oral at bedtime  tamsulosin 0.8 milliGRAM(s) Oral at bedtime  zinc sulfate 220 milliGRAM(s) Oral daily    MEDICATIONS  (PRN):  acetaminophen   Tablet .. 650 milliGRAM(s) Oral every 6 hours PRN Temp greater or equal to 38C (100.4F)      Allergies    Augmentin (Unknown)  aztreonam (Rash)  penicillin (Unknown)  sulfa drugs (Unknown)    Intolerances        REVIEW OF SYSTEMS:    All other review of systems is negative unless indicated above    Vital Signs Last 24 Hrs  T(C): 36.5 (10 May 2021 05:00), Max: 36.9 (09 May 2021 14:22)  T(F): 97.7 (10 May 2021 05:00), Max: 98.5 (09 May 2021 21:06)  HR: 72 (10 May 2021 05:00) (72 - 107)  BP: 109/73 (10 May 2021 05:00) (109/70 - 127/75)  BP(mean): --  RR: 17 (10 May 2021 05:00) (17 - 18)  SpO2: 95% (10 May 2021 05:00) (90% - 95%)    I&O's Summary    09 May 2021 07:01  -  10 May 2021 07:00  --------------------------------------------------------  IN: 50 mL / OUT: 0 mL / NET: 50 mL        PHYSICAL EXAM:    Constitutional: NAD, awake and alert, well-developed  Eyes:  EOMI,  Pupils round, No oral cyanosis.  HEENT: No exudate or erythema  Pulmonary: Non-labored, breath sounds are clear bilaterally, No wheezing, rales or rhonchi  Cardiovascular: Regular, S1 and S2, No murmurs, rubs, gallops oir clicks  Gastrointestinal: Bowel Sounds present, soft, nontender.   Ext: No significant LE edema with good pulses x 4  Neurological: Alert, no gross focal motor deficits  Skin: No rashes.  Psych:  Mood & affect appropriate    LABS: All Labs Reviewed:                Blood Culture:         RADIOLOGY/EKG:    Attending Attestation:   20 minutes spent on total encounter; more than 50% of the visit was spent counseling and/or coordinating care by the attending physician.     ASSESSMENT AND PLAN [facility] Progress Note [phone] (Dr. Portillo, Julieta, Genevieve, Alex)    CHIEF COMPLAINT: Patient is a 65y old  Male who presents with a chief complaint of hypoxia and fevers (10 May 2021 07:26)      Follow Up Today: The patient denies any chest discomfort or shortness of breath.    HPI:  fever 103 at home and pulse ox 88  was dc hospital few days ago for fever  w/u was negative  patient had fallen the day before he was admitted  stitches were removed 2 days ago (03 May 2021 18:09)      PAST MEDICAL & SURGICAL HISTORY:  MR (mental retardation), severe    Seizure    HTN (hypertension)    Psoriasis    DD (diastrophic dysplasia)    Blind  right eye    Constipation, unspecified constipation type    No significant past surgical history        MEDICATIONS  (STANDING):  amLODIPine   Tablet 5 milliGRAM(s) Oral daily  ascorbic acid 500 milliGRAM(s) Oral two times a day  aspirin enteric coated 81 milliGRAM(s) Oral daily  calcium carbonate 1250 mG  + Vitamin D (OsCal 500 + D) 1 Tablet(s) Oral three times a day  cefpodoxime 200 milliGRAM(s) Oral every 12 hours  cholecalciferol 2000 Unit(s) Oral two times a day  clonazePAM  Tablet 1 milliGRAM(s) Oral every 12 hours  diVALproex ER Oral Tab/Cap - Peds 250 milliGRAM(s) Oral three times a day  enoxaparin Injectable 40 milliGRAM(s) SubCutaneous daily  fluvoxaMINE 100 milliGRAM(s) Oral two times a day  hydrOXYzine  Oral Tab/Cap - Peds 20 milliGRAM(s) Oral every 8 hours  lactulose Syrup 10 Gram(s) Oral three times a day  levothyroxine 75 MICROGram(s) Oral daily  loratadine 10 milliGRAM(s) Oral daily  metoprolol tartrate 25 milliGRAM(s) Oral two times a day  multivitamin/minerals/iron Oral Solution (CENTRUM) 15 milliLiter(s) Oral daily  OLANZapine 15 milliGRAM(s) Oral at bedtime  pantoprazole   Suspension 40 milliGRAM(s) Oral before breakfast  senna 2 Tablet(s) Oral at bedtime  tamsulosin 0.8 milliGRAM(s) Oral at bedtime  zinc sulfate 220 milliGRAM(s) Oral daily    MEDICATIONS  (PRN):  acetaminophen   Tablet .. 650 milliGRAM(s) Oral every 6 hours PRN Temp greater or equal to 38C (100.4F)      Allergies    Augmentin (Unknown)  aztreonam (Rash)  penicillin (Unknown)  sulfa drugs (Unknown)    Intolerances        REVIEW OF SYSTEMS:    All other review of systems is negative unless indicated above    Vital Signs Last 24 Hrs  T(C): 36.5 (10 May 2021 05:00), Max: 36.9 (09 May 2021 14:22)  T(F): 97.7 (10 May 2021 05:00), Max: 98.5 (09 May 2021 21:06)  HR: 72 (10 May 2021 05:00) (72 - 107)  BP: 109/73 (10 May 2021 05:00) (109/70 - 127/75)  BP(mean): --  RR: 17 (10 May 2021 05:00) (17 - 18)  SpO2: 95% (10 May 2021 05:00) (90% - 95%)    I&O's Summary    09 May 2021 07:01  -  10 May 2021 07:00  --------------------------------------------------------  IN: 50 mL / OUT: 0 mL / NET: 50 mL        PHYSICAL EXAM:    Constitutional: NAD, awake and alert, well-developed  Eyes:  EOMI,  Pupils round, No oral cyanosis.  HEENT: No exudate or erythema  Pulmonary: Non-labored, breath sounds are clear bilaterally, No wheezing, rales or rhonchi  Cardiovascular: Regular, S1 and S2, No murmurs, rubs, gallops oir clicks  Gastrointestinal: Bowel Sounds present, soft, nontender.   Ext: No significant LE edema with good pulses x 4  Neurological: Alert, no gross focal motor deficits  Skin: No rashes.  Psych:  Mood & affect appropriate    LABS: All Labs Reviewed:        Blood Culture:         RADIOLOGY/EKG:    Attending Attestation:   20 minutes spent on total encounter; more than 50% of the visit was spent counseling and/or coordinating care by the attending physician.     ASSESSMENT AND PLAN

## 2021-05-10 NOTE — PROGRESS NOTE ADULT - SUBJECTIVE AND OBJECTIVE BOX
SYLVAIN ROSELIA is a 65yMale , patient examined and chart reviewed.    INTERVAL HPI/ OVERNIGHT EVENTS:   Afebrile. NAD. In chair.  No events.    PAST MEDICAL & SURGICAL HISTORY:  MR (mental retardation), severe  Seizure  HTN (hypertension)  Psoriasis  DD (diastrophic dysplasia)  Blind  right eye  Constipation, unspecified constipation type      For details regarding the patient's social history, family history, and other miscellaneous elements, please refer the initial infectious diseases consultation and/or the admitting history and physical examination for this admission.    ROS:  Unable to obtain due to MRDD    ALLERGIES  Augmentin (Unknown)  aztreonam (Rash)  penicillin (Unknown)  sulfa drugs (Unknown)      Current inpatient medications :    ANTIBIOTICS/RELEVANT:  cefpodoxime 200 milliGRAM(s) Oral every 12 hours    MEDICATIONS  (STANDING):  amLODIPine   Tablet 5 milliGRAM(s) Oral daily  ascorbic acid 500 milliGRAM(s) Oral two times a day  aspirin enteric coated 81 milliGRAM(s) Oral daily  calcium carbonate 1250 mG  + Vitamin D (OsCal 500 + D) 1 Tablet(s) Oral three times a day  cholecalciferol 2000 Unit(s) Oral two times a day  clonazePAM  Tablet 1 milliGRAM(s) Oral every 12 hours  diVALproex ER Oral Tab/Cap - Peds 250 milliGRAM(s) Oral three times a day  enoxaparin Injectable 40 milliGRAM(s) SubCutaneous daily  fluvoxaMINE 100 milliGRAM(s) Oral two times a day  hydrOXYzine  Oral Tab/Cap - Peds 20 milliGRAM(s) Oral every 8 hours  lactulose Syrup 10 Gram(s) Oral three times a day  levothyroxine 75 MICROGram(s) Oral daily  loratadine 10 milliGRAM(s) Oral daily  metoprolol tartrate 25 milliGRAM(s) Oral two times a day  multivitamin/minerals/iron Oral Solution (CENTRUM) 15 milliLiter(s) Oral daily  OLANZapine 15 milliGRAM(s) Oral at bedtime  pantoprazole   Suspension 40 milliGRAM(s) Oral before breakfast  senna 2 Tablet(s) Oral at bedtime  tamsulosin 0.8 milliGRAM(s) Oral at bedtime  zinc sulfate 220 milliGRAM(s) Oral daily    MEDICATIONS  (PRN):  acetaminophen   Tablet .. 650 milliGRAM(s) Oral every 6 hours PRN Temp greater or equal to 38C (100.4F)      Objective:  Vital Signs Last 24 Hrs  T(C): 36.8 (10 May 2021 13:06), Max: 36.9 (09 May 2021 21:06)  T(F): 98.2 (10 May 2021 13:06), Max: 98.5 (09 May 2021 21:06)  HR: 96 (10 May 2021 13:06) (72 - 107)  BP: 113/71 (10 May 2021 13:06) (109/70 - 127/75)  RR: 18 (10 May 2021 13:06) (17 - 18)  SpO2: 90% (10 May 2021 13:06) (90% - 95%)      Physical Exam:  General: no acute distress  Neck: supple, trachea midline  Lungs: clear, no wheeze/rhonchi  Cardiovascular: regular rate and rhythm, S1 S2  Abdomen: soft, nontender,  bowel sounds normal  Neurological: alert and oriented x3  Skin: no rash  Extremities: Right LE swelling with decreasing erythema - improving      LABS:      MICROBIOLOGY:  Culture - Blood (collected 01 May 2021 13:48)  Source: .Blood Blood-Peripheral  Preliminary Report (02 May 2021 14:01):    No growth to date.    Culture - Blood (collected 01 May 2021 13:48)  Source: .Blood Blood-Peripheral  Preliminary Report (02 May 2021 14:01):    No growth to date.    Culture - Urine (collected 01 May 2021 13:17)  Source: .Urine Clean Catch (Midstream)  Final Report (02 May 2021 13:30):    No growth    RADIOLOGY & ADDITIONAL STUDIES:    EXAM:  CT ANGIO CHEST (W)AW IC                            PROCEDURE DATE:  05/01/2021          INTERPRETATION:  CTA CHEST AND CT ABDOMEN AND PELVIS    INDICATION: History of fever. Hypoxia..    TECHNIQUE: Enhanced helical images were obtained of the chest, abdomen, and pelvis. Coronal and sagittal images were reconstructed.  Images were obtained after the uneventful administration of 90 cc of nonionic intravenous contrast (Omnipaque 350). 10 cc of nonionic intravenous contrast (Omnipaque 350) was discarded. Maximum intensity projection images were generated.    COMPARISON: Radiograph 5/1/2021. CT chest 5/1/2021, 4/22/2021, and 7/26/2020. CT abdomen and pelvis 4/23/2021.    FINDINGS:    Pulmonary Artery:  There is no main, central, lobar, orproximal to mid segmental pulmonary embolus. Distal segmental and subsegmental divisions are not well evaluated.    Tubes/Lines: None.    Lungs, airways and pleura: Since 5/1/2021, the groundglass opacities in the right lower lobe are unchanged. The lungs are otherwise clear allowing for respiratory motion. The airways are unremarkable.    Mediastinum: The visualized thyroid gland and esophagus are unremarkable. The chest lymph nodes measure less than 10 mm in the short axis. Hiatal hernia.    The heart is normal in size. No pericardial effusion. Coronary artery calcifications. Left-sided aortic arch and left-sided descending thoracic aorta.    Bones And Soft Tissues: The bones are unremarkable.  The soft tissues are unremarkable.    Abdomen and pelvis:    Liver: The liver is normal.    Gallbladder: Cholecystectomy.    Spleen: The spleen is normal.    Pancreas: The pancreas is normal.    Adrenal glands: The adrenal glands are normal.    Kidneys: Pelvic right renal cysts. Subcentimeter hypodense right renal lesions are statistically benign and likely represent cysts.    Hypodense left renal lesions are statistically benign and likely represent cysts. No hydronephrosis or hydroureter.    Lymph nodes: No enlarged retroperitoneal or upperabdominal lymph nodes.    Bowel: The stomach is unremarkable. The small bowel and large bowel are normal in caliber. The appendix is normal. No free air. No free fluid.    Bladder: The bladder is distended.    Prostate gland: The prostate gland is unremarkable.    Skeletal: Degenerative change of the spine.      IMPRESSION:    Chest:  1.  No pulmonary embolus.  2.  The right lower lobe groundglass opacities are unchanged allowing for differences in technique an respiratory motion. A follow-up study is recommended to evaluate for resolution/change to include prone imaging in 6-8 weeks.    Abdomen and pelvis:  1.  No bowel detection.      Assessment :  66YO M Phx of MRDD, HTN, resident of care home admitted with fevers sec to Right LE cellulitis with lymphangitis- likely strep  Doubt pneumonia  AUR resolved  Fevers better  WBC downtrending  Clinically improving     Plan :   Off Rocephin   Finish course of Vantin x 7 days  Trend temps and cbc  Elevate leg  Asp precautions  Dc planning    Continue with present regiment.  Appropriate use of antibiotics and adverse effects reviewed.      > 35 minutes were spent in direct patient care reviewing notes, medications ,labs data/ imaging , discussion with multidisciplinary team.    Thank you for allowing me to participate in care of your patient .    Olivier Daniels MD  Infectious Disease  [phone]

## 2021-05-10 NOTE — PROGRESS NOTE ADULT - ASSESSMENT
65M group home resident admitted with fevers and hypoxia after recently hospitalization at [facility] with severe mental retardation, cholecystectomy, COVID 4/2020, nonverbal likely with aspiration PNA, cellulitis    Suggest:    1. Abx per ID and Pulm  R leg cellulitis  2. HTN - c/w amlodipine  3. DVT prophylaxis.  4. Will follow as needed 65M group home resident admitted with fevers and hypoxia after recently hospitalization at [facility] [facility] with severe mental retardation, cholecystectomy, COVID 4/2020, nonverbal likely with aspiration PNA, cellulitis    Suggest:    1. Abx per ID and Pulm  R leg cellulitis  2. HTN - c/w amlodipine  3. DVT prophylaxis.  4. Will follow as needed

## 2021-05-10 NOTE — PROGRESS NOTE ADULT - PROBLEM SELECTOR PLAN 1
off oxygen  silent aspiration  seen by speech  no change diet  did not tolerate mbs,,became combative  cta dopplers were negative for pe/dvt
resolved  off oxygen  to complete rocephin
resolved  off oxygen  on dysphagia diet with [place],,new  complete vantin 7 days  sent to [place] ,today,,i spoke to pharmacist
resolved  off oxygen  seen by speech swallow  refused to comply with mbs  cta chest /venous dopplers x2 negative  seen by cardiology and pulmonary

## 2021-05-10 NOTE — PROGRESS NOTE ADULT - SUBJECTIVE AND OBJECTIVE BOX
PAST MEDICAL & SURGICAL HISTORY:  MR (mental retardation), severe    Seizure    HTN (hypertension)    Psoriasis    DD (diastrophic dysplasia)    Blind  right eye    Constipation, unspecified constipation type    No significant past surgical history        MEDICATIONS  (STANDING):  amLODIPine   Tablet 5 milliGRAM(s) Oral daily  ascorbic acid 500 milliGRAM(s) Oral two times a day  aspirin enteric coated 81 milliGRAM(s) Oral daily  calcium carbonate 1250 mG  + Vitamin D (OsCal 500 + D) 1 Tablet(s) Oral three times a day  cefpodoxime 200 milliGRAM(s) Oral every 12 hours  cholecalciferol 2000 Unit(s) Oral two times a day  clonazePAM  Tablet 1 milliGRAM(s) Oral every 12 hours  diVALproex ER Oral Tab/Cap - Peds 250 milliGRAM(s) Oral three times a day  enoxaparin Injectable 40 milliGRAM(s) SubCutaneous daily  fluvoxaMINE 100 milliGRAM(s) Oral two times a day  hydrOXYzine  Oral Tab/Cap - Peds 20 milliGRAM(s) Oral every 8 hours  lactulose Syrup 10 Gram(s) Oral three times a day  levothyroxine 75 MICROGram(s) Oral daily  loratadine 10 milliGRAM(s) Oral daily  metoprolol tartrate 25 milliGRAM(s) Oral two times a day  multivitamin/minerals/iron Oral Solution (CENTRUM) 15 milliLiter(s) Oral daily  OLANZapine 15 milliGRAM(s) Oral at bedtime  pantoprazole   Suspension 40 milliGRAM(s) Oral before breakfast  senna 2 Tablet(s) Oral at bedtime  tamsulosin 0.8 milliGRAM(s) Oral at bedtime  zinc sulfate 220 milliGRAM(s) Oral daily    MEDICATIONS  (PRN):  acetaminophen   Tablet .. 650 milliGRAM(s) Oral every 6 hours PRN Temp greater or equal to 38C (100.4F)      Patient is a 65y old  Male who presents with a chief complaint of hypoxia and fevers (10 May 2021 19:45)      Vital Signs Last 24 Hrs  T(C): 36.8 (10 May 2021 13:06), Max: 36.9 (09 May 2021 21:06)  T(F): 98.2 (10 May 2021 13:06), Max: 98.5 (09 May 2021 21:06)  HR: 94 (10 May 2021 17:57) (72 - 96)  BP: 136/73 (10 May 2021 17:57) (109/70 - 136/73)  BP(mean): --  RR: 18 (10 May 2021 13:06) (17 - 18)  SpO2: 90% (10 May 2021 13:06) (90% - 95%)          05-09 @ 07:01  -  05-10 @ 07:00  --------------------------------------------------------  IN: 50 mL / OUT: 0 mL / NET: 50 mL        REVIEW OF SYSTEMS:    Constitutional: No fever, weight loss or fatigue  Eyes: No eye pain, visual disturbances, or discharge  ENT:  No difficulty hearing, tinnitus, vertigo; No sinus or throat pain  Neck: No pain or stiffness  Breasts: No pain, masses or nipple discharge  Respiratory: No cough, wheezing, chills or hemoptysis  Cardiovascular: No chest pain, palpitations, shortness of breath, dizziness or leg swelling  Gastrointestinal: No abdominal or epigastric pain. No nausea, vomiting or hematemesis; No diarrhea or constipation. No melena or hematochezia.  Genitourinary: No dysuria, frequency, hematuria or incontinence  Rectal: No pain, hemorrhoids or incontinence  Neurological: No headaches, memory loss, loss of strength, numbness or tremors  Skin: No itching, burning, rashes or lesions   Lymph Nodes: No enlarged glands  Endocrine: No heat or cold intolerance; No hair loss  Musculoskeletal: No joint pain or swelling; No muscle, back or extremity pain  Psychiatric: No depression, anxiety, mood swings or difficulty sleeping  Heme/Lymph: No easy bruising or bleeding gums  Allergy and Immunologic: No hives or eczema    PHYSICAL EXAM:  alert  looking around  interactive  Constitutional: NAD,   Respiratory: CTAB/L   Cardiovascular: S1 and S2, RRR, no M/G/R  Gastrointestinal: BS+, soft, NT/ND  Extremities: no c/c/e  no induration  Neurological: moves all ext  Skin: No rashes      decubiti: no                      .Urine Clean Catch (Midstream)  05-04 @ 00:48   No growth  --  --        Urine Culture:  05-04 @ 00:48    --        No growth        Radiology:

## 2021-05-11 ENCOUNTER — TRANSCRIPTION ENCOUNTER (OUTPATIENT)
Age: 66
End: 2021-05-11

## 2021-05-11 VITALS
RESPIRATION RATE: 18 BRPM | DIASTOLIC BLOOD PRESSURE: 74 MMHG | SYSTOLIC BLOOD PRESSURE: 119 MMHG | HEART RATE: 83 BPM | OXYGEN SATURATION: 96 % | TEMPERATURE: 98 F

## 2021-05-11 PROCEDURE — 85379 FIBRIN DEGRADATION QUANT: CPT

## 2021-05-11 PROCEDURE — 84443 ASSAY THYROID STIM HORMONE: CPT

## 2021-05-11 PROCEDURE — 85025 COMPLETE CBC W/AUTO DIFF WBC: CPT

## 2021-05-11 PROCEDURE — 82248 BILIRUBIN DIRECT: CPT

## 2021-05-11 PROCEDURE — 92611 MOTION FLUOROSCOPY/SWALLOW: CPT

## 2021-05-11 PROCEDURE — 87640 STAPH A DNA AMP PROBE: CPT

## 2021-05-11 PROCEDURE — 93970 EXTREMITY STUDY: CPT

## 2021-05-11 PROCEDURE — 81003 URINALYSIS AUTO W/O SCOPE: CPT

## 2021-05-11 PROCEDURE — A9698: CPT

## 2021-05-11 PROCEDURE — 85027 COMPLETE CBC AUTOMATED: CPT

## 2021-05-11 PROCEDURE — 84145 PROCALCITONIN (PCT): CPT

## 2021-05-11 PROCEDURE — 80202 ASSAY OF VANCOMYCIN: CPT

## 2021-05-11 PROCEDURE — 99285 EMERGENCY DEPT VISIT HI MDM: CPT | Mod: 25

## 2021-05-11 PROCEDURE — 86769 SARS-COV-2 COVID-19 ANTIBODY: CPT

## 2021-05-11 PROCEDURE — 36415 COLL VENOUS BLD VENIPUNCTURE: CPT

## 2021-05-11 PROCEDURE — 71045 X-RAY EXAM CHEST 1 VIEW: CPT

## 2021-05-11 PROCEDURE — 84100 ASSAY OF PHOSPHORUS: CPT

## 2021-05-11 PROCEDURE — 93925 LOWER EXTREMITY STUDY: CPT

## 2021-05-11 PROCEDURE — 80053 COMPREHEN METABOLIC PANEL: CPT

## 2021-05-11 PROCEDURE — 71250 CT THORAX DX C-: CPT

## 2021-05-11 PROCEDURE — 74230 X-RAY XM SWLNG FUNCJ C+: CPT

## 2021-05-11 PROCEDURE — 93005 ELECTROCARDIOGRAM TRACING: CPT

## 2021-05-11 PROCEDURE — 87086 URINE CULTURE/COLONY COUNT: CPT

## 2021-05-11 PROCEDURE — 96361 HYDRATE IV INFUSION ADD-ON: CPT

## 2021-05-11 PROCEDURE — 87899 AGENT NOS ASSAY W/OPTIC: CPT

## 2021-05-11 PROCEDURE — 80164 ASSAY DIPROPYLACETIC ACD TOT: CPT

## 2021-05-11 PROCEDURE — 87449 NOS EACH ORGANISM AG IA: CPT

## 2021-05-11 PROCEDURE — 71275 CT ANGIOGRAPHY CHEST: CPT

## 2021-05-11 PROCEDURE — 74177 CT ABD & PELVIS W/CONTRAST: CPT

## 2021-05-11 PROCEDURE — 87040 BLOOD CULTURE FOR BACTERIA: CPT

## 2021-05-11 PROCEDURE — 92610 EVALUATE SWALLOWING FUNCTION: CPT

## 2021-05-11 PROCEDURE — 0225U NFCT DS DNA&RNA 21 SARSCOV2: CPT

## 2021-05-11 PROCEDURE — 84484 ASSAY OF TROPONIN QUANT: CPT

## 2021-05-11 PROCEDURE — 96365 THER/PROPH/DIAG IV INF INIT: CPT

## 2021-05-11 PROCEDURE — 83690 ASSAY OF LIPASE: CPT

## 2021-05-11 PROCEDURE — 82550 ASSAY OF CK (CPK): CPT

## 2021-05-11 PROCEDURE — 82803 BLOOD GASES ANY COMBINATION: CPT

## 2021-05-11 PROCEDURE — 85610 PROTHROMBIN TIME: CPT

## 2021-05-11 PROCEDURE — 87641 MR-STAPH DNA AMP PROBE: CPT

## 2021-05-11 PROCEDURE — 83605 ASSAY OF LACTIC ACID: CPT

## 2021-05-11 PROCEDURE — 83880 ASSAY OF NATRIURETIC PEPTIDE: CPT

## 2021-05-11 RX ADMIN — Medication 75 MICROGRAM(S): at 05:29

## 2021-05-11 RX ADMIN — FLUVOXAMINE MALEATE 100 MILLIGRAM(S): 25 TABLET ORAL at 05:29

## 2021-05-11 RX ADMIN — Medication 500 MILLIGRAM(S): at 05:29

## 2021-05-11 RX ADMIN — DIVALPROEX SODIUM 250 MILLIGRAM(S): 500 TABLET, DELAYED RELEASE ORAL at 05:30

## 2021-05-11 RX ADMIN — Medication 1 TABLET(S): at 05:29

## 2021-05-11 RX ADMIN — Medication 20 MILLIGRAM(S): at 05:30

## 2021-05-11 RX ADMIN — Medication 2000 UNIT(S): at 05:31

## 2021-05-11 RX ADMIN — PANTOPRAZOLE SODIUM 40 MILLIGRAM(S): 20 TABLET, DELAYED RELEASE ORAL at 05:31

## 2021-05-11 RX ADMIN — Medication 1 MILLIGRAM(S): at 05:29

## 2021-05-11 RX ADMIN — Medication 25 MILLIGRAM(S): at 05:33

## 2021-05-11 RX ADMIN — AMLODIPINE BESYLATE 5 MILLIGRAM(S): 2.5 TABLET ORAL at 05:29

## 2021-05-11 RX ADMIN — Medication 200 MILLIGRAM(S): at 05:30

## 2021-05-11 NOTE — PROGRESS NOTE ADULT - ASSESSMENT
65M group home resident admitted with fevers and hypoxia after recently hospitalization at [facility] with severe mental retardation, cholecystectomy, COVID 4/2020, nonverbal likely with aspiration PNA, cellulitis    Suggest:    1. Abx per ID and Pulm  R leg cellulitis  2. HTN - c/w amlodipine  3. DVT prophylaxis.  4. Will follow as needed

## 2021-05-11 NOTE — PROGRESS NOTE ADULT - SUBJECTIVE AND OBJECTIVE BOX
[facility] Cardiology Progress Note [phone] (Dr. Portillo, Julieta, Genevieve, Alex)    CHIEF COMPLAINT: Patient is a 65y old  Male who presents with a chief complaint of hypoxia and fevers (10 May 2021 19:45)      Follow Up Today: The patient denies any chest discomfort or shortness of breath.    HPI:  fever 103 at home and pulse ox 88  was dc hospital few days ago for fever  w/u was negative  patient had fallen the day before he was admitted  stitches were removed 2 days ago (03 May 2021 18:09)      PAST MEDICAL & SURGICAL HISTORY:  MR (mental retardation), severe    Seizure    HTN (hypertension)    Psoriasis    DD (diastrophic dysplasia)    Blind  right eye    Constipation, unspecified constipation type    No significant past surgical history        MEDICATIONS  (STANDING):  amLODIPine   Tablet 5 milliGRAM(s) Oral daily  ascorbic acid 500 milliGRAM(s) Oral two times a day  aspirin enteric coated 81 milliGRAM(s) Oral daily  calcium carbonate 1250 mG  + Vitamin D (OsCal 500 + D) 1 Tablet(s) Oral three times a day  cefpodoxime 200 milliGRAM(s) Oral every 12 hours  cholecalciferol 2000 Unit(s) Oral two times a day  clonazePAM  Tablet 1 milliGRAM(s) Oral every 12 hours  diVALproex ER Oral Tab/Cap - Peds 250 milliGRAM(s) Oral three times a day  enoxaparin Injectable 40 milliGRAM(s) SubCutaneous daily  fluvoxaMINE 100 milliGRAM(s) Oral two times a day  hydrOXYzine  Oral Tab/Cap - Peds 20 milliGRAM(s) Oral every 8 hours  lactulose Syrup 10 Gram(s) Oral three times a day  levothyroxine 75 MICROGram(s) Oral daily  loratadine 10 milliGRAM(s) Oral daily  metoprolol tartrate 25 milliGRAM(s) Oral two times a day  multivitamin/minerals/iron Oral Solution (CENTRUM) 15 milliLiter(s) Oral daily  OLANZapine 15 milliGRAM(s) Oral at bedtime  pantoprazole   Suspension 40 milliGRAM(s) Oral before breakfast  senna 2 Tablet(s) Oral at bedtime  tamsulosin 0.8 milliGRAM(s) Oral at bedtime  zinc sulfate 220 milliGRAM(s) Oral daily    MEDICATIONS  (PRN):  acetaminophen   Tablet .. 650 milliGRAM(s) Oral every 6 hours PRN Temp greater or equal to 38C (100.4F)      Allergies    Augmentin (Unknown)  aztreonam (Rash)  penicillin (Unknown)  sulfa drugs (Unknown)    Intolerances        REVIEW OF SYSTEMS:    All other review of systems is negative unless indicated above    Vital Signs Last 24 Hrs  T(C): 36.8 (11 May 2021 05:16), Max: 36.8 (10 May 2021 13:06)  T(F): 98.3 (11 May 2021 05:16), Max: 98.3 (11 May 2021 05:16)  HR: 89 (11 May 2021 05:16) (89 - 96)  BP: 120/77 (11 May 2021 05:16) (113/71 - 136/73)  BP(mean): --  RR: 18 (11 May 2021 05:16) (17 - 18)  SpO2: 96% (11 May 2021 05:16) (90% - 96%)    I&O's Summary      PHYSICAL EXAM:    Constitutional: NAD, awake and alert, well-developed  Eyes:  EOMI,  Pupils round, No oral cyanosis.  HEENT: No exudate or erythema  Pulmonary: Non-labored, breath sounds are clear bilaterally, No wheezing, rales or rhonchi  Cardiovascular: Regular, S1 and S2, No murmurs, rubs, gallops oir clicks  Gastrointestinal: Bowel Sounds present, soft, nontender.   Ext: No significant LE edema with good pulses x 4  Neurological: Alert, no gross focal motor deficits  Skin: No rashes.  Psych:  Mood & affect appropriate    LABS: All Labs Reviewed:                        13.5   10.35 )-----------( 334      ( 10 May 2021 20:14 )             39.6     10 May 2021 20:14    141    |  107    |  25     ----------------------------<  98     3.8     |  28     |  1.40     Ca    8.4        10 May 2021 20:14    TPro  7.7    /  Alb  2.8    /  TBili  0.1    /  DBili  x      /  AST  21     /  ALT  31     /  AlkPhos  80     10 May 2021 20:14          Blood Culture:         RADIOLOGY/EKG:    Attending Attestation:   20 minutes spent on total encounter; more than 50% of the visit was spent counseling and/or coordinating care by the attending physician.     ASSESSMENT AND PLAN

## 2021-05-11 NOTE — PROGRESS NOTE ADULT - ASSESSMENT
SYLVAIN VELOZ [facility]  5/1/2021  DR TASHA CALIXTO     REVIEW OF SYMPTOMS      Able to give (reliable) ROS  NO     PHYSICAL EXAM    HEENT Unremarkable  atraumatic   RESP Fair air entry EXP prolonged    Harsh breath sound Resp distres mild   CARDIAC S1 S2 No S3     NO JVD    ABDOMEN SOFT BS PRESENT NOT DISTENDED No hepatosplenomegaly   PEDAL EDEMA present No calf tenderness  NO rash       PATIENT PRESENTATION.     65M group home resident with PMH severe mental retardation HTN, Cholecystectomy, COVID 4/2020 non-verbal recently admitted [facility] 4/23-4/27/2021  was readmitted 5/1/2021 with fever hypoxia  and pulmonary consulted   In ER pt already got levaquin and ns 2.5 l (5/1/2021 2:13 PM)     RECENT SHARPE   echo 4/23/2021 echo n lvsf n lvedp  4/22/2021 D-dimr 314  4/23 v duplx negv  cta ch 4/22 cta ch no pe bl is edema air trapping cm     Home meds.   amlodipine 5  asa  81 clonazepam 1.2 cosentyx  300q m depakote 250.3 fluvoxamine 100.2 hydroxyzine 20.3 levoxyl 75 metoprolol 25.2 olanzapine 15 omeprazole 20 tamsulosin .4       PROBLEMS  COVID pcr -ric   HYPOXIA poa   SEPSIS poa   ASP PNEUM    DYSPHAGIA   RLE CELLULITIS  YOLANDA       GLOBAL AND BEST PRACTICE ISSUES                     ALLERGY.    azactm augmentn pncl sulfa   WEIGHT.    5/1/2021 81K                            BMI.              5/1/2021 29                ADVANCED DIRECTIVE.                               HEAD OF BED ELEVATION. Yes  DVT PROPHYLAXIS.  lvnx 40 (5/1)                     SAUER PROPHYLAXIS.   APA.   asa 81 (5/1)                                                                  DYSPHAGIA RECOMM.  5/4 mbs puree nectar  5/3/2021 DYS 2 NECTAR   DIET.   dys 2 mech soft nectar (5/1)    INFECTION PROPHYLAXIS.   FREE WATER.    IV.          D5 1/2 60 (5/1)               ASSESSMENT/RECOMMENDATIONS.    COVID STATUS EVAL  5/2spkab posv   5/1 scv2 negv   SEPSIS poa   Had fever 102 on 5/2/2021  Pt had leukocytosis w 14 poa and procalc 4/5 poa   MRSA is posv 5/2/2021 5/1/2021 CT ch did not show infiltr  5/1/2021 RVP was negv   ROCEPHIN (5/3/2021) (DR MUIR  HYPOXIA poa   O2 to keep po 90-95%  VTE RULED OUT  V duplx 5/1  negv   cta 5/1 cta ch No pe rll ggo fu study recommended in 6 w   GROUND GLASS OPAC RLL  Will need followup ct chend June 2021   NEUROPSYCHIATRIC MEDS  Cont prev meds   DYSPHAGIA EVAL   Speech consulted 5/1/2021   On dysphagia diet   YOLANDA   5/10/2021 Cr 1.4       TIME SPENT   Over 25 minutes aggregate care time spent on encounter; activities included   direct patient care, counseling and/or coordinating care reviewing notes, lab data/ imaging , discussion with multidisciplinary team/ patient  /family and explaining in detail risks, benefits, alternatives  of the recommendations     SYLVAIN VELOZ [facility]  5/1/2021  DR TASHA CALIXTO

## 2021-05-11 NOTE — DISCHARGE NOTE NURSING/CASE MANAGEMENT/SOCIAL WORK - PATIENT PORTAL LINK FT
You can access the FollowMyHealth Patient Portal offered by [facility] by registering at the following website: [email or web address]. By joining [facility]’s FollowMyHealth portal, you will also be able to view your health information using other applications (apps) compatible with our system.

## 2021-05-11 NOTE — PROGRESS NOTE ADULT - REASON FOR ADMISSION
hypoxia and fevers
fevers
fevers
hypoxia and fevers

## 2021-05-11 NOTE — PROGRESS NOTE ADULT - SUBJECTIVE AND OBJECTIVE BOX
ROSELIA NARAYAN    PLV 1EAS 114 W1    Patient is a 65y old  Male who presents with a chief complaint of hypoxia and fevers (11 May 2021 07:12)       Allergies    Augmentin (Unknown)  aztreonam (Rash)  penicillin (Unknown)  sulfa drugs (Unknown)    Intolerances        HPI:  fever 103 at home and pulse ox 88  was [facility] few days ago for fever  w/u was negative  patient had fallen the day before he was admitted  stitches were removed 2 days ago (03 May 2021 18:09)      PAST MEDICAL & SURGICAL HISTORY:  MR (mental retardation), severe    Seizure    HTN (hypertension)    Psoriasis    DD (diastrophic dysplasia)    Blind  right eye    Constipation, unspecified constipation type    No significant past surgical history        FAMILY HISTORY:  No pertinent family history in first degree relatives          MEDICATIONS   acetaminophen   Tablet .. 650 milliGRAM(s) Oral every 6 hours PRN  amLODIPine   Tablet 5 milliGRAM(s) Oral daily  ascorbic acid 500 milliGRAM(s) Oral two times a day  aspirin enteric coated 81 milliGRAM(s) Oral daily  calcium carbonate 1250 mG  + Vitamin D (OsCal 500 + D) 1 Tablet(s) Oral three times a day  cefpodoxime 200 milliGRAM(s) Oral every 12 hours  cholecalciferol 2000 Unit(s) Oral two times a day  clonazePAM  Tablet 1 milliGRAM(s) Oral every 12 hours  diVALproex ER Oral Tab/Cap - Peds 250 milliGRAM(s) Oral three times a day  enoxaparin Injectable 40 milliGRAM(s) SubCutaneous daily  fluvoxaMINE 100 milliGRAM(s) Oral two times a day  hydrOXYzine  Oral Tab/Cap - Peds 20 milliGRAM(s) Oral every 8 hours  lactulose Syrup 10 Gram(s) Oral three times a day  levothyroxine 75 MICROGram(s) Oral daily  loratadine 10 milliGRAM(s) Oral daily  metoprolol tartrate 25 milliGRAM(s) Oral two times a day  multivitamin/minerals/iron Oral Solution (CENTRUM) 15 milliLiter(s) Oral daily  OLANZapine 15 milliGRAM(s) Oral at bedtime  pantoprazole   Suspension 40 milliGRAM(s) Oral before breakfast  senna 2 Tablet(s) Oral at bedtime  tamsulosin 0.8 milliGRAM(s) Oral at bedtime  zinc sulfate 220 milliGRAM(s) Oral daily      Vital Signs Last 24 Hrs  T(C): 36.8 (11 May 2021 05:16), Max: 36.8 (10 May 2021 13:06)  T(F): 98.3 (11 May 2021 05:16), Max: 98.3 (11 May 2021 05:16)  HR: 89 (11 May 2021 05:16) (89 - 96)  BP: 120/77 (11 May 2021 05:16) (113/71 - 136/73)  BP(mean): --  RR: 18 (11 May 2021 05:16) (17 - 18)  SpO2: 96% (11 May 2021 05:16) (90% - 96%)            LABS:                        13.5   10.35 )-----------( 334      ( 10 May 2021 20:14 )             39.6     05-10    141  |  107  |  25<H>  ----------------------------<  98  3.8   |  28  |  1.40<H>    Ca    8.4<L>      10 May 2021 20:14    TPro  7.7  /  Alb  2.8<L>  /  TBili  0.1<L>  /  DBili  x   /  AST  21  /  ALT  31  /  AlkPhos  80  05-10              WBC:  WBC Count: 10.35 K/uL (05-10 @ 20:14)      MICROBIOLOGY:  RECENT CULTURES:                  Sodium:  Sodium, Serum: 141 mmol/L (05-10 @ 20:14)      1.40 mg/dL 05-10 @ 20:14      Hemoglobin:  Hemoglobin: 13.5 g/dL (05-10 @ 20:14)      Platelets: Platelet Count - Automated: 334 K/uL (05-10 @ 20:14)      LIVER FUNCTIONS - ( 10 May 2021 20:14 )  Alb: 2.8 g/dL / Pro: 7.7 g/dL / ALK PHOS: 80 U/L / ALT: 31 U/L / AST: 21 U/L / GGT: x                 RADIOLOGY & ADDITIONAL STUDIES:

## 2021-06-28 ENCOUNTER — INPATIENT (INPATIENT)
Facility: HOSPITAL | Age: 66
LOS: 3 days | Discharge: HOME | DRG: 871 | End: 2021-07-02
Attending: FAMILY MEDICINE | Admitting: FAMILY MEDICINE
Payer: MEDICARE

## 2021-06-28 VITALS
OXYGEN SATURATION: 96 % | SYSTOLIC BLOOD PRESSURE: 121 MMHG | DIASTOLIC BLOOD PRESSURE: 89 MMHG | WEIGHT: 154.98 LBS | RESPIRATION RATE: 18 BRPM | HEART RATE: 131 BPM | HEIGHT: 65 IN | TEMPERATURE: 98 F

## 2021-06-28 DIAGNOSIS — J18.9 PNEUMONIA, UNSPECIFIED ORGANISM: ICD-10-CM

## 2021-06-28 LAB
ALBUMIN SERPL ELPH-MCNC: 3.3 G/DL — SIGNIFICANT CHANGE UP (ref 3.3–5)
ALP SERPL-CCNC: 69 U/L — SIGNIFICANT CHANGE UP (ref 40–120)
ALT FLD-CCNC: 23 U/L — SIGNIFICANT CHANGE UP (ref 12–78)
ANION GAP SERPL CALC-SCNC: 5 MMOL/L — SIGNIFICANT CHANGE UP (ref 5–17)
APPEARANCE UR: CLEAR — SIGNIFICANT CHANGE UP
AST SERPL-CCNC: 21 U/L — SIGNIFICANT CHANGE UP (ref 15–37)
BACTERIA # UR AUTO: ABNORMAL
BASOPHILS # BLD AUTO: 0.02 K/UL — SIGNIFICANT CHANGE UP (ref 0–0.2)
BASOPHILS NFR BLD AUTO: 0.2 % — SIGNIFICANT CHANGE UP (ref 0–2)
BILIRUB SERPL-MCNC: 0.2 MG/DL — SIGNIFICANT CHANGE UP (ref 0.2–1.2)
BILIRUB UR-MCNC: NEGATIVE — SIGNIFICANT CHANGE UP
BUN SERPL-MCNC: 21 MG/DL — SIGNIFICANT CHANGE UP (ref 7–23)
CALCIUM SERPL-MCNC: 8.5 MG/DL — SIGNIFICANT CHANGE UP (ref 8.5–10.1)
CHLORIDE SERPL-SCNC: 112 MMOL/L — HIGH (ref 96–108)
CO2 SERPL-SCNC: 26 MMOL/L — SIGNIFICANT CHANGE UP (ref 22–31)
COLOR SPEC: YELLOW — SIGNIFICANT CHANGE UP
COMMENT - URINE: SIGNIFICANT CHANGE UP
CREAT SERPL-MCNC: 0.83 MG/DL — SIGNIFICANT CHANGE UP (ref 0.5–1.3)
DIFF PNL FLD: ABNORMAL
EOSINOPHIL # BLD AUTO: 0.07 K/UL — SIGNIFICANT CHANGE UP (ref 0–0.5)
EOSINOPHIL NFR BLD AUTO: 0.7 % — SIGNIFICANT CHANGE UP (ref 0–6)
EPI CELLS # UR: SIGNIFICANT CHANGE UP
GLUCOSE SERPL-MCNC: 121 MG/DL — HIGH (ref 70–99)
GLUCOSE UR QL: NEGATIVE — SIGNIFICANT CHANGE UP
HCT VFR BLD CALC: 47.7 % — SIGNIFICANT CHANGE UP (ref 39–50)
HGB BLD-MCNC: 16.1 G/DL — SIGNIFICANT CHANGE UP (ref 13–17)
IMM GRANULOCYTES NFR BLD AUTO: 0.6 % — SIGNIFICANT CHANGE UP (ref 0–1.5)
KETONES UR-MCNC: NEGATIVE — SIGNIFICANT CHANGE UP
LACTATE SERPL-SCNC: 1.8 MMOL/L — SIGNIFICANT CHANGE UP (ref 0.7–2)
LEUKOCYTE ESTERASE UR-ACNC: NEGATIVE — SIGNIFICANT CHANGE UP
LYMPHOCYTES # BLD AUTO: 1.49 K/UL — SIGNIFICANT CHANGE UP (ref 1–3.3)
LYMPHOCYTES # BLD AUTO: 15.3 % — SIGNIFICANT CHANGE UP (ref 13–44)
MCHC RBC-ENTMCNC: 30.6 PG — SIGNIFICANT CHANGE UP (ref 27–34)
MCHC RBC-ENTMCNC: 33.8 GM/DL — SIGNIFICANT CHANGE UP (ref 32–36)
MCV RBC AUTO: 90.5 FL — SIGNIFICANT CHANGE UP (ref 80–100)
MONOCYTES # BLD AUTO: 0.14 K/UL — SIGNIFICANT CHANGE UP (ref 0–0.9)
MONOCYTES NFR BLD AUTO: 1.4 % — LOW (ref 2–14)
NEUTROPHILS # BLD AUTO: 7.97 K/UL — HIGH (ref 1.8–7.4)
NEUTROPHILS NFR BLD AUTO: 81.8 % — HIGH (ref 43–77)
NITRITE UR-MCNC: NEGATIVE — SIGNIFICANT CHANGE UP
NRBC # BLD: 0 /100 WBCS — SIGNIFICANT CHANGE UP (ref 0–0)
PH UR: 6 — SIGNIFICANT CHANGE UP (ref 5–8)
PLATELET # BLD AUTO: 200 K/UL — SIGNIFICANT CHANGE UP (ref 150–400)
POTASSIUM SERPL-MCNC: 3.9 MMOL/L — SIGNIFICANT CHANGE UP (ref 3.5–5.3)
POTASSIUM SERPL-SCNC: 3.9 MMOL/L — SIGNIFICANT CHANGE UP (ref 3.5–5.3)
PROT SERPL-MCNC: 7.3 G/DL — SIGNIFICANT CHANGE UP (ref 6–8.3)
PROT UR-MCNC: NEGATIVE — SIGNIFICANT CHANGE UP
RAPID RVP RESULT: SIGNIFICANT CHANGE UP
RBC # BLD: 5.27 M/UL — SIGNIFICANT CHANGE UP (ref 4.2–5.8)
RBC # FLD: 13.2 % — SIGNIFICANT CHANGE UP (ref 10.3–14.5)
RBC CASTS # UR COMP ASSIST: NEGATIVE /HPF — SIGNIFICANT CHANGE UP (ref 0–4)
SARS-COV-2 RNA SPEC QL NAA+PROBE: SIGNIFICANT CHANGE UP
SODIUM SERPL-SCNC: 143 MMOL/L — SIGNIFICANT CHANGE UP (ref 135–145)
SP GR SPEC: 1.01 — SIGNIFICANT CHANGE UP (ref 1.01–1.02)
UROBILINOGEN FLD QL: NEGATIVE — SIGNIFICANT CHANGE UP
WBC # BLD: 9.75 K/UL — SIGNIFICANT CHANGE UP (ref 3.8–10.5)
WBC # FLD AUTO: 9.75 K/UL — SIGNIFICANT CHANGE UP (ref 3.8–10.5)
WBC UR QL: SIGNIFICANT CHANGE UP

## 2021-06-28 PROCEDURE — 99285 EMERGENCY DEPT VISIT HI MDM: CPT | Mod: CS

## 2021-06-28 PROCEDURE — 71045 X-RAY EXAM CHEST 1 VIEW: CPT | Mod: 26

## 2021-06-28 PROCEDURE — 93010 ELECTROCARDIOGRAM REPORT: CPT

## 2021-06-28 RX ORDER — ENOXAPARIN SODIUM 100 MG/ML
40 INJECTION SUBCUTANEOUS DAILY
Refills: 0 | Status: DISCONTINUED | OUTPATIENT
Start: 2021-06-28 | End: 2021-06-29

## 2021-06-28 RX ORDER — ACETAMINOPHEN 500 MG
650 TABLET ORAL ONCE
Refills: 0 | Status: DISCONTINUED | OUTPATIENT
Start: 2021-06-28 | End: 2021-06-28

## 2021-06-28 RX ORDER — SODIUM CHLORIDE 9 MG/ML
1000 INJECTION INTRAMUSCULAR; INTRAVENOUS; SUBCUTANEOUS
Refills: 0 | Status: COMPLETED | OUTPATIENT
Start: 2021-06-28 | End: 2021-06-28

## 2021-06-28 RX ORDER — ACETAMINOPHEN 500 MG
650 TABLET ORAL ONCE
Refills: 0 | Status: COMPLETED | OUTPATIENT
Start: 2021-06-28 | End: 2021-06-28

## 2021-06-28 RX ORDER — VANCOMYCIN HCL 1 G
1000 VIAL (EA) INTRAVENOUS ONCE
Refills: 0 | Status: COMPLETED | OUTPATIENT
Start: 2021-06-28 | End: 2021-06-28

## 2021-06-28 RX ORDER — ERTAPENEM SODIUM 1 G/1
1000 INJECTION, POWDER, LYOPHILIZED, FOR SOLUTION INTRAMUSCULAR; INTRAVENOUS ONCE
Refills: 0 | Status: COMPLETED | OUTPATIENT
Start: 2021-06-28 | End: 2021-06-28

## 2021-06-28 RX ADMIN — SODIUM CHLORIDE 2000 MILLILITER(S): 9 INJECTION INTRAMUSCULAR; INTRAVENOUS; SUBCUTANEOUS at 19:50

## 2021-06-28 RX ADMIN — Medication 250 MILLIGRAM(S): at 23:31

## 2021-06-28 RX ADMIN — ERTAPENEM SODIUM 120 MILLIGRAM(S): 1 INJECTION, POWDER, LYOPHILIZED, FOR SOLUTION INTRAMUSCULAR; INTRAVENOUS at 22:57

## 2021-06-28 RX ADMIN — Medication 650 MILLIGRAM(S): at 22:00

## 2021-06-28 RX ADMIN — Medication 650 MILLIGRAM(S): at 21:09

## 2021-06-28 NOTE — ED PROVIDER NOTE - OBJECTIVE STATEMENT
67 yo M p/w BIB EMS from SNF for episode of wheezing today. No known coughing / uri. pt fully vaccinated to covid, no known recent exposure. no known pain. No recent travel. no known sick contacts. Pt non-verbal, no other hx avail.

## 2021-06-28 NOTE — CONSULT NOTE ADULT - ASSESSMENT
SYLVAIN VELOZ [facility]  6/28/2021 DR TASHA CALIXTO       Initial evaluation/Pulmonary Critical Care consultation requested on  6/28/2021  by Dr PRUETT  from Dr Damon   Patient examined chart reviewed    HOSPITAL ADMISSION   PATIENT CAME  FROM (if information available)      SYLVAIN VELOZ [facility]  6/28/2021 DR TASHA CALIXTO     REVIEW OF SYMPTOMS      Able to give (reliable) ROS  NO     PHYSICAL EXAM    HEENT Unremarkable  atraumatic   RESP Fair air entry EXP prolonged    Harsh breath sound Resp distres mild   CARDIAC S1 S2 No S3     NO JVD    ABDOMEN SOFT BS PRESENT NOT DISTENDED No hepatosplenomegaly   PEDAL EDEMA present No calf tenderness  NO rash       PATIENT PRESENTATION.   65 yo M PMH mental retardation seizures constipation  p/w BIB EMS from SNF for episode of wheezing today. No known coughing / uri. pt fully vaccinated to covid, no known recent exposure. no known pain. No recent travel. no known sick contacts. Pt non-verbal, no other hx avail.  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      .      PATIENT DATA                ABG.                 OXYGENATION.       6/28/2021 ra 96%         VITALS/IO/VENT/DRIPS.     6/28/2021 afeb 130 120/80        ASSESSMENT/RECOMMENDATIONS.    INFECTION  w 6/28/2021 w 9.7   ua 6/28/2021 w 3-5   rvp 6/28/2021 rvp (-)   Ertapenem vanco 1 dose given in er 6/28/2021   Follow ct ch   WHEEZE  Bronchodilators  RESP  target po 90-95%    OVERALL PLAN.  Await ct   bronchdilators   oxygen    TIME SPENT   Over 55 minutes aggregate care time spent on encounter; activities included   direct patient care, counseling and/or coordinating care reviewing notes, lab data/ imaging , discussion with multidisciplinary team/ patient  /family and explaining in detail risks, benefits, alternatives  of the recommendations     SYLVAIN VELOZ [facility]  6/28/2021 DR TASHA CALIXTO

## 2021-06-28 NOTE — ED PROVIDER NOTE - PROGRESS NOTE DETAILS
Dw Dr Garcia, will see pt to admit, check CTA and US doppler robert - Pt doing well, no acute changes.

## 2021-06-28 NOTE — ED ADULT NURSE REASSESSMENT NOTE - NS ED NURSE REASSESS COMMENT FT1
Diaper changed, comfort care measures performed; patient repositioned on stretcher to position of comfort.  IVF still in progress; aid is still at the bedside.  Patient is visible from the nurse's station; still pending lab results and disposition from the ER.

## 2021-06-28 NOTE — ED ADULT NURSE NOTE - NSIMPLEMENTINTERV_GEN_ALL_ED
Implemented All Universal Safety Interventions:  [place] to call system. Call bell, personal items and telephone within reach. Instruct patient to call for assistance. Room bathroom lighting operational. Non-slip footwear when patient is off stretcher. Physically safe environment: no spills, clutter or unnecessary equipment. Stretcher in lowest position, wheels locked, appropriate side rails in place. Implemented All Fall Risk Interventions:  [place] to call system. Call bell, personal items and telephone within reach. Instruct patient to call for assistance. Room bathroom lighting operational. Non-slip footwear when patient is off stretcher. Physically safe environment: no spills, clutter or unnecessary equipment. Stretcher in lowest position, wheels locked, appropriate side rails in place. Provide visual cue, wrist band, yellow gown, etc. Monitor gait and stability. Monitor for mental status changes and reorient to person, place, and time. Review medications for side effects contributing to fall risk. Reinforce activity limits and safety measures with patient and family.

## 2021-06-28 NOTE — ED ADULT NURSE NOTE - OBJECTIVE STATEMENT
Patient with history of   BIBA with aid at bedside with c/o "episode of wheezing", now resolved.  Temperature checked by aid at bedside about one hour PTA, 98.3.  +cough; patient arrives tachycardic, is awake and alert, nonverbal at baseline.  Patient arrives in no respiratory distress. Patient with history of MR, seizures BIBA with aid at bedside with c/o "episode of wheezing", now resolved.  Temperature checked by aid at bedside about one hour PTA, 98.3.  +cough; patient arrives tachycardic, is awake and alert, nonverbal at baseline.  Patient arrives in no respiratory distress.  Patient is at mental baseline per aid at bedside.

## 2021-06-28 NOTE — ED PROVIDER NOTE - CARE PLAN
Principal Discharge DX:	Pneumonia of left lung due to infectious organism, unspecified part of lung  Secondary Diagnosis:	Tachycardia

## 2021-06-28 NOTE — ED PROVIDER NOTE - GASTROINTESTINAL, MLM
Abdomen soft, ? tender - pulling hand away when palpating abd, no guarding. non-distended. no hsm. no cvat

## 2021-06-29 DIAGNOSIS — A41.9 SEPSIS, UNSPECIFIED ORGANISM: ICD-10-CM

## 2021-06-29 LAB
ALBUMIN SERPL ELPH-MCNC: 2.8 G/DL — LOW (ref 3.3–5)
ALP SERPL-CCNC: 53 U/L — SIGNIFICANT CHANGE UP (ref 40–120)
ALT FLD-CCNC: 29 U/L — SIGNIFICANT CHANGE UP (ref 12–78)
ANION GAP SERPL CALC-SCNC: 10 MMOL/L — SIGNIFICANT CHANGE UP (ref 5–17)
AST SERPL-CCNC: 28 U/L — SIGNIFICANT CHANGE UP (ref 15–37)
BASOPHILS # BLD AUTO: 0 K/UL — SIGNIFICANT CHANGE UP (ref 0–0.2)
BASOPHILS NFR BLD AUTO: 0 % — SIGNIFICANT CHANGE UP (ref 0–2)
BILIRUB SERPL-MCNC: 0.3 MG/DL — SIGNIFICANT CHANGE UP (ref 0.2–1.2)
BUN SERPL-MCNC: 24 MG/DL — HIGH (ref 7–23)
CALCIUM SERPL-MCNC: 8.5 MG/DL — SIGNIFICANT CHANGE UP (ref 8.5–10.1)
CHLORIDE SERPL-SCNC: 112 MMOL/L — HIGH (ref 96–108)
CO2 SERPL-SCNC: 21 MMOL/L — LOW (ref 22–31)
CREAT SERPL-MCNC: 1.2 MG/DL — SIGNIFICANT CHANGE UP (ref 0.5–1.3)
EOSINOPHIL # BLD AUTO: 0 K/UL — SIGNIFICANT CHANGE UP (ref 0–0.5)
EOSINOPHIL NFR BLD AUTO: 0 % — SIGNIFICANT CHANGE UP (ref 0–6)
GLUCOSE SERPL-MCNC: 188 MG/DL — HIGH (ref 70–99)
HCT VFR BLD CALC: 39.1 % — SIGNIFICANT CHANGE UP (ref 39–50)
HGB BLD-MCNC: 13.4 G/DL — SIGNIFICANT CHANGE UP (ref 13–17)
LYMPHOCYTES # BLD AUTO: 0.53 K/UL — LOW (ref 1–3.3)
LYMPHOCYTES # BLD AUTO: 4 % — LOW (ref 13–44)
MANUAL SMEAR VERIFICATION: SIGNIFICANT CHANGE UP
MCHC RBC-ENTMCNC: 30.9 PG — SIGNIFICANT CHANGE UP (ref 27–34)
MCHC RBC-ENTMCNC: 34.3 GM/DL — SIGNIFICANT CHANGE UP (ref 32–36)
MCV RBC AUTO: 90.1 FL — SIGNIFICANT CHANGE UP (ref 80–100)
MONOCYTES # BLD AUTO: 0.26 K/UL — SIGNIFICANT CHANGE UP (ref 0–0.9)
MONOCYTES NFR BLD AUTO: 2 % — SIGNIFICANT CHANGE UP (ref 2–14)
NEUTROPHILS # BLD AUTO: 12.37 K/UL — HIGH (ref 1.8–7.4)
NEUTROPHILS NFR BLD AUTO: 89 % — HIGH (ref 43–77)
NEUTS BAND # BLD: 5 % — SIGNIFICANT CHANGE UP (ref 0–8)
NRBC # BLD: 0 — SIGNIFICANT CHANGE UP
NRBC # BLD: SIGNIFICANT CHANGE UP /100 WBCS (ref 0–0)
PLAT MORPH BLD: NORMAL — SIGNIFICANT CHANGE UP
PLATELET # BLD AUTO: 166 K/UL — SIGNIFICANT CHANGE UP (ref 150–400)
POTASSIUM SERPL-MCNC: 4 MMOL/L — SIGNIFICANT CHANGE UP (ref 3.5–5.3)
POTASSIUM SERPL-SCNC: 4 MMOL/L — SIGNIFICANT CHANGE UP (ref 3.5–5.3)
PROT SERPL-MCNC: 6.5 G/DL — SIGNIFICANT CHANGE UP (ref 6–8.3)
RBC # BLD: 4.34 M/UL — SIGNIFICANT CHANGE UP (ref 4.2–5.8)
RBC # FLD: 13.4 % — SIGNIFICANT CHANGE UP (ref 10.3–14.5)
RBC BLD AUTO: SIGNIFICANT CHANGE UP
SODIUM SERPL-SCNC: 143 MMOL/L — SIGNIFICANT CHANGE UP (ref 135–145)
WBC # BLD: 13.16 K/UL — HIGH (ref 3.8–10.5)
WBC # FLD AUTO: 13.16 K/UL — HIGH (ref 3.8–10.5)

## 2021-06-29 PROCEDURE — 74176 CT ABD & PELVIS W/O CONTRAST: CPT | Mod: 26

## 2021-06-29 PROCEDURE — 93970 EXTREMITY STUDY: CPT | Mod: 26

## 2021-06-29 PROCEDURE — 71250 CT THORAX DX C-: CPT | Mod: 26

## 2021-06-29 PROCEDURE — 93010 ELECTROCARDIOGRAM REPORT: CPT

## 2021-06-29 RX ORDER — AMLODIPINE BESYLATE 2.5 MG/1
5 TABLET ORAL DAILY
Refills: 0 | Status: DISCONTINUED | OUTPATIENT
Start: 2021-06-29 | End: 2021-07-02

## 2021-06-29 RX ORDER — MULTIVIT-MIN/FERROUS GLUCONATE 9 MG/15 ML
1 LIQUID (ML) ORAL DAILY
Refills: 0 | Status: DISCONTINUED | OUTPATIENT
Start: 2021-06-29 | End: 2021-07-02

## 2021-06-29 RX ORDER — VANCOMYCIN HCL 1 G
750 VIAL (EA) INTRAVENOUS EVERY 12 HOURS
Refills: 0 | Status: DISCONTINUED | OUTPATIENT
Start: 2021-06-29 | End: 2021-06-30

## 2021-06-29 RX ORDER — TAMSULOSIN HYDROCHLORIDE 0.4 MG/1
0.8 CAPSULE ORAL AT BEDTIME
Refills: 0 | Status: DISCONTINUED | OUTPATIENT
Start: 2021-06-29 | End: 2021-07-02

## 2021-06-29 RX ORDER — FLUVOXAMINE MALEATE 25 MG/1
100 TABLET ORAL
Refills: 0 | Status: DISCONTINUED | OUTPATIENT
Start: 2021-06-29 | End: 2021-07-02

## 2021-06-29 RX ORDER — DIVALPROEX SODIUM 500 MG/1
250 TABLET, DELAYED RELEASE ORAL THREE TIMES A DAY
Refills: 0 | Status: DISCONTINUED | OUTPATIENT
Start: 2021-06-29 | End: 2021-07-02

## 2021-06-29 RX ORDER — ACETAMINOPHEN 500 MG
325 TABLET ORAL EVERY 4 HOURS
Refills: 0 | Status: DISCONTINUED | OUTPATIENT
Start: 2021-06-29 | End: 2021-07-02

## 2021-06-29 RX ORDER — ACETAMINOPHEN 500 MG
650 TABLET ORAL ONCE
Refills: 0 | Status: COMPLETED | OUTPATIENT
Start: 2021-06-29 | End: 2021-06-29

## 2021-06-29 RX ORDER — CLONAZEPAM 1 MG
0.5 TABLET ORAL
Refills: 0 | Status: DISCONTINUED | OUTPATIENT
Start: 2021-06-29 | End: 2021-07-02

## 2021-06-29 RX ORDER — PANTOPRAZOLE SODIUM 20 MG/1
40 TABLET, DELAYED RELEASE ORAL
Refills: 0 | Status: DISCONTINUED | OUTPATIENT
Start: 2021-06-29 | End: 2021-07-02

## 2021-06-29 RX ORDER — CEFTRIAXONE 500 MG/1
1000 INJECTION, POWDER, FOR SOLUTION INTRAMUSCULAR; INTRAVENOUS EVERY 24 HOURS
Refills: 0 | Status: DISCONTINUED | OUTPATIENT
Start: 2021-06-29 | End: 2021-07-02

## 2021-06-29 RX ORDER — METOPROLOL TARTRATE 50 MG
25 TABLET ORAL
Refills: 0 | Status: DISCONTINUED | OUTPATIENT
Start: 2021-06-29 | End: 2021-07-02

## 2021-06-29 RX ORDER — LEVOTHYROXINE SODIUM 125 MCG
75 TABLET ORAL DAILY
Refills: 0 | Status: DISCONTINUED | OUTPATIENT
Start: 2021-06-29 | End: 2021-07-02

## 2021-06-29 RX ORDER — ASPIRIN/CALCIUM CARB/MAGNESIUM 324 MG
81 TABLET ORAL DAILY
Refills: 0 | Status: DISCONTINUED | OUTPATIENT
Start: 2021-06-29 | End: 2021-07-02

## 2021-06-29 RX ORDER — DIPHENHYDRAMINE HCL 50 MG
25 CAPSULE ORAL ONCE
Refills: 0 | Status: COMPLETED | OUTPATIENT
Start: 2021-06-29 | End: 2021-06-29

## 2021-06-29 RX ORDER — NYSTATIN CREAM 100000 [USP'U]/G
1 CREAM TOPICAL
Refills: 0 | Status: DISCONTINUED | OUTPATIENT
Start: 2021-06-29 | End: 2021-07-02

## 2021-06-29 RX ORDER — NYSTATIN CREAM 100000 [USP'U]/G
1 CREAM TOPICAL THREE TIMES A DAY
Refills: 0 | Status: DISCONTINUED | OUTPATIENT
Start: 2021-06-29 | End: 2021-07-02

## 2021-06-29 RX ORDER — HEPARIN SODIUM 5000 [USP'U]/ML
5000 INJECTION INTRAVENOUS; SUBCUTANEOUS EVERY 12 HOURS
Refills: 0 | Status: DISCONTINUED | OUTPATIENT
Start: 2021-06-29 | End: 2021-07-02

## 2021-06-29 RX ORDER — SODIUM CHLORIDE 9 MG/ML
1000 INJECTION INTRAMUSCULAR; INTRAVENOUS; SUBCUTANEOUS
Refills: 0 | Status: DISCONTINUED | OUTPATIENT
Start: 2021-06-29 | End: 2021-06-30

## 2021-06-29 RX ADMIN — PANTOPRAZOLE SODIUM 40 MILLIGRAM(S): 20 TABLET, DELAYED RELEASE ORAL at 06:17

## 2021-06-29 RX ADMIN — Medication 25 MILLIGRAM(S): at 06:17

## 2021-06-29 RX ADMIN — SODIUM CHLORIDE 1000 MILLILITER(S): 9 INJECTION INTRAMUSCULAR; INTRAVENOUS; SUBCUTANEOUS at 00:00

## 2021-06-29 RX ADMIN — Medication 25 MILLIGRAM(S): at 11:31

## 2021-06-29 RX ADMIN — DIVALPROEX SODIUM 250 MILLIGRAM(S): 500 TABLET, DELAYED RELEASE ORAL at 21:48

## 2021-06-29 RX ADMIN — Medication 81 MILLIGRAM(S): at 11:31

## 2021-06-29 RX ADMIN — AMLODIPINE BESYLATE 5 MILLIGRAM(S): 2.5 TABLET ORAL at 06:17

## 2021-06-29 RX ADMIN — Medication 650 MILLIGRAM(S): at 01:20

## 2021-06-29 RX ADMIN — NYSTATIN CREAM 1 APPLICATION(S): 100000 CREAM TOPICAL at 22:00

## 2021-06-29 RX ADMIN — Medication 40 MILLIGRAM(S): at 11:31

## 2021-06-29 RX ADMIN — Medication 0.5 MILLIGRAM(S): at 17:58

## 2021-06-29 RX ADMIN — SODIUM CHLORIDE 70 MILLILITER(S): 9 INJECTION INTRAMUSCULAR; INTRAVENOUS; SUBCUTANEOUS at 02:00

## 2021-06-29 RX ADMIN — DIVALPROEX SODIUM 250 MILLIGRAM(S): 500 TABLET, DELAYED RELEASE ORAL at 16:11

## 2021-06-29 RX ADMIN — Medication 250 MILLIGRAM(S): at 18:43

## 2021-06-29 RX ADMIN — FLUVOXAMINE MALEATE 100 MILLIGRAM(S): 25 TABLET ORAL at 17:52

## 2021-06-29 RX ADMIN — Medication 0.5 MILLIGRAM(S): at 06:17

## 2021-06-29 RX ADMIN — TAMSULOSIN HYDROCHLORIDE 0.8 MILLIGRAM(S): 0.4 CAPSULE ORAL at 21:49

## 2021-06-29 RX ADMIN — NYSTATIN CREAM 1 APPLICATION(S): 100000 CREAM TOPICAL at 17:53

## 2021-06-29 RX ADMIN — HEPARIN SODIUM 5000 UNIT(S): 5000 INJECTION INTRAVENOUS; SUBCUTANEOUS at 17:52

## 2021-06-29 RX ADMIN — SODIUM CHLORIDE 2000 MILLILITER(S): 9 INJECTION INTRAMUSCULAR; INTRAVENOUS; SUBCUTANEOUS at 00:32

## 2021-06-29 RX ADMIN — Medication 75 MICROGRAM(S): at 06:17

## 2021-06-29 RX ADMIN — DIVALPROEX SODIUM 250 MILLIGRAM(S): 500 TABLET, DELAYED RELEASE ORAL at 06:17

## 2021-06-29 RX ADMIN — CEFTRIAXONE 100 MILLIGRAM(S): 500 INJECTION, POWDER, FOR SOLUTION INTRAMUSCULAR; INTRAVENOUS at 17:58

## 2021-06-29 RX ADMIN — FLUVOXAMINE MALEATE 100 MILLIGRAM(S): 25 TABLET ORAL at 06:17

## 2021-06-29 RX ADMIN — SODIUM CHLORIDE 70 MILLILITER(S): 9 INJECTION INTRAMUSCULAR; INTRAVENOUS; SUBCUTANEOUS at 15:22

## 2021-06-29 RX ADMIN — Medication 1 TABLET(S): at 11:30

## 2021-06-29 NOTE — PROGRESS NOTE ADULT - SUBJECTIVE AND OBJECTIVE BOX
ROSELIA NARAYAN    [facility] TELN 336 W1    Patient is a 66y old  Male who presents with a chief complaint of fevers cough (2021 09:10)       Allergies    Augmentin (Unknown)  aztreonam (Rash)  penicillin (Unknown)  sulfa drugs (Unknown)    Intolerances        HPI:      PAST MEDICAL & SURGICAL HISTORY:  MR (mental retardation), severe    Seizure    HTN (hypertension)    Psoriasis    DD (diastrophic dysplasia)    Blind  right eye    Constipation, unspecified constipation type    No significant past surgical history        FAMILY HISTORY:  No pertinent family history in first degree relatives          MEDICATIONS   acetaminophen   Tablet .. 325 milliGRAM(s) Oral every 4 hours PRN  amLODIPine   Tablet 5 milliGRAM(s) Oral daily  aspirin  chewable 81 milliGRAM(s) Oral daily  clonazePAM  Tablet 0.5 milliGRAM(s) Oral two times a day  diVALproex  milliGRAM(s) Oral three times a day  enoxaparin Injectable 40 milliGRAM(s) SubCutaneous daily  fluvoxaMINE 100 milliGRAM(s) Oral two times a day  levoFLOXacin IVPB 500 milliGRAM(s) IV Intermittent every 24 hours  levothyroxine 75 MICROGram(s) Oral daily  metoprolol tartrate 25 milliGRAM(s) Oral two times a day  multivitamin/minerals 1 Tablet(s) Oral daily  pantoprazole    Tablet 40 milliGRAM(s) Oral before breakfast  sodium chloride 0.9%. 1000 milliLiter(s) IV Continuous <Continuous>  tamsulosin 0.8 milliGRAM(s) Oral at bedtime      Vital Signs Last 24 Hrs  T(C): 37.1 (2021 05:18), Max: 38.4 (2021 01:20)  T(F): 98.8 (2021 05:18), Max: 101.2 (2021 01:20)  HR: 121 (2021 05:18) (121 - 140)  BP: 115/65 (2021 05:18) (103/60 - 121/89)  BP(mean): --  RR: 20 (2021 05:18) (18 - 20)  SpO2: 91% (2021 05:18) (90% - 96%)      21 @ 07:01  -  21 @ 07:00  --------------------------------------------------------  IN: 590 mL / OUT: 1 mL / NET: 589 mL            LABS:                        16.1   9.75  )-----------( 200      ( 2021 20:36 )             47.7         143  |  112<H>  |  21  ----------------------------<  121<H>  3.9   |  26  |  0.83    Ca    8.5      2021 21:15    TPro  7.3  /  Alb  3.3  /  TBili  0.2  /  DBili  x   /  AST  21  /  ALT  23  /  AlkPhos  69        Urinalysis Basic - ( 2021 21:25 )    Color: Yellow / Appearance: Clear / S.015 / pH: x  Gluc: x / Ketone: Negative  / Bili: Negative / Urobili: Negative   Blood: x / Protein: Negative / Nitrite: Negative   Leuk Esterase: Negative / RBC: Negative /HPF / WBC 3-5   Sq Epi: x / Non Sq Epi: Occasional / Bacteria: Occasional            WBC:  WBC Count: 9.75 K/uL ( @ 20:36)      MICROBIOLOGY:  RECENT CULTURES:                  Sodium:  Sodium, Serum: 143 mmol/L ( @ 21:15)      0.83 mg/dL  @ 21:15      Hemoglobin:  Hemoglobin: 16.1 g/dL ( @ 20:36)      Platelets: Platelet Count - Automated: 200 K/uL ( @ 20:36)      LIVER FUNCTIONS - ( 2021 21:15 )  Alb: 3.3 g/dL / Pro: 7.3 g/dL / ALK PHOS: 69 U/L / ALT: 23 U/L / AST: 21 U/L / GGT: x             Urinalysis Basic - ( 2021 21:25 )    Color: Yellow / Appearance: Clear / S.015 / pH: x  Gluc: x / Ketone: Negative  / Bili: Negative / Urobili: Negative   Blood: x / Protein: Negative / Nitrite: Negative   Leuk Esterase: Negative / RBC: Negative /HPF / WBC 3-5   Sq Epi: x / Non Sq Epi: Occasional / Bacteria: Occasional        RADIOLOGY & ADDITIONAL STUDIES:

## 2021-06-29 NOTE — PROGRESS NOTE ADULT - PROBLEM SELECTOR PLAN 1
due to aspiration pn from silent aspiration  had seen y speech on puree  patient had gone back to program where there is less over sight  at home they are very cautious feeding slow with aspiration precautions  continue iv abxs  seen previous chronic rib fractures,,,not acute  will discuss with house if patient has had bone density  no s/sx acute trauma  sp tachycardia due to infection  cardiology consulted  venous dopplers wnl

## 2021-06-29 NOTE — SWALLOW BEDSIDE ASSESSMENT ADULT - ASR SWALLOW RECOMMEND DIAG
Pt would benefit from MBS given admitting diagnosis, CXR, and pmhx; however, MD does not suspect pt will tolerate study. Will continue to monitor diet tolerance at bedside, MD in agreement with POC.

## 2021-06-29 NOTE — SWALLOW BEDSIDE ASSESSMENT ADULT - COMMENTS
Pt received upright in bed, awake and cooperative, on room air. Pt's aides present at bedside and assisted in providing relevant information. Per aide report, pt is on a ground diet with nectar thick liquids at home with strict aspiration precautions. There is concern that pt is attempting to drink thin liquids while at program. Aide reported pt tolerates ground diet with nectar thick liquids w/o difficulty with strict aspiration precautions. Pt demonstrated difficulty following low level directives despite max multimodal cues. Pt w/o vocalizations during session. Nonverbal pain scale 0/10.    Pt known to this service from a previous admission. MBS completed 5/4/21, at which time pt was recommended puree with nectar thick liquids. It should be noted that pt did not tolerate procedure will and became combative/agitated, requiring study to be terminated.    CXR 6/28: "Questionable right base infiltrate." Pt's WBC is WFL, no fever.

## 2021-06-29 NOTE — SWALLOW BEDSIDE ASSESSMENT ADULT - SLP PERTINENT HISTORY OF CURRENT PROBLEM
Per charting, "67 yo M p/w BIB EMS from SNF for episode of wheezing today. No known coughing / uri. pt fully vaccinated to covid, no known recent exposure. no known pain. No recent travel. no known sick contacts. Pt non-verbal, no other hx avail."

## 2021-06-29 NOTE — SWALLOW BEDSIDE ASSESSMENT ADULT - SWALLOW EVAL: DIAGNOSIS
SLP administered PO trials of puree, honey thick liquids, and nectar thick liquids. Pt p/w a mild oral dysphagia marked by adequate retrieval and containment, timely manipulation and transfer, suspected posterior loss, and adequate clearance post swallow. *Pt noted to be impulsive with PO. Pharyngeal dysphagia marked by suspected delayed swallow onset, +laryngeal elevation to palpation, and no overt s/sx of aspiration. Recommend puree with nectar thick liquids via TEASPOON to assist with portion control. 1:1 supervision/assistance is required during all PO intake given impulsivity. Position upright 90 degrees, all PO via teaspoon, alternate bite/sip, pace meal slowly. Continue to monitor respiratory status closely. Per discussion with Dr. Meyer, do not suspect pt will tolerate MBS. Will continue to monitor for diet tolerance at bedside. MD in agreement with POC. Discussed with RN and aides.

## 2021-06-29 NOTE — PROGRESS NOTE ADULT - SUBJECTIVE AND OBJECTIVE BOX
PAST MEDICAL & SURGICAL HISTORY:  MR (mental retardation), severe    Seizure    HTN (hypertension)    Psoriasis    DD (diastrophic dysplasia)    Blind  right eye    Constipation, unspecified constipation type    No significant past surgical history        MEDICATIONS  (STANDING):  amLODIPine   Tablet 5 milliGRAM(s) Oral daily  aspirin  chewable 81 milliGRAM(s) Oral daily  cefTRIAXone   IVPB 1000 milliGRAM(s) IV Intermittent every 24 hours  clonazePAM  Tablet 0.5 milliGRAM(s) Oral two times a day  diVALproex  milliGRAM(s) Oral three times a day  fluvoxaMINE 100 milliGRAM(s) Oral two times a day  heparin   Injectable 5000 Unit(s) SubCutaneous every 12 hours  levothyroxine 75 MICROGram(s) Oral daily  metoprolol tartrate 25 milliGRAM(s) Oral two times a day  multivitamin/minerals 1 Tablet(s) Oral daily  nystatin Cream 1 Application(s) Topical two times a day  nystatin Powder 1 Application(s) Topical three times a day  pantoprazole    Tablet 40 milliGRAM(s) Oral before breakfast  sodium chloride 0.9%. 1000 milliLiter(s) (70 mL/Hr) IV Continuous <Continuous>  tamsulosin 0.8 milliGRAM(s) Oral at bedtime  vancomycin  IVPB 750 milliGRAM(s) IV Intermittent every 12 hours    MEDICATIONS  (PRN):  acetaminophen   Tablet .. 325 milliGRAM(s) Oral every 4 hours PRN Temp greater or equal to 38C (100.4F), Mild Pain (1 - 3)      Patient is a 66y old  Male who presents with a chief complaint of fevers cough (2021 16:19)      Vital Signs Last 24 Hrs  T(C): 37.8 (2021 16:41), Max: 38.4 (2021 01:20)  T(F): 100.1 (2021 16:41), Max: 101.2 (2021 01:20)  HR: 92 (2021 17:50) (89 - 140)  BP: 118/66 (2021 17:50) (103/60 - 127/81)  BP(mean): --  RR: 18 (2021 17:50) (18 - 20)  SpO2: 90% (2021 16:41) (90% - 92%)           @ 07:01  -  06-29 @ 07:00  --------------------------------------------------------  IN: 590 mL / OUT: 1 mL / NET: 589 mL     @ 07: @ 20:06  --------------------------------------------------------  IN: 930 mL / OUT: 0 mL / NET: 930 mL        REVIEW OF SYSTEMS:    Constitutional: No fever, weight loss or fatigue  Eyes: No eye pain, visual disturbances, or discharge  ENT:  No difficulty hearing, tinnitus, vertigo; No sinus or throat pain  Neck: No pain or stiffness  Breasts: No pain, masses or nipple discharge  Respiratory: No cough, wheezing, chills or hemoptysis  Cardiovascular: No chest pain, palpitations, shortness of breath, dizziness or leg swelling  Gastrointestinal: No abdominal or epigastric pain. No nausea, vomiting or hematemesis; No diarrhea or constipation. No melena or hematochezia.  Genitourinary: No dysuria, frequency, hematuria or incontinence  Rectal: No pain, hemorrhoids or incontinence  Neurological: No headaches, memory loss, loss of strength, numbness or tremors  Skin: No itching, burning, rashes or lesions   Lymph Nodes: No enlarged glands  Endocrine: No heat or cold intolerance; No hair loss  Musculoskeletal: No joint pain or swelling; No muscle, back or extremity pain  Psychiatric: No depression, anxiety, mood swings or difficulty sleeping  Heme/Lymph: No easy bruising or bleeding gums  Allergy and Immunologic: No hives or eczema    PHYSICAL EXAM:  alert  interactive  house aid present,,highly informative  + beigy rash right lateral abdominal wall/bl axillas/groin,,  Constitutional: NAD,   Respiratory: decrease breath sounds bl   Cardiovascular: S1 and S2, RRR, no M/G/R  Gastrointestinal: BS+, soft, NT/ND  Extremities: No peripheral edema  Neurological: moves all ext                                13.4   13.16 )-----------( 166      ( 2021 17:11 )             39.1         143  |  112<H>  |  24<H>  ----------------------------<  188<H>  4.0   |  21<L>  |  1.20    Ca    8.5      2021 17:11    TPro  6.5  /  Alb  2.8<L>  /  TBili  0.3  /  DBili  x   /  AST    /  ALT    /  AlkPhos  53        Urinalysis Basic - ( 2021 21:25 )    Color: Yellow / Appearance: Clear / S.015 / pH: x  Gluc: x / Ketone: Negative  / Bili: Negative / Urobili: Negative   Blood: x / Protein: Negative / Nitrite: Negative   Leuk Esterase: Negative / RBC: Negative /HPF / WBC 3-5   Sq Epi: x / Non Sq Epi: Occasional / Bacteria: Occasional      < from: CT Abdomen and Pelvis No Cont (21 @ 15:57) >  COMPARISON: CT scan chest abdomen pelvis 2021.    CONTRAST/COMPLICATIONS:  IV Contrast: NONE  0 cc administered   0 cc discarded  Oral Contrast: NONE  Complications: None reported at time of study completion    PROCEDURE:  CT of the Chest, Abdomen and Pelvis was performed.  Sagittal and coronal reformats were performed.    FINDINGS:    CHEST:    LUNGS AND LARGE AIRWAYS: PLEURA:  There is persistent patchy groundglass opacification involving the right lower lobe.  The central airways remain patent.    No significant pleural effusion.    VESSELS: Mild atherosclerotic calcification of thoracic aorta with coronary artery calcifications.    HEART: Heart size is normal. No pericardial effusion.    MEDIASTINUM AND GREYSON:  Small, subcentimeter short axis mediastinal lymph nodes, stable.  < from: CT Abdomen and Pelvis No Cont (21 @ 15:57) >  Fluid-filled colon, nonspecific, can be associated with diarrhea.  No bowel obstruction.   Appendix normal.  PERITONEUM: No ascites.  No localized intra-abdominal fluid collection or pneumoperitoneum noted.    VESSELS: Atherosclerotic calcification.  RETROPERITONEUM/LYMPH NODES: No enlarged lymphadenopathy.    ABDOMINAL WALL: Within normal limits.    BONES:  Degenerative changes spine.  Chronic appearing bilateral rib fracture deformities.    IMPRESSION:    Persistent groundglass opacification right lower lobe.  In the chronic setting finding could reflect organizing pneumonia, hypersensitivity pneumonitis or lung fibrosis.    No acute intra-abdominal pathology noted.    Other findings as discussed above.        < end of copied text >  < from: CT Abdomen and Pelvis No Cont (21 @ 15:57) >  Fluid-filled colon, nonspecific, can be associated with diarrhea.  No bowel obstruction.   Appendix normal.  PERITONEUM: No ascites.  No localized intra-abdominal fluid collection or pneumoperitoneum noted.    VESSELS: Atherosclerotic calcification.  RETROPERITONEUM/LYMPH NODES: No enlarged lymphadenopathy.    ABDOMINAL WALL: Within normal limits.    BONES:  Degenerative changes spine.  Chronic appearing bilateral rib fracture deformities.    IMPRESSION:    Persistent groundglass opacification right lower lobe.  In the chronic setting finding could reflect organizing pneumonia, hypersensitivity pneumonitis or lung fibrosis.    No acute intra-abdominal pathology noted.    Other findings as discussed above.        < end of copied text >  < from: CT Abdomen and Pelvis No Cont (21 @ 15:57) >  Fluid-filled colon, nonspecific, can be associated with diarrhea.  No bowel obstruction.   Appendix normal.  PERITONEUM: No ascites.  No localized intra-abdominal fluid collection or pneumoperitoneum noted.    VESSELS: Atherosclerotic calcification.  RETROPERITONEUM/LYMPH NODES: No enlarged lymphadenopathy.    ABDOMINAL WALL: Within normal limits.    BONES:  Degenerative changes spine.  Chronic appearing bilateral rib fracture deformities.    IMPRESSION:    Persistent groundglass opacification right lower lobe.  In the chronic setting finding could reflect organizing pneumonia, hypersensitivity pneumonitis or lung fibrosis.    No acute intra-abdominal pathology noted.    Other findings as discussed above.        < end of copied text >

## 2021-06-29 NOTE — ED ADULT NURSE REASSESSMENT NOTE - NS ED NURSE REASSESS COMMENT FT1
MD Hunter Curran aware of patient's current vital signs and interventions; patient currently febrile and agitated.  Patient cleaned and wiped down prior to patient leaving the unit; comfort care measures provided.  Pending CTA, per MD Hunter Curran, due to current blood pressure and mental state, to hold off until HR comes down closer to 100 and to reevaluate patient.  KEVYN Polk made aware of this.  Patient is still awake with aid at bedside; IVF and IV antibiotics still in progress.

## 2021-06-30 LAB
ALBUMIN SERPL ELPH-MCNC: 3.1 G/DL — LOW (ref 3.3–5)
ALP SERPL-CCNC: 54 U/L — SIGNIFICANT CHANGE UP (ref 40–120)
ALT FLD-CCNC: 28 U/L — SIGNIFICANT CHANGE UP (ref 12–78)
ANION GAP SERPL CALC-SCNC: 9 MMOL/L — SIGNIFICANT CHANGE UP (ref 5–17)
AST SERPL-CCNC: 20 U/L — SIGNIFICANT CHANGE UP (ref 15–37)
BASOPHILS # BLD AUTO: 0.02 K/UL — SIGNIFICANT CHANGE UP (ref 0–0.2)
BASOPHILS NFR BLD AUTO: 0.1 % — SIGNIFICANT CHANGE UP (ref 0–2)
BILIRUB SERPL-MCNC: 0.2 MG/DL — SIGNIFICANT CHANGE UP (ref 0.2–1.2)
BUN SERPL-MCNC: 22 MG/DL — SIGNIFICANT CHANGE UP (ref 7–23)
CALCIUM SERPL-MCNC: 8.8 MG/DL — SIGNIFICANT CHANGE UP (ref 8.5–10.1)
CHLORIDE SERPL-SCNC: 112 MMOL/L — HIGH (ref 96–108)
CO2 SERPL-SCNC: 24 MMOL/L — SIGNIFICANT CHANGE UP (ref 22–31)
COVID-19 SPIKE DOMAIN AB INTERP: POSITIVE
COVID-19 SPIKE DOMAIN ANTIBODY RESULT: >250 U/ML — HIGH
CREAT SERPL-MCNC: 0.9 MG/DL — SIGNIFICANT CHANGE UP (ref 0.5–1.3)
CULTURE RESULTS: NO GROWTH — SIGNIFICANT CHANGE UP
EOSINOPHIL # BLD AUTO: 0.13 K/UL — SIGNIFICANT CHANGE UP (ref 0–0.5)
EOSINOPHIL NFR BLD AUTO: 0.8 % — SIGNIFICANT CHANGE UP (ref 0–6)
GLUCOSE SERPL-MCNC: 115 MG/DL — HIGH (ref 70–99)
HCT VFR BLD CALC: 38.8 % — LOW (ref 39–50)
HGB BLD-MCNC: 13.2 G/DL — SIGNIFICANT CHANGE UP (ref 13–17)
IMM GRANULOCYTES NFR BLD AUTO: 0.5 % — SIGNIFICANT CHANGE UP (ref 0–1.5)
LYMPHOCYTES # BLD AUTO: 17.5 % — SIGNIFICANT CHANGE UP (ref 13–44)
LYMPHOCYTES # BLD AUTO: 2.77 K/UL — SIGNIFICANT CHANGE UP (ref 1–3.3)
MCHC RBC-ENTMCNC: 30.6 PG — SIGNIFICANT CHANGE UP (ref 27–34)
MCHC RBC-ENTMCNC: 34 GM/DL — SIGNIFICANT CHANGE UP (ref 32–36)
MCV RBC AUTO: 90 FL — SIGNIFICANT CHANGE UP (ref 80–100)
MONOCYTES # BLD AUTO: 1.05 K/UL — HIGH (ref 0–0.9)
MONOCYTES NFR BLD AUTO: 6.6 % — SIGNIFICANT CHANGE UP (ref 2–14)
MRSA PCR RESULT.: SIGNIFICANT CHANGE UP
NEUTROPHILS # BLD AUTO: 11.79 K/UL — HIGH (ref 1.8–7.4)
NEUTROPHILS NFR BLD AUTO: 74.5 % — SIGNIFICANT CHANGE UP (ref 43–77)
NRBC # BLD: 0 /100 WBCS — SIGNIFICANT CHANGE UP (ref 0–0)
PLATELET # BLD AUTO: 194 K/UL — SIGNIFICANT CHANGE UP (ref 150–400)
POTASSIUM SERPL-MCNC: 3.8 MMOL/L — SIGNIFICANT CHANGE UP (ref 3.5–5.3)
POTASSIUM SERPL-SCNC: 3.8 MMOL/L — SIGNIFICANT CHANGE UP (ref 3.5–5.3)
PROT SERPL-MCNC: 7.2 G/DL — SIGNIFICANT CHANGE UP (ref 6–8.3)
RBC # BLD: 4.31 M/UL — SIGNIFICANT CHANGE UP (ref 4.2–5.8)
RBC # FLD: 13.6 % — SIGNIFICANT CHANGE UP (ref 10.3–14.5)
S AUREUS DNA NOSE QL NAA+PROBE: DETECTED
SARS-COV-2 IGG+IGM SERPL QL IA: >250 U/ML — HIGH
SARS-COV-2 IGG+IGM SERPL QL IA: POSITIVE
SODIUM SERPL-SCNC: 145 MMOL/L — SIGNIFICANT CHANGE UP (ref 135–145)
SPECIMEN SOURCE: SIGNIFICANT CHANGE UP
VANCOMYCIN TROUGH SERPL-MCNC: 7.3 UG/ML — LOW (ref 10–20)
WBC # BLD: 15.84 K/UL — HIGH (ref 3.8–10.5)
WBC # FLD AUTO: 15.84 K/UL — HIGH (ref 3.8–10.5)

## 2021-06-30 RX ORDER — VANCOMYCIN HCL 1 G
1000 VIAL (EA) INTRAVENOUS EVERY 12 HOURS
Refills: 0 | Status: DISCONTINUED | OUTPATIENT
Start: 2021-06-30 | End: 2021-07-02

## 2021-06-30 RX ADMIN — FLUVOXAMINE MALEATE 100 MILLIGRAM(S): 25 TABLET ORAL at 17:24

## 2021-06-30 RX ADMIN — DIVALPROEX SODIUM 250 MILLIGRAM(S): 500 TABLET, DELAYED RELEASE ORAL at 21:14

## 2021-06-30 RX ADMIN — Medication 1 TABLET(S): at 11:30

## 2021-06-30 RX ADMIN — DIVALPROEX SODIUM 250 MILLIGRAM(S): 500 TABLET, DELAYED RELEASE ORAL at 15:06

## 2021-06-30 RX ADMIN — Medication 81 MILLIGRAM(S): at 11:30

## 2021-06-30 RX ADMIN — Medication 250 MILLIGRAM(S): at 06:29

## 2021-06-30 RX ADMIN — Medication 0.5 MILLIGRAM(S): at 06:26

## 2021-06-30 RX ADMIN — NYSTATIN CREAM 1 APPLICATION(S): 100000 CREAM TOPICAL at 15:06

## 2021-06-30 RX ADMIN — AMLODIPINE BESYLATE 5 MILLIGRAM(S): 2.5 TABLET ORAL at 06:26

## 2021-06-30 RX ADMIN — HEPARIN SODIUM 5000 UNIT(S): 5000 INJECTION INTRAVENOUS; SUBCUTANEOUS at 17:24

## 2021-06-30 RX ADMIN — TAMSULOSIN HYDROCHLORIDE 0.8 MILLIGRAM(S): 0.4 CAPSULE ORAL at 21:15

## 2021-06-30 RX ADMIN — FLUVOXAMINE MALEATE 100 MILLIGRAM(S): 25 TABLET ORAL at 06:26

## 2021-06-30 RX ADMIN — NYSTATIN CREAM 1 APPLICATION(S): 100000 CREAM TOPICAL at 06:26

## 2021-06-30 RX ADMIN — Medication 25 MILLIGRAM(S): at 06:30

## 2021-06-30 RX ADMIN — Medication 0.5 MILLIGRAM(S): at 17:24

## 2021-06-30 RX ADMIN — Medication 250 MILLIGRAM(S): at 18:43

## 2021-06-30 RX ADMIN — NYSTATIN CREAM 1 APPLICATION(S): 100000 CREAM TOPICAL at 06:27

## 2021-06-30 RX ADMIN — SODIUM CHLORIDE 70 MILLILITER(S): 9 INJECTION INTRAMUSCULAR; INTRAVENOUS; SUBCUTANEOUS at 06:30

## 2021-06-30 RX ADMIN — CEFTRIAXONE 100 MILLIGRAM(S): 500 INJECTION, POWDER, FOR SOLUTION INTRAMUSCULAR; INTRAVENOUS at 17:24

## 2021-06-30 RX ADMIN — Medication 25 MILLIGRAM(S): at 17:24

## 2021-06-30 RX ADMIN — PANTOPRAZOLE SODIUM 40 MILLIGRAM(S): 20 TABLET, DELAYED RELEASE ORAL at 06:26

## 2021-06-30 RX ADMIN — Medication 75 MICROGRAM(S): at 06:26

## 2021-06-30 RX ADMIN — NYSTATIN CREAM 1 APPLICATION(S): 100000 CREAM TOPICAL at 22:00

## 2021-06-30 RX ADMIN — NYSTATIN CREAM 1 APPLICATION(S): 100000 CREAM TOPICAL at 17:26

## 2021-06-30 RX ADMIN — HEPARIN SODIUM 5000 UNIT(S): 5000 INJECTION INTRAVENOUS; SUBCUTANEOUS at 06:30

## 2021-06-30 RX ADMIN — DIVALPROEX SODIUM 250 MILLIGRAM(S): 500 TABLET, DELAYED RELEASE ORAL at 06:26

## 2021-06-30 NOTE — PHYSICAL THERAPY INITIAL EVALUATION ADULT - PERTINENT HX OF CURRENT PROBLEM, REHAB EVAL
65 yo M p/w BIB EMS from SNF for episode of wheezing today. No known coughing / uri. pt fully vaccinated to covid, no known recent exposure. no known pain. No recent travel. no known sick contacts. Pt non-verbal, no other hx avail.

## 2021-06-30 NOTE — PROGRESS NOTE ADULT - SUBJECTIVE AND OBJECTIVE BOX
Patient is a 66y Male with a known history of :  Sepsis, unspecified organism [A41.9]      HPI:      REVIEW OF SYSTEMS:    CONSTITUTIONAL: No weakness, no fevers   EYES/ENT: No visual changes  NECK: No pain or stiffness  RESPIRATORY: No shortness of breath  CARDIOVASCULAR: No chest pain or palpitations  GASTROINTESTINAL: No abdominal pain  GENITOURINARY: No hematuria  NEUROLOGICAL: No weakness  SKIN: No rash  All other review of systems is negative unless indicated above    MEDICATIONS  (STANDING):  amLODIPine   Tablet 5 milliGRAM(s) Oral daily  aspirin  chewable 81 milliGRAM(s) Oral daily  cefTRIAXone   IVPB 1000 milliGRAM(s) IV Intermittent every 24 hours  clonazePAM  Tablet 0.5 milliGRAM(s) Oral two times a day  diVALproex  milliGRAM(s) Oral three times a day  fluvoxaMINE 100 milliGRAM(s) Oral two times a day  heparin   Injectable 5000 Unit(s) SubCutaneous every 12 hours  levothyroxine 75 MICROGram(s) Oral daily  metoprolol tartrate 25 milliGRAM(s) Oral two times a day  multivitamin/minerals 1 Tablet(s) Oral daily  nystatin Cream 1 Application(s) Topical two times a day  nystatin Powder 1 Application(s) Topical three times a day  pantoprazole    Tablet 40 milliGRAM(s) Oral before breakfast  tamsulosin 0.8 milliGRAM(s) Oral at bedtime  vancomycin  IVPB 750 milliGRAM(s) IV Intermittent every 12 hours    MEDICATIONS  (PRN):  acetaminophen   Tablet .. 325 milliGRAM(s) Oral every 4 hours PRN Temp greater or equal to 38C (100.4F), Mild Pain (1 - 3)      ALLERGIES: Augmentin (Unknown)  aztreonam (Rash)  penicillin (Unknown)  sulfa drugs (Unknown)      FAMILY HISTORY:  No pertinent family history in first degree relatives        PHYSICAL EXAMINATION:  -----------------------------  T(C): 36.9 (06-30-21 @ 09:19), Max: 37.8 (06-29-21 @ 16:41)  HR: 81 (06-30-21 @ 09:19) (67 - 94)  BP: 108/66 (06-30-21 @ 09:19) (96/60 - 127/81)  RR: 19 (06-30-21 @ 09:19) (17 - 19)  SpO2: 91% (06-30-21 @ 09:19) (90% - 93%)  Wt(kg): --    06-29 @ 07:01  -  06-30 @ 07:00  --------------------------------------------------------  IN:    IV PiggyBack: 50 mL    IV PiggyBack: 500 mL    Oral Fluid: 200 mL    sodium chloride 0.9%: 1680 mL  Total IN: 2430 mL    OUT:    Incontinent per Diaper, Weight (mL): 7 mL  Total OUT: 7 mL    Total NET: 2423 mL    PHYSICAL EXAM:  Constitutional: NAD  Neurological: Alert and oriented  HEENT: EOMI, no JVD  Cardiovascular: S1 and S2, no murmur  Pulmonary: breath sounds bilaterally  Gastrointestinal: Bowel Sounds present, soft, nontender  Ext: no peripheral edema  Skin: No rashes, No cyanosis.  Psych:  Mood calm    LABS: All Labs Reviewed:          LABS:   --------  06-29    143  |  112<H>  |  24<H>  ----------------------------<  188<H>  4.0   |  21<L>  |  1.20    Ca    8.5      29 Jun 2021 17:11    TPro  6.5  /  Alb  2.8<L>  /  TBili  0.3  /  DBili  x   /  AST  28  /  ALT  29  /  AlkPhos  53  06-29                         13.4   13.16 )-----------( 166      ( 29 Jun 2021 17:11 )             39.1             Culture Results:   No growth (06-29 @ 00:34)  Culture Results:   No growth to date. (06-29 @ 00:32)  Culture Results:   No growth to date. (06-29 @ 00:32)

## 2021-06-30 NOTE — PROGRESS NOTE ADULT - SUBJECTIVE AND OBJECTIVE BOX
SYLVAIN ROSELIA is a 66yMale , patient examined and chart reviewed.    INTERVAL HPI/ OVERNIGHT EVENTS:   In chair. NAD.   Afebrile.    PAST MEDICAL & SURGICAL HISTORY:  MR (mental retardation), severe  Seizure  HTN (hypertension)  Psoriasis  DD (diastrophic dysplasia)  Blind  right eye  Constipation, unspecified constipation type      For details regarding the patient's social history, family history, and other miscellaneous elements, please refer the initial infectious diseases consultation and/or the admitting history and physical examination for this admission.    ROS:  Unable to obtain due to : MRDD    ALLERGIES:  Augmentin (Unknown)  aztreonam (Rash)  penicillin (Unknown)  sulfa drugs (Unknown)      Current inpatient medications :    ANTIBIOTICS/RELEVANT:  cefTRIAXone   IVPB 1000 milliGRAM(s) IV Intermittent every 24 hours  vancomycin  IVPB 750 milliGRAM(s) IV Intermittent every 12 hours      acetaminophen   Tablet .. 325 milliGRAM(s) Oral every 4 hours PRN  amLODIPine   Tablet 5 milliGRAM(s) Oral daily  aspirin  chewable 81 milliGRAM(s) Oral daily  clonazePAM  Tablet 0.5 milliGRAM(s) Oral two times a day  diVALproex  milliGRAM(s) Oral three times a day  fluvoxaMINE 100 milliGRAM(s) Oral two times a day  heparin   Injectable 5000 Unit(s) SubCutaneous every 12 hours  levothyroxine 75 MICROGram(s) Oral daily  metoprolol tartrate 25 milliGRAM(s) Oral two times a day  multivitamin/minerals 1 Tablet(s) Oral daily  nystatin Cream 1 Application(s) Topical two times a day  nystatin Powder 1 Application(s) Topical three times a day  pantoprazole    Tablet 40 milliGRAM(s) Oral before breakfast  tamsulosin 0.8 milliGRAM(s) Oral at bedtime      Objective:     @ 07:  -   @ 07:00  --------------------------------------------------------  IN: 2430 mL / OUT: 7 mL / NET: 2423 mL      T(C): 36.5 (21 @ 16:39), Max: 36.9 (21 @ 09:19)  HR: 102 (21 @ 17:20) (67 - 102)  BP: 142/69 (21 @ 17:20) (96/60 - 142/69)  RR: 18 (21 @ 17:20) (17 - 19)  SpO2: 91% (21 @ 16:39) (91% - 93%)      Physical Exam:  General:  no acute distress  Neck: supple, trachea midline  Lungs: clear, no wheeze/rhonchi  Cardiovascular: regular rate and rhythm, S1 S2  Abdomen: soft, nontender,  bowel sounds normal right sided and lower abd erythema resolving  Neurological: awake MRDD  Skin: no rash  Extremities: no edema        LABS:                          13.2   15.84 )-----------( 194      ( 2021 16:45 )             38.8           145  |  112<H>  |  22  ----------------------------<  115<H>  3.8   |  24  |  0.90    Ca    8.8      2021 16:45    TPro  7.2  /  Alb  3.1<L>  /  TBili  0.2  /  DBili  x   /  AST  20  /  ALT  28  /  AlkPhos  54          Urinalysis Basic - ( 2021 21:25 )    Color: Yellow / Appearance: Clear / S.015 / pH: x  Gluc: x / Ketone: Negative  / Bili: Negative / Urobili: Negative   Blood: x / Protein: Negative / Nitrite: Negative   Leuk Esterase: Negative / RBC: Negative /HPF / WBC 3-5   Sq Epi: x / Non Sq Epi: Occasional / Bacteria: Occasional    Vancomycin Level, Trough: 7.3 ug/mL ( @ 17:57)      MICROBIOLOGY:    Culture - Urine (collected 2021 00:34)  Source: .Urine Catheterized  Final Report (2021 00:10):    No growth    Culture - Blood (collected 2021 00:32)  Source: .Blood Blood-Peripheral  Preliminary Report (2021 01:02):    No growth to date.    Culture - Blood (collected 2021 00:32)  Source: .Blood Blood-Peripheral  Preliminary Report (2021 01:02):    No growth to date.    RADIOLOGY & ADDITIONAL STUDIES:  EXAM:  CT ABDOMEN AND PELVIS                          EXAM:  CT CHEST                            PROCEDURE DATE:  2021          INTERPRETATION:  CLINICAL INFORMATION: Cough, fever, tachycardia. History mental retardation.    COMPARISON: CT scan chest abdomen pelvis 2021.    CONTRAST/COMPLICATIONS:  IV Contrast: NONE  0 cc administered   0 cc discarded  Oral Contrast: NONE  Complications: None reported at time of study completion    PROCEDURE:  CT of the Chest, Abdomen and Pelvis was performed.  Sagittal and coronal reformats were performed.    FINDINGS:    CHEST:    LUNGS AND LARGE AIRWAYS: PLEURA:  There is persistent patchy groundglass opacification involving the right lower lobe.  The central airways remain patent.    No significant pleural effusion.    VESSELS: Mild atherosclerotic calcification of thoracic aorta with coronary artery calcifications.    HEART: Heart size is normal. No pericardial effusion.    MEDIASTINUM AND GREYSON:  Small, subcentimeter short axis mediastinal lymph nodes, stable.    CHEST WALL AND LOWER NECK: Within normal limits.    ABDOMEN AND PELVIS:    Streak and motion artifact degrades image quality.    The evaluation of the solid organ parenchyma is limited without intravenous contrast.    LIVER: Within normal limits.  BILE DUCTS: Normal caliber.  GALLBLADDER: Prior cholecystectomy.  SPLEEN: Within normal limits.  PANCREAS: Within normal limits.  ADRENALS: Within normal limits.  KIDNEYS/URETERS: No hydronephrosis or perinephric fluid collection.  Small indeterminate hypo and hyperdense left-sided renal lesions.    BLADDER: Within normal limits.  REPRODUCTIVE ORGANS: Prostate within normal limits.    BOWEL: Stomach moderately distended with retained fluid/particulate matter.  Fluid-filled colon, nonspecific, can be associated with diarrhea.  No bowel obstruction.   Appendix normal.  PERITONEUM: No ascites.  No localized intra-abdominal fluid collection or pneumoperitoneum noted.    VESSELS: Atherosclerotic calcification.  RETROPERITONEUM/LYMPH NODES: No enlarged lymphadenopathy.    ABDOMINAL WALL: Within normal limits.    BONES:  Degenerative changes spine.  Chronic appearing bilateral rib fracture deformities.    IMPRESSION:    Persistent groundglass opacification right lower lobe.  In the chronic setting finding could reflect organizing pneumonia, hypersensitivity pneumonitis or lung fibrosis.    No acute intra-abdominal pathology noted.      Assessment :   64YO M Phx of MRDD, HTN, seizure resident of group home hx of Right LE cellulitis admitted with fever likely sec right sided lower abd wall cellulitis with lymphangitis.  better today.  Sp fever  Doubt pneumonia  Pt has chronic RLL changes.    Plan :   Cont Vancomycin and  Rocephin  Fu cultures  Trend temps and cbc  Asp precautions      Continue with present regiment.  Appropriate use of antibiotics and adverse effects reviewed.    > 35 minutes were spent in direct patient care reviewing notes, medications ,labs data/ imaging , discussion with multidisciplinary team.    Thank you for allowing me to participate in care of your patient .    Olivier Daniels MD  Infectious Disease  [phone]

## 2021-06-30 NOTE — PHYSICAL THERAPY INITIAL EVALUATION ADULT - BED MOBILITY TRAINING, PT EVAL
Patient will perform all bed mobility with supervision in 2 weeks in order to increase safety at home

## 2021-06-30 NOTE — PHYSICAL THERAPY INITIAL EVALUATION ADULT - TRANSFER TRAINING, PT EVAL
Patient will transfer from all surfaces with supervision in 2 weeks in order to increase mobility at home

## 2021-06-30 NOTE — PHYSICAL THERAPY INITIAL EVALUATION ADULT - GAIT TRAINING, PT EVAL
Patient will ambulate x 100 feet with appropriate device in 2 weeks with supervision in order to increase mobility at home

## 2021-06-30 NOTE — PROGRESS NOTE ADULT - SUBJECTIVE AND OBJECTIVE BOX
PAST MEDICAL & SURGICAL HISTORY:  MR (mental retardation), severe    Seizure    HTN (hypertension)    Psoriasis    DD (diastrophic dysplasia)    Blind  right eye    Constipation, unspecified constipation type    No significant past surgical history        MEDICATIONS  (STANDING):  amLODIPine   Tablet 5 milliGRAM(s) Oral daily  aspirin  chewable 81 milliGRAM(s) Oral daily  cefTRIAXone   IVPB 1000 milliGRAM(s) IV Intermittent every 24 hours  clonazePAM  Tablet 0.5 milliGRAM(s) Oral two times a day  diVALproex  milliGRAM(s) Oral three times a day  fluvoxaMINE 100 milliGRAM(s) Oral two times a day  heparin   Injectable 5000 Unit(s) SubCutaneous every 12 hours  levothyroxine 75 MICROGram(s) Oral daily  metoprolol tartrate 25 milliGRAM(s) Oral two times a day  multivitamin/minerals 1 Tablet(s) Oral daily  nystatin Cream 1 Application(s) Topical two times a day  nystatin Powder 1 Application(s) Topical three times a day  pantoprazole    Tablet 40 milliGRAM(s) Oral before breakfast  tamsulosin 0.8 milliGRAM(s) Oral at bedtime  vancomycin  IVPB 750 milliGRAM(s) IV Intermittent every 12 hours    MEDICATIONS  (PRN):  acetaminophen   Tablet .. 325 milliGRAM(s) Oral every 4 hours PRN Temp greater or equal to 38C (100.4F), Mild Pain (1 - 3)      Patient is a 66y old  Male who presents with a chief complaint of fevers cough (2021 10:06)      Vital Signs Last 24 Hrs  T(C): 36.9 (2021 09:19), Max: 37.8 (2021 16:41)  T(F): 98.5 (2021 09:19), Max: 100.1 (2021 16:41)  HR: 81 (2021 09:19) (67 - 94)  BP: 108/66 (2021 09:19) (96/60 - 127/81)  BP(mean): --  RR: 19 (2021 09:19) (17 - 19)  SpO2: 91% (2021 09:19) (90% - 93%)          - @ 07:01  -   @ 07:00  --------------------------------------------------------  IN: 2430 mL / OUT: 7 mL / NET: 2423 mL        REVIEW OF SYSTEMS:    Constitutional: No fever, weight loss or fatigue  Eyes: No eye pain, visual disturbances, or discharge  ENT:  No difficulty hearing, tinnitus, vertigo; No sinus or throat pain  Neck: No pain or stiffness  Breasts: No pain, masses or nipple discharge  Respiratory: No cough, wheezing, chills or hemoptysis  Cardiovascular: No chest pain, palpitations, shortness of breath, dizziness or leg swelling  Gastrointestinal: No abdominal or epigastric pain. No nausea, vomiting or hematemesis; No diarrhea or constipation. No melena or hematochezia.  Genitourinary: No dysuria, frequency, hematuria or incontinence  Rectal: No pain, hemorrhoids or incontinence  Neurological: No headaches, memory loss, loss of strength, numbness or tremors  Skin: No itching, burning, rashes or lesions   Lymph Nodes: No enlarged glands  Endocrine: No heat or cold intolerance; No hair loss  Musculoskeletal: No joint pain or swelling; No muscle, back or extremity pain  Psychiatric: No depression, anxiety, mood swings or difficulty sleeping  Heme/Lymph: No easy bruising or bleeding gums  Allergy and Immunologic: No hives or eczema    PHYSICAL EXAM:  alert  eating well  smiling  Constitutional: NAD, Respiratory: Clear upper lobes   Cardiovascular: S1 and S2, RRR, no M/G/R  Gastrointestinal: BS+, soft, NT/ND  Extremities: No peripheral edema  Neurological: moves all ext  Skin: No rashes      decubiti: none                          13.4   13.16 )-----------( 166      ( 2021 17:11 )             39.1         143  |  112<H>  |  24<H>  ----------------------------<  188<H>  4.0   |  21<L>  |  1.20    Ca    8.5      2021 17:11    TPro  6.5  /  Alb  2.8<L>  /  TBili  0.3  /  DBili  x   /  AST  28  /  ALT  29  /  AlkPhos  53        Urinalysis Basic - ( 2021 21:25 )    Color: Yellow / Appearance: Clear / S.015 / pH: x  Gluc: x / Ketone: Negative  / Bili: Negative / Urobili: Negative   Blood: x / Protein: Negative / Nitrite: Negative   Leuk Esterase: Negative / RBC: Negative /HPF / WBC 3-5   Sq Epi: x / Non Sq Epi: Occasional / Bacteria: Occasional            .Urine Catheterized   @ 00:34   No growth  --  --      .Blood Blood-Peripheral   @ 00:32   No growth to date.  --  --        Urine Culture:   @ 00:34    --        No growth  Urine Culture:   @ 00:32    --        No growth to date.        Radiology:  < from: CT Abdomen and Pelvis No Cont (21 @ 15:57) >    INTERPRETATION:  CLINICAL INFORMATION: Cough, fever, tachycardia. History mental retardation.    COMPARISON: CT scan chest abdomen pelvis 2021.    CONTRAST/COMPLICATIONS:  IV Contrast: NONE  0 cc administered   0 cc discarded  Oral Contrast: NONE  Complications: None reported at time of study completion    PROCEDURE:  CT of the Chest, Abdomen and Pelvis was performed.  Sagittal and coronal reformats were performed.    FINDINGS:    CHEST:    LUNGS AND LARGE AIRWAYS: PLEURA:  There is persistent patchy groundglass opacification involving the right lower lobe.  The central airways remain patent.    No significant pleural effusion.    VESSELS: Mild atherosclerotic calcification of thoracic aorta with coronary artery calcifications.    HEART: Heart size is normal. No pericardial effusion.    MEDIASTINUM AND GREYSON:  Small, subcentimeter short axis mediastinal lymph nodes, stable.    CHEST WALL AND LOWER NECK: Within normal limits.    ABDOMEN AND PELVIS:    Streak and motion artifact degrades image quality.    The evaluation of the solid organ parenchyma is limited without intravenous contrast.    LIVER: Within normal limits.  BILE DUCTS: Normal caliber.  GALLBLADDER: Prior cholecystectomy.  SPLEEN: Within normal limits.  PANCREAS: Within normal limits.  ADRENALS: Within normal limits.  KIDNEYS/URETERS: No hydronephrosis or perinephric fluid collection.  Small indeterminate hypo and hyperdense left-sided renal lesions.    BLADDER: Within normal limits.  REPRODUCTIVE ORGANS: Prostate within normal limits.    BOWEL: Stomach moderately distended with retained fluid/particulate matter.    < end of copied text >  < from: CT Abdomen and Pelvis No Cont (21 @ 15:57) >  Fluid-filled colon, nonspecific, can be associated with diarrhea.  No bowel obstruction.   Appendix normal.  PERITONEUM: No ascites.  No localized intra-abdominal fluid collection or pneumoperitoneum noted.    VESSELS: Atherosclerotic calcification.  RETROPERITONEUM/LYMPH NODES: No enlarged lymphadenopathy.    ABDOMINAL WALL: Within normal limits.    BONES:  Degenerative changes spine.  Chronic appearing bilateral rib fracture deformities.    IMPRESSION:    Persistent groundglass opacification right lower lobe.  In the chronic setting finding could reflect organizing pneumonia, hypersensitivity pneumonitis or lung fibrosis.    No acute intra-abdominal pathology noted.    Other findings as discussed above.                < end of copied text >     PAST MEDICAL & SURGICAL HISTORY:  MR (mental retardation), severe    Seizure    HTN (hypertension)    Psoriasis    DD (diastrophic dysplasia)    Blind  right eye    Constipation, unspecified constipation type    No significant past surgical history        MEDICATIONS  (STANDING):  amLODIPine   Tablet 5 milliGRAM(s) Oral daily  aspirin  chewable 81 milliGRAM(s) Oral daily  cefTRIAXone   IVPB 1000 milliGRAM(s) IV Intermittent every 24 hours  clonazePAM  Tablet 0.5 milliGRAM(s) Oral two times a day  diVALproex  milliGRAM(s) Oral three times a day  fluvoxaMINE 100 milliGRAM(s) Oral two times a day  heparin   Injectable 5000 Unit(s) SubCutaneous every 12 hours  levothyroxine 75 MICROGram(s) Oral daily  metoprolol tartrate 25 milliGRAM(s) Oral two times a day  multivitamin/minerals 1 Tablet(s) Oral daily  nystatin Cream 1 Application(s) Topical two times a day  nystatin Powder 1 Application(s) Topical three times a day  pantoprazole    Tablet 40 milliGRAM(s) Oral before breakfast  tamsulosin 0.8 milliGRAM(s) Oral at bedtime  vancomycin  IVPB 750 milliGRAM(s) IV Intermittent every 12 hours    MEDICATIONS  (PRN):  acetaminophen   Tablet .. 325 milliGRAM(s) Oral every 4 hours PRN Temp greater or equal to 38C (100.4F), Mild Pain (1 - 3)      Patient is a 66y old  Male who presents with a chief complaint of fevers cough (2021 10:06)      Vital Signs Last 24 Hrs  T(C): 36.9 (2021 09:19), Max: 37.8 (2021 16:41)  T(F): 98.5 (2021 09:19), Max: 100.1 (2021 16:41)  HR: 81 (2021 09:19) (67 - 94)  BP: 108/66 (2021 09:19) (96/60 - 127/81)  BP(mean): --  RR: 19 (2021 09:19) (17 - 19)  SpO2: 91% (2021 09:19) (90% - 93%)          - @ 07:01  -   @ 07:00  --------------------------------------------------------  IN: 2430 mL / OUT: 7 mL / NET: 2423 mL        REVIEW OF SYSTEMS:    Constitutional: No fever, weight loss or fatigue  Eyes: No eye pain, visual disturbances, or discharge  ENT:  No difficulty hearing, tinnitus, vertigo; No sinus or throat pain  Neck: No pain or stiffness  Breasts: No pain, masses or nipple discharge  Respiratory: No cough, wheezing, chills or hemoptysis  Cardiovascular: No chest pain, palpitations, shortness of breath, dizziness or leg swelling  Gastrointestinal: No abdominal or epigastric pain. No nausea, vomiting or hematemesis; No diarrhea or constipation. No melena or hematochezia.  Genitourinary: No dysuria, frequency, hematuria or incontinence  Rectal: No pain, hemorrhoids or incontinence  Neurological: No headaches, memory loss, loss of strength, numbness or tremors  Skin: No itching, burning, rashes or lesions   Lymph Nodes: No enlarged glands  Endocrine: No heat or cold intolerance; No hair loss  Musculoskeletal: No joint pain or swelling; No muscle, back or extremity pain  Psychiatric: No depression, anxiety, mood swings or difficulty sleeping  Heme/Lymph: No easy bruising or bleeding gums  Allergy and Immunologic: No hives or eczema    PHYSICAL EXAM:room air 91 percent  alert  eating well  smiling  Constitutional: NAD, Respiratory: Clear upper lobes   Cardiovascular: S1 and S2, RRR, no M/G/R  Gastrointestinal: BS+, soft, NT/ND  Extremities: No peripheral edema  Neurological: moves all ext  Skin: No rashes      decubiti: none                          13.4   13.16 )-----------( 166      ( 2021 17:11 )             39.1         143  |  112<H>  |  24<H>  ----------------------------<  188<H>  4.0   |  21<L>  |  1.20    Ca    8.5      2021 17:11    TPro  6.5  /  Alb  2.8<L>  /  TBili  0.3  /  DBili  x   /  AST  28  /  ALT  29  /  AlkPhos  53        Urinalysis Basic - ( 2021 21:25 )    Color: Yellow / Appearance: Clear / S.015 / pH: x  Gluc: x / Ketone: Negative  / Bili: Negative / Urobili: Negative   Blood: x / Protein: Negative / Nitrite: Negative   Leuk Esterase: Negative / RBC: Negative /HPF / WBC 3-5   Sq Epi: x / Non Sq Epi: Occasional / Bacteria: Occasional            .Urine Catheterized   @ 00:34   No growth  --  --      .Blood Blood-Peripheral   @ 00:32   No growth to date.  --  --        Urine Culture:   @ 00:34    --        No growth  Urine Culture:   @ 00:32    --        No growth to date.        Radiology:  < from: CT Abdomen and Pelvis No Cont (21 @ 15:57) >    INTERPRETATION:  CLINICAL INFORMATION: Cough, fever, tachycardia. History mental retardation.    COMPARISON: CT scan chest abdomen pelvis 2021.    CONTRAST/COMPLICATIONS:  IV Contrast: NONE  0 cc administered   0 cc discarded  Oral Contrast: NONE  Complications: None reported at time of study completion    PROCEDURE:  CT of the Chest, Abdomen and Pelvis was performed.  Sagittal and coronal reformats were performed.    FINDINGS:    CHEST:    LUNGS AND LARGE AIRWAYS: PLEURA:  There is persistent patchy groundglass opacification involving the right lower lobe.  The central airways remain patent.    No significant pleural effusion.    VESSELS: Mild atherosclerotic calcification of thoracic aorta with coronary artery calcifications.    HEART: Heart size is normal. No pericardial effusion.    MEDIASTINUM AND GREYSON:  Small, subcentimeter short axis mediastinal lymph nodes, stable.    CHEST WALL AND LOWER NECK: Within normal limits.    ABDOMEN AND PELVIS:    Streak and motion artifact degrades image quality.    The evaluation of the solid organ parenchyma is limited without intravenous contrast.    LIVER: Within normal limits.  BILE DUCTS: Normal caliber.  GALLBLADDER: Prior cholecystectomy.  SPLEEN: Within normal limits.  PANCREAS: Within normal limits.  ADRENALS: Within normal limits.  KIDNEYS/URETERS: No hydronephrosis or perinephric fluid collection.  Small indeterminate hypo and hyperdense left-sided renal lesions.    BLADDER: Within normal limits.  REPRODUCTIVE ORGANS: Prostate within normal limits.    BOWEL: Stomach moderately distended with retained fluid/particulate matter.    < end of copied text >  < from: CT Abdomen and Pelvis No Cont (06.29.21 @ 15:57) >  Fluid-filled colon, nonspecific, can be associated with diarrhea.  No bowel obstruction.   Appendix normal.  PERITONEUM: No ascites.  No localized intra-abdominal fluid collection or pneumoperitoneum noted.    VESSELS: Atherosclerotic calcification.  RETROPERITONEUM/LYMPH NODES: No enlarged lymphadenopathy.    ABDOMINAL WALL: Within normal limits.    BONES:  Degenerative changes spine.  Chronic appearing bilateral rib fracture deformities.    IMPRESSION:    Persistent groundglass opacification right lower lobe.  In the chronic setting finding could reflect organizing pneumonia, hypersensitivity pneumonitis or lung fibrosis.    No acute intra-abdominal pathology noted.    Other findings as discussed above.                < end of copied text >

## 2021-06-30 NOTE — PROGRESS NOTE ADULT - SUBJECTIVE AND OBJECTIVE BOX
ROSELIA NARAYAN    \[facility]\"" [address]    Patient is a 66y old  Male who presents with a chief complaint of fevers cough (2021 16:19)       Allergies    Augmentin (Unknown)  aztreonam (Rash)  penicillin (Unknown)  sulfa drugs (Unknown)    Intolerances        HPI:      PAST MEDICAL & SURGICAL HISTORY:  MR (mental retardation), severe    Seizure    HTN (hypertension)    Psoriasis    DD (diastrophic dysplasia)    Blind  right eye    Constipation, unspecified constipation type    No significant past surgical history        FAMILY HISTORY:  No pertinent family history in first degree relatives          MEDICATIONS   acetaminophen   Tablet .. 325 milliGRAM(s) Oral every 4 hours PRN  amLODIPine   Tablet 5 milliGRAM(s) Oral daily  aspirin  chewable 81 milliGRAM(s) Oral daily  cefTRIAXone   IVPB 1000 milliGRAM(s) IV Intermittent every 24 hours  clonazePAM  Tablet 0.5 milliGRAM(s) Oral two times a day  diVALproex  milliGRAM(s) Oral three times a day  fluvoxaMINE 100 milliGRAM(s) Oral two times a day  heparin   Injectable 5000 Unit(s) SubCutaneous every 12 hours  levothyroxine 75 MICROGram(s) Oral daily  metoprolol tartrate 25 milliGRAM(s) Oral two times a day  multivitamin/minerals 1 Tablet(s) Oral daily  nystatin Cream 1 Application(s) Topical two times a day  nystatin Powder 1 Application(s) Topical three times a day  pantoprazole    Tablet 40 milliGRAM(s) Oral before breakfast  sodium chloride 0.9%. 1000 milliLiter(s) IV Continuous <Continuous>  tamsulosin 0.8 milliGRAM(s) Oral at bedtime  vancomycin  IVPB 750 milliGRAM(s) IV Intermittent every 12 hours      Vital Signs Last 24 Hrs  T(C): 36.6 (2021 04:58), Max: 37.8 (2021 16:41)  T(F): 97.8 (2021 04:58), Max: 100.1 (2021 16:41)  HR: 67 (2021 04:58) (67 - 94)  BP: 118/62 (2021 06:38) (96/60 - 127/81)  BP(mean): --  RR: 18 (2021 04:58) (17 - 19)  SpO2: 93% (2021 04:58) (90% - 93%)      21 @ 07:01  -  21 @ 07:00  --------------------------------------------------------  IN: 2430 mL / OUT: 7 mL / NET: 2423 mL            LABS:                        13.4   13.16 )-----------( 166      ( 2021 17:11 )             39.1         143  |  112<H>  |  24<H>  ----------------------------<  188<H>  4.0   |  21<L>  |  1.20    Ca    8.5      2021 17:11    TPro  6.5  /  Alb  2.8<L>  /  TBili  0.3  /  DBili  x   /  AST  28  /  ALT    /  AlkPhos  53        Urinalysis Basic - ( 2021 21:25 )    Color: Yellow / Appearance: Clear / S.015 / pH: x  Gluc: x / Ketone: Negative  / Bili: Negative / Urobili: Negative   Blood: x / Protein: Negative / Nitrite: Negative   Leuk Esterase: Negative / RBC: Negative /HPF / WBC 3-5   Sq Epi: x / Non Sq Epi: Occasional / Bacteria: Occasional            WBC:  WBC Count: 13.16 K/uL ( @ 17:11)  WBC Count: 9.75 K/uL ( @ 20:36)      MICROBIOLOGY:  RECENT CULTURES:   .Urine Catheterized XXXX XXXX   No growth     .Blood Blood-Peripheral XXXX XXXX   No growth to date.                    Sodium:  Sodium, Serum: 143 mmol/L ( @ 17:11)  Sodium, Serum: 143 mmol/L ( @ 21:15)      1.20 mg/dL  @ 17:11  0.83 mg/dL  @ 21:15      Hemoglobin:  Hemoglobin: 13.4 g/dL ( @ 17:11)  Hemoglobin: 16.1 g/dL ( @ 20:36)      Platelets: Platelet Count - Automated: 166 K/uL ( @ 17:11)  Platelet Count - Automated: 200 K/uL ( @ 20:36)      LIVER FUNCTIONS - ( 2021 17:11 )  Alb: 2.8 g/dL / Pro: 6.5 g/dL / ALK PHOS: 53 U/L / ALT: 29 U/L / AST: 28 U/L / GGT: x             Urinalysis Basic - ( 2021 21:25 )    Color: Yellow / Appearance: Clear / S.015 / pH: x  Gluc: x / Ketone: Negative  / Bili: Negative / Urobili: Negative   Blood: x / Protein: Negative / Nitrite: Negative   Leuk Esterase: Negative / RBC: Negative /HPF / WBC 3-5   Sq Epi: x / Non Sq Epi: Occasional / Bacteria: Occasional        RADIOLOGY & ADDITIONAL STUDIES:

## 2021-07-01 ENCOUNTER — TRANSCRIPTION ENCOUNTER (OUTPATIENT)
Age: 66
End: 2021-07-01

## 2021-07-01 LAB
BASOPHILS # BLD AUTO: 0.03 K/UL — SIGNIFICANT CHANGE UP (ref 0–0.2)
BASOPHILS NFR BLD AUTO: 0.3 % — SIGNIFICANT CHANGE UP (ref 0–2)
EOSINOPHIL # BLD AUTO: 0.72 K/UL — HIGH (ref 0–0.5)
EOSINOPHIL NFR BLD AUTO: 6.8 % — HIGH (ref 0–6)
HCT VFR BLD CALC: 40.1 % — SIGNIFICANT CHANGE UP (ref 39–50)
HGB BLD-MCNC: 13.8 G/DL — SIGNIFICANT CHANGE UP (ref 13–17)
IMM GRANULOCYTES NFR BLD AUTO: 0.8 % — SIGNIFICANT CHANGE UP (ref 0–1.5)
LYMPHOCYTES # BLD AUTO: 2.53 K/UL — SIGNIFICANT CHANGE UP (ref 1–3.3)
LYMPHOCYTES # BLD AUTO: 23.9 % — SIGNIFICANT CHANGE UP (ref 13–44)
MCHC RBC-ENTMCNC: 30.5 PG — SIGNIFICANT CHANGE UP (ref 27–34)
MCHC RBC-ENTMCNC: 34.4 GM/DL — SIGNIFICANT CHANGE UP (ref 32–36)
MCV RBC AUTO: 88.7 FL — SIGNIFICANT CHANGE UP (ref 80–100)
MONOCYTES # BLD AUTO: 0.72 K/UL — SIGNIFICANT CHANGE UP (ref 0–0.9)
MONOCYTES NFR BLD AUTO: 6.8 % — SIGNIFICANT CHANGE UP (ref 2–14)
NEUTROPHILS # BLD AUTO: 6.51 K/UL — SIGNIFICANT CHANGE UP (ref 1.8–7.4)
NEUTROPHILS NFR BLD AUTO: 61.4 % — SIGNIFICANT CHANGE UP (ref 43–77)
NRBC # BLD: 0 /100 WBCS — SIGNIFICANT CHANGE UP (ref 0–0)
PLATELET # BLD AUTO: 200 K/UL — SIGNIFICANT CHANGE UP (ref 150–400)
RBC # BLD: 4.52 M/UL — SIGNIFICANT CHANGE UP (ref 4.2–5.8)
RBC # FLD: 13.6 % — SIGNIFICANT CHANGE UP (ref 10.3–14.5)
WBC # BLD: 10.6 K/UL — HIGH (ref 3.8–10.5)
WBC # FLD AUTO: 10.6 K/UL — HIGH (ref 3.8–10.5)

## 2021-07-01 RX ORDER — CEFPODOXIME PROXETIL 100 MG
200 TABLET ORAL EVERY 12 HOURS
Refills: 0 | Status: DISCONTINUED | OUTPATIENT
Start: 2021-07-02 | End: 2021-07-02

## 2021-07-01 RX ADMIN — Medication 81 MILLIGRAM(S): at 13:12

## 2021-07-01 RX ADMIN — FLUVOXAMINE MALEATE 100 MILLIGRAM(S): 25 TABLET ORAL at 05:46

## 2021-07-01 RX ADMIN — NYSTATIN CREAM 1 APPLICATION(S): 100000 CREAM TOPICAL at 21:44

## 2021-07-01 RX ADMIN — DIVALPROEX SODIUM 250 MILLIGRAM(S): 500 TABLET, DELAYED RELEASE ORAL at 21:42

## 2021-07-01 RX ADMIN — Medication 250 MILLIGRAM(S): at 19:57

## 2021-07-01 RX ADMIN — FLUVOXAMINE MALEATE 100 MILLIGRAM(S): 25 TABLET ORAL at 18:37

## 2021-07-01 RX ADMIN — PANTOPRAZOLE SODIUM 40 MILLIGRAM(S): 20 TABLET, DELAYED RELEASE ORAL at 05:49

## 2021-07-01 RX ADMIN — NYSTATIN CREAM 1 APPLICATION(S): 100000 CREAM TOPICAL at 18:39

## 2021-07-01 RX ADMIN — NYSTATIN CREAM 1 APPLICATION(S): 100000 CREAM TOPICAL at 05:48

## 2021-07-01 RX ADMIN — DIVALPROEX SODIUM 250 MILLIGRAM(S): 500 TABLET, DELAYED RELEASE ORAL at 13:11

## 2021-07-01 RX ADMIN — Medication 1 TABLET(S): at 13:11

## 2021-07-01 RX ADMIN — Medication 75 MICROGRAM(S): at 05:49

## 2021-07-01 RX ADMIN — Medication 0.5 MILLIGRAM(S): at 05:45

## 2021-07-01 RX ADMIN — Medication 25 MILLIGRAM(S): at 05:45

## 2021-07-01 RX ADMIN — HEPARIN SODIUM 5000 UNIT(S): 5000 INJECTION INTRAVENOUS; SUBCUTANEOUS at 18:37

## 2021-07-01 RX ADMIN — Medication 250 MILLIGRAM(S): at 05:44

## 2021-07-01 RX ADMIN — NYSTATIN CREAM 1 APPLICATION(S): 100000 CREAM TOPICAL at 08:13

## 2021-07-01 RX ADMIN — CEFTRIAXONE 100 MILLIGRAM(S): 500 INJECTION, POWDER, FOR SOLUTION INTRAMUSCULAR; INTRAVENOUS at 18:35

## 2021-07-01 RX ADMIN — NYSTATIN CREAM 1 APPLICATION(S): 100000 CREAM TOPICAL at 13:12

## 2021-07-01 RX ADMIN — DIVALPROEX SODIUM 250 MILLIGRAM(S): 500 TABLET, DELAYED RELEASE ORAL at 05:46

## 2021-07-01 RX ADMIN — HEPARIN SODIUM 5000 UNIT(S): 5000 INJECTION INTRAVENOUS; SUBCUTANEOUS at 05:46

## 2021-07-01 RX ADMIN — Medication 25 MILLIGRAM(S): at 18:38

## 2021-07-01 RX ADMIN — AMLODIPINE BESYLATE 5 MILLIGRAM(S): 2.5 TABLET ORAL at 05:48

## 2021-07-01 RX ADMIN — TAMSULOSIN HYDROCHLORIDE 0.8 MILLIGRAM(S): 0.4 CAPSULE ORAL at 21:42

## 2021-07-01 NOTE — PROGRESS NOTE ADULT - SUBJECTIVE AND OBJECTIVE BOX
[facility]    CHIEF COMPLAINT: Patient is a 66y old  Male who presents with a chief complaint of fevers cough (01 Jul 2021 13:44)      Follow Up: [ ] Chest Pain      [ ] Dyspnea     [ ] Palpitations    [ ] Atrial Fibrillation     [ ] Ventricular Dysrhythmia    [ ] Abnormal EKG                      [ ] Abnormal Cardiac Enzymes     [ ] Valvular Disease    HPI:    PAST MEDICAL & SURGICAL HISTORY:  MR (mental retardation), severe    Seizure    HTN (hypertension)    Psoriasis    DD (diastrophic dysplasia)    Blind  right eye    Constipation, unspecified constipation type    No significant past surgical history      MEDICATIONS  (STANDING):  amLODIPine   Tablet 5 milliGRAM(s) Oral daily  aspirin  chewable 81 milliGRAM(s) Oral daily  cefTRIAXone   IVPB 1000 milliGRAM(s) IV Intermittent every 24 hours  clonazePAM  Tablet 0.5 milliGRAM(s) Oral two times a day  diVALproex  milliGRAM(s) Oral three times a day  fluvoxaMINE 100 milliGRAM(s) Oral two times a day  heparin   Injectable 5000 Unit(s) SubCutaneous every 12 hours  levothyroxine 75 MICROGram(s) Oral daily  metoprolol tartrate 25 milliGRAM(s) Oral two times a day  multivitamin/minerals 1 Tablet(s) Oral daily  nystatin Cream 1 Application(s) Topical two times a day  nystatin Powder 1 Application(s) Topical three times a day  pantoprazole    Tablet 40 milliGRAM(s) Oral before breakfast  tamsulosin 0.8 milliGRAM(s) Oral at bedtime  vancomycin  IVPB 1000 milliGRAM(s) IV Intermittent every 12 hours    MEDICATIONS  (PRN):  acetaminophen   Tablet .. 325 milliGRAM(s) Oral every 4 hours PRN Temp greater or equal to 38C (100.4F), Mild Pain (1 - 3)    Allergies    Augmentin (Unknown)  aztreonam (Rash)  penicillin (Unknown)  sulfa drugs (Unknown)    Intolerances        REVIEW OF SYSTEMS:    CONSTITUTIONAL: No weakness, fevers or chills.   EYES/ENT: No visual changes;    NECK: No pain or stiffness  RESPIRATORY: No cough, wheezing, No shortness of breath  CARDIOVASCULAR: No chest pain or palpitations  GASTROINTESTINAL: No abdominal pain, or hematochezia.  GENITOURINARY: No dysuria orhematuria  NEUROLOGICAL: No numbness or weakness  SKIN: No itching, burning, rashes  All other review of systems is negative unless indicated above    Vital Signs Last 24 Hrs  T(C): 36.6 (01 Jul 2021 10:59), Max: 36.9 (30 Jun 2021 20:59)  T(F): 97.8 (01 Jul 2021 10:59), Max: 98.4 (30 Jun 2021 20:59)  HR: 68 (01 Jul 2021 10:59) (68 - 102)  BP: 128/71 (01 Jul 2021 10:59) (120/67 - 142/69)  BP(mean): --  RR: 18 (01 Jul 2021 10:59) (17 - 18)  SpO2: 94% (01 Jul 2021 10:59) (90% - 94%)  I&O's Summary      PHYSICAL EXAM:  Constitutional: NAD  Neurological: Alert, no focal deficits  HEENT: no JVD, EOMI  Cardiovascular: Regular, S1 and S2, no murmur  Pulmonary: breath sounds bilaterally  Gastrointestinal: Bowel Sounds present, soft, nontender  EXT:  no peripheral edema  Skin: No rashes.  Psych:  Mood calm  LABS: All Labs Reviewed:                          13.8   10.60 )-----------( 200      ( 01 Jul 2021 12:46 )             40.1     06-30    145  |  112<H>  |  22  ----------------------------<  115<H>  3.8   |  24  |  0.90    Ca    8.8      30 Jun 2021 16:45    TPro  7.2  /  Alb  3.1<L>  /  TBili  0.2  /  DBili  x   /  AST  20  /  ALT  28  /  AlkPhos  54  06-30          12 Lead ECG:   Ventricular Rate 117 BPM    Atrial Rate 117 BPM    P-R Interval 168 ms    QRS Duration 94 ms    Q-T Interval 304 ms    QTC Calculation(Bazett) 424 ms    P Axis 43 degrees    R Axis -21 degrees    T Axis 37 degrees    Diagnosis Line Sinus tachycardia  Otherwise normal ECG  When compared with ECG of 28-JUN-2021 21:12, (Unconfirmed)  QRS axis shifted left  ST no longer depressed in Anterior leads  Nonspecific T wave abnormality, worse in Anterolateral leads  Confirmed by Js Dennis MD ([id number]) on 6/29/2021 12:20:52 PM (06-29-21 @ 09:41)  12 Lead ECG:   Ventricular Rate 144 BPM    Atrial Rate 144 BPM    P-R Interval 136 ms    QRS Duration 90 ms    Q-T Interval 332 ms    QTC Calculation(Bazett) 514 ms    R Axis 117 degrees    T Axis 38 degrees    Diagnosis Line Sinus tachycardia  Low voltage QRS  Possible Lateral infarct , age undetermined  Abnormal ECG  When compared with ECG of 01-MAY-2021 10:23,  ST now depressed in Anterior leads  Confirmed by Js Dennis MD ([id number]) on 6/29/2021 12:20:41 PM (06-28-21 @ 21:12)

## 2021-07-01 NOTE — DISCHARGE NOTE PROVIDER - HOSPITAL COURSE
came in with fevers cough  ct chest abd pelvis showed right infiltrate  old rib fractures  with mild warmth right lower ext  treated by infectious disease with rocephin and vancomycin  he had heat rash various sites treated with nystatin ,,,resolved  walked with physical therapy  seen by speech,,,diet puree with thicket,,may re test speech swallow in the next 2-3 weeks  complete vantin 200mg 2x day for 10 days  if patient has not had bone density,,please have one with discussion for biphonate or prolia like medication,,  all other home medications other wise the same  activity the same  diet puree with thicket came in with fevers cough  ct chest abd pelvis showed right infiltrate  old rib fractures  with mild warmth right lower ext  treated by infectious disease with rocephin and vancomycin  he had heat rash various sites treated with nystatin ,,,resolved  walked with physical therapy  seen by speech,,,diet puree with  nectar thicket,,may re test speech swallow in the next 2-3 weeks  complete vantin 200mg 2x day for 10 days  if patient has not had bone density,,please have one with discussion for biphonate or prolia like medication,,  all other home medications other wise the same  activity the same  diet puree with thicket came in with fevers cough  ct chest abd pelvis showed right infiltrate  chronic rib fractures,not acute  no s/sx of any trauma what so ever  with mild warmth right lower ext  treated by infectious disease with rocephin and vancomycin  he had heat rash various sites treated with nystatin ,,,resolved  walked with physical therapy  seen by speech,,,diet puree with  nectar thicket,,may re test speech swallow in the next 2-3 weeks  complete vantin 200mg 2x day for 10 days,sent to [facility]  last dexa juan pablo 15 2020 normal  continue vitamin d2 [id number]  and calcium /vit d supplements with multi  all other home medications other wise the same  activity the same  diet puree with thicket/nectar

## 2021-07-01 NOTE — PROGRESS NOTE ADULT - PROBLEM SELECTOR PLAN 1
due to aspiration pneumonia  seen by speech on puree diet,,was on ground at home  continue iv abxs for now  I spoke to dr gordon,,,dc home with vantin 200mg bid for 10 days  out patient bone density if not done already with biphosphonate or prolia like med if indicated  see by physical therapy,,walking with prompting due to aspiration pneumonia  seen by speech on puree w [place] thicket diet,,was on ground at home  continue iv abxs for now  I spoke to dr gordon,,,dc home with vantin 200mg bid for 10 days  sp apoorva june 2020,,normal  see by physical therapy,,walking with prompting  I spoke to the house ,at length  and sent vantin to pharmacy

## 2021-07-01 NOTE — DISCHARGE NOTE PROVIDER - NSDCCPCAREPLAN_GEN_ALL_CORE_FT
PRINCIPAL DISCHARGE DIAGNOSIS  Diagnosis: Sepsis, unspecified organism  Assessment and Plan of Treatment: due to aspiration pneumonia cellulitis  complete course with vantin for 10 days  diet puree with thicket  may repeat speech swallow in 2-3 weeks  old rib fractures,per ct   patient on vit d2 [id number]  ?dexa out patient if not done already with either fosamax or prolia like med ,if indicated  all other home meds activity  the same         PRINCIPAL DISCHARGE DIAGNOSIS  Diagnosis: Sepsis, unspecified organism  Assessment and Plan of Treatment: due to aspiration pneumonia cellulitis  complete course with vantin for 10 days  diet puree with thicket/nectar  may repeat speech swallow in 2-3 weeks  old rib fractures,per ct   patient on vit d2 [id number]  ?dexa out patient if not done already with either fosamax or prolia like med ,if indicated  all other home meds activity  the same

## 2021-07-01 NOTE — PROGRESS NOTE ADULT - SUBJECTIVE AND OBJECTIVE BOX
PAST MEDICAL & SURGICAL HISTORY:  MR (mental retardation), severe    Seizure    HTN (hypertension)    Psoriasis    DD (diastrophic dysplasia)    Blind  right eye    Constipation, unspecified constipation type    No significant past surgical history        MEDICATIONS  (STANDING):  amLODIPine   Tablet 5 milliGRAM(s) Oral daily  aspirin  chewable 81 milliGRAM(s) Oral daily  cefTRIAXone   IVPB 1000 milliGRAM(s) IV Intermittent every 24 hours  clonazePAM  Tablet 0.5 milliGRAM(s) Oral two times a day  diVALproex  milliGRAM(s) Oral three times a day  fluvoxaMINE 100 milliGRAM(s) Oral two times a day  heparin   Injectable 5000 Unit(s) SubCutaneous every 12 hours  levothyroxine 75 MICROGram(s) Oral daily  metoprolol tartrate 25 milliGRAM(s) Oral two times a day  multivitamin/minerals 1 Tablet(s) Oral daily  nystatin Cream 1 Application(s) Topical two times a day  nystatin Powder 1 Application(s) Topical three times a day  pantoprazole    Tablet 40 milliGRAM(s) Oral before breakfast  tamsulosin 0.8 milliGRAM(s) Oral at bedtime  vancomycin  IVPB 1000 milliGRAM(s) IV Intermittent every 12 hours    MEDICATIONS  (PRN):  acetaminophen   Tablet .. 325 milliGRAM(s) Oral every 4 hours PRN Temp greater or equal to 38C (100.4F), Mild Pain (1 - 3)      Patient is a 66y old  Male who presents with a chief complaint of fevers cough (01 Jul 2021 07:24)      Vital Signs Last 24 Hrs  T(C): 36.6 (01 Jul 2021 10:59), Max: 36.9 (30 Jun 2021 20:59)  T(F): 97.8 (01 Jul 2021 10:59), Max: 98.4 (30 Jun 2021 20:59)  HR: 68 (01 Jul 2021 10:59) (68 - 102)  BP: 128/71 (01 Jul 2021 10:59) (120/67 - 142/69)  BP(mean): --  RR: 18 (01 Jul 2021 10:59) (17 - 18)  SpO2: 94% (01 Jul 2021 10:59) (90% - 94%)            REVIEW OF SYSTEMS:    Constitutional: No fever, weight loss or fatigue  Eyes: No eye pain, visual disturbances, or discharge  ENT:  No difficulty hearing, tinnitus, vertigo; No sinus or throat pain  Neck: No pain or stiffness  Breasts: No pain, masses or nipple discharge  Respiratory: No cough, wheezing, chills or hemoptysis  Cardiovascular: No chest pain, palpitations, shortness of breath, dizziness or leg swelling  Gastrointestinal: No abdominal or epigastric pain. No nausea, vomiting or hematemesis; No diarrhea or constipation. No melena or hematochezia.  Genitourinary: No dysuria, frequency, hematuria or incontinence  Rectal: No pain, hemorrhoids or incontinence  Neurological: No headaches, memory loss, loss of strength, numbness or tremors  Skin: No itching, burning, rashes or lesions   Lymph Nodes: No enlarged glands  Endocrine: No heat or cold intolerance; No hair loss  Musculoskeletal: No joint pain or swelling; No muscle, back or extremity pain  Psychiatric: No depression, anxiety, mood swings or difficulty sleeping  Heme/Lymph: No easy bruising or bleeding gums  Allergy and Immunologic: No hives or eczema    PHYSICAL EXAM:  alert  walked with physical therapy  Constitutional: NAD,  Respiratory: Clear bl upper lobes   Cardiovascular: S1 and S2, RRR, no M/G/R  Gastrointestinal: BS+, soft, NT/ND  Extremities:no warmth redness bl lower ext  Skin: No rashes      decubiti: no                          13.8   10.60 )-----------( 200      ( 01 Jul 2021 12:46 )             40.1     06-30    145  |  112<H>  |  22  ----------------------------<  115<H>  3.8   |  24  |  0.90    Ca    8.8      30 Jun 2021 16:45    TPro  7.2  /  Alb  3.1<L>  /  TBili  0.2  /  DBili  x   /  AST  20  /  ALT  28  /  AlkPhos  54  06-30              .Urine Catheterized  06-29 @ 00:34   No growth  --  --      .Blood Blood-Peripheral  06-29 @ 00:32   No growth to date.  --  --        Urine Culture:  06-29 @ 00:34    --        No growth  Urine Culture:  06-29 @ 00:32    --        No growth to date.        Radiology:

## 2021-07-01 NOTE — DISCHARGE NOTE PROVIDER - CARE PROVIDER_API CALL
Naima Hawkins)  Internal Medicine  [address] [place] [address]  Phone: [phone]  Fax: [phone]  Follow Up Time: 1 week

## 2021-07-01 NOTE — PROGRESS NOTE ADULT - SUBJECTIVE AND OBJECTIVE BOX
SYLVAIN ROSELIA is a 66yMale , patient examined and chart reviewed.    INTERVAL HPI/ OVERNIGHT EVENTS:   NAD.   Afebrile.    PAST MEDICAL & SURGICAL HISTORY:  MR (mental retardation), severe  Seizure  HTN (hypertension)  Psoriasis  DD (diastrophic dysplasia)  Blind  right eye  Constipation, unspecified constipation type      For details regarding the patient's social history, family history, and other miscellaneous elements, please refer the initial infectious diseases consultation and/or the admitting history and physical examination for this admission.    ROS:  Unable to obtain due to : MRDD    ALLERGIES:  Augmentin (Unknown)  aztreonam (Rash)  penicillin (Unknown)  sulfa drugs (Unknown)      Current inpatient medications :    ANTIBIOTICS/RELEVANT:  cefTRIAXone   IVPB 1000 milliGRAM(s) IV Intermittent every 24 hours  vancomycin  IVPB 750 milliGRAM(s) IV Intermittent every 12 hours      MEDICATIONS  (STANDING):  amLODIPine   Tablet 5 milliGRAM(s) Oral daily  aspirin  chewable 81 milliGRAM(s) Oral daily  clonazePAM  Tablet 0.5 milliGRAM(s) Oral two times a day  diVALproex  milliGRAM(s) Oral three times a day  fluvoxaMINE 100 milliGRAM(s) Oral two times a day  heparin   Injectable 5000 Unit(s) SubCutaneous every 12 hours  levothyroxine 75 MICROGram(s) Oral daily  metoprolol tartrate 25 milliGRAM(s) Oral two times a day  multivitamin/minerals 1 Tablet(s) Oral daily  nystatin Cream 1 Application(s) Topical two times a day  nystatin Powder 1 Application(s) Topical three times a day  pantoprazole    Tablet 40 milliGRAM(s) Oral before breakfast  tamsulosin 0.8 milliGRAM(s) Oral at bedtime      MEDICATIONS  (PRN):  acetaminophen   Tablet .. 325 milliGRAM(s) Oral every 4 hours PRN Temp greater or equal to 38C (100.4F), Mild Pain (1 - 3)      Objective:  Vital Signs Last 24 Hrs  T(C): 36.6 (2021 10:59), Max: 36.9 (2021 20:59)  T(F): 97.8 (2021 10:59), Max: 98.4 (2021 20:59)  HR: 68 (2021 10:59) (68 - 88)  BP: 128/71 (2021 10:59) (120/67 - 128/71)  RR: 18 (2021 10:59) (17 - 18)  SpO2: 94% (2021 10:59) (90% - 94%)    Physical Exam:  General:  no acute distress  Neck: supple, trachea midline  Lungs: clear, no wheeze/rhonchi  Cardiovascular: regular rate and rhythm, S1 S2  Abdomen: soft, nontender,  bowel sounds normal right sided and lower abd erythema resolving  Neurological: awake MRDD  Skin: no rash  Extremities: no edema        LABS:                        13.8   10.60 )-----------( 200      ( 2021 12:46 )             40.1   30    145  |  112<H>  |  22  ----------------------------<  115<H>  3.8   |  24  |  0.90    Ca    8.8      2021 16:45    TPro  7.2  /  Alb  3.1<L>  /  TBili  0.2  /  DBili  x   /  AST  20  /  ALT  28  /  AlkPhos  54        Urinalysis Basic - ( 2021 21:25 )    Color: Yellow / Appearance: Clear / S.015 / pH: x  Gluc: x / Ketone: Negative  / Bili: Negative / Urobili: Negative   Blood: x / Protein: Negative / Nitrite: Negative   Leuk Esterase: Negative / RBC: Negative /HPF / WBC 3-5   Sq Epi: x / Non Sq Epi: Occasional / Bacteria: Occasional    Vancomycin Level, Trough: 7.3 ug/mL ( @ 17:57)      MICROBIOLOGY:    Culture - Urine (collected 2021 00:34)  Source: .Urine Catheterized  Final Report (2021 00:10):    No growth    Culture - Blood (collected 2021 00:32)  Source: .Blood Blood-Peripheral  Preliminary Report (2021 01:02):    No growth to date.    Culture - Blood (collected 2021 00:32)  Source: .Blood Blood-Peripheral  Preliminary Report (2021 01:02):    No growth to date.    RADIOLOGY & ADDITIONAL STUDIES:  EXAM:  CT ABDOMEN AND PELVIS                          EXAM:  CT CHEST                            PROCEDURE DATE:  2021          INTERPRETATION:  CLINICAL INFORMATION: Cough, fever, tachycardia. History mental retardation.    COMPARISON: CT scan chest abdomen pelvis 2021.    CONTRAST/COMPLICATIONS:  IV Contrast: NONE  0 cc administered   0 cc discarded  Oral Contrast: NONE  Complications: None reported at time of study completion    PROCEDURE:  CT of the Chest, Abdomen and Pelvis was performed.  Sagittal and coronal reformats were performed.    FINDINGS:    CHEST:    LUNGS AND LARGE AIRWAYS: PLEURA:  There is persistent patchy groundglass opacification involving the right lower lobe.  The central airways remain patent.    No significant pleural effusion.    VESSELS: Mild atherosclerotic calcification of thoracic aorta with coronary artery calcifications.    HEART: Heart size is normal. No pericardial effusion.    MEDIASTINUM AND GREYSON:  Small, subcentimeter short axis mediastinal lymph nodes, stable.    CHEST WALL AND LOWER NECK: Within normal limits.    ABDOMEN AND PELVIS:    Streak and motion artifact degrades image quality.    The evaluation of the solid organ parenchyma is limited without intravenous contrast.    LIVER: Within normal limits.  BILE DUCTS: Normal caliber.  GALLBLADDER: Prior cholecystectomy.  SPLEEN: Within normal limits.  PANCREAS: Within normal limits.  ADRENALS: Within normal limits.  KIDNEYS/URETERS: No hydronephrosis or perinephric fluid collection.  Small indeterminate hypo and hyperdense left-sided renal lesions.    BLADDER: Within normal limits.  REPRODUCTIVE ORGANS: Prostate within normal limits.    BOWEL: Stomach moderately distended with retained fluid/particulate matter.  Fluid-filled colon, nonspecific, can be associated with diarrhea.  No bowel obstruction.   Appendix normal.  PERITONEUM: No ascites.  No localized intra-abdominal fluid collection or pneumoperitoneum noted.    VESSELS: Atherosclerotic calcification.  RETROPERITONEUM/LYMPH NODES: No enlarged lymphadenopathy.    ABDOMINAL WALL: Within normal limits.    BONES:  Degenerative changes spine.  Chronic appearing bilateral rib fracture deformities.    IMPRESSION:    Persistent groundglass opacification right lower lobe.  In the chronic setting finding could reflect organizing pneumonia, hypersensitivity pneumonitis or lung fibrosis.    No acute intra-abdominal pathology noted.      Assessment :   64YO M Phx of MRDD, HTN, seizure resident of group home hx of Right LE cellulitis admitted with fever sec right sided lower abd wall cellulitis with lymphangitis- improving.  Sp fever  Doubt pneumonia  Pt has chronic RLL changes.  Overall doing well.    Plan :   On Vancomycin and  Rocephin  Can change to po Vantin x 7 days for dc  Trend temps and cbc  Asp precautions    D/w Dr Mcelroy    Continue with present regiment.  Appropriate use of antibiotics and adverse effects reviewed.    > 35 minutes were spent in direct patient care reviewing notes, medications ,labs data/ imaging , discussion with multidisciplinary team.    Thank you for allowing me to participate in care of your patient .    Olivier Daniels MD  Infectious Disease  [phone]

## 2021-07-01 NOTE — DISCHARGE NOTE PROVIDER - NSDCMRMEDTOKEN_GEN_ALL_CORE_FT
amLODIPine 5 mg oral tablet: 1 tab(s) orally once a day  aspirin 81 mg oral tablet: 1 tab(s) orally once a day  calcipotriene 0.005% topical cream: Apply topically to affected area 2 times a day  cefpodoxime 200 mg oral tablet: 1 tab(s) orally every 12 hours until may 16  chlorhexidine 0.12% mucous membrane liquid: application mucous membrane 2 times a day  clonazePAM 1 mg oral tablet: 1 tab(s) orally 2 times a day  Cosentyx 150 mg/mL subcutaneous solution: 300 milligram(s) subcutaneous every 28 days  Depakote  mg oral tablet, extended release: 1 tab(s) orally 3 times a day  fluvoxaMINE 100 mg oral tablet: 1 tab(s) orally 2 times a day  hydrogen peroxide 1.5% mucous membrane solution: application mucous membrane 2 times a day  hydrOXYzine hydrochloride 10 mg oral tablet: 2 tab(s) orally every 8 hours  ketoconazole 2% topical shampoo: Apply topically to affected area two times a week  lactulose 10 g/15 mL oral solution: 30 milliliter(s) orally once a day  levothyroxine 75 mcg (0.075 mg) oral tablet: 1 tab(s) orally once a day  metoprolol tartrate 25 mg oral tablet: 1 tab(s) orally 2 times a day  multivitamin with minerals: 1 tab(s) orally once a day  OLANZapine 15 mg oral tablet: 1 tab(s) orally once a day (at bedtime)  omeprazole 20 mg oral delayed release capsule: 1 cap(s) orally once a day  Oyster Betito 1250 mg (500 mg elemental calcium) oral tablet: 1 tab(s) orally 3 times a day  petrolatum topical ointment: Apply topically to affected area once a day  tamsulosin 0.4 mg oral capsule: 2 cap(s) orally once a day (at bedtime)  tolnaftate 1% topical cream: Apply topically to affected area once a day  Vitamin D2 50,000 intl units (1.25 mg) oral capsule: 1 cap(s) orally every 2 weeks   amLODIPine 5 mg oral tablet: 1 tab(s) orally once a day  aspirin 81 mg oral tablet: 1 tab(s) orally once a day  calcipotriene 0.005% topical cream: Apply topically to affected area 2 times a day  cefpodoxime 200 mg oral tablet: 1 tab(s) orally every 12 hours for 10 days  chlorhexidine 0.12% mucous membrane liquid: application mucous membrane 2 times a day  clonazePAM 1 mg oral tablet: 1 tab(s) orally 2 times a day  Cosentyx 150 mg/mL subcutaneous solution: 300 milligram(s) subcutaneous every 28 days  Depakote  mg oral tablet, extended release: 1 tab(s) orally 3 times a day  fluvoxaMINE 100 mg oral tablet: 1 tab(s) orally 2 times a day  hydrogen peroxide 1.5% mucous membrane solution: application mucous membrane 2 times a day  hydrOXYzine hydrochloride 10 mg oral tablet: 2 tab(s) orally every 8 hours  ketoconazole 2% topical shampoo: Apply topically to affected area two times a week  lactulose 10 g/15 mL oral solution: 30 milliliter(s) orally once a day  levothyroxine 75 mcg (0.075 mg) oral tablet: 1 tab(s) orally once a day  metoprolol tartrate 25 mg oral tablet: 1 tab(s) orally 2 times a day  multivitamin with minerals: 1 tab(s) orally once a day  OLANZapine 15 mg oral tablet: 1 tab(s) orally once a day (at bedtime)  omeprazole 20 mg oral delayed release capsule: 1 cap(s) orally once a day  Oyster Betito 1250 mg (500 mg elemental calcium) oral tablet: 1 tab(s) orally 3 times a day  petrolatum topical ointment: Apply topically to affected area once a day  tamsulosin 0.4 mg oral capsule: 2 cap(s) orally once a day (at bedtime)  tolnaftate 1% topical cream: Apply topically to affected area once a day  Vitamin D2 50,000 intl units (1.25 mg) oral capsule: 1 cap(s) orally every 2 weeks   amLODIPine 2.5 mg oral tablet: 1 tab(s) orally once a day  aspirin 81 mg oral tablet: 1 tab(s) orally once a day  calcipotriene 0.005% topical cream: Apply topically to affected area 2 times a day  cefpodoxime 200 mg oral tablet: 1 tab(s) orally every 12 hours for 10 days  chlorhexidine 0.12% mucous membrane liquid: application mucous membrane 2 times a day  clonazePAM 1 mg oral tablet: 1 tab(s) orally 2 times a day  Cosentyx 150 mg/mL subcutaneous solution: 300 milligram(s) subcutaneous every 28 days  Depakote  mg oral tablet, extended release: 1 tab(s) orally 3 times a day  fluvoxaMINE 100 mg oral tablet: 1 tab(s) orally 2 times a day  hydrogen peroxide 1.5% mucous membrane solution: application mucous membrane 2 times a day  hydrOXYzine hydrochloride 10 mg oral tablet: 2 tab(s) orally every 8 hours  ketoconazole 2% topical shampoo: Apply topically to affected area two times a week  lactulose 10 g/15 mL oral solution: 30 milliliter(s) orally once a day  levothyroxine 75 mcg (0.075 mg) oral tablet: 1 tab(s) orally once a day  metoprolol tartrate 25 mg oral tablet: 1 tab(s) orally 2 times a day  multivitamin with minerals: 1 tab(s) orally once a day  OLANZapine 15 mg oral tablet: 1 tab(s) orally once a day (at bedtime)  omeprazole 20 mg oral delayed release capsule: 1 cap(s) orally once a day  Oyster Betito 1250 mg (500 mg elemental calcium) oral tablet: 1 tab(s) orally 3 times a day  petrolatum topical ointment: Apply topically to affected area once a day  tamsulosin 0.4 mg oral capsule: 2 cap(s) orally once a day (at bedtime)  tolnaftate 1% topical cream: Apply topically to affected area once a day  Vitamin D2 50,000 intl units (1.25 mg) oral capsule: 1 cap(s) orally every 2 weeks

## 2021-07-01 NOTE — PROGRESS NOTE ADULT - SUBJECTIVE AND OBJECTIVE BOX
ROSELIA NARAYAN    [facility] [address]    Patient is a 66y old  Male who presents with a chief complaint of fevers cough (30 Jun 2021 17:04)       Allergies    Augmentin (Unknown)  aztreonam (Rash)  penicillin (Unknown)  sulfa drugs (Unknown)    Intolerances        HPI:      PAST MEDICAL & SURGICAL HISTORY:  MR (mental retardation), severe    Seizure    HTN (hypertension)    Psoriasis    DD (diastrophic dysplasia)    Blind  right eye    Constipation, unspecified constipation type    No significant past surgical history        FAMILY HISTORY:  No pertinent family history in first degree relatives          MEDICATIONS   acetaminophen   Tablet .. 325 milliGRAM(s) Oral every 4 hours PRN  amLODIPine   Tablet 5 milliGRAM(s) Oral daily  aspirin  chewable 81 milliGRAM(s) Oral daily  cefTRIAXone   IVPB 1000 milliGRAM(s) IV Intermittent every 24 hours  clonazePAM  Tablet 0.5 milliGRAM(s) Oral two times a day  diVALproex  milliGRAM(s) Oral three times a day  fluvoxaMINE 100 milliGRAM(s) Oral two times a day  heparin   Injectable 5000 Unit(s) SubCutaneous every 12 hours  levothyroxine 75 MICROGram(s) Oral daily  metoprolol tartrate 25 milliGRAM(s) Oral two times a day  multivitamin/minerals 1 Tablet(s) Oral daily  nystatin Cream 1 Application(s) Topical two times a day  nystatin Powder 1 Application(s) Topical three times a day  pantoprazole    Tablet 40 milliGRAM(s) Oral before breakfast  tamsulosin 0.8 milliGRAM(s) Oral at bedtime  vancomycin  IVPB 1000 milliGRAM(s) IV Intermittent every 12 hours      Vital Signs Last 24 Hrs  T(C): 36.9 (01 Jul 2021 04:49), Max: 36.9 (30 Jun 2021 09:19)  T(F): 98.4 (01 Jul 2021 04:49), Max: 98.5 (30 Jun 2021 09:19)  HR: 73 (01 Jul 2021 04:49) (73 - 102)  BP: 127/65 (01 Jul 2021 04:49) (108/66 - 142/69)  BP(mean): --  RR: 18 (01 Jul 2021 04:49) (17 - 19)  SpO2: 91% (01 Jul 2021 04:49) (90% - 91%)            LABS:                        13.2   15.84 )-----------( 194      ( 30 Jun 2021 16:45 )             38.8     06-30    145  |  112<H>  |  22  ----------------------------<  115<H>  3.8   |  24  |  0.90    Ca    8.8      30 Jun 2021 16:45    TPro  7.2  /  Alb  3.1<L>  /  TBili  0.2  /  DBili  x   /  AST  20  /  ALT  28  /  AlkPhos  54  06-30              WBC:  WBC Count: 15.84 K/uL (06-30 @ 16:45)  WBC Count: 13.16 K/uL (06-29 @ 17:11)  WBC Count: 9.75 K/uL (06-28 @ 20:36)      MICROBIOLOGY:  RECENT CULTURES:  06-29 .Urine Catheterized XXXX XXXX   No growth    06-29 .Blood Blood-Peripheral XXXX XXXX   No growth to date.                    Sodium:  Sodium, Serum: 145 mmol/L (06-30 @ 16:45)  Sodium, Serum: 143 mmol/L (06-29 @ 17:11)  Sodium, Serum: 143 mmol/L (06-28 @ 21:15)      0.90 mg/dL 06-30 @ 16:45  1.20 mg/dL 06-29 @ 17:11  0.83 mg/dL 06-28 @ 21:15      Hemoglobin:  Hemoglobin: 13.2 g/dL (06-30 @ 16:45)  Hemoglobin: 13.4 g/dL (06-29 @ 17:11)  Hemoglobin: 16.1 g/dL (06-28 @ 20:36)      Platelets: Platelet Count - Automated: 194 K/uL (06-30 @ 16:45)  Platelet Count - Automated: 166 K/uL (06-29 @ 17:11)  Platelet Count - Automated: 200 K/uL (06-28 @ 20:36)      LIVER FUNCTIONS - ( 30 Jun 2021 16:45 )  Alb: 3.1 g/dL / Pro: 7.2 g/dL / ALK PHOS: 54 U/L / ALT: 28 U/L / AST: 20 U/L / GGT: x                 RADIOLOGY & ADDITIONAL STUDIES:

## 2021-07-02 ENCOUNTER — TRANSCRIPTION ENCOUNTER (OUTPATIENT)
Age: 66
End: 2021-07-02

## 2021-07-02 VITALS
HEART RATE: 75 BPM | DIASTOLIC BLOOD PRESSURE: 64 MMHG | SYSTOLIC BLOOD PRESSURE: 111 MMHG | OXYGEN SATURATION: 92 % | RESPIRATION RATE: 18 BRPM | TEMPERATURE: 98 F

## 2021-07-02 PROCEDURE — 80053 COMPREHEN METABOLIC PANEL: CPT

## 2021-07-02 PROCEDURE — 81001 URINALYSIS AUTO W/SCOPE: CPT

## 2021-07-02 PROCEDURE — 99285 EMERGENCY DEPT VISIT HI MDM: CPT

## 2021-07-02 PROCEDURE — 96361 HYDRATE IV INFUSION ADD-ON: CPT

## 2021-07-02 PROCEDURE — 87641 MR-STAPH DNA AMP PROBE: CPT

## 2021-07-02 PROCEDURE — 80202 ASSAY OF VANCOMYCIN: CPT

## 2021-07-02 PROCEDURE — 83605 ASSAY OF LACTIC ACID: CPT

## 2021-07-02 PROCEDURE — 36415 COLL VENOUS BLD VENIPUNCTURE: CPT

## 2021-07-02 PROCEDURE — 87640 STAPH A DNA AMP PROBE: CPT

## 2021-07-02 PROCEDURE — 87040 BLOOD CULTURE FOR BACTERIA: CPT

## 2021-07-02 PROCEDURE — 93970 EXTREMITY STUDY: CPT

## 2021-07-02 PROCEDURE — 85025 COMPLETE CBC W/AUTO DIFF WBC: CPT

## 2021-07-02 PROCEDURE — 71250 CT THORAX DX C-: CPT

## 2021-07-02 PROCEDURE — 97162 PT EVAL MOD COMPLEX 30 MIN: CPT

## 2021-07-02 PROCEDURE — 97530 THERAPEUTIC ACTIVITIES: CPT

## 2021-07-02 PROCEDURE — 74176 CT ABD & PELVIS W/O CONTRAST: CPT

## 2021-07-02 PROCEDURE — 97116 GAIT TRAINING THERAPY: CPT

## 2021-07-02 PROCEDURE — 92610 EVALUATE SWALLOWING FUNCTION: CPT

## 2021-07-02 PROCEDURE — 86769 SARS-COV-2 COVID-19 ANTIBODY: CPT

## 2021-07-02 PROCEDURE — 71045 X-RAY EXAM CHEST 1 VIEW: CPT

## 2021-07-02 PROCEDURE — 87086 URINE CULTURE/COLONY COUNT: CPT

## 2021-07-02 PROCEDURE — 0225U NFCT DS DNA&RNA 21 SARSCOV2: CPT

## 2021-07-02 PROCEDURE — 83036 HEMOGLOBIN GLYCOSYLATED A1C: CPT

## 2021-07-02 PROCEDURE — 93005 ELECTROCARDIOGRAM TRACING: CPT

## 2021-07-02 RX ORDER — AMLODIPINE BESYLATE 2.5 MG/1
2.5 TABLET ORAL DAILY
Refills: 0 | Status: DISCONTINUED | OUTPATIENT
Start: 2021-07-02 | End: 2021-07-02

## 2021-07-02 RX ORDER — AMLODIPINE BESYLATE 2.5 MG/1
1 TABLET ORAL
Qty: 0 | Refills: 0 | DISCHARGE

## 2021-07-02 RX ORDER — AMLODIPINE BESYLATE 2.5 MG/1
1 TABLET ORAL
Qty: 0 | Refills: 0 | DISCHARGE
Start: 2021-07-02

## 2021-07-02 RX ADMIN — PANTOPRAZOLE SODIUM 40 MILLIGRAM(S): 20 TABLET, DELAYED RELEASE ORAL at 05:04

## 2021-07-02 RX ADMIN — Medication 250 MILLIGRAM(S): at 05:03

## 2021-07-02 RX ADMIN — AMLODIPINE BESYLATE 5 MILLIGRAM(S): 2.5 TABLET ORAL at 05:04

## 2021-07-02 RX ADMIN — NYSTATIN CREAM 1 APPLICATION(S): 100000 CREAM TOPICAL at 05:07

## 2021-07-02 RX ADMIN — Medication 75 MICROGRAM(S): at 05:04

## 2021-07-02 RX ADMIN — HEPARIN SODIUM 5000 UNIT(S): 5000 INJECTION INTRAVENOUS; SUBCUTANEOUS at 05:04

## 2021-07-02 RX ADMIN — Medication 25 MILLIGRAM(S): at 05:04

## 2021-07-02 RX ADMIN — Medication 81 MILLIGRAM(S): at 11:37

## 2021-07-02 RX ADMIN — Medication 0.5 MILLIGRAM(S): at 05:05

## 2021-07-02 RX ADMIN — Medication 1 TABLET(S): at 11:38

## 2021-07-02 RX ADMIN — DIVALPROEX SODIUM 250 MILLIGRAM(S): 500 TABLET, DELAYED RELEASE ORAL at 05:04

## 2021-07-02 RX ADMIN — FLUVOXAMINE MALEATE 100 MILLIGRAM(S): 25 TABLET ORAL at 05:05

## 2021-07-02 RX ADMIN — Medication 200 MILLIGRAM(S): at 05:04

## 2021-07-02 NOTE — PROGRESS NOTE ADULT - SUBJECTIVE AND OBJECTIVE BOX
[facility] Cardiology    CHIEF COMPLAINT: Patient is a 66y old  Male who presents with a chief complaint of fevers cough (02 Jul 2021 08:47)      Follow Up: [ ] Chest Pain      [ ] Dyspnea     [ ] Palpitations    [ ] Atrial Fibrillation     [ ] Ventricular Dysrhythmia    [ ] Abnormal EKG                      [ ] Abnormal Cardiac Enzymes     [ ] Valvular Disease    HPI:    PAST MEDICAL & SURGICAL HISTORY:  MR (mental retardation), severe    Seizure    HTN (hypertension)    Psoriasis    DD (diastrophic dysplasia)    Blind  right eye    Constipation, unspecified constipation type    No significant past surgical history      MEDICATIONS  (STANDING):  amLODIPine   Tablet 5 milliGRAM(s) Oral daily  aspirin  chewable 81 milliGRAM(s) Oral daily  cefpodoxime 200 milliGRAM(s) Oral every 12 hours  cefTRIAXone   IVPB 1000 milliGRAM(s) IV Intermittent every 24 hours  clonazePAM  Tablet 0.5 milliGRAM(s) Oral two times a day  diVALproex  milliGRAM(s) Oral three times a day  fluvoxaMINE 100 milliGRAM(s) Oral two times a day  heparin   Injectable 5000 Unit(s) SubCutaneous every 12 hours  levothyroxine 75 MICROGram(s) Oral daily  metoprolol tartrate 25 milliGRAM(s) Oral two times a day  multivitamin/minerals 1 Tablet(s) Oral daily  nystatin Cream 1 Application(s) Topical two times a day  nystatin Powder 1 Application(s) Topical three times a day  pantoprazole    Tablet 40 milliGRAM(s) Oral before breakfast  tamsulosin 0.8 milliGRAM(s) Oral at bedtime  vancomycin  IVPB 1000 milliGRAM(s) IV Intermittent every 12 hours    MEDICATIONS  (PRN):  acetaminophen   Tablet .. 325 milliGRAM(s) Oral every 4 hours PRN Temp greater or equal to 38C (100.4F), Mild Pain (1 - 3)    Allergies    Augmentin (Unknown)  aztreonam (Rash)  penicillin (Unknown)  sulfa drugs (Unknown)    Intolerances        REVIEW OF SYSTEMS:    CONSTITUTIONAL: No weakness, fevers or chills.   EYES/ENT: No visual changes;    NECK: No pain or stiffness  RESPIRATORY: No cough, wheezing, No shortness of breath  CARDIOVASCULAR: No chest pain or palpitations  GASTROINTESTINAL: No abdominal pain, or hematochezia.  GENITOURINARY: No dysuria orhematuria  NEUROLOGICAL: No numbness or weakness  SKIN: No itching, burning, rashes  All other review of systems is negative unless indicated above    Vital Signs Last 24 Hrs  T(C): 37.1 (02 Jul 2021 08:15), Max: 37.2 (02 Jul 2021 04:40)  T(F): 98.7 (02 Jul 2021 08:15), Max: 99 (02 Jul 2021 04:40)  HR: 75 (02 Jul 2021 04:40) (68 - 78)  BP: 128/75 (02 Jul 2021 04:40) (119/64 - 128/75)  BP(mean): --  RR: 18 (02 Jul 2021 04:40) (18 - 18)  SpO2: 91% (02 Jul 2021 04:40) (90% - 94%)  I&O's Summary    01 Jul 2021 07:01  -  02 Jul 2021 07:00  --------------------------------------------------------  IN: 0 mL / OUT: 1 mL / NET: -1 mL        PHYSICAL EXAM:  Constitutional: NAD  Neurological: Alert, no focal deficits  HEENT: no JVD, EOMI  Cardiovascular: Regular, S1 and S2, no murmur  Pulmonary: breath sounds bilaterally  Gastrointestinal: Bowel Sounds present, soft, nontender  EXT:  no peripheral edema  Skin: No rashes.  Psych:  Mood calm  LABS: All Labs Reviewed:                          13.8   10.60 )-----------( 200      ( 01 Jul 2021 12:46 )             40.1     06-30    145  |  112<H>  |  22  ----------------------------<  115<H>  3.8   |  24  |  0.90    Ca    8.8      30 Jun 2021 16:45    TPro  7.2  /  Alb  3.1<L>  /  TBili  0.2  /  DBili  x   /  AST  20  /  ALT  28  /  AlkPhos  54  06-30    · Assessment	  66M group home  [facility] hospitalization at [place], [facility] with severe mental retardation, cholecystectomy, COVID 4/2020, nonverbal, aspiration PNA, cellulitis admitted with fevers  Sinus tach likely multifactorial.     ECHO April 2021: Normal LV systolic function, no significant valve disease, normal LVEDP by tissue Doppler index which suggests patient is not volume overload    1. Abx per ID and Pulm  r/o sepsis    2. HTN - c/w amlodipine for now.     Hx of sinus tach.  SR on tele past 24 hours.   Cont Metoprolol    3. DVT prophylaxis.  4. Will follow prn  call if needed

## 2021-07-02 NOTE — PROGRESS NOTE ADULT - REASON FOR ADMISSION
fevers cough

## 2021-07-02 NOTE — PROGRESS NOTE ADULT - PROVIDER SPECIALTY LIST ADULT
Cardiology
Pulmonology
Pulmonology
Infectious Disease
Infectious Disease
Internal Medicine
Internal Medicine
Pulmonology
Cardiology
Pulmonology
Cardiology
Internal Medicine

## 2021-07-02 NOTE — DISCHARGE NOTE NURSING/CASE MANAGEMENT/SOCIAL WORK - NSDCVIVACCINE_GEN_ALL_CORE_FT
Tdap; 18-Oct-2020 22:33; Karissa Blanco (RN); [employer]; [id number] (Exp. Date: 21-Jul-2022); IntraMuscular; Deltoid Right.; 0.5 milliLiter(s); VIS (VIS Published: 09-May-2013, VIS Presented: 18-Oct-2020);   Tdap; 21-Apr-2021 22:37; Farhana Cardoso (KEVYN); [employer]; [id number] (Exp. Date: 10-Sep-2022); IntraMuscular; Deltoid Left.; 0.5 milliLiter(s); VIS (VIS Published: 09-May-2013, VIS Presented: 21-Apr-2021);

## 2021-07-02 NOTE — PROGRESS NOTE ADULT - ASSESSMENT
· Assessment	  66M group home  [facility] hospitalization at [place], [facility] with severe mental retardation, cholecystectomy, COVID 4/2020, nonverbal, aspiration PNA, cellulitis admitted with fevers  Sinus tach likely multifactorial.     ECHO April 2021: Normal LV systolic function, no significant valve disease, normal LVEDP by tissue Doppler index which suggests patient is not volume overload    1. Abx per ID and Pulm  r/o sepsis    2. HTN - c/w amlodipine for now.     Hx of sinus tach.  SR on tele past 24 hours.   Cont Metoprolol    3. DVT prophylaxis.  4. Will follow prn    
    SYLVAIN ROSELIA [facility] P  6/28/2021 DR TASHA CALIXTO     REVIEW OF SYMPTOMS      Able to give (reliable) ROS  NO     PHYSICAL EXAM    HEENT Unremarkable  atraumatic   RESP Fair air entry EXP prolonged    Harsh breath sound Resp distres mild   CARDIAC S1 S2 No S3     NO JVD    ABDOMEN SOFT BS PRESENT NOT DISTENDED No hepatosplenomegaly   PEDAL EDEMA present No calf tenderness  NO rash       SYLVAIN VELOZ 66 m 6/28/2021 admission PROBLEMS    CVID STATUS  scv2 6/28/2021 (-)     WHEEZING poa   POSSIBLE PNEUMONIA     GLOBAL AND BEST PRACTICE ISSUES                     ALLERGY.  AUGMENTIN AZTREONAM PNCL SULFA    WEIGHT.         6/28/2021 70          BMI                    6/28/2021 25     .                          ADVANCED DIRECTIVE.                               HEAD OF BED ELEVATION. Yes  DVT PROPHYLAXIS.       lvnx 40 (6/29/2021)                SAUER PROPHYLAXIS. 6/29/2021 puree nectar recommended   APA.                   asa 81 (6/29/2021)                                                  DYSPHAGIA RECOMM.   DIET.    dys 3 soft nectar (6/29)   INFECTION PROPHYLAXIS.     PATIENT DATA                ABG.                 OXYGENATION.       6/28-6/29/2021 ra 96%   - ra 91%       VITALS/IO/VENT/DRIPS.   6/29/2021 99f 120 110/60       ASSESSMENT/RECOMMENDATIONS.    INFECTION  w 6/28/2021 w 9.7   ua 6/28/2021 w 3-5   la 6/28 la 1.8   rvp 6/28/2021 rvp (-)   cxr 6/28 poss rll infiltr   Ertapenem vanco 1 dose given in er 6/28/2021   levaquin (6/28)   DEHYDRATION  ns 70 (6/29)   WHEEZE  echo 423/2021 n lvsf  Bronchodilators  RESP  target po 90-95%  RO VTE   6/29/2021 v duplx (-)     OVERALL PLAN.  Poss asp pneum pn levaquin   bronchdilators   oxygen    TIME SPENT   Over 25 minutes aggregate care time spent on encounter; activities included   direct patient care, counseling and/or coordinating care reviewing notes, lab data/ imaging , discussion with multidisciplinary team/ patient  /family and explaining in detail risks, benefits, alternatives  of the recommendations     SYLVAIN VELOZ [facility] P  6/28/2021 DR TASHA CALIXTO    
    SYLVAIN VELOZ [facility] P  6/28/2021 DR TASHA CALIXTO     REVIEW OF SYMPTOMS      Able to give (reliable) ROS  NO     PHYSICAL EXAM    HEENT Unremarkable  atraumatic   RESP Fair air entry EXP prolonged    Harsh breath sound Resp distres mild   CARDIAC S1 S2 No S3     NO JVD    ABDOMEN SOFT BS PRESENT NOT DISTENDED No hepatosplenomegaly   PEDAL EDEMA present No calf tenderness  NO rash       SYLVAIN VELOZ 66 m 6/28/2021 admission PROBLEMS    CVID STATUS  scv2 6/28/2021 (-)   spkab 6/30/2021 (+) 20     WHEEZING poa   POSSIBLE PNEUMONIA   R LOWER ABD WALL CELLULITIS WITH LYMPHANITIS 6/29)   VANCO (6/29) (Dr Francis)  ROCEPH 6/29 (Dr Francis)     GLOBAL AND BEST PRACTICE ISSUES                     ALLERGY.  AUGMENTIN AZTREONAM PNCL SULFA    WEIGHT.         6/28/2021 70          BMI                    6/28/2021 25     .                          ADVANCED DIRECTIVE.                               HEAD OF BED ELEVATION. Yes  DVT PROPHYLAXIS.       lvnx 40 (6/29/2021)    _. hpsc (6/29)             SAUER PROPHYLAXIS. 6/29/2021 puree nectar recommended   APA.                   asa 81 (6/29/2021)                                                  DYSPHAGIA RECOMM. 629 puree dys 1 nectar   DIET.    dys 3 soft nectar (6/29) -> dys 1 puree nectar (6/29)   INFECTION PROPHYLAXIS.   FREE WATER.      PATIENT DATA                ABG.                 OXYGENATION.       6/28-6/29/2021 ra 96%   - ra 91%       VITALS/IO/VENT/DRIPS.   7/1/2021 afeb 78 110/60     ASSESSMENT/RECOMMENDATIONS.    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  Poss asp pneum   abd wall cellulitis   On vanco rocephin per id Dr Francis   bronchdilators   oxygen    TIME SPENT   Over 25 minutes aggregate care time spent on encounter; activities included   direct patient care, counseling and/or coordinating care reviewing notes, lab data/ imaging , discussion with multidisciplinary team/ patient  /family and explaining in detail risks, benefits, alternatives  of the recommendations     SYLVAIN VELOZ [facility] P  6/28/2021 DR TASHA CALIXTO    
    SYLVAIN VELOZ [facility] P  6/28/2021 DR TASHA CALIXTO     REVIEW OF SYMPTOMS      Able to give (reliable) ROS  NO     PHYSICAL EXAM    HEENT Unremarkable  atraumatic   RESP Fair air entry EXP prolonged    Harsh breath sound Resp distres mild   CARDIAC S1 S2 No S3     NO JVD    ABDOMEN SOFT BS PRESENT NOT DISTENDED No hepatosplenomegaly   PEDAL EDEMA present No calf tenderness  NO rash     SYLVAIN VELOZ 66 m 6/28/2021 admission PROBLEMS    CVID STATUS  scv2 6/28/2021 (-)   spkab 6/30/2021 (+) 20     WHEEZING poa   POSSIBLE PNEUMONIA   R LOWER ABD WALL CELLULITIS WITH LYMPHANITIS 6/29)   VANCO (6/29) (Dr Francis)  ROCEPH 6/29 (Dr Francis)     GLOBAL AND BEST PRACTICE ISSUES                     ALLERGY.  AUGMENTIN AZTREONAM PNCL SULFA    WEIGHT.         6/28/2021 70          BMI                    6/28/2021 25     .                          ADVANCED DIRECTIVE.                               HEAD OF BED ELEVATION. Yes  DVT PROPHYLAXIS.       lvnx 40 (6/29/2021)    _. hpsc (6/29)             SAUER PROPHYLAXIS. 6/29/2021 puree nectar recommended   APA.                   asa 81 (6/29/2021)                                                  DYSPHAGIA RECOMM.   DIET.    dys 3 soft nectar (6/29) -> dys 1 puree nectar (6/29)   INFECTION PROPHYLAXIS.   FREE WATER.      PATIENT DATA                ABG.                 OXYGENATION.       6/28-6/29/2021 ra 96%   - ra 91%       VITALS/IO/VENT/DRIPS.   6/30/2021 afeb 90 120/60     ASSESSMENT/RECOMMENDATIONS.    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  Poss asp pneum   abd wall cellulitis   On vanco rocephin per id Dr Francis   bronchdilators   oxygen    TIME SPENT   Over 25 minutes aggregate care time spent on encounter; activities included   direct patient care, counseling and/or coordinating care reviewing notes, lab data/ imaging , discussion with multidisciplinary team/ patient  /family and explaining in detail risks, benefits, alternatives  of the recommendations     SYLVAIN VELOZ [facility] P  6/28/2021 DR TASHA CALIXTO    
    SYLVAIN VELOZ [facility] P  6/28/2021 DR TASHA CALIXTO     REVIEW OF SYMPTOMS      Able to give (reliable) ROS  NO     PHYSICAL EXAM    HEENT Unremarkable  atraumatic   RESP Fair air entry EXP prolonged    Harsh breath sound Resp distres mild   CARDIAC S1 S2 No S3     NO JVD    ABDOMEN SOFT BS PRESENT NOT DISTENDED No hepatosplenomegaly   PEDAL EDEMA present No calf tenderness  NO rash       SYLVAIN VELOZ 66 m 6/28/2021 admission PROBLEMS    CVID STATUS  scv2 6/28/2021 (-)   spkab 6/30/2021 (+) 20     WHEEZING poa   POSSIBLE PNEUMONIA   R LOWER ABD WALL CELLULITIS WITH LYMPHANITIS 6/29)   VANCO (6/29) (Dr Francis)  ROCEPH 6/29 (Dr Francis)     GLOBAL AND BEST PRACTICE ISSUES                     ALLERGY.  AUGMENTIN AZTREONAM PNCL SULFA    WEIGHT.         6/28/2021 70          BMI                    6/28/2021 25     .                          ADVANCED DIRECTIVE.                               HEAD OF BED ELEVATION. Yes  DVT PROPHYLAXIS.       lvnx 40 (6/29/2021)    _. hpsc (6/29)             SAUER PROPHYLAXIS. 6/29/2021 puree nectar recommended   APA.                   asa 81 (6/29/2021)                                                  DYSPHAGIA RECOMM. 629 puree dys 1 nectar   DIET.    dys 3 soft nectar (6/29) -> dys 1 puree nectar (6/29)   INFECTION PROPHYLAXIS.     PATIENT DATA                ABG.                 OXYGENATION.       6/28-6/29/2021 ra 96%   - ra 91%       VITALS/IO/VENT/DRIPS.   7/2/2021 afeb 69 120/70    ASSESSMENT/RECOMMENDATIONS.    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  Poss asp pneum   abd wall cellulitis   On vanco rocephin per id Dr Francis   bronchdilators   oxygen    TIME SPENT   Over 25 minutes aggregate care time spent on encounter; activities included   direct patient care, counseling and/or coordinating care reviewing notes, lab data/ imaging , discussion with multidisciplinary team/ patient  /family and explaining in detail risks, benefits, alternatives  of the recommendations     SYLVAIN VELOZ [facility] P  6/28/2021 DR TASHA CALIXTO    
66M group home  PMH hospitalization at [facility] with severe mental retardation, cholecystectomy, COVID 4/2020, nonverbal, aspiration PNA, cellulitis admitted with fevers  Sinus tach likely multifactorial.     ECHO April 2021: Normal LV systolic function, no significant valve disease, normal LVEDP by tissue Doppler index which suggests patient is not volume overload    1. Abx per ID and Pulm  r/o sepsis    2. HTN - c/w amlodipine for now.  BP at lower limits, can d/c amlodipine if BP remains at lower range.   Hx of sinus tach.  HR past 24 hours in more moderate range.   ekg ordered  Cont Metoprolol    3. DVT prophylaxis.  4. Will follow prn

## 2021-07-02 NOTE — PROGRESS NOTE ADULT - SUBJECTIVE AND OBJECTIVE BOX
ROSELIA NARAYAN    [facility] [address]    Patient is a 66y old  Male who presents with a chief complaint of fevers cough (01 Jul 2021 18:01)       Allergies    Augmentin (Unknown)  aztreonam (Rash)  penicillin (Unknown)  sulfa drugs (Unknown)    Intolerances        HPI:      PAST MEDICAL & SURGICAL HISTORY:  MR (mental retardation), severe    Seizure    HTN (hypertension)    Psoriasis    DD (diastrophic dysplasia)    Blind  right eye    Constipation, unspecified constipation type    No significant past surgical history        FAMILY HISTORY:  No pertinent family history in first degree relatives          MEDICATIONS   acetaminophen   Tablet .. 325 milliGRAM(s) Oral every 4 hours PRN  amLODIPine   Tablet 5 milliGRAM(s) Oral daily  aspirin  chewable 81 milliGRAM(s) Oral daily  cefpodoxime 200 milliGRAM(s) Oral every 12 hours  cefTRIAXone   IVPB 1000 milliGRAM(s) IV Intermittent every 24 hours  clonazePAM  Tablet 0.5 milliGRAM(s) Oral two times a day  diVALproex  milliGRAM(s) Oral three times a day  fluvoxaMINE 100 milliGRAM(s) Oral two times a day  heparin   Injectable 5000 Unit(s) SubCutaneous every 12 hours  levothyroxine 75 MICROGram(s) Oral daily  metoprolol tartrate 25 milliGRAM(s) Oral two times a day  multivitamin/minerals 1 Tablet(s) Oral daily  nystatin Cream 1 Application(s) Topical two times a day  nystatin Powder 1 Application(s) Topical three times a day  pantoprazole    Tablet 40 milliGRAM(s) Oral before breakfast  tamsulosin 0.8 milliGRAM(s) Oral at bedtime  vancomycin  IVPB 1000 milliGRAM(s) IV Intermittent every 12 hours      Vital Signs Last 24 Hrs  T(C): 37.1 (02 Jul 2021 08:15), Max: 37.2 (02 Jul 2021 04:40)  T(F): 98.7 (02 Jul 2021 08:15), Max: 99 (02 Jul 2021 04:40)  HR: 75 (02 Jul 2021 04:40) (68 - 78)  BP: 128/75 (02 Jul 2021 04:40) (119/64 - 128/75)  BP(mean): --  RR: 18 (02 Jul 2021 04:40) (18 - 18)  SpO2: 91% (02 Jul 2021 04:40) (90% - 94%)      07-01-21 @ 07:01  -  07-02-21 @ 07:00  --------------------------------------------------------  IN: 0 mL / OUT: 1 mL / NET: -1 mL            LABS:                        13.8   10.60 )-----------( 200      ( 01 Jul 2021 12:46 )             40.1     06-30    145  |  112<H>  |  22  ----------------------------<  115<H>  3.8   |  24  |  0.90    Ca    8.8      30 Jun 2021 16:45    TPro  7.2  /  Alb  3.1<L>  /  TBili  0.2  /  DBili  x   /  AST  20  /  ALT  28  /  AlkPhos  54  06-30              WBC:  WBC Count: 10.60 K/uL (07-01 @ 12:46)  WBC Count: 15.84 K/uL (06-30 @ 16:45)  WBC Count: 13.16 K/uL (06-29 @ 17:11)  WBC Count: 9.75 K/uL (06-28 @ 20:36)      MICROBIOLOGY:  RECENT CULTURES:  06-29 .Urine Catheterized XXXX XXXX   No growth    06-29 .Blood Blood-Peripheral XXXX XXXX   No growth to date.                    Sodium:  Sodium, Serum: 145 mmol/L (06-30 @ 16:45)  Sodium, Serum: 143 mmol/L (06-29 @ 17:11)  Sodium, Serum: 143 mmol/L (06-28 @ 21:15)      0.90 mg/dL 06-30 @ 16:45  1.20 mg/dL 06-29 @ 17:11  0.83 mg/dL 06-28 @ 21:15      Hemoglobin:  Hemoglobin: 13.8 g/dL (07-01 @ 12:46)  Hemoglobin: 13.2 g/dL (06-30 @ 16:45)  Hemoglobin: 13.4 g/dL (06-29 @ 17:11)  Hemoglobin: 16.1 g/dL (06-28 @ 20:36)      Platelets: Platelet Count - Automated: 200 K/uL (07-01 @ 12:46)  Platelet Count - Automated: 194 K/uL (06-30 @ 16:45)  Platelet Count - Automated: 166 K/uL (06-29 @ 17:11)  Platelet Count - Automated: 200 K/uL (06-28 @ 20:36)      LIVER FUNCTIONS - ( 30 Jun 2021 16:45 )  Alb: 3.1 g/dL / Pro: 7.2 g/dL / ALK PHOS: 54 U/L / ALT: 28 U/L / AST: 20 U/L / GGT: x                 RADIOLOGY & ADDITIONAL STUDIES:

## 2021-07-02 NOTE — DISCHARGE NOTE NURSING/CASE MANAGEMENT/SOCIAL WORK - PATIENT PORTAL LINK FT
You can access the FollowMyHealth Patient Portal offered by [facility] by registering at the following website: [email or web address]. By joining "[email or web address]’s FollowMyHealth portal, you will also be able to view your health information using other applications (apps) compatible with our system.

## 2021-07-06 ENCOUNTER — APPOINTMENT (OUTPATIENT)
Dept: OPHTHALMOLOGY | Facility: CLINIC | Age: 66
End: 2021-07-06
Payer: MEDICARE

## 2021-07-06 ENCOUNTER — NON-APPOINTMENT (OUTPATIENT)
Age: 66
End: 2021-07-06

## 2021-07-06 PROCEDURE — 92014 COMPRE OPH EXAM EST PT 1/>: CPT

## 2021-08-16 NOTE — ED PROVIDER NOTE - NSCAREINITIATED _GEN_ER
TRANSFER - OUT REPORT:    Verbal report given to [facility] RN (name) on Venida Gist  being transferred to gen surg (unit) for routine progression of care       Report consisted of patients Situation, Background, Assessment and   Recommendations(SBAR). Information from the following report(s) SBAR, ED Summary, [facility] and Recent Results was reviewed with the receiving nurse. Lines:   Peripheral IV 08/16/21 Anterior;Proximal;Right Forearm (Active)        Opportunity for questions and clarification was provided. Camron Shearer)

## 2021-08-31 NOTE — ED ADULT NURSE NOTE - PLAN OF CARE DISCUSSED WITH:
Family/Guardian/Patient FAMILY HISTORY:  Father  Still living? Unknown  FH: pancreatic cancer, Age at diagnosis: Age Unknown    Sibling  Still living? Unknown  FH: colon cancer, Age at diagnosis: Age Unknown  FH: liver cancer, Age at diagnosis: Age Unknown    Child  Still living? Unknown  FH: uterine cancer, Age at diagnosis: Age Unknown

## 2021-11-08 NOTE — ED ADULT NURSE NOTE - NSIMPLEMENTINTERV_GEN_ALL_ED
Patient's chart reviewed, please contact to schedule OA colonoscopy with .Patient Preference    Screening Criteria  Age: 54 year old Patient meets age criteria.  PCP:  Geraldo Cintron MD  Personal H/O Colon CA or Polyps: Patient does NOT have a personal history of colon polyps or colon cancer  Family H/O Colon CA: No family history of colon cancer.  GI Symptoms: IBS (irritable bowel syndrome)  Most recent colon procedure Colonoscopy: 7/2005 by unknown  Concerns for taking prep at home(mobility, etc): No    ALLERGIES:   Allergen Reactions   • Bactrim Nausea & Vomiting   • Dilaudid [Hydromorphone Hcl] HIVES   • Methadone Other (See Comments)     Severe fatigue   • Sudafed [Pseudoephedrine Hcl]      Jittery   • Vicodin [Hydrocodone-Acetaminophen] Other (See Comments)     Syncope; ?stopped breathing?       Medications:      Summary of your Discharge Medications          Accurate as of November 8, 2021  7:02 AM. Always use your most recent med list.            Take these Medications      Details   albuterol-ipratropium 100-20 MCG/ACT inhaler  Commonly known as: Combivent Respimat   Inhale 1 puff into the lungs every 4 hours as needed for Shortness of Breath. Indications: Disease Involving Spasms of the Bronchus     ALPRAZolam 0.5 MG tablet  Commonly known as: XANAX   TAKE 1 TABLET DAILY     amoxicillin 500 MG capsule  Commonly known as: AMOXIL   Take 4 capsules by mouth for 1 dose one hour prior to procedure.     budesonide-formoterol 80-4.5 MCG/ACT inhaler  Commonly known as: Symbicort   Inhale 2 puffs into the lungs 2 times daily.     metoPROLOL succinate 100 MG 24 hr tablet  Commonly known as: TOPROL-XL   TAKE 1 TABLET DAILY     PARoxetine 20 MG tablet  Commonly known as: PAXIL   TAKE 1 TABLET DAILY WITH   DINNER     Synthroid 100 MCG tablet   Generic drug: levothyroxine  TAKE 1 TABLET DAILY     Vitamin D (Ergocalciferol) 1.25 mg (50,000 units) capsule   TAKE 1 CAPSULE 1 DAY A WEEK     Xarelto 10 MG Tab   Generic  drug: rivaroxaban  Take 1 tablet by mouth daily.            Anticoagulation (examples - coumadin, heparin, lovenox, xarelto, pradaxa): Yes, Current however,     Kendal Chua RN     RR    10:58 AM  Note     Patient had Right Total Knee Replacement on 8/24/21 and is to take Xarelto for 3 months post op to prevent DVT. She is unable to hold medication. After 3 months, patient to discontinue Xarelto. No further blood thinners would be prescribed by Dr. Melendez' office after that.     Also it is recommended pt wait at least 3 months after surgery for routine/non-urgent colonoscopy procedure. She does have a follow up appointment with us tomorrow, 11/4/21. We can discuss with her then.            Platelet Modifying (examples - Plavix, aspirin, nsaids, Aggrenox) : No  Concerns for sedation. On Chronic long acting narcotics, Methadone, Suboxone, antianxiety like xanax, klonazepam: Yes  Review of other medications indicate - inhalers  BMI: Estimated body mass index is 44.58 kg/m² as calculated from the following:    Height as of 8/24/21: 5' 4\" (1.626 m).    Weight as of 8/25/21: 117.8 kg (259 lb 11.2 oz).:more than 45 No  Is the patient over 300 lbs Weight:   Wt Readings from Last 1 Encounters:   08/25/21 117.8 kg (259 lb 11.2 oz)   :  No  On Oxygen:  No    Past Medical History:  Past Medical History:   Diagnosis Date   • Annular tear of lumbar disc 3/2016    L2-3,L3-4,L4-5   • AR (allergic rhinitis)     all year   • Arthritis    • Asthma    • Blood clot associated with vein wall inflammation    • Gastrocnemius muscle tear 1/2014    right   • Goiter diffuse 02/2017   • Hyperlipemia 2004   • Hypothyroid 2002   • IBS (irritable bowel syndrome) 1996   • Lumbar disc disease 2006    L2-L3   • Obesity    • Panic disorder 2000   • Pulmonary embolism (CMS/HCC) 7/2008    Bilateral   • Tachycardia 1999   • Vitamin D deficiency 2010    12.5       Past Surgical History:  Past Surgical History:   Procedure Laterality Date   •  Colonoscopy  7/2005    normal   • Joint replacement Right 08/24/2021    Total knee replacement with Dr. Melendez   • Myomectomy  9/99   • Tonsillectomy and adenoidectomy  1976        Other Concerns: sleep apnea, tachycardia, asthma  SEE ABOVE MEDICAL AND SURGICAL HISTORY      Prior Labs: If abnormal creatinine/electrolytes, then will need Nulytely prep  Creatinine (mg/dL)   Date Value   08/25/2021 0.72     BUN (mg/dL)   Date Value   08/25/2021 13     Sodium (mmol/L)   Date Value   08/25/2021 138     Potassium (mmol/L)   Date Value   08/25/2021 4.4     Chloride (mmol/L)   Date Value   08/25/2021 111 (H)     Carbon Dioxide (mmol/L)   Date Value   08/25/2021 25     Glucose (mg/dL)   Date Value   08/25/2021 147 (H)     Anion Gap (mmol/L)   Date Value   08/25/2021 6 (L)     Calcium (mg/dL)   Date Value   08/25/2021 8.1 (L)         SCHEDULING:  OK to schedule open access colonoscopy, if no then will need office visit: Yes  Patient have to schedule procedure 3 months after having total knee replacement ON 8/24/21 and being off Xarelto per Ortho  Physician Scheduled with: Patient Preference  Diagnosis code from O/A order:  Colon Cancer Screening Z12.11  Location: HOSPITAL  Sedation: MAC  Prep: Physician Preference  Covid: Fully Vaccinated  YES RADHA Chua, RN     RR    10:58 AM  Note     Patient had Right Total Knee Replacement on 8/24/21 and is to take Xarelto for 3 months post op to prevent DVT. She is unable to hold medication. After 3 months, patient to discontinue Xarelto. No further blood thinners would be prescribed by Dr. Melendez' office after that.     Also it is recommended pt wait at least 3 months after surgery for routine/non-urgent colonoscopy procedure. She does have a follow up appointment with us tomorrow, 11/4/21. We can discuss with her then.           Implemented All Fall with Harm Risk Interventions:  [place] to call system. Call bell, personal items and telephone within reach. Instruct patient to call for assistance. Room bathroom lighting operational. Non-slip footwear when patient is off stretcher. Physically safe environment: no spills, clutter or unnecessary equipment. Stretcher in lowest position, wheels locked, appropriate side rails in place. Provide visual cue, wrist band, yellow gown, etc. Monitor gait and stability. Monitor for mental status changes and reorient to person, place, and time. Review medications for side effects contributing to fall risk. Reinforce activity limits and safety measures with patient and family. Provide visual clues: red socks.

## 2021-12-03 ENCOUNTER — EMERGENCY (EMERGENCY)
Facility: HOSPITAL | Age: 66
LOS: 1 days | Discharge: HOME | End: 2021-12-03
Attending: EMERGENCY MEDICINE | Admitting: EMERGENCY MEDICINE
Payer: MEDICARE

## 2021-12-03 VITALS
RESPIRATION RATE: 20 BRPM | OXYGEN SATURATION: 100 % | SYSTOLIC BLOOD PRESSURE: 130 MMHG | TEMPERATURE: 98 F | HEART RATE: 81 BPM | DIASTOLIC BLOOD PRESSURE: 78 MMHG

## 2021-12-03 VITALS
SYSTOLIC BLOOD PRESSURE: 132 MMHG | WEIGHT: 149.91 LBS | RESPIRATION RATE: 19 BRPM | DIASTOLIC BLOOD PRESSURE: 82 MMHG | TEMPERATURE: 98 F | OXYGEN SATURATION: 100 % | HEIGHT: 65 IN | HEART RATE: 89 BPM

## 2021-12-03 PROCEDURE — 73562 X-RAY EXAM OF KNEE 3: CPT

## 2021-12-03 PROCEDURE — 99283 EMERGENCY DEPT VISIT LOW MDM: CPT | Mod: 25

## 2021-12-03 PROCEDURE — 73562 X-RAY EXAM OF KNEE 3: CPT | Mod: 26,50

## 2021-12-03 PROCEDURE — 99283 EMERGENCY DEPT VISIT LOW MDM: CPT

## 2021-12-03 NOTE — ED ADULT NURSE NOTE - CHPI ED NUR SYMPTOMS NEG
no abrasion/no bleeding/no deformity/no fever/no loss of consciousness/no numbness/no tingling/no vomiting/no weakness

## 2021-12-03 NOTE — ED ADULT NURSE NOTE - PAIN RATING/NUMBER SCALE (0-10): REST
HPI:    Patient ID: Renny Lopez is a 9 month old male. Diarrhea x 3 days  freq yest  W/o cough / cold  W/o fever  HPI    Review of Systems   Constitutional: Negative for fever. HENT: Negative for congestion and rhinorrhea.     Respiratory: Negative for 0

## 2021-12-03 NOTE — ED PROVIDER NOTE - NSFOLLOWUPINSTRUCTIONS_ED_ALL_ED_FT
1) Follow-up with Orthopedics, See referred doctor. Call today/next business day for close, prompt follow-up.  2) Return to Emergency room for any worsening or persistent pain, weakness, numbness, fever, color change to extremity, or any other concerning symptoms.  3) Take Tylenol as needed for pain   4) You can consider discussing with your doctor if physical therapy or further imaging as an MRI may be beneficial. 1) Follow-up with Orthopedics, See referred doctor. Call today/next business day for close, prompt follow-up.  2) Return to Emergency room for any worsening or persistent pain, weakness, numbness, fever, color change to extremity, or any other concerning symptoms.  3) Take Tylenol as needed for pain   4) You can consider discussing with your doctor if physical therapy or further imaging as an MRI may be beneficial.      Knee Pain    WHAT YOU NEED TO KNOW:    What do I need to know about knee pain? Knee pain may start suddenly, or it may be a long-term problem. You may have pain on the side, front, or back of your knee. You may have knee stiffness and swelling. You may hear popping sounds or feel like your knee is giving way or locking up as you walk. You may feel pain when you sit, stand, walk, or climb up and down stairs.    What increases my risk for knee pain?   •Obesity      •A strain or tear in ligaments or tendons      •A leg fracture or knee dislocation      •Overuse of your knee      •Osteoarthritis, rheumatoid arthritis, or gout      •An infection, tumor, or cyst in your knee      •Shoes that are not supportive, or training on a hard surface      •Sports that involve jumping or pivoting on your knee      How is the cause of knee pain diagnosed? Your healthcare provider will examine your knee and ask about your symptoms. Tell your provider when the pain started and what you were doing at the time. Describe the pain, such as sharp, throbbing, or achy. Tell your provider about any knee injury or surgery you had. You may need any of the following:   •MRI, CT, or ultrasound pictures may show an injury, fracture, or tumor.       •Blood tests may be used to check the level of inflammation in your blood. The tests may also show signs of infection.      •Arthroscopy is a procedure to look inside your knee joint with an arthroscope. An arthroscope is a flexible tube with a light and camera on the end. A knee arthroscopy is usually done to check for disease or damage inside your knee. These problems may be fixed during the procedure.      How is knee pain treated? Treatment will depend on the cause of your pain. You may need any of the following:   •NSAIDs help decrease swelling and pain or fever. This medicine is available with or without a doctor's order. NSAIDs can cause stomach bleeding or kidney problems in certain people. If you take blood thinner medicine, always ask your healthcare provider if NSAIDs are safe for you. Always read the medicine label and follow directions.      •Acetaminophen decreases pain and fever. It is available without a doctor's order. Ask how much to take and how often to take it. Follow directions. Read the labels of all other medicines you are using to see if they also contain acetaminophen, or ask your doctor or pharmacist. Acetaminophen can cause liver damage if not taken correctly. Do not use more than 4 grams (4,000 milligrams) total of acetaminophen in one day.       •Prescription pain medicine may be given. Ask your healthcare provider how to take this medicine safely. Some prescription pain medicines contain acetaminophen. Do not take other medicines that contain acetaminophen without talking to your healthcare provider. Too much acetaminophen may cause liver damage. Prescription pain medicine may cause constipation. Ask your healthcare provider how to prevent or treat constipation.       •Steroid injections may be given into your knee. Steroids reduce inflammation and pain.      •Surgery may be used for some injuries, such as to repair a torn ACL.      What can I do to manage my symptoms?   •Rest your knee so it can heal. Limit activities that increase your pain. Do low-impact exercises, such as walking or swimming.       •Apply ice to help reduce swelling and pain. Use an ice pack, or put crushed ice in a plastic bag. Cover it with a towel before you apply it to your knee. Apply ice for 15 to 20 minutes every hour, or as directed.      •Apply compression to help reduce swelling. Use a brace or bandage only as directed.      •Elevate your knee to help decrease pain and swelling. Elevate your knee while you are sitting or lying down. Prop your leg on pillows to keep your knee above the level of your heart.      •Prevent your knee from moving as directed. Your healthcare provider may put on a cast or splint. You may need to wear a leg brace to stabilize your knee. A leg brace can be adjusted to increase your range of motion as your knee heals.  Hinged Knee Braces            What can I do to prevent knee pain?   •Maintain a healthy weight. Extra weight increases your risk for knee pain. Ask your healthcare provider how much you should weigh. He or she can help you create a safe weight loss plan if you need to lose weight.      •Exercise or train properly. Use the correct equipment for sports. Wear shoes that provide good support. Check your posture often as you exercise, play sports, or train for an event. This can help prevent stress and strain on your knees. Rest between sessions so you do not overwork your knees.      When should I seek immediate care?   •Your pain is worse, even after treatment.       •You cannot bend or straighten your leg completely.       •The swelling around your knee does not go down even with treatment.      •Your knee is painful and hot to the touch.       When should I contact my healthcare provider?   •You have questions or concerns about your condition or care.           CARE AGREEMENT:    You have the right to help plan your care. Learn about your health condition and how it may be treated. Discuss treatment options with your healthcare providers to decide what care you want to receive. You always have the right to refuse treatment.

## 2021-12-03 NOTE — ED PROVIDER NOTE - PATIENT PORTAL LINK FT
You can access the FollowMyHealth Patient Portal offered by [facility] by registering at the following website: [email or web address]. By joining [employer]’s FollowMyHealth portal, you will also be able to view your health information using other applications (apps) compatible with our system.

## 2021-12-03 NOTE — ED PROVIDER NOTE - ATTENDING CONTRIBUTION TO CARE
65 yo M with hx MR p/w tripped when getting out of a chair and fell onto bl knees. Pt did not hit head. no loc. Pt walking / acting normally since fall. no apparent pain. no vomiting. No change in usual behavior. Pt fully vaccinated for covid, no known exposure. No other acute co.  exam: MM Moist. neck supple. no ext signs of head trauma. no spinal tend (c,t,l). CTA bl no w/r/.r No chest wall tend. no signs of truncal trauma. abd soft NT. POs contusions to bl knees. no apparent bony tend, nl gait at baseline. Nl nonfocal neuro exam. no other acute findings.  check xr, outpt fu

## 2021-12-03 NOTE — ED PROVIDER NOTE - PHYSICAL EXAMINATION
Constitutional: Awake, Alert, non-toxic. NAD. Well appearing, well nourished.   HEAD: Normocephalic, atraumatic.   EYES: EOM intact, conjunctiva and sclera are clear bilaterally.   ENT: No rhinorrhea, patent, mucous membranes pink/moist, no drooling or stridor.   NECK: Supple, non-tender  RESPIRATORY: Normal respiratory effort  EXTREMITIES: Full passive and active ROM in all extremities; (+) right knee abrasion, knees non-tender to palpation and FROM, normal gait; distal pulses palpable and symmetric  SKIN: Warm, dry; good skin turgor, no apparent lesions or rashes, no ecchymosis, brisk capillary refill.  NEURO: A&O x3. Sensory and motor functions are grossly intact. Speech is normal. Appearance and judgement seem appropriate for gender and age.

## 2021-12-03 NOTE — ED PROVIDER NOTE - OBJECTIVE STATEMENT
67 y/o male with PMHx MR and Seizure presents today due to a witnessed fall. As per aid, reports patient got out of his chair and fell forward on to his knees. Reports patient has been not expressing pain and ambulating well but protocol to have patient evaluated. Denies head injury, LOC, difficulty ambulating.

## 2021-12-03 NOTE — ED PROVIDER NOTE - CARE PROVIDER_API CALL
Jose Garcia)  Orthopaedic Surgery  [address] [place] [address]  Phone: [phone]  Fax: [phone]  Follow Up Time: 1-3 Days

## 2021-12-03 NOTE — ED PROVIDER NOTE - CLINICAL SUMMARY MEDICAL DECISION MAKING FREE TEXT BOX
presents today due to a witnessed fall. As per aid, reports patient got out of his chair and fell forward on to his knees. Reports patient has been not expressing pain and ambulating well but protocol to have patient evaluated. plan includes xray and dc

## 2022-01-26 NOTE — SWALLOW BEDSIDE ASSESSMENT ADULT - DATE
"Called Tristian.  He is on dialysis.  He stated he was actually talking to his \"coordinator\" right now, and did not need any further help.     Rachel Raman RN   [facility]  [address] [address]   [email or web address]   Office : [phone]  Fax: [phone]    " 31-Jul-2020

## 2022-03-02 ENCOUNTER — EMERGENCY (EMERGENCY)
Facility: HOSPITAL | Age: 67
LOS: 1 days | Discharge: HOME | End: 2022-03-02
Attending: STUDENT IN AN ORGANIZED HEALTH CARE EDUCATION/TRAINING PROGRAM | Admitting: STUDENT IN AN ORGANIZED HEALTH CARE EDUCATION/TRAINING PROGRAM
Payer: MEDICARE

## 2022-03-02 VITALS
SYSTOLIC BLOOD PRESSURE: 129 MMHG | OXYGEN SATURATION: 96 % | RESPIRATION RATE: 16 BRPM | HEART RATE: 85 BPM | DIASTOLIC BLOOD PRESSURE: 78 MMHG | TEMPERATURE: 98 F

## 2022-03-02 VITALS
HEART RATE: 76 BPM | SYSTOLIC BLOOD PRESSURE: 123 MMHG | RESPIRATION RATE: 18 BRPM | WEIGHT: 166.01 LBS | TEMPERATURE: 99 F | OXYGEN SATURATION: 95 % | DIASTOLIC BLOOD PRESSURE: 75 MMHG | HEIGHT: 65 IN

## 2022-03-02 PROCEDURE — 99284 EMERGENCY DEPT VISIT MOD MDM: CPT

## 2022-03-02 PROCEDURE — 99284 EMERGENCY DEPT VISIT MOD MDM: CPT | Mod: 25

## 2022-03-02 PROCEDURE — 70450 CT HEAD/BRAIN W/O DYE: CPT | Mod: 26,MA

## 2022-03-02 PROCEDURE — 70450 CT HEAD/BRAIN W/O DYE: CPT | Mod: MA

## 2022-03-02 NOTE — ED PROVIDER NOTE - CARE PROVIDER_API CALL
Kamille Mcelroy (DO)  Medicine  [address] [facility]  [place] [address]  Phone: [phone]  Fax: [phone]  Follow Up Time:

## 2022-03-02 NOTE — ED ADULT NURSE NOTE - SKIN CAPILLARY REFILL
2 seconds or less Closure 4 Information: This tab is for additional flaps and grafts above and beyond our usual structured repairs.  Please note if you enter information here it will not currently bill and you will need to add the billing information manually.

## 2022-03-02 NOTE — ED ADULT NURSE NOTE - NSIMPLEMENTINTERV_GEN_ALL_ED
Implemented All Fall Risk Interventions:  [place] to call system. Call bell, personal items and telephone within reach. Instruct patient to call for assistance. Room bathroom lighting operational. Non-slip footwear when patient is off stretcher. Physically safe environment: no spills, clutter or unnecessary equipment. Stretcher in lowest position, wheels locked, appropriate side rails in place. Provide visual cue, wrist band, yellow gown, etc. Monitor gait and stability. Monitor for mental status changes and reorient to person, place, and time. Review medications for side effects contributing to fall risk. Reinforce activity limits and safety measures with patient and family.

## 2022-03-02 NOTE — ED PROVIDER NOTE - OBJECTIVE STATEMENT
66 year old male from [facility] [place] with PMH of HTN, seizure disorder, MR presents with head injury.  Patient nonverbal, history provided by aide at bedside.  Patient had a suspected unwitnessed fall.  Staff at [facility] noted bump on right side of forehead this morning.  Aide states patient is at baseline mental status and patient has been seizure-controlled.  PMD Dr. Meyer 66 year old male from group [place] with PMH of HTN, seizure disorder, MR presents with head injury.  Patient nonverbal, history provided by aide at bedside.  Patient had a suspected unwitnessed fall.  Staff at [facility] noted bump on right side of forehead this morning.  Aide states patient is at baseline mental status and patient has been seizure-controlled.  Patient unable to provide further history.  PMD Dr. Meyer

## 2022-03-02 NOTE — ED PROVIDER NOTE - PATIENT PORTAL LINK FT
You can access the FollowMyHealth Patient Portal offered by [facility] by registering at the following website: [email or web address]. By joining [employer]’s FollowMyHealth portal, you will also be able to view your health information using other applications (apps) compatible with our system.

## 2022-03-02 NOTE — ED PROVIDER NOTE - PROGRESS NOTE DETAILS
Results of CT discussed with aide at bedside and copy of report given.  She will call group home to arrange transport.

## 2022-03-02 NOTE — ED PROVIDER NOTE - PHYSICAL EXAMINATION
Head: Full ROM of c-spine, no step-off or deformity   2cm x 3cm hematoma to right forehead.  No laceration or abrasion

## 2022-03-02 NOTE — ED PROVIDER NOTE - CLINICAL SUMMARY MEDICAL DECISION MAKING FREE TEXT BOX
66 year old male nonverbal from group home p/w hematoma to right forehead.  Unwitnessed possible fall.  No change in baseline mental status.  On ASA.  CT head, reassess

## 2022-03-02 NOTE — ED PROVIDER NOTE - NSFOLLOWUPINSTRUCTIONS_ED_ALL_ED_FT
Please follow up with your primary care doctor.  Return to the ER for persistent falls, weakness, headache, lethargy, altered mental status, or any other concerns.       Hematoma    WHAT YOU NEED TO KNOW:    A hematoma is a collection of blood. A bruise is a type of hematoma. A hematoma may form in a muscle or in the tissues just under the skin. A hematoma that forms under the skin will feel like a bump or hard mass. Hematomas can happen anywhere in your body, including in your brain. Your body may break down and absorb a mild hematoma on its own. A more serious hematoma may need treatment.     DISCHARGE INSTRUCTIONS:    Medicines: You may need any of the following:   •Prescription pain medicine may be given. Ask how to take this medicine safely.      •NSAIDs, such as ibuprofen, help decrease swelling, pain, and fever. This medicine is available with or without a doctor's order. NSAIDs can cause stomach bleeding or kidney problems in certain people. If you take blood thinner medicine, always ask your healthcare provider if NSAIDs are safe for you. Always read the medicine label and follow directions.      •Antibiotics prevent or treat a bacterial infection.      •Take your medicine as directed. Contact your healthcare provider if you think your medicine is not helping or if you have side effects. Tell him of her if you are allergic to any medicine. Keep a list of the medicines, vitamins, and herbs you take. Include the amounts, and when and why you take them. Bring the list or the pill bottles to follow-up visits. Carry your medicine list with you in case of an emergency.      Return to the emergency department if:   •You have new or worsening pain, or pain that does not get better with medicine.      •You have a fever.      •You have trouble moving the body part that has the hematoma.      Contact your healthcare provider if:   •You have questions or concerns about your condition or care.          Follow up with your healthcare provider as directed: You may need to have surgery if your hematoma is severe. You may also need other tests to make sure there is no other damage that needs to be treated. Write down your questions so you remember to ask them during your visits.    Self-care:   •Rest the area. Rest will help your body heal and will also help prevent more damage.      •Apply ice as directed. Ice helps reduce swelling. Ice may also help prevent tissue damage. Use an ice pack, or put crushed ice in a bag. Cover it with a towel. Place it on your hematoma for 20 minutes every hour, or as directed. Ask how many times each day to apply ice, and for how many days.      •Compress the injury if possible. Lightly wrap the injury with an elastic or soft bandage. This may help control swelling. Ask your healthcare provider how to wrap your injury properly.       •Elevate the area as directed. If possible, raise the area above the level of your heart as often as you can. This will help decrease swelling.       •Keep the hematoma covered with a bandage. This will help protect the area while it heals. Please follow up with your primary care doctor.  Return to the ER for persistent falls, weakness, headache, lethargy, altered mental status, or any other concerns.       Hematoma    WHAT YOU NEED TO KNOW:    A hematoma is a collection of blood. A bruise is a type of hematoma. A hematoma may form in a muscle or in the tissues just under the skin. A hematoma that forms under the skin will feel like a bump or hard mass. Hematomas can happen anywhere in your body, including in your brain. Your body may break down and absorb a mild hematoma on its own. A more serious hematoma may need treatment.     DISCHARGE INSTRUCTIONS:    Medicines: You may need any of the following:   •Prescription pain medicine may be given. Ask how to take this medicine safely.      •NSAIDs, such as ibuprofen, help decrease swelling, pain, and fever. This medicine is available with or without a doctor's order. NSAIDs can cause stomach bleeding or kidney problems in certain people. If you take blood thinner medicine, always ask your healthcare provider if NSAIDs are safe for you. Always read the medicine label and follow directions.      •Antibiotics prevent or treat a bacterial infection.      •Take your medicine as directed. Contact your healthcare provider if you think your medicine is not helping or if you have side effects. Tell him of her if you are allergic to any medicine. Keep a list of the medicines, vitamins, and herbs you take. Include the amounts, and when and why you take them. Bring the list or the pill bottles to follow-up visits. Carry your medicine list with you in case of an emergency.      Return to the emergency department if:   •You have new or worsening pain, or pain that does not get better with medicine.      •You have a fever.      •You have trouble moving the body part that has the hematoma.      Contact your healthcare provider if:   •You have questions or concerns about your condition or care.          Follow up with your healthcare provider as directed: You may need to have surgery if your hematoma is severe. You may also need other tests to make sure there is no other damage that needs to be treated. Write down your questions so you remember to ask them during your visits.    Self-care:   •Rest the area. Rest will help your body heal and will also help prevent more damage.      •Apply ice as directed. Ice helps reduce swelling. Ice may also help prevent tissue damage. Use an ice pack, or put crushed ice in a bag. Cover it with a towel. Place it on your hematoma for 20 minutes every hour, or as directed. Ask how many times each day to apply ice, and for how many days.      •Compress the injury if possible. Lightly wrap the injury with an elastic or soft bandage. This may help control swelling. Ask your healthcare provider how to wrap your injury properly.       •Elevate the area as directed. If possible, raise the area above the level of your heart as often as you can. This will help decrease swelling.       •Keep the hematoma covered with a bandage. This will help protect the area while it heals.          Head Injury    WHAT YOU NEED TO KNOW:    A head injury can include your scalp, face, skull, or brain and range from mild to severe. Effects can appear immediately after the injury or develop later. The effects may last a short time or be permanent. Healthcare providers may want to check your recovery over time. Treatment may change as you recover or develop new health problems from the head injury.    DISCHARGE INSTRUCTIONS:    Call your local emergency number (911 in the ), or have someone else call if:   •You cannot be woken.      •You have a seizure.      •You stop responding to others or you faint.      •You have blurry or double vision.      •Your speech becomes slurred or confused.      •You have arm or leg weakness, loss of feeling, or new problems with coordination.      •Your pupils are larger than usual, or one pupil is a different size than the other.      •You have blood or clear fluid coming out of your ears or nose.      Return to the emergency department if:   •You have repeated or forceful vomiting.      •You feel confused.      •Your headache gets worse or becomes severe.      •You or someone caring for you notices that you are harder to wake than usual.      Call your doctor if:   •Your symptoms last longer than 6 weeks after the injury.      •You have questions or concerns about your condition or care.      Medicines:   •Acetaminophen decreases pain and fever. It is available without a doctor's order. Ask how much to take and how often to take it. Follow directions. Read the labels of all other medicines you are using to see if they also contain acetaminophen, or ask your doctor or pharmacist. Acetaminophen can cause liver damage if not taken correctly. Do not use more than 4 grams (4,000 milligrams) total of acetaminophen in one day.       •Take your medicine as directed. Contact your healthcare provider if you think your medicine is not helping or if you have side effects. Tell him or her if you are allergic to any medicine. Keep a list of the medicines, vitamins, and herbs you take. Include the amounts, and when and why you take them. Bring the list or the pill bottles to follow-up visits. Carry your medicine list with you in case of an emergency.      Self-care:   •Rest or do quiet activities. Limit your time watching TV, using the computer, or doing tasks that require a lot of thinking. Slowly return to your normal activities as directed. Do not play sports or do activities that may cause you to get hit in the head. Ask your healthcare provider when you can return to sports.      •Apply ice on your head for 15 to 20 minutes every hour or as directed. Use an ice pack, or put crushed ice in a plastic bag. Cover it with a towel before you apply it to your skin. Ice helps prevent tissue damage and decreases swelling and pain.      •Have someone stay with you for 24 hours , or as directed. This person can monitor you for problems and call for help if needed. When you are awake, the person should ask you a few questions every few hours to see if you are thinking clearly. An example is to ask your name or address.      Prevent another head injury:   •Wear a helmet that fits properly. Do this when you play sports, or ride a bike, scooter, or skateboard. Helmets help decrease your risk for a serious head injury. Talk to your healthcare provider about other ways you can protect yourself if you play sports.      •Wear your seatbelt every time you are in a car. This helps lower your risk for a head injury if you are in a car accident.      Follow up with your doctor as directed: Write down your questions so you remember to ask them during your visits.

## 2022-03-02 NOTE — ED ADULT NURSE NOTE - OBJECTIVE STATEMENT
Pt received from triage. Awake and alert, non verbal at base Pt received from triage. Awake and alert, non verbal at base line. As per private aide at beside pt with right upper eye "bump", hematoma noted on right upper eye. Denies any falls/LOC or blood thinners. Pt currently at neuro baseline, as per aide. No acute distress noted at this time. Respirations even and unlabored. Will continue to monitor.

## 2022-04-11 ENCOUNTER — EMERGENCY (EMERGENCY)
Facility: HOSPITAL | Age: 67
LOS: 1 days | Discharge: HOME | End: 2022-04-11
Attending: STUDENT IN AN ORGANIZED HEALTH CARE EDUCATION/TRAINING PROGRAM | Admitting: STUDENT IN AN ORGANIZED HEALTH CARE EDUCATION/TRAINING PROGRAM
Payer: MEDICARE

## 2022-04-11 VITALS
HEART RATE: 88 BPM | SYSTOLIC BLOOD PRESSURE: 116 MMHG | DIASTOLIC BLOOD PRESSURE: 76 MMHG | OXYGEN SATURATION: 95 % | TEMPERATURE: 98 F | RESPIRATION RATE: 16 BRPM

## 2022-04-11 VITALS
SYSTOLIC BLOOD PRESSURE: 118 MMHG | OXYGEN SATURATION: 95 % | TEMPERATURE: 98 F | HEIGHT: 65 IN | DIASTOLIC BLOOD PRESSURE: 74 MMHG | WEIGHT: 169.98 LBS | HEART RATE: 128 BPM | RESPIRATION RATE: 16 BRPM

## 2022-04-11 PROCEDURE — 72125 CT NECK SPINE W/O DYE: CPT | Mod: 26,MA

## 2022-04-11 PROCEDURE — 73130 X-RAY EXAM OF HAND: CPT | Mod: 26,LT

## 2022-04-11 PROCEDURE — 99284 EMERGENCY DEPT VISIT MOD MDM: CPT | Mod: FS

## 2022-04-11 PROCEDURE — 99284 EMERGENCY DEPT VISIT MOD MDM: CPT | Mod: 25

## 2022-04-11 PROCEDURE — 73130 X-RAY EXAM OF HAND: CPT

## 2022-04-11 PROCEDURE — 70450 CT HEAD/BRAIN W/O DYE: CPT | Mod: MA

## 2022-04-11 PROCEDURE — 70450 CT HEAD/BRAIN W/O DYE: CPT | Mod: 26,MA

## 2022-04-11 PROCEDURE — 72125 CT NECK SPINE W/O DYE: CPT | Mod: MA

## 2022-04-11 NOTE — ED PROVIDER NOTE - SKIN, MLM
Skin normal color for race, warm, dry and intact. No evidence of rash. small abrasions to left 2nd and 5th digits. no bleeding

## 2022-04-11 NOTE — ED PROVIDER NOTE - CARE PROVIDER_API CALL
DAVID CALIXTO  Internal Medicine  Phone: [phone]  Fax: [phone]  Follow Up Time: 1-3 Days

## 2022-04-11 NOTE — ED ADULT TRIAGE NOTE - CHIEF COMPLAINT QUOTE
Witnessed fall from group home hit front of head on floor, No LOC noted. Abrasion noted to left 2nd and 5th fingers.

## 2022-04-11 NOTE — ED PROVIDER NOTE - PRO INTERPRETER NEED 2
Refill request from pharmacy for the medication listed below.  Patient was last seen 10/8/20  Next appt 11/05/20.  Refilled per protocol.    Last written as Rosuvastatin 20mg 1 tablet po daily #30     Patient stopped taking the medication for while not sure of the time frame  but wants to restart.  Ok to refill?      Pharmacy:  [facility]  Call when complete?  no      Drug Prescription Monitoring    Associated Diagnosis for Medication: hyperlipidemia  Last Dispensed from Pharmacy: ?  New Refill Start Date Expected:  Next Refill After This Not Until:     English

## 2022-04-11 NOTE — ED PROVIDER NOTE - PROGRESS NOTE DETAILS
CT of head and cervical spine pending. Dr. Martinez will assume care. head injury instructions explained and return precautions discussed Results of CT and x-ray d/w aide at bedside and copy of reports given.  Aide will call the group home and arrange for transportation.

## 2022-04-11 NOTE — ED PROVIDER NOTE - CARE PLAN
Principal Discharge DX:	Closed head injury  Secondary Diagnosis:	Hand abrasion  Secondary Diagnosis:	Fall   1

## 2022-04-11 NOTE — ED PROVIDER NOTE - NSFOLLOWUPINSTRUCTIONS_ED_ALL_ED_FT
tylenol over the counter as needed for pain  ice  follow up with primary care provider       Head Injury, Adult       There are many types of head injuries. They can be as minor as a small bump. Some head injuries can be worse. Worse injuries include:  •A strong hit to the head that shakes the brain back and forth, causing damage (concussion).      •A bruise (contusion) of the brain. This means there is bleeding in the brain that can cause swelling.      •A cracked skull (skull fracture).      •Bleeding in the brain that gathers, gets thick (makes a clot), and forms a bump (hematoma).      Most problems from a head injury come in the first 24 hours. However, you may still have side effects up to 7–10 days after your injury. It is important to watch your condition for any changes. You may need to be watched in the emergency department or urgent care, or you may need to stay in the hospital.      What are the causes?    There are many possible causes of a head injury. A serious head injury may be caused by:  •A car accident.      •Bicycle or motorcycle accidents.      •Sports injuries.      •Falls.      •Being hit by an object.        What are the signs or symptoms?    Symptoms of a head injury include a bruise, bump, or bleeding where the injury happened. Other physical symptoms may include:  •Headache.      •Feeling like you may vomit (nauseous) or vomiting.      •Dizziness.      •Blurred or double vision.      •Being uncomfortable around bright lights or loud noises.      •Shaking movements that you cannot control (seizures).      •Feeling tired.      •Trouble being woken up.      •Fainting or loss of consciousness.      Mental or emotional symptoms may include:  •Feeling grumpy or cranky.      •Confusion and memory problems.      •Having trouble paying attention or concentrating.      •Changes in eating or sleeping habits.      •Feeling worried or nervous (anxious).      •Feeling sad (depressed).        How is this treated?    Treatment for this condition depends on how severe the injury is and the type of injury you have. The main goal is to prevent problems and to allow the brain time to heal.    Mild head injury     If you have a mild head injury, you may be sent home, and treatment may include:  •Being watched. A responsible adult should stay with you for 24 hours after your injury and check on you often.      •Physical rest.      •Brain rest.      •Pain medicines.      Severe head injury    If you have a severe head injury, treatment may include:  •Being watched closely. This includes staying in the hospital.    •Medicines to:  •Help with pain.      •Prevent seizures.      •Help with brain swelling.        •Protecting your airway and using a machine that helps you breathe (ventilator).      •Treatments to watch for and manage swelling inside the brain.    •Brain surgery. This may be needed to:  •Remove a collection of blood or blood clots.      •Stop the bleeding.      •Remove a part of the skull. This allows room for the brain to swell.          Follow these instructions at home:    Activity     •Rest.      •Avoid activities that are hard or tiring.      •Make sure you get enough sleep.    •Let your brain rest. Do this by limiting activities that need a lot of thought or attention, such as:  •Watching TV.      •Playing memory games and puzzles.      •Job-related work or homework.      •Working on the computer, social media, and texting.        •Avoid activities that could cause another head injury until your doctor says it is okay. This includes playing sports. Having another head injury, especially before the first one has healed, can be dangerous.      •Ask your doctor when it is safe for you to go back to your normal activities, such as work or school. Ask your doctor for a step-by-step plan for slowly going back to your normal activities.      •Ask your doctor when you can drive, ride a bicycle, or use heavy machinery. Do not do these activities if you are dizzy.        Lifestyle      • Do not drink alcohol until your doctor says it is okay.      • Do not use drugs.      •If it is harder than usual to remember things, write them down.      •If you are easily distracted, try to do one thing at a time.      •Talk with family members or close friends when making important decisions.      •Tell your friends, family, a trusted co-worker, and  about your injury, symptoms, and limits (restrictions). Have them watch for any problems that are new or getting worse.      General instructions     •Take over-the-counter and prescription medicines only as told by your doctor.      •Have someone stay with you for 24 hours after your head injury. This person should watch you for any changes in your symptoms and be ready to get help.      •Keep all follow-up visits as told by your doctor. This is important.      How is this prevented?     •Work on your balance and strength. This can help you avoid falls.      •Wear a seat belt when you are in a moving vehicle.    •Wear a helmet when you:  •Ride a bicycle.      •Ski.      •Do any other sport or activity that has a risk of injury.      •If you drink alcohol:•Limit how much you use to:  •0–1 drink a day for nonpregnant women.      •0–2 drinks a day for men.        •Be aware of how much alcohol is in your drink. In the [place] one drink equals one 12 oz bottle of beer (355 mL), one 5 oz glass of wine (148 mL), or one 1½ oz glass of hard liquor (44 mL).      •Make your home safer by:  •Getting rid of clutter from the floors and stairs. This includes things that can make you trip.      •Using grab bars in bathrooms and handrails by stairs.      •Placing non-slip mats on floors and in bathtubs.      •Putting more light in dim areas.          Where to find more information    •Centers for Disease Control and Prevention: www.cdc.gov        Get help right away if:  •You have:  •A very bad headache that is not helped by medicine.      •Trouble walking or weakness in your arms and legs.      •Clear or bloody fluid coming from your nose or ears.      •Changes in how you see (vision).      •A seizure.      •More confusion or more grumpy moods.        •Your symptoms get worse.      •You are sleepier than normal and have trouble staying awake.      •You lose your balance.      •The black centers of your eyes (pupils) change in size.      •Your speech is slurred.      •Your dizziness gets worse.      •You vomit.      These symptoms may be an emergency. Do not wait to see if the symptoms will go away. Get medical help right away. Call your local emergency services (911 in the [place] Do not drive yourself to the hospital.       Summary    •Head injuries can be as minor as a small bump. Some head injuries can be worse.      •Treatment for this condition depends on how severe the injury is and the type of injury you have.      •Have someone stay with you for 24 hours after your head injury.      •Ask your doctor when it is safe for you to go back to your normal activities, such as work or school.      •To prevent a head injury, wear a seat belt in a car, wear a helmet when you use a bicycle, limit your alcohol use, and make your home safer.      This information is not intended to replace advice given to you by your health care provider. Make sure you discuss any questions you have with your health care provider.

## 2022-04-11 NOTE — ED PROVIDER NOTE - CLINICAL SUMMARY MEDICAL DECISION MAKING FREE TEXT BOX
witnessed fall at group home. hit forehead on blood. no LOC or blood thinners. CT head and c spine to eval for ICH, skull fx, or vert fx. moving all ext. do not suspect ext fx

## 2022-04-11 NOTE — ED PROVIDER NOTE - PATIENT PORTAL LINK FT
You can access the FollowMyHealth Patient Portal offered by [facility] by registering at the following website: [email or web address]. By joining [employer]’s FollowMyHealth portal, you will also be able to view your health information using other applications (apps) compatible with our system.

## 2022-04-11 NOTE — ED PROVIDER NOTE - ATTENDING CONTRIBUTION TO CARE
66 year old male with a history of MR, HTN, seizure d/o presents with witnesses fall at group home.  Patient fell face-forward in his room and hit his forehead on the wood floor.  No LOC.  Patient is not on any blood thinners.  Aide at bedside states patient is an baseline mental status.  Sustained small abrasion to left hand 2nd and 5th digits.  Tetanus UTD.    AO X 0, patient non-verbal, in NAD  1cm abrasion to left 2nd and 5th digits, distal end, no active bleeding  Small hematoma to right central forehead, no laceration.  Full ROM of c-spine, no deformity or step off  Full ROM of b/l UE and LE, hips and pelvis stable     CT head, c-spine, x-ray left hand

## 2022-04-11 NOTE — ED PROVIDER NOTE - NS ED ATTENDING STATEMENT MOD
This was a shared visit with the ESTELLE. I reviewed and verified the documentation and independently performed the documented:

## 2022-04-11 NOTE — ED PROVIDER NOTE - OBJECTIVE STATEMENT
66 year old male with history of MR, seizure disorder, HTN, psoriases, and diastrophic dysplasia presents for evaluation after witnessed fall at group home. fell while in room and hit forehead on hard wood floor. no LOC. does not take any blood thinners. has been acting at normal neuro baseline per caregiver. does not appear to be in any pain per caregiver. was able to get back up on own and ambulatory per caregiver. small abrasion to 2nd and 5th digits of left hand. no bleeding. has been moving both hands and digits per caregiver. limited history due to MR  BEHZAD Joanna Price  Resident at group home 66 year old male with history of MR, seizure disorder, HTN, psoriases, and diastrophic dysplasia presents for evaluation after witnessed fall at group home. fell while in room and hit forehead on hard wood floor. no LOC. does not take any blood thinners. has been acting at normal neuro baseline per caregiver. does not appear to be in any pain per caregiver. was able to get back up on own and ambulatory per caregiver. small abrasion to 2nd and 5th digits of left hand. no bleeding. has been moving both hands and digits per caregiver. limited history due to MR  tetanus up to date per caregiver   PCP Joanna Price  Resident at group home

## 2022-05-03 ENCOUNTER — EMERGENCY (EMERGENCY)
Facility: HOSPITAL | Age: 67
LOS: 1 days | Discharge: HOME | End: 2022-05-03
Attending: EMERGENCY MEDICINE | Admitting: EMERGENCY MEDICINE
Payer: MEDICARE

## 2022-05-03 VITALS
OXYGEN SATURATION: 95 % | SYSTOLIC BLOOD PRESSURE: 135 MMHG | HEIGHT: 65 IN | DIASTOLIC BLOOD PRESSURE: 80 MMHG | RESPIRATION RATE: 18 BRPM | WEIGHT: 164.91 LBS | TEMPERATURE: 98 F | HEART RATE: 73 BPM

## 2022-05-03 PROCEDURE — 99284 EMERGENCY DEPT VISIT MOD MDM: CPT | Mod: 25

## 2022-05-03 PROCEDURE — 72125 CT NECK SPINE W/O DYE: CPT | Mod: 26,MA

## 2022-05-03 PROCEDURE — 70450 CT HEAD/BRAIN W/O DYE: CPT | Mod: 26,MA

## 2022-05-03 PROCEDURE — 70450 CT HEAD/BRAIN W/O DYE: CPT | Mod: MA

## 2022-05-03 PROCEDURE — 70486 CT MAXILLOFACIAL W/O DYE: CPT | Mod: MA

## 2022-05-03 PROCEDURE — 72125 CT NECK SPINE W/O DYE: CPT | Mod: MA

## 2022-05-03 PROCEDURE — 99284 EMERGENCY DEPT VISIT MOD MDM: CPT

## 2022-05-03 PROCEDURE — 70486 CT MAXILLOFACIAL W/O DYE: CPT | Mod: 26,MA

## 2022-05-03 NOTE — ED ADULT TRIAGE NOTE - CHIEF COMPLAINT QUOTE
Patient is a 65yo mentally challenged male non verbal that sustained a fall today at his program hitting head Patient caregiver does not know the particulars of the fall Patient has abrasion on top of head Patient is not on blood thinning medication

## 2022-05-03 NOTE — ED PROVIDER NOTE - PATIENT PORTAL LINK FT
You can access the FollowMyHealth Patient Portal offered by [facility] by registering at the following website: [email or web address]. By joining [employer]’s FollowMyHealth portal, you will also be able to view your health information using other applications (apps) compatible with our system.

## 2022-05-03 NOTE — ED ADULT NURSE NOTE - OBJECTIVE STATEMENT
patient comes to ED from group home caregiver reports pt returned from program and they noticed a contusion to right side of head and forehead slight redness noted pt is nonverbal and they were unable to get any information as to what occurred patient is awake alert moves all extremities patient is at baseline mental status per caregiver

## 2022-05-03 NOTE — ED PROVIDER NOTE - NSFOLLOWUPINSTRUCTIONS_ED_ALL_ED_FT
WHAT YOU NEED TO KNOW:    Fall prevention includes ways to make your home and other areas safer. It also includes ways you can move more carefully to prevent a fall. Health conditions that cause changes in your blood pressure, vision, or muscle strength and coordination may increase your risk for falls. Medicines may also increase your risk for falls if they make you dizzy, weak, or sleepy.    DISCHARGE INSTRUCTIONS:    Call 911 or have someone else call if:   •You have fallen and are unconscious.      •You have fallen and cannot move part of your body.      Contact your healthcare provider if:   •You have fallen and have pain or a headache.      •You have questions or concerns about your condition or care.      Fall prevention tips:   •Stand or sit up slowly. This may help you keep your balance and prevent falls.      •Use assistive devices as directed. Your healthcare provider may suggest that you use a cane or walker to help you keep your balance. You may need to have grab bars put in your bathroom near the toilet or in the shower.      •Wear shoes that fit well and have soles that . Wear shoes both inside and outside. Use slippers with good . Do not wear shoes with high heels.      •Wear a personal alarm. This is a device that allows you to call 911 if you fall and need help. Ask your healthcare provider for more information.      •Stay active. Exercise can help strengthen your muscles and improve your balance. Your healthcare provider may recommend water aerobics or walking. He or she may also recommend physical therapy to improve your coordination. Never start an exercise program without talking to your healthcare provider first.  Walking for Exercise           •Manage your medical conditions.  Keep all appointments with your healthcare providers. Visit your eye doctor as directed.      Home safety tips:   Fall Prevention for Adults       •Add items to prevent falls in the bathroom. Put nonslip strips on your bath or shower floor to prevent you from slipping. Use a bath mat if you do not have carpet in the bathroom. This will prevent you from falling when you step out of the bath or shower. Use a shower seat so you do not need to stand while you shower. Sit on the toilet or a chair in your bathroom to dry yourself and put on clothing. This will prevent you from losing your balance from drying or dressing yourself while you are standing.      •Keep paths clear. Remove books, shoes, and other objects from walkways and stairs. Place cords for telephones and lamps out of the way so that you do not need to walk over them. Tape them down if you cannot move them. Remove small rugs. If you cannot remove a rug, secure it with double-sided tape. This will prevent you from tripping.      •Install bright lights in your home. Use night lights to help light paths to the bathroom or kitchen. Always turn on the light before you start walking.      •Keep items you use often on shelves within reach. Do not use a step stool to help you reach an item.      •Paint or place reflective tape on the edges of your stairs. This will help you see the stairs better.      Follow up with your doctor as directed: Write down your questions so you remember to ask them during your visits.        © Copyright [employer] 2022           back to top                          © Copyright [employer] 2022

## 2022-05-03 NOTE — ED ADULT NURSE NOTE - NSIMPLEMENTINTERV_GEN_ALL_ED
Implemented All Fall Risk Interventions:  [place] to call system. Call bell, personal items and telephone within reach. Instruct patient to call for assistance. Room bathroom lighting operational. Non-slip footwear when patient is off stretcher. Physically safe environment: no spills, clutter or unnecessary equipment. Stretcher in lowest position, wheels locked, appropriate side rails in place. Provide visual cue, wrist band, yellow gown, etc. Monitor gait and stability. Monitor for mental status changes and reorient to person, place, and time. Review medications for side effects contributing to fall risk. Reinforce activity limits and safety measures with patient and family.

## 2022-05-03 NOTE — ED PROVIDER NOTE - OBJECTIVE STATEMENT
66 male presents to ER from [facility] with report of fall hit head, no LOC, no syncope, no vomiting, no change in baseline mental status.

## 2022-05-03 NOTE — ED PROVIDER NOTE - PROGRESS NOTE DETAILS
results of images reviewed, copy provided, all questions answered. Nasal fractures noted. Staff reports pt with multiple prior h/o nasal fractures nose chronically deformed but unchanged no epistaxis or skin break involving the nasal bridge. Fractures likely old and therefore will defer abx at this time.

## 2022-08-15 ENCOUNTER — APPOINTMENT (OUTPATIENT)
Dept: OPHTHALMOLOGY | Facility: CLINIC | Age: 67
End: 2022-08-15

## 2022-08-29 ENCOUNTER — NON-APPOINTMENT (OUTPATIENT)
Age: 67
End: 2022-08-29

## 2022-08-29 ENCOUNTER — APPOINTMENT (OUTPATIENT)
Dept: OPHTHALMOLOGY | Facility: CLINIC | Age: 67
End: 2022-08-29

## 2022-08-29 PROCEDURE — 92012 INTRM OPH EXAM EST PATIENT: CPT

## 2022-09-24 NOTE — ED ADULT NURSE NOTE - NSSUHOSCREENINGYN_ED_ALL_ED
"  [facility]   Acute Rehabilitation Unit    INTERVAL HISTORY  Stephani reports doing well.  No acute events overnight.  Continues to be constipated. Denies chest pain, shortness of breath, no fever or chills, n/v.       ROS: 10 point ROS neg other than the symptoms noted above in the HPI.              MEDICATIONS  Scheduled meds    amLODIPine  10 mg Oral Daily     aspirin  81 mg Oral Daily     atorvastatin  80 mg Oral QPM     clopidogrel  75 mg Oral Daily     heparin ANTICOAGULANT  5,000 Units Subcutaneous Q12H     letrozole  2.5 mg Oral At Bedtime     lisinopril  30 mg Oral At Bedtime     melatonin  5 mg Oral At Bedtime     pantoprazole  40 mg Oral QAM AC     polyethylene glycol  17 g Oral Daily     traZODone  25 mg Oral At Bedtime       PRN meds:  acetaminophen, bisacodyl, hydrALAZINE, senna-docusate      PHYSICAL EXAM  /68 (BP Location: Right arm)   Pulse 68   Temp (!) 96.1  F (35.6  C) (Oral)   Resp 20   Ht 1.702 m (5' 7\")   Wt 75.3 kg (166 lb)   SpO2 98%   BMI 26.00 kg/m    Gen: awake, alert  HEENT: EOMI, NCAT   Cardio: RRR, no murmur  Pulm: on room air, symmetrical chest rise   Abd: soft, nontender, nondistended  Ext: moves RUE, RLE freely, more movement present in LUE and LLE than yesterday  Neuro: sensation intact to soft touch at BLE, left hemiparesis getting proximal return.    MSK: strength 4/5 in L HF, 5/5 in R HF    LABS  No results found for this or any previous visit (from the past 24 hour(s)).    ASSESSMENT AND PLAN    Stephani Garcia is a 69 year old left hand dominant female with PMHx HTN, HLD, vertigo, GILDA (not using CPAP). Admitted for acute ischemic infarcts with right frontal lobe and right basal ganglia involvement. Has functional deficits of weak L. shoulder shrug, L. Hemiparesis, dysphagia, dysarthria, possbile neurogenic bowel/bladder and cognitive deficits. She also has possible L. Breast CA pending pathology results. She is admitted to " undergo therapies with PT/OT/SLP.     --Vitals stable. No lab today.  --No suppository given in last few days; will ask nursing staff to give one time suppository this evening 1/2 hr after dinner if no BM prior.   --Continue ongoing medical management.  --Continue therapies and plan of care.      Kaleigh Engel MD    Physical Medicine and Rehabilitation             No - the patient is unable to be screened due to medical condition

## 2022-10-19 NOTE — ED PROVIDER NOTE - RATE
[de-identified] : Constitutional: \par - No acute distress \par - Well developed; well nourished \par \par Neurological: \par - normal mood and affect \par - alert and oriented x 3  \par \par Cardiovascular: \par - grossly normal\par \par Lumbar Spine Exam: \par \par Inspection: \par erythema (-) \par ecchymosis (-) \par rashes (-) \par alignment: no scoliosis\par well healed surgical scar midline \par \par Palpation:  \par paraspinal tenderness:                  L (-) ; R (+) \par thoracic paraspinal tenderness:    L (-) ; R (-) \par sciatic nerve tenderness :             L (-) ; R (-) \par SI joint tenderness:                        L (-) ; R (-) \par GTB tenderness:                            L (-);  R (-) \par \par ROM:   \par Full ROM with mild stiffness \par \par Strength:                   Right       Left    \par Hip Flexion:                (5/5)       (5/5) \par Quadriceps:               (5/5)       (5/5) \par Hamstrings:                (5/5)       (5/5) \par Ankle Dorsiflexion:     (5/5)       (5/5) \par EHL:                            (5/5)       (5/5) \par Ankle Plantarflexion:  (5/5)       (5/5) \par \par Special Tests: \par SLR:                      R (-) ; L (-) \par Facet loading:       R (-) ; L (-) \par Tight Hamstrings   R (-) ; L (-) \par \par Neurologic: \par Light touch intact throughout LE bilaterally\par Reflexes normal and symmetric bilaterally\par \par Gait: \par mildly antalgic  107

## 2022-11-21 NOTE — SWALLOW BEDSIDE ASSESSMENT ADULT - SWALLOW EVAL: FEEDING ASSISTANCE
PCP: Ajay Cramer MD    Reason for consultation: Papillary thyroid cancer status post total thyroidectomy, type 2 diabetes mellitus with hyperglycemia    History present illness:  Patient is a pleasant 52 year old who comes for follow-up for the for the above-mentioned complaint. He was seen in 2016 for thyroid nodules, 1.8 cm nodule on the right side and a 1.1 cm nodule near the isthmus on the right side. He underwent biopsy of the dominant nodule which came consistent with the PTC. He was referred to Dr. Keane and underwent total thyroidectomy with limited central neck dissection on 10/10/2016. His final pathology showed-  A. Lymph nodes, level VI, regional resection:     - Metastatic papillary thyroid carcinoma identified in three of five lymph   nodes (pN1a).     - The largest metastatic focus measures 0.6 cm.     - Extracapsular extension is not identified.         B. Thyroid, total thyroidectomy:     - Right thyroid lobe with two papillary thyroid carcinomas (1.9 and 1.6 cm), classical type, margins negative, No angioinvasion, focal extension into perithyroid soft tissue posterior(minimal extrathyroidal extension)     - Minimal extrathyroidal extension is identified (pT3).    [id number]      After discussing the management options,he decided to go for the WHITE treatment and underwent thyrogen stimulated WHITE treatment  On 2/27/2017 with 163.2mCi I-131.His pre and post therapy showed small activity in right thyroid bed suggestive of residual thyroid tissue.     He was started on levothyroxine replacement post surgery and currently on 175 µg 1 tablet Monday to Saturday and half a tablet on Sunday. He is taking the medication regularly and appropriately.   TSH  1.2 . He feels well . Denies any palpitation.    His last TG panel showed thyroglobulin of < 0.1 with negative antibodies. His neck ultrasound done in 2021 was negative for any abnormal lymphadenopathy.    He also has diabetes , he is currently on  metformin xr 1000mg twice daily  Has not been compliant with the diet or activity--> has improved diet;exercise limited.  He is not checking his blood sugar. His last  A1c was 7.8 down from 10.6    He had an eye examination in 2020- no diabetic retinopathy ; denies blurriness of vision. Denies any neuropathy symptoms. Spot urine for microalbumin negative.    His blood pressure is controlled on lisinopril 5 mg daily    Last Lipid profile done showed elevated triglycerides of 402 and LDL of 80-> on lipitor 10mg daily    Current Outpatient Medications   Medication Sig Dispense Refill   • lisinopril (ZESTRIL) 5 MG tablet TAKE 1 TABLET BY MOUTH DAILY 30 tablet 3   • atorvastatin (LIPITOR) 10 MG tablet TAKE 1 TABLET BY MOUTH DAILY 30 tablet 5   • sertraline (ZOLOFT) 50 MG tablet TAKE 1 TABLET BY MOUTH DAILY 90 tablet 0   • levothyroxine 175 MCG tablet TAKE 1 TABLET BY MOUTH EVERY DAY ON MONDAY THROUGH SATURDAY AND 1/2 TABLET ON SUNDAYS 84 tablet 4   • blood glucose (OneTouch Verio) test strip Test blood sugar 2 times daily as directed. Diagnosis: E11.65. Meter: One Touch Verio 200 each 3   • OneTouch Delica Lancets 30G Misc 1 each 2 times daily. Diagnosis: DM2; E11.65. Meter: One Touch Verio 200 each 3   • metFORMIN (GLUCOPHAGE-XR) 500 MG 24 hr tablet TAKE 2 TABLETS BY MOUTH TWICE DAILY WITH MEALS. PLEASE COMPLETE LABS. 360 tablet 1   • Cholecalciferol (Vitamin D) 50 mcg (2,000 units) tablet Take 50 mcg by mouth daily.     • Bioflavonoid Products (VITAMIN C) Chew Tab Chew 1 capsule by mouth daily.     • Probiotic Product (PROBIOTIC PO) Take 1 capsule by mouth every other day.      • Multiple Vitamins-Minerals (MULTIVITAMIN PO) Take 1 capsule by mouth daily.       No current facility-administered medications for this visit.     ALLERGIES:  No Known Allergies     Past Medical History:   Diagnosis Date   • Diabetes mellitus (CMS/HCC)    • Personal history of malignant neoplasm of testis 2001   • Testicular cancer (CMS/HCC)     • Thyroid cancer (CMS/HCC)    • Wears eyeglasses      Past Surgical History:   Procedure Laterality Date   • Colonoscopy diagnostic  06/20/2022    Tubular adenoma and Tubulovillous adenoma. Follow up in 3 years given three adenomatous polyps.   • Exploratory retroperitoneal  2001    retroperitoneal node dissection for testicle cancer   • Removal testis,radical+abd explor  2001    testicle cancer   • Thyroidectomy         REVIEW OF SYSTEMS  I conducted a 6 point review of systems with the patient that included General, Neurological, Cardiovascular, Eyes,Pulmonary, GI, . Pertinent positives discussed in the HPI.       PHYSICAL EXAMINATION:  GENERAL:  Pleasant  52 year old  malewho was in no acute distress when examined in the clinic.  Visit Vitals  BP (!) 142/79   Pulse 76   Ht 5' 10\" (1.778 m)   Wt 91.8 kg (202 lb 6.1 oz)   BMI 29.04 kg/m²       HEENT:  Normocephalic, atraumatic.    Extraocular muscles intact.  Conjunctivae clear.  Sclerae anicteric.   No lid lag.  No proptosis.   NECK:  Supple.  Healing thyroidectomy scar ; no abnormal cervical lymphopathy  CHEST: Breathing is unlabored.  NEUROLOGIC:  Alert, awake, oriented x3.  No obvious focal deficit.    MUSCULOSKELETAL:  Normal gait.  EXTREMITIES:  No cyanosis, clubbing or pedal edema.  SKIN:  Warm and dry.  No rash.  PSYCHIATRIC:  Normal behavior.  Affect is consistent.    Labs:  Reviewed      Assessment and recommendations:  Papillary thyroid cancer- TNM; [id number] -Stage 1, AMBROSE-intermediate risk,MACIS 6.7;Follow up complete biochemical and structural response  Acquired hypothyroidism  Uncontrolled type 2 diabetes    -Surveillance workup negative for any residual or recurrent cancer. Follow with periodic Tg panel    -With regard to acquired hypothyroidism,  Continue current dose of levothyroxine.      - T2DM without hyperglycemia. Counseled regarding importance of dietary compliance, activity, glycemic goals, A1c goal, complications associated with  uncontrolled glycemia, appropriate medications intake and compliance- spent most of the time discussing and  Counseling about these issues and doesn't want to add medications. Do lab for a1c , microlab and reassess.  Interested in using continuous glucose sensor and will send prescription for freestyle Miguelangel      - counseled regarding diet.continue with atorvastatin. Repeat lipid profile and reassess      Thank you for allowing to participate in to care of this patient. Please let me know if you have any questions or concerns.       1:1 supervision/assistance is required during all PO intake 2/2 impulsivity/dependent

## 2022-12-16 ENCOUNTER — INPATIENT (INPATIENT)
Facility: HOSPITAL | Age: 67
LOS: 0 days | Discharge: OTHER INSTITUTION | DRG: 543 | End: 2022-12-17
Attending: FAMILY MEDICINE | Admitting: FAMILY MEDICINE
Payer: COMMERCIAL

## 2022-12-16 VITALS
DIASTOLIC BLOOD PRESSURE: 68 MMHG | OXYGEN SATURATION: 94 % | RESPIRATION RATE: 16 BRPM | TEMPERATURE: 98 F | WEIGHT: 149.91 LBS | HEART RATE: 126 BPM | SYSTOLIC BLOOD PRESSURE: 115 MMHG

## 2022-12-16 VITALS — TEMPERATURE: 99 F

## 2022-12-16 DIAGNOSIS — S72.001A FRACTURE OF UNSPECIFIED PART OF NECK OF RIGHT FEMUR, INITIAL ENCOUNTER FOR CLOSED FRACTURE: ICD-10-CM

## 2022-12-16 LAB
ALBUMIN SERPL ELPH-MCNC: 3.2 G/DL — LOW (ref 3.3–5)
ALP SERPL-CCNC: 94 U/L — SIGNIFICANT CHANGE UP (ref 40–120)
ALT FLD-CCNC: 20 U/L — SIGNIFICANT CHANGE UP (ref 12–78)
ANION GAP SERPL CALC-SCNC: 9 MMOL/L — SIGNIFICANT CHANGE UP (ref 5–17)
APTT BLD: 28.5 SEC — SIGNIFICANT CHANGE UP (ref 27.5–35.5)
AST SERPL-CCNC: 23 U/L — SIGNIFICANT CHANGE UP (ref 15–37)
BASOPHILS # BLD AUTO: 0.02 K/UL — SIGNIFICANT CHANGE UP (ref 0–0.2)
BASOPHILS NFR BLD AUTO: 0.1 % — SIGNIFICANT CHANGE UP (ref 0–2)
BILIRUB SERPL-MCNC: 0.3 MG/DL — SIGNIFICANT CHANGE UP (ref 0.2–1.2)
BUN SERPL-MCNC: 15 MG/DL — SIGNIFICANT CHANGE UP (ref 7–23)
CALCIUM SERPL-MCNC: 9.5 MG/DL — SIGNIFICANT CHANGE UP (ref 8.5–10.1)
CHLORIDE SERPL-SCNC: 105 MMOL/L — SIGNIFICANT CHANGE UP (ref 96–108)
CO2 SERPL-SCNC: 25 MMOL/L — SIGNIFICANT CHANGE UP (ref 22–31)
CREAT SERPL-MCNC: 1.2 MG/DL — SIGNIFICANT CHANGE UP (ref 0.5–1.3)
EGFR: 67 ML/MIN/1.73M2 — SIGNIFICANT CHANGE UP
EOSINOPHIL # BLD AUTO: 0.07 K/UL — SIGNIFICANT CHANGE UP (ref 0–0.5)
EOSINOPHIL NFR BLD AUTO: 0.4 % — SIGNIFICANT CHANGE UP (ref 0–6)
FLUAV AG NPH QL: SIGNIFICANT CHANGE UP
FLUBV AG NPH QL: SIGNIFICANT CHANGE UP
GLUCOSE SERPL-MCNC: 131 MG/DL — HIGH (ref 70–99)
HCT VFR BLD CALC: 39.3 % — SIGNIFICANT CHANGE UP (ref 39–50)
HGB BLD-MCNC: 12.9 G/DL — LOW (ref 13–17)
IMM GRANULOCYTES NFR BLD AUTO: 0.8 % — SIGNIFICANT CHANGE UP (ref 0–0.9)
INR BLD: 1.13 RATIO — SIGNIFICANT CHANGE UP (ref 0.88–1.16)
LACTATE SERPL-SCNC: 2.5 MMOL/L — HIGH (ref 0.7–2)
LACTATE SERPL-SCNC: 6.5 MMOL/L — CRITICAL HIGH (ref 0.7–2)
LYMPHOCYTES # BLD AUTO: 1.74 K/UL — SIGNIFICANT CHANGE UP (ref 1–3.3)
LYMPHOCYTES # BLD AUTO: 10.4 % — LOW (ref 13–44)
MCHC RBC-ENTMCNC: 30.3 PG — SIGNIFICANT CHANGE UP (ref 27–34)
MCHC RBC-ENTMCNC: 32.8 GM/DL — SIGNIFICANT CHANGE UP (ref 32–36)
MCV RBC AUTO: 92.3 FL — SIGNIFICANT CHANGE UP (ref 80–100)
MONOCYTES # BLD AUTO: 0.99 K/UL — HIGH (ref 0–0.9)
MONOCYTES NFR BLD AUTO: 5.9 % — SIGNIFICANT CHANGE UP (ref 2–14)
NEUTROPHILS # BLD AUTO: 13.81 K/UL — HIGH (ref 1.8–7.4)
NEUTROPHILS NFR BLD AUTO: 82.4 % — HIGH (ref 43–77)
NRBC # BLD: 0 /100 WBCS — SIGNIFICANT CHANGE UP (ref 0–0)
PLATELET # BLD AUTO: 250 K/UL — SIGNIFICANT CHANGE UP (ref 150–400)
POTASSIUM SERPL-MCNC: 4 MMOL/L — SIGNIFICANT CHANGE UP (ref 3.5–5.3)
POTASSIUM SERPL-SCNC: 4 MMOL/L — SIGNIFICANT CHANGE UP (ref 3.5–5.3)
PROT SERPL-MCNC: 7.8 G/DL — SIGNIFICANT CHANGE UP (ref 6–8.3)
PROTHROM AB SERPL-ACNC: 13.2 SEC — SIGNIFICANT CHANGE UP (ref 10.5–13.4)
RBC # BLD: 4.26 M/UL — SIGNIFICANT CHANGE UP (ref 4.2–5.8)
RBC # FLD: 13.5 % — SIGNIFICANT CHANGE UP (ref 10.3–14.5)
RSV RNA NPH QL NAA+NON-PROBE: SIGNIFICANT CHANGE UP
SARS-COV-2 RNA SPEC QL NAA+PROBE: SIGNIFICANT CHANGE UP
SODIUM SERPL-SCNC: 139 MMOL/L — SIGNIFICANT CHANGE UP (ref 135–145)
WBC # BLD: 16.77 K/UL — HIGH (ref 3.8–10.5)
WBC # FLD AUTO: 16.77 K/UL — HIGH (ref 3.8–10.5)

## 2022-12-16 PROCEDURE — 73551 X-RAY EXAM OF FEMUR 1: CPT | Mod: 26,RT

## 2022-12-16 PROCEDURE — 71250 CT THORAX DX C-: CPT | Mod: 26,MA

## 2022-12-16 PROCEDURE — 93010 ELECTROCARDIOGRAM REPORT: CPT

## 2022-12-16 PROCEDURE — 73502 X-RAY EXAM HIP UNI 2-3 VIEWS: CPT | Mod: 26,RT

## 2022-12-16 PROCEDURE — 72125 CT NECK SPINE W/O DYE: CPT | Mod: 26,MA

## 2022-12-16 PROCEDURE — 70450 CT HEAD/BRAIN W/O DYE: CPT | Mod: 26,MA

## 2022-12-16 PROCEDURE — 99285 EMERGENCY DEPT VISIT HI MDM: CPT | Mod: FS

## 2022-12-16 PROCEDURE — 71045 X-RAY EXAM CHEST 1 VIEW: CPT | Mod: 26

## 2022-12-16 PROCEDURE — 74176 CT ABD & PELVIS W/O CONTRAST: CPT | Mod: 26,MA

## 2022-12-16 RX ORDER — SODIUM CHLORIDE 9 MG/ML
1000 INJECTION INTRAMUSCULAR; INTRAVENOUS; SUBCUTANEOUS ONCE
Refills: 0 | Status: COMPLETED | OUTPATIENT
Start: 2022-12-16 | End: 2022-12-16

## 2022-12-16 RX ORDER — ERTAPENEM SODIUM 1 G/1
1000 INJECTION, POWDER, LYOPHILIZED, FOR SOLUTION INTRAMUSCULAR; INTRAVENOUS ONCE
Refills: 0 | Status: COMPLETED | OUTPATIENT
Start: 2022-12-16 | End: 2022-12-16

## 2022-12-16 RX ADMIN — ERTAPENEM SODIUM 120 MILLIGRAM(S): 1 INJECTION, POWDER, LYOPHILIZED, FOR SOLUTION INTRAMUSCULAR; INTRAVENOUS at 22:17

## 2022-12-16 RX ADMIN — SODIUM CHLORIDE 1000 MILLILITER(S): 9 INJECTION INTRAMUSCULAR; INTRAVENOUS; SUBCUTANEOUS at 22:17

## 2022-12-16 RX ADMIN — SODIUM CHLORIDE 1000 MILLILITER(S): 9 INJECTION INTRAMUSCULAR; INTRAVENOUS; SUBCUTANEOUS at 23:00

## 2022-12-16 RX ADMIN — ERTAPENEM SODIUM 1000 MILLIGRAM(S): 1 INJECTION, POWDER, LYOPHILIZED, FOR SOLUTION INTRAMUSCULAR; INTRAVENOUS at 23:00

## 2022-12-16 RX ADMIN — SODIUM CHLORIDE 1000 MILLILITER(S): 9 INJECTION INTRAMUSCULAR; INTRAVENOUS; SUBCUTANEOUS at 18:00

## 2022-12-16 NOTE — ED PROVIDER NOTE - OBJECTIVE STATEMENT
67-year-old male with a past medical history of hypertension mental retardation and seizures brought in by ambulance from [facility] due to difficulty walking.  As per aide, patient was brought in from program in which they advised that he was not able to walk.  Reports the [facility] staff had to carry the patient off the bus.  No reports of any fall.  Denies fever, vomiting, known head injury, abdominal pain, chest pain, laceration, or any other complaints.

## 2022-12-16 NOTE — ED ADULT NURSE NOTE - OBJECTIVE STATEMENT
pt alert, Hx MR, " unable to ambulate today,  rt leg weakness, externally rotated , shorter than lt , possible injury" RROM, good cap refill

## 2022-12-16 NOTE — ED PROVIDER NOTE - CLINICAL SUMMARY MEDICAL DECISION MAKING FREE TEXT BOX
non ambulatory from group home- found to have hip fracture. ? no reported fall/injury. screen for infection.

## 2022-12-16 NOTE — ED PROVIDER NOTE - WR ORDER STATUS 2
Per case management, no further assistance can be provided to patient who already has information for shelters in [place] [facility] contacted for a Lyft ride to [facility] in [place]       Urszula Faustin RN  12/19/18 [id number] Performed

## 2022-12-16 NOTE — ED PROVIDER NOTE - PHYSICAL EXAMINATION
Constitutional: Awake, Alert, non-toxic   HEAD: Normocephalic, atraumatic.   EYES: PERRL, EOM intact, conjunctiva and sclera are clear bilaterally. No raccoon eyes.   ENT: no self sign. No rhinorrhea, normal pharynx, patent, no tonsillar exudate or enlargement, mucous membranes pink/moist, no erythema, no drooling or stridor.   NECK: Supple, non-tender  BACK: No midline or paraspinal TTP of cervical/thoracic/lumbar spine, FROM. No ecchymosis or hematomas. no rib TTP.   CARDIOVASCULAR: Normal S1, S2; regular rate and rhythm.  RESPIRATORY: Normal respiratory effort; breath sounds CTAB, no wheezes, rhonchi, or rales. Speaking in full sentences. No accessory muscle use.   ABDOMEN: Soft; non-tender, non-distended  EXTREMITIES: Full passive and active ROM in all extremities; (+) leg external rotated, (+) mild pain with right hip ROM, LE non-tender to palpation; distal pulses palpable and symmetric, no edema, no crepitus or step off  SKIN: Warm, dry; good skin turgor, no apparent lesions or rashes, no ecchymosis, brisk capillary refill.  NEURO: Awake, alert, CN intact, gross motor function intact. not following commands.

## 2022-12-16 NOTE — ED PROVIDER NOTE - ATTENDING APP SHARED VISIT CONTRIBUTION OF CARE
This was a shared visit with ESTELLE. I reviewed and verified the documentation and independently performed the documented MDM.
Verbalized Understanding

## 2022-12-17 ENCOUNTER — INPATIENT (INPATIENT)
Facility: HOSPITAL | Age: 67
LOS: 11 days | Discharge: NURSING, REHAB OR LONG-TERM CARE | DRG: 481 | End: 2022-12-29
Attending: HOSPITALIST | Admitting: HOSPITALIST
Payer: MEDICARE

## 2022-12-17 VITALS
OXYGEN SATURATION: 93 % | TEMPERATURE: 100 F | RESPIRATION RATE: 17 BRPM | DIASTOLIC BLOOD PRESSURE: 58 MMHG | HEART RATE: 117 BPM | SYSTOLIC BLOOD PRESSURE: 114 MMHG

## 2022-12-17 DIAGNOSIS — S72.001A FRACTURE OF UNSPECIFIED PART OF NECK OF RIGHT FEMUR, INITIAL ENCOUNTER FOR CLOSED FRACTURE: ICD-10-CM

## 2022-12-17 LAB
APPEARANCE UR: CLEAR — SIGNIFICANT CHANGE UP
BILIRUB UR-MCNC: NEGATIVE — SIGNIFICANT CHANGE UP
COLOR SPEC: YELLOW — SIGNIFICANT CHANGE UP
DIFF PNL FLD: ABNORMAL
GLUCOSE UR QL: NEGATIVE — SIGNIFICANT CHANGE UP
KETONES UR-MCNC: ABNORMAL
LACTATE SERPL-SCNC: 0.9 MMOL/L — SIGNIFICANT CHANGE UP (ref 0.7–2)
LEUKOCYTE ESTERASE UR-ACNC: ABNORMAL
NITRITE UR-MCNC: NEGATIVE — SIGNIFICANT CHANGE UP
PH UR: 6.5 — SIGNIFICANT CHANGE UP (ref 5–8)
PROT UR-MCNC: 30 MG/DL
SARS-COV-2 RNA SPEC QL NAA+PROBE: SIGNIFICANT CHANGE UP
SP GR SPEC: 1.01 — SIGNIFICANT CHANGE UP (ref 1.01–1.02)
UROBILINOGEN FLD QL: NEGATIVE — SIGNIFICANT CHANGE UP

## 2022-12-17 PROCEDURE — 85025 COMPLETE CBC W/AUTO DIFF WBC: CPT

## 2022-12-17 PROCEDURE — U0003: CPT

## 2022-12-17 PROCEDURE — 73502 X-RAY EXAM HIP UNI 2-3 VIEWS: CPT

## 2022-12-17 PROCEDURE — 71250 CT THORAX DX C-: CPT | Mod: MA

## 2022-12-17 PROCEDURE — 73551 X-RAY EXAM OF FEMUR 1: CPT

## 2022-12-17 PROCEDURE — 36415 COLL VENOUS BLD VENIPUNCTURE: CPT

## 2022-12-17 PROCEDURE — 71045 X-RAY EXAM CHEST 1 VIEW: CPT

## 2022-12-17 PROCEDURE — U0005: CPT

## 2022-12-17 PROCEDURE — 99285 EMERGENCY DEPT VISIT HI MDM: CPT

## 2022-12-17 PROCEDURE — 96365 THER/PROPH/DIAG IV INF INIT: CPT

## 2022-12-17 PROCEDURE — 93005 ELECTROCARDIOGRAM TRACING: CPT

## 2022-12-17 PROCEDURE — 80053 COMPREHEN METABOLIC PANEL: CPT

## 2022-12-17 PROCEDURE — 93010 ELECTROCARDIOGRAM REPORT: CPT

## 2022-12-17 PROCEDURE — 70450 CT HEAD/BRAIN W/O DYE: CPT | Mod: MA

## 2022-12-17 PROCEDURE — 99223 1ST HOSP IP/OBS HIGH 75: CPT | Mod: AI

## 2022-12-17 PROCEDURE — 83605 ASSAY OF LACTIC ACID: CPT

## 2022-12-17 PROCEDURE — 87040 BLOOD CULTURE FOR BACTERIA: CPT

## 2022-12-17 PROCEDURE — 85730 THROMBOPLASTIN TIME PARTIAL: CPT

## 2022-12-17 PROCEDURE — 74176 CT ABD & PELVIS W/O CONTRAST: CPT | Mod: MA

## 2022-12-17 PROCEDURE — 72125 CT NECK SPINE W/O DYE: CPT | Mod: MA

## 2022-12-17 PROCEDURE — 72170 X-RAY EXAM OF PELVIS: CPT

## 2022-12-17 PROCEDURE — 87637 SARSCOV2&INF A&B&RSV AMP PRB: CPT

## 2022-12-17 PROCEDURE — 85610 PROTHROMBIN TIME: CPT

## 2022-12-17 PROCEDURE — 81001 URINALYSIS AUTO W/SCOPE: CPT

## 2022-12-17 RX ORDER — ASPIRIN/CALCIUM CARB/MAGNESIUM 324 MG
1 TABLET ORAL
Qty: 0 | Refills: 0 | DISCHARGE

## 2022-12-17 RX ORDER — SECUKINUMAB 150 MG/ML
300 INJECTION SUBCUTANEOUS
Qty: 0 | Refills: 0 | DISCHARGE

## 2022-12-17 RX ORDER — METOPROLOL TARTRATE 50 MG
25 TABLET ORAL
Refills: 0 | Status: DISCONTINUED | OUTPATIENT
Start: 2022-12-17 | End: 2022-12-19

## 2022-12-17 RX ORDER — OLANZAPINE 15 MG/1
15 TABLET, FILM COATED ORAL AT BEDTIME
Refills: 0 | Status: DISCONTINUED | OUTPATIENT
Start: 2022-12-17 | End: 2022-12-19

## 2022-12-17 RX ORDER — SODIUM CHLORIDE 9 MG/ML
1000 INJECTION, SOLUTION INTRAVENOUS
Refills: 0 | Status: DISCONTINUED | OUTPATIENT
Start: 2022-12-17 | End: 2022-12-17

## 2022-12-17 RX ORDER — PETROLATUM,WHITE
0 JELLY (GRAM) TOPICAL
Qty: 0 | Refills: 0 | DISCHARGE

## 2022-12-17 RX ORDER — CEFAZOLIN SODIUM 1 G
2000 VIAL (EA) INJECTION ONCE
Refills: 0 | Status: DISCONTINUED | OUTPATIENT
Start: 2022-12-19 | End: 2022-12-23

## 2022-12-17 RX ORDER — ERGOCALCIFEROL 1.25 MG/1
1 CAPSULE ORAL
Qty: 0 | Refills: 0 | DISCHARGE

## 2022-12-17 RX ORDER — OLANZAPINE 15 MG/1
1 TABLET, FILM COATED ORAL
Qty: 0 | Refills: 0 | DISCHARGE

## 2022-12-17 RX ORDER — ONDANSETRON 8 MG/1
4 TABLET, FILM COATED ORAL EVERY 8 HOURS
Refills: 0 | Status: DISCONTINUED | OUTPATIENT
Start: 2022-12-17 | End: 2022-12-19

## 2022-12-17 RX ORDER — HYDROXYZINE HCL 10 MG
2 TABLET ORAL
Qty: 0 | Refills: 0 | DISCHARGE

## 2022-12-17 RX ORDER — LACTULOSE 10 G/15ML
30 SOLUTION ORAL
Qty: 0 | Refills: 0 | DISCHARGE

## 2022-12-17 RX ORDER — CLONAZEPAM 1 MG
1 TABLET ORAL
Qty: 0 | Refills: 0 | DISCHARGE

## 2022-12-17 RX ORDER — VALPROIC ACID (AS SODIUM SALT) 250 MG/5ML
1000 SOLUTION, ORAL ORAL ONCE
Refills: 0 | Status: DISCONTINUED | OUTPATIENT
Start: 2022-12-17 | End: 2022-12-17

## 2022-12-17 RX ORDER — TAMSULOSIN HYDROCHLORIDE 0.4 MG/1
0.8 CAPSULE ORAL AT BEDTIME
Refills: 0 | Status: DISCONTINUED | OUTPATIENT
Start: 2022-12-17 | End: 2022-12-19

## 2022-12-17 RX ORDER — CALCIPOTRIENE 50 UG/G
1 CREAM TOPICAL
Qty: 0 | Refills: 0 | DISCHARGE

## 2022-12-17 RX ORDER — DIVALPROEX SODIUM 500 MG/1
1 TABLET, DELAYED RELEASE ORAL
Qty: 0 | Refills: 0 | DISCHARGE

## 2022-12-17 RX ORDER — VALPROIC ACID (AS SODIUM SALT) 250 MG/5ML
1000 SOLUTION, ORAL ORAL
Refills: 0 | Status: DISCONTINUED | OUTPATIENT
Start: 2022-12-17 | End: 2022-12-17

## 2022-12-17 RX ORDER — FLUVOXAMINE MALEATE 25 MG/1
1 TABLET ORAL
Qty: 0 | Refills: 0 | DISCHARGE

## 2022-12-17 RX ORDER — TRANEXAMIC ACID 100 MG/ML
1000 INJECTION, SOLUTION INTRAVENOUS ONCE
Refills: 0 | Status: DISCONTINUED | OUTPATIENT
Start: 2022-12-19 | End: 2022-12-24

## 2022-12-17 RX ORDER — CHLORHEXIDINE GLUCONATE 213 G/1000ML
1 SOLUTION TOPICAL ONCE
Refills: 0 | Status: DISCONTINUED | OUTPATIENT
Start: 2022-12-19 | End: 2022-12-24

## 2022-12-17 RX ORDER — LEVOTHYROXINE SODIUM 125 MCG
1 TABLET ORAL
Qty: 0 | Refills: 0 | DISCHARGE

## 2022-12-17 RX ORDER — AMLODIPINE BESYLATE 2.5 MG/1
2.5 TABLET ORAL DAILY
Refills: 0 | Status: DISCONTINUED | OUTPATIENT
Start: 2022-12-17 | End: 2022-12-19

## 2022-12-17 RX ORDER — DIVALPROEX SODIUM 500 MG/1
250 TABLET, DELAYED RELEASE ORAL
Refills: 0 | Status: DISCONTINUED | OUTPATIENT
Start: 2022-12-17 | End: 2022-12-19

## 2022-12-17 RX ORDER — CALCIUM CARBONATE 500(1250)
1 TABLET ORAL
Qty: 0 | Refills: 0 | DISCHARGE

## 2022-12-17 RX ORDER — HYDROGEN PEROXIDE 0.3 KG/100L
0 LIQUID TOPICAL
Qty: 0 | Refills: 0 | DISCHARGE

## 2022-12-17 RX ORDER — KETOCONAZOLE 20 MG/G
1 AEROSOL, FOAM TOPICAL
Qty: 0 | Refills: 0 | DISCHARGE

## 2022-12-17 RX ORDER — MULTIVIT-MIN/FERROUS GLUCONATE 9 MG/15 ML
1 LIQUID (ML) ORAL
Qty: 0 | Refills: 0 | DISCHARGE

## 2022-12-17 RX ORDER — ASPIRIN/CALCIUM CARB/MAGNESIUM 324 MG
81 TABLET ORAL DAILY
Refills: 0 | Status: DISCONTINUED | OUTPATIENT
Start: 2022-12-17 | End: 2022-12-18

## 2022-12-17 RX ORDER — LANOLIN ALCOHOL/MO/W.PET/CERES
3 CREAM (GRAM) TOPICAL AT BEDTIME
Refills: 0 | Status: DISCONTINUED | OUTPATIENT
Start: 2022-12-17 | End: 2022-12-19

## 2022-12-17 RX ORDER — ACETAMINOPHEN 500 MG
650 TABLET ORAL EVERY 6 HOURS
Refills: 0 | Status: DISCONTINUED | OUTPATIENT
Start: 2022-12-17 | End: 2022-12-19

## 2022-12-17 RX ORDER — SODIUM CHLORIDE 9 MG/ML
1000 INJECTION, SOLUTION INTRAVENOUS
Refills: 0 | Status: DISCONTINUED | OUTPATIENT
Start: 2022-12-17 | End: 2022-12-27

## 2022-12-17 RX ORDER — CLONAZEPAM 1 MG
0.5 TABLET ORAL
Refills: 0 | Status: DISCONTINUED | OUTPATIENT
Start: 2022-12-17 | End: 2022-12-19

## 2022-12-17 RX ORDER — ENOXAPARIN SODIUM 100 MG/ML
40 INJECTION SUBCUTANEOUS ONCE
Refills: 0 | Status: COMPLETED | OUTPATIENT
Start: 2022-12-17 | End: 2022-12-17

## 2022-12-17 RX ORDER — FLUVOXAMINE MALEATE 25 MG/1
100 TABLET ORAL
Refills: 0 | Status: DISCONTINUED | OUTPATIENT
Start: 2022-12-17 | End: 2022-12-19

## 2022-12-17 RX ORDER — TAMSULOSIN HYDROCHLORIDE 0.4 MG/1
2 CAPSULE ORAL
Qty: 0 | Refills: 0 | DISCHARGE

## 2022-12-17 RX ORDER — CLONAZEPAM 1 MG
0.5 TABLET ORAL
Qty: 0 | Refills: 0 | DISCHARGE

## 2022-12-17 RX ORDER — LEVOTHYROXINE SODIUM 125 MCG
75 TABLET ORAL DAILY
Refills: 0 | Status: DISCONTINUED | OUTPATIENT
Start: 2022-12-17 | End: 2022-12-19

## 2022-12-17 RX ORDER — OMEPRAZOLE 10 MG/1
1 CAPSULE, DELAYED RELEASE ORAL
Qty: 0 | Refills: 0 | DISCHARGE

## 2022-12-17 RX ADMIN — Medication 0.5 MILLIGRAM(S): at 17:54

## 2022-12-17 RX ADMIN — DIVALPROEX SODIUM 250 MILLIGRAM(S): 500 TABLET, DELAYED RELEASE ORAL at 21:57

## 2022-12-17 RX ADMIN — ENOXAPARIN SODIUM 40 MILLIGRAM(S): 100 INJECTION SUBCUTANEOUS at 16:14

## 2022-12-17 RX ADMIN — FLUVOXAMINE MALEATE 100 MILLIGRAM(S): 25 TABLET ORAL at 17:54

## 2022-12-17 RX ADMIN — TAMSULOSIN HYDROCHLORIDE 0.8 MILLIGRAM(S): 0.4 CAPSULE ORAL at 21:57

## 2022-12-17 RX ADMIN — OLANZAPINE 15 MILLIGRAM(S): 15 TABLET, FILM COATED ORAL at 21:57

## 2022-12-17 RX ADMIN — Medication 25 MILLIGRAM(S): at 17:54

## 2022-12-17 NOTE — ED ADULT NURSE REASSESSMENT NOTE - NS ED NURSE REASSESS COMMENT FT1
Attempted to straight cath patient, but there was resistance> history of BPH. Bladder scan performed> 570 ml. Dr. Kimbrough was informed. Wade attempted but unsuccessful. Dr. Kimbrough was informed.

## 2022-12-17 NOTE — PATIENT PROFILE ADULT - FALL HARM RISK - RISK INTERVENTIONS
Assistance OOB with selected safe patient handling equipment/Assistance with ambulation/Communicate Fall Risk and Risk Factors to all staff, patient, and family/Discuss with provider need for PT consult/Monitor gait and stability/Reinforce activity limits and safety measures with patient and family/Visual Cue: Yellow wristband/Bed in lowest position, wheels locked, appropriate side rails in place/Call bell, personal items and telephone in reach/Instruct patient to call for assistance before getting out of bed or chair/Non-slip footwear when patient is out of bed/[place] to call system/Physically safe environment - no spills, clutter or unnecessary equipment/Purposeful Proactive Rounding/Room/bathroom lighting operational, light cord in reach

## 2022-12-17 NOTE — H&P ADULT - ASSESSMENT
67-year-old male with a past medical history of hypertension mental retardation and seizures admitted for Right intertrochanteric fracture, elevated Lactate, Leukocytosis, tachycardia    #Right intertrochanteric fracture  GMF  Transferred from [place] to [facility]  here to be medically cleared for procedure  currently not medically cleared   pt has elevated lactate and tachycardic    #Elevated lactate, leukocytosis, Tachycardia  unclear if this is infectious process.  U/A unremarkable and Chest CT was unremarkable.  Pt is currently febrile.   IVF to be given  repeat ordered for noon time.     #HTN  continue BP meds with hold parameters  hold diuretics on POD#0    #Seizures  continue depakote

## 2022-12-17 NOTE — H&P ADULT - HISTORY OF PRESENT ILLNESS
67-year-old male with a past medical history of hypertension mental retardation and seizures brought in by ambulance from [facility] due to difficulty walking.  As per aide, patient was brought in from program in which they advised that he was not able to walk.  Reports the [facility] staff had to carry the patient off the bus.  No reports of any fall.  Denies fever, vomiting, known head injury, abdominal pain, chest pain, laceration, or any other complaints.    CT imaging showed  Head CT: No obvious acute intracranial hemorrhage or displaced skull   fracture. If clinically indicated, short-term follow-up or MRI may be   obtained for further evaluation.    Cervical spine CT: Osteopenia without obvious displaced fracture or   traumatic malalignment in the cervical spine. If there is clinical   suspicion for acute fracture or ligamentous/cord injury, MRI may be   obtained for further evaluation.    Comminuted and displaced right intertrochanteric fracture. Small   associated hematoma. Associated edema/hemorrhage in the adjacent   subcutaneous tissues.    Pt transferred from [place] to [place]

## 2022-12-17 NOTE — PROGRESS NOTE ADULT - SUBJECTIVE AND OBJECTIVE BOX
This is a 67 y.o. male transferred from [facility] ED to [facility] with a Right intertrochanteric hip fracture, ANNABELLA is unknown.  Patient has mental retardation, lives in a group home, and is noncommunicative. HHA from [facility] is present in hospital room. Patient was seen by Dr. Hernadez who explained to [facility] that patient has hip fracture requiring surgical fixation.     On exam: Patient is resting comfortably in bed.                  Right LE: shortened, externally rotated, knee held in flexion                 calf supple and NT                 2+ DP pulse  Wade Catheter in place     Hip X-rays: comminuted, displaced Right intertrochanteric hip fracture    A/P: as above, plan for ORIF Right hip with long TFN tomorrow by Dr. Cosme Hernadez. Called Group Home to find out who will be giving consent. Group Home to call Nursing Supervisor to give information for consent for surgery to be given.   NPO p MN ordered  Lovenox x 1 today ordered  Awaiting definitive medical clearance for procedure

## 2022-12-17 NOTE — PROGRESS NOTE ADULT - SUBJECTIVE AND OBJECTIVE BOX
ROSELIA NARAYAN    [facility] APER 07    Allergies    Augmentin (Unknown)  aztreonam (Rash)  penicillin (Unknown)  sulfa drugs (Unknown)    Intolerances        PAST MEDICAL & SURGICAL HISTORY:  MR (mental retardation), severe      Seizure      HTN (hypertension)      Psoriasis      DD (diastrophic dysplasia)      Blind  right eye      Constipation, unspecified constipation type      No significant past surgical history          FAMILY HISTORY:      Home Medications:  amLODIPine 2.5 mg oral tablet: 1 tab(s) orally once a day (2021 13:13)  aspirin 81 mg oral tablet: 1 tab(s) orally once a day (01 May 2021 13:09)  calcipotriene 0.005% topical cream: Apply topically to affected area 2 times a day (01 May 2021 13:09)  cefpodoxime 200 mg oral tablet: 1 tab(s) orally every 12 hours for 10 days (2021 14:03)  chlorhexidine 0.12% mucous membrane liquid: application mucous membrane 2 times a day (01 May 2021 13:09)  clonazePAM 1 mg oral tablet: 1 tab(s) orally 2 times a day (01 May 2021 13:09)  Cosentyx 150 mg/mL subcutaneous solution: 300 milligram(s) subcutaneous every 28 days (01 May 2021 13:09)  Depakote  mg oral tablet, extended release: 1 tab(s) orally 3 times a day (01 May 2021 13:09)  fluvoxaMINE 100 mg oral tablet: 1 tab(s) orally 2 times a day (01 May 2021 13:09)  hydrogen peroxide 1.5% mucous membrane solution: application mucous membrane 2 times a day (01 May 2021 13:09)  hydrOXYzine hydrochloride 10 mg oral tablet: 2 tab(s) orally every 8 hours (01 May 2021 13:09)  ketoconazole 2% topical shampoo: Apply topically to affected area two times a week (01 May 2021 13:09)  lactulose 10 g/15 mL oral solution: 30 milliliter(s) orally once a day (01 May 2021 13:09)  levothyroxine 75 mcg (0.075 mg) oral tablet: 1 tab(s) orally once a day (01 May 2021 13:09)  metoprolol tartrate 25 mg oral tablet: 1 tab(s) orally 2 times a day (01 May 2021 13:09)  multivitamin with minerals: 1 tab(s) orally once a day (01 May 2021 13:09)  OLANZapine 15 mg oral tablet: 1 tab(s) orally once a day (at bedtime) (01 May 2021 13:09)  omeprazole 20 mg oral delayed release capsule: 1 cap(s) orally once a day (01 May 2021 13:09)  Oyster Betito 1250 mg (500 mg elemental calcium) oral tablet: 1 tab(s) orally 3 times a day (01 May 2021 13:09)  petrolatum topical ointment: Apply topically to affected area once a day (01 May 2021 13:09)  tamsulosin 0.4 mg oral capsule: 2 cap(s) orally once a day (at bedtime) (01 May 2021 13:09)  tolnaftate 1% topical cream: Apply topically to affected area once a day (01 May 2021 13:09)  Vitamin D2 50,000 intl units (1.25 mg) oral capsule: 1 cap(s) orally every 2 weeks (01 May 2021 13:09)      MEDICATIONS  (STANDING):    MEDICATIONS  (PRN):      Diet, NPO after Midnight:      NPO Start Date: 16-Dec-2022,   NPO Start Time: 23:59 (22 @ 23:26) [Active]          Vital Signs Last 24 Hrs  T(C): 37.4 (16 Dec 2022 21:00), Max: 37.4 (16 Dec 2022 21:00)  T(F): 99.4 (16 Dec 2022 21:00), Max: 99.4 (16 Dec 2022 21:00)  HR: 114 (16 Dec 2022 18:00) (114 - 126)  BP: 118/70 (16 Dec 2022 18:00) (115/68 - 118/70)  BP(mean): --  RR: 18 (16 Dec 2022 18:00) (16 - 18)  SpO2: 95% (16 Dec 2022 18:00) (94% - 95%)    Parameters below as of 16 Dec 2022 18:00  Patient On (Oxygen Delivery Method): room air                  LABS:                        12.9   16.77 )-----------( 250      ( 16 Dec 2022 17:30 )             39.3     12-16    139  |  105  |  15  ----------------------------<  131<H>  4.0   |  25  |  1.20    Ca    9.5      16 Dec 2022 17:30    TPro  7.8  /  Alb  3.2<L>  /  TBili  0.3  /  DBili  x   /  AST  23  /  ALT  20  /  AlkPhos  94      PT/INR - ( 16 Dec 2022 17:30 )   PT: 13.2 sec;   INR: 1.13 ratio         PTT - ( 16 Dec 2022 17:30 )  PTT:28.5 sec  Urinalysis Basic - ( 17 Dec 2022 07:56 )    Color: Yellow / Appearance: Clear / S.015 / pH: x  Gluc: x / Ketone: Small  / Bili: Negative / Urobili: Negative   Blood: x / Protein: 30 mg/dL / Nitrite: Negative   Leuk Esterase: Trace / RBC: 6-10 /HPF / WBC 0-2   Sq Epi: x / Non Sq Epi: Few / Bacteria: x            WBC:  WBC Count: 16.77 K/uL ( @ 17:30)      MICROBIOLOGY:  RECENT CULTURES:              PT/INR - ( 16 Dec 2022 17:30 )   PT: 13.2 sec;   INR: 1.13 ratio         PTT - ( 16 Dec 2022 17:30 )  PTT:28.5 sec    Sodium:  Sodium, Serum: 139 mmol/L ( @ 17:30)      1.20 mg/dL  @ 17:30      Hemoglobin:  Hemoglobin: 12.9 g/dL ( @ 17:30)      Platelets: Platelet Count - Automated: 250 K/uL ( @ 17:30)      LIVER FUNCTIONS - ( 16 Dec 2022 17:30 )  Alb: 3.2 g/dL / Pro: 7.8 g/dL / ALK PHOS: 94 U/L / ALT: 20 U/L / AST: 23 U/L / GGT: x             Urinalysis Basic - ( 17 Dec 2022 07:56 )    Color: Yellow / Appearance: Clear / S.015 / pH: x  Gluc: x / Ketone: Small  / Bili: Negative / Urobili: Negative   Blood: x / Protein: 30 mg/dL / Nitrite: Negative   Leuk Esterase: Trace / RBC: 6-10 /HPF / WBC 0-2   Sq Epi: x / Non Sq Epi: Few / Bacteria: x        RADIOLOGY & ADDITIONAL STUDIES:      MICROBIOLOGY:  RECENT CULTURES:

## 2022-12-17 NOTE — H&P ADULT - NSHPPHYSICALEXAM_GEN_ALL_CORE
Vital Signs Last 24 Hrs  T(C): 37.6 (17 Dec 2022 10:45), Max: 37.6 (17 Dec 2022 10:45)  T(F): 99.6 (17 Dec 2022 10:45), Max: 99.6 (17 Dec 2022 10:45)  HR: 117 (17 Dec 2022 10:45) (114 - 126)  BP: 114/58 (17 Dec 2022 10:45) (114/58 - 118/70)  BP(mean): --  RR: 17 (17 Dec 2022 10:45) (16 - 18)  SpO2: 93% (17 Dec 2022 10:45) (93% - 95%)    Parameters below as of 17 Dec 2022 10:45  Patient On (Oxygen Delivery Method): room air      Constitutional: Awake, Alert, non-toxic   HEAD: Normocephalic, atraumatic.   EYES: PERRL, EOM intact, conjunctiva and sclera are clear bilaterally. No raccoon eyes.   ENT: no self sign. No rhinorrhea, normal pharynx, patent, no tonsillar exudate or enlargement, mucous membranes pink/moist, no erythema, no drooling or stridor.   NECK: Supple, non-tender  BACK: No midline or paraspinal TTP of cervical/thoracic/lumbar spine, FROM. No ecchymosis or hematomas. no rib TTP.   CARDIOVASCULAR: Normal S1, S2; regular rate and rhythm.  RESPIRATORY: Normal respiratory effort; breath sounds CTAB, no wheezes, rhonchi, or rales. Speaking in full sentences. No accessory muscle use.   ABDOMEN: Soft; non-tender, non-distended  EXTREMITIES: Full passive and active ROM in all extremities; (+) leg external rotated, (+) mild pain with right hip ROM, LE non-tender to palpation; distal pulses palpable and symmetric, no edema, no crepitus or step off  SKIN: Warm, dry; good skin turgor, no apparent lesions or rashes, no ecchymosis, brisk capillary refill.  NEURO: Awake, alert, CN intact, gross motor function intact. not following commands.

## 2022-12-18 ENCOUNTER — TRANSCRIPTION ENCOUNTER (OUTPATIENT)
Age: 67
End: 2022-12-18

## 2022-12-18 LAB
ALBUMIN SERPL ELPH-MCNC: 2.4 G/DL — LOW (ref 3.3–5)
ALP SERPL-CCNC: 72 U/L — SIGNIFICANT CHANGE UP (ref 30–120)
ALT FLD-CCNC: 27 U/L DA — SIGNIFICANT CHANGE UP (ref 10–60)
ANION GAP SERPL CALC-SCNC: 7 MMOL/L — SIGNIFICANT CHANGE UP (ref 5–17)
AST SERPL-CCNC: 48 U/L — HIGH (ref 10–40)
BILIRUB SERPL-MCNC: 0.4 MG/DL — SIGNIFICANT CHANGE UP (ref 0.2–1.2)
BLD GP AB SCN SERPL QL: SIGNIFICANT CHANGE UP
BUN SERPL-MCNC: 11 MG/DL — SIGNIFICANT CHANGE UP (ref 7–23)
CALCIUM SERPL-MCNC: 8.2 MG/DL — LOW (ref 8.4–10.5)
CHLORIDE SERPL-SCNC: 107 MMOL/L — SIGNIFICANT CHANGE UP (ref 96–108)
CO2 SERPL-SCNC: 27 MMOL/L — SIGNIFICANT CHANGE UP (ref 22–31)
CREAT SERPL-MCNC: 0.78 MG/DL — SIGNIFICANT CHANGE UP (ref 0.5–1.3)
EGFR: 98 ML/MIN/1.73M2 — SIGNIFICANT CHANGE UP
GLUCOSE SERPL-MCNC: 124 MG/DL — HIGH (ref 70–99)
HCT VFR BLD CALC: 26.2 % — LOW (ref 39–50)
HGB BLD-MCNC: 8.7 G/DL — LOW (ref 13–17)
MCHC RBC-ENTMCNC: 30.9 PG — SIGNIFICANT CHANGE UP (ref 27–34)
MCHC RBC-ENTMCNC: 33.2 GM/DL — SIGNIFICANT CHANGE UP (ref 32–36)
MCV RBC AUTO: 92.9 FL — SIGNIFICANT CHANGE UP (ref 80–100)
NRBC # BLD: 0 /100 WBCS — SIGNIFICANT CHANGE UP (ref 0–0)
PLATELET # BLD AUTO: 169 K/UL — SIGNIFICANT CHANGE UP (ref 150–400)
POTASSIUM SERPL-MCNC: 3.8 MMOL/L — SIGNIFICANT CHANGE UP (ref 3.5–5.3)
POTASSIUM SERPL-SCNC: 3.8 MMOL/L — SIGNIFICANT CHANGE UP (ref 3.5–5.3)
PROT SERPL-MCNC: 6.1 G/DL — SIGNIFICANT CHANGE UP (ref 6–8.3)
RBC # BLD: 2.82 M/UL — LOW (ref 4.2–5.8)
RBC # FLD: 13.4 % — SIGNIFICANT CHANGE UP (ref 10.3–14.5)
SODIUM SERPL-SCNC: 141 MMOL/L — SIGNIFICANT CHANGE UP (ref 135–145)
WBC # BLD: 11.76 K/UL — HIGH (ref 3.8–10.5)
WBC # FLD AUTO: 11.76 K/UL — HIGH (ref 3.8–10.5)

## 2022-12-18 PROCEDURE — 99233 SBSQ HOSP IP/OBS HIGH 50: CPT

## 2022-12-18 PROCEDURE — 99221 1ST HOSP IP/OBS SF/LOW 40: CPT | Mod: FS,57

## 2022-12-18 RX ORDER — ASPIRIN/CALCIUM CARB/MAGNESIUM 324 MG
81 TABLET ORAL DAILY
Refills: 0 | Status: DISCONTINUED | OUTPATIENT
Start: 2022-12-18 | End: 2022-12-19

## 2022-12-18 RX ORDER — VALPROIC ACID (AS SODIUM SALT) 250 MG/5ML
250 SOLUTION, ORAL ORAL
Refills: 0 | Status: DISCONTINUED | OUTPATIENT
Start: 2022-12-18 | End: 2022-12-19

## 2022-12-18 RX ORDER — MORPHINE SULFATE 50 MG/1
2 CAPSULE, EXTENDED RELEASE ORAL EVERY 4 HOURS
Refills: 0 | Status: DISCONTINUED | OUTPATIENT
Start: 2022-12-18 | End: 2022-12-19

## 2022-12-18 RX ORDER — ENOXAPARIN SODIUM 100 MG/ML
40 INJECTION SUBCUTANEOUS EVERY 24 HOURS
Refills: 0 | Status: DISCONTINUED | OUTPATIENT
Start: 2022-12-18 | End: 2022-12-18

## 2022-12-18 RX ORDER — VALPROIC ACID (AS SODIUM SALT) 250 MG/5ML
250 SOLUTION, ORAL ORAL
Refills: 0 | Status: DISCONTINUED | OUTPATIENT
Start: 2022-12-18 | End: 2022-12-18

## 2022-12-18 RX ADMIN — AMLODIPINE BESYLATE 2.5 MILLIGRAM(S): 2.5 TABLET ORAL at 05:47

## 2022-12-18 RX ADMIN — SODIUM CHLORIDE 100 MILLILITER(S): 9 INJECTION, SOLUTION INTRAVENOUS at 10:06

## 2022-12-18 RX ADMIN — DIVALPROEX SODIUM 250 MILLIGRAM(S): 500 TABLET, DELAYED RELEASE ORAL at 05:43

## 2022-12-18 RX ADMIN — ENOXAPARIN SODIUM 40 MILLIGRAM(S): 100 INJECTION SUBCUTANEOUS at 12:55

## 2022-12-18 RX ADMIN — SODIUM CHLORIDE 100 MILLILITER(S): 9 INJECTION, SOLUTION INTRAVENOUS at 21:29

## 2022-12-18 RX ADMIN — MORPHINE SULFATE 2 MILLIGRAM(S): 50 CAPSULE, EXTENDED RELEASE ORAL at 12:55

## 2022-12-18 RX ADMIN — Medication 0.5 MILLIGRAM(S): at 05:43

## 2022-12-18 RX ADMIN — Medication 75 MICROGRAM(S): at 05:42

## 2022-12-18 RX ADMIN — OLANZAPINE 15 MILLIGRAM(S): 15 TABLET, FILM COATED ORAL at 21:30

## 2022-12-18 RX ADMIN — MORPHINE SULFATE 2 MILLIGRAM(S): 50 CAPSULE, EXTENDED RELEASE ORAL at 19:56

## 2022-12-18 RX ADMIN — Medication 25 MILLIGRAM(S): at 05:42

## 2022-12-18 RX ADMIN — DIVALPROEX SODIUM 250 MILLIGRAM(S): 500 TABLET, DELAYED RELEASE ORAL at 14:34

## 2022-12-18 RX ADMIN — Medication 650 MILLIGRAM(S): at 15:00

## 2022-12-18 RX ADMIN — MORPHINE SULFATE 2 MILLIGRAM(S): 50 CAPSULE, EXTENDED RELEASE ORAL at 19:41

## 2022-12-18 RX ADMIN — Medication 81 MILLIGRAM(S): at 12:55

## 2022-12-18 RX ADMIN — FLUVOXAMINE MALEATE 100 MILLIGRAM(S): 25 TABLET ORAL at 17:20

## 2022-12-18 RX ADMIN — MORPHINE SULFATE 2 MILLIGRAM(S): 50 CAPSULE, EXTENDED RELEASE ORAL at 13:25

## 2022-12-18 RX ADMIN — Medication 650 MILLIGRAM(S): at 23:31

## 2022-12-18 RX ADMIN — Medication 25 MILLIGRAM(S): at 17:19

## 2022-12-18 RX ADMIN — Medication 650 MILLIGRAM(S): at 15:29

## 2022-12-18 RX ADMIN — Medication 0.5 MILLIGRAM(S): at 17:19

## 2022-12-18 RX ADMIN — Medication 650 MILLIGRAM(S): at 10:06

## 2022-12-18 RX ADMIN — TAMSULOSIN HYDROCHLORIDE 0.8 MILLIGRAM(S): 0.4 CAPSULE ORAL at 21:30

## 2022-12-18 RX ADMIN — Medication 650 MILLIGRAM(S): at 10:36

## 2022-12-18 RX ADMIN — FLUVOXAMINE MALEATE 100 MILLIGRAM(S): 25 TABLET ORAL at 05:46

## 2022-12-18 NOTE — PROGRESS NOTE ADULT - ASSESSMENT
A/P:  67 M w/R hip intertrochanteric femur fracture  - patient has intellectual disability so is unable to consent for himself. 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 two physician consent can be obtained for emergency medically necessary procedures. Given that this case is urgent, but not an emergency and there are limited resources over the weekend, will plan to address need/method of consent tomorrow.   - ok for diet today  - NPO after MN  - dvt ppx

## 2022-12-18 NOTE — PHARMACOTHERAPY INTERVENTION NOTE - COMMENTS
depakote ER was ordered for pt who is unable to swallow tabs whole. Pt currently is getting meds crushed prior to taking. MD was contacted to change formulation from tab to solution. Depakote ER to valproic acid solution (depakene) 250mg/5mL conversion was discussed to change over patients therapy. patient total daily dose was 750mg on depakote ER and was change to valproic acid 250mg/ 5mL BID (TDD 500mg) as suggested below.    DEPAKOTE  Total Daily Dose (mg)	DEPAKOTE ER  (mg)  500 -625*	                 750

## 2022-12-18 NOTE — PROGRESS NOTE ADULT - SUBJECTIVE AND OBJECTIVE BOX
S: No acute events    O:   NAD  NLR  ext wwp    RLE:  shortened, rotated  unable to follow commands

## 2022-12-18 NOTE — PROGRESS NOTE ADULT - SUBJECTIVE AND OBJECTIVE BOX
Patient is a 67y old  Male who presents with a chief complaint of     INTERVAL HPI/OVERNIGHT EVENTS:    no overnight events,   procedure cancelled today bc unable to get consent    MEDICATIONS  (STANDING):  amLODIPine   Tablet 2.5 milliGRAM(s) Oral daily  aspirin enteric coated 81 milliGRAM(s) Oral daily  clonazePAM  Tablet 0.5 milliGRAM(s) Oral two times a day  divalproex  milliGRAM(s) Oral <User Schedule>  enoxaparin Injectable 40 milliGRAM(s) SubCutaneous every 24 hours  fluvoxaMINE 100 milliGRAM(s) Oral two times a day  lactated ringers. 1000 milliLiter(s) (100 mL/Hr) IV Continuous <Continuous>  levothyroxine 75 MICROGram(s) Oral daily  metoprolol tartrate 25 milliGRAM(s) Oral two times a day  OLANZapine 15 milliGRAM(s) Oral at bedtime  tamsulosin 0.8 milliGRAM(s) Oral at bedtime    MEDICATIONS  (PRN):  acetaminophen     Tablet .. 650 milliGRAM(s) Oral every 6 hours PRN Temp greater or equal to 38C (100.4F), Mild Pain (1 - 3)  aluminum hydroxide/magnesium hydroxide/simethicone Suspension 30 milliLiter(s) Oral every 4 hours PRN Dyspepsia  melatonin 3 milliGRAM(s) Oral at bedtime PRN Insomnia  ondansetron Injectable 4 milliGRAM(s) IV Push every 8 hours PRN Nausea and/or Vomiting      Allergies    Augmentin (Unknown)  aztreonam (Rash)  penicillin (Unknown)  sulfa drugs (Unknown)    Intolerances        REVIEW OF SYSTEMS:  nonverbal    Vital Signs Last 24 Hrs  T(C): 37 (18 Dec 2022 08:04), Max: 37.3 (17 Dec 2022 19:40)  T(F): 98.6 (18 Dec 2022 08:04), Max: 99.1 (17 Dec 2022 19:40)  HR: 98 (18 Dec 2022 08:04) (98 - 113)  BP: 109/55 (18 Dec 2022 08:04) (109/55 - 123/62)  BP(mean): --  RR: 17 (18 Dec 2022 08:04) (17 - 18)  SpO2: 91% (18 Dec 2022 08:04) (91% - 96%)    Parameters below as of 18 Dec 2022 08:04  Patient On (Oxygen Delivery Method): room air        PHYSICAL EXAM:  Constitutional: Awake, Alert, non-toxic   HEAD: Normocephalic, atraumatic.   EYES: PERRL, EOM intact, conjunctiva and sclera are clear bilaterally. No raccoon eyes.   ENT: no self sign. No rhinorrhea, normal pharynx, patent, no tonsillar exudate or enlargement, mucous membranes pink/moist, no erythema, no drooling or stridor.   NECK: Supple, non-tender  BACK: No midline or paraspinal TTP of cervical/thoracic/lumbar spine, FROM. No ecchymosis or hematomas. no rib TTP.   CARDIOVASCULAR: Normal S1, S2; regular rate and rhythm.  RESPIRATORY: Normal respiratory effort; breath sounds CTAB, no wheezes, rhonchi, or rales. Speaking in full sentences. No accessory muscle use.   ABDOMEN: Soft; non-tender, non-distended  EXTREMITIES: Full passive and active ROM in all extremities; (+) leg external rotated, (+) mild pain with right hip ROM, LE non-tender to palpation; distal pulses palpable and symmetric, no edema, no crepitus or step off  SKIN: Warm, dry; good skin turgor, no apparent lesions or rashes, no ecchymosis, brisk capillary refill.  NEURO: Awake, alert, CN intact, gross motor function intact. not following commands.    LABS:                        8.7    11.76 )-----------( 169      ( 18 Dec 2022 07:00 )             26.2     18 Dec 2022 07:00    141    |  107    |  11     ----------------------------<  124    3.8     |  27     |  0.78     Ca    8.2        18 Dec 2022 07:00    TPro  6.1    /  Alb  2.4    /  TBili  0.4    /  DBili  x      /  AST  48     /  ALT  27     /  AlkPhos  72     18 Dec 2022 07:00    PT/INR - ( 16 Dec 2022 17:30 )   PT: 13.2 sec;   INR: 1.13 ratio         PTT - ( 16 Dec 2022 17:30 )  PTT:28.5 sec  Urinalysis Basic - ( 17 Dec 2022 07:56 )    Color: Yellow / Appearance: Clear / S.015 / pH: x  Gluc: x / Ketone: Small  / Bili: Negative / Urobili: Negative   Blood: x / Protein: 30 mg/dL / Nitrite: Negative   Leuk Esterase: Trace / RBC: 6-10 /HPF / WBC 0-2   Sq Epi: x / Non Sq Epi: Few / Bacteria: x      CAPILLARY BLOOD GLUCOSE          RADIOLOGY & ADDITIONAL TESTS:

## 2022-12-18 NOTE — PROGRESS NOTE ADULT - ASSESSMENT
67-year-old male with a past medical history of hypertension mental retardation and seizures admitted for Right intertrochanteric fracture, elevated Lactate, Leukocytosis, tachycardia    #Right intertrochanteric fracture  GMF  Transferred from [place] to [place]  here to be medically cleared for procedure  Lactate improved, WBC downtrend  continues to be tachycardic  medically optimized for procedure    #Elevated lactate, leukocytosis, Tachycardia  unclear if this is infectious process.  U/A unremarkable and Chest CT was unremarkable.  Pt is currently febrile.   IVF to be given  repeat labs show improvement  continue IVF    #HTN  continue BP meds with hold parameters  hold diuretics on POD#0    #Seizures  continue depakote       67-year-old male with a past medical history of hypertension mental retardation and seizures admitted for Right intertrochanteric fracture, elevated Lactate, Leukocytosis, tachycardia    #Right intertrochanteric fracture  GMF  Transferred from [place] to [place]  here to be medically cleared for procedure  Lactate improved, WBC downtrend  continues to be tachycardic  medically optimized for procedure    #Elevated lactate, leukocytosis, Tachycardia  unclear if this is infectious process.  U/A unremarkable and Chest CT was unremarkable.  Pt is currently febrile.   IVF to be given  repeat labs show improvement  continue IVF    #HTN  continue BP meds with hold parameters  hold diuretics on POD#0    #Seizures  continue depakote    #DVT proph  lovenox  hold in AM for procedure

## 2022-12-19 LAB
ALBUMIN SERPL ELPH-MCNC: 2.1 G/DL — LOW (ref 3.3–5)
ALP SERPL-CCNC: 64 U/L — SIGNIFICANT CHANGE UP (ref 30–120)
ALT FLD-CCNC: 29 U/L DA — SIGNIFICANT CHANGE UP (ref 10–60)
ANION GAP SERPL CALC-SCNC: 8 MMOL/L — SIGNIFICANT CHANGE UP (ref 5–17)
AST SERPL-CCNC: 44 U/L — HIGH (ref 10–40)
BILIRUB SERPL-MCNC: 0.3 MG/DL — SIGNIFICANT CHANGE UP (ref 0.2–1.2)
BUN SERPL-MCNC: 8 MG/DL — SIGNIFICANT CHANGE UP (ref 7–23)
CALCIUM SERPL-MCNC: 8 MG/DL — LOW (ref 8.4–10.5)
CHLORIDE SERPL-SCNC: 108 MMOL/L — SIGNIFICANT CHANGE UP (ref 96–108)
CO2 SERPL-SCNC: 27 MMOL/L — SIGNIFICANT CHANGE UP (ref 22–31)
CREAT SERPL-MCNC: 0.82 MG/DL — SIGNIFICANT CHANGE UP (ref 0.5–1.3)
EGFR: 97 ML/MIN/1.73M2 — SIGNIFICANT CHANGE UP
GLUCOSE SERPL-MCNC: 122 MG/DL — HIGH (ref 70–99)
HCT VFR BLD CALC: 24.2 % — LOW (ref 39–50)
HGB BLD-MCNC: 8.1 G/DL — LOW (ref 13–17)
MCHC RBC-ENTMCNC: 31.5 PG — SIGNIFICANT CHANGE UP (ref 27–34)
MCHC RBC-ENTMCNC: 33.5 GM/DL — SIGNIFICANT CHANGE UP (ref 32–36)
MCV RBC AUTO: 94.2 FL — SIGNIFICANT CHANGE UP (ref 80–100)
NRBC # BLD: 0 /100 WBCS — SIGNIFICANT CHANGE UP (ref 0–0)
PLATELET # BLD AUTO: 166 K/UL — SIGNIFICANT CHANGE UP (ref 150–400)
POTASSIUM SERPL-MCNC: 3.5 MMOL/L — SIGNIFICANT CHANGE UP (ref 3.5–5.3)
POTASSIUM SERPL-SCNC: 3.5 MMOL/L — SIGNIFICANT CHANGE UP (ref 3.5–5.3)
PROT SERPL-MCNC: 5.7 G/DL — LOW (ref 6–8.3)
RBC # BLD: 2.57 M/UL — LOW (ref 4.2–5.8)
RBC # FLD: 13.5 % — SIGNIFICANT CHANGE UP (ref 10.3–14.5)
SODIUM SERPL-SCNC: 143 MMOL/L — SIGNIFICANT CHANGE UP (ref 135–145)
WBC # BLD: 10.61 K/UL — HIGH (ref 3.8–10.5)
WBC # FLD AUTO: 10.61 K/UL — HIGH (ref 3.8–10.5)

## 2022-12-19 PROCEDURE — 27245 TREAT THIGH FRACTURE: CPT | Mod: RT

## 2022-12-19 PROCEDURE — 99233 SBSQ HOSP IP/OBS HIGH 50: CPT

## 2022-12-19 DEVICE — IMPLANTABLE DEVICE: Type: IMPLANTABLE DEVICE | Site: RIGHT | Status: FUNCTIONAL

## 2022-12-19 DEVICE — SCREW LOKG STRL 5X38MM: Type: IMPLANTABLE DEVICE | Site: RIGHT | Status: FUNCTIONAL

## 2022-12-19 RX ORDER — ACETAMINOPHEN 500 MG
1000 TABLET ORAL ONCE
Refills: 0 | Status: COMPLETED | OUTPATIENT
Start: 2022-12-19 | End: 2022-12-19

## 2022-12-19 RX ORDER — HYDROMORPHONE HYDROCHLORIDE 2 MG/ML
0.5 INJECTION INTRAMUSCULAR; INTRAVENOUS; SUBCUTANEOUS
Refills: 0 | Status: DISCONTINUED | OUTPATIENT
Start: 2022-12-19 | End: 2022-12-19

## 2022-12-19 RX ORDER — DIVALPROEX SODIUM 500 MG/1
250 TABLET, DELAYED RELEASE ORAL
Refills: 0 | Status: DISCONTINUED | OUTPATIENT
Start: 2022-12-19 | End: 2022-12-29

## 2022-12-19 RX ORDER — ONDANSETRON 8 MG/1
4 TABLET, FILM COATED ORAL ONCE
Refills: 0 | Status: DISCONTINUED | OUTPATIENT
Start: 2022-12-19 | End: 2022-12-19

## 2022-12-19 RX ORDER — ENOXAPARIN SODIUM 100 MG/ML
40 INJECTION SUBCUTANEOUS EVERY 24 HOURS
Refills: 0 | Status: DISCONTINUED | OUTPATIENT
Start: 2022-12-20 | End: 2022-12-29

## 2022-12-19 RX ORDER — TAMSULOSIN HYDROCHLORIDE 0.4 MG/1
0.4 CAPSULE ORAL AT BEDTIME
Refills: 0 | Status: DISCONTINUED | OUTPATIENT
Start: 2022-12-19 | End: 2022-12-29

## 2022-12-19 RX ORDER — CHLORHEXIDINE GLUCONATE 213 G/1000ML
15 SOLUTION TOPICAL
Refills: 0 | Status: DISCONTINUED | OUTPATIENT
Start: 2022-12-19 | End: 2022-12-29

## 2022-12-19 RX ORDER — SODIUM CHLORIDE 9 MG/ML
1000 INJECTION, SOLUTION INTRAVENOUS
Refills: 0 | Status: DISCONTINUED | OUTPATIENT
Start: 2022-12-19 | End: 2022-12-27

## 2022-12-19 RX ORDER — FLUVOXAMINE MALEATE 25 MG/1
100 TABLET ORAL
Refills: 0 | Status: DISCONTINUED | OUTPATIENT
Start: 2022-12-19 | End: 2022-12-29

## 2022-12-19 RX ORDER — APREPITANT 80 MG/1
40 CAPSULE ORAL ONCE
Refills: 0 | Status: DISCONTINUED | OUTPATIENT
Start: 2022-12-19 | End: 2022-12-19

## 2022-12-19 RX ORDER — ONDANSETRON 8 MG/1
4 TABLET, FILM COATED ORAL EVERY 6 HOURS
Refills: 0 | Status: DISCONTINUED | OUTPATIENT
Start: 2022-12-19 | End: 2022-12-29

## 2022-12-19 RX ORDER — OLANZAPINE 15 MG/1
15 TABLET, FILM COATED ORAL AT BEDTIME
Refills: 0 | Status: DISCONTINUED | OUTPATIENT
Start: 2022-12-19 | End: 2022-12-29

## 2022-12-19 RX ORDER — PANTOPRAZOLE SODIUM 20 MG/1
40 TABLET, DELAYED RELEASE ORAL
Refills: 0 | Status: DISCONTINUED | OUTPATIENT
Start: 2022-12-19 | End: 2022-12-29

## 2022-12-19 RX ORDER — ACETAMINOPHEN 500 MG
1000 TABLET ORAL EVERY 8 HOURS
Refills: 0 | Status: DISCONTINUED | OUTPATIENT
Start: 2022-12-20 | End: 2022-12-29

## 2022-12-19 RX ORDER — LEVOTHYROXINE SODIUM 125 MCG
75 TABLET ORAL DAILY
Refills: 0 | Status: DISCONTINUED | OUTPATIENT
Start: 2022-12-19 | End: 2022-12-29

## 2022-12-19 RX ORDER — CLONAZEPAM 1 MG
0.5 TABLET ORAL
Refills: 0 | Status: DISCONTINUED | OUTPATIENT
Start: 2022-12-19 | End: 2022-12-26

## 2022-12-19 RX ORDER — ASPIRIN/CALCIUM CARB/MAGNESIUM 324 MG
81 TABLET ORAL DAILY
Refills: 0 | Status: DISCONTINUED | OUTPATIENT
Start: 2022-12-20 | End: 2022-12-29

## 2022-12-19 RX ORDER — METOPROLOL TARTRATE 50 MG
25 TABLET ORAL
Refills: 0 | Status: DISCONTINUED | OUTPATIENT
Start: 2022-12-19 | End: 2022-12-24

## 2022-12-19 RX ORDER — SODIUM CHLORIDE 9 MG/ML
1000 INJECTION, SOLUTION INTRAVENOUS
Refills: 0 | Status: DISCONTINUED | OUTPATIENT
Start: 2022-12-19 | End: 2022-12-19

## 2022-12-19 RX ORDER — AMLODIPINE BESYLATE 2.5 MG/1
2.5 TABLET ORAL DAILY
Refills: 0 | Status: DISCONTINUED | OUTPATIENT
Start: 2022-12-21 | End: 2022-12-29

## 2022-12-19 RX ORDER — MAGNESIUM HYDROXIDE 400 MG/1
30 TABLET, CHEWABLE ORAL DAILY
Refills: 0 | Status: DISCONTINUED | OUTPATIENT
Start: 2022-12-19 | End: 2022-12-29

## 2022-12-19 RX ORDER — HYDROMORPHONE HYDROCHLORIDE 2 MG/ML
1 INJECTION INTRAMUSCULAR; INTRAVENOUS; SUBCUTANEOUS
Refills: 0 | Status: DISCONTINUED | OUTPATIENT
Start: 2022-12-19 | End: 2022-12-19

## 2022-12-19 RX ORDER — ACETAMINOPHEN 500 MG
1000 TABLET ORAL ONCE
Refills: 0 | Status: DISCONTINUED | OUTPATIENT
Start: 2022-12-19 | End: 2022-12-19

## 2022-12-19 RX ORDER — OXYCODONE HYDROCHLORIDE 5 MG/1
5 TABLET ORAL
Refills: 0 | Status: DISCONTINUED | OUTPATIENT
Start: 2022-12-19 | End: 2022-12-26

## 2022-12-19 RX ORDER — OXYCODONE HYDROCHLORIDE 5 MG/1
10 TABLET ORAL
Refills: 0 | Status: DISCONTINUED | OUTPATIENT
Start: 2022-12-19 | End: 2022-12-26

## 2022-12-19 RX ADMIN — Medication 1000 MILLIGRAM(S): at 23:57

## 2022-12-19 RX ADMIN — AMLODIPINE BESYLATE 2.5 MILLIGRAM(S): 2.5 TABLET ORAL at 06:41

## 2022-12-19 RX ADMIN — TAMSULOSIN HYDROCHLORIDE 0.4 MILLIGRAM(S): 0.4 CAPSULE ORAL at 22:01

## 2022-12-19 RX ADMIN — Medication 0.5 MILLIGRAM(S): at 06:46

## 2022-12-19 RX ADMIN — Medication 100 MILLIGRAM(S): at 23:27

## 2022-12-19 RX ADMIN — DIVALPROEX SODIUM 250 MILLIGRAM(S): 500 TABLET, DELAYED RELEASE ORAL at 22:01

## 2022-12-19 RX ADMIN — OLANZAPINE 15 MILLIGRAM(S): 15 TABLET, FILM COATED ORAL at 22:01

## 2022-12-19 RX ADMIN — SODIUM CHLORIDE 75 MILLILITER(S): 9 INJECTION, SOLUTION INTRAVENOUS at 19:54

## 2022-12-19 RX ADMIN — MORPHINE SULFATE 2 MILLIGRAM(S): 50 CAPSULE, EXTENDED RELEASE ORAL at 06:56

## 2022-12-19 RX ADMIN — Medication 650 MILLIGRAM(S): at 00:01

## 2022-12-19 RX ADMIN — MORPHINE SULFATE 2 MILLIGRAM(S): 50 CAPSULE, EXTENDED RELEASE ORAL at 06:41

## 2022-12-19 RX ADMIN — Medication 75 MICROGRAM(S): at 06:41

## 2022-12-19 RX ADMIN — Medication 400 MILLIGRAM(S): at 23:27

## 2022-12-19 RX ADMIN — FLUVOXAMINE MALEATE 100 MILLIGRAM(S): 25 TABLET ORAL at 06:41

## 2022-12-19 RX ADMIN — Medication 25 MILLIGRAM(S): at 06:41

## 2022-12-19 RX ADMIN — SODIUM CHLORIDE 100 MILLILITER(S): 9 INJECTION, SOLUTION INTRAVENOUS at 22:02

## 2022-12-19 RX ADMIN — Medication 650 MILLIGRAM(S): at 06:47

## 2022-12-19 RX ADMIN — Medication 650 MILLIGRAM(S): at 07:17

## 2022-12-19 RX ADMIN — Medication 250 MILLIGRAM(S): at 06:42

## 2022-12-19 NOTE — PROGRESS NOTE ADULT - SUBJECTIVE AND OBJECTIVE BOX
This is a note of documentation regarding lack of legal guardianship for this patient. I called his group home [facility] ([phone]) on 12/17/22 and confirmed that this patient does not have a legal guardian, family, or any other person who can sign consent on his behalf. I telephoned [facility] again this morning and spoke to Lainey Montesinos, the , who again confirmed that there is no legal guardian. Madian Ribeiro,  at [facility], also spoke to Lainey Montesinos to confirm the above.

## 2022-12-19 NOTE — PROGRESS NOTE ADULT - ASSESSMENT
67-year-old male with a past medical history of hypertension mental retardation and seizures admitted for Right intertrochanteric fracture, elevated Lactate, Leukocytosis, tachycardia    #Right intertrochanteric fracture  GMF  Transferred from [place] to [place]  here to be medically cleared for procedure  Lactate improved, WBC downtrend  continues to be tachycardic  medically optimized for procedure    #Elevated lactate, leukocytosis, Tachycardia  unclear if this is infectious process.  U/A unremarkable and Chest CT was unremarkable.  Pt is currently febrile.   IVF to be given  repeat labs show improvement  continue IVF    #HTN  continue BP meds with hold parameters  hold diuretics on POD#0    #Seizures  continue depakote    #DVT proph  lovenox  hold in AM for procedure   67-year-old male with a past medical history of hypertension mental retardation and seizures admitted for Right intertrochanteric fracture, elevated Lactate, Leukocytosis, tachycardia    #Right intertrochanteric fracture  GMF  Transferred from [place] to [place]  here to be medically cleared for procedure  Lactate improved, WBC downtrend  continues to be tachycardic  medically optimized for procedure    #Febrile  continues to be febrile  pt has multiple drug allergies  consulted ID Dr. Dos Santos for antibiotic eval    #Elevated lactate, leukocytosis, Tachycardia  unclear if this is infectious process.  U/A unremarkable and Chest CT was unremarkable.  Pt is currently febrile.   IVF to be given  repeat labs show improvement  continue IVF    #HTN  continue BP meds with hold parameters  hold diuretics on POD#0    #Seizures  is on depakote, changed to [facility] b/c depakote cannot be crushed    #DVT proph  lovenox  held for possible procedure

## 2022-12-19 NOTE — BRIEF OPERATIVE NOTE - NSICDXBRIEFPROCEDURE_GEN_ALL_CORE_FT
PROCEDURES:  Open reduction and internal fixation (ORIF) of fracture of right hip using intramedullary nilda 18-Dec-2022 07:48:53  Mathew Salinas

## 2022-12-19 NOTE — PROGRESS NOTE ADULT - SUBJECTIVE AND OBJECTIVE BOX
Patient is a 67y old  Male who presents with a chief complaint of     INTERVAL HPI/OVERNIGHT EVENTS:    no overnight events  plan for possible OR today, needs consent from state likely.     MEDICATIONS  (STANDING):  amLODIPine   Tablet 2.5 milliGRAM(s) Oral daily  aspirin  chewable 81 milliGRAM(s) Oral daily  clonazePAM  Tablet 0.5 milliGRAM(s) Oral two times a day  divalproex  milliGRAM(s) Oral <User Schedule>  fluvoxaMINE 100 milliGRAM(s) Oral two times a day  lactated ringers. 1000 milliLiter(s) (100 mL/Hr) IV Continuous <Continuous>  levothyroxine 75 MICROGram(s) Oral daily  metoprolol tartrate 25 milliGRAM(s) Oral two times a day  OLANZapine 15 milliGRAM(s) Oral at bedtime  tamsulosin 0.8 milliGRAM(s) Oral at bedtime  valproic  acid Syrup 250 milliGRAM(s) Oral two times a day    MEDICATIONS  (PRN):  acetaminophen     Tablet .. 650 milliGRAM(s) Oral every 6 hours PRN Temp greater or equal to 38C (100.4F), Mild Pain (1 - 3)  aluminum hydroxide/magnesium hydroxide/simethicone Suspension 30 milliLiter(s) Oral every 4 hours PRN Dyspepsia  melatonin 3 milliGRAM(s) Oral at bedtime PRN Insomnia  morphine  - Injectable 2 milliGRAM(s) IV Push every 4 hours PRN Moderate Pain (4 - 6)  ondansetron Injectable 4 milliGRAM(s) IV Push every 8 hours PRN Nausea and/or Vomiting      Allergies    Augmentin (Unknown)  aztreonam (Rash)  penicillin (Unknown)  sulfa drugs (Unknown)    Intolerances        REVIEW OF SYSTEMS:  nonverbal    Vital Signs Last 24 Hrs  T(C): 38.2 (19 Dec 2022 06:23), Max: 38.2 (19 Dec 2022 06:23)  T(F): 100.7 (19 Dec 2022 06:23), Max: 100.7 (19 Dec 2022 06:23)  HR: 110 (19 Dec 2022 06:23) (108 - 110)  BP: 141/57 (19 Dec 2022 06:23) (133/57 - 141/57)  BP(mean): 85 (19 Dec 2022 06:23) (85 - 85)  RR: 16 (19 Dec 2022 06:23) (16 - 17)  SpO2: 91% (19 Dec 2022 06:23) (90% - 91%)    Parameters below as of 19 Dec 2022 06:23  Patient On (Oxygen Delivery Method): room air        PHYSICAL EXAM:  Constitutional: Awake, Alert, non-toxic   HEAD: Normocephalic, atraumatic.   EYES: PERRL, EOM intact, conjunctiva and sclera are clear bilaterally. No raccoon eyes.   ENT: no self sign. No rhinorrhea, normal pharynx, patent, no tonsillar exudate or enlargement, mucous membranes pink/moist, no erythema, no drooling or stridor.   NECK: Supple, non-tender  BACK: No midline or paraspinal TTP of cervical/thoracic/lumbar spine, FROM. No ecchymosis or hematomas. no rib TTP.   CARDIOVASCULAR: Normal S1, S2; regular rate and rhythm.  RESPIRATORY: Normal respiratory effort; breath sounds CTAB, no wheezes, rhonchi, or rales. Speaking in full sentences. No accessory muscle use.   ABDOMEN: Soft; non-tender, non-distended  EXTREMITIES: Full passive and active ROM in all extremities; (+) leg external rotated, (+) mild pain with right hip ROM, LE non-tender to palpation; distal pulses palpable and symmetric, no edema, no crepitus or step off  SKIN: Warm, dry; good skin turgor, no apparent lesions or rashes, no ecchymosis, brisk capillary refill.  NEURO: Awake, alert, CN intact, gross motor function intact. not following commands.    LABS:      Ca    8.2        18 Dec 2022 07:00          CAPILLARY BLOOD GLUCOSE          RADIOLOGY & ADDITIONAL TESTS:     Patient is a 67y old  Male who presents with a chief complaint of     INTERVAL HPI/OVERNIGHT EVENTS:    pt had a temp of 100.7 overnight    plan for possible OR today, needs consent from state likely.     MEDICATIONS  (STANDING):  amLODIPine   Tablet 2.5 milliGRAM(s) Oral daily  aspirin  chewable 81 milliGRAM(s) Oral daily  clonazePAM  Tablet 0.5 milliGRAM(s) Oral two times a day  divalproex  milliGRAM(s) Oral <User Schedule>  fluvoxaMINE 100 milliGRAM(s) Oral two times a day  lactated ringers. 1000 milliLiter(s) (100 mL/Hr) IV Continuous <Continuous>  levothyroxine 75 MICROGram(s) Oral daily  metoprolol tartrate 25 milliGRAM(s) Oral two times a day  OLANZapine 15 milliGRAM(s) Oral at bedtime  tamsulosin 0.8 milliGRAM(s) Oral at bedtime  valproic  acid Syrup 250 milliGRAM(s) Oral two times a day    MEDICATIONS  (PRN):  acetaminophen     Tablet .. 650 milliGRAM(s) Oral every 6 hours PRN Temp greater or equal to 38C (100.4F), Mild Pain (1 - 3)  aluminum hydroxide/magnesium hydroxide/simethicone Suspension 30 milliLiter(s) Oral every 4 hours PRN Dyspepsia  melatonin 3 milliGRAM(s) Oral at bedtime PRN Insomnia  morphine  - Injectable 2 milliGRAM(s) IV Push every 4 hours PRN Moderate Pain (4 - 6)  ondansetron Injectable 4 milliGRAM(s) IV Push every 8 hours PRN Nausea and/or Vomiting      Allergies    Augmentin (Unknown)  aztreonam (Rash)  penicillin (Unknown)  sulfa drugs (Unknown)    Intolerances        REVIEW OF SYSTEMS:  nonverbal    Vital Signs Last 24 Hrs  T(C): 38.2 (19 Dec 2022 06:23), Max: 38.2 (19 Dec 2022 06:23)  T(F): 100.7 (19 Dec 2022 06:23), Max: 100.7 (19 Dec 2022 06:23)  HR: 110 (19 Dec 2022 06:23) (108 - 110)  BP: 141/57 (19 Dec 2022 06:23) (133/57 - 141/57)  BP(mean): 85 (19 Dec 2022 06:23) (85 - 85)  RR: 16 (19 Dec 2022 06:23) (16 - 17)  SpO2: 91% (19 Dec 2022 06:23) (90% - 91%)    Parameters below as of 19 Dec 2022 06:23  Patient On (Oxygen Delivery Method): room air        PHYSICAL EXAM:  Constitutional: Awake, Alert, non-toxic   HEAD: Normocephalic, atraumatic.   EYES: PERRL, EOM intact, conjunctiva and sclera are clear bilaterally. No raccoon eyes.   ENT: no self sign. No rhinorrhea, normal pharynx, patent, no tonsillar exudate or enlargement, mucous membranes pink/moist, no erythema, no drooling or stridor.   NECK: Supple, non-tender  BACK: No midline or paraspinal TTP of cervical/thoracic/lumbar spine, FROM. No ecchymosis or hematomas. no rib TTP.   CARDIOVASCULAR: Normal S1, S2; regular rate and rhythm.  RESPIRATORY: Normal respiratory effort; breath sounds CTAB, no wheezes, rhonchi, or rales. Speaking in full sentences. No accessory muscle use.   ABDOMEN: Soft; non-tender, non-distended  EXTREMITIES: Full passive and active ROM in all extremities; (+) leg external rotated, (+) mild pain with right hip ROM, LE non-tender to palpation; distal pulses palpable and symmetric, no edema, no crepitus or step off  SKIN: Warm, dry; good skin turgor, no apparent lesions or rashes, no ecchymosis, brisk capillary refill.  NEURO: Awake, alert, CN intact, gross motor function intact. not following commands.    LABS:      Ca    8.2        18 Dec 2022 07:00          CAPILLARY BLOOD GLUCOSE          RADIOLOGY & ADDITIONAL TESTS:

## 2022-12-20 ENCOUNTER — TRANSCRIPTION ENCOUNTER (OUTPATIENT)
Age: 67
End: 2022-12-20

## 2022-12-20 LAB
ALBUMIN SERPL ELPH-MCNC: 2 G/DL — LOW (ref 3.3–5)
ALP SERPL-CCNC: 60 U/L — SIGNIFICANT CHANGE UP (ref 30–120)
ALT FLD-CCNC: 29 U/L DA — SIGNIFICANT CHANGE UP (ref 10–60)
ANION GAP SERPL CALC-SCNC: 6 MMOL/L — SIGNIFICANT CHANGE UP (ref 5–17)
AST SERPL-CCNC: 43 U/L — HIGH (ref 10–40)
BILIRUB SERPL-MCNC: 0.4 MG/DL — SIGNIFICANT CHANGE UP (ref 0.2–1.2)
BUN SERPL-MCNC: 10 MG/DL — SIGNIFICANT CHANGE UP (ref 7–23)
CALCIUM SERPL-MCNC: 8.1 MG/DL — LOW (ref 8.4–10.5)
CHLORIDE SERPL-SCNC: 107 MMOL/L — SIGNIFICANT CHANGE UP (ref 96–108)
CO2 SERPL-SCNC: 29 MMOL/L — SIGNIFICANT CHANGE UP (ref 22–31)
CREAT SERPL-MCNC: 0.79 MG/DL — SIGNIFICANT CHANGE UP (ref 0.5–1.3)
EGFR: 98 ML/MIN/1.73M2 — SIGNIFICANT CHANGE UP
GLUCOSE SERPL-MCNC: 170 MG/DL — HIGH (ref 70–99)
HCT VFR BLD CALC: 20.3 % — CRITICAL LOW (ref 39–50)
HGB BLD-MCNC: 6.7 G/DL — CRITICAL LOW (ref 13–17)
MCHC RBC-ENTMCNC: 30.9 PG — SIGNIFICANT CHANGE UP (ref 27–34)
MCHC RBC-ENTMCNC: 33 GM/DL — SIGNIFICANT CHANGE UP (ref 32–36)
MCV RBC AUTO: 93.5 FL — SIGNIFICANT CHANGE UP (ref 80–100)
NRBC # BLD: 0 /100 WBCS — SIGNIFICANT CHANGE UP (ref 0–0)
PLATELET # BLD AUTO: 160 K/UL — SIGNIFICANT CHANGE UP (ref 150–400)
POTASSIUM SERPL-MCNC: 3.9 MMOL/L — SIGNIFICANT CHANGE UP (ref 3.5–5.3)
POTASSIUM SERPL-SCNC: 3.9 MMOL/L — SIGNIFICANT CHANGE UP (ref 3.5–5.3)
PROT SERPL-MCNC: 5.6 G/DL — LOW (ref 6–8.3)
RBC # BLD: 2.17 M/UL — LOW (ref 4.2–5.8)
RBC # FLD: 13.5 % — SIGNIFICANT CHANGE UP (ref 10.3–14.5)
SODIUM SERPL-SCNC: 142 MMOL/L — SIGNIFICANT CHANGE UP (ref 135–145)
WBC # BLD: 8.12 K/UL — SIGNIFICANT CHANGE UP (ref 3.8–10.5)
WBC # FLD AUTO: 8.12 K/UL — SIGNIFICANT CHANGE UP (ref 3.8–10.5)

## 2022-12-20 PROCEDURE — 99233 SBSQ HOSP IP/OBS HIGH 50: CPT

## 2022-12-20 RX ADMIN — Medication 75 MICROGRAM(S): at 06:12

## 2022-12-20 RX ADMIN — OLANZAPINE 15 MILLIGRAM(S): 15 TABLET, FILM COATED ORAL at 21:47

## 2022-12-20 RX ADMIN — Medication 25 MILLIGRAM(S): at 17:11

## 2022-12-20 RX ADMIN — SODIUM CHLORIDE 100 MILLILITER(S): 9 INJECTION, SOLUTION INTRAVENOUS at 06:14

## 2022-12-20 RX ADMIN — Medication 0.5 MILLIGRAM(S): at 17:11

## 2022-12-20 RX ADMIN — Medication 0.5 MILLIGRAM(S): at 06:13

## 2022-12-20 RX ADMIN — Medication 1000 MILLIGRAM(S): at 21:47

## 2022-12-20 RX ADMIN — Medication 100 MILLIGRAM(S): at 06:12

## 2022-12-20 RX ADMIN — Medication 25 MILLIGRAM(S): at 06:12

## 2022-12-20 RX ADMIN — CHLORHEXIDINE GLUCONATE 15 MILLILITER(S): 213 SOLUTION TOPICAL at 17:11

## 2022-12-20 RX ADMIN — DIVALPROEX SODIUM 250 MILLIGRAM(S): 500 TABLET, DELAYED RELEASE ORAL at 21:47

## 2022-12-20 RX ADMIN — CHLORHEXIDINE GLUCONATE 15 MILLILITER(S): 213 SOLUTION TOPICAL at 06:12

## 2022-12-20 RX ADMIN — FLUVOXAMINE MALEATE 100 MILLIGRAM(S): 25 TABLET ORAL at 17:11

## 2022-12-20 RX ADMIN — PANTOPRAZOLE SODIUM 40 MILLIGRAM(S): 20 TABLET, DELAYED RELEASE ORAL at 06:12

## 2022-12-20 RX ADMIN — Medication 1000 MILLIGRAM(S): at 06:14

## 2022-12-20 RX ADMIN — DIVALPROEX SODIUM 250 MILLIGRAM(S): 500 TABLET, DELAYED RELEASE ORAL at 13:08

## 2022-12-20 RX ADMIN — TAMSULOSIN HYDROCHLORIDE 0.4 MILLIGRAM(S): 0.4 CAPSULE ORAL at 21:47

## 2022-12-20 RX ADMIN — FLUVOXAMINE MALEATE 100 MILLIGRAM(S): 25 TABLET ORAL at 06:13

## 2022-12-20 RX ADMIN — Medication 1000 MILLIGRAM(S): at 06:13

## 2022-12-20 RX ADMIN — Medication 81 MILLIGRAM(S): at 06:13

## 2022-12-20 RX ADMIN — Medication 1000 MILLIGRAM(S): at 13:28

## 2022-12-20 RX ADMIN — Medication 1000 MILLIGRAM(S): at 13:08

## 2022-12-20 RX ADMIN — Medication 1000 MILLIGRAM(S): at 23:42

## 2022-12-20 RX ADMIN — DIVALPROEX SODIUM 250 MILLIGRAM(S): 500 TABLET, DELAYED RELEASE ORAL at 06:12

## 2022-12-20 NOTE — DISCHARGE NOTE PROVIDER - NSDCCPCAREPLAN_GEN_ALL_CORE_FT
PRINCIPAL DISCHARGE DIAGNOSIS  Diagnosis: Closed intertrochanteric fracture of hip, right, initial encounter  Assessment and Plan of Treatment: Physicial Therapy, Occupational Therapy for ambulation, transfers, ADLs  WBAT  ice hip 20 minutes every several times daily as needed  Silverlon dressing is waterproof.  You may shower, but do not aim shower stream at surgical site. Pat dry after shower.   Remove Silverlon dressing on postop day #7. May shower after Silverlon removal.         PRINCIPAL DISCHARGE DIAGNOSIS  Diagnosis: Closed intertrochanteric fracture of hip, right, initial encounter  Assessment and Plan of Treatment: Physicial Therapy, Occupational Therapy for ambulation, transfers, ADLs  WBAT  ice hip 20 minutes every several times daily as needed  Silverlon dressing is waterproof.  You may shower, but do not aim shower stream at surgical site. Pat dry after shower.    New Silverlon Dressing was applied on POD#7 and you may  remove new Silverlon dressing on postop day #14. May remove dressing sooner if pad within becomes wet/saturated. Please contact office if wound begins draining. May shower after Silverlon removal as well.          PRINCIPAL DISCHARGE DIAGNOSIS  Diagnosis: Closed intertrochanteric fracture of hip, right, initial encounter  Assessment and Plan of Treatment: Physicial Therapy, Occupational Therapy for ambulation, transfers, ADLs  WBAT  ice 20 minutes on alternating with 20 minutes off for 48 hours post op hip 20 minutes every several times daily as needed  Silverlon dressing is waterproof.  You may shower, but do not aim shower stream at surgical site. Pat dry after shower.    New Silverlon Dressing was applied on POD#7 and you may  remove new Silverlon dressing on postop day #14( 1/2/23) . May remove dressing sooner if pad within becomes wet/saturated. Please contact office if wound begins draining. May shower after Silverlon removal as well.

## 2022-12-20 NOTE — PROGRESS NOTE ADULT - ASSESSMENT
67-year-old male with a past medical history of hypertension mental retardation and seizures admitted for Right intertrochanteric fracture, elevated Lactate, Leukocytosis, tachycardia    #Right intertrochanteric fracture  GM[facility]  Transferred from [place] to [place]  here to be medically cleared for procedure  s/p Right hip repair POD #1  anemia Hb 6.7, 1 unit prbc ordered  may need consent     #Febrile  continues to be febrile  pt has multiple drug allergies  Consulted ID for recs    #Elevated lactate, leukocytosis, Tachycardia  unclear if this is infectious process.  U/A unremarkable and Chest CT was unremarkable.  Pt is currently febrile.   IVF to be given  repeat labs show improvement  continue IVF    #HTN  continue BP meds with hold parameters      #Seizures  is on depakote, changed to [facility] b/c depakote cannot be crushed    #DVT proph  hold chemical ac 2/2 anemia hb 6.7

## 2022-12-20 NOTE — DISCHARGE NOTE PROVIDER - NSDCFUADDAPPT_GEN_ALL_CORE_FT
It is advisable to follow up with your primary care provider within the next 2-3 weeks to ensure your medications are appropriate and there are no underlying problems after your procedure.     Please call orthopedic office to schedule follow up appointment upon discharge.

## 2022-12-20 NOTE — PROGRESS NOTE ADULT - SUBJECTIVE AND OBJECTIVE BOX
Patient is a 67y old  Male who presents with a chief complaint of     INTERVAL HPI/OVERNIGHT EVENTS:    hb 6.7 this morning.  1 unit of blood ordered.  Unclear if pt needs consent since 2PC with ortho was done.     MEDICATIONS  (STANDING):  acetaminophen     Tablet .. 1000 milliGRAM(s) Oral every 8 hours  aspirin enteric coated 81 milliGRAM(s) Oral daily  ceFAZolin   IVPB 2000 milliGRAM(s) IV Intermittent once  chlorhexidine 0.12% Liquid 15 milliLiter(s) Oral Mucosa two times a day  chlorhexidine 2% Cloths 1 Application(s) Topical once  clonazePAM  Tablet 0.5 milliGRAM(s) Oral two times a day  divalproex  milliGRAM(s) Oral <User Schedule>  enoxaparin Injectable 40 milliGRAM(s) SubCutaneous every 24 hours  fluvoxaMINE 100 milliGRAM(s) Oral two times a day  lactated ringers. 1000 milliLiter(s) (100 mL/Hr) IV Continuous <Continuous>  lactated ringers. 1000 milliLiter(s) (100 mL/Hr) IV Continuous <Continuous>  levothyroxine 75 MICROGram(s) Oral daily  metoprolol tartrate 25 milliGRAM(s) Oral two times a day  OLANZapine 15 milliGRAM(s) Oral at bedtime  pantoprazole    Tablet 40 milliGRAM(s) Oral before breakfast  tamsulosin 0.4 milliGRAM(s) Oral at bedtime  tranexamic acid IVPB 1000 milliGRAM(s) IV Intermittent once  tranexamic acid IVPB 1000 milliGRAM(s) IV Intermittent once    MEDICATIONS  (PRN):  HYDROmorphone  Injectable 0.5 milliGRAM(s) IV Push every 3 hours PRN for breakthrough pain  magnesium hydroxide Suspension 30 milliLiter(s) Oral daily PRN Constipation  ondansetron Injectable 4 milliGRAM(s) IV Push every 6 hours PRN Nausea and/or Vomiting  oxyCODONE    IR 5 milliGRAM(s) Oral every 3 hours PRN Moderate Pain (4 - 6)  oxyCODONE    IR 10 milliGRAM(s) Oral every 3 hours PRN Severe Pain (7 - 10)      Allergies    Augmentin (Unknown)  aztreonam (Rash)  penicillin (Unknown)  sulfa drugs (Unknown)    Intolerances        REVIEW OF SYSTEMS:  nonverbal    Vital Signs Last 24 Hrs  T(C): 36.4 (20 Dec 2022 06:19), Max: 36.7 (19 Dec 2022 13:56)  T(F): 97.5 (20 Dec 2022 06:19), Max: 98.1 (19 Dec 2022 13:56)  HR: 104 (20 Dec 2022 06:19) (97 - 116)  BP: 115/58 (20 Dec 2022 06:19) (102/65 - 128/60)  BP(mean): 80 (20 Dec 2022 01:00) (73 - 80)  RR: 19 (20 Dec 2022 06:19) (13 - 19)  SpO2: 94% (20 Dec 2022 06:19) (91% - 100%)    Parameters below as of 20 Dec 2022 06:19  Patient On (Oxygen Delivery Method): nasal cannula        PHYSICAL EXAM:  Constitutional: Awake, Alert, non-toxic   HEAD: Normocephalic, atraumatic.   EYES: PERRL, EOM intact, conjunctiva and sclera are clear bilaterally. No raccoon eyes.   ENT: no self sign. No rhinorrhea, normal pharynx, patent, no tonsillar exudate or enlargement, mucous membranes pink/moist, no erythema, no drooling or stridor.   NECK: Supple, non-tender  BACK: No midline or paraspinal TTP of cervical/thoracic/lumbar spine, FROM. No ecchymosis or hematomas. no rib TTP.   CARDIOVASCULAR: Normal S1, S2; regular rate and rhythm.  RESPIRATORY: Normal respiratory effort; breath sounds CTAB, no wheezes, rhonchi, or rales. Speaking in full sentences. No accessory muscle use.   ABDOMEN: Soft; non-tender, non-distended  EXTREMITIES: no cce, incisions intact  SKIN: Warm, dry; good skin turgor, no apparent lesions or rashes, no ecchymosis, brisk capillary refill.  NEURO: Awake, alert, CN intact, gross motor function intact. not following commands.  LABS:                        6.7    8.12  )-----------( 160      ( 20 Dec 2022 06:00 )             20.3     20 Dec 2022 06:00    142    |  107    |  10     ----------------------------<  170    3.9     |  29     |  0.79     Ca    8.1        20 Dec 2022 06:00    TPro  5.6    /  Alb  2.0    /  TBili  0.4    /  DBili  x      /  AST  43     /  ALT  29     /  AlkPhos  60     20 Dec 2022 06:00        CAPILLARY BLOOD GLUCOSE          RADIOLOGY & ADDITIONAL TESTS:

## 2022-12-20 NOTE — DISCHARGE NOTE PROVIDER - NSDCMRMEDTOKEN_GEN_ALL_CORE_FT
amLODIPine 2.5 mg oral tablet: 1 tab(s) orally once a day  aspirin 81 mg oral tablet: 1 tab(s) orally once a day  calcipotriene 0.005% topical cream: Apply topically to affected area 2 times a day  chlorhexidine 0.12% mucous membrane liquid: application mucous membrane 2 times a day  clonazePAM 1 mg oral tablet: 0.5 tab(s) orally 2 times a day  Depakote  mg oral tablet, extended release: 1 tab(s) orally 3 times a day  fluvoxaMINE 100 mg oral tablet: 1 tab(s) orally 2 times a day  ketoconazole 2% topical shampoo: Apply topically to affected area two times a week  lactulose 10 g/15 mL oral solution: 30 milliliter(s) orally once a day  levothyroxine 75 mcg (0.075 mg) oral tablet: 1 tab(s) orally once a day  metoprolol tartrate 25 mg oral tablet: 1 tab(s) orally 2 times a day  multivitamin with minerals: 1 tab(s) orally once a day  OLANZapine 15 mg oral tablet: 1 tab(s) orally once a day (at bedtime)  omeprazole 20 mg oral delayed release capsule: 1 cap(s) orally once a day  Oyster Betito 1250 mg (500 mg elemental calcium) oral tablet: 1 tab(s) orally 3 times a day  petrolatum topical ointment: Apply topically to affected area once a day  tamsulosin 0.4 mg oral capsule: 2 cap(s) orally once a day (at bedtime)  tolnaftate 1% topical cream: Apply topically to affected area once a day   acetaminophen 500 mg oral tablet: 2 tab(s) orally every 8 hours  amLODIPine 2.5 mg oral tablet: 1 tab(s) orally once a day  aspirin 81 mg oral tablet: 1 tab(s) orally once a day  calcipotriene 0.005% topical cream: Apply topically to affected area 2 times a day  clonazePAM 1 mg oral tablet: 0.5 tab(s) orally 2 times a day  Depakote  mg oral tablet, extended release: 1 tab(s) orally 3 times a day  enoxaparin: 40 milligram(s) by injection into the soft tissue once a day MDD:1   fluvoxaMINE 100 mg oral tablet: 1 tab(s) orally 2 times a day  ketoconazole 2% topical shampoo: Apply topically to affected area two times a week  lactulose 10 g/15 mL oral solution: 30 milliliter(s) orally once a day  levothyroxine 75 mcg (0.075 mg) oral tablet: 1 tab(s) orally once a day  metoprolol tartrate 25 mg oral tablet: 1 tab(s) orally 2 times a day  multivitamin with minerals: 1 tab(s) orally once a day  OLANZapine 15 mg oral tablet: 1 tab(s) orally once a day (at bedtime)  omeprazole 20 mg oral delayed release capsule: 1 cap(s) orally once a day  oxyCODONE 5 mg oral tablet: 1 tab(s) orally every 6 to 8 hours, As Needed - 6)  Oyster Betito 1250 mg (500 mg elemental calcium) oral tablet: 1 tab(s) orally 3 times a day  petrolatum topical ointment: Apply topically to affected area once a day  tamsulosin 0.4 mg oral capsule: 2 cap(s) orally once a day (at bedtime)  tolnaftate 1% topical cream: Apply topically to affected area once a day   acetaminophen 500 mg oral tablet: 2 tab(s) orally every 8 hours  amLODIPine 2.5 mg oral tablet: 1 tab(s) orally once a day  aspirin 81 mg oral tablet: 1 tab(s) orally once a day  calcipotriene 0.005% topical cream: Apply topically to affected area 2 times a day  clonazePAM 1 mg oral tablet: 0.5 tab(s) orally 2 times a day  Depakote  mg oral tablet, extended release: 1 tab(s) orally 3 times a day  fluvoxaMINE 100 mg oral tablet: 1 tab(s) orally 2 times a day  ketoconazole 2% topical shampoo: Apply topically to affected area two times a week  lactulose 10 g/15 mL oral solution: 30 milliliter(s) orally once a day  levothyroxine 75 mcg (0.075 mg) oral tablet: 1 tab(s) orally once a day  Lovenox 40 mg/0.4 mL injectable solution: 1 application injectable once a day  metoprolol tartrate 50 mg oral tablet: 1 tab(s) orally 2 times a day  multivitamin with minerals: 1 tab(s) orally once a day  OLANZapine 15 mg oral tablet: 1 tab(s) orally once a day (at bedtime)  omeprazole 20 mg oral delayed release capsule: 1 cap(s) orally once a day  oxyCODONE 5 mg oral tablet: 1 tab(s) orally every 6 to 8 hours, As Needed - 6)  Oyster Betito 1250 mg (500 mg elemental calcium) oral tablet: 1 tab(s) orally 3 times a day  petrolatum topical ointment: Apply topically to affected area once a day  tamsulosin 0.4 mg oral capsule: 2 cap(s) orally once a day (at bedtime)  tolnaftate 1% topical cream: Apply topically to affected area once a day

## 2022-12-20 NOTE — DISCHARGE NOTE PROVIDER - NSDCCPTREATMENT_GEN_ALL_CORE_FT
PRINCIPAL PROCEDURE  Procedure: Open reduction and internal fixation (ORIF) of fracture of right hip using intramedullary nilda  Findings and Treatment: intertrochanteric hip fx

## 2022-12-20 NOTE — PROGRESS NOTE ADULT - SUBJECTIVE AND OBJECTIVE BOX
POST OPERATIVE DAY #: 1  STATUS POST: Right HHA by direct lateral approach                   SUBJECTIVE: Patient seen and examined. Resting in bed. Is non-communicative. Does not respond to commands. Aide at bedside. Feeding patient.       OBJECTIVE:     Vital Signs Last 24 Hrs  T(C): 36.8 (20 Dec 2022 11:44), Max: 36.9 (20 Dec 2022 10:13)  T(F): 98.3 (20 Dec 2022 11:44), Max: 98.4 (20 Dec 2022 10:13)  HR: 86 (20 Dec 2022 11:44) (81 - 116)  BP: 125/69 (20 Dec 2022 11:44) (102/65 - 125/69)  BP(mean): 88 (20 Dec 2022 11:44) (73 - 88)  RR: 17 (20 Dec 2022 11:44) (13 - 19)  SpO2: 95% (20 Dec 2022 11:44) (94% - 100%)    Parameters below as of 20 Dec 2022 11:44  Patient On (Oxygen Delivery Method): nasal cannula  O2 Flow (L/min): 2      Right hip:          Silverlon Dressing: clean/dry/intact    Bilateral LEs:        unable to perform sensorimotor exam adequately         2+ DP pulses          calf supple, NT          SCDs in place    LABS:                        6.7    8.12  )-----------( 160      ( 20 Dec 2022 06:00 )             20.3     12-20    142  |  107  |  10  ----------------------------<  170<H>  3.9   |  29  |  0.79    Ca    8.1<L>      20 Dec 2022 06:00    TPro  5.6<L>  /  Alb  2.0<L>  /  TBili  0.4  /  DBili  x   /  AST  43<H>  /  ALT  29  /  AlkPhos  60  12-20          MEDICATIONS:  Anticoagulation:  aspirin enteric coated 81 milliGRAM(s) Oral daily  enoxaparin Injectable 40 milliGRAM(s) SubCutaneous every 24 hours        Pain medications:   acetaminophen     Tablet .. 1000 milliGRAM(s) Oral every 8 hours  HYDROmorphone  Injectable 0.5 milliGRAM(s) IV Push every 3 hours PRN  oxyCODONE    IR 5 milliGRAM(s) Oral every 3 hours PRN  oxyCODONE    IR 10 milliGRAM(s) Oral every 3 hours PRN        A/P :   s/p Right HHA by direct lateral approach POD # 1  -    has post-op anemia, receiving 2 units PRBCs today  -    Pain control  -    DVT ppx: Lovenox 40mg SQ daily  -    Weight bearing status: WBAT   -    Continue anterior total hip precautions, no active abduction  -    Physical Therapy  -    Occupational Therapy  -    Discharge plan: [facility] pending medical clearance, insurance authorization, bed availability.  POST OPERATIVE DAY #: 1  STATUS POST: ORIF Right HIP                   SUBJECTIVE: Patient seen and examined. Resting in bed. Is non-communicative. Does not respond to commands. Aide at bedside. Feeding patient.       OBJECTIVE:     Vital Signs Last 24 Hrs  T(C): 36.8 (20 Dec 2022 11:44), Max: 36.9 (20 Dec 2022 10:13)  T(F): 98.3 (20 Dec 2022 11:44), Max: 98.4 (20 Dec 2022 10:13)  HR: 86 (20 Dec 2022 11:44) (81 - 116)  BP: 125/69 (20 Dec 2022 11:44) (102/65 - 125/69)  BP(mean): 88 (20 Dec 2022 11:44) (73 - 88)  RR: 17 (20 Dec 2022 11:44) (13 - 19)  SpO2: 95% (20 Dec 2022 11:44) (94% - 100%)    Parameters below as of 20 Dec 2022 11:44  Patient On (Oxygen Delivery Method): nasal cannula  O2 Flow (L/min): 2      Right hip:          Silverlon Dressing: clean/dry/intact    Bilateral LEs:        unable to perform sensorimotor exam adequately         2+ DP pulses          calf supple, NT          SCDs in place    LABS:                        6.7    8.12  )-----------( 160      ( 20 Dec 2022 06:00 )             20.3     12-20    142  |  107  |  10  ----------------------------<  170<H>  3.9   |  29  |  0.79    Ca    8.1<L>      20 Dec 2022 06:00    TPro  5.6<L>  /  Alb  2.0<L>  /  TBili  0.4  /  DBili  x   /  AST  43<H>  /  ALT  29  /  AlkPhos  60  12-20          MEDICATIONS:  Anticoagulation:  aspirin enteric coated 81 milliGRAM(s) Oral daily  enoxaparin Injectable 40 milliGRAM(s) SubCutaneous every 24 hours        Pain medications:   acetaminophen     Tablet .. 1000 milliGRAM(s) Oral every 8 hours  HYDROmorphone  Injectable 0.5 milliGRAM(s) IV Push every 3 hours PRN  oxyCODONE    IR 5 milliGRAM(s) Oral every 3 hours PRN  oxyCODONE    IR 10 milliGRAM(s) Oral every 3 hours PRN        A/P :   s/p ORIF Right hip POD # 1  -    has post-op anemia, receiving 2 units PRBCs today  -    Pain control  -    DVT ppx: Lovenox 40mg SQ daily  -    Weight bearing status: WBAT   -    Physical Therapy  -    Occupational Therapy  -    Discharge plan: [facility] pending medical clearance, insurance authorization, bed availability.

## 2022-12-20 NOTE — DIETITIAN INITIAL EVALUATION ADULT - PERTINENT MEDS FT
MEDICATIONS  (STANDING):  acetaminophen     Tablet .. 1000 milliGRAM(s) Oral every 8 hours  aspirin enteric coated 81 milliGRAM(s) Oral daily  ceFAZolin   IVPB 2000 milliGRAM(s) IV Intermittent once  chlorhexidine 0.12% Liquid 15 milliLiter(s) Oral Mucosa two times a day  chlorhexidine 2% Cloths 1 Application(s) Topical once  clonazePAM  Tablet 0.5 milliGRAM(s) Oral two times a day  divalproex  milliGRAM(s) Oral <User Schedule>  enoxaparin Injectable 40 milliGRAM(s) SubCutaneous every 24 hours  fluvoxaMINE 100 milliGRAM(s) Oral two times a day  lactated ringers. 1000 milliLiter(s) (100 mL/Hr) IV Continuous <Continuous>  lactated ringers. 1000 milliLiter(s) (100 mL/Hr) IV Continuous <Continuous>  levothyroxine 75 MICROGram(s) Oral daily  metoprolol tartrate 25 milliGRAM(s) Oral two times a day  OLANZapine 15 milliGRAM(s) Oral at bedtime  pantoprazole    Tablet 40 milliGRAM(s) Oral before breakfast  tamsulosin 0.4 milliGRAM(s) Oral at bedtime  tranexamic acid IVPB 1000 milliGRAM(s) IV Intermittent once  tranexamic acid IVPB 1000 milliGRAM(s) IV Intermittent once    MEDICATIONS  (PRN):  HYDROmorphone  Injectable 0.5 milliGRAM(s) IV Push every 3 hours PRN for breakthrough pain  magnesium hydroxide Suspension 30 milliLiter(s) Oral daily PRN Constipation  ondansetron Injectable 4 milliGRAM(s) IV Push every 6 hours PRN Nausea and/or Vomiting  oxyCODONE    IR 5 milliGRAM(s) Oral every 3 hours PRN Moderate Pain (4 - 6)  oxyCODONE    IR 10 milliGRAM(s) Oral every 3 hours PRN Severe Pain (7 - 10)

## 2022-12-20 NOTE — DISCHARGE NOTE PROVIDER - CARE PROVIDER_API CALL
Cosme Hernadez)  Orthopedics  [address], [address]  [place] [address]  Phone: [phone]  Fax: [phone]  Established Patient  Follow Up Time: 2 weeks

## 2022-12-20 NOTE — DISCHARGE NOTE PROVIDER - HOSPITAL COURSE
This is a 67 y.o. M who was transferred to [facility] from [place] ED on 12/17/22 after sustaining a Right hip fracture. Mechanism of injury unknown. Patient was deemed medically stable for transfer for definitive surgical intervention. Received H&P including complete medical history, physical exam, medication review, preop clearance by Dr. Josiah Traore.   Transferred 12/19/22 into OR. Received Mimi-Op parenteral antibiotics for SCIP protocol .  Patient underwent uncomplicated ORight hip by  **.  Stable PACU course then transferred to Surgical floor for acute postoperative care.  Pre-Op meds continued post-op and modified as medically necessary. Post-Op medical eval by Hospitalist team for post-op medical comanagement.  Extended course of IV  antibiotics to complete surgical prophylaxis.   VTE prophylaxis with ** + PAS.  Post-Op PT/OT modalities for ambulation, stairs. ADLs, &  transfers.  Stable post-op course. Tolerated diet well.  Stable neuromotor exam of Left Right Lower extremity.  All D/C instructions and medication reconciliation completed including pain Rx & VTEP Rx. This is a 67 y.o. M who was transferred to [facility] from [place] ED on 12/17/22 after sustaining a Right hip fracture. Mechanism of injury unknown. Patient was deemed medically stable for transfer for definitive surgical intervention. Received H&P including complete medical history, physical exam, medication review, preop clearance by Dr. Josiah Traore.   Transferred 12/19/22 into OR. Received Mimi-Op parenteral antibiotics for SCIP protocol .  Patient underwent uncomplicated ORIF Right hip by Dr. Cosme Hernadez on 12/19/22.  Stable PACU course then transferred to Surgical floor for acute postoperative care.  Pre-Op meds continued post-op and modified as medically necessary. Post-Op medical eval by Hospitalist team for post-op medical comanagement.  Extended course of IV  antibiotics to complete surgical prophylaxis.   VTE prophylaxis with Lovenox 40mg SQ + PAS.  Post-Op PT/OT modalities for ambulation, stairs. ADLs, &  transfers.  Stable post-op course. Tolerated diet well.  Stable neuromotor exam of Left Right Lower extremity.  All D/C instructions and medication reconciliation completed including pain Rx & VTEP Rx.

## 2022-12-20 NOTE — DIETITIAN INITIAL EVALUATION ADULT - PERTINENT LABORATORY DATA
12-20    142  |  107  |  10  ----------------------------<  170<H>  3.9   |  29  |  0.79    Ca    8.1<L>      20 Dec 2022 06:00    TPro  5.6<L>  /  Alb  2.0<L>  /  TBili  0.4  /  DBili  x   /  AST  43<H>  /  ALT  29  /  AlkPhos  60  12-20

## 2022-12-20 NOTE — PROGRESS NOTE ADULT - SUBJECTIVE AND OBJECTIVE BOX
ROSELIA NARAYAN is a 67yMale , patient examined and chart reviewed.    INTERVAL HPI/ OVERNIGHT EVENTS:   Afebrile. NAD.      PAST MEDICAL & SURGICAL HISTORY:  MR (mental retardation), severe  Seizure  HTN (hypertension)  Psoriasis  DD (diastrophic dysplasia)  Blind  right eye  Constipation, unspecified constipation type          For details regarding the patient's social history, family history, and other miscellaneous elements, please refer the initial infectious diseases consultation and/or the admitting history and physical examination for this admission.    ROS:  Unable to obtain due to : MRDD    ALLERGIES:  Augmentin (Unknown)  aztreonam (Rash)  penicillin (Unknown)  sulfa drugs (Unknown)    Current inpatient medications :    ANTIBIOTICS/RELEVANT:  ceFAZolin   IVPB 2000 milliGRAM(s) IV Intermittent once      acetaminophen     Tablet .. 1000 milliGRAM(s) Oral every 8 hours  aspirin enteric coated 81 milliGRAM(s) Oral daily  chlorhexidine 0.12% Liquid 15 milliLiter(s) Oral Mucosa two times a day  chlorhexidine 2% Cloths 1 Application(s) Topical once  clonazePAM  Tablet 0.5 milliGRAM(s) Oral two times a day  divalproex  milliGRAM(s) Oral <User Schedule>  enoxaparin Injectable 40 milliGRAM(s) SubCutaneous every 24 hours  fluvoxaMINE 100 milliGRAM(s) Oral two times a day  HYDROmorphone  Injectable 0.5 milliGRAM(s) IV Push every 3 hours PRN  lactated ringers. 1000 milliLiter(s) IV Continuous <Continuous>  lactated ringers. 1000 milliLiter(s) IV Continuous <Continuous>  levothyroxine 75 MICROGram(s) Oral daily  magnesium hydroxide Suspension 30 milliLiter(s) Oral daily PRN  metoprolol tartrate 25 milliGRAM(s) Oral two times a day  OLANZapine 15 milliGRAM(s) Oral at bedtime  ondansetron Injectable 4 milliGRAM(s) IV Push every 6 hours PRN  oxyCODONE    IR 5 milliGRAM(s) Oral every 3 hours PRN  oxyCODONE    IR 10 milliGRAM(s) Oral every 3 hours PRN  pantoprazole    Tablet 40 milliGRAM(s) Oral before breakfast  tamsulosin 0.4 milliGRAM(s) Oral at bedtime  tranexamic acid IVPB 1000 milliGRAM(s) IV Intermittent once  tranexamic acid IVPB 1000 milliGRAM(s) IV Intermittent once      Objective:    12-19 @ 07:01  -  12-20 @ 07:00  --------------------------------------------------------  IN: 1620 mL / OUT: 2300 mL / NET: -680 mL      T(C): 36.8 (12-20-22 @ 11:44), Max: 36.9 (12-20-22 @ 10:13)  HR: 86 (12-20-22 @ 11:44) (81 - 116)  BP: 125/69 (12-20-22 @ 11:44) (102/65 - 128/60)  RR: 17 (12-20-22 @ 11:44) (13 - 19)  SpO2: 95% (12-20-22 @ 11:44) (91% - 100%)      Physical Exam:  General:  no acute distress  Neck: supple, trachea midline  Lungs: clear, no wheeze/rhonchi  Cardiovascular: regular rate and rhythm, S1 S2  Abdomen: soft, nontender,  bowel sounds normal  Neurological: alert and oriented x3  Skin: no rash  Extremities: right hip drsg c/d/i      LABS:                          6.7    8.12  )-----------( 160      ( 20 Dec 2022 06:00 )             20.3       12-20    142  |  107  |  10  ----------------------------<  170<H>  3.9   |  29  |  0.79    Ca    8.1<L>      20 Dec 2022 06:00    TPro  5.6<L>  /  Alb  2.0<L>  /  TBili  0.4  /  DBili  x   /  AST  43<H>  /  ALT  29  /  AlkPhos  60  12-20    MICROBIOLOGY:  Culture - Blood (12.16.22 @ 21:06)    Specimen Source: .Blood Blood    Culture Results:   No growth to date.      RADIOLOGY & ADDITIONAL STUDIES:    ACC: [id number] EXAM:  XR CHEST AP OR PA 1V                          PROCEDURE DATE:  12/16/2022          INTERPRETATION:  Short of breath.    AP chest a prior 6/28/2021.    Heart mediastinum exaggerated by AP film shallow inspiration. Grossly   clear lungs. Generative spondylosis dorsal spine. Clips right upper   quadrant. Old left rib fractures    IMPRESSION: Grossly clear lungs      ACC: [id number] EXAM:  CT CHEST                        ACC: [id number] EXAM:  CT ABDOMEN AND PELVIS                          PROCEDURE DATE:  12/16/2022          INTERPRETATION:  CLINICAL INFORMATION: Elevated lactate and leukocytosis.   Right hip fracture.    COMPARISON: None.    CONTRAST/COMPLICATIONS:  IV Contrast: NONE  Oral Contrast: NONE  Complications: None reported at time of study completion    PROCEDURE:  CT of the Chest, Abdomen and Pelvis was performed.  Sagittal and coronal reformats were performed.    FINDINGS:  CHEST:  LUNGS AND LARGE AIRWAYS: Patent central airways. No pulmonary nodules.   Minimal dependent atelectatic changes.  PLEURA: No pleural effusion.  VESSELS: Mild aortic and coronary artery calcifications.  HEART: Heart size is normal. No pericardial effusion.  MEDIASTINUM AND GREYSON: No lymphadenopathy.  CHEST WALL AND LOWER NECK: Within normal limits.    ABDOMEN AND PELVIS:  LIVER: Within normal limits.  BILE DUCTS: Normal caliber.  GALLBLADDER: Cholecystectomy.  SPLEEN: Within normal limits.  PANCREAS: Within normal limits.  ADRENALS: Within normal limits.  KIDNEYS/URETERS: 8 mm cortical calcification in the left renal midpole.   No hydronephrosis    BLADDER: Distended but otherwise unremarkable urinary bladder  REPRODUCTIVE ORGANS: Prostate within normal limits.    BOWEL: No bowel obstruction. Appendix is normal.  PERITONEUM: No ascites.  VESSELS: Mild atherosclerotic changes..  RETROPERITONEUM/LYMPH NODES: No lymphadenopathy.  ABDOMINAL WALL: Within normallimits.  BONES: Degenerative changes.  Several old bilateral rib fractures.  Comminuted and displaced right intertrochanteric fracture. Small   associated hematoma. Associated edema/hemorrhage in the adjacent   subcutaneous tissues.    IMPRESSION:    Comminuted and displaced right intertrochanteric fracture. Small   associated hematoma. Associated edema/hemorrhage in the adjacent   subcutaneous tissues.      Assessment :   68YO M PMH hypertension mental retardation and seizures resident of [facility] admitted with Comminuted and displaced right intertrochanteric fracture. Small associated hematoma. PT febrile Tmax 100.7 tachy and wbc 16K Also with elevated lactic. Pt is post op Open reduction and internal fixation (ORIF) of fracture of right hip using intramedullary nilda 18-Dec-2022  Likely SIRS sec Right hip fracture  Doubt septic  Cultures and imaging neg for infectious process  Anemia    Plan :   Monitor off antibiotics  Trend temps and cbc  Asp precautions   Pain control  Stable from ID standpoint    Continue with present regiment.  Appropriate use of antibiotics and adverse effects reviewed.    > 35 minutes were spent in direct patient care reviewing notes, medications ,labs data/ imaging , discussion with multidisciplinary team.    Thank you for allowing me to participate in care of your patient .    Olivier Daniels MD  Infectious Disease  [phone]

## 2022-12-20 NOTE — DISCHARGE NOTE PROVIDER - DISCHARGE DIET
Regular Diet - No restrictions/Other Diet Instructions Regular Diet - No restrictions/Moderately Thick Liquids/Other Diet Instructions

## 2022-12-20 NOTE — DISCHARGE NOTE PROVIDER - NSDCFUSCHEDAPPT_GEN_ALL_CORE_FT
[facility]  OPHTHALM [address]  Scheduled Appointment: 02/27/2023

## 2022-12-20 NOTE — DISCHARGE NOTE PROVIDER - NSDCCPGOAL_GEN_ALL_CORE_FT
To get better and follow your care plan as instructed. To get better and follow your care plan as instructed. Improve ambulation, ADLs and quality of life.

## 2022-12-21 LAB
ALBUMIN SERPL ELPH-MCNC: 2 G/DL — LOW (ref 3.3–5)
ALP SERPL-CCNC: 62 U/L — SIGNIFICANT CHANGE UP (ref 30–120)
ALT FLD-CCNC: 34 U/L DA — SIGNIFICANT CHANGE UP (ref 10–60)
ANION GAP SERPL CALC-SCNC: 6 MMOL/L — SIGNIFICANT CHANGE UP (ref 5–17)
APPEARANCE UR: CLEAR — SIGNIFICANT CHANGE UP
AST SERPL-CCNC: 42 U/L — HIGH (ref 10–40)
BILIRUB SERPL-MCNC: 0.5 MG/DL — SIGNIFICANT CHANGE UP (ref 0.2–1.2)
BILIRUB UR-MCNC: NEGATIVE — SIGNIFICANT CHANGE UP
BUN SERPL-MCNC: 14 MG/DL — SIGNIFICANT CHANGE UP (ref 7–23)
CALCIUM SERPL-MCNC: 8.4 MG/DL — SIGNIFICANT CHANGE UP (ref 8.4–10.5)
CHLORIDE SERPL-SCNC: 112 MMOL/L — HIGH (ref 96–108)
CO2 SERPL-SCNC: 28 MMOL/L — SIGNIFICANT CHANGE UP (ref 22–31)
COLOR SPEC: YELLOW — SIGNIFICANT CHANGE UP
CREAT SERPL-MCNC: 0.95 MG/DL — SIGNIFICANT CHANGE UP (ref 0.5–1.3)
DIFF PNL FLD: NEGATIVE — SIGNIFICANT CHANGE UP
EGFR: 88 ML/MIN/1.73M2 — SIGNIFICANT CHANGE UP
GLUCOSE SERPL-MCNC: 99 MG/DL — SIGNIFICANT CHANGE UP (ref 70–99)
GLUCOSE UR QL: NEGATIVE MG/DL — SIGNIFICANT CHANGE UP
HCT VFR BLD CALC: 28.1 % — LOW (ref 39–50)
HGB BLD-MCNC: 9.2 G/DL — LOW (ref 13–17)
KETONES UR-MCNC: ABNORMAL
LEUKOCYTE ESTERASE UR-ACNC: NEGATIVE — SIGNIFICANT CHANGE UP
MCHC RBC-ENTMCNC: 29.8 PG — SIGNIFICANT CHANGE UP (ref 27–34)
MCHC RBC-ENTMCNC: 32.7 GM/DL — SIGNIFICANT CHANGE UP (ref 32–36)
MCV RBC AUTO: 90.9 FL — SIGNIFICANT CHANGE UP (ref 80–100)
NITRITE UR-MCNC: NEGATIVE — SIGNIFICANT CHANGE UP
NRBC # BLD: 0 /100 WBCS — SIGNIFICANT CHANGE UP (ref 0–0)
PH UR: 8 — SIGNIFICANT CHANGE UP (ref 5–8)
PLATELET # BLD AUTO: 222 K/UL — SIGNIFICANT CHANGE UP (ref 150–400)
POTASSIUM SERPL-MCNC: 4 MMOL/L — SIGNIFICANT CHANGE UP (ref 3.5–5.3)
POTASSIUM SERPL-SCNC: 4 MMOL/L — SIGNIFICANT CHANGE UP (ref 3.5–5.3)
PROT SERPL-MCNC: 6 G/DL — SIGNIFICANT CHANGE UP (ref 6–8.3)
PROT UR-MCNC: NEGATIVE MG/DL — SIGNIFICANT CHANGE UP
RBC # BLD: 3.09 M/UL — LOW (ref 4.2–5.8)
RBC # FLD: 15.2 % — HIGH (ref 10.3–14.5)
SODIUM SERPL-SCNC: 146 MMOL/L — HIGH (ref 135–145)
SP GR SPEC: 1.01 — SIGNIFICANT CHANGE UP (ref 1.01–1.02)
UROBILINOGEN FLD QL: 1 MG/DL
WBC # BLD: 10.92 K/UL — HIGH (ref 3.8–10.5)
WBC # FLD AUTO: 10.92 K/UL — HIGH (ref 3.8–10.5)

## 2022-12-21 PROCEDURE — 99232 SBSQ HOSP IP/OBS MODERATE 35: CPT

## 2022-12-21 PROCEDURE — 93970 EXTREMITY STUDY: CPT | Mod: 26

## 2022-12-21 PROCEDURE — 71045 X-RAY EXAM CHEST 1 VIEW: CPT | Mod: 26,76

## 2022-12-21 RX ORDER — ACETAMINOPHEN 500 MG
2 TABLET ORAL
Qty: 0 | Refills: 0 | DISCHARGE
Start: 2022-12-21 | End: 2023-01-18

## 2022-12-21 RX ORDER — ACETAMINOPHEN 500 MG
650 TABLET ORAL ONCE
Refills: 0 | Status: COMPLETED | OUTPATIENT
Start: 2022-12-21 | End: 2022-12-21

## 2022-12-21 RX ADMIN — DIVALPROEX SODIUM 250 MILLIGRAM(S): 500 TABLET, DELAYED RELEASE ORAL at 22:20

## 2022-12-21 RX ADMIN — CHLORHEXIDINE GLUCONATE 15 MILLILITER(S): 213 SOLUTION TOPICAL at 17:02

## 2022-12-21 RX ADMIN — Medication 1000 MILLIGRAM(S): at 22:20

## 2022-12-21 RX ADMIN — Medication 81 MILLIGRAM(S): at 06:00

## 2022-12-21 RX ADMIN — Medication 650 MILLIGRAM(S): at 02:38

## 2022-12-21 RX ADMIN — Medication 75 MICROGRAM(S): at 06:01

## 2022-12-21 RX ADMIN — AMLODIPINE BESYLATE 2.5 MILLIGRAM(S): 2.5 TABLET ORAL at 06:01

## 2022-12-21 RX ADMIN — CHLORHEXIDINE GLUCONATE 15 MILLILITER(S): 213 SOLUTION TOPICAL at 06:06

## 2022-12-21 RX ADMIN — Medication 0.5 MILLIGRAM(S): at 06:00

## 2022-12-21 RX ADMIN — OLANZAPINE 15 MILLIGRAM(S): 15 TABLET, FILM COATED ORAL at 22:20

## 2022-12-21 RX ADMIN — TAMSULOSIN HYDROCHLORIDE 0.4 MILLIGRAM(S): 0.4 CAPSULE ORAL at 22:20

## 2022-12-21 RX ADMIN — SODIUM CHLORIDE 100 MILLILITER(S): 9 INJECTION, SOLUTION INTRAVENOUS at 22:21

## 2022-12-21 RX ADMIN — PANTOPRAZOLE SODIUM 40 MILLIGRAM(S): 20 TABLET, DELAYED RELEASE ORAL at 06:01

## 2022-12-21 RX ADMIN — FLUVOXAMINE MALEATE 100 MILLIGRAM(S): 25 TABLET ORAL at 17:03

## 2022-12-21 RX ADMIN — DIVALPROEX SODIUM 250 MILLIGRAM(S): 500 TABLET, DELAYED RELEASE ORAL at 06:00

## 2022-12-21 RX ADMIN — Medication 1000 MILLIGRAM(S): at 13:38

## 2022-12-21 RX ADMIN — Medication 25 MILLIGRAM(S): at 17:03

## 2022-12-21 RX ADMIN — Medication 1000 MILLIGRAM(S): at 06:00

## 2022-12-21 RX ADMIN — Medication 1000 MILLIGRAM(S): at 16:19

## 2022-12-21 RX ADMIN — Medication 1000 MILLIGRAM(S): at 07:59

## 2022-12-21 RX ADMIN — Medication 650 MILLIGRAM(S): at 01:48

## 2022-12-21 RX ADMIN — Medication 25 MILLIGRAM(S): at 06:01

## 2022-12-21 RX ADMIN — DIVALPROEX SODIUM 250 MILLIGRAM(S): 500 TABLET, DELAYED RELEASE ORAL at 13:39

## 2022-12-21 RX ADMIN — Medication 0.5 MILLIGRAM(S): at 17:02

## 2022-12-21 RX ADMIN — FLUVOXAMINE MALEATE 100 MILLIGRAM(S): 25 TABLET ORAL at 06:00

## 2022-12-21 RX ADMIN — ENOXAPARIN SODIUM 40 MILLIGRAM(S): 100 INJECTION SUBCUTANEOUS at 08:15

## 2022-12-21 NOTE — PROGRESS NOTE ADULT - SUBJECTIVE AND OBJECTIVE BOX
ROSELIA NARAYAN is a 67yMale , patient examined and chart reviewed.    INTERVAL HPI/ OVERNIGHT EVENTS:   Tmax 100.5 NAD.  Awake    PAST MEDICAL & SURGICAL HISTORY:  MR (mental retardation), severe  Seizure  HTN (hypertension)  Psoriasis  DD (diastrophic dysplasia)  Blind  right eye  Constipation, unspecified constipation type        For details regarding the patient's social history, family history, and other miscellaneous elements, please refer the initial infectious diseases consultation and/or the admitting history and physical examination for this admission.    ROS:  Unable to obtain due to : MRDD    ALLERGIES:  Augmentin (Unknown)  aztreonam (Rash)  penicillin (Unknown)  sulfa drugs (Unknown)    Current inpatient medications :    ANTIBIOTICS/RELEVANT:    MEDICATIONS  (STANDING):  acetaminophen     Tablet .. 1000 milliGRAM(s) Oral every 8 hours  amLODIPine   Tablet 2.5 milliGRAM(s) Oral daily  aspirin enteric coated 81 milliGRAM(s) Oral daily  chlorhexidine 0.12% Liquid 15 milliLiter(s) Oral Mucosa two times a day  chlorhexidine 2% Cloths 1 Application(s) Topical once  clonazePAM  Tablet 0.5 milliGRAM(s) Oral two times a day  divalproex  milliGRAM(s) Oral <User Schedule>  enoxaparin Injectable 40 milliGRAM(s) SubCutaneous every 24 hours  fluvoxaMINE 100 milliGRAM(s) Oral two times a day  lactated ringers. 1000 milliLiter(s) (100 mL/Hr) IV Continuous <Continuous>  lactated ringers. 1000 milliLiter(s) (100 mL/Hr) IV Continuous <Continuous>  levothyroxine 75 MICROGram(s) Oral daily  metoprolol tartrate 25 milliGRAM(s) Oral two times a day  OLANZapine 15 milliGRAM(s) Oral at bedtime  pantoprazole    Tablet 40 milliGRAM(s) Oral before breakfast  tamsulosin 0.4 milliGRAM(s) Oral at bedtime  tranexamic acid IVPB 1000 milliGRAM(s) IV Intermittent once  tranexamic acid IVPB 1000 milliGRAM(s) IV Intermittent once    MEDICATIONS  (PRN):  HYDROmorphone  Injectable 0.5 milliGRAM(s) IV Push every 3 hours PRN for breakthrough pain  magnesium hydroxide Suspension 30 milliLiter(s) Oral daily PRN Constipation  ondansetron Injectable 4 milliGRAM(s) IV Push every 6 hours PRN Nausea and/or Vomiting  oxyCODONE    IR 5 milliGRAM(s) Oral every 3 hours PRN Moderate Pain (4 - 6)  oxyCODONE    IR 10 milliGRAM(s) Oral every 3 hours PRN Severe Pain (7 - 10)      Objective:  Vital Signs Last 24 Hrs  T(C): 37.6 (21 Dec 2022 10:08), Max: 38.1 (20 Dec 2022 23:57)  T(F): 99.7 (21 Dec 2022 10:08), Max: 100.5 (20 Dec 2022 23:57)  HR: 93 (21 Dec 2022 10:08) (93 - 108)  BP: 115/65 (21 Dec 2022 10:08) (115/65 - 151/71)  RR: 16 (21 Dec 2022 10:08) (16 - 17)  SpO2: 92% (21 Dec 2022 10:08) (92% - 94%)    Parameters below as of 21 Dec 2022 10:08  Patient On (Oxygen Delivery Method): room air      Physical Exam:  General:  no acute distress  Neck: supple, trachea midline  Lungs: clear, no wheeze/rhonchi  Cardiovascular: regular rate and rhythm, S1 S2  Abdomen: soft, nontender,  bowel sounds normal  Neurological: alert and oriented x3  Skin: no rash  Extremities: right hip drsg c/d/i      LABS:                        9.2    10.92 )-----------( 222      ( 21 Dec 2022 08:28 )             28.1   12-21    146<H>  |  112<H>  |  14  ----------------------------<  99  4.0   |  28  |  0.95    Ca    8.4      21 Dec 2022 08:28    TPro  6.0  /  Alb  2.0<L>  /  TBili  0.5  /  DBili  x   /  AST  42<H>  /  ALT  34  /  AlkPhos  62  12-21      MICROBIOLOGY:  Culture - Blood (12.16.22 @ 21:06)    Specimen Source: .Blood Blood    Culture Results:   No growth to date.      RADIOLOGY & ADDITIONAL STUDIES:    ACC: [id number] EXAM:  XR CHEST AP OR PA 1V                          PROCEDURE DATE:  12/16/2022          INTERPRETATION:  Short of breath.    AP chest a prior 6/28/2021.    Heart mediastinum exaggerated by AP film shallow inspiration. Grossly   clear lungs. Generative spondylosis dorsal spine. Clips right upper   quadrant. Old left rib fractures    IMPRESSION: Grossly clear lungs      ACC: [id number] EXAM:  CT CHEST                        ACC: [id number] EXAM:  CT ABDOMEN AND PELVIS                          PROCEDURE DATE:  12/16/2022          INTERPRETATION:  CLINICAL INFORMATION: Elevated lactate and leukocytosis.   Right hip fracture.    COMPARISON: None.    CONTRAST/COMPLICATIONS:  IV Contrast: NONE  Oral Contrast: NONE  Complications: None reported at time of study completion    PROCEDURE:  CT of the Chest, Abdomen and Pelvis was performed.  Sagittal and coronal reformats were performed.    FINDINGS:  CHEST:  LUNGS AND LARGE AIRWAYS: Patent central airways. No pulmonary nodules.   Minimal dependent atelectatic changes.  PLEURA: No pleural effusion.  VESSELS: Mild aortic and coronary artery calcifications.  HEART: Heart size is normal. No pericardial effusion.  MEDIASTINUM AND GREYSON: No lymphadenopathy.  CHEST WALL AND LOWER NECK: Within normal limits.    ABDOMEN AND PELVIS:  LIVER: Within normal limits.  BILE DUCTS: Normal caliber.  GALLBLADDER: Cholecystectomy.  SPLEEN: Within normal limits.  PANCREAS: Within normal limits.  ADRENALS: Within normal limits.  KIDNEYS/URETERS: 8 mm cortical calcification in the left renal midpole.   No hydronephrosis    BLADDER: Distended but otherwise unremarkable urinary bladder  REPRODUCTIVE ORGANS: Prostate within normal limits.    BOWEL: No bowel obstruction. Appendix is normal.  PERITONEUM: No ascites.  VESSELS: Mild atherosclerotic changes..  RETROPERITONEUM/LYMPH NODES: No lymphadenopathy.  ABDOMINAL WALL: Within normallimits.  BONES: Degenerative changes.  Several old bilateral rib fractures.  Comminuted and displaced right intertrochanteric fracture. Small   associated hematoma. Associated edema/hemorrhage in the adjacent   subcutaneous tissues.    IMPRESSION:    Comminuted and displaced right intertrochanteric fracture. Small   associated hematoma. Associated edema/hemorrhage in the adjacent   subcutaneous tissues.      Assessment :   68YO M PMH hypertension mental retardation and seizures resident of [facility] admitted with Comminuted and displaced right intertrochanteric fracture. Small associated hematoma. PT febrile Tmax 100.7 tachy and wbc 16K Also with elevated lactic. Pt is post op Open reduction and internal fixation (ORIF) of fracture of right hip using intramedullary nilda 18-Dec-2022  Likely SIRS sec Right hip fracture  Doubt septic  Cultures and imaging neg for infectious process  Anemia  post op fever Tmax 100.5     Plan :   Monitor off antibiotics  Trend temps and cbc  Asp precautions   Pulm toileting  Pain control  Stable from ID standpoint    Continue with present regiment.  Appropriate use of antibiotics and adverse effects reviewed.    > 35 minutes were spent in direct patient care reviewing notes, medications ,labs data/ imaging , discussion with multidisciplinary team.    Thank you for allowing me to participate in care of your patient .    Olivier Daniels MD  Infectious Disease  [phone]

## 2022-12-21 NOTE — OCCUPATIONAL THERAPY INITIAL EVALUATION ADULT - BED MOBILITY LIMITATIONS, REHAB EVAL
yes yes yes yes yes yes yes yes yes yes yes yes yes yes yes yes yes yes decreased ability to use arms for pushing/pulling/decreased ability to use legs for bridging/pushing/impaired ability to control trunk for mobility

## 2022-12-21 NOTE — PHYSICAL THERAPY INITIAL EVALUATION ADULT - BED MOBILITY TRAINING, PT EVAL
Patient will perform bed mobility independently within 2-3 weeks to promote independence with functional mobility.

## 2022-12-21 NOTE — PHYSICAL THERAPY INITIAL EVALUATION ADULT - TRANSFER TRAINING, PT EVAL
Patient will perform transfers independently with a rolling walker within 2-3 weeks to promote safety and reduce risk of falls.

## 2022-12-21 NOTE — OCCUPATIONAL THERAPY INITIAL EVALUATION ADULT - ADDITIONAL COMMENTS
Pt lives in a group home- pt is non verbal and does not have any other way to communicate his needs as per group home staff present at bedside. As per EMR pt was Independent with ambulation

## 2022-12-21 NOTE — PROGRESS NOTE ADULT - ASSESSMENT
67-year-old male with a past medical history of hypertension mental retardation and seizures admitted for Right intertrochanteric fracture, elevated Lactate, Leukocytosis, tachycardia    #Right intertrochanteric fracture  s/p Right hip repair  s/p transfusion and hgb ok today     #Febrile  continues to be febrile  pt has multiple drug allergies  Consulted ID for recs    #Elevated lactate, leukocytosis, Tachycardia  cxr, UA ordered  will ordered blood cx 12/1  id following    #HTN  continue BP meds with hold parameters      #Seizures  is on depakote, changed to [facility] b/c depakote cannot be crushed    #DVT proph  hold chemical ac 2/2 anemia hb 6.7

## 2022-12-21 NOTE — PHYSICAL THERAPY INITIAL EVALUATION ADULT - PERTINENT HX OF CURRENT PROBLEM, REHAB EVAL
Pt is 66 y/o male BIBEMS from group home due to not being able to walk. X-ray hip/pelvis/femur (12/16): markedly comminuted intertrochanteric right hip fracture with overriding of the fracture fragments/displacement.

## 2022-12-21 NOTE — PHYSICAL THERAPY INITIAL EVALUATION ADULT - ADDITIONAL COMMENTS
Pt unable to provide PLOF due to pt non-verbal. As per EMR pt resides in [facility] group home. PTA pt was independent with ambulation.

## 2022-12-21 NOTE — PHYSICAL THERAPY INITIAL EVALUATION ADULT - GAIT TRAINING, PT EVAL
Patient will ambulate 100 feet independently with a rolling walker within 2-3 weeks for safe household ambulation.

## 2022-12-21 NOTE — OCCUPATIONAL THERAPY INITIAL EVALUATION ADULT - NSOTDISCHREC_GEN_A_CORE
Pt would benefit from continued OT in inpatient rehabilitation facility to optimize pt's function, safety and maximize return to prior level of functioning. SW made aware./Sub-acute Rehab

## 2022-12-21 NOTE — OCCUPATIONAL THERAPY INITIAL EVALUATION ADULT - PERTINENT HX OF CURRENT PROBLEM, REHAB EVAL
Open reduction and internal fixation (ORIF) of fracture of right hip using intramedullary nilda h/o mental retardation and seizures brought in by ambulance from [facility] due to difficulty walking.  As per aide, patient was brought in from program in which they advised that he was not able to walk.  Reports the [facility] staff had to carry the patient off the bus.  No reports of any fall

## 2022-12-21 NOTE — PROGRESS NOTE ADULT - SUBJECTIVE AND OBJECTIVE BOX
Patient is a 67y old  Male who presents with a chief complaint of Fracture of unspecified part of neck of right femur, initial encounter for closed fracture     (20 Dec 2022 15:07)      SUBJECTIVE / OVERNIGHT EVENTS: pt seen and examined. low grade temp, no shob, NAD, no c/o pain        Vital Signs Last 24 Hrs  T(C): 37.2 (21 Dec 2022 06:07), Max: 38.1 (20 Dec 2022 23:57)  T(F): 99 (21 Dec 2022 06:07), Max: 100.5 (20 Dec 2022 23:57)  HR: 104 (21 Dec 2022 06:07) (81 - 108)  BP: 151/71 (21 Dec 2022 06:07) (107/65 - 151/71)  BP(mean): 88 (20 Dec 2022 11:44) (88 - 88)  RR: 16 (21 Dec 2022 06:07) (16 - 18)  SpO2: 94% (21 Dec 2022 06:07) (92% - 95%)    Parameters below as of 21 Dec 2022 03:02  Patient On (Oxygen Delivery Method): nasal cannula  O2 Flow (L/min): 2    I&O's Summary    20 Dec 2022 07:01  -  21 Dec 2022 07:00  --------------------------------------------------------  IN: 0 mL / OUT: 1800 mL / NET: -1800 mL        PHYSICAL EXAM:  GENERAL: NAD  HEAD:  Atraumatic, Normocephalic  NECK: Supple  CHEST/LUNG: dec sb at bases  HEART: S1+S2  ABDOMEN: Soft, Nontender, Nondistended; Bowel sounds present  SKIN: No rashes or lesions    LABS:                        9.2    10.92 )-----------( 222      ( 21 Dec 2022 08:28 )             28.1     12-20    142  |  107  |  10  ----------------------------<  170<H>  3.9   |  29  |  0.79    Ca    8.1<L>      20 Dec 2022 06:00    TPro  5.6<L>  /  Alb  2.0<L>  /  TBili  0.4  /  DBili  x   /  AST  43<H>  /  ALT  29  /  AlkPhos  60  12-20      CAPILLARY BLOOD GLUCOSE                RADIOLOGY & ADDITIONAL TESTS:    Imaging Personally Reviewed:  [x] YES  [ ] NO    Consultant(s) Notes Reviewed:  [x] YES  [ ] NO      MEDICATIONS  (STANDING):  acetaminophen     Tablet .. 1000 milliGRAM(s) Oral every 8 hours  amLODIPine   Tablet 2.5 milliGRAM(s) Oral daily  aspirin enteric coated 81 milliGRAM(s) Oral daily  ceFAZolin   IVPB 2000 milliGRAM(s) IV Intermittent once  chlorhexidine 0.12% Liquid 15 milliLiter(s) Oral Mucosa two times a day  chlorhexidine 2% Cloths 1 Application(s) Topical once  clonazePAM  Tablet 0.5 milliGRAM(s) Oral two times a day  divalproex  milliGRAM(s) Oral <User Schedule>  enoxaparin Injectable 40 milliGRAM(s) SubCutaneous every 24 hours  fluvoxaMINE 100 milliGRAM(s) Oral two times a day  lactated ringers. 1000 milliLiter(s) (100 mL/Hr) IV Continuous <Continuous>  lactated ringers. 1000 milliLiter(s) (100 mL/Hr) IV Continuous <Continuous>  levothyroxine 75 MICROGram(s) Oral daily  metoprolol tartrate 25 milliGRAM(s) Oral two times a day  OLANZapine 15 milliGRAM(s) Oral at bedtime  pantoprazole    Tablet 40 milliGRAM(s) Oral before breakfast  tamsulosin 0.4 milliGRAM(s) Oral at bedtime  tranexamic acid IVPB 1000 milliGRAM(s) IV Intermittent once  tranexamic acid IVPB 1000 milliGRAM(s) IV Intermittent once    MEDICATIONS  (PRN):  HYDROmorphone  Injectable 0.5 milliGRAM(s) IV Push every 3 hours PRN for breakthrough pain  magnesium hydroxide Suspension 30 milliLiter(s) Oral daily PRN Constipation  ondansetron Injectable 4 milliGRAM(s) IV Push every 6 hours PRN Nausea and/or Vomiting  oxyCODONE    IR 5 milliGRAM(s) Oral every 3 hours PRN Moderate Pain (4 - 6)  oxyCODONE    IR 10 milliGRAM(s) Oral every 3 hours PRN Severe Pain (7 - 10)      Care Discussed with Consultants/Other Providers [x] YES  [ ] NO    HEALTH ISSUES - PROBLEM Dx:

## 2022-12-21 NOTE — PHYSICAL THERAPY INITIAL EVALUATION ADULT - DID THE PATIENT HAVE SURGERY?
"Assessment & Plan     Clavicle pain  Patient had heavy iron bar fall onto shoulder one week ago. No obvious fracture on x-ray. If there is a hairline fracture we discussed the clavicle is well aligned and that healing should proceed without complication. Advised rest, activity as tolerated, NSAIDs as needed, icing at bedtime and follow up if pain persists.   - XR Clavicle Right         No follow-ups on file.    Letitia Conklin, RADHA CNP  [facility]    Franklyn Cristobal is a 55 year old female who presents to clinic today for the following health issues:  Chief Complaint   Patient presents with     Shoulder Pain     A week ago a \"\" fell on her shoulder and it still hurts.  Metal piece that weighs about 15 lbs     HPI        Review of Systems  Constitutional, HEENT, cardiovascular, pulmonary, gi and gu systems are negative, except as otherwise noted.      Objective    BP (!) 136/97   Pulse 77   Temp 98.5  F (36.9  C) (Tympanic)   Wt 88.5 kg (195 lb)   LMP 01/29/2016   SpO2 100%   BMI 32.95 kg/m    Physical Exam   GENERAL APPEARANCE: alert and no distress  MS: point tenderness to palpation over mid right clavicle  SKIN: no suspicious lesions or rashes  NEURO: Normal strength and tone, mentation intact and speech normal      Clavicle x-ray - awaiting radiology report, no obvious fracture seen with initial impression  "
right hip ORIF with IM nail/yes

## 2022-12-21 NOTE — PROGRESS NOTE ADULT - SUBJECTIVE AND OBJECTIVE BOX
Orthopedics     POD #2  s/p ORIF Right Hip   Patient is resting in bed. Is non-communicative. Appears comfortable.     Vital Signs Last 24 Hrs  T(C): 37.6 (12-21-22 @ 10:08), Max: 38.1 (12-20-22 @ 23:57)  T(F): 99.7 (12-21-22 @ 10:08), Max: 100.5 (12-20-22 @ 23:57)  HR: 93 (12-21-22 @ 10:08) (86 - 108)  BP: 115/65 (12-21-22 @ 10:08) (115/65 - 151/71)  BP(mean): 88 (12-20-22 @ 11:44) (88 - 88)  RR: 16 (12-21-22 @ 10:08) (16 - 17)  SpO2: 92% (12-21-22 @ 10:08) (92% - 95%)    LABS/RADIOLOGY RESULTS:                          9.2    10.92 )-----------( 222      ( 21 Dec 2022 08:28 )             28.1   12-21    146<H>  |  112<H>  |  14  ----------------------------<  99  4.0   |  28  |  0.95    Ca    8.4      21 Dec 2022 08:28    TPro  6.0  /  Alb  2.0<L>  /  TBili  0.5  /  DBili  x   /  AST  42<H>  /  ALT  34  /  AlkPhos  62  12-21            Exam:  NAD  Right Hip: [place] Dressing clean, dry and intact  Bilateral LE's:  Unable to assess sensorimotor secondary to patient's mental status  1+DP pulse  Calf Soft, NT    A/P:  Stable POD #2  s/p ORIF Right Hip   -Pain control  -DVT PE ppx: Lovenox 40mg SQ daily  -OOB PT, OT   -WBAT  Dispo: [facility] pending medical clearance, insurance authorization, bed availability.

## 2022-12-22 LAB
ANION GAP SERPL CALC-SCNC: 7 MMOL/L — SIGNIFICANT CHANGE UP (ref 5–17)
BUN SERPL-MCNC: 14 MG/DL — SIGNIFICANT CHANGE UP (ref 7–23)
CALCIUM SERPL-MCNC: 8.5 MG/DL — SIGNIFICANT CHANGE UP (ref 8.4–10.5)
CHLORIDE SERPL-SCNC: 107 MMOL/L — SIGNIFICANT CHANGE UP (ref 96–108)
CO2 SERPL-SCNC: 28 MMOL/L — SIGNIFICANT CHANGE UP (ref 22–31)
CREAT SERPL-MCNC: 0.8 MG/DL — SIGNIFICANT CHANGE UP (ref 0.5–1.3)
CULTURE RESULTS: SIGNIFICANT CHANGE UP
CULTURE RESULTS: SIGNIFICANT CHANGE UP
EGFR: 98 ML/MIN/1.73M2 — SIGNIFICANT CHANGE UP
GLUCOSE SERPL-MCNC: 103 MG/DL — HIGH (ref 70–99)
HCT VFR BLD CALC: 29.4 % — LOW (ref 39–50)
HGB BLD-MCNC: 9.7 G/DL — LOW (ref 13–17)
MCHC RBC-ENTMCNC: 30.6 PG — SIGNIFICANT CHANGE UP (ref 27–34)
MCHC RBC-ENTMCNC: 33 GM/DL — SIGNIFICANT CHANGE UP (ref 32–36)
MCV RBC AUTO: 92.7 FL — SIGNIFICANT CHANGE UP (ref 80–100)
NRBC # BLD: 0 /100 WBCS — SIGNIFICANT CHANGE UP (ref 0–0)
PLATELET # BLD AUTO: 237 K/UL — SIGNIFICANT CHANGE UP (ref 150–400)
POTASSIUM SERPL-MCNC: 3.9 MMOL/L — SIGNIFICANT CHANGE UP (ref 3.5–5.3)
POTASSIUM SERPL-SCNC: 3.9 MMOL/L — SIGNIFICANT CHANGE UP (ref 3.5–5.3)
RAPID RVP RESULT: SIGNIFICANT CHANGE UP
RBC # BLD: 3.17 M/UL — LOW (ref 4.2–5.8)
RBC # FLD: 14.8 % — HIGH (ref 10.3–14.5)
SARS-COV-2 RNA SPEC QL NAA+PROBE: SIGNIFICANT CHANGE UP
SODIUM SERPL-SCNC: 142 MMOL/L — SIGNIFICANT CHANGE UP (ref 135–145)
SPECIMEN SOURCE: SIGNIFICANT CHANGE UP
SPECIMEN SOURCE: SIGNIFICANT CHANGE UP
WBC # BLD: 12.16 K/UL — HIGH (ref 3.8–10.5)
WBC # FLD AUTO: 12.16 K/UL — HIGH (ref 3.8–10.5)

## 2022-12-22 PROCEDURE — 99232 SBSQ HOSP IP/OBS MODERATE 35: CPT

## 2022-12-22 RX ADMIN — OXYCODONE HYDROCHLORIDE 5 MILLIGRAM(S): 5 TABLET ORAL at 21:58

## 2022-12-22 RX ADMIN — Medication 81 MILLIGRAM(S): at 05:37

## 2022-12-22 RX ADMIN — Medication 1000 MILLIGRAM(S): at 05:35

## 2022-12-22 RX ADMIN — Medication 1000 MILLIGRAM(S): at 22:14

## 2022-12-22 RX ADMIN — OXYCODONE HYDROCHLORIDE 5 MILLIGRAM(S): 5 TABLET ORAL at 12:44

## 2022-12-22 RX ADMIN — ENOXAPARIN SODIUM 40 MILLIGRAM(S): 100 INJECTION SUBCUTANEOUS at 08:27

## 2022-12-22 RX ADMIN — CHLORHEXIDINE GLUCONATE 15 MILLILITER(S): 213 SOLUTION TOPICAL at 05:37

## 2022-12-22 RX ADMIN — TAMSULOSIN HYDROCHLORIDE 0.4 MILLIGRAM(S): 0.4 CAPSULE ORAL at 21:14

## 2022-12-22 RX ADMIN — FLUVOXAMINE MALEATE 100 MILLIGRAM(S): 25 TABLET ORAL at 17:05

## 2022-12-22 RX ADMIN — Medication 25 MILLIGRAM(S): at 05:36

## 2022-12-22 RX ADMIN — AMLODIPINE BESYLATE 2.5 MILLIGRAM(S): 2.5 TABLET ORAL at 05:37

## 2022-12-22 RX ADMIN — CHLORHEXIDINE GLUCONATE 15 MILLILITER(S): 213 SOLUTION TOPICAL at 17:05

## 2022-12-22 RX ADMIN — Medication 75 MICROGRAM(S): at 05:36

## 2022-12-22 RX ADMIN — Medication 1000 MILLIGRAM(S): at 21:14

## 2022-12-22 RX ADMIN — FLUVOXAMINE MALEATE 100 MILLIGRAM(S): 25 TABLET ORAL at 05:36

## 2022-12-22 RX ADMIN — DIVALPROEX SODIUM 250 MILLIGRAM(S): 500 TABLET, DELAYED RELEASE ORAL at 05:35

## 2022-12-22 RX ADMIN — Medication 0.5 MILLIGRAM(S): at 17:04

## 2022-12-22 RX ADMIN — DIVALPROEX SODIUM 250 MILLIGRAM(S): 500 TABLET, DELAYED RELEASE ORAL at 14:54

## 2022-12-22 RX ADMIN — Medication 0.5 MILLIGRAM(S): at 05:39

## 2022-12-22 RX ADMIN — OXYCODONE HYDROCHLORIDE 5 MILLIGRAM(S): 5 TABLET ORAL at 22:58

## 2022-12-22 RX ADMIN — PANTOPRAZOLE SODIUM 40 MILLIGRAM(S): 20 TABLET, DELAYED RELEASE ORAL at 05:36

## 2022-12-22 RX ADMIN — Medication 1000 MILLIGRAM(S): at 15:00

## 2022-12-22 RX ADMIN — Medication 25 MILLIGRAM(S): at 17:04

## 2022-12-22 RX ADMIN — DIVALPROEX SODIUM 250 MILLIGRAM(S): 500 TABLET, DELAYED RELEASE ORAL at 21:14

## 2022-12-22 RX ADMIN — OXYCODONE HYDROCHLORIDE 5 MILLIGRAM(S): 5 TABLET ORAL at 11:44

## 2022-12-22 RX ADMIN — OLANZAPINE 15 MILLIGRAM(S): 15 TABLET, FILM COATED ORAL at 21:13

## 2022-12-22 NOTE — PROGRESS NOTE ADULT - SUBJECTIVE AND OBJECTIVE BOX
Patient is a 67y old  Male who presents with a chief complaint of Fracture of unspecified part of neck of right femur, initial encounter for closed fracture     (20 Dec 2022 15:07)      SUBJECTIVE / OVERNIGHT EVENTS: pt seen and examined. no fever, no shob, ptosis is not new, waspresent on admission        Vital Signs Last 24 Hrs  T(C): 36.7 (22 Dec 2022 05:54), Max: 37.8 (21 Dec 2022 14:09)  T(F): 98 (22 Dec 2022 05:54), Max: 100.1 (21 Dec 2022 14:09)  HR: 110 (22 Dec 2022 05:54) (93 - 112)  BP: 122/70 (22 Dec 2022 05:54) (115/65 - 139/76)  BP(mean): --  RR: 18 (22 Dec 2022 05:54) (16 - 18)  SpO2: 98% (22 Dec 2022 05:54) (92% - 98%)    Parameters below as of 22 Dec 2022 05:54  Patient On (Oxygen Delivery Method): room air      I&O's Summary    21 Dec 2022 07:01  -  22 Dec 2022 07:00  --------------------------------------------------------  IN: 0 mL / OUT: 1300 mL / NET: -1300 mL    22 Dec 2022 07:01  -  22 Dec 2022 09:56  --------------------------------------------------------  IN: 0 mL / OUT: 1120 mL / NET: -1120 mL        PHYSICAL EXAM:  GENERAL: NAD  HEAD:  Atraumatic, Normocephalic  NECK: Supple  CHEST/LUNG: CTABL  HEART: S1+S2  ABDOMEN: Soft, Nontender, Nondistended; Bowel sounds present  Neuro: no focal deficit      LABS:                        9.7    12.16 )-----------( 237      ( 22 Dec 2022 06:00 )             29.4     12-    142  |  107  |  14  ----------------------------<  103<H>  3.9   |  28  |  0.80    Ca    8.5      22 Dec 2022 06:00    TPro  6.0  /  Alb  2.0<L>  /  TBili  0.5  /  DBili  x   /  AST  42<H>  /  ALT  34  /  AlkPhos  62        CAPILLARY BLOOD GLUCOSE            Urinalysis Basic - ( 21 Dec 2022 14:51 )    Color: Yellow / Appearance: Clear / S.010 / pH: x  Gluc: x / Ketone: Small  / Bili: Negative / Urobili: 1 mg/dL   Blood: x / Protein: Negative mg/dL / Nitrite: Negative   Leuk Esterase: Negative / RBC: x / WBC x   Sq Epi: x / Non Sq Epi: x / Bacteria: x        RADIOLOGY & ADDITIONAL TESTS:    Imaging Personally Reviewed:  [x] YES  [ ] NO    Consultant(s) Notes Reviewed:  [x] YES  [ ] NO      MEDICATIONS  (STANDING):  acetaminophen     Tablet .. 1000 milliGRAM(s) Oral every 8 hours  amLODIPine   Tablet 2.5 milliGRAM(s) Oral daily  aspirin enteric coated 81 milliGRAM(s) Oral daily  ceFAZolin   IVPB 2000 milliGRAM(s) IV Intermittent once  chlorhexidine 0.12% Liquid 15 milliLiter(s) Oral Mucosa two times a day  chlorhexidine 2% Cloths 1 Application(s) Topical once  clonazePAM  Tablet 0.5 milliGRAM(s) Oral two times a day  divalproex  milliGRAM(s) Oral <User Schedule>  enoxaparin Injectable 40 milliGRAM(s) SubCutaneous every 24 hours  fluvoxaMINE 100 milliGRAM(s) Oral two times a day  lactated ringers. 1000 milliLiter(s) (100 mL/Hr) IV Continuous <Continuous>  lactated ringers. 1000 milliLiter(s) (100 mL/Hr) IV Continuous <Continuous>  levothyroxine 75 MICROGram(s) Oral daily  metoprolol tartrate 25 milliGRAM(s) Oral two times a day  OLANZapine 15 milliGRAM(s) Oral at bedtime  pantoprazole    Tablet 40 milliGRAM(s) Oral before breakfast  tamsulosin 0.4 milliGRAM(s) Oral at bedtime  tranexamic acid IVPB 1000 milliGRAM(s) IV Intermittent once  tranexamic acid IVPB 1000 milliGRAM(s) IV Intermittent once    MEDICATIONS  (PRN):  HYDROmorphone  Injectable 0.5 milliGRAM(s) IV Push every 3 hours PRN for breakthrough pain  magnesium hydroxide Suspension 30 milliLiter(s) Oral daily PRN Constipation  ondansetron Injectable 4 milliGRAM(s) IV Push every 6 hours PRN Nausea and/or Vomiting  oxyCODONE    IR 5 milliGRAM(s) Oral every 3 hours PRN Moderate Pain (4 - 6)  oxyCODONE    IR 10 milliGRAM(s) Oral every 3 hours PRN Severe Pain (7 - 10)      Care Discussed with Consultants/Other Providers [x] YES  [ ] NO    HEALTH ISSUES - PROBLEM Dx:

## 2022-12-22 NOTE — PROGRESS NOTE ADULT - SUBJECTIVE AND OBJECTIVE BOX
Orthopedics     POD #3  s/p ORIF Right Hip   Patient appears comfortable. Noncommunicative.    Vital Signs Last 24 Hrs  T(C): 36.7 (12-22-22 @ 05:54), Max: 37.8 (12-21-22 @ 14:09)  T(F): 98 (12-22-22 @ 05:54), Max: 100.1 (12-21-22 @ 14:09)  HR: 110 (12-22-22 @ 05:54) (100 - 112)  BP: 122/70 (12-22-22 @ 05:54) (122/70 - 139/76)  RR: 18 (12-22-22 @ 05:54) (16 - 18)  SpO2: 98% (12-22-22 @ 05:54) (94% - 98%)                        9.7    12.16 )-----------( 237      ( 22 Dec 2022 06:00 )             29.4     12-22    142  |  107  |  14  ----------------------------<  103<H>  3.9   |  28  |  0.80    Ca    8.5      22 Dec 2022 06:00    TPro  6.0  /  Alb  2.0<L>  /  TBili  0.5  /  DBili  x   /  AST  42<H>  /  ALT  34  /  AlkPhos  62  12-21        Exam:  NAD  Right Hip: Silverlon Dressing clean, dry and intact  Bilateral LE's:  unable to assess sensorimotor exam  2+DP pulse  Calf Soft, NT        A/P:  Stable POD #3  s/p ORIF Right Hip   -Pain control  -DVT PE ppx: Lovenox 40mg SQ daily  -OOB PT   -WBAT  - [facility] pending medical clearance, insurance authorization, bed availability; possibly today

## 2022-12-22 NOTE — PROGRESS NOTE ADULT - ASSESSMENT
67-year-old male with a past medical history of hypertension mental retardation and seizures admitted for Right intertrochanteric fracture, elevated Lactate, Leukocytosis, tachycardia    #Right intertrochanteric fracture  s/p Right hip repair  s/p transfusion and hgb stable    #Elevated lactate, leukocytosis, Tachycardia  ID following    #HTN  continue BP meds with hold parameters      #Seizures  is on depakote, changed to [facility] b/c depakote cannot be crushed    #DVT proph  hold chemical ac 2/2 anemia hb 6.7

## 2022-12-22 NOTE — PROGRESS NOTE ADULT - SUBJECTIVE AND OBJECTIVE BOX
SYLVAIN ROSELIA is a 67yMale , patient examined and chart reviewed.    INTERVAL HPI/ OVERNIGHT EVENTS:   No events, NAD. tmax 100.1  Awake    PAST MEDICAL & SURGICAL HISTORY:  MR (mental retardation), severe  Seizure  HTN (hypertension)  Psoriasis  DD (diastrophic dysplasia)  Blind  right eye  Constipation, unspecified constipation type        For details regarding the patient's social history, family history, and other miscellaneous elements, please refer the initial infectious diseases consultation and/or the admitting history and physical examination for this admission.    ROS:  Unable to obtain due to : MRDD    ALLERGIES:  Augmentin (Unknown)  aztreonam (Rash)  penicillin (Unknown)  sulfa drugs (Unknown)    Current inpatient medications :    ANTIBIOTICS/RELEVANT:    MEDICATIONS  (STANDING):  acetaminophen     Tablet .. 1000 milliGRAM(s) Oral every 8 hours  amLODIPine   Tablet 2.5 milliGRAM(s) Oral daily  aspirin enteric coated 81 milliGRAM(s) Oral daily  chlorhexidine 0.12% Liquid 15 milliLiter(s) Oral Mucosa two times a day  chlorhexidine 2% Cloths 1 Application(s) Topical once  clonazePAM  Tablet 0.5 milliGRAM(s) Oral two times a day  divalproex  milliGRAM(s) Oral <User Schedule>  enoxaparin Injectable 40 milliGRAM(s) SubCutaneous every 24 hours  fluvoxaMINE 100 milliGRAM(s) Oral two times a day  lactated ringers. 1000 milliLiter(s) (100 mL/Hr) IV Continuous <Continuous>  lactated ringers. 1000 milliLiter(s) (100 mL/Hr) IV Continuous <Continuous>  levothyroxine 75 MICROGram(s) Oral daily  metoprolol tartrate 25 milliGRAM(s) Oral two times a day  OLANZapine 15 milliGRAM(s) Oral at bedtime  pantoprazole    Tablet 40 milliGRAM(s) Oral before breakfast  tamsulosin 0.4 milliGRAM(s) Oral at bedtime  tranexamic acid IVPB 1000 milliGRAM(s) IV Intermittent once  tranexamic acid IVPB 1000 milliGRAM(s) IV Intermittent once    MEDICATIONS  (PRN):  HYDROmorphone  Injectable 0.5 milliGRAM(s) IV Push every 3 hours PRN for breakthrough pain  magnesium hydroxide Suspension 30 milliLiter(s) Oral daily PRN Constipation  ondansetron Injectable 4 milliGRAM(s) IV Push every 6 hours PRN Nausea and/or Vomiting  oxyCODONE    IR 5 milliGRAM(s) Oral every 3 hours PRN Moderate Pain (4 - 6)  oxyCODONE    IR 10 milliGRAM(s) Oral every 3 hours PRN Severe Pain (7 - 10)      Objective:  Vital Signs Last 24 Hrs  T(C): 36.6 (22 Dec 2022 09:00), Max: 37.8 (21 Dec 2022 14:09)  T(F): 97.9 (22 Dec 2022 09:00), Max: 100.1 (21 Dec 2022 14:09)  HR: 86 (22 Dec 2022 09:00) (86 - 112)  BP: 102/55 (22 Dec 2022 09:00) (102/55 - 139/76)  RR: 18 (22 Dec 2022 09:00) (16 - 18)  SpO2: 93% (22 Dec 2022 09:00) (93% - 98%)    Parameters below as of 22 Dec 2022 09:00  Patient On (Oxygen Delivery Method): room air      Physical Exam:  General:  no acute distress  Neck: supple, trachea midline  Lungs: clear, no wheeze/rhonchi  Cardiovascular: regular rate and rhythm, S1 S2  Abdomen: soft, nontender,  bowel sounds normal  Neurological: alert and oriented x3  Skin: no rash  Extremities: right hip drsg c/d/i      LABS:                        9.7    12.16 )-----------( 237      ( 22 Dec 2022 06:00 )             29.4   12-22    142  |  107  |  14  ----------------------------<  103<H>  3.9   |  28  |  0.80    Ca    8.5      22 Dec 2022 06:00    TPro  6.0  /  Alb  2.0<L>  /  TBili  0.5  /  DBili  x   /  AST  42<H>  /  ALT  34  /  AlkPhos  62  12-21      MICROBIOLOGY:  Culture - Blood (12.16.22 @ 21:06)    Specimen Source: .Blood Blood    Culture Results:   No growth to date.      RADIOLOGY & ADDITIONAL STUDIES:    ACC: [id number] EXAM:  XR CHEST AP OR PA 1V                          PROCEDURE DATE:  12/16/2022          INTERPRETATION:  Short of breath.    AP chest a prior 6/28/2021.    Heart mediastinum exaggerated by AP film shallow inspiration. Grossly   clear lungs. Generative spondylosis dorsal spine. Clips right upper   quadrant. Old left rib fractures    IMPRESSION: Grossly clear lungs      ACC: [id number] EXAM:  CT CHEST                        ACC: [id number] EXAM:  CT ABDOMEN AND PELVIS                          PROCEDURE DATE:  12/16/2022          INTERPRETATION:  CLINICAL INFORMATION: Elevated lactate and leukocytosis.   Right hip fracture.    COMPARISON: None.    CONTRAST/COMPLICATIONS:  IV Contrast: NONE  Oral Contrast: NONE  Complications: None reported at time of study completion    PROCEDURE:  CT of the Chest, Abdomen and Pelvis was performed.  Sagittal and coronal reformats were performed.    FINDINGS:  CHEST:  LUNGS AND LARGE AIRWAYS: Patent central airways. No pulmonary nodules.   Minimal dependent atelectatic changes.  PLEURA: No pleural effusion.  VESSELS: Mild aortic and coronary artery calcifications.  HEART: Heart size is normal. No pericardial effusion.  MEDIASTINUM AND GREYSON: No lymphadenopathy.  CHEST WALL AND LOWER NECK: Within normal limits.    ABDOMEN AND PELVIS:  LIVER: Within normal limits.  BILE DUCTS: Normal caliber.  GALLBLADDER: Cholecystectomy.  SPLEEN: Within normal limits.  PANCREAS: Within normal limits.  ADRENALS: Within normal limits.  KIDNEYS/URETERS: 8 mm cortical calcification in the left renal midpole.   No hydronephrosis    BLADDER: Distended but otherwise unremarkable urinary bladder  REPRODUCTIVE ORGANS: Prostate within normal limits.    BOWEL: No bowel obstruction. Appendix is normal.  PERITONEUM: No ascites.  VESSELS: Mild atherosclerotic changes..  RETROPERITONEUM/LYMPH NODES: No lymphadenopathy.  ABDOMINAL WALL: Within normallimits.  BONES: Degenerative changes.  Several old bilateral rib fractures.  Comminuted and displaced right intertrochanteric fracture. Small   associated hematoma. Associated edema/hemorrhage in the adjacent   subcutaneous tissues.    IMPRESSION:    Comminuted and displaced right intertrochanteric fracture. Small   associated hematoma. Associated edema/hemorrhage in the adjacent   subcutaneous tissues.      Assessment :   66YO M PMH hypertension mental retardation and seizures resident of [facility] admitted with Comminuted and displaced right intertrochanteric fracture. Small associated hematoma. PT febrile Tmax 100.7 tachy and wbc 16K Also with elevated lactic. Pt is post op Open reduction and internal fixation (ORIF) of fracture of right hip using intramedullary nilda 18-Dec-2022  Likely SIRS sec Right hip fracture  Doubt septic  Cultures and imaging neg for infectious process  Anemia  post op fever resolving    Plan :   Monitor off antibiotics  Trend temps and cbc  Asp precautions   Pulm toileting  Pain control  Increase activity  Stable from ID standpoint    Continue with present regiment.  Appropriate use of antibiotics and adverse effects reviewed.    > 35 minutes were spent in direct patient care reviewing notes, medications ,labs data/ imaging , discussion with multidisciplinary team.    Thank you for allowing me to participate in care of your patient .    Olivier Daniels MD  Infectious Disease  [phone] exit interview completed

## 2022-12-23 LAB
ANION GAP SERPL CALC-SCNC: 12 MMOL/L — SIGNIFICANT CHANGE UP (ref 5–17)
BUN SERPL-MCNC: 12 MG/DL — SIGNIFICANT CHANGE UP (ref 7–23)
CALCIUM SERPL-MCNC: 8.3 MG/DL — LOW (ref 8.4–10.5)
CHLORIDE SERPL-SCNC: 105 MMOL/L — SIGNIFICANT CHANGE UP (ref 96–108)
CO2 SERPL-SCNC: 26 MMOL/L — SIGNIFICANT CHANGE UP (ref 22–31)
CREAT SERPL-MCNC: 0.65 MG/DL — SIGNIFICANT CHANGE UP (ref 0.5–1.3)
CULTURE RESULTS: NO GROWTH — SIGNIFICANT CHANGE UP
EGFR: 104 ML/MIN/1.73M2 — SIGNIFICANT CHANGE UP
GLUCOSE SERPL-MCNC: 100 MG/DL — HIGH (ref 70–99)
HCT VFR BLD CALC: 32 % — LOW (ref 39–50)
HGB BLD-MCNC: 10.5 G/DL — LOW (ref 13–17)
MCHC RBC-ENTMCNC: 30.6 PG — SIGNIFICANT CHANGE UP (ref 27–34)
MCHC RBC-ENTMCNC: 32.8 GM/DL — SIGNIFICANT CHANGE UP (ref 32–36)
MCV RBC AUTO: 93.3 FL — SIGNIFICANT CHANGE UP (ref 80–100)
NRBC # BLD: 0 /100 WBCS — SIGNIFICANT CHANGE UP (ref 0–0)
PLATELET # BLD AUTO: 283 K/UL — SIGNIFICANT CHANGE UP (ref 150–400)
POTASSIUM SERPL-MCNC: 4.2 MMOL/L — SIGNIFICANT CHANGE UP (ref 3.5–5.3)
POTASSIUM SERPL-SCNC: 4.2 MMOL/L — SIGNIFICANT CHANGE UP (ref 3.5–5.3)
PROCALCITONIN SERPL-MCNC: 0.05 NG/ML — SIGNIFICANT CHANGE UP (ref 0.02–0.1)
RBC # BLD: 3.43 M/UL — LOW (ref 4.2–5.8)
RBC # FLD: 14.5 % — SIGNIFICANT CHANGE UP (ref 10.3–14.5)
SODIUM SERPL-SCNC: 143 MMOL/L — SIGNIFICANT CHANGE UP (ref 135–145)
SPECIMEN SOURCE: SIGNIFICANT CHANGE UP
WBC # BLD: 13.09 K/UL — HIGH (ref 3.8–10.5)
WBC # FLD AUTO: 13.09 K/UL — HIGH (ref 3.8–10.5)

## 2022-12-23 PROCEDURE — 71045 X-RAY EXAM CHEST 1 VIEW: CPT | Mod: 26

## 2022-12-23 PROCEDURE — 99232 SBSQ HOSP IP/OBS MODERATE 35: CPT

## 2022-12-23 RX ADMIN — Medication 1000 MILLIGRAM(S): at 15:31

## 2022-12-23 RX ADMIN — Medication 1000 MILLIGRAM(S): at 05:52

## 2022-12-23 RX ADMIN — Medication 1000 MILLIGRAM(S): at 15:25

## 2022-12-23 RX ADMIN — ENOXAPARIN SODIUM 40 MILLIGRAM(S): 100 INJECTION SUBCUTANEOUS at 09:21

## 2022-12-23 RX ADMIN — Medication 81 MILLIGRAM(S): at 05:52

## 2022-12-23 RX ADMIN — CHLORHEXIDINE GLUCONATE 15 MILLILITER(S): 213 SOLUTION TOPICAL at 05:51

## 2022-12-23 RX ADMIN — CHLORHEXIDINE GLUCONATE 15 MILLILITER(S): 213 SOLUTION TOPICAL at 18:05

## 2022-12-23 RX ADMIN — FLUVOXAMINE MALEATE 100 MILLIGRAM(S): 25 TABLET ORAL at 05:51

## 2022-12-23 RX ADMIN — AMLODIPINE BESYLATE 2.5 MILLIGRAM(S): 2.5 TABLET ORAL at 05:52

## 2022-12-23 RX ADMIN — Medication 75 MICROGRAM(S): at 05:53

## 2022-12-23 RX ADMIN — Medication 1000 MILLIGRAM(S): at 21:06

## 2022-12-23 RX ADMIN — FLUVOXAMINE MALEATE 100 MILLIGRAM(S): 25 TABLET ORAL at 17:57

## 2022-12-23 RX ADMIN — OLANZAPINE 15 MILLIGRAM(S): 15 TABLET, FILM COATED ORAL at 21:07

## 2022-12-23 RX ADMIN — Medication 25 MILLIGRAM(S): at 17:57

## 2022-12-23 RX ADMIN — Medication 25 MILLIGRAM(S): at 05:53

## 2022-12-23 RX ADMIN — TAMSULOSIN HYDROCHLORIDE 0.4 MILLIGRAM(S): 0.4 CAPSULE ORAL at 21:07

## 2022-12-23 RX ADMIN — SODIUM CHLORIDE 100 MILLILITER(S): 9 INJECTION, SOLUTION INTRAVENOUS at 09:22

## 2022-12-23 RX ADMIN — DIVALPROEX SODIUM 250 MILLIGRAM(S): 500 TABLET, DELAYED RELEASE ORAL at 15:25

## 2022-12-23 RX ADMIN — DIVALPROEX SODIUM 250 MILLIGRAM(S): 500 TABLET, DELAYED RELEASE ORAL at 05:53

## 2022-12-23 RX ADMIN — Medication 1000 MILLIGRAM(S): at 23:36

## 2022-12-23 RX ADMIN — DIVALPROEX SODIUM 250 MILLIGRAM(S): 500 TABLET, DELAYED RELEASE ORAL at 21:06

## 2022-12-23 RX ADMIN — PANTOPRAZOLE SODIUM 40 MILLIGRAM(S): 20 TABLET, DELAYED RELEASE ORAL at 05:52

## 2022-12-23 RX ADMIN — Medication 0.5 MILLIGRAM(S): at 18:05

## 2022-12-23 RX ADMIN — Medication 0.5 MILLIGRAM(S): at 05:53

## 2022-12-23 NOTE — PROGRESS NOTE ADULT - SUBJECTIVE AND OBJECTIVE BOX
Orthopedics     POD #4  s/p ORIF Right Hip   Resting in bed. Has not yet been OOB with PT.    Vital Signs Last 24 Hrs  T(C): 36.8 (12-23-22 @ 05:04), Max: 37.9 (12-22-22 @ 16:49)  T(F): 98.2 (12-23-22 @ 05:04), Max: 100.3 (12-22-22 @ 16:49)  HR: 112 (12-23-22 @ 05:04) (92 - 124)  BP: 152/78 (12-23-22 @ 05:04) (120/68 - 152/78)  RR: 18 (12-23-22 @ 05:04) (17 - 18)  SpO2: 93% (12-23-22 @ 05:04) (93% - 94%)                        10.5   13.09 )-----------( 283      ( 23 Dec 2022 07:38 )             32.0     12-23    143  |  105  |  12  ----------------------------<  100<H>  4.2   |  26  |  0.65    Ca    8.3<L>      23 Dec 2022 07:38          Exam:  NAD   Right Hip: Silverlon Dressings clean, dry and intact  Bilateral LE's:  Unable to assess sensorimotor  2+DP pulse  Calf Soft, NT        A/P:  Stable POD #4  s/p ORIF Right Hip   -Pain control  -DVT PE ppx: Lovenox 40mg SQ daily  -OOB PT, OT  -WBAT  Dispo: awaiting [facility] placement

## 2022-12-23 NOTE — PROGRESS NOTE ADULT - ASSESSMENT
67-year-old male with a past medical history of hypertension mental retardation and seizures admitted for Right intertrochanteric fracture, elevated Lactate, Leukocytosis, tachycardia    #Right intertrochanteric fracture  s/p Right hip repair  s/p transfusion and hgb stable    #Elevated lactate, leukocytosis, Tachycardia  ID following  cxr repeat 12/23, bladder scan q8    #HTN  continue BP meds with hold parameters      #Seizures  is on depakote, changed to [facility] b/c depakote cannot be crushed    #DVT proph  hold chemical ac 2/2 anemia     dispo: pending clearence from ortho, id

## 2022-12-23 NOTE — PROGRESS NOTE ADULT - SUBJECTIVE AND OBJECTIVE BOX
Patient is a 67y old  Male who presents with a chief complaint of Fracture of unspecified part of neck of right femur, initial encounter for closed fracture     (20 Dec 2022 15:07)      SUBJECTIVE / OVERNIGHT EVENTS: pt seen and examined. no fever, no shob, NAD, no c/o pain        Vital Signs Last 24 Hrs  T(C): 36.8 (23 Dec 2022 05:04), Max: 37.9 (22 Dec 2022 16:49)  T(F): 98.2 (23 Dec 2022 05:04), Max: 100.3 (22 Dec 2022 16:49)  HR: 112 (23 Dec 2022 05:04) (112 - 124)  BP: 152/78 (23 Dec 2022 05:04) (143/84 - 152/78)  BP(mean): --  RR: 18 (23 Dec 2022 05:04) (18 - 18)  SpO2: 93% (23 Dec 2022 05:04) (93% - 94%)    Parameters below as of 23 Dec 2022 05:04  Patient On (Oxygen Delivery Method): room air      I&O's Summary    22 Dec 2022 07:01  -  23 Dec 2022 07:00  --------------------------------------------------------  IN: 0 mL / OUT: 1870 mL / NET: -1870 mL        PHYSICAL EXAM:  GENERAL: NAD  HEAD:  Atraumatic, Normocephalic  NECK: Supple  CHEST/LUNG: CTABL  HEART: S1+S2  ABDOMEN: Soft, Nontender, Nondistended; Bowel sounds present      LABS:                        10.5   13.09 )-----------( 283      ( 23 Dec 2022 07:38 )             32.0     12-    143  |  105  |  12  ----------------------------<  100<H>  4.2   |  26  |  0.65    Ca    8.3<L>      23 Dec 2022 07:38        CAPILLARY BLOOD GLUCOSE            Urinalysis Basic - ( 21 Dec 2022 14:51 )    Color: Yellow / Appearance: Clear / S.010 / pH: x  Gluc: x / Ketone: Small  / Bili: Negative / Urobili: 1 mg/dL   Blood: x / Protein: Negative mg/dL / Nitrite: Negative   Leuk Esterase: Negative / RBC: x / WBC x   Sq Epi: x / Non Sq Epi: x / Bacteria: x        RADIOLOGY & ADDITIONAL TESTS:    Imaging Personally Reviewed:  [x] YES  [ ] NO    Consultant(s) Notes Reviewed:  [x] YES  [ ] NO      MEDICATIONS  (STANDING):  acetaminophen     Tablet .. 1000 milliGRAM(s) Oral every 8 hours  amLODIPine   Tablet 2.5 milliGRAM(s) Oral daily  aspirin enteric coated 81 milliGRAM(s) Oral daily  chlorhexidine 0.12% Liquid 15 milliLiter(s) Oral Mucosa two times a day  chlorhexidine 2% Cloths 1 Application(s) Topical once  clonazePAM  Tablet 0.5 milliGRAM(s) Oral two times a day  divalproex  milliGRAM(s) Oral <User Schedule>  enoxaparin Injectable 40 milliGRAM(s) SubCutaneous every 24 hours  fluvoxaMINE 100 milliGRAM(s) Oral two times a day  lactated ringers. 1000 milliLiter(s) (100 mL/Hr) IV Continuous <Continuous>  lactated ringers. 1000 milliLiter(s) (100 mL/Hr) IV Continuous <Continuous>  levothyroxine 75 MICROGram(s) Oral daily  metoprolol tartrate 25 milliGRAM(s) Oral two times a day  OLANZapine 15 milliGRAM(s) Oral at bedtime  pantoprazole    Tablet 40 milliGRAM(s) Oral before breakfast  tamsulosin 0.4 milliGRAM(s) Oral at bedtime  tranexamic acid IVPB 1000 milliGRAM(s) IV Intermittent once  tranexamic acid IVPB 1000 milliGRAM(s) IV Intermittent once    MEDICATIONS  (PRN):  HYDROmorphone  Injectable 0.5 milliGRAM(s) IV Push every 3 hours PRN for breakthrough pain  magnesium hydroxide Suspension 30 milliLiter(s) Oral daily PRN Constipation  ondansetron Injectable 4 milliGRAM(s) IV Push every 6 hours PRN Nausea and/or Vomiting  oxyCODONE    IR 5 milliGRAM(s) Oral every 3 hours PRN Moderate Pain (4 - 6)  oxyCODONE    IR 10 milliGRAM(s) Oral every 3 hours PRN Severe Pain (7 - 10)      Care Discussed with Consultants/Other Providers [x] YES  [ ] NO    HEALTH ISSUES - PROBLEM Dx:

## 2022-12-23 NOTE — PROGRESS NOTE ADULT - SUBJECTIVE AND OBJECTIVE BOX
SYLVAIN ROSELIA is a 67yMale , patient examined and chart reviewed.    INTERVAL HPI/ OVERNIGHT EVENTS:   No events, NAD. tmax 100.3  Awake    PAST MEDICAL & SURGICAL HISTORY:  MR (mental retardation), severe  Seizure  HTN (hypertension)  Psoriasis  DD (diastrophic dysplasia)  Blind  right eye  Constipation, unspecified constipation type        For details regarding the patient's social history, family history, and other miscellaneous elements, please refer the initial infectious diseases consultation and/or the admitting history and physical examination for this admission.    ROS:  Unable to obtain due to : MRDD    ALLERGIES:  Augmentin (Unknown)  aztreonam (Rash)  penicillin (Unknown)  sulfa drugs (Unknown)    Current inpatient medications :    ANTIBIOTICS/RELEVANT:    MEDICATIONS  (STANDING):  acetaminophen     Tablet .. 1000 milliGRAM(s) Oral every 8 hours  amLODIPine   Tablet 2.5 milliGRAM(s) Oral daily  aspirin enteric coated 81 milliGRAM(s) Oral daily  chlorhexidine 0.12% Liquid 15 milliLiter(s) Oral Mucosa two times a day  chlorhexidine 2% Cloths 1 Application(s) Topical once  clonazePAM  Tablet 0.5 milliGRAM(s) Oral two times a day  divalproex  milliGRAM(s) Oral <User Schedule>  enoxaparin Injectable 40 milliGRAM(s) SubCutaneous every 24 hours  fluvoxaMINE 100 milliGRAM(s) Oral two times a day  lactated ringers. 1000 milliLiter(s) (100 mL/Hr) IV Continuous <Continuous>  lactated ringers. 1000 milliLiter(s) (100 mL/Hr) IV Continuous <Continuous>  levothyroxine 75 MICROGram(s) Oral daily  metoprolol tartrate 25 milliGRAM(s) Oral two times a day  OLANZapine 15 milliGRAM(s) Oral at bedtime  pantoprazole    Tablet 40 milliGRAM(s) Oral before breakfast  tamsulosin 0.4 milliGRAM(s) Oral at bedtime  tranexamic acid IVPB 1000 milliGRAM(s) IV Intermittent once  tranexamic acid IVPB 1000 milliGRAM(s) IV Intermittent once    MEDICATIONS  (PRN):  HYDROmorphone  Injectable 0.5 milliGRAM(s) IV Push every 3 hours PRN for breakthrough pain  magnesium hydroxide Suspension 30 milliLiter(s) Oral daily PRN Constipation  ondansetron Injectable 4 milliGRAM(s) IV Push every 6 hours PRN Nausea and/or Vomiting  oxyCODONE    IR 5 milliGRAM(s) Oral every 3 hours PRN Moderate Pain (4 - 6)  oxyCODONE    IR 10 milliGRAM(s) Oral every 3 hours PRN Severe Pain (7 - 10)      Objective:  Vital Signs Last 24 Hrs  T(C): 36.6 (22 Dec 2022 09:00), Max: 37.8 (21 Dec 2022 14:09)  T(F): 97.9 (22 Dec 2022 09:00), Max: 100.1 (21 Dec 2022 14:09)  HR: 86 (22 Dec 2022 09:00) (86 - 112)  BP: 102/55 (22 Dec 2022 09:00) (102/55 - 139/76)  RR: 18 (22 Dec 2022 09:00) (16 - 18)  SpO2: 93% (22 Dec 2022 09:00) (93% - 98%)    Parameters below as of 22 Dec 2022 09:00  Patient On (Oxygen Delivery Method): room air      Physical Exam:  General:  no acute distress  Neck: supple, trachea midline  Lungs: clear, no wheeze/rhonchi  Cardiovascular: regular rate and rhythm, S1 S2  Abdomen: soft, nontender,  bowel sounds normal  Neurological: alert and oriented x3  Skin: no rash  Extremities: right hip drsg c/d/i      LABS:                        9.7    12.16 )-----------( 237      ( 22 Dec 2022 06:00 )             29.4   12-22    142  |  107  |  14  ----------------------------<  103<H>  3.9   |  28  |  0.80    Ca    8.5      22 Dec 2022 06:00    TPro  6.0  /  Alb  2.0<L>  /  TBili  0.5  /  DBili  x   /  AST  42<H>  /  ALT  34  /  AlkPhos  62  12-21      MICROBIOLOGY:  Culture - Blood (12.16.22 @ 21:06)    Specimen Source: .Blood Blood    Culture Results:   No growth to date.      RADIOLOGY & ADDITIONAL STUDIES:    ACC: [id number] EXAM:  XR CHEST AP OR PA 1V                          PROCEDURE DATE:  12/16/2022          INTERPRETATION:  Short of breath.    AP chest a prior 6/28/2021.    Heart mediastinum exaggerated by AP film shallow inspiration. Grossly   clear lungs. Generative spondylosis dorsal spine. Clips right upper   quadrant. Old left rib fractures    IMPRESSION: Grossly clear lungs      ACC: [id number] EXAM:  CT CHEST                        ACC: [id number] EXAM:  CT ABDOMEN AND PELVIS                          PROCEDURE DATE:  12/16/2022          INTERPRETATION:  CLINICAL INFORMATION: Elevated lactate and leukocytosis.   Right hip fracture.    COMPARISON: None.    CONTRAST/COMPLICATIONS:  IV Contrast: NONE  Oral Contrast: NONE  Complications: None reported at time of study completion    PROCEDURE:  CT of the Chest, Abdomen and Pelvis was performed.  Sagittal and coronal reformats were performed.    FINDINGS:  CHEST:  LUNGS AND LARGE AIRWAYS: Patent central airways. No pulmonary nodules.   Minimal dependent atelectatic changes.  PLEURA: No pleural effusion.  VESSELS: Mild aortic and coronary artery calcifications.  HEART: Heart size is normal. No pericardial effusion.  MEDIASTINUM AND GREYSON: No lymphadenopathy.  CHEST WALL AND LOWER NECK: Within normal limits.    ABDOMEN AND PELVIS:  LIVER: Within normal limits.  BILE DUCTS: Normal caliber.  GALLBLADDER: Cholecystectomy.  SPLEEN: Within normal limits.  PANCREAS: Within normal limits.  ADRENALS: Within normal limits.  KIDNEYS/URETERS: 8 mm cortical calcification in the left renal midpole.   No hydronephrosis    BLADDER: Distended but otherwise unremarkable urinary bladder  REPRODUCTIVE ORGANS: Prostate within normal limits.    BOWEL: No bowel obstruction. Appendix is normal.  PERITONEUM: No ascites.  VESSELS: Mild atherosclerotic changes..  RETROPERITONEUM/LYMPH NODES: No lymphadenopathy.  ABDOMINAL WALL: Within normallimits.  BONES: Degenerative changes.  Several old bilateral rib fractures.  Comminuted and displaced right intertrochanteric fracture. Small   associated hematoma. Associated edema/hemorrhage in the adjacent   subcutaneous tissues.    IMPRESSION:    Comminuted and displaced right intertrochanteric fracture. Small   associated hematoma. Associated edema/hemorrhage in the adjacent   subcutaneous tissues.      Assessment :   68YO M PMH hypertension mental retardation and seizures resident of [facility] admitted with Comminuted and displaced right intertrochanteric fracture. Small associated hematoma. PT febrile Tmax 100.7 tachy and wbc 16K Also with elevated lactic. Pt is post op Open reduction and internal fixation (ORIF) of fracture of right hip using intramedullary nilda 18-Dec-2022  Likely SIRS sec Right hip fracture  Doubt septic  Cultures and imaging neg for infectious process  Anemia  Post op fever Tmax 100.3 WBC uptrending    Plan :   check CXR Ua bladder scan  Monitor off antibiotics for now  Trend temps and cbc  Asp precautions   Pulm toileting  Pain control  Increase activity  Stable from ID standpoint    I will be away 12/24/22-1/2/23. Dr Tamara Dos Santos will be covering.    Continue with present regiment.  Appropriate use of antibiotics and adverse effects reviewed.    > 35 minutes were spent in direct patient care reviewing notes, medications ,labs data/ imaging , discussion with multidisciplinary team.    Thank you for allowing me to participate in care of your patient .    Olivier Daniels MD  Infectious Disease  [phone]

## 2022-12-24 LAB
ANION GAP SERPL CALC-SCNC: 9 MMOL/L — SIGNIFICANT CHANGE UP (ref 5–17)
BUN SERPL-MCNC: 9 MG/DL — SIGNIFICANT CHANGE UP (ref 7–23)
CALCIUM SERPL-MCNC: 8.7 MG/DL — SIGNIFICANT CHANGE UP (ref 8.4–10.5)
CHLORIDE SERPL-SCNC: 106 MMOL/L — SIGNIFICANT CHANGE UP (ref 96–108)
CO2 SERPL-SCNC: 26 MMOL/L — SIGNIFICANT CHANGE UP (ref 22–31)
CREAT SERPL-MCNC: 0.55 MG/DL — SIGNIFICANT CHANGE UP (ref 0.5–1.3)
EGFR: 109 ML/MIN/1.73M2 — SIGNIFICANT CHANGE UP
GLUCOSE SERPL-MCNC: 111 MG/DL — HIGH (ref 70–99)
HCT VFR BLD CALC: 32.6 % — LOW (ref 39–50)
HGB BLD-MCNC: 10.8 G/DL — LOW (ref 13–17)
MCHC RBC-ENTMCNC: 30.7 PG — SIGNIFICANT CHANGE UP (ref 27–34)
MCHC RBC-ENTMCNC: 33.1 GM/DL — SIGNIFICANT CHANGE UP (ref 32–36)
MCV RBC AUTO: 92.6 FL — SIGNIFICANT CHANGE UP (ref 80–100)
NRBC # BLD: 0 /100 WBCS — SIGNIFICANT CHANGE UP (ref 0–0)
PLATELET # BLD AUTO: 309 K/UL — SIGNIFICANT CHANGE UP (ref 150–400)
POTASSIUM SERPL-MCNC: 4.2 MMOL/L — SIGNIFICANT CHANGE UP (ref 3.5–5.3)
POTASSIUM SERPL-SCNC: 4.2 MMOL/L — SIGNIFICANT CHANGE UP (ref 3.5–5.3)
RBC # BLD: 3.52 M/UL — LOW (ref 4.2–5.8)
RBC # FLD: 14.6 % — HIGH (ref 10.3–14.5)
SODIUM SERPL-SCNC: 141 MMOL/L — SIGNIFICANT CHANGE UP (ref 135–145)
WBC # BLD: 10.75 K/UL — HIGH (ref 3.8–10.5)
WBC # FLD AUTO: 10.75 K/UL — HIGH (ref 3.8–10.5)

## 2022-12-24 PROCEDURE — 99233 SBSQ HOSP IP/OBS HIGH 50: CPT

## 2022-12-24 PROCEDURE — 93010 ELECTROCARDIOGRAM REPORT: CPT

## 2022-12-24 RX ORDER — METOPROLOL TARTRATE 50 MG
50 TABLET ORAL
Refills: 0 | Status: DISCONTINUED | OUTPATIENT
Start: 2022-12-24 | End: 2022-12-29

## 2022-12-24 RX ADMIN — FLUVOXAMINE MALEATE 100 MILLIGRAM(S): 25 TABLET ORAL at 17:35

## 2022-12-24 RX ADMIN — DIVALPROEX SODIUM 250 MILLIGRAM(S): 500 TABLET, DELAYED RELEASE ORAL at 16:21

## 2022-12-24 RX ADMIN — Medication 81 MILLIGRAM(S): at 05:49

## 2022-12-24 RX ADMIN — DIVALPROEX SODIUM 250 MILLIGRAM(S): 500 TABLET, DELAYED RELEASE ORAL at 21:33

## 2022-12-24 RX ADMIN — Medication 75 MICROGRAM(S): at 05:50

## 2022-12-24 RX ADMIN — Medication 1000 MILLIGRAM(S): at 05:51

## 2022-12-24 RX ADMIN — SODIUM CHLORIDE 100 MILLILITER(S): 9 INJECTION, SOLUTION INTRAVENOUS at 21:34

## 2022-12-24 RX ADMIN — DIVALPROEX SODIUM 250 MILLIGRAM(S): 500 TABLET, DELAYED RELEASE ORAL at 05:52

## 2022-12-24 RX ADMIN — AMLODIPINE BESYLATE 2.5 MILLIGRAM(S): 2.5 TABLET ORAL at 05:49

## 2022-12-24 RX ADMIN — SODIUM CHLORIDE 100 MILLILITER(S): 9 INJECTION, SOLUTION INTRAVENOUS at 05:48

## 2022-12-24 RX ADMIN — Medication 50 MILLIGRAM(S): at 17:34

## 2022-12-24 RX ADMIN — Medication 0.5 MILLIGRAM(S): at 05:49

## 2022-12-24 RX ADMIN — ENOXAPARIN SODIUM 40 MILLIGRAM(S): 100 INJECTION SUBCUTANEOUS at 09:08

## 2022-12-24 RX ADMIN — Medication 1000 MILLIGRAM(S): at 13:37

## 2022-12-24 RX ADMIN — Medication 0.5 MILLIGRAM(S): at 17:34

## 2022-12-24 RX ADMIN — CHLORHEXIDINE GLUCONATE 15 MILLILITER(S): 213 SOLUTION TOPICAL at 05:48

## 2022-12-24 RX ADMIN — Medication 25 MILLIGRAM(S): at 05:50

## 2022-12-24 RX ADMIN — Medication 1000 MILLIGRAM(S): at 21:55

## 2022-12-24 RX ADMIN — Medication 1000 MILLIGRAM(S): at 13:11

## 2022-12-24 RX ADMIN — FLUVOXAMINE MALEATE 100 MILLIGRAM(S): 25 TABLET ORAL at 05:50

## 2022-12-24 RX ADMIN — PANTOPRAZOLE SODIUM 40 MILLIGRAM(S): 20 TABLET, DELAYED RELEASE ORAL at 09:02

## 2022-12-24 RX ADMIN — Medication 1000 MILLIGRAM(S): at 21:33

## 2022-12-24 RX ADMIN — TAMSULOSIN HYDROCHLORIDE 0.4 MILLIGRAM(S): 0.4 CAPSULE ORAL at 21:34

## 2022-12-24 RX ADMIN — OLANZAPINE 15 MILLIGRAM(S): 15 TABLET, FILM COATED ORAL at 21:34

## 2022-12-24 RX ADMIN — CHLORHEXIDINE GLUCONATE 15 MILLILITER(S): 213 SOLUTION TOPICAL at 17:34

## 2022-12-24 NOTE — CONSULT NOTE ADULT - ASSESSMENT
66y/o seen at [facility] telemetry. [facility]  Lying flat, awake and alert comfortably  Patient non-verbal, therefore history from chart  History HTN, mental retardation, seizures    12/19/22 admitted due to difficulty walking  S/P right hip surgery  Today at 5:55 am patient was noted to have increased heart rate to about 128/minute  This appeared to be sinus tachycardia  Presently heart rate about 90/minute  Also upon admission patient was tachycardic with mild fever and fractured hip    Plan:  - Will increase Metoprolol with parameters  - Continue Amlodipine-2.5mg OD  - Lovenox  - 12/21/22 normal lower extremity venous doppler study  - Grossly normal Echocardiogram 4/23/21  - Pain control  - Post-op care  - Being followed by Orthopedics, ID  - Discharge planning

## 2022-12-24 NOTE — PROGRESS NOTE ADULT - ASSESSMENT
68YO M PMH hypertension mental retardation and seizures resident of [facility] admitted with Comminuted and displaced right intertrochanteric fracture. Small associated hematoma. PT febrile Tmax 100.7 tachy and wbc 16K Also with elevated lactic. Pt is post op Open reduction and internal fixation (ORIF) of fracture of right hip using intramedullary nilda 18-Dec-2022  Likely SIRS sec Right hip fracture  Doubt septic  Cultures and imaging neg for infectious process  Anemia  Post op fever Tmax 100.3   WBC uptrending--resolved    Plan :   UA negative  BCx/UCx NGTD  Monitor off antibiotics  Trend temps and cbc  Asp precautions   Pulm toileting  Pain control  Increase activity    Covering weekend/holiday service through 12/26  Covering Dr. Daniels  Infectious Diseases will continue to follow. Please call with any questions.   Tamara Dos Santos M.D.  [facility] Division of Infectious Diseases [phone]

## 2022-12-24 NOTE — PROGRESS NOTE ADULT - SUBJECTIVE AND OBJECTIVE BOX
Procedure: ORIF Right   Hip Fx with  long intramedullary nail  POD#: 5    S: Pt without complaints. No SOB,CP, N/V. Tolerated Fluids / Diet well.   Pain comfortable on Tylenol  No BM yet [+BM], + flatus, No abdominal pain.  No oxy used for several days  acetaminophen     Tablet .. 1000 milliGRAM(s) Oral every 8 hours  clonazePAM  Tablet 0.5 milliGRAM(s) Oral two times a day  divalproex  milliGRAM(s) Oral <User Schedule>  fluvoxaMINE 100 milliGRAM(s) Oral two times a day  HYDROmorphone  Injectable 0.5 milliGRAM(s) IV Push every 3 hours PRN  OLANZapine 15 milliGRAM(s) Oral at bedtime  ondansetron Injectable 4 milliGRAM(s) IV Push every 6 hours PRN  oxyCODONE    IR 5 milliGRAM(s) Oral every 3 hours PRN  oxyCODONE    IR 10 milliGRAM(s) Oral every 3 hours PRN    O: General: Pt Alert and oriented, On exam NAD,   VS: Vital Signs Last 24 Hrs  T(C): 37.2 (24 Dec 2022 05:33), Max: 37.2 (24 Dec 2022 05:33)  T(F): 98.9 (24 Dec 2022 05:33), Max: 98.9 (24 Dec 2022 05:33)  HR: 117 (24 Dec 2022 05:33) (105 - 117)  BP: 164/75 (24 Dec 2022 05:33) (128/68 - 164/75)  BP(mean): --  RR: 19 (24 Dec 2022 05:33) (16 - 19)  SpO2: 92% (24 Dec 2022 05:33) (92% - 94%)    Parameters below as of 24 Dec 2022 05:33  Patient On (Oxygen Delivery Method): room air    Heart: RRR no murmur  Lungs: BS clear bilat.  Abd: Soft; no distention, benign exam    Ext( Right Hip/thigh): Sivlerlon dressings dry.  Neurologic: Has sensation bilat. feet & toes ;  Full AROM bilat feet & toes. EHL / AT  = Bilat: 5/5   Vascular: Feet toes warm, pink. DP = 1+. PT 2+.  Calves soft ; w/o tenderness bilat..  VTEP: On Bilat. Venodynes + aspirin enteric coated 81 milliGRAM(s) Oral daily  enoxaparin Injectable 40 milliGRAM(s) SubCutaneous every 24 hours       Activity in PT yesterday : Sat and dangled on edge of bed.  has not been able to walk as yet                          10.8   10.75 )-----------( 309      ( 24 Dec 2022 06:00 )             32.6     12-24    141  |  106  |  9   ----------------------------<  111<H>  4.2   |  26  |  0.55    Ca    8.7      24 Dec 2022 06:00        Hospitalist input noted    Primary Orthopedic Assessment:  • Stable from Orthopedic perspective  • Neuro motor exam stable  • Labs: wbc trending down, post op anemia improving.  BMP stable      Plan:   • Continue:  PT/OT/Weightbearing as tolerated with assistance of a walker/Ice to hip/         • Continue DVT prophylaxis as prescribed, including use of compression devices and ankle pumps  • Continue Pain Rx  • Plans per Medicine   • Discharge planning – anticipated discharge is:  Subacute rehabilitation  when medically stable & cleared by PT/OT, ID

## 2022-12-24 NOTE — CONSULT NOTE ADULT - SUBJECTIVE AND OBJECTIVE BOX
HPI:  66YO M PMH hypertension mental retardation and seizures resident of  [facility] home due to difficulty walking.  Found to have Comminuted and displaced right intertrochanteric fracture. Small associated hematoma. PT febrile Tmax 100.7 tachy and wbc 16K Also with elevated lactic. Pt is post op Open reduction and internal fixation (ORIF) of fracture of right hip using intramedullary nilda 18-Dec-2022    Infectious Disease consult was called to evaluate pt due to poss sepsis    Past Medical & Surgical Hx:  PAST MEDICAL & SURGICAL HISTORY:  MR (mental retardation), severe  Seizure  HTN (hypertension)  Psoriasis  DD (diastrophic dysplasia)  Blind  right eye  Constipation, unspecified constipation type  No significant past surgical history        Social History--  EtOH: denies   Tobacco: denies   Drug Use: denies      FAMILY HISTORY:  No pertinent family history in first degree relatives        Allergies  Augmentin (Unknown)  aztreonam (Rash)  penicillin (Unknown)  sulfa drugs (Unknown)    Intolerances  NONE    Home Medications:  amLODIPine 2.5 mg oral tablet: 1 tab(s) orally once a day (17 Dec 2022 15:03)  aspirin 81 mg oral tablet: 1 tab(s) orally once a day (17 Dec 2022 15:03)  calcipotriene 0.005% topical cream: Apply topically to affected area 2 times a day (17 Dec 2022 15:03)  chlorhexidine 0.12% mucous membrane liquid: application mucous membrane 2 times a day (17 Dec 2022 15:03)  clonazePAM 1 mg oral tablet: 0.5 tab(s) orally 2 times a day (17 Dec 2022 15:03)  Depakote  mg oral tablet, extended release: 1 tab(s) orally 3 times a day (17 Dec 2022 15:03)  fluvoxaMINE 100 mg oral tablet: 1 tab(s) orally 2 times a day (17 Dec 2022 15:03)  ketoconazole 2% topical shampoo: Apply topically to affected area two times a week (17 Dec 2022 15:03)  lactulose 10 g/15 mL oral solution: 30 milliliter(s) orally once a day (17 Dec 2022 15:03)  levothyroxine 75 mcg (0.075 mg) oral tablet: 1 tab(s) orally once a day (17 Dec 2022 15:03)  metoprolol tartrate 25 mg oral tablet: 1 tab(s) orally 2 times a day (17 Dec 2022 15:03)  multivitamin with minerals: 1 tab(s) orally once a day (17 Dec 2022 15:03)  OLANZapine 15 mg oral tablet: 1 tab(s) orally once a day (at bedtime) (17 Dec 2022 15:03)  omeprazole 20 mg oral delayed release capsule: 1 cap(s) orally once a day (17 Dec 2022 15:03)  Oyster Betito 1250 mg (500 mg elemental calcium) oral tablet: 1 tab(s) orally 3 times a day (17 Dec 2022 15:03)  petrolatum topical ointment: Apply topically to affected area once a day (17 Dec 2022 15:03)  tamsulosin 0.4 mg oral capsule: 2 cap(s) orally once a day (at bedtime) (17 Dec 2022 15:03)  tolnaftate 1% topical cream: Apply topically to affected area once a day (17 Dec 2022 15:03)    Current Inpatient Medications :    ANTIBIOTICS:   ceFAZolin   IVPB 2000 milliGRAM(s) IV Intermittent once  clindamycin IVPB 900 milliGRAM(s) IV Intermittent every 8 hours      OTHER RELEVANT MEDICATIONS :  acetaminophen   IVPB .. 1000 milliGRAM(s) IV Intermittent once  chlorhexidine 0.12% Liquid 15 milliLiter(s) Oral Mucosa two times a day  chlorhexidine 2% Cloths 1 Application(s) Topical once  clonazePAM  Tablet 0.5 milliGRAM(s) Oral two times a day  divalproex  milliGRAM(s) Oral <User Schedule>  fluvoxaMINE 100 milliGRAM(s) Oral two times a day  HYDROmorphone  Injectable 0.5 milliGRAM(s) IV Push every 3 hours PRN  lactated ringers. 1000 milliLiter(s) IV Continuous <Continuous>  lactated ringers. 1000 milliLiter(s) IV Continuous <Continuous>  levothyroxine 75 MICROGram(s) Oral daily  magnesium hydroxide Suspension 30 milliLiter(s) Oral daily PRN  metoprolol tartrate 25 milliGRAM(s) Oral two times a day  OLANZapine 15 milliGRAM(s) Oral at bedtime  ondansetron Injectable 4 milliGRAM(s) IV Push every 6 hours PRN  oxyCODONE    IR 5 milliGRAM(s) Oral every 3 hours PRN  oxyCODONE    IR 10 milliGRAM(s) Oral every 3 hours PRN  pantoprazole    Tablet 40 milliGRAM(s) Oral before breakfast  tamsulosin 0.4 milliGRAM(s) Oral at bedtime  tranexamic acid IVPB 1000 milliGRAM(s) IV Intermittent once  tranexamic acid IVPB 1000 milliGRAM(s) IV Intermittent once      ROS:  Unable to obtain due to : MRDD      I&O's Detail    18 Dec 2022 07:01  -  19 Dec 2022 07:00  --------------------------------------------------------  IN:  Total IN: 0 mL    OUT:    Indwelling Catheter - Urethral (mL): 2175 mL  Total OUT: 2175 mL    Total NET: -2175 mL      19 Dec 2022 07:01  -  19 Dec 2022 22:37  --------------------------------------------------------  IN:    Lactated Ringers: 1500 mL    Oral Fluid: 120 mL  Total IN: 1620 mL    OUT:    Blood Loss (mL): 150 mL    Indwelling Catheter - Urethral (mL): 1150 mL  Total OUT: 1300 mL    Total NET: 320 mL          Physical Exam:  Vital Signs Last 24 Hrs  T(C): 36.6 (19 Dec 2022 20:21), Max: 38.2 (19 Dec 2022 06:23)  T(F): 97.8 (19 Dec 2022 20:21), Max: 100.7 (19 Dec 2022 06:23)  HR: 116 (19 Dec 2022 20:21) (97 - 116)  BP: 113/59 (19 Dec 2022 20:21) (102/65 - 141/57)  BP(mean): 73 (19 Dec 2022 20:21) (73 - 85)  RR: 16 (19 Dec 2022 20:21) (13 - 19)  SpO2: 94% (19 Dec 2022 20:21) (90% - 100%)    Parameters below as of 19 Dec 2022 20:21  Patient On (Oxygen Delivery Method): nasal cannula  O2 Flow (L/min): 2        General:  no acute distress  Neck: supple, trachea midline  Lungs: clear, no wheeze/rhonchi  Cardiovascular: regular rate and rhythm, S1 S2  Abdomen: soft, nontender, ND, bowel sounds normal  Neurological: MRDD  Skin: no rash  Extremities: right hip drsg c/d/i    Labs:                         8.1    10.61 )-----------( 166      ( 19 Dec 2022 06:00 )             24.2   12-19    143  |  108  |  8   ----------------------------<  122<H>  3.5   |  27  |  0.82    Ca    8.0<L>      19 Dec 2022 06:00    TPro  5.7<L>  /  Alb  2.1<L>  /  TBili  0.3  /  DBili  x   /  AST  44<H>  /  ALT  29  /  AlkPhos  64  12-19    Urinalysis (12.17.22 @ 07:56)    Glucose Qualitative, Urine: Negative    Blood, Urine: Moderate    pH Urine: 6.5    Color: Yellow    Urine Appearance: Clear    Bilirubin: Negative    Ketone - Urine: Small    Specific Gravity: 1.015    Protein, Urine: 30 mg/dL    Urobilinogen: Negative    Nitrite: Negative    Leukocyte Esterase Concentration: Trace      RECENT CULTURES:  Culture - Blood (12.16.22 @ 21:06)    Specimen Source: .Blood Blood    Culture Results:   No growth to date.      RADIOLOGY & ADDITIONAL STUDIES:    ACC: [id number] EXAM:  XR CHEST AP OR PA 1V                          PROCEDURE DATE:  12/16/2022          INTERPRETATION:  Short of breath.    AP chest a prior 6/28/2021.    Heart mediastinum exaggerated by AP film shallow inspiration. Grossly   clear lungs. Generative spondylosis dorsal spine. Clips right upper   quadrant. Old left rib fractures    IMPRESSION: Grossly clear lungs      ACC: [id number] EXAM:  CT CHEST                        ACC: [id number] EXAM:  CT ABDOMEN AND PELVIS                          PROCEDURE DATE:  12/16/2022          INTERPRETATION:  CLINICAL INFORMATION: Elevated lactate and leukocytosis.   Right hip fracture.    COMPARISON: None.    CONTRAST/COMPLICATIONS:  IV Contrast: NONE  Oral Contrast: NONE  Complications: None reported at time of study completion    PROCEDURE:  CT of the Chest, Abdomen and Pelvis was performed.  Sagittal and coronal reformats were performed.    FINDINGS:  CHEST:  LUNGS AND LARGE AIRWAYS: Patent central airways. No pulmonary nodules.   Minimal dependent atelectatic changes.  PLEURA: No pleural effusion.  VESSELS: Mild aortic and coronary artery calcifications.  HEART: Heart size is normal. No pericardial effusion.  MEDIASTINUM AND GREYSON: No lymphadenopathy.  CHEST WALL AND LOWER NECK: Within normal limits.    ABDOMEN AND PELVIS:  LIVER: Within normal limits.  BILE DUCTS: Normal caliber.  GALLBLADDER: Cholecystectomy.  SPLEEN: Within normal limits.  PANCREAS: Within normal limits.  ADRENALS: Within normal limits.  KIDNEYS/URETERS: 8 mm cortical calcification in the left renal midpole.   No hydronephrosis    BLADDER: Distended but otherwise unremarkable urinary bladder  REPRODUCTIVE ORGANS: Prostate within normal limits.    BOWEL: No bowel obstruction. Appendix is normal.  PERITONEUM: No ascites.  VESSELS: Mild atherosclerotic changes..  RETROPERITONEUM/LYMPH NODES: No lymphadenopathy.  ABDOMINAL WALL: Within normallimits.  BONES: Degenerative changes.  Several old bilateral rib fractures.  Comminuted and displaced right intertrochanteric fracture. Small   associated hematoma. Associated edema/hemorrhage in the adjacent   subcutaneous tissues.    IMPRESSION:    Comminuted and displaced right intertrochanteric fracture. Small   associated hematoma. Associated edema/hemorrhage in the adjacent   subcutaneous tissues.        Assessment :   66YO M PMH hypertension mental retardation and seizures resident of  [facility] home due to difficulty walking.  Found to have Comminuted and displaced right intertrochanteric fracture. Small associated hematoma. PT febrile Tmax 100.7 tachy and wbc 16K Also with elevated lactic. Pt is post op Open reduction and internal fixation (ORIF) of fracture of right hip using intramedullary nilda 18-Dec-2022  Likely SIRS sec Right hip fracture  Doubt septic  Cultures and imaging neg for infectious process      Plan :   Cont Cefazolin post op protocol  Dc Clinda  Trend temps and cbc  Fu cultures  Asp precautions   Pain control    Continue with present regiment .  Approptiate use of antibiotics and adverse effects reviewed.      > 45 minutes spent in direct patient care reviewing  the notes, lab data/ imaging , discussion with multidisciplinary team. All questions were addressed and answered to the best of my capacity .    Thank you for allowing me to participate in the care of your patient .      Olivier Daniels MD  Infectious Disease  [phone]
CARDIOLOGY CONSULT NOTE    Patient is a 67y Male with a known history of :    HPI:  67-year-old male with a past medical history of hypertension mental retardation and seizures brought in by ambulance from [facility] due to difficulty walking.  As per aide, patient was brought in from program in which they advised that he was not able to walk.  Reports the [facility] staff had to carry the patient off the bus.  No reports of any fall.  Denies fever, vomiting, known head injury, abdominal pain, chest pain, laceration, or any other complaints.    CT imaging showed  Head CT: No obvious acute intracranial hemorrhage or displaced skull   fracture. If clinically indicated, short-term follow-up or MRI may be   obtained for further evaluation.    Cervical spine CT: Osteopenia without obvious displaced fracture or   traumatic malalignment in the cervical spine. If there is clinical   suspicion for acute fracture or ligamentous/cord injury, MRI may be   obtained for further evaluation.    Comminuted and displaced right intertrochanteric fracture. Small   associated hematoma. Associated edema/hemorrhage in the adjacent   subcutaneous tissues.    Pt transferred from [place] to [place]   (17 Dec 2022 10:53)      REVIEW OF SYSTEMS:    CONSTITUTIONAL: No fever, weight loss, or fatigue  EYES: No eye pain, visual disturbances, or discharge  ENMT:  No difficulty hearing, tinnitus, vertigo; No sinus or throat pain  NECK: No pain or stiffness  BREASTS: No pain, masses, or nipple discharge  RESPIRATORY: No cough, wheezing, chills or hemoptysis; No shortness of breath  CARDIOVASCULAR: No chest pain, palpitations, dizziness, or leg swelling  GASTROINTESTINAL: No abdominal or epigastric pain. No nausea, vomiting, or hematemesis; No diarrhea or constipation. No melena or hematochezia.  GENITOURINARY: No dysuria, frequency, hematuria, or incontinence  NEUROLOGICAL: No headaches, memory loss, loss of strength, numbness, or tremors  SKIN: No itching, burning, rashes, or lesions   LYMPH NODES: No enlarged glands  ENDOCRINE: No heat or cold intolerance; No hair loss  MUSCULOSKELETAL: No joint pain or swelling; No muscle, back, or extremity pain  PSYCHIATRIC: No depression, anxiety, mood swings, or difficulty sleeping  HEME/LYMPH: No easy bruising, or bleeding gums  ALLERGY AND IMMUNOLOGIC: No hives or eczema    MEDICATIONS  (STANDING):  acetaminophen     Tablet .. 1000 milliGRAM(s) Oral every 8 hours  amLODIPine   Tablet 2.5 milliGRAM(s) Oral daily  aspirin enteric coated 81 milliGRAM(s) Oral daily  chlorhexidine 0.12% Liquid 15 milliLiter(s) Oral Mucosa two times a day  clonazePAM  Tablet 0.5 milliGRAM(s) Oral two times a day  divalproex  milliGRAM(s) Oral <User Schedule>  enoxaparin Injectable 40 milliGRAM(s) SubCutaneous every 24 hours  fluvoxaMINE 100 milliGRAM(s) Oral two times a day  lactated ringers. 1000 milliLiter(s) (100 mL/Hr) IV Continuous <Continuous>  lactated ringers. 1000 milliLiter(s) (100 mL/Hr) IV Continuous <Continuous>  levothyroxine 75 MICROGram(s) Oral daily  metoprolol tartrate 50 milliGRAM(s) Oral two times a day  OLANZapine 15 milliGRAM(s) Oral at bedtime  pantoprazole    Tablet 40 milliGRAM(s) Oral before breakfast  tamsulosin 0.4 milliGRAM(s) Oral at bedtime    MEDICATIONS  (PRN):  HYDROmorphone  Injectable 0.5 milliGRAM(s) IV Push every 3 hours PRN for breakthrough pain  magnesium hydroxide Suspension 30 milliLiter(s) Oral daily PRN Constipation  ondansetron Injectable 4 milliGRAM(s) IV Push every 6 hours PRN Nausea and/or Vomiting  oxyCODONE    IR 5 milliGRAM(s) Oral every 3 hours PRN Moderate Pain (4 - 6)  oxyCODONE    IR 10 milliGRAM(s) Oral every 3 hours PRN Severe Pain (7 - 10)      ALLERGIES: Augmentin (Unknown)  aztreonam (Rash)  penicillin (Unknown)  sulfa drugs (Unknown)      FAMILY HISTORY:  No pertinent family history in first degree relatives        Social History:  Alochol:   Smoking:   Drug Use:   Marital Status:     I&O's Detail      PHYSICAL EXAMINATION:  -----------------------------  T(C): 37.2 (12-24-22 @ 05:33), Max: 37.2 (12-24-22 @ 05:33)  HR: 117 (12-24-22 @ 05:33) (105 - 117)  BP: 164/75 (12-24-22 @ 05:33) (128/68 - 164/75)  RR: 19 (12-24-22 @ 05:33) (16 - 19)  SpO2: 92% (12-24-22 @ 05:33) (92% - 94%)  Wt(kg): --        Constitutional: well developed, normal appearance, well groomed, well nourished, no deformities and no acute distress.   Eyes: the conjunctiva exhibited no abnormalities and the eyelids demonstrated no xanthelasmas.   HEENT: normal oral mucosa, no oral pallor and no oral cyanosis.   Neck: normal jugular venous A waves present, normal jugular venous V waves present and no jugular venous reyes A waves.   Pulmonary: no respiratory distress, normal respiratory rhythm and effort, no accessory muscle use and lungs were clear to auscultation bilaterally. Anteriorly  Cardiovascular: heart rate and rhythm were normal, normal S1 and S2 and no murmur, gallop, rub, heave or thrill are present.    Musculoskeletal: the gait could not be assessed.   Extremities: no clubbing of the fingernails, no localized cyanosis, no petechial hemorrhages and no ischemic changes.   Skin: normal skin color and pigmentation, no rash, no venous stasis, no skin lesions, no skin ulcer and no xanthoma was observed.       LABS:   --------  12-24    141  |  106  |  9   ----------------------------<  111<H>  4.2   |  26  |  0.55    Ca    8.7      24 Dec 2022 06:00                           10.8   10.75 )-----------( 309      ( 24 Dec 2022 06:00 )             32.6                   RADIOLOGY:  -----------------    < from: TTE Echo Complete w/o Contrast w/ Doppler (04.23.21 @ 09:35) >     EXAM:  ECHO TTE WO CON COMP W DOPP         PROCEDURE DATE:  04/23/2021        INTERPRETATION:  Ordering Physician: TRUONG KEBEDE [id number]    Indication: Abnormal EKG    Study Quality: Technically difficult study, patient nonverbal, uncooperative  A complete echocardiographic study was performed utilizing standard protocol including spectral and color Doppler in all echocardiographic windows.    Height: 165 cm  Weight: 69 kg  BSA:  Blood Pressure: 120/70    MEASUREMENTS  IVS: 0.9cm  PWT: 9cm  LA: 2cm  AO: 3.5cm  AV Cusp Separation: cm  LVIDd: 4.5cm  LVIDs: 3.cm  LVOT: cm    LVEF: 55%  RVSP: mmHg    FINDINGS  Left Ventricle: Normal systolic function  Aortic Valve: Normal  Mitral Valve: Normal  Tricuspid Valve: Trace TR  Pulmonic Valve: Normal  Left Atrium: Normal  Right Ventricle: Normal  Right Atrium: Normal  Diastolic Function: Normal LVEDP by tissue Doppler index  Pericardium/Pleura: Normal pericardium      Impression:  Normal LV systolic function, no significant valve disease, normal LVEDP by tissue Doppler index which suggests patient is not volume overload                SUDEEP DEGROOT MD; Attending Cardiologist  This document has been electronically signed. Apr 23 2021  9:34AM    < end of copied text >      ECG: NSR, LAD, Tall R-wave V2-V4, no acute changes

## 2022-12-24 NOTE — PROGRESS NOTE ADULT - ASSESSMENT
67-year-old male with a past medical history of hypertension mental retardation and seizures admitted for Right intertrochanteric fracture, elevated Lactate, Leukocytosis, tachycardia    #Right intertrochanteric fracture  s/p Right hip repair  s/p transfusion and hgb stable    #Mild leukocytosis  ID following  cxr repeat 12/23 - no infiltrate, bladder scan q8  UA not suggestive of UTI  monitor off abx    #HTN/Tachycardia  continue BP meds with hold parameters  -f/up cardio recs   -EKG ordered    #Seizures  is on depakote, changed to [facility] b/c depakote cannot be crushed    #DVT proph  Lovenox    dispo: pending clearance from cardio/ID.

## 2022-12-24 NOTE — PROGRESS NOTE ADULT - SUBJECTIVE AND OBJECTIVE BOX
Patient is a 67y old  Male who presents with a chief complaint of fall-- for ORIF right hip fx (24 Dec 2022 08:49)      INTERVAL History of Present Illness/OVERNIGHT EVENTS: cardio consulted for tachycardia - await recs  pt unable to provide history due to MR/DD status    MEDICATIONS  (STANDING):  acetaminophen     Tablet .. 1000 milliGRAM(s) Oral every 8 hours  amLODIPine   Tablet 2.5 milliGRAM(s) Oral daily  aspirin enteric coated 81 milliGRAM(s) Oral daily  chlorhexidine 0.12% Liquid 15 milliLiter(s) Oral Mucosa two times a day  clonazePAM  Tablet 0.5 milliGRAM(s) Oral two times a day  divalproex  milliGRAM(s) Oral <User Schedule>  enoxaparin Injectable 40 milliGRAM(s) SubCutaneous every 24 hours  fluvoxaMINE 100 milliGRAM(s) Oral two times a day  lactated ringers. 1000 milliLiter(s) (100 mL/Hr) IV Continuous <Continuous>  lactated ringers. 1000 milliLiter(s) (100 mL/Hr) IV Continuous <Continuous>  levothyroxine 75 MICROGram(s) Oral daily  metoprolol tartrate 25 milliGRAM(s) Oral two times a day  OLANZapine 15 milliGRAM(s) Oral at bedtime  pantoprazole    Tablet 40 milliGRAM(s) Oral before breakfast  tamsulosin 0.4 milliGRAM(s) Oral at bedtime    MEDICATIONS  (PRN):  HYDROmorphone  Injectable 0.5 milliGRAM(s) IV Push every 3 hours PRN for breakthrough pain  magnesium hydroxide Suspension 30 milliLiter(s) Oral daily PRN Constipation  ondansetron Injectable 4 milliGRAM(s) IV Push every 6 hours PRN Nausea and/or Vomiting  oxyCODONE    IR 5 milliGRAM(s) Oral every 3 hours PRN Moderate Pain (4 - 6)  oxyCODONE    IR 10 milliGRAM(s) Oral every 3 hours PRN Severe Pain (7 - 10)      Allergies    Augmentin (Unknown)  aztreonam (Rash)  penicillin (Unknown)  sulfa drugs (Unknown)    Intolerances        REVIEW OF SYSTEMS:  Other systems currently negative unless otherwise specified above.    Vital Signs Last 24 Hrs  T(C): 37.2 (24 Dec 2022 05:33), Max: 37.2 (24 Dec 2022 05:33)  T(F): 98.9 (24 Dec 2022 05:33), Max: 98.9 (24 Dec 2022 05:33)  HR: 117 (24 Dec 2022 05:33) (105 - 117)  BP: 164/75 (24 Dec 2022 05:33) (128/68 - 164/75)  BP(mean): --  RR: 19 (24 Dec 2022 05:33) (16 - 19)  SpO2: 92% (24 Dec 2022 05:33) (92% - 94%)    Parameters below as of 24 Dec 2022 05:33  Patient On (Oxygen Delivery Method): room air            PHYSICAL EXAM:  GENERAL: No apparent distress, appears stated age  EYES: Conjunctiva and sclera clear, no discharge  ENMT: Moist mucous membranes, no nasal discharge  CHEST/LUNG: Clear to auscultation bilaterally, no wheeze or rales  HEART: Regular rhythm, no rubs or gallops  ABDOMEN: Soft, Nontender, Nondistended  EXTREMITIES: Right hip surgical site [facility]        LABS:                        10.8   10.75 )-----------( 309      ( 24 Dec 2022 06:00 )             32.6     24 Dec 2022 06:00    141    |  106    |  9      ----------------------------<  111    4.2     |  26     |  0.55     Ca    8.7        24 Dec 2022 06:00          CAPILLARY BLOOD GLUCOSE            RADIOLOGY & ADDITIONAL TESTS:      Images reviewed personally    Consultant Notes Reviewed and Care Discussed with relevant Consultants.

## 2022-12-24 NOTE — PROGRESS NOTE ADULT - SUBJECTIVE AND OBJECTIVE BOX
[facility], Division of Infectious Diseases  YARON Donovan Y. Patel, S. Shah, [facility]  [phone]    Name: ROSELIA NARAYAN  Age: 67y  Gender: Male  MRN: [id number]    Interval History:  Patient seen and examined at bedside  No acute overnight events. Afebrile  Notes reviewed    Antibiotics:      Medications:  acetaminophen     Tablet .. 1000 milliGRAM(s) Oral every 8 hours  amLODIPine   Tablet 2.5 milliGRAM(s) Oral daily  aspirin enteric coated 81 milliGRAM(s) Oral daily  chlorhexidine 0.12% Liquid 15 milliLiter(s) Oral Mucosa two times a day  clonazePAM  Tablet 0.5 milliGRAM(s) Oral two times a day  divalproex  milliGRAM(s) Oral <User Schedule>  enoxaparin Injectable 40 milliGRAM(s) SubCutaneous every 24 hours  fluvoxaMINE 100 milliGRAM(s) Oral two times a day  HYDROmorphone  Injectable 0.5 milliGRAM(s) IV Push every 3 hours PRN  lactated ringers. 1000 milliLiter(s) IV Continuous <Continuous>  lactated ringers. 1000 milliLiter(s) IV Continuous <Continuous>  levothyroxine 75 MICROGram(s) Oral daily  magnesium hydroxide Suspension 30 milliLiter(s) Oral daily PRN  metoprolol tartrate 50 milliGRAM(s) Oral two times a day  OLANZapine 15 milliGRAM(s) Oral at bedtime  ondansetron Injectable 4 milliGRAM(s) IV Push every 6 hours PRN  oxyCODONE    IR 5 milliGRAM(s) Oral every 3 hours PRN  oxyCODONE    IR 10 milliGRAM(s) Oral every 3 hours PRN  pantoprazole    Tablet 40 milliGRAM(s) Oral before breakfast  tamsulosin 0.4 milliGRAM(s) Oral at bedtime      Review of Systems:  unable to obtain    Allergies: Augmentin (Unknown)  aztreonam (Rash)  penicillin (Unknown)  sulfa drugs (Unknown)    For details regarding the patient's past medical history, social history, family history, and other miscellaneous elements, please refer the initial infectious diseases consultation and/or the admitting history and physical examination for this admission.    Objective:  Vitals:   T(C): 36.8 (12-24-22 @ 11:00), Max: 37.2 (12-24-22 @ 05:33)  HR: 99 (12-24-22 @ 11:00) (99 - 117)  BP: 120/69 (12-24-22 @ 11:00) (120/69 - 164/75)  RR: 18 (12-24-22 @ 11:00) (18 - 19)  SpO2: 94% (12-24-22 @ 11:00) (92% - 94%)    Physical Examination:  General:  no acute distress  Neck: supple, trachea midline  Lungs: clear, no wheeze/rhonchi  Cardiovascular: regular rate and rhythm, S1 S2  Abdomen: soft, nontender,  bowel sounds normal  Neurological: alert and oriented x3  Skin: no rash  Extremities: right hip drsg c/d/i      Laboratory Studies:  CBC:                       10.8   10.75 )-----------( 309      ( 24 Dec 2022 06:00 )             32.6     CMP: 12-24    141  |  106  |  9   ----------------------------<  111<H>  4.2   |  26  |  0.55    Ca    8.7      24 Dec 2022 06:00            Microbiology: reviewed    Culture - Urine (collected 12-21-22 @ 14:51)  Source: Clean Catch Clean Catch (Midstream)  Final Report (12-23-22 @ 07:33):    No growth    Culture - Blood (collected 12-21-22 @ 12:28)  Source: .Blood Blood  Preliminary Report (12-22-22 @ 16:02):    No growth to date.    Culture - Blood (collected 12-21-22 @ 12:28)  Source: .Blood Blood  Preliminary Report (12-22-22 @ 16:02):    No growth to date.    Culture - Blood (collected 12-16-22 @ 21:06)  Source: .Blood Blood  Final Report (12-22-22 @ 01:00):    No Growth Final    Culture - Blood (collected 12-16-22 @ 21:00)  Source: .Blood Blood  Final Report (12-22-22 @ 01:00):    No Growth Final          Radiology: reviewed

## 2022-12-25 ENCOUNTER — TRANSCRIPTION ENCOUNTER (OUTPATIENT)
Age: 67
End: 2022-12-25

## 2022-12-25 LAB
MAGNESIUM SERPL-MCNC: 1.9 MG/DL — SIGNIFICANT CHANGE UP (ref 1.6–2.6)
TSH SERPL-MCNC: 3.37 UIU/ML — SIGNIFICANT CHANGE UP (ref 0.27–4.2)

## 2022-12-25 PROCEDURE — 99232 SBSQ HOSP IP/OBS MODERATE 35: CPT | Mod: GC

## 2022-12-25 RX ORDER — OXYCODONE HYDROCHLORIDE 5 MG/1
1 TABLET ORAL
Qty: 0 | Refills: 0 | DISCHARGE
Start: 2022-12-25

## 2022-12-25 RX ORDER — ENOXAPARIN SODIUM 100 MG/ML
40 INJECTION SUBCUTANEOUS
Qty: 10 | Refills: 0
Start: 2022-12-25

## 2022-12-25 RX ORDER — CHLORHEXIDINE GLUCONATE 213 G/1000ML
0 SOLUTION TOPICAL
Qty: 0 | Refills: 0 | DISCHARGE

## 2022-12-25 RX ADMIN — Medication 1000 MILLIGRAM(S): at 21:16

## 2022-12-25 RX ADMIN — PANTOPRAZOLE SODIUM 40 MILLIGRAM(S): 20 TABLET, DELAYED RELEASE ORAL at 06:34

## 2022-12-25 RX ADMIN — Medication 1000 MILLIGRAM(S): at 21:36

## 2022-12-25 RX ADMIN — OLANZAPINE 15 MILLIGRAM(S): 15 TABLET, FILM COATED ORAL at 21:16

## 2022-12-25 RX ADMIN — Medication 50 MILLIGRAM(S): at 06:34

## 2022-12-25 RX ADMIN — CHLORHEXIDINE GLUCONATE 15 MILLILITER(S): 213 SOLUTION TOPICAL at 06:29

## 2022-12-25 RX ADMIN — Medication 1000 MILLIGRAM(S): at 06:35

## 2022-12-25 RX ADMIN — Medication 1000 MILLIGRAM(S): at 14:06

## 2022-12-25 RX ADMIN — Medication 0.5 MILLIGRAM(S): at 06:34

## 2022-12-25 RX ADMIN — FLUVOXAMINE MALEATE 100 MILLIGRAM(S): 25 TABLET ORAL at 06:35

## 2022-12-25 RX ADMIN — AMLODIPINE BESYLATE 2.5 MILLIGRAM(S): 2.5 TABLET ORAL at 06:29

## 2022-12-25 RX ADMIN — DIVALPROEX SODIUM 250 MILLIGRAM(S): 500 TABLET, DELAYED RELEASE ORAL at 21:16

## 2022-12-25 RX ADMIN — FLUVOXAMINE MALEATE 100 MILLIGRAM(S): 25 TABLET ORAL at 20:11

## 2022-12-25 RX ADMIN — Medication 50 MILLIGRAM(S): at 20:11

## 2022-12-25 RX ADMIN — DIVALPROEX SODIUM 250 MILLIGRAM(S): 500 TABLET, DELAYED RELEASE ORAL at 14:07

## 2022-12-25 RX ADMIN — TAMSULOSIN HYDROCHLORIDE 0.4 MILLIGRAM(S): 0.4 CAPSULE ORAL at 21:16

## 2022-12-25 RX ADMIN — OXYCODONE HYDROCHLORIDE 5 MILLIGRAM(S): 5 TABLET ORAL at 21:45

## 2022-12-25 RX ADMIN — Medication 75 MICROGRAM(S): at 07:03

## 2022-12-25 RX ADMIN — Medication 81 MILLIGRAM(S): at 06:34

## 2022-12-25 RX ADMIN — Medication 1000 MILLIGRAM(S): at 07:03

## 2022-12-25 RX ADMIN — Medication 0.5 MILLIGRAM(S): at 20:11

## 2022-12-25 RX ADMIN — ENOXAPARIN SODIUM 40 MILLIGRAM(S): 100 INJECTION SUBCUTANEOUS at 09:00

## 2022-12-25 RX ADMIN — DIVALPROEX SODIUM 250 MILLIGRAM(S): 500 TABLET, DELAYED RELEASE ORAL at 06:28

## 2022-12-25 RX ADMIN — OXYCODONE HYDROCHLORIDE 5 MILLIGRAM(S): 5 TABLET ORAL at 21:15

## 2022-12-25 RX ADMIN — SODIUM CHLORIDE 100 MILLILITER(S): 9 INJECTION, SOLUTION INTRAVENOUS at 06:36

## 2022-12-25 RX ADMIN — CHLORHEXIDINE GLUCONATE 15 MILLILITER(S): 213 SOLUTION TOPICAL at 20:10

## 2022-12-25 NOTE — PROVIDER CONTACT NOTE (OTHER) - ASSESSMENT
pt asymptomatic sleeping bp 131/84 hr 133
hr 160 bp 145/87. house md aware. pt c/o pain due to positioning

## 2022-12-25 NOTE — DISCHARGE NOTE NURSING/CASE MANAGEMENT/SOCIAL WORK - NSDCPEFALRISK_GEN_ALL_CORE
For information on Fall & Injury Prevention, visit: [email or web address] OR  [email or web address] OR  https://www.cdc.gov/steadi/patient.html

## 2022-12-25 NOTE — PROGRESS NOTE ADULT - ASSESSMENT
67-year-old male with a past medical history of hypertension mental retardation and seizures admitted for Right intertrochanteric fracture, elevated Lactate, Leukocytosis, tachycardia    #Right intertrochanteric fracture  s/p Right hip repair  s/p transfusion and hgb stable  pt/ot, pain control     #Mild leukocytosis  ID following  cxr repeat 12/23 - no infiltrate, bladder scan q8  UA not suggestive of UTI  monitor off abx    #HTN/Tachycardia  continue BP meds with hold parameters  -f/up cardio recs   -EKG ordered    #Seizures  is on depakote, changed to [facility] b/c depakote cannot be crushed    #DVT proph  Lovenox    dispo: pending clearance from cardio/ID.

## 2022-12-25 NOTE — PROGRESS NOTE ADULT - SUBJECTIVE AND OBJECTIVE BOX
Patient is a 67y old  Male who presents with a chief complaint of right femur fracture ORIF (24 Dec 2022 10:28)        HPI:  67-year-old male with a past medical history of hypertension mental retardation and seizures brought in by ambulance from [facility] due to difficulty walking.  As per aide, patient was brought in from program in which they advised that he was not able to walk.  Reports the [facility] staff had to carry the patient off the bus.  No reports of any fall.  Denies fever, vomiting, known head injury, abdominal pain, chest pain, laceration, or any other complaints.    CT imaging showed  Head CT: No obvious acute intracranial hemorrhage or displaced skull   fracture. If clinically indicated, short-term follow-up or MRI may be   obtained for further evaluation.    Cervical spine CT: Osteopenia without obvious displaced fracture or   traumatic malalignment in the cervical spine. If there is clinical   suspicion for acute fracture or ligamentous/cord injury, MRI may be   obtained for further evaluation.    Comminuted and displaced right intertrochanteric fracture. Small   associated hematoma. Associated edema/hemorrhage in the adjacent   subcutaneous tissues.    Pt transferred from [place] to [place]   (17 Dec 2022 10:53)      SUBJECTIVE & OBJECTIVE: Pt seen and examined at bedside. nad    PHYSICAL EXAM:  T(C): 37.2 (12-25-22 @ 09:34), Max: 37.3 (12-24-22 @ 21:16)  HR: 80 (12-25-22 @ 09:34) (80 - 94)  BP: 110/60 (12-25-22 @ 09:34) (110/60 - 133/68)  RR: 16 (12-25-22 @ 09:34) (16 - 18)  SpO2: 96% (12-25-22 @ 09:34) (93% - 96%)  Wt(kg): --   GENERAL: NAD, well-groomed, well-developed  HEAD:  Atraumatic, Normocephalic  EYES: EOMI, PERRLA, conjunctiva and sclera clear  ENMT: Moist mucous membranes  NECK: Supple, No JVD  NERVOUS SYSTEM:  Alert & Oriented X3, Motor Strength 5/5 B/L upper and lower extremities; DTRs 2+ intact and symmetric  CHEST/LUNG: Clear to auscultation bilaterally; No rales, rhonchi, wheezing, or rubs  HEART: Regular rate and rhythm; No murmurs, rubs, or gallops  ABDOMEN: Soft, Nontender, Nondistended; Bowel sounds present  EXTREMITIES:  2+ Peripheral Pulses, No clubbing, cyanosis, or edema        MEDICATIONS  (STANDING):  acetaminophen     Tablet .. 1000 milliGRAM(s) Oral every 8 hours  amLODIPine   Tablet 2.5 milliGRAM(s) Oral daily  aspirin enteric coated 81 milliGRAM(s) Oral daily  chlorhexidine 0.12% Liquid 15 milliLiter(s) Oral Mucosa two times a day  clonazePAM  Tablet 0.5 milliGRAM(s) Oral two times a day  divalproex  milliGRAM(s) Oral <User Schedule>  enoxaparin Injectable 40 milliGRAM(s) SubCutaneous every 24 hours  fluvoxaMINE 100 milliGRAM(s) Oral two times a day  lactated ringers. 1000 milliLiter(s) (100 mL/Hr) IV Continuous <Continuous>  lactated ringers. 1000 milliLiter(s) (100 mL/Hr) IV Continuous <Continuous>  levothyroxine 75 MICROGram(s) Oral daily  metoprolol tartrate 50 milliGRAM(s) Oral two times a day  OLANZapine 15 milliGRAM(s) Oral at bedtime  pantoprazole    Tablet 40 milliGRAM(s) Oral before breakfast  tamsulosin 0.4 milliGRAM(s) Oral at bedtime    MEDICATIONS  (PRN):  HYDROmorphone  Injectable 0.5 milliGRAM(s) IV Push every 3 hours PRN for breakthrough pain  magnesium hydroxide Suspension 30 milliLiter(s) Oral daily PRN Constipation  ondansetron Injectable 4 milliGRAM(s) IV Push every 6 hours PRN Nausea and/or Vomiting  oxyCODONE    IR 5 milliGRAM(s) Oral every 3 hours PRN Moderate Pain (4 - 6)  oxyCODONE    IR 10 milliGRAM(s) Oral every 3 hours PRN Severe Pain (7 - 10)      LABS:                        10.8   10.75 )-----------( 309      ( 24 Dec 2022 06:00 )             32.6     12-24    141  |  106  |  9   ----------------------------<  111<H>  4.2   |  26  |  0.55    Ca    8.7      24 Dec 2022 06:00            CAPILLARY BLOOD GLUCOSE          CAPILLARY BLOOD GLUCOSE        CAPILLARY BLOOD GLUCOSE                RECENT CULTURES:      RADIOLOGY & ADDITIONAL TESTS:                        DVT/GI ppx  Discussed with pt @ bedside

## 2022-12-25 NOTE — PROGRESS NOTE ADULT - SUBJECTIVE AND OBJECTIVE BOX
Patient is a 67y Male with a known history of :    HPI:  67-year-old male with a past medical history of hypertension mental retardation and seizures brought in by ambulance from [facility] due to difficulty walking.  As per aide, patient was brought in from program in which they advised that he was not able to walk.  Reports the [facility] staff had to carry the patient off the bus.  No reports of any fall.  Denies fever, vomiting, known head injury, abdominal pain, chest pain, laceration, or any other complaints.    CT imaging showed  Head CT: No obvious acute intracranial hemorrhage or displaced skull   fracture. If clinically indicated, short-term follow-up or MRI may be   obtained for further evaluation.    Cervical spine CT: Osteopenia without obvious displaced fracture or   traumatic malalignment in the cervical spine. If there is clinical   suspicion for acute fracture or ligamentous/cord injury, MRI may be   obtained for further evaluation.    Comminuted and displaced right intertrochanteric fracture. Small   associated hematoma. Associated edema/hemorrhage in the adjacent   subcutaneous tissues.    Pt transferred from [place] to [place]   (17 Dec 2022 10:53)      REVIEW OF SYSTEMS:    CONSTITUTIONAL: No fever, weight loss, or fatigue  EYES: No eye pain, visual disturbances, or discharge  ENMT:  No difficulty hearing, tinnitus, vertigo; No sinus or throat pain  NECK: No pain or stiffness  BREASTS: No pain, masses, or nipple discharge  RESPIRATORY: No cough, wheezing, chills or hemoptysis; No shortness of breath  CARDIOVASCULAR: No chest pain, palpitations, dizziness, or leg swelling  GASTROINTESTINAL: No abdominal or epigastric pain. No nausea, vomiting, or hematemesis; No diarrhea or constipation. No melena or hematochezia.  GENITOURINARY: No dysuria, frequency, hematuria, or incontinence  NEUROLOGICAL: No headaches, memory loss, loss of strength, numbness, or tremors  SKIN: No itching, burning, rashes, or lesions   LYMPH NODES: No enlarged glands  ENDOCRINE: No heat or cold intolerance; No hair loss  MUSCULOSKELETAL: No joint pain or swelling; No muscle, back, or extremity pain  PSYCHIATRIC: No depression, anxiety, mood swings, or difficulty sleeping  HEME/LYMPH: No easy bruising, or bleeding gums  ALLERGY AND IMMUNOLOGIC: No hives or eczema    MEDICATIONS  (STANDING):  acetaminophen     Tablet .. 1000 milliGRAM(s) Oral every 8 hours  amLODIPine   Tablet 2.5 milliGRAM(s) Oral daily  aspirin enteric coated 81 milliGRAM(s) Oral daily  chlorhexidine 0.12% Liquid 15 milliLiter(s) Oral Mucosa two times a day  clonazePAM  Tablet 0.5 milliGRAM(s) Oral two times a day  divalproex  milliGRAM(s) Oral <User Schedule>  enoxaparin Injectable 40 milliGRAM(s) SubCutaneous every 24 hours  fluvoxaMINE 100 milliGRAM(s) Oral two times a day  lactated ringers. 1000 milliLiter(s) (100 mL/Hr) IV Continuous <Continuous>  lactated ringers. 1000 milliLiter(s) (100 mL/Hr) IV Continuous <Continuous>  levothyroxine 75 MICROGram(s) Oral daily  metoprolol tartrate 50 milliGRAM(s) Oral two times a day  OLANZapine 15 milliGRAM(s) Oral at bedtime  pantoprazole    Tablet 40 milliGRAM(s) Oral before breakfast  tamsulosin 0.4 milliGRAM(s) Oral at bedtime    MEDICATIONS  (PRN):  HYDROmorphone  Injectable 0.5 milliGRAM(s) IV Push every 3 hours PRN for breakthrough pain  magnesium hydroxide Suspension 30 milliLiter(s) Oral daily PRN Constipation  ondansetron Injectable 4 milliGRAM(s) IV Push every 6 hours PRN Nausea and/or Vomiting  oxyCODONE    IR 5 milliGRAM(s) Oral every 3 hours PRN Moderate Pain (4 - 6)  oxyCODONE    IR 10 milliGRAM(s) Oral every 3 hours PRN Severe Pain (7 - 10)      ALLERGIES: Augmentin (Unknown)  aztreonam (Rash)  penicillin (Unknown)  sulfa drugs (Unknown)      FAMILY HISTORY:  No pertinent family history in first degree relatives        Social history:  Alochol:   Smoking:   Drug Use:   Marital Status:     PHYSICAL EXAMINATION:  -----------------------------  T(C): 37.2 (12-25-22 @ 09:34), Max: 37.3 (12-24-22 @ 21:16)  HR: 80 (12-25-22 @ 09:34) (80 - 94)  BP: 110/60 (12-25-22 @ 09:34) (110/60 - 133/68)  RR: 16 (12-25-22 @ 09:34) (16 - 18)  SpO2: 96% (12-25-22 @ 09:34) (93% - 96%)  Wt(kg): --    12-24 @ 07:01  -  12-25 @ 07:00  --------------------------------------------------------  IN:    Lactated Ringers: 1000 mL  Total IN: 1000 mL    OUT:  Total OUT: 0 mL    Total NET: 1000 mL            Constitutional: well developed, normal appearance, well groomed, well nourished, no deformities and no acute distress.   Eyes: the conjunctiva exhibited no abnormalities and the eyelids demonstrated no xanthelasmas.   HEENT: normal oral mucosa, no oral pallor and no oral cyanosis.   Neck: normal jugular venous A waves present, normal jugular venous V waves present and no jugular venous reyes A waves.   Pulmonary: no respiratory distress, normal respiratory rhythm and effort, no accessory muscle use and lungs were clear to auscultation bilaterally. Anteriorly  Cardiovascular: heart rate and rhythm were normal, normal S1 and S2 and no murmur, gallop, rub, heave or thrill are present.    Musculoskeletal: the gait could not be assessed.   Extremities: no clubbing of the fingernails, no localized cyanosis, no petechial hemorrhages and no ischemic changes.   Skin: normal skin color and pigmentation, no rash, no venous stasis, no skin lesions, no skin ulcer and no xanthoma was observed.       LABS:   --------  12-24    141  |  106  |  9   ----------------------------<  111<H>  4.2   |  26  |  0.55    Ca    8.7      24 Dec 2022 06:00                           10.8   10.75 )-----------( 309      ( 24 Dec 2022 06:00 )             32.6                   Radiology:

## 2022-12-25 NOTE — DISCHARGE NOTE NURSING/CASE MANAGEMENT/SOCIAL WORK - NSDCVIVACCINE_GEN_ALL_CORE_FT
Tdap; 18-Oct-2020 22:33; Karissa Blanco (RN); [employer]; [id number] (Exp. Date: 21-Jul-2022); IntraMuscular; Deltoid Right.; 0.5 milliLiter(s); VIS (VIS Published: 09-May-2013, VIS Presented: 18-Oct-2020);   Tdap; 21-Apr-2021 22:37; Farhana Cardoso (KEVYN); [employer]; [id number] (Exp. Date: 10-Sep-2022); IntraMuscular; Deltoid Left.; 0.5 milliLiter(s); VIS (VIS Published: 09-May-2013, VIS Presented: 21-Apr-2021);

## 2022-12-25 NOTE — DISCHARGE NOTE NURSING/CASE MANAGEMENT/SOCIAL WORK - PATIENT PORTAL LINK FT
You can access the FollowMyHealth Patient Portal offered by [facility] by registering at the following website: [email or web address]. By joining [employer]’s FollowMyHealth portal, you will also be able to view your health information using other applications (apps) compatible with our system.

## 2022-12-25 NOTE — PROGRESS NOTE ADULT - ASSESSMENT
66y/o seen at [facility] telemetry. From [facility] home  Lying flat, awake and alert comfortably  Patient non-verbal, therefore history from chart  History HTN, mental retardation, seizures    12/19/22 admitted due to difficulty walking  S/P right hip surgery  Today at 5:55 am patient was noted to have increased heart rate to about 128/minute  This appeared to be sinus tachycardia  Presently heart rate about 90/minute  Also upon admission patient was tachycardic with mild fever and fractured hip    12/25/22  Seen at [facility] telemetry  Lying flat, comfortably  Awake and alert    Plan:  - Continue Metoprolol with parameters and titrate as needed  - Continue Amlodipine-2.5mg OD  - Lovenox  - 12/21/22 normal lower extremity venous doppler study  - Grossly normal Echocardiogram 4/23/21  - Pain control  - Post-op care  - Being followed by Orthopedics, ID  - Discharge planning

## 2022-12-26 LAB
CULTURE RESULTS: SIGNIFICANT CHANGE UP
CULTURE RESULTS: SIGNIFICANT CHANGE UP
SARS-COV-2 RNA SPEC QL NAA+PROBE: SIGNIFICANT CHANGE UP
SPECIMEN SOURCE: SIGNIFICANT CHANGE UP
SPECIMEN SOURCE: SIGNIFICANT CHANGE UP

## 2022-12-26 PROCEDURE — 99232 SBSQ HOSP IP/OBS MODERATE 35: CPT

## 2022-12-26 RX ADMIN — OXYCODONE HYDROCHLORIDE 10 MILLIGRAM(S): 5 TABLET ORAL at 22:34

## 2022-12-26 RX ADMIN — CHLORHEXIDINE GLUCONATE 15 MILLILITER(S): 213 SOLUTION TOPICAL at 05:24

## 2022-12-26 RX ADMIN — AMLODIPINE BESYLATE 2.5 MILLIGRAM(S): 2.5 TABLET ORAL at 05:25

## 2022-12-26 RX ADMIN — Medication 1000 MILLIGRAM(S): at 21:27

## 2022-12-26 RX ADMIN — Medication 50 MILLIGRAM(S): at 05:25

## 2022-12-26 RX ADMIN — OLANZAPINE 15 MILLIGRAM(S): 15 TABLET, FILM COATED ORAL at 22:47

## 2022-12-26 RX ADMIN — Medication 0.5 MILLIGRAM(S): at 05:24

## 2022-12-26 RX ADMIN — PANTOPRAZOLE SODIUM 40 MILLIGRAM(S): 20 TABLET, DELAYED RELEASE ORAL at 05:28

## 2022-12-26 RX ADMIN — TAMSULOSIN HYDROCHLORIDE 0.4 MILLIGRAM(S): 0.4 CAPSULE ORAL at 21:28

## 2022-12-26 RX ADMIN — Medication 1000 MILLIGRAM(S): at 05:39

## 2022-12-26 RX ADMIN — DIVALPROEX SODIUM 250 MILLIGRAM(S): 500 TABLET, DELAYED RELEASE ORAL at 21:27

## 2022-12-26 RX ADMIN — DIVALPROEX SODIUM 250 MILLIGRAM(S): 500 TABLET, DELAYED RELEASE ORAL at 05:25

## 2022-12-26 RX ADMIN — OXYCODONE HYDROCHLORIDE 5 MILLIGRAM(S): 5 TABLET ORAL at 08:29

## 2022-12-26 RX ADMIN — FLUVOXAMINE MALEATE 100 MILLIGRAM(S): 25 TABLET ORAL at 05:25

## 2022-12-26 RX ADMIN — Medication 1000 MILLIGRAM(S): at 05:25

## 2022-12-26 RX ADMIN — DIVALPROEX SODIUM 250 MILLIGRAM(S): 500 TABLET, DELAYED RELEASE ORAL at 13:24

## 2022-12-26 RX ADMIN — Medication 81 MILLIGRAM(S): at 05:24

## 2022-12-26 RX ADMIN — Medication 1000 MILLIGRAM(S): at 21:57

## 2022-12-26 RX ADMIN — Medication 50 MILLIGRAM(S): at 16:54

## 2022-12-26 RX ADMIN — SODIUM CHLORIDE 100 MILLILITER(S): 9 INJECTION, SOLUTION INTRAVENOUS at 22:30

## 2022-12-26 RX ADMIN — Medication 1000 MILLIGRAM(S): at 13:45

## 2022-12-26 RX ADMIN — FLUVOXAMINE MALEATE 100 MILLIGRAM(S): 25 TABLET ORAL at 16:54

## 2022-12-26 RX ADMIN — Medication 1000 MILLIGRAM(S): at 13:23

## 2022-12-26 RX ADMIN — OXYCODONE HYDROCHLORIDE 5 MILLIGRAM(S): 5 TABLET ORAL at 09:00

## 2022-12-26 RX ADMIN — CHLORHEXIDINE GLUCONATE 15 MILLILITER(S): 213 SOLUTION TOPICAL at 16:55

## 2022-12-26 RX ADMIN — Medication 75 MICROGRAM(S): at 05:25

## 2022-12-26 RX ADMIN — Medication 0.5 MILLIGRAM(S): at 17:08

## 2022-12-26 RX ADMIN — OXYCODONE HYDROCHLORIDE 10 MILLIGRAM(S): 5 TABLET ORAL at 22:04

## 2022-12-26 RX ADMIN — ENOXAPARIN SODIUM 40 MILLIGRAM(S): 100 INJECTION SUBCUTANEOUS at 08:29

## 2022-12-26 NOTE — PROGRESS NOTE ADULT - ASSESSMENT
67-year-old male with a past medical history of hypertension mental retardation and seizures admitted for Right intertrochanteric fracture, elevated Lactate, Leukocytosis, tachycardia    #Right intertrochanteric fracture  s/p Right hip repair  s/p transfusion and hgb stable  pt/ot, pain control   dc planning     #Mild leukocytosis  ID following  cxr repeat 12/23 - no infiltrate, bladder scan q8  UA not suggestive of UTI  monitor off abx    #HTN/Tachycardia  continue BP meds with hold parameters  -f/up cardio recs   -EKG ordered    #Seizures  is on depakote, changed to [facility] b/c depakote cannot be crushed    #DVT proph  Lovenox    dispo: pending clearance from cardio/ID.

## 2022-12-26 NOTE — PROGRESS NOTE ADULT - SUBJECTIVE AND OBJECTIVE BOX
Patient is a 67y old  Male who presents with a chief complaint of right femur fracture ORIF (24 Dec 2022 10:28)        HPI:  67-year-old male with a past medical history of hypertension mental retardation and seizures brought in by ambulance from [facility] due to difficulty walking.  As per aide, patient was brought in from program in which they advised that he was not able to walk.  Reports the [facility] staff had to carry the patient off the bus.  No reports of any fall.  Denies fever, vomiting, known head injury, abdominal pain, chest pain, laceration, or any other complaints.    CT imaging showed  Head CT: No obvious acute intracranial hemorrhage or displaced skull   fracture. If clinically indicated, short-term follow-up or MRI may be   obtained for further evaluation.    Cervical spine CT: Osteopenia without obvious displaced fracture or   traumatic malalignment in the cervical spine. If there is clinical   suspicion for acute fracture or ligamentous/cord injury, MRI may be   obtained for further evaluation.    Comminuted and displaced right intertrochanteric fracture. Small   associated hematoma. Associated edema/hemorrhage in the adjacent   subcutaneous tissues.    Pt transferred from [place] to [place]   (17 Dec 2022 10:53)      SUBJECTIVE & OBJECTIVE: Pt seen and examined at bedside. non-verbal     PHYSICAL EXAM:  T(C): 36.9 (12-27-22 @ 05:45), Max: 38.1 (12-26-22 @ 23:16)  HR: 111 (12-27-22 @ 05:45) (97 - 124)  BP: 121/66 (12-27-22 @ 05:45) (114/73 - 136/84)  RR: 16 (12-27-22 @ 05:45) (16 - 17)  SpO2: 96% (12-27-22 @ 05:45) (92% - 96%)  Wt(kg): --   GENERAL: NAD,  NECK: Supple, No JVD  NERVOUS SYSTEM:  Alert  CHEST/LUNG: Clear to auscultation bilaterally; No rales, rhonchi, wheezing, or rubs  HEART: Regular rate and rhythm; No murmurs, rubs, or gallops  ABDOMEN: Soft, Nontender, Nondistended; Bowel sounds present  EXTREMITIES:  2+ Peripheral Pulses, No clubbing, cyanosis, or edema        MEDICATIONS  (STANDING):  acetaminophen     Tablet .. 1000 milliGRAM(s) Oral every 8 hours  amLODIPine   Tablet 2.5 milliGRAM(s) Oral daily  aspirin enteric coated 81 milliGRAM(s) Oral daily  chlorhexidine 0.12% Liquid 15 milliLiter(s) Oral Mucosa two times a day  divalproex  milliGRAM(s) Oral <User Schedule>  enoxaparin Injectable 40 milliGRAM(s) SubCutaneous every 24 hours  fluvoxaMINE 100 milliGRAM(s) Oral two times a day  lactated ringers. 1000 milliLiter(s) (100 mL/Hr) IV Continuous <Continuous>  lactated ringers. 1000 milliLiter(s) (100 mL/Hr) IV Continuous <Continuous>  levothyroxine 75 MICROGram(s) Oral daily  metoprolol tartrate 50 milliGRAM(s) Oral two times a day  OLANZapine 15 milliGRAM(s) Oral at bedtime  pantoprazole    Tablet 40 milliGRAM(s) Oral before breakfast  tamsulosin 0.4 milliGRAM(s) Oral at bedtime    MEDICATIONS  (PRN):  magnesium hydroxide Suspension 30 milliLiter(s) Oral daily PRN Constipation  ondansetron Injectable 4 milliGRAM(s) IV Push every 6 hours PRN Nausea and/or Vomiting      LABS:      Mg     1.9     12-25            CAPILLARY BLOOD GLUCOSE          CAPILLARY BLOOD GLUCOSE        CAPILLARY BLOOD GLUCOSE                RECENT CULTURES:      RADIOLOGY & ADDITIONAL TESTS:                        DVT/GI ppx  Discussed with pt @ bedside

## 2022-12-26 NOTE — PROGRESS NOTE ADULT - SUBJECTIVE AND OBJECTIVE BOX
Patient is a 67y Male with a known history of :    HPI:  67-year-old male with a past medical history of hypertension mental retardation and seizures brought in by ambulance from [facility] due to difficulty walking.  As per aide, patient was brought in from program in which they advised that he was not able to walk.  Reports the [facility] staff had to carry the patient off the bus.  No reports of any fall.  Denies fever, vomiting, known head injury, abdominal pain, chest pain, laceration, or any other complaints.    CT imaging showed  Head CT: No obvious acute intracranial hemorrhage or displaced skull   fracture. If clinically indicated, short-term follow-up or MRI may be   obtained for further evaluation.    Cervical spine CT: Osteopenia without obvious displaced fracture or   traumatic malalignment in the cervical spine. If there is clinical   suspicion for acute fracture or ligamentous/cord injury, MRI may be   obtained for further evaluation.    Comminuted and displaced right intertrochanteric fracture. Small   associated hematoma. Associated edema/hemorrhage in the adjacent   subcutaneous tissues.    Pt transferred from [facility] to [place]   (17 Dec 2022 10:53)      REVIEW OF SYSTEMS:    CONSTITUTIONAL: No fever, weight loss, or fatigue  EYES: No eye pain, visual disturbances, or discharge  ENMT:  No difficulty hearing, tinnitus, vertigo; No sinus or throat pain  NECK: No pain or stiffness  BREASTS: No pain, masses, or nipple discharge  RESPIRATORY: No cough, wheezing, chills or hemoptysis; No shortness of breath  CARDIOVASCULAR: No chest pain, palpitations, dizziness, or leg swelling  GASTROINTESTINAL: No abdominal or epigastric pain. No nausea, vomiting, or hematemesis; No diarrhea or constipation. No melena or hematochezia.  GENITOURINARY: No dysuria, frequency, hematuria, or incontinence  NEUROLOGICAL: No headaches, memory loss, loss of strength, numbness, or tremors  SKIN: No itching, burning, rashes, or lesions   LYMPH NODES: No enlarged glands  ENDOCRINE: No heat or cold intolerance; No hair loss  MUSCULOSKELETAL: No joint pain or swelling; No muscle, back, or extremity pain  PSYCHIATRIC: No depression, anxiety, mood swings, or difficulty sleeping  HEME/LYMPH: No easy bruising, or bleeding gums  ALLERGY AND IMMUNOLOGIC: No hives or eczema    MEDICATIONS  (STANDING):  acetaminophen     Tablet .. 1000 milliGRAM(s) Oral every 8 hours  amLODIPine   Tablet 2.5 milliGRAM(s) Oral daily  aspirin enteric coated 81 milliGRAM(s) Oral daily  chlorhexidine 0.12% Liquid 15 milliLiter(s) Oral Mucosa two times a day  clonazePAM  Tablet 0.5 milliGRAM(s) Oral two times a day  divalproex  milliGRAM(s) Oral <User Schedule>  enoxaparin Injectable 40 milliGRAM(s) SubCutaneous every 24 hours  fluvoxaMINE 100 milliGRAM(s) Oral two times a day  lactated ringers. 1000 milliLiter(s) (100 mL/Hr) IV Continuous <Continuous>  lactated ringers. 1000 milliLiter(s) (100 mL/Hr) IV Continuous <Continuous>  levothyroxine 75 MICROGram(s) Oral daily  metoprolol tartrate 50 milliGRAM(s) Oral two times a day  OLANZapine 15 milliGRAM(s) Oral at bedtime  pantoprazole    Tablet 40 milliGRAM(s) Oral before breakfast  tamsulosin 0.4 milliGRAM(s) Oral at bedtime    MEDICATIONS  (PRN):  HYDROmorphone  Injectable 0.5 milliGRAM(s) IV Push every 3 hours PRN for breakthrough pain  magnesium hydroxide Suspension 30 milliLiter(s) Oral daily PRN Constipation  ondansetron Injectable 4 milliGRAM(s) IV Push every 6 hours PRN Nausea and/or Vomiting  oxyCODONE    IR 10 milliGRAM(s) Oral every 3 hours PRN Severe Pain (7 - 10)  oxyCODONE    IR 5 milliGRAM(s) Oral every 3 hours PRN Moderate Pain (4 - 6)      ALLERGIES: Augmentin (Unknown)  aztreonam (Rash)  penicillin (Unknown)  sulfa drugs (Unknown)      FAMILY HISTORY:  No pertinent family history in first degree relatives        Social history:  Alochol:   Smoking:   Drug Use:   Marital Status:     PHYSICAL EXAMINATION:  -----------------------------  T(C): 36.8 (12-26-22 @ 09:00), Max: 37.2 (12-25-22 @ 13:49)  HR: 97 (12-26-22 @ 09:00) (97 - 160)  BP: 118/71 (12-26-22 @ 09:00) (100/64 - 145/87)  RR: 16 (12-26-22 @ 09:00) (16 - 16)  SpO2: 92% (12-26-22 @ 09:00) (92% - 92%)  Wt(kg): --        Constitutional: well developed, normal appearance, well groomed, well nourished, no deformities and no acute distress.   Eyes: the conjunctiva exhibited no abnormalities and the eyelids demonstrated no xanthelasmas.   HEENT: normal oral mucosa, no oral pallor and no oral cyanosis.   Neck: normal jugular venous A waves present, normal jugular venous V waves present and no jugular venous reyes A waves.   Pulmonary: no respiratory distress, normal respiratory rhythm and effort, no accessory muscle use and lungs were clear to auscultation bilaterally. Anteriorly  Cardiovascular: heart rate and rhythm were normal, normal S1 and S2 and no murmur, gallop, rub, heave or thrill are present.   Musculoskeletal: the gait could not be assessed.   Extremities: no clubbing of the fingernails, no localized cyanosis, no petechial hemorrhages and no ischemic changes.   Skin: normal skin color and pigmentation, no rash, no venous stasis, no skin lesions, no skin ulcer and no xanthoma was observed.       LABS:   --------    Mg     1.9     12-25                     Radiology:

## 2022-12-26 NOTE — PROGRESS NOTE ADULT - ASSESSMENT
68y/o seen at [facility] telemetry. From [facility] home  Lying flat, awake and alert comfortably  Patient non-verbal, therefore history from chart  History HTN, mental retardation, seizures    12/19/22 admitted due to difficulty walking  S/P right hip surgery  Today at 5:55 am patient was noted to have increased heart rate to about 128/minute  This appeared to be sinus tachycardia  Presently heart rate about 90/minute  Also upon admission patient was tachycardic with mild fever and fractured hip    12/26/22  Seen at [facility] telemetry  Lying flat, comfortably  Awake and alert  Health aide at bedside    Plan:  - Continue Metoprolol with parameters and titrate as needed  - Continue Amlodipine-2.5mg OD  - Lovenox  - 12/21/22 normal lower extremity venous doppler study  - Grossly normal Echocardiogram 4/23/21  - Pain control  - Post-op care  - Being followed by Orthopedics, ID  - Discharge planning

## 2022-12-26 NOTE — PROGRESS NOTE ADULT - SUBJECTIVE AND OBJECTIVE BOX
Post Op     ROSELIA NARAYAN      67y        Male                                                                                                                 T(C): 36.8 (12-26-22 @ 09:00), Max: 37.2 (12-25-22 @ 13:49)  HR: 97 (12-26-22 @ 09:00) (97 - 160)  BP: 118/71 (12-26-22 @ 09:00) (100/64 - 145/87)  RR: 16 (12-26-22 @ 09:00) (16 - 16)  SpO2: 92% (12-26-22 @ 09:00) (92% - 92%)  Wt(kg): --    S/P open reduction internal fixation hip     Patient denies shortness of breath, chest pain, dyspnea, No complaints  Pain is3 /10    Physical Exam    Extremity: Bilaterally:  No holmon                                           No Cord                                          PAS on                                          Neurovascular intact                                          Motor intact EHL/FHL                                          Sensation intact                                          Pulses intact DP/PT                                         Calves Soft                                         Dressing Clean / Dry / Intact changed silverlon today                                         Capillary refill with 5 seconds          Mg     1.9     12-25        A/P  -- S/P open reduction internal fixation hip    -  Medicine To Follow   - DVT prophylaxis PAS gay59jo po  daily  and lovenox  - PT & OT    - Analagesia  - Incentive Spirometry  - Discharge Planning  - Safety Precautions  -  CBC , BMP daily    silverlone changed today

## 2022-12-27 PROCEDURE — 71045 X-RAY EXAM CHEST 1 VIEW: CPT | Mod: 26

## 2022-12-27 PROCEDURE — 99232 SBSQ HOSP IP/OBS MODERATE 35: CPT

## 2022-12-27 RX ADMIN — Medication 1000 MILLIGRAM(S): at 06:22

## 2022-12-27 RX ADMIN — Medication 75 MICROGRAM(S): at 05:56

## 2022-12-27 RX ADMIN — Medication 1000 MILLIGRAM(S): at 22:35

## 2022-12-27 RX ADMIN — Medication 1000 MILLIGRAM(S): at 14:24

## 2022-12-27 RX ADMIN — Medication 1000 MILLIGRAM(S): at 14:11

## 2022-12-27 RX ADMIN — TAMSULOSIN HYDROCHLORIDE 0.4 MILLIGRAM(S): 0.4 CAPSULE ORAL at 22:35

## 2022-12-27 RX ADMIN — PANTOPRAZOLE SODIUM 40 MILLIGRAM(S): 20 TABLET, DELAYED RELEASE ORAL at 05:57

## 2022-12-27 RX ADMIN — DIVALPROEX SODIUM 250 MILLIGRAM(S): 500 TABLET, DELAYED RELEASE ORAL at 14:11

## 2022-12-27 RX ADMIN — Medication 1000 MILLIGRAM(S): at 05:56

## 2022-12-27 RX ADMIN — Medication 81 MILLIGRAM(S): at 05:56

## 2022-12-27 RX ADMIN — AMLODIPINE BESYLATE 2.5 MILLIGRAM(S): 2.5 TABLET ORAL at 05:56

## 2022-12-27 RX ADMIN — FLUVOXAMINE MALEATE 100 MILLIGRAM(S): 25 TABLET ORAL at 18:09

## 2022-12-27 RX ADMIN — Medication 50 MILLIGRAM(S): at 05:56

## 2022-12-27 RX ADMIN — Medication 50 MILLIGRAM(S): at 18:08

## 2022-12-27 RX ADMIN — ENOXAPARIN SODIUM 40 MILLIGRAM(S): 100 INJECTION SUBCUTANEOUS at 09:28

## 2022-12-27 RX ADMIN — DIVALPROEX SODIUM 250 MILLIGRAM(S): 500 TABLET, DELAYED RELEASE ORAL at 05:56

## 2022-12-27 RX ADMIN — DIVALPROEX SODIUM 250 MILLIGRAM(S): 500 TABLET, DELAYED RELEASE ORAL at 22:35

## 2022-12-27 RX ADMIN — OLANZAPINE 15 MILLIGRAM(S): 15 TABLET, FILM COATED ORAL at 22:35

## 2022-12-27 RX ADMIN — FLUVOXAMINE MALEATE 100 MILLIGRAM(S): 25 TABLET ORAL at 05:56

## 2022-12-27 NOTE — PROGRESS NOTE ADULT - SUBJECTIVE AND OBJECTIVE BOX
POST OPERATIVE DAY #:   8  STATUS POST: Right Hip ORIF with TFN                    SUBJECTIVE: Patient seen and examined.   Reported Pain score = unable to assess due to patient being non communicative. Patient was resting in bed and appears comfortable.  Aide stepped out prior to exam but their daily notes indicated that the patient ate some applesauce this morning after refusing breakfast initially. Patient scratched aide but calmed down for nurse and was able to be fed the applesauce. No other reports.   Unable to asses CP/SOB/N/V but no other reports from overnight nurse or noted from aide observation. Patient appears comfortable and was resting.     OBJECTIVE:     Vital Signs Last 24 Hrs  T(C): 36.9 (27 Dec 2022 05:45), Max: 38.1 (26 Dec 2022 23:16)  T(F): 98.5 (27 Dec 2022 05:45), Max: 100.6 (26 Dec 2022 23:16)  HR: 111 (27 Dec 2022 05:45) (101 - 124)  BP: 121/66 (27 Dec 2022 05:45) (114/73 - 136/84)  RR: 16 (27 Dec 2022 05:45) (16 - 17)  SpO2: 96% (27 Dec 2022 05:45) (92% - 96%)    Parameters below as of 26 Dec 2022 23:16  Patient On (Oxygen Delivery Method): room air      Gen:  NAD, sitting up in bed and resting. Aide's note indicates that patient was mildly combative and scratched aide this morning. Patient was calm when I assessed him. Aide stepped out at the time and was not present for exam. Aide left daily notes behind.  Abd: soft, ND, exam was limited due to patient not liking his abdomen being pushed. No verbal reaction and didn't wince with palpation  Right hip:          Silverlon Dressing: clean/dry/intact  with no apparent discharge or erythema appreciated  Bilateral LEs:         Sensation:  unable to assess due to patient being non-communicative.          Motor exam: unable to assess due to patient not following commands. Patient shifted/moved in bed, flexed knees and moved feet.         2+ DP/PT pulses          calf supple, NT; assessed with no reaction to light and deep palpation of b/l calves         SCDs in place         Feet warm and well perfused, Cap refill <2 secs    LABS:      Mg     1.9     12-25            MEDICATIONS:  Anticoagulation:  aspirin enteric coated 81 milliGRAM(s) Oral daily  enoxaparin Injectable 40 milliGRAM(s) SubCutaneous every 24 hours      Pain medications:   acetaminophen     Tablet .. 1000 milliGRAM(s) Oral every 8 hours      A/P : 67y M  s/p Right Hip ORIF with TFN POD # 8  -    Pain control Acetaminophen  -    Silverlon dressing along right hip changed yesterday and left in place  -    DVT ppx: Aspirin 81mg q daily and Lovenox 40 SQ q daily  -    Weight bearing status: WBAT   -    Safety precautions  -    Medicine co-management  -    Physical Therapy  -    Occupational Therapy  -    Discharge plan:      [facility] today pending PT/OT/Medicine clearance and social work clearance with bed/transport availability.

## 2022-12-27 NOTE — PROGRESS NOTE ADULT - SUBJECTIVE AND OBJECTIVE BOX
Patient is a 67y Male with a known history of :    HPI:  67-year-old male with a past medical history of hypertension mental retardation and seizures brought in by ambulance from [facility] due to difficulty walking.  As per aide, patient was brought in from program in which they advised that he was not able to walk.  Reports the [facility] staff had to carry the patient off the bus.  No reports of any fall.  Denies fever, vomiting, known head injury, abdominal pain, chest pain, laceration, or any other complaints.    CT imaging showed  Head CT: No obvious acute intracranial hemorrhage or displaced skull   fracture. If clinically indicated, short-term follow-up or MRI may be   obtained for further evaluation.    Cervical spine CT: Osteopenia without obvious displaced fracture or   traumatic malalignment in the cervical spine. If there is clinical   suspicion for acute fracture or ligamentous/cord injury, MRI may be   obtained for further evaluation.    Comminuted and displaced right intertrochanteric fracture. Small   associated hematoma. Associated edema/hemorrhage in the adjacent   subcutaneous tissues.    Pt transferred from [place] to [place]   (17 Dec 2022 10:53)      REVIEW OF SYSTEMS:    CONSTITUTIONAL: No fever, weight loss, or fatigue  EYES: No eye pain, visual disturbances, or discharge  ENMT:  No difficulty hearing, tinnitus, vertigo; No sinus or throat pain  NECK: No pain or stiffness  BREASTS: No pain, masses, or nipple discharge  RESPIRATORY: No cough, wheezing, chills or hemoptysis; No shortness of breath  CARDIOVASCULAR: No chest pain, palpitations, dizziness, or leg swelling  GASTROINTESTINAL: No abdominal or epigastric pain. No nausea, vomiting, or hematemesis; No diarrhea or constipation. No melena or hematochezia.  GENITOURINARY: No dysuria, frequency, hematuria, or incontinence  NEUROLOGICAL: No headaches, memory loss, loss of strength, numbness, or tremors  SKIN: No itching, burning, rashes, or lesions   LYMPH NODES: No enlarged glands  ENDOCRINE: No heat or cold intolerance; No hair loss  MUSCULOSKELETAL: No joint pain or swelling; No muscle, back, or extremity pain  PSYCHIATRIC: No depression, anxiety, mood swings, or difficulty sleeping  HEME/LYMPH: No easy bruising, or bleeding gums  ALLERGY AND IMMUNOLOGIC: No hives or eczema    MEDICATIONS  (STANDING):  acetaminophen     Tablet .. 1000 milliGRAM(s) Oral every 8 hours  amLODIPine   Tablet 2.5 milliGRAM(s) Oral daily  aspirin enteric coated 81 milliGRAM(s) Oral daily  chlorhexidine 0.12% Liquid 15 milliLiter(s) Oral Mucosa two times a day  divalproex  milliGRAM(s) Oral <User Schedule>  enoxaparin Injectable 40 milliGRAM(s) SubCutaneous every 24 hours  fluvoxaMINE 100 milliGRAM(s) Oral two times a day  levothyroxine 75 MICROGram(s) Oral daily  metoprolol tartrate 50 milliGRAM(s) Oral two times a day  OLANZapine 15 milliGRAM(s) Oral at bedtime  pantoprazole    Tablet 40 milliGRAM(s) Oral before breakfast  tamsulosin 0.4 milliGRAM(s) Oral at bedtime    MEDICATIONS  (PRN):  magnesium hydroxide Suspension 30 milliLiter(s) Oral daily PRN Constipation  ondansetron Injectable 4 milliGRAM(s) IV Push every 6 hours PRN Nausea and/or Vomiting      ALLERGIES: Augmentin (Unknown)  aztreonam (Rash)  penicillin (Unknown)  sulfa drugs (Unknown)      FAMILY HISTORY:  No pertinent family history in first degree relatives        Social history:  Alochol:   Smoking:   Drug Use:   Marital Status:     PHYSICAL EXAMINATION:  -----------------------------  T(C): 36.9 (12-27-22 @ 05:45), Max: 38.1 (12-26-22 @ 23:16)  HR: 111 (12-27-22 @ 05:45) (101 - 124)  BP: 121/66 (12-27-22 @ 05:45) (114/73 - 136/84)  RR: 16 (12-27-22 @ 05:45) (16 - 17)  SpO2: 96% (12-27-22 @ 05:45) (92% - 96%)  Wt(kg): --        Constitutional: well developed, normal appearance, well groomed, well nourished, no deformities and no acute distress.   Eyes: the conjunctiva exhibited no abnormalities and the eyelids demonstrated no xanthelasmas.   HEENT: normal oral mucosa, no oral pallor and no oral cyanosis.   Neck: normal jugular venous A waves present, normal jugular venous V waves present and no jugular venous reyes A waves.   Pulmonary: no respiratory distress, normal respiratory rhythm and effort, no accessory muscle use and lungs were clear to auscultation bilaterally. Anteriorly  Cardiovascular: heart rate and rhythm were normal, normal S1 and S2 and no murmur, gallop, rub, heave or thrill are present.   Musculoskeletal: the gait could not be assessed.  Extremities: no clubbing of the fingernails, no localized cyanosis, no petechial hemorrhages and no ischemic changes.   Skin: normal skin color and pigmentation, no rash, no venous stasis, no skin lesions, no skin ulcer and no xanthoma was observed.       LABS:   --------    Mg     1.9     12-25                     Radiology:

## 2022-12-27 NOTE — PROGRESS NOTE ADULT - SUBJECTIVE AND OBJECTIVE BOX
Patient is a 67y old  Male who presents with a chief complaint of right femur fracture ORIF (24 Dec 2022 10:28)        HPI:  67-year-old male with a past medical history of hypertension mental retardation and seizures brought in by ambulance from [facility] due to difficulty walking.  As per aide, patient was brought in from program in which they advised that he was not able to walk.  Reports the [facility] staff had to carry the patient off the bus.  No reports of any fall.  Denies fever, vomiting, known head injury, abdominal pain, chest pain, laceration, or any other complaints.    CT imaging showed  Head CT: No obvious acute intracranial hemorrhage or displaced skull   fracture. If clinically indicated, short-term follow-up or MRI may be   obtained for further evaluation.    Cervical spine CT: Osteopenia without obvious displaced fracture or   traumatic malalignment in the cervical spine. If there is clinical   suspicion for acute fracture or ligamentous/cord injury, MRI may be   obtained for further evaluation.    Comminuted and displaced right intertrochanteric fracture. Small   associated hematoma. Associated edema/hemorrhage in the adjacent   subcutaneous tissues.    Pt transferred from [place] to [place]   (17 Dec 2022 10:53)      SUBJECTIVE & OBJECTIVE: Pt seen and examined at bedside. nad    PHYSICAL EXAM:  T(C): 36.9 (12-27-22 @ 05:45), Max: 38.1 (12-26-22 @ 23:16)  HR: 111 (12-27-22 @ 05:45) (101 - 124)  BP: 121/66 (12-27-22 @ 05:45) (114/73 - 136/84)  RR: 16 (12-27-22 @ 05:45) (16 - 17)  SpO2: 96% (12-27-22 @ 05:45) (92% - 96%)  Wt(kg): --   GENERAL: NAD,  NERVOUS SYSTEM:  Alert & Oriented X3,   CHEST/LUNG:   HEART: Regular rate and rhythm; No murmurs, rubs, or gallops  ABDOMEN: Soft, Nontender, Nondistended; Bowel sounds present  EXTREMITIES:  2+ Peripheral Pulses, No clubbing, cyanosis, or edema        MEDICATIONS  (STANDING):  acetaminophen     Tablet .. 1000 milliGRAM(s) Oral every 8 hours  amLODIPine   Tablet 2.5 milliGRAM(s) Oral daily  aspirin enteric coated 81 milliGRAM(s) Oral daily  chlorhexidine 0.12% Liquid 15 milliLiter(s) Oral Mucosa two times a day  divalproex  milliGRAM(s) Oral <User Schedule>  enoxaparin Injectable 40 milliGRAM(s) SubCutaneous every 24 hours  fluvoxaMINE 100 milliGRAM(s) Oral two times a day  lactated ringers. 1000 milliLiter(s) (100 mL/Hr) IV Continuous <Continuous>  lactated ringers. 1000 milliLiter(s) (100 mL/Hr) IV Continuous <Continuous>  levothyroxine 75 MICROGram(s) Oral daily  metoprolol tartrate 50 milliGRAM(s) Oral two times a day  OLANZapine 15 milliGRAM(s) Oral at bedtime  pantoprazole    Tablet 40 milliGRAM(s) Oral before breakfast  tamsulosin 0.4 milliGRAM(s) Oral at bedtime    MEDICATIONS  (PRN):  magnesium hydroxide Suspension 30 milliLiter(s) Oral daily PRN Constipation  ondansetron Injectable 4 milliGRAM(s) IV Push every 6 hours PRN Nausea and/or Vomiting      LABS:      Mg     1.9     12-25            CAPILLARY BLOOD GLUCOSE          CAPILLARY BLOOD GLUCOSE        CAPILLARY BLOOD GLUCOSE                RECENT CULTURES:      RADIOLOGY & ADDITIONAL TESTS:                        DVT/GI ppx  Discussed with pt @ bedside

## 2022-12-27 NOTE — PROGRESS NOTE ADULT - ASSESSMENT
66YO M PMH hypertension mental retardation and seizures resident of [facility] admitted with Comminuted and displaced right intertrochanteric fracture. Small associated hematoma. PT febrile Tmax 100.7 tachy and wbc 16K Also with elevated lactic. Pt is post op Open reduction and internal fixation (ORIF) of fracture of right hip using intramedullary nilda 18-Dec-2022  Likely SIRS sec Right hip fracture  Doubt septic  Cultures and imaging neg for infectious process  Anemia  Post op fever Tmax 100.3   WBC uptrending--resolved  Febrile again 100.6F  WBC WNL    Plan :   UA negative  BCx/UCx NGTD  Monitor off antibiotics  Trend temps and cbc  Asp precautions   Pulm toileting  Pain control  Increase activity    Covering Dr. Daniels  Infectious Diseases will continue to follow. Please call with any questions.   Tamara Dos Santos M.D.  [facility] Division of Infectious Diseases [phone]

## 2022-12-27 NOTE — PROGRESS NOTE ADULT - SUBJECTIVE AND OBJECTIVE BOX
Patient is a 67y old  Male who presents with a chief complaint of right femur fracture ORIF (24 Dec 2022 10:28)        HPI:  67-year-old male with a past medical history of hypertension mental retardation and seizures brought in by ambulance from [facility] due to difficulty walking.  As per aide, patient was brought in from program in which they advised that he was not able to walk.  Reports the [facility] staff had to carry the patient off the bus.  No reports of any fall.  Denies fever, vomiting, known head injury, abdominal pain, chest pain, laceration, or any other complaints.    CT imaging showed  Head CT: No obvious acute intracranial hemorrhage or displaced skull   fracture. If clinically indicated, short-term follow-up or MRI may be   obtained for further evaluation.    Cervical spine CT: Osteopenia without obvious displaced fracture or   traumatic malalignment in the cervical spine. If there is clinical   suspicion for acute fracture or ligamentous/cord injury, MRI may be   obtained for further evaluation.    Comminuted and displaced right intertrochanteric fracture. Small   associated hematoma. Associated edema/hemorrhage in the adjacent   subcutaneous tissues.    Pt transferred from [place] to [place]   (17 Dec 2022 10:53)      SUBJECTIVE & OBJECTIVE: Pt seen and examined at bedside. had low grade feer of 100.5      PHYSICAL EXAM:  T(C): 37.2 (12-27-22 @ 11:54), Max: 38.1 (12-26-22 @ 23:16)  HR: 89 (12-27-22 @ 11:54) (89 - 124)  BP: 146/74 (12-27-22 @ 11:54) (114/73 - 146/74)  RR: 18 (12-27-22 @ 11:54) (16 - 18)  SpO2: 97% (12-27-22 @ 11:54) (92% - 97%)  Wt(kg): --   GENERAL: NAD,  HEAD:  Atraumatic, Normocephalic  NERVOUS SYSTEM:  Alert & Oriented X3,   CHEST/LUNG: Clear to auscultation bilaterally; No rales, rhonchi, wheezing, or rubs  HEART: Regular rate and rhythm; No murmurs, rubs, or gallops  ABDOMEN: Soft, Nontender, Nondistended; Bowel sounds present  EXTREMITIES:  2+ Peripheral Pulses, No clubbing, cyanosis, or edema        MEDICATIONS  (STANDING):  acetaminophen     Tablet .. 1000 milliGRAM(s) Oral every 8 hours  amLODIPine   Tablet 2.5 milliGRAM(s) Oral daily  aspirin enteric coated 81 milliGRAM(s) Oral daily  chlorhexidine 0.12% Liquid 15 milliLiter(s) Oral Mucosa two times a day  divalproex  milliGRAM(s) Oral <User Schedule>  enoxaparin Injectable 40 milliGRAM(s) SubCutaneous every 24 hours  fluvoxaMINE 100 milliGRAM(s) Oral two times a day  levothyroxine 75 MICROGram(s) Oral daily  metoprolol tartrate 50 milliGRAM(s) Oral two times a day  OLANZapine 15 milliGRAM(s) Oral at bedtime  pantoprazole    Tablet 40 milliGRAM(s) Oral before breakfast  tamsulosin 0.4 milliGRAM(s) Oral at bedtime    MEDICATIONS  (PRN):  magnesium hydroxide Suspension 30 milliLiter(s) Oral daily PRN Constipation  ondansetron Injectable 4 milliGRAM(s) IV Push every 6 hours PRN Nausea and/or Vomiting      LABS:      Mg     1.9     12-25            CAPILLARY BLOOD GLUCOSE          CAPILLARY BLOOD GLUCOSE        CAPILLARY BLOOD GLUCOSE                RECENT CULTURES:      RADIOLOGY & ADDITIONAL TESTS:                        DVT/GI ppx  Discussed with pt @ bedside

## 2022-12-27 NOTE — PROGRESS NOTE ADULT - ASSESSMENT
67-year-old male with a past medical history of hypertension mental retardation and seizures admitted for Right intertrochanteric fracture, elevated Lactate, Leukocytosis, tachycardia    fever  suspect reactive  will order chest xray   ua     #Right intertrochanteric fracture  s/p Right hip repair  s/p transfusion and hgb stable  pt/ot, pain control   dc planning     #Mild leukocytosis  ID following  cxr repeat 12/23 - no infiltrate, bladder scan q8  UA not suggestive of UTI  monitor off abx    #HTN/Tachycardia  continue BP meds with hold parameters  -f/up cardio recs   -EKG ordered    #Seizures  is on depakote, changed to [facility] b/c depakote cannot be crushed    #DVT proph  Lovenox    dispo: pending clearance from cardio/ID.

## 2022-12-27 NOTE — PROGRESS NOTE ADULT - SUBJECTIVE AND OBJECTIVE BOX
[facility], Division of Infectious Diseases  YARON Donovan Y. Patel, S. Shah, [facility]  [phone]    Name: ROSELIA NARAYAN  Age: 67y  Gender: Male  MRN: [id number]    Interval History:  Patient seen and examined at bedside  Tm 100.6F  Notes reviewed    Antibiotics:      Medications:  acetaminophen     Tablet .. 1000 milliGRAM(s) Oral every 8 hours  amLODIPine   Tablet 2.5 milliGRAM(s) Oral daily  aspirin enteric coated 81 milliGRAM(s) Oral daily  chlorhexidine 0.12% Liquid 15 milliLiter(s) Oral Mucosa two times a day  divalproex  milliGRAM(s) Oral <User Schedule>  enoxaparin Injectable 40 milliGRAM(s) SubCutaneous every 24 hours  fluvoxaMINE 100 milliGRAM(s) Oral two times a day  levothyroxine 75 MICROGram(s) Oral daily  magnesium hydroxide Suspension 30 milliLiter(s) Oral daily PRN  metoprolol tartrate 50 milliGRAM(s) Oral two times a day  OLANZapine 15 milliGRAM(s) Oral at bedtime  ondansetron Injectable 4 milliGRAM(s) IV Push every 6 hours PRN  pantoprazole    Tablet 40 milliGRAM(s) Oral before breakfast  tamsulosin 0.4 milliGRAM(s) Oral at bedtime      Review of Systems: unable to obtain    Allergies: Augmentin (Unknown)  aztreonam (Rash)  penicillin (Unknown)  sulfa drugs (Unknown)    For details regarding the patient's past medical history, social history, family history, and other miscellaneous elements, please refer the initial infectious diseases consultation and/or the admitting history and physical examination for this admission.    Objective:  Vitals:   T(C): 37.2 (12-27-22 @ 11:54), Max: 38.1 (12-26-22 @ 23:16)  HR: 89 (12-27-22 @ 11:54) (89 - 124)  BP: 146/74 (12-27-22 @ 11:54) (121/66 - 146/74)  RR: 18 (12-27-22 @ 11:54) (16 - 18)  SpO2: 97% (12-27-22 @ 11:54) (92% - 97%)    Physical Examination:  General:  no acute distress  Neck: supple, trachea midline  Lungs: clear, no wheeze/rhonchi  Cardiovascular: regular rate and rhythm, S1 S2  Abdomen: soft, nontender,  bowel sounds normal  Neurological: alert and oriented x3  Skin: no rash  Extremities: right hip drsg c/d/i    Laboratory Studies:  CBC:   CMP:   Mg     1.9     12-25            Microbiology: reviewed    Culture - Urine (collected 12-21-22 @ 14:51)  Source: Clean Catch Clean Catch (Midstream)  Final Report (12-23-22 @ 07:33):    No growth    Culture - Blood (collected 12-21-22 @ 12:28)  Source: .Blood Blood  Final Report (12-26-22 @ 16:00):    No Growth Final    Culture - Blood (collected 12-21-22 @ 12:28)  Source: .Blood Blood  Final Report (12-26-22 @ 16:00):    No Growth Final    Culture - Blood (collected 12-16-22 @ 21:06)  Source: .Blood Blood  Final Report (12-22-22 @ 01:00):    No Growth Final    Culture - Blood (collected 12-16-22 @ 21:00)  Source: .Blood Blood  Final Report (12-22-22 @ 01:00):    No Growth Final          Radiology: reviewed

## 2022-12-27 NOTE — PROGRESS NOTE ADULT - ASSESSMENT
68y/o seen at [facility] telemetry. From [facility] home  Lying flat, awake and alert comfortably  Patient non-verbal, therefore history from chart  History HTN, mental retardation, seizures    12/19/22 admitted due to difficulty walking  S/P right hip surgery  Today at 5:55 am patient was noted to have increased heart rate to about 128/minute  This appeared to be sinus tachycardia  Presently heart rate about 90/minute  Also upon admission patient was tachycardic with mild fever and fractured hip    12/27/22  Seen at [facility] telemetry  Lying flat, comfortably  Awake and alert  Health aide at bedside    Plan:  - Continue Metoprolol with parameters and titrate as needed  - Continue Amlodipine-2.5mg OD  - Lovenox  - 12/21/22 normal lower extremity venous doppler study  - Grossly normal Echocardiogram 4/23/21  - Pain control  - Post-op care  - D/C remote telemetry  - Being followed by Orthopedics, ID  - Discharge planning

## 2022-12-28 LAB
ALBUMIN SERPL ELPH-MCNC: 2.3 G/DL — LOW (ref 3.3–5)
ALP SERPL-CCNC: 74 U/L — SIGNIFICANT CHANGE UP (ref 30–120)
ALT FLD-CCNC: 35 U/L DA — SIGNIFICANT CHANGE UP (ref 10–60)
ANION GAP SERPL CALC-SCNC: 10 MMOL/L — SIGNIFICANT CHANGE UP (ref 5–17)
AST SERPL-CCNC: 31 U/L — SIGNIFICANT CHANGE UP (ref 10–40)
BILIRUB SERPL-MCNC: 0.4 MG/DL — SIGNIFICANT CHANGE UP (ref 0.2–1.2)
BUN SERPL-MCNC: 27 MG/DL — HIGH (ref 7–23)
CALCIUM SERPL-MCNC: 8.6 MG/DL — SIGNIFICANT CHANGE UP (ref 8.4–10.5)
CHLORIDE SERPL-SCNC: 111 MMOL/L — HIGH (ref 96–108)
CO2 SERPL-SCNC: 26 MMOL/L — SIGNIFICANT CHANGE UP (ref 22–31)
CREAT SERPL-MCNC: 0.82 MG/DL — SIGNIFICANT CHANGE UP (ref 0.5–1.3)
EGFR: 97 ML/MIN/1.73M2 — SIGNIFICANT CHANGE UP
GLUCOSE SERPL-MCNC: 115 MG/DL — HIGH (ref 70–99)
HCT VFR BLD CALC: 33.2 % — LOW (ref 39–50)
HGB BLD-MCNC: 10.4 G/DL — LOW (ref 13–17)
MCHC RBC-ENTMCNC: 30.6 PG — SIGNIFICANT CHANGE UP (ref 27–34)
MCHC RBC-ENTMCNC: 31.3 GM/DL — LOW (ref 32–36)
MCV RBC AUTO: 97.6 FL — SIGNIFICANT CHANGE UP (ref 80–100)
NRBC # BLD: 0 /100 WBCS — SIGNIFICANT CHANGE UP (ref 0–0)
PLATELET # BLD AUTO: 444 K/UL — HIGH (ref 150–400)
POTASSIUM SERPL-MCNC: 4 MMOL/L — SIGNIFICANT CHANGE UP (ref 3.5–5.3)
POTASSIUM SERPL-SCNC: 4 MMOL/L — SIGNIFICANT CHANGE UP (ref 3.5–5.3)
PROT SERPL-MCNC: 6.6 G/DL — SIGNIFICANT CHANGE UP (ref 6–8.3)
RBC # BLD: 3.4 M/UL — LOW (ref 4.2–5.8)
RBC # FLD: 15.2 % — HIGH (ref 10.3–14.5)
SODIUM SERPL-SCNC: 147 MMOL/L — HIGH (ref 135–145)
WBC # BLD: 13.58 K/UL — HIGH (ref 3.8–10.5)
WBC # FLD AUTO: 13.58 K/UL — HIGH (ref 3.8–10.5)

## 2022-12-28 PROCEDURE — 99233 SBSQ HOSP IP/OBS HIGH 50: CPT

## 2022-12-28 RX ADMIN — ENOXAPARIN SODIUM 40 MILLIGRAM(S): 100 INJECTION SUBCUTANEOUS at 09:00

## 2022-12-28 RX ADMIN — CHLORHEXIDINE GLUCONATE 15 MILLILITER(S): 213 SOLUTION TOPICAL at 05:34

## 2022-12-28 RX ADMIN — DIVALPROEX SODIUM 250 MILLIGRAM(S): 500 TABLET, DELAYED RELEASE ORAL at 05:35

## 2022-12-28 RX ADMIN — Medication 1000 MILLIGRAM(S): at 05:33

## 2022-12-28 RX ADMIN — TAMSULOSIN HYDROCHLORIDE 0.4 MILLIGRAM(S): 0.4 CAPSULE ORAL at 22:03

## 2022-12-28 RX ADMIN — Medication 1000 MILLIGRAM(S): at 13:08

## 2022-12-28 RX ADMIN — OLANZAPINE 15 MILLIGRAM(S): 15 TABLET, FILM COATED ORAL at 22:03

## 2022-12-28 RX ADMIN — DIVALPROEX SODIUM 250 MILLIGRAM(S): 500 TABLET, DELAYED RELEASE ORAL at 13:08

## 2022-12-28 RX ADMIN — Medication 50 MILLIGRAM(S): at 17:38

## 2022-12-28 RX ADMIN — Medication 1000 MILLIGRAM(S): at 05:36

## 2022-12-28 RX ADMIN — FLUVOXAMINE MALEATE 100 MILLIGRAM(S): 25 TABLET ORAL at 05:33

## 2022-12-28 RX ADMIN — DIVALPROEX SODIUM 250 MILLIGRAM(S): 500 TABLET, DELAYED RELEASE ORAL at 22:02

## 2022-12-28 RX ADMIN — Medication 50 MILLIGRAM(S): at 05:33

## 2022-12-28 RX ADMIN — AMLODIPINE BESYLATE 2.5 MILLIGRAM(S): 2.5 TABLET ORAL at 05:33

## 2022-12-28 RX ADMIN — Medication 75 MICROGRAM(S): at 05:33

## 2022-12-28 RX ADMIN — FLUVOXAMINE MALEATE 100 MILLIGRAM(S): 25 TABLET ORAL at 17:38

## 2022-12-28 RX ADMIN — Medication 1000 MILLIGRAM(S): at 22:02

## 2022-12-28 RX ADMIN — Medication 81 MILLIGRAM(S): at 05:33

## 2022-12-28 RX ADMIN — CHLORHEXIDINE GLUCONATE 15 MILLILITER(S): 213 SOLUTION TOPICAL at 17:38

## 2022-12-28 RX ADMIN — PANTOPRAZOLE SODIUM 40 MILLIGRAM(S): 20 TABLET, DELAYED RELEASE ORAL at 09:00

## 2022-12-28 NOTE — PROGRESS NOTE ADULT - SUBJECTIVE AND OBJECTIVE BOX
Post Op     ROSELIA NARAYAN      67y        Male                                                                                                                 T(C): 36.7 (12-28-22 @ 05:25), Max: 37.2 (12-27-22 @ 11:54)  HR: 100 (12-28-22 @ 05:25) (89 - 101)  BP: 157/72 (12-28-22 @ 05:25) (134/73 - 157/72)  RR: 20 (12-28-22 @ 05:25) (18 - 20)  SpO2: 94% (12-28-22 @ 05:25) (94% - 97%)  Wt(kg): --    S/P   open reduction internal fixation left hip    Patient denies shortness of breath, chest pain, dyspnea, No complaints  Pain is  unable  to asses  2ndery  to non communicative  Physical Exam    Extremity: Bilaterally:  No holmon                                           No Cord                                          PAS on                                          Neurovascular intact                                          Motor intact EHL/FHL unable to asses  patient does not follow commands                                          Sensation intact                                          Pulses intact DP/PT                                         Calves Soft                                         Dressing Clean / Dry / Intact Silverlon                                         Capillary refill with 5 seconds                A/P  -- S/P open reduction internal fixation  left hip    -  Medicine To Follow   - DVT prophylaxis PAS lovenox   - PT & OT   - Analagesia  - Incentive Spirometry  - Discharge Planning  - Safety Precautions  -  CBC , BMP daily     Post Op     ROSELIA NARAYAN      67y        Male                                                                                                                 T(C): 36.7 (12-28-22 @ 05:25), Max: 37.2 (12-27-22 @ 11:54)  HR: 100 (12-28-22 @ 05:25) (89 - 101)  BP: 157/72 (12-28-22 @ 05:25) (134/73 - 157/72)  RR: 20 (12-28-22 @ 05:25) (18 - 20)  SpO2: 94% (12-28-22 @ 05:25) (94% - 97%)  Wt(kg): --    S/P   open reduction internal fixation left hip    Patient denies shortness of breath, chest pain, dyspnea, No complaints  Pain is  unable  to asses  2ndery  to non communicative  Physical Exam    Extremity: Bilaterally:  No holmon                                           No Cord                                          PAS on                                          Neurovascular intact                                          Motor intact EHL/FHL unable to asses  patient does not follow commands                                          Sensation intact                                          Pulses intact DP/PT                                         Calves Soft                                         Dressing Clean / Dry / Intact Silverlon                                         Capillary refill with 5 seconds                A/P  -- S/P open reduction internal fixation  left hip    -  Medicine To Follow   - DVT prophylaxis PAS lovenox  /asa  - PT & OT   - Analagesia  - Incentive Spirometry  - Discharge Planning group home  when cleared by  pt/ot/medicine /cardiology /infectious disease    - Safety Precautions  -  CBC , BMP daily

## 2022-12-28 NOTE — CHART NOTE - NSCHARTNOTEFT_GEN_A_CORE
Called for tylenol order for fever of 100.5F. ID note appreciated - likely SIRS in setting of fracture and recent surgery - does not suspect infection. Getting standing tylenol (1000mg tid for a total of 3g/day) so may be masking fevers. Will give one time dose of tylenol and check routine CXR given saturations documented in EMR on supplemental O2. Follow up AM CBC post 2U PRBC 12/20. ID to follow up in AM.
Pt is medically optimized for the procedure
Assessment: Chart reviewed, events noted: As per chart "The pt is a 67-year-old male with a past medical history of hypertension mental retardation and seizures admitted for Right intertrochanteric fracture, elevated Lactate, Leukocytosis, tachycardia."    12/28  Pt seen at bedside for nutrition follow up, pt non-verbal at baseline, aide at bedside provided pertinent information. Pt s/p right hip ORIF (12/19), presently on a pureed diet with moderately thick liquids, per nursing documents noted with variable PO intake and requires assistance with feeding throughout admission.  Per aide, pt had good po intake today, well accepted on ONS. No acute GI distress reported at this time, last BM on (12/28) per nursing flow sheets. Continue to provide encouragement/assistance as needed during meal times to increase PO intake and will continue to provide Magic cup  2x/day to optimize PO intake and provide an additional 290kcal, 9g protein per serving. RD to follow up to continue to monitor pt's status.       Factors impacting intake: [ ] none [ ] nausea  [ ] vomiting [ ] diarrhea [ ] constipation  [ ]chewing problems [ ] swallowing issues  [ ] other:     Diet Prescription: Diet, Regular (12-19-22 @ 18:35)  Diet, Full Liquid (12-19-22 @ 18:35)  Diet, Pureed:   Moderately Thick Liquids (MODTHICKLIQS) (12-17-22 @ 13:04)    Intake: fair-good    Daily   % Weight Change    Pertinent Medications: MEDICATIONS  (STANDING):  acetaminophen     Tablet .. 1000 milliGRAM(s) Oral every 8 hours  amLODIPine   Tablet 2.5 milliGRAM(s) Oral daily  aspirin enteric coated 81 milliGRAM(s) Oral daily  chlorhexidine 0.12% Liquid 15 milliLiter(s) Oral Mucosa two times a day  divalproex  milliGRAM(s) Oral <User Schedule>  enoxaparin Injectable 40 milliGRAM(s) SubCutaneous every 24 hours  fluvoxaMINE 100 milliGRAM(s) Oral two times a day  levothyroxine 75 MICROGram(s) Oral daily  metoprolol tartrate 50 milliGRAM(s) Oral two times a day  OLANZapine 15 milliGRAM(s) Oral at bedtime  pantoprazole    Tablet 40 milliGRAM(s) Oral before breakfast  tamsulosin 0.4 milliGRAM(s) Oral at bedtime    MEDICATIONS  (PRN):  magnesium hydroxide Suspension 30 milliLiter(s) Oral daily PRN Constipation  ondansetron Injectable 4 milliGRAM(s) IV Push every 6 hours PRN Nausea and/or Vomiting    Pertinent Labs: 12-28 Na147 mmol/L<H> Glu 115 mg/dL<H> K+ 4.0 mmol/L Cr  0.82 mg/dL BUN 27 mg/dL<H> 12-28 Alb 2.3 g/dL<L>     CAPILLARY BLOOD GLUCOSE        Edema per flowsheets: +1 right hip  Skin: no pressure injury noted      Estimated Needs:   [x ] no change since previous assessment  [ ] recalculated:     Previous Nutrition Diagnosis:   [ ] Inadequate Energy Intake [ ]Inadequate Oral Intake [ ] Excessive Energy Intake [x] Swallowing Difficulty   [ ] Underweight [ ] Increased Nutrient Needs [ ] Overweight/Obesity [ ] Altered Nutrition related lab values  [ ] Altered GI Function [ ] Unintended Weight Loss [ ] Food & Nutrition Related Knowledge Deficit [ ] Malnutrition     Nutrition Diagnosis is [x ] ongoing  [ ] resolved [ ] not applicable     New Nutrition Diagnosis: [x ] not applicable       Interventions: Continue current diet order and monitor tolerance of prescribed diet   Recommend  [ ] Change Diet To:  [ ] Nutrition Supplement  [ ] Nutrition Support  [ ] Other:     Monitoring and Evaluation:   [X ] Intake [ x ] Tolerance to diet prescription [ x ] weights [ x ] labs[ x ] follow up per protocol  [ ] other:
Nutrition Follow Up Note  Patient seen for: Nutrition follow up     Chart reviewed, events noted: As per chart "The pt is a 67-year-old male with a past medical history of hypertension mental retardation and seizures admitted for Right intertrochanteric fracture, elevated Lactate, Leukocytosis, tachycardia."    12/24  Pt seen at bedside for nutrition follow up, pt non-verbal at baseline admitted from group home due to difficulty walking found to have hip fracture. Pt s/p right hip ORIF (12/19), presently on a pureed diet with moderately thick liquids, per nursing documents noted with variable PO intake and requires assistance with feeding. No acute GI distress reported at this time, last BM on (12/19) per nursing flow sheets. Continue to provide encouragement/assistance as needed during meal times to increase PO intake and will continue to provide Magic cup  2x/day to optimize PO intake and provide an additional 290kcal, 9g protein per serving. RD to follow up to continue to monitor pt's status.     Source: [] Patient       [x] EMR        [] RN        [] Family at bedside       [] Other:    -If unable to interview patient: [] Trach/Vent/BiPAP  [x] Disoriented/confused/inappropriate to interview    Diet Order:   Diet, Pureed:   Moderately Thick Liquids (MODTHICKLIQS) (12-17-22)    - Is current order appropriate/adequate? [x] Yes  []  No:     - PO intake :   [] >75%  Adequate    [x] 50-75%  Fair       [] <50%  Poor    Weights: Bed scale wt obtained of 158.4 lbs, 149 lbs (12/17)- Will continue to monitor weights and trend as available.     Nutritionally Pertinent MEDICATIONS  (STANDING):  amLODIPine   Tablet  lactated ringers.  lactated ringers.  levothyroxine  metoprolol tartrate  pantoprazole    Tablet    Pertinent Labs: 12-24 @ 06:00: Na 141, BUN 9, Cr 0.55, <H>, K+ 4.2, Phos --, Mg --, Alk Phos --, ALT/SGPT --, AST/SGOT --, HbA1c --    Skin per nursing documentation: Free of pressure injuries   Edema: No noted edema thus far, as per flow sheets pt with +1 right hip edema.     Estimated Needs:   [x] no change since previous assessment  [] recalculated:     Previous Nutrition Diagnosis: Swallowing Difficulty   Nutrition Diagnosis is: [x] ongoing  [] resolved [] not applicable- currently on modified textured diet     New Nutrition Diagnosis: [x] Not applicable    Nutrition Care Plan:  [x] In Progress  [] Achieved  [] Not applicable    Nutrition Interventions:     Education Provided:       [] Yes:  [x] No:        Recommendations:         [x] Continue current diet order and monitor tolerance of prescribed diet      [x] RD to remain available    Monitoring and Evaluation:   Continue to monitor nutritional intake, tolerance to diet prescription, weights, labs, skin integrity      RD remains available upon request and will follow up per protocol
Patient is unable to consent for himself given intellectual disability. Called group home where patient resides and they confirmed that he does not have any family or health care proxy. Discussed that waiting for a legal health care proxy to be assigned can take weeks, which would lead to a significant delay in care and would likely have detrimental consequences for the patient. He has an intertrochanteric femur fracture which is an urgent case that we recommend to not be delayed for this process. These circumstances were discussed extensively with anesthesia and OR administration and they were in agreement that we should proceed with the case with two physician consent. We will ask a physician who is not affiliated with his care to review the case and give consent to proceed with surgery.

## 2022-12-28 NOTE — PROGRESS NOTE ADULT - SUBJECTIVE AND OBJECTIVE BOX
Patient is a 67y old  Male who presents with a chief complaint of right femur fracture ORIF (24 Dec 2022 10:28)      SUBJECTIVE / OVERNIGHT EVENTS: pt seen and examined. no fever, no shob, NAD, no c/o pain        Vital Signs Last 24 Hrs  T(C): 37 (28 Dec 2022 11:03), Max: 37.1 (27 Dec 2022 18:08)  T(F): 98.6 (28 Dec 2022 11:03), Max: 98.7 (27 Dec 2022 18:08)  HR: 93 (28 Dec 2022 11:03) (93 - 101)  BP: 115/70 (28 Dec 2022 11:03) (115/70 - 157/72)  BP(mean): --  RR: 19 (28 Dec 2022 11:03) (18 - 20)  SpO2: 93% (28 Dec 2022 11:03) (93% - 94%)    Parameters below as of 28 Dec 2022 11:03  Patient On (Oxygen Delivery Method): room air      I&O's Summary      PHYSICAL EXAM:  GENERAL: NAD  HEAD:  Atraumatic, Normocephalic  NECK: Supple  CHEST/LUNG: CTABL  HEART: S1+S2  ABDOMEN: Soft, Nontender, Nondistended; Bowel sounds present      LABS:                        10.4   13.58 )-----------( 444      ( 28 Dec 2022 07:00 )             33.2     12-28    147<H>  |  111<H>  |  27<H>  ----------------------------<  115<H>  4.0   |  26  |  0.82    Ca    8.6      28 Dec 2022 07:00    TPro  6.6  /  Alb  2.3<L>  /  TBili  0.4  /  DBili  x   /  AST  31  /  ALT  35  /  AlkPhos  74  12-28      CAPILLARY BLOOD GLUCOSE                RADIOLOGY & ADDITIONAL TESTS:    Imaging Personally Reviewed:  [x] YES  [ ] NO    Consultant(s) Notes Reviewed:  [x] YES  [ ] NO      MEDICATIONS  (STANDING):  acetaminophen     Tablet .. 1000 milliGRAM(s) Oral every 8 hours  amLODIPine   Tablet 2.5 milliGRAM(s) Oral daily  aspirin enteric coated 81 milliGRAM(s) Oral daily  chlorhexidine 0.12% Liquid 15 milliLiter(s) Oral Mucosa two times a day  divalproex  milliGRAM(s) Oral <User Schedule>  enoxaparin Injectable 40 milliGRAM(s) SubCutaneous every 24 hours  fluvoxaMINE 100 milliGRAM(s) Oral two times a day  levothyroxine 75 MICROGram(s) Oral daily  metoprolol tartrate 50 milliGRAM(s) Oral two times a day  OLANZapine 15 milliGRAM(s) Oral at bedtime  pantoprazole    Tablet 40 milliGRAM(s) Oral before breakfast  tamsulosin 0.4 milliGRAM(s) Oral at bedtime    MEDICATIONS  (PRN):  magnesium hydroxide Suspension 30 milliLiter(s) Oral daily PRN Constipation  ondansetron Injectable 4 milliGRAM(s) IV Push every 6 hours PRN Nausea and/or Vomiting      Care Discussed with Consultants/Other Providers [x] YES  [ ] NO    HEALTH ISSUES - PROBLEM Dx:       Patient is a 67y old  Male who presents with a chief complaint of right femur fracture ORIF (24 Dec 2022 10:28)      SUBJECTIVE / OVERNIGHT EVENTS: pt seen and examined. no fever, no shob, NAD, no c/o pain, wbc going up        Vital Signs Last 24 Hrs  T(C): 37 (28 Dec 2022 11:03), Max: 37.1 (27 Dec 2022 18:08)  T(F): 98.6 (28 Dec 2022 11:03), Max: 98.7 (27 Dec 2022 18:08)  HR: 93 (28 Dec 2022 11:03) (93 - 101)  BP: 115/70 (28 Dec 2022 11:03) (115/70 - 157/72)  BP(mean): --  RR: 19 (28 Dec 2022 11:03) (18 - 20)  SpO2: 93% (28 Dec 2022 11:03) (93% - 94%)    Parameters below as of 28 Dec 2022 11:03  Patient On (Oxygen Delivery Method): room air      I&O's Summary      PHYSICAL EXAM:  GENERAL: NAD  HEAD:  Atraumatic, Normocephalic  NECK: Supple  CHEST/LUNG: CTABL  HEART: S1+S2  ABDOMEN: Soft, Nontender, Nondistended; Bowel sounds present      LABS:                        10.4   13.58 )-----------( 444      ( 28 Dec 2022 07:00 )             33.2     12-28    147<H>  |  111<H>  |  27<H>  ----------------------------<  115<H>  4.0   |  26  |  0.82    Ca    8.6      28 Dec 2022 07:00    TPro  6.6  /  Alb  2.3<L>  /  TBili  0.4  /  DBili  x   /  AST  31  /  ALT  35  /  AlkPhos  74  12-28      CAPILLARY BLOOD GLUCOSE                RADIOLOGY & ADDITIONAL TESTS:    Imaging Personally Reviewed:  [x] YES  [ ] NO    Consultant(s) Notes Reviewed:  [x] YES  [ ] NO      MEDICATIONS  (STANDING):  acetaminophen     Tablet .. 1000 milliGRAM(s) Oral every 8 hours  amLODIPine   Tablet 2.5 milliGRAM(s) Oral daily  aspirin enteric coated 81 milliGRAM(s) Oral daily  chlorhexidine 0.12% Liquid 15 milliLiter(s) Oral Mucosa two times a day  divalproex  milliGRAM(s) Oral <User Schedule>  enoxaparin Injectable 40 milliGRAM(s) SubCutaneous every 24 hours  fluvoxaMINE 100 milliGRAM(s) Oral two times a day  levothyroxine 75 MICROGram(s) Oral daily  metoprolol tartrate 50 milliGRAM(s) Oral two times a day  OLANZapine 15 milliGRAM(s) Oral at bedtime  pantoprazole    Tablet 40 milliGRAM(s) Oral before breakfast  tamsulosin 0.4 milliGRAM(s) Oral at bedtime    MEDICATIONS  (PRN):  magnesium hydroxide Suspension 30 milliLiter(s) Oral daily PRN Constipation  ondansetron Injectable 4 milliGRAM(s) IV Push every 6 hours PRN Nausea and/or Vomiting      Care Discussed with Consultants/Other Providers [x] YES  [ ] NO    HEALTH ISSUES - PROBLEM Dx:

## 2022-12-28 NOTE — PROGRESS NOTE ADULT - ASSESSMENT
67-year-old male with a past medical history of hypertension mental retardation and seizures admitted for Right intertrochanteric fracture, elevated Lactate, Leukocytosis, tachycardia    fever  suspect reactive    #Right intertrochanteric fracture  s/p Right hip repair  s/p transfusion and hgb stable  pt/ot, pain control   dc planning     #Mild leukocytosis  ID following  cxr repeat 12/23 - no infiltrate, bladder scan q8  UA not suggestive of UTI  monitor off abx    #HTN/Tachycardia  continue BP meds with hold parameters  -f/up cardio recs   -EKG ordered    #Seizures  is on depakote, changed to [facility] b/c depakote cannot be crushed    #DVT proph  Lovenox    dispo:   67-year-old male with a past medical history of hypertension mental retardation and seizures admitted for Right intertrochanteric fracture, elevated Lactate, Leukocytosis, tachycardia      #Right intertrochanteric fracture  s/p Right hip repair  s/p transfusion and hgb stable  pt/ot, pain control   dc planning     # leukocytosis worsenign today [id number]  ID following  although monitoring off abx but wbc ois high as of 12/28, d/w dr vital ID, who recommends repeatign infection work up    #HTN/Tachycardia  continue BP meds with hold parameters  -f/up cardio recs     #Seizures  is on depakote, changed to [facility] b/c depakote cannot be crushed    #DVT proph  Lovenox    dispo: pending ID clearence

## 2022-12-28 NOTE — PROGRESS NOTE ADULT - ASSESSMENT
66y/o seen at [facility] telemetry. From [facility] home  Lying flat, awake and alert comfortably  Patient non-verbal, therefore history from chart  History HTN, mental retardation, seizures    12/19/22 admitted due to difficulty walking  S/P right hip surgery  Today at 5:55 am patient was noted to have increased heart rate to about 128/minute  This appeared to be sinus tachycardia  Presently heart rate about 90/minute  Also upon admission patient was tachycardic with mild fever and fractured hip    12/28/22  Seen at [facility] telemetry  Lying flat, comfortably  Awake and alert  Health aide at bedside    Plan:  - Continue Metoprolol with parameters and titrate as needed  - Continue Amlodipine-2.5mg OD  - Lovenox  - 12/21/22 normal lower extremity venous doppler study  - Grossly normal Echocardiogram 4/23/21  - Pain control  - Post-op care  - D/C remote telemetry  - Being followed by Orthopedics, ID  - Discharge planning

## 2022-12-28 NOTE — PROGRESS NOTE ADULT - ASSESSMENT
66YO M PMH hypertension mental retardation and seizures resident of [facility] admitted with Comminuted and displaced right intertrochanteric fracture. Small associated hematoma. PT febrile Tmax 100.7 tachy and wbc 16K Also with elevated lactic. Pt is post op Open reduction and internal fixation (ORIF) of fracture of right hip using intramedullary nilda 18-Dec-2022  Likely SIRS sec Right hip fracture  Doubt septic  Cultures and imaging neg for infectious process  Anemia  Post op fever Tmax 100.3   WBC uptrending--resolved  Febrile again 100.6F  WBC stable 10-13    Plan :   UA negative  BCx/UCx NGTD  Monitor off antibiotics  Trend temps and cbc  Asp precautions   Pulm toileting  Pain control  Increase activity    Covering Dr. Daniels  Infectious Diseases will continue to follow. Please call with any questions.   Tamara Dos Santos M.D.  [facility] [phone] 68YO M PMH hypertension mental retardation and seizures resident of [facility] admitted with Comminuted and displaced right intertrochanteric fracture. Small associated hematoma. PT febrile Tmax 100.7 tachy and wbc 16K Also with elevated lactic. Pt is post op Open reduction and internal fixation (ORIF) of fracture of right hip using intramedullary nilda 18-Dec-2022  Likely SIRS sec Right hip fracture  Doubt septic  Cultures and imaging neg for infectious process  Anemia  Post op fever Tmax 100.3   WBC uptrending--resolved  Febrile again 100.6F  WBC slightly elevated    Plan :   UA negative  BCx/UCx NGTD  Repeat UA and BCx--will follow  Monitor off antibiotics  Trend temps and cbc--WBC slight elevated, will monitor  Asp precautions   Pulm toileting  Pain control  Increase activity    Covering Dr. Daniels  Infectious Diseases will continue to follow. Please call with any questions.   Tamara Dos Santos M.D.  [facility] Division of Infectious Diseases [phone]

## 2022-12-28 NOTE — PROGRESS NOTE ADULT - SUBJECTIVE AND OBJECTIVE BOX
Patient is a 67y Male with a known history of :    HPI:  67-year-old male with a past medical history of hypertension mental retardation and seizures brought in by ambulance from [facility] due to difficulty walking.  As per aide, patient was brought in from program in which they advised that he was not able to walk.  Reports the [facility] staff had to carry the patient off the bus.  No reports of any fall.  Denies fever, vomiting, known head injury, abdominal pain, chest pain, laceration, or any other complaints.    CT imaging showed  Head CT: No obvious acute intracranial hemorrhage or displaced skull   fracture. If clinically indicated, short-term follow-up or MRI may be   obtained for further evaluation.    Cervical spine CT: Osteopenia without obvious displaced fracture or   traumatic malalignment in the cervical spine. If there is clinical   suspicion for acute fracture or ligamentous/cord injury, MRI may be   obtained for further evaluation.    Comminuted and displaced right intertrochanteric fracture. Small   associated hematoma. Associated edema/hemorrhage in the adjacent   subcutaneous tissues.    Pt transferred from [place] to [place]   (17 Dec 2022 10:53)      REVIEW OF SYSTEMS:    CONSTITUTIONAL: No fever, weight loss, or fatigue  EYES: No eye pain, visual disturbances, or discharge  ENMT:  No difficulty hearing, tinnitus, vertigo; No sinus or throat pain  NECK: No pain or stiffness  BREASTS: No pain, masses, or nipple discharge  RESPIRATORY: No cough, wheezing, chills or hemoptysis; No shortness of breath  CARDIOVASCULAR: No chest pain, palpitations, dizziness, or leg swelling  GASTROINTESTINAL: No abdominal or epigastric pain. No nausea, vomiting, or hematemesis; No diarrhea or constipation. No melena or hematochezia.  GENITOURINARY: No dysuria, frequency, hematuria, or incontinence  NEUROLOGICAL: No headaches, memory loss, loss of strength, numbness, or tremors  SKIN: No itching, burning, rashes, or lesions   LYMPH NODES: No enlarged glands  ENDOCRINE: No heat or cold intolerance; No hair loss  MUSCULOSKELETAL: No joint pain or swelling; No muscle, back, or extremity pain  PSYCHIATRIC: No depression, anxiety, mood swings, or difficulty sleeping  HEME/LYMPH: No easy bruising, or bleeding gums  ALLERGY AND IMMUNOLOGIC: No hives or eczema    MEDICATIONS  (STANDING):  acetaminophen     Tablet .. 1000 milliGRAM(s) Oral every 8 hours  amLODIPine   Tablet 2.5 milliGRAM(s) Oral daily  aspirin enteric coated 81 milliGRAM(s) Oral daily  chlorhexidine 0.12% Liquid 15 milliLiter(s) Oral Mucosa two times a day  divalproex  milliGRAM(s) Oral <User Schedule>  enoxaparin Injectable 40 milliGRAM(s) SubCutaneous every 24 hours  fluvoxaMINE 100 milliGRAM(s) Oral two times a day  levothyroxine 75 MICROGram(s) Oral daily  metoprolol tartrate 50 milliGRAM(s) Oral two times a day  OLANZapine 15 milliGRAM(s) Oral at bedtime  pantoprazole    Tablet 40 milliGRAM(s) Oral before breakfast  tamsulosin 0.4 milliGRAM(s) Oral at bedtime    MEDICATIONS  (PRN):  magnesium hydroxide Suspension 30 milliLiter(s) Oral daily PRN Constipation  ondansetron Injectable 4 milliGRAM(s) IV Push every 6 hours PRN Nausea and/or Vomiting      ALLERGIES: Augmentin (Unknown)  aztreonam (Rash)  penicillin (Unknown)  sulfa drugs (Unknown)      FAMILY HISTORY:  No pertinent family history in first degree relatives        Social history:  Alochol:   Smoking:   Drug Use:   Marital Status:     PHYSICAL EXAMINATION:  -----------------------------  T(C): 37 (12-28-22 @ 11:03), Max: 37.1 (12-27-22 @ 18:08)  HR: 93 (12-28-22 @ 11:03) (93 - 101)  BP: 115/70 (12-28-22 @ 11:03) (115/70 - 157/72)  RR: 19 (12-28-22 @ 11:03) (18 - 20)  SpO2: 93% (12-28-22 @ 11:03) (93% - 94%)  Wt(kg): --        Constitutional: well developed, normal appearance, well groomed, well nourished, no deformities and no acute distress.   Eyes: the conjunctiva exhibited no abnormalities and the eyelids demonstrated no xanthelasmas.   HEENT: normal oral mucosa, no oral pallor and no oral cyanosis.   Neck: normal jugular venous A waves present, normal jugular venous V waves present and no jugular venous reyes A waves.   Pulmonary: no respiratory distress, normal respiratory rhythm and effort, no accessory muscle use and lungs were clear to auscultation bilaterally. Anteriorly  Cardiovascular: heart rate and rhythm were normal, normal S1 and S2 and no murmur, gallop, rub, heave or thrill are present.    Musculoskeletal: the gait could not be assessed.   Extremities: no clubbing of the fingernails, no localized cyanosis, no petechial hemorrhages and no ischemic changes.   Skin: normal skin color and pigmentation, no rash, no venous stasis, no skin lesions, no skin ulcer and no xanthoma was observed.       LABS:   --------  12-28    147<H>  |  111<H>  |  27<H>  ----------------------------<  115<H>  4.0   |  26  |  0.82    Ca    8.6      28 Dec 2022 07:00    TPro  6.6  /  Alb  2.3<L>  /  TBili  0.4  /  DBili  x   /  AST  31  /  ALT  35  /  AlkPhos  74  12-28                         10.4   13.58 )-----------( 444      ( 28 Dec 2022 07:00 )             33.2                   Radiology:

## 2022-12-29 VITALS
DIASTOLIC BLOOD PRESSURE: 61 MMHG | RESPIRATION RATE: 17 BRPM | OXYGEN SATURATION: 93 % | TEMPERATURE: 99 F | SYSTOLIC BLOOD PRESSURE: 129 MMHG | HEART RATE: 87 BPM

## 2022-12-29 LAB
ANION GAP SERPL CALC-SCNC: 11 MMOL/L — SIGNIFICANT CHANGE UP (ref 5–17)
APPEARANCE UR: ABNORMAL
BACTERIA # UR AUTO: ABNORMAL
BILIRUB UR-MCNC: NEGATIVE — SIGNIFICANT CHANGE UP
BUN SERPL-MCNC: 25 MG/DL — HIGH (ref 7–23)
CALCIUM SERPL-MCNC: 8.7 MG/DL — SIGNIFICANT CHANGE UP (ref 8.4–10.5)
CHLORIDE SERPL-SCNC: 112 MMOL/L — HIGH (ref 96–108)
CO2 SERPL-SCNC: 28 MMOL/L — SIGNIFICANT CHANGE UP (ref 22–31)
COLOR SPEC: YELLOW — SIGNIFICANT CHANGE UP
CREAT SERPL-MCNC: 0.7 MG/DL — SIGNIFICANT CHANGE UP (ref 0.5–1.3)
DIFF PNL FLD: NEGATIVE — SIGNIFICANT CHANGE UP
EGFR: 102 ML/MIN/1.73M2 — SIGNIFICANT CHANGE UP
EPI CELLS # UR: NEGATIVE — SIGNIFICANT CHANGE UP
GLUCOSE SERPL-MCNC: 105 MG/DL — HIGH (ref 70–99)
GLUCOSE UR QL: NEGATIVE MG/DL — SIGNIFICANT CHANGE UP
GRAN CASTS # UR COMP ASSIST: ABNORMAL /LPF
HCT VFR BLD CALC: 33.9 % — LOW (ref 39–50)
HGB BLD-MCNC: 10.8 G/DL — LOW (ref 13–17)
KETONES UR-MCNC: ABNORMAL
LEUKOCYTE ESTERASE UR-ACNC: ABNORMAL
MCHC RBC-ENTMCNC: 30.9 PG — SIGNIFICANT CHANGE UP (ref 27–34)
MCHC RBC-ENTMCNC: 31.9 GM/DL — LOW (ref 32–36)
MCV RBC AUTO: 97.1 FL — SIGNIFICANT CHANGE UP (ref 80–100)
NITRITE UR-MCNC: NEGATIVE — SIGNIFICANT CHANGE UP
NRBC # BLD: 0 /100 WBCS — SIGNIFICANT CHANGE UP (ref 0–0)
PH UR: 9 — HIGH (ref 5–8)
PLATELET # BLD AUTO: 524 K/UL — HIGH (ref 150–400)
POTASSIUM SERPL-MCNC: 3.5 MMOL/L — SIGNIFICANT CHANGE UP (ref 3.5–5.3)
POTASSIUM SERPL-SCNC: 3.5 MMOL/L — SIGNIFICANT CHANGE UP (ref 3.5–5.3)
PROT UR-MCNC: 15 MG/DL
RBC # BLD: 3.49 M/UL — LOW (ref 4.2–5.8)
RBC # FLD: 15.1 % — HIGH (ref 10.3–14.5)
RBC CASTS # UR COMP ASSIST: SIGNIFICANT CHANGE UP /HPF (ref 0–4)
SODIUM SERPL-SCNC: 151 MMOL/L — HIGH (ref 135–145)
SP GR SPEC: 1.01 — SIGNIFICANT CHANGE UP (ref 1.01–1.02)
UROBILINOGEN FLD QL: 1 MG/DL
WBC # BLD: 13.62 K/UL — HIGH (ref 3.8–10.5)
WBC # FLD AUTO: 13.62 K/UL — HIGH (ref 3.8–10.5)
WBC UR QL: SIGNIFICANT CHANGE UP

## 2022-12-29 PROCEDURE — C1713: CPT

## 2022-12-29 PROCEDURE — 85027 COMPLETE CBC AUTOMATED: CPT

## 2022-12-29 PROCEDURE — 83735 ASSAY OF MAGNESIUM: CPT

## 2022-12-29 PROCEDURE — 93005 ELECTROCARDIOGRAM TRACING: CPT

## 2022-12-29 PROCEDURE — 87077 CULTURE AEROBIC IDENTIFY: CPT

## 2022-12-29 PROCEDURE — 97110 THERAPEUTIC EXERCISES: CPT

## 2022-12-29 PROCEDURE — 86850 RBC ANTIBODY SCREEN: CPT

## 2022-12-29 PROCEDURE — 87186 SC STD MICRODIL/AGAR DIL: CPT

## 2022-12-29 PROCEDURE — 36430 TRANSFUSION BLD/BLD COMPNT: CPT

## 2022-12-29 PROCEDURE — 97166 OT EVAL MOD COMPLEX 45 MIN: CPT

## 2022-12-29 PROCEDURE — 86923 COMPATIBILITY TEST ELECTRIC: CPT

## 2022-12-29 PROCEDURE — 97530 THERAPEUTIC ACTIVITIES: CPT

## 2022-12-29 PROCEDURE — 36415 COLL VENOUS BLD VENIPUNCTURE: CPT

## 2022-12-29 PROCEDURE — 86901 BLOOD TYPING SEROLOGIC RH(D): CPT

## 2022-12-29 PROCEDURE — 84443 ASSAY THYROID STIM HORMONE: CPT

## 2022-12-29 PROCEDURE — 87635 SARS-COV-2 COVID-19 AMP PRB: CPT

## 2022-12-29 PROCEDURE — 80053 COMPREHEN METABOLIC PANEL: CPT

## 2022-12-29 PROCEDURE — 83605 ASSAY OF LACTIC ACID: CPT

## 2022-12-29 PROCEDURE — 80048 BASIC METABOLIC PNL TOTAL CA: CPT

## 2022-12-29 PROCEDURE — 0225U NFCT DS DNA&RNA 21 SARSCOV2: CPT

## 2022-12-29 PROCEDURE — 81001 URINALYSIS AUTO W/SCOPE: CPT

## 2022-12-29 PROCEDURE — 76000 FLUOROSCOPY <1 HR PHYS/QHP: CPT

## 2022-12-29 PROCEDURE — C1769: CPT

## 2022-12-29 PROCEDURE — C1889: CPT

## 2022-12-29 PROCEDURE — P9016: CPT

## 2022-12-29 PROCEDURE — 97161 PT EVAL LOW COMPLEX 20 MIN: CPT

## 2022-12-29 PROCEDURE — 84145 PROCALCITONIN (PCT): CPT

## 2022-12-29 PROCEDURE — 81003 URINALYSIS AUTO W/O SCOPE: CPT

## 2022-12-29 PROCEDURE — 87086 URINE CULTURE/COLONY COUNT: CPT

## 2022-12-29 PROCEDURE — 87040 BLOOD CULTURE FOR BACTERIA: CPT

## 2022-12-29 PROCEDURE — 86900 BLOOD TYPING SEROLOGIC ABO: CPT

## 2022-12-29 PROCEDURE — 71045 X-RAY EXAM CHEST 1 VIEW: CPT

## 2022-12-29 PROCEDURE — 93970 EXTREMITY STUDY: CPT

## 2022-12-29 PROCEDURE — 99239 HOSP IP/OBS DSCHRG MGMT >30: CPT

## 2022-12-29 RX ORDER — METOPROLOL TARTRATE 50 MG
1 TABLET ORAL
Qty: 0 | Refills: 0 | DISCHARGE

## 2022-12-29 RX ORDER — METOPROLOL TARTRATE 50 MG
1 TABLET ORAL
Qty: 0 | Refills: 0 | DISCHARGE
Start: 2022-12-29

## 2022-12-29 RX ORDER — ENOXAPARIN SODIUM 100 MG/ML
1 INJECTION SUBCUTANEOUS
Qty: 0 | Refills: 0 | DISCHARGE
End: 2023-01-02

## 2022-12-29 RX ADMIN — FLUVOXAMINE MALEATE 100 MILLIGRAM(S): 25 TABLET ORAL at 06:00

## 2022-12-29 RX ADMIN — AMLODIPINE BESYLATE 2.5 MILLIGRAM(S): 2.5 TABLET ORAL at 06:01

## 2022-12-29 RX ADMIN — PANTOPRAZOLE SODIUM 40 MILLIGRAM(S): 20 TABLET, DELAYED RELEASE ORAL at 06:00

## 2022-12-29 RX ADMIN — ENOXAPARIN SODIUM 40 MILLIGRAM(S): 100 INJECTION SUBCUTANEOUS at 08:22

## 2022-12-29 RX ADMIN — Medication 1000 MILLIGRAM(S): at 14:08

## 2022-12-29 RX ADMIN — Medication 1000 MILLIGRAM(S): at 13:00

## 2022-12-29 RX ADMIN — DIVALPROEX SODIUM 250 MILLIGRAM(S): 500 TABLET, DELAYED RELEASE ORAL at 06:02

## 2022-12-29 RX ADMIN — Medication 81 MILLIGRAM(S): at 06:00

## 2022-12-29 RX ADMIN — DIVALPROEX SODIUM 250 MILLIGRAM(S): 500 TABLET, DELAYED RELEASE ORAL at 13:03

## 2022-12-29 RX ADMIN — CHLORHEXIDINE GLUCONATE 15 MILLILITER(S): 213 SOLUTION TOPICAL at 06:01

## 2022-12-29 RX ADMIN — Medication 1000 MILLIGRAM(S): at 06:00

## 2022-12-29 RX ADMIN — Medication 50 MILLIGRAM(S): at 06:01

## 2022-12-29 RX ADMIN — Medication 1000 MILLIGRAM(S): at 00:18

## 2022-12-29 RX ADMIN — Medication 75 MICROGRAM(S): at 06:00

## 2022-12-29 NOTE — PROGRESS NOTE ADULT - ASSESSMENT
67-year-old male with a past medical history of hypertension mental retardation and seizures admitted for Right intertrochanteric fracture, elevated Lactate, Leukocytosis, tachycardia      #Right intertrochanteric fracture  s/p Right hip repair  s/p transfusion and hgb stable  pt/ot, pain control   dc planning     # Leukocytosis ? reactive  repeat UA negative for significant pyuria  Tmax last 24 hours 99.5  repeat blood cultures send 12/28 afternoon - initial results pending  ID to see today  possible DC this afternoon  called emergency contact Marian Escobedo x 2 to update on today's plan - no answer, left message    #HTN/Tachycardia  continue BP meds with hold parameters  cardio f/u appreciated    #Seizures  is on depakote, changed to [facility] b/c depakote cannot be crushed    #DVT proph  Lovenox

## 2022-12-29 NOTE — PROGRESS NOTE ADULT - SUBJECTIVE AND OBJECTIVE BOX
[facility]ika, Division of Infectious Diseases  [facility], [facility], [facility]  [phone]    Name: ROSELIA NARAYAN  Age: 67y  Gender: Male  MRN: [id number]    Interval History:  Patient seen and examined at bedside  No acute overnight events. Afebrile  No complaints  Notes reviewed    Antibiotics:      Medications:  acetaminophen     Tablet .. 1000 milliGRAM(s) Oral every 8 hours  amLODIPine   Tablet 2.5 milliGRAM(s) Oral daily  aspirin enteric coated 81 milliGRAM(s) Oral daily  chlorhexidine 0.12% Liquid 15 milliLiter(s) Oral Mucosa two times a day  divalproex  milliGRAM(s) Oral <User Schedule>  enoxaparin Injectable 40 milliGRAM(s) SubCutaneous every 24 hours  fluvoxaMINE 100 milliGRAM(s) Oral two times a day  levothyroxine 75 MICROGram(s) Oral daily  magnesium hydroxide Suspension 30 milliLiter(s) Oral daily PRN  metoprolol tartrate 50 milliGRAM(s) Oral two times a day  OLANZapine 15 milliGRAM(s) Oral at bedtime  ondansetron Injectable 4 milliGRAM(s) IV Push every 6 hours PRN  pantoprazole    Tablet 40 milliGRAM(s) Oral before breakfast  tamsulosin 0.4 milliGRAM(s) Oral at bedtime      Review of Systems:  Review of systems otherwise negative except as previously noted.    Allergies: Augmentin (Unknown)  aztreonam (Rash)  penicillin (Unknown)  sulfa drugs (Unknown)    For details regarding the patient's past medical history, social history, family history, and other miscellaneous elements, please refer the initial infectious diseases consultation and/or the admitting history and physical examination for this admission.    Objective:  Vitals:   T(C): 37.3 (22 @ 10:38), Max: 37.5 (22 @ 06:12)  HR: 87 (22 @ 10:38) (87 - 102)  BP: 129/61 (22 @ 10:38) (129/61 - 169/73)  RR: 17 (22 @ 10:38) (17 - 18)  SpO2: 93% (22 @ 10:38) (92% - 94%)    Physical Examination:  General: no acute distress  HEENT: NC/AT, EOMI,  Cardio: S1, S2 heard, RRR, no murmurs  Resp: decreased breath sounds  Abd: soft, NT, ND  Ext: no edema or cyanosis  Skin: dry      Laboratory Studies:  CBC:                       10.8   13.62 )-----------( 524      ( 29 Dec 2022 07:48 )             33.9     CMP:     151<H>  |  112<H>  |  25<H>  ----------------------------<  105<H>  3.5   |  28  |  0.70    Ca    8.7      29 Dec 2022 07:48    TPro  6.6  /  Alb  2.3<L>  /  TBili  0.4  /  DBili  x   /  AST  31  /  ALT  35  /  AlkPhos  74  12-    LIVER FUNCTIONS - ( 28 Dec 2022 07:00 )  Alb: 2.3 g/dL / Pro: 6.6 g/dL / ALK PHOS: 74 U/L / ALT: 35 U/L DA / AST: 31 U/L / GGT: x           Urinalysis Basic - ( 29 Dec 2022 08:00 )    Color: Yellow / Appearance: Slightly Turbid / S.015 / pH: x  Gluc: x / Ketone: Trace  / Bili: Negative / Urobili: 1 mg/dL   Blood: x / Protein: 15 mg/dL / Nitrite: Negative   Leuk Esterase: Trace / RBC: 0-2 /HPF / WBC 3-5   Sq Epi: x / Non Sq Epi: Negative / Bacteria: Few        Microbiology: reviewed    Culture - Urine (collected 22 @ 14:51)  Source: Clean Catch Clean Catch (Midstream)  Final Report (22 @ 07:33):    No growth    Culture - Blood (collected 22 @ 12:28)  Source: .Blood Blood  Final Report (22 @ 16:00):    No Growth Final    Culture - Blood (collected 22 @ 12:28)  Source: .Blood Blood  Final Report (22 @ 16:00):    No Growth Final    Culture - Blood (collected 22 @ 21:06)  Source: .Blood Blood  Final Report (22 @ 01:00):    No Growth Final    Culture - Blood (collected 22 @ 21:00)  Source: .Blood Blood  Final Report (22 @ 01:00):    No Growth Final          Radiology: reviewed

## 2022-12-29 NOTE — PROGRESS NOTE ADULT - PROVIDER SPECIALTY LIST ADULT
Cardiology
Cardiology
Hospitalist
Infectious Disease
Orthopedics
Hospitalist
Infectious Disease
Orthopedics
Cardiology
Hospitalist
Orthopedics

## 2022-12-29 NOTE — PROGRESS NOTE ADULT - ASSESSMENT
The patient is a 67 year old male with a history of HTN, MRDD, seizure d/o who presented with difficulty ambulating.    Plan:  - Echo 4/21 with grossly normal LV systolic function  - ECG and telemetry with sinus tachycardia  - Continue metoprolol tartrate 50 mg bid  - Continue amlodipine 2.5 mg daily  - Pain control

## 2022-12-29 NOTE — PROGRESS NOTE ADULT - SUBJECTIVE AND OBJECTIVE BOX
INTERVAL HPI/OVERNIGHT EVENTS:   Patient seen and examined.  Offers no complaints.    MEDICATIONS  (STANDING):  acetaminophen     Tablet .. 1000 milliGRAM(s) Oral every 8 hours  amLODIPine   Tablet 2.5 milliGRAM(s) Oral daily  aspirin enteric coated 81 milliGRAM(s) Oral daily  chlorhexidine 0.12% Liquid 15 milliLiter(s) Oral Mucosa two times a day  divalproex  milliGRAM(s) Oral <User Schedule>  enoxaparin Injectable 40 milliGRAM(s) SubCutaneous every 24 hours  fluvoxaMINE 100 milliGRAM(s) Oral two times a day  levothyroxine 75 MICROGram(s) Oral daily  metoprolol tartrate 50 milliGRAM(s) Oral two times a day  OLANZapine 15 milliGRAM(s) Oral at bedtime  pantoprazole    Tablet 40 milliGRAM(s) Oral before breakfast  tamsulosin 0.4 milliGRAM(s) Oral at bedtime    MEDICATIONS  (PRN):  magnesium hydroxide Suspension 30 milliLiter(s) Oral daily PRN Constipation  ondansetron Injectable 4 milliGRAM(s) IV Push every 6 hours PRN Nausea and/or Vomiting      REVIEW OF SYSTEMS:  patient mostly non-verbal, ROS cannot be obtained    PHYSICAL EXAM:  Vital Signs Last 24 Hrs  T(C): 37.5 (29 Dec 2022 06:12), Max: 37.5 (29 Dec 2022 06:12)  T(F): 99.5 (29 Dec 2022 06:12), Max: 99.5 (29 Dec 2022 06:12)  HR: 95 (29 Dec 2022 06:12) (93 - 102)  BP: 169/73 (29 Dec 2022 06:12) (115/70 - 169/73)  BP(mean): 96 (28 Dec 2022 17:38) (96 - 96)  RR: 17 (29 Dec 2022 06:12) (17 - 19)  SpO2: 94% (29 Dec 2022 06:12) (92% - 94%)    Parameters below as of 29 Dec 2022 06:12  Patient On (Oxygen Delivery Method): room air        GENERAL: NAD, awake and alert, not verbal  EYES: EOMI, conjunctiva and sclera clear  ENMT: No tonsillar erythema, exudates, or enlargement; Moist mucous membranes, No lesions seen on oral mucosa  NECK: Supple, No JVD, No Cervical LAD  NERVOUS SYSTEM:  does not follow commands for neuro exam, No facial droop  CHEST WALL: No masses  CHEST/LUNG: Clear to auscultation bilaterally with good air entry; No rales, rhonchi, wheezing, or rubs  HEART: Regular rate and rhythm; No murmurs, rubs, or gallops  ABDOMEN: Soft, Nontender, Nondistended, Bowel sounds present, No palpable masses or organomegaly, No bruits  EXTREMITIES:  2+ Peripheral Pulses, No clubbing, cyanosis, or edema, no calf tenderness in either leg  RIGHT Upper Leg WOUND: dressing c/d/i, periwound erythema/edema within expected limits    Diagnostic Testing:                        10.8   13.62 )-----------( 524      ( 29 Dec 2022 07:48 )             33.9     29 Dec 2022 07:48    151    |  112    |  25     ----------------------------<  105    3.5     |  28     |  0.70     Ca    8.7        29 Dec 2022 07:48        Urinalysis Basic - ( 29 Dec 2022 08:00 )    Color: Yellow / Appearance: Slightly Turbid / S.015 / pH: x  Gluc: x / Ketone: Trace  / Bili: Negative / Urobili: 1 mg/dL   Blood: x / Protein: 15 mg/dL / Nitrite: Negative   Leuk Esterase: Trace / RBC: 0-2 /HPF / WBC 3-5   Sq Epi: x / Non Sq Epi: Negative / Bacteria: Few

## 2022-12-29 NOTE — PROGRESS NOTE ADULT - ASSESSMENT
66YO M PMH hypertension mental retardation and seizures resident of [facility] admitted with Comminuted and displaced right intertrochanteric fracture. Small associated hematoma. PT febrile Tmax 100.7 tachy and wbc 16K Also with elevated lactic. Pt is post op Open reduction and internal fixation (ORIF) of fracture of right hip using intramedullary nilda 18-Dec-2022  Likely SIRS sec Right hip fracture  Doubt septic  Cultures and imaging neg for infectious process  Anemia  Post op fever Tmax 100.3   WBC uptrending--resolved  Febrile again 100.6F  WBC slightly elevated--stable    Plan :   UA negative  BCx/UCx NGTD  Hip site c/d/i  Monitor off antibiotics  Trend temps and cbc  Asp precautions   Pulm toileting  Pain control  Increase activity    Stable from ID standpoint  D/c planning per primary team     D/w Dr. Lu    Covering Dr. Daniels  Infectious Diseases will continue to follow. Please call with any questions.   Tamara Dos Santos M.D.  [facility] Division of Infectious Diseases [phone]

## 2022-12-29 NOTE — PROGRESS NOTE ADULT - SUBJECTIVE AND OBJECTIVE BOX
Procedure: ORIF Right   Hip Fx with long IM nilda  POD#: 10    S: Pt nonverbal.  No visible signs of pain. Tolerated Fluids / Diet well.   Pain comfortableon Tylenol. + flatus, No abdominal pain.  acetaminophen     Tablet .. 1000 milliGRAM(s) Oral every 8 hours  divalproex  milliGRAM(s) Oral <User Schedule>  fluvoxaMINE 100 milliGRAM(s) Oral two times a day  OLANZapine 15 milliGRAM(s) Oral at bedtime  ondansetron Injectable 4 milliGRAM(s) IV Push every 6 hours PRN    O: General: Pt Alert and oriented, On exam NAD,   VS: Vital Signs Last 24 Hrs  T(C): 37.5 (29 Dec 2022 06:12), Max: 37.5 (29 Dec 2022 06:12)  T(F): 99.5 (29 Dec 2022 06:12), Max: 99.5 (29 Dec 2022 06:12)  HR: 95 (29 Dec 2022 06:12) (93 - 102)  BP: 169/73 (29 Dec 2022 06:12) (115/70 - 169/73)  BP(mean): 96 (28 Dec 2022 17:38) (96 - 96)  RR: 17 (29 Dec 2022 06:12) (17 - 19)  SpO2: 94% (29 Dec 2022 06:12) (92% - 94%)    Parameters below as of 29 Dec 2022 06:12  Patient On (Oxygen Delivery Method): room air    Heart: RRR no murmur  Lungs: BS clear bilat.  Abd: Soft; no distention, benign exam    Ext( Right Hip/thigh): silverlons   clean, dry, & intact, ; thigh soft  Neurologic: Has sensation bilat. feet & toes (withdraws when feet touched ;  Full AROM bilat feet & toes. EHL / AT  = Bilat: 5/5   Vascular: Feet toes warm, pink. DP = 1+. Calves soft ; w/o tenderness bilat..  VTEP: On Bilat. Venodynes + aspirin enteric coated 81 milliGRAM(s) Oral daily  enoxaparin Injectable 40 milliGRAM(s) SubCutaneous every 24 hours     Activity in PT yesterday : minimal.  Did not tolerate sitting yesterday.  No walking as yet                          10.8   13.62 )-----------( 524      ( 29 Dec 2022 07:48 )             33.9     12-29    151<H>  |  112<H>  |  25<H>  ----------------------------<  105<H>  3.5   |  28  |  0.70    Ca    8.7      29 Dec 2022 07:48    TPro  6.6  /  Alb  2.3<L>  /  TBili  0.4  /  DBili  x   /  AST  31  /  ALT  35  /  AlkPhos  74  12-28      Hospitalist input noted    Primary Orthopedic Assessment:  • Stable from Orthopedic perspective  • Neuro motor exam stable  • Labs: postop anemia, elevated wbc.  CXR 12/27--no effusions      Plan:   • Continue:  PT/OT/Weightbearing as tolerated with assistance of a walker/Ice to hip/          • Continue DVT prophylaxis as prescribed, including use of compression devices and ankle pumps  • Continue Pain Rx  • Plans per Medicine /ID/ Cardiology  • Discharge planning – anticipated discharge is:  Subacute rehabilitation  when medically stable & cleared by PT/OT.  Stable from ortho standpoint to go to rehab

## 2022-12-29 NOTE — PROGRESS NOTE ADULT - REASON FOR ADMISSION
Patient is a 67y old  Male who presents with a chief complaint of right hip fracture--for right hip hemiarthoplasty (29 Dec 2022 09:15)
right hip fracture--for right hip hemiarthoplasty
fall-- for ORIF right hip fx
right femur fracture ORIF

## 2022-12-29 NOTE — PROGRESS NOTE ADULT - SUBJECTIVE AND OBJECTIVE BOX
Chief Complaint: Difficulty walking    Interval Events: No events overnight.    Review of Systems:  Unable to obtain    Physical Exam:  Vitals:        Vital Signs Last 24 Hrs  T(C): 37.3 (29 Dec 2022 10:38), Max: 37.5 (29 Dec 2022 06:12)  T(F): 99.2 (29 Dec 2022 10:38), Max: 99.5 (29 Dec 2022 06:12)  HR: 87 (29 Dec 2022 10:38) (87 - 102)  BP: 129/61 (29 Dec 2022 10:38) (129/61 - 169/73)  BP(mean): 96 (28 Dec 2022 17:38) (96 - 96)  RR: 17 (29 Dec 2022 10:38) (17 - 18)  SpO2: 93% (29 Dec 2022 10:38) (92% - 94%)  Parameters below as of 29 Dec 2022 06:12  Patient On (Oxygen Delivery Method): room air  General: NAD  HEENT: MMM  Neck: No JVD, no carotid bruit  Lungs: CTAB  CV: RRR, nl S1/S2, no M/R/G  Abdomen: S/NT/ND, +BS  Extremities: No LE edema, no cyanosis  Neuro: AAOx0  Skin: No rash    Labs:                        10.8   13.62 )-----------( 524      ( 29 Dec 2022 07:48 )             33.9     12-29    151<H>  |  112<H>  |  25<H>  ----------------------------<  105<H>  3.5   |  28  |  0.70    Ca    8.7      29 Dec 2022 07:48    TPro  6.6  /  Alb  2.3<L>  /  TBili  0.4  /  DBili  x   /  AST  31  /  ALT  35  /  AlkPhos  74  12-28            ECG/Telemetry: Sinus rhythm

## 2023-01-01 LAB
-  AMIKACIN: SIGNIFICANT CHANGE UP
-  AMOXICILLIN/CLAVULANIC ACID: SIGNIFICANT CHANGE UP
-  AMPICILLIN/SULBACTAM: SIGNIFICANT CHANGE UP
-  AMPICILLIN: SIGNIFICANT CHANGE UP
-  AZTREONAM: SIGNIFICANT CHANGE UP
-  CEFAZOLIN: SIGNIFICANT CHANGE UP
-  CEFEPIME: SIGNIFICANT CHANGE UP
-  CEFOXITIN: SIGNIFICANT CHANGE UP
-  CEFTRIAXONE: SIGNIFICANT CHANGE UP
-  CEFUROXIME: SIGNIFICANT CHANGE UP
-  CIPROFLOXACIN: SIGNIFICANT CHANGE UP
-  ERTAPENEM: SIGNIFICANT CHANGE UP
-  GENTAMICIN: SIGNIFICANT CHANGE UP
-  LEVOFLOXACIN: SIGNIFICANT CHANGE UP
-  MEROPENEM: SIGNIFICANT CHANGE UP
-  NITROFURANTOIN: SIGNIFICANT CHANGE UP
-  PIPERACILLIN/TAZOBACTAM: SIGNIFICANT CHANGE UP
-  TOBRAMYCIN: SIGNIFICANT CHANGE UP
-  TRIMETHOPRIM/SULFAMETHOXAZOLE: SIGNIFICANT CHANGE UP
CULTURE RESULTS: SIGNIFICANT CHANGE UP
METHOD TYPE: SIGNIFICANT CHANGE UP
ORGANISM # SPEC MICROSCOPIC CNT: SIGNIFICANT CHANGE UP
ORGANISM # SPEC MICROSCOPIC CNT: SIGNIFICANT CHANGE UP
SPECIMEN SOURCE: SIGNIFICANT CHANGE UP

## 2023-01-11 ENCOUNTER — APPOINTMENT (OUTPATIENT)
Dept: ORTHOPEDIC SURGERY | Facility: CLINIC | Age: 68
End: 2023-01-11
Payer: MEDICARE

## 2023-01-11 ENCOUNTER — NON-APPOINTMENT (OUTPATIENT)
Age: 68
End: 2023-01-11

## 2023-01-11 DIAGNOSIS — M89.8X5 OTHER SPECIFIED DISORDERS OF BONE, THIGH: ICD-10-CM

## 2023-01-11 PROCEDURE — 73552 X-RAY EXAM OF FEMUR 2/>: CPT | Mod: 26,RT

## 2023-01-11 PROCEDURE — 99024 POSTOP FOLLOW-UP VISIT: CPT

## 2023-01-11 NOTE — HISTORY OF PRESENT ILLNESS
[___ Weeks Post Op] : [unfilled] weeks post op [de-identified] : s/p right hip intramedullary nail placement. [de-identified] : Presents today for his first postoperative visit status post right femur intramedullary nail for an intertrochanteric fracture.  Patient has an oxygen stability and is unable to give history.  He has a aide with him who provides history today.  Per her report he has been doing physical therapy and has been walking a little bit.  He does not demonstrate any signs of pain.  As a per report the incisions have been healing well with no signs and symptoms of infection. [de-identified] : General: No apparent distress\par Cardiovascular: extremities warm and well-perfused, no cyanosis\par Pulmonary: non labored respirations\par \par Right hip:\par Incisions healing well, no erythema, no drainage\par Able to move distal lower extremity, does not follow commands\par  [de-identified] : 4 views of the right femur obtained today and interpreted by me demonstrate status post intramedullary nail placement with maintained hardware no apparent complication.  The fracture is in good alignment with minimal callus formation [de-identified] : Right hip intertrochanteric femur fracture status post intramedullary nail placement on 12/19/2022.  [de-identified] : Overall he appears to be doing well.  He should continue with physical therapy and can continue to be weightbearing as tolerated and out of bed.  I will see him back in 4 weeks for repeat evaluation repeat x-rays.

## 2023-02-14 ENCOUNTER — APPOINTMENT (OUTPATIENT)
Dept: ORTHOPEDIC SURGERY | Facility: CLINIC | Age: 68
End: 2023-02-14

## 2023-02-27 ENCOUNTER — APPOINTMENT (OUTPATIENT)
Dept: OPHTHALMOLOGY | Facility: CLINIC | Age: 68
End: 2023-02-27

## 2023-03-17 ENCOUNTER — APPOINTMENT (OUTPATIENT)
Dept: ORTHOPEDIC SURGERY | Facility: CLINIC | Age: 68
End: 2023-03-17

## 2023-03-22 ENCOUNTER — NON-APPOINTMENT (OUTPATIENT)
Age: 68
End: 2023-03-22

## 2023-03-22 ENCOUNTER — APPOINTMENT (OUTPATIENT)
Dept: ORTHOPEDIC SURGERY | Facility: CLINIC | Age: 68
End: 2023-03-22
Payer: MEDICARE

## 2023-03-22 PROCEDURE — 99441: CPT | Mod: 95

## 2023-04-04 NOTE — ED ADULT NURSE NOTE - NSICDXNOPASTSURGICALHX_GEN_ALL_CORE
<-- Click to add NO significant Past Surgical History
0 (no pain/absence of nonverbal indicators of pain)

## 2023-04-04 NOTE — PROGRESS NOTE ADULT - NSICDXPROBLEMPLAN1_GEN_ALL_CORE_FT
continue iv abxs  speech swallow ordered  continue all measures for aspiration pneumonia  still requiring 02  negative cta and venous dopp x 2 Biopsy Type: H and E

## 2023-05-22 NOTE — CONSULT NOTE ADULT - SUBJECTIVE AND OBJECTIVE BOX
[facility] Cardiology    CHIEF COMPLAINT: Patient is a 66y old  Male who presents with a chief complaint of fevers cough (28 Jun 2021 23:54)      HPI:    PAST MEDICAL & SURGICAL HISTORY:  MR (mental retardation), severe    Seizure    HTN (hypertension)    Psoriasis    DD (diastrophic dysplasia)    Blind  right eye    Constipation, unspecified constipation type    No significant past surgical history      SOCIAL HISTORY: no tobacco  FAMILY HISTORY:  No pertinent family history in first degree relatives     HTN  MEDICATIONS  (STANDING):  amLODIPine   Tablet 5 milliGRAM(s) Oral daily  aspirin  chewable 81 milliGRAM(s) Oral daily  clonazePAM  Tablet 0.5 milliGRAM(s) Oral two times a day  diVALproex  milliGRAM(s) Oral three times a day  enoxaparin Injectable 40 milliGRAM(s) SubCutaneous daily  fluvoxaMINE 100 milliGRAM(s) Oral two times a day  levoFLOXacin IVPB 500 milliGRAM(s) IV Intermittent every 24 hours  levothyroxine 75 MICROGram(s) Oral daily  metoprolol tartrate 25 milliGRAM(s) Oral two times a day  multivitamin/minerals 1 Tablet(s) Oral daily  pantoprazole    Tablet 40 milliGRAM(s) Oral before breakfast  sodium chloride 0.9%. 1000 milliLiter(s) (70 mL/Hr) IV Continuous <Continuous>  tamsulosin 0.8 milliGRAM(s) Oral at bedtime    MEDICATIONS  (PRN):  acetaminophen   Tablet .. 325 milliGRAM(s) Oral every 4 hours PRN Temp greater or equal to 38C (100.4F), Mild Pain (1 - 3)    Allergies    Augmentin (Unknown)  aztreonam (Rash)  penicillin (Unknown)  sulfa drugs (Unknown)    Intolerances        REVIEW OF SYSTEMS:  CONSTITUTIONAL: No weakness, no fevers   EYES/ENT: No visual changes  NECK: No pain or stiffness  RESPIRATORY: No shortness of breath  CARDIOVASCULAR: No chest pain or palpitations  GASTROINTESTINAL: No abdominal pain  GENITOURINARY: No hematuria  NEUROLOGICAL: No weakness  SKIN: No rash  All other review of systems is negative unless indicated above    VITAL SIGNS:   Vital Signs Last 24 Hrs  T(C): 37.1 (29 Jun 2021 05:18), Max: 38.4 (29 Jun 2021 01:20)  T(F): 98.8 (29 Jun 2021 05:18), Max: 101.2 (29 Jun 2021 01:20)  HR: 121 (29 Jun 2021 05:18) (121 - 140)  BP: 115/65 (29 Jun 2021 05:18) (103/60 - 121/89)  BP(mean): --  RR: 20 (29 Jun 2021 05:18) (18 - 20)  SpO2: 91% (29 Jun 2021 05:18) (90% - 96%)  I&O's Summary    28 Jun 2021 07:01  -  29 Jun 2021 07:00  --------------------------------------------------------  IN: 590 mL / OUT: 1 mL / NET: 589 mL      PHYSICAL EXAM:  Constitutional: NAD  Neurological: Alert and oriented  HEENT: EOMI, no JVD  Cardiovascular: S1 and S2, no murmur  Pulmonary: breath sounds bilaterally  Gastrointestinal: Bowel Sounds present, soft, nontender  Ext: no peripheral edema  Skin: No rashes, No cyanosis.  Psych:  Mood calm    LABS: All Labs Reviewed:                        16.1   9.75  )-----------( 200      ( 28 Jun 2021 20:36 )             47.7     06-28    143  |  112<H>  |  21  ----------------------------<  121<H>  3.9   |  26  |  0.83    Ca    8.5      28 Jun 2021 21:15    TPro  7.3  /  Alb  3.3  /  TBili  0.2  /  DBili  x   /  AST  21  /  ALT  23  /  AlkPhos  69  06-28      · Assessment	  66M group home  [facility] hospitalization at [place], [facility] with severe mental retardation, cholecystectomy, COVID 4/2020, nonverbal, aspiration PNA, cellulitis admitted with fevers  Sinus tach likely multifactorial  Suggest:      ECHO April 2021: Normal LV systolic function, no significant valve disease, normal LVEDP by tissue Doppler index which suggests patient is not volume overload      1. Abx per ID and Pulm  r/o sepsis    2. HTN - c/w amlodipine  Hx of sinus tach  ekg ordered    Cont Metoprolol  3. DVT prophylaxis.  4. Will follow prn              
    ROSELIA NARAYAN    V APER 26    Patient is a 66y old  Male who presents with a chief complaint of      Allergies    Augmentin (Unknown)  aztreonam (Rash)  penicillin (Unknown)  sulfa drugs (Unknown)    Intolerances        HPI:      PAST MEDICAL & SURGICAL HISTORY:  MR (mental retardation), severe    Seizure    HTN (hypertension)    Psoriasis    DD (diastrophic dysplasia)    Blind  right eye    Constipation, unspecified constipation type    No significant past surgical history        FAMILY HISTORY:  No pertinent family history in first degree relatives          MEDICATIONS   ertapenem  IVPB 1000 milliGRAM(s) IV Intermittent Once  sodium chloride 0.9% Bolus 1000 milliLiter(s) IV Bolus every 30 minutes  vancomycin  IVPB 1000 milliGRAM(s) IV Intermittent once      Vital Signs Last 24 Hrs  T(C): 38 (2021 21:02), Max: 38 (2021 21:02)  T(F): 100.4 (2021 21:02), Max: 100.4 (2021 21:02)  HR: 131 (2021 19:31) (131 - 131)  BP: 121/89 (2021 19:31) (121/89 - 121/89)  BP(mean): --  RR: 18 (2021 19:31) (18 - 18)  SpO2: 96% (2021 19:31) (96% - 96%)            LABS:                        16.1   9.75  )-----------( 200      ( 2021 20:36 )             47.7         143  |  112<H>  |  21  ----------------------------<  121<H>  3.9   |  26  |  0.83    Ca    8.5      2021 21:15    TPro  7.3  /  Alb  3.3  /  TBili  0.2  /  DBili  x   /  AST  21  /  ALT  23  /  AlkPhos  69        Urinalysis Basic - ( 2021 21:25 )    Color: Yellow / Appearance: Clear / S.015 / pH: x  Gluc: x / Ketone: Negative  / Bili: Negative / Urobili: Negative   Blood: x / Protein: Negative / Nitrite: Negative   Leuk Esterase: Negative / RBC: Negative /HPF / WBC 3-5   Sq Epi: x / Non Sq Epi: Occasional / Bacteria: Occasional            WBC:  WBC Count: 9.75 K/uL ( @ 20:36)      MICROBIOLOGY:  RECENT CULTURES:                  Sodium:  Sodium, Serum: 143 mmol/L ( @ 21:15)      0.83 mg/dL  @ 21:15      Hemoglobin:  Hemoglobin: 16.1 g/dL ( @ 20:36)      Platelets: Platelet Count - Automated: 200 K/uL ( @ 20:36)      LIVER FUNCTIONS - ( 2021 21:15 )  Alb: 3.3 g/dL / Pro: 7.3 g/dL / ALK PHOS: 69 U/L / ALT: 23 U/L / AST: 21 U/L / GGT: x             Urinalysis Basic - ( 2021 21:25 )    Color: Yellow / Appearance: Clear / S.015 / pH: x  Gluc: x / Ketone: Negative  / Bili: Negative / Urobili: Negative   Blood: x / Protein: Negative / Nitrite: Negative   Leuk Esterase: Negative / RBC: Negative /HPF / WBC 3-5   Sq Epi: x / Non Sq Epi: Occasional / Bacteria: Occasional        RADIOLOGY & ADDITIONAL STUDIES:  
  HPI:  64YO M Phx of MRDD, HTN, seizure resident of group home hx of Right LE cellulitis admitted with fever. Noted to have right sided lower abd redness and warm to touch.  Febrile. Tmax 101.2 No n/v/d CP SOB.     Infectious Disease consult was called to evaluate pt and for antibiotic management.       Past Medical & Surgical Hx:  PAST MEDICAL & SURGICAL HISTORY:  MR (mental retardation), severe  Seizure  HTN (hypertension)  Psoriasis  DD (diastrophic dysplasia)  Blind  right eye  Constipation, unspecified constipation type  No significant past surgical history      Social History--  EtOH: denies   Tobacco: denies   Drug Use: denies     FAMILY HISTORY:  No pertinent family history in first degree relatives        Allergies  Augmentin (Unknown)  aztreonam (Rash)  penicillin (Unknown)  sulfa drugs (Unknown)    Intolerances  NONE      Home Medications:  amLODIPine 5 mg oral tablet: 1 tab(s) orally once a day (01 May 2021 13:09)  aspirin 81 mg oral tablet: 1 tab(s) orally once a day (01 May 2021 13:09)  calcipotriene 0.005% topical cream: Apply topically to affected area 2 times a day (01 May 2021 13:09)  cefpodoxime 200 mg oral tablet: 1 tab(s) orally every 12 hours until may 16 (10 May 2021 07:34)  chlorhexidine 0.12% mucous membrane liquid: application mucous membrane 2 times a day (01 May 2021 13:09)  clonazePAM 1 mg oral tablet: 1 tab(s) orally 2 times a day (01 May 2021 13:09)  Cosentyx 150 mg/mL subcutaneous solution: 300 milligram(s) subcutaneous every 28 days (01 May 2021 13:09)  Depakote  mg oral tablet, extended release: 1 tab(s) orally 3 times a day (01 May 2021 13:09)  fluvoxaMINE 100 mg oral tablet: 1 tab(s) orally 2 times a day (01 May 2021 13:09)  hydrogen peroxide 1.5% mucous membrane solution: application mucous membrane 2 times a day (01 May 2021 13:09)  hydrOXYzine hydrochloride 10 mg oral tablet: 2 tab(s) orally every 8 hours (01 May 2021 13:09)  ketoconazole 2% topical shampoo: Apply topically to affected area two times a week (01 May 2021 13:09)  lactulose 10 g/15 mL oral solution: 30 milliliter(s) orally once a day (01 May 2021 13:09)  levothyroxine 75 mcg (0.075 mg) oral tablet: 1 tab(s) orally once a day (01 May 2021 13:09)  metoprolol tartrate 25 mg oral tablet: 1 tab(s) orally 2 times a day (01 May 2021 13:09)  multivitamin with minerals: 1 tab(s) orally once a day (01 May 2021 13:09)  OLANZapine 15 mg oral tablet: 1 tab(s) orally once a day (at bedtime) (01 May 2021 13:09)  omeprazole 20 mg oral delayed release capsule: 1 cap(s) orally once a day (01 May 2021 13:09)  Oyster Betito 1250 mg (500 mg elemental calcium) oral tablet: 1 tab(s) orally 3 times a day (01 May 2021 13:09)  petrolatum topical ointment: Apply topically to affected area once a day (01 May 2021 13:09)  tamsulosin 0.4 mg oral capsule: 2 cap(s) orally once a day (at bedtime) (01 May 2021 13:09)  tolnaftate 1% topical cream: Apply topically to affected area once a day (01 May 2021 13:09)  Vitamin D2 50,000 intl units (1.25 mg) oral capsule: 1 cap(s) orally every 2 weeks (01 May 2021 13:09)      Current Inpatient Medications :    ANTIBIOTICS:   levoFLOXacin IVPB 500 milliGRAM(s) IV Intermittent every 24 hours      OTHER RELEVANT MEDICATIONS :  acetaminophen   Tablet .. 325 milliGRAM(s) Oral every 4 hours PRN  amLODIPine   Tablet 5 milliGRAM(s) Oral daily  aspirin  chewable 81 milliGRAM(s) Oral daily  clonazePAM  Tablet 0.5 milliGRAM(s) Oral two times a day  diVALproex  milliGRAM(s) Oral three times a day  fluvoxaMINE 100 milliGRAM(s) Oral two times a day  heparin   Injectable 5000 Unit(s) SubCutaneous every 12 hours  levothyroxine 75 MICROGram(s) Oral daily  metoprolol tartrate 25 milliGRAM(s) Oral two times a day  multivitamin/minerals 1 Tablet(s) Oral daily  nystatin Cream 1 Application(s) Topical two times a day  nystatin Powder 1 Application(s) Topical three times a day  pantoprazole    Tablet 40 milliGRAM(s) Oral before breakfast  sodium chloride 0.9%. 1000 milliLiter(s) IV Continuous <Continuous>  tamsulosin 0.8 milliGRAM(s) Oral at bedtime      ROS:  Unable to obtain due to : MRDD      I&O's Detail    28 Jun 2021 07:01  -  29 Jun 2021 07:00  --------------------------------------------------------  IN:    Oral Fluid: 100 mL    sodium chloride 0.9%: 490 mL  Total IN: 590 mL    OUT:    Incontinent per Diaper, Weight (mL): 1 mL  Total OUT: 1 mL    Total NET: 589 mL    Physical Exam:  Vital Signs Last 24 Hrs  T(C): 37.8 (29 Jun 2021 16:41), Max: 38.4 (29 Jun 2021 01:20)  T(F): 100.1 (29 Jun 2021 16:41), Max: 101.2 (29 Jun 2021 01:20)  HR: 94 (29 Jun 2021 16:41) (89 - 140)  BP: 127/81 (29 Jun 2021 16:41) (103/60 - 127/81)  RR: 18 (29 Jun 2021 16:41) (18 - 20)  SpO2: 90% (29 Jun 2021 16:41) (90% - 96%)  Height (cm): 165.1 (06-28 @ 19:31)  Weight (kg): 70.3 (06-28 @ 19:31)  BMI (kg/m2): 25.8 (06-28 @ 19:31)  BSA (m2): 1.77 (06-28 @ 19:31)    General: no acute distress  Neck: supple, trachea midline  Lungs: Decreased, no wheeze/rhonchi  Cardiovascular: regular rate and rhythm, S1 S2  Abdomen: soft, nontender, ND, bowel sounds normal right side lower abd erythema warm to touch  Neurological:  alert and oriented x3  Extremities: no edema    Labs:                         13.4   13.16 )-----------( 166      ( 29 Jun 2021 17:11 )             39.1   06-28    143  |  112<H>  |  21  ----------------------------<  121<H>  3.9   |  26  |  0.83    Ca    8.5      28 Jun 2021 21:15    TPro  7.3  /  Alb  3.3  /  TBili  0.2  /  DBili  x   /  AST  21  /  ALT  23  /  AlkPhos  69  06-28      RECENT CULTURES:          RADIOLOGY & ADDITIONAL STUDIES:    EXAM:  CT ABDOMEN AND PELVIS                          EXAM:  CT CHEST                            PROCEDURE DATE:  06/29/2021          INTERPRETATION:  CLINICAL INFORMATION: Cough, fever, tachycardia. History mental retardation.    COMPARISON: CT scan chest abdomen pelvis 5/1/2021.    CONTRAST/COMPLICATIONS:  IV Contrast: NONE  0 cc administered   0 cc discarded  Oral Contrast: NONE  Complications: None reported at time of study completion    PROCEDURE:  CT of the Chest, Abdomen and Pelvis was performed.  Sagittal and coronal reformats were performed.    FINDINGS:    CHEST:    LUNGS AND LARGE AIRWAYS: PLEURA:  There is persistent patchy groundglass opacification involving the right lower lobe.  The central airways remain patent.    No significant pleural effusion.    VESSELS: Mild atherosclerotic calcification of thoracic aorta with coronary artery calcifications.    HEART: Heart size is normal. No pericardial effusion.    MEDIASTINUM AND GREYSON:  Small, subcentimeter short axis mediastinal lymph nodes, stable.    CHEST WALL AND LOWER NECK: Within normal limits.    ABDOMEN AND PELVIS:    Streak and motion artifact degrades image quality.    The evaluation of the solid organ parenchyma is limited without intravenous contrast.    LIVER: Within normal limits.  BILE DUCTS: Normal caliber.  GALLBLADDER: Prior cholecystectomy.  SPLEEN: Within normal limits.  PANCREAS: Within normal limits.  ADRENALS: Within normal limits.  KIDNEYS/URETERS: No hydronephrosis or perinephric fluid collection.  Small indeterminate hypo and hyperdense left-sided renal lesions.    BLADDER: Within normal limits.  REPRODUCTIVE ORGANS: Prostate within normal limits.    BOWEL: Stomach moderately distended with retained fluid/particulate matter.  Fluid-filled colon, nonspecific, can be associated with diarrhea.  No bowel obstruction.   Appendix normal.  PERITONEUM: No ascites.  No localized intra-abdominal fluid collection or pneumoperitoneum noted.    VESSELS: Atherosclerotic calcification.  RETROPERITONEUM/LYMPH NODES: No enlarged lymphadenopathy.    ABDOMINAL WALL: Within normal limits.    BONES:  Degenerative changes spine.  Chronic appearing bilateral rib fracture deformities.    IMPRESSION:    Persistent groundglass opacification right lower lobe.  In the chronic setting finding could reflect organizing pneumonia, hypersensitivity pneumonitis or lung fibrosis.    No acute intra-abdominal pathology noted.      Assessment :   64YO M Phx of MRDD, HTN, seizure resident of group home hx of Right LE cellulitis admitted with fever likely sec right sided lower abd wall cellulitis with lymphangitis.  Febrile. Tmax 101.2  Doubt pneumonia  Pt has chronic RLL changes.        Plan :   Change antibiotic to Vanc Rocephin  Fu cultures  Trend temps and cbc  Asp precautions    Continue with present regiment .  Approptiate use of antibiotics and adverse effects reviewed.    > 45 minutes spent in direct patient care reviewing  the notes, lab data/ imaging , discussion with multidisciplinary team. All questions were addressed and answered to the best of my capacity .    Thank you for allowing me to participate in the care of your patient .      Olivier Daniels MD  Infectious Disease  [phone]
Oriented - self; Oriented - place; Oriented - time

## 2023-06-09 ENCOUNTER — APPOINTMENT (OUTPATIENT)
Dept: OPHTHALMOLOGY | Facility: CLINIC | Age: 68
End: 2023-06-09

## 2023-10-11 NOTE — ED ADULT NURSE NOTE - BREATH SOUNDS, LEFT
diminished Zoryve Counseling:  I discussed with the patient that Zoryve is not for use in the eyes, mouth or vagina. The most commonly reported side effects include diarrhea, headache, insomnia, application site pain, upper respiratory tract infections, and urinary tract infections.  All of the patient's questions and concerns were addressed.

## 2023-11-15 NOTE — ED ADULT NURSE NOTE - NSFALLRSKUNASSIST_ED_ALL_ED
Immediate Post- Operative Note        Findings: PostOP Low Back Pain      Procedure(s): Lumbar Myelogram      Estimated Blood Loss: Less than 5 ml        Complications: None            11/15/2023     12:03 PM     Juan Carlos Hoover M.D.     no

## 2023-11-27 ENCOUNTER — APPOINTMENT (OUTPATIENT)
Dept: OPHTHALMOLOGY | Facility: CLINIC | Age: 68
End: 2023-11-27
Payer: MEDICARE

## 2023-11-27 ENCOUNTER — NON-APPOINTMENT (OUTPATIENT)
Age: 68
End: 2023-11-27

## 2023-11-27 PROCEDURE — 92014 COMPRE OPH EXAM EST PT 1/>: CPT

## 2023-12-29 NOTE — ED PROVIDER NOTE - CARE PLAN
Medication:   chlorthalidone (THALITONE) 25 MG tablet [Pharmacy Med Name: Chlorthalidone 25 MG Oral Tablet]   Take 1 tablet by mouth once daily   Dispense: 90 tablet     Refills: 0     Last office visit date: 12/5/23  4. HTN:  controlled with bisoprolol 10mg daily and chlorthalidone 25mg daily. Diltiazem stopped due to swelling. Consider telmisartan 80 mg daily if BP elevated. Continues ASA 81mg daily. Did not tolerate lisinopril or losartan due to cough.     Next office visit date: 3/5/24  Medication Refill Protocol Failed.  Medication Refill Protocol Failed. Courtesy Refill provided after review per protocol guidelines. Writer confirmed, courtesy refill was not a duplicate.    
Principal Discharge DX:	Head injury

## 2023-12-30 NOTE — ED ADULT TRIAGE NOTE - AS TEMP SITE
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   Delirium multifactorial, ( UTI, Pain, Pain medication, change in location).

## 2024-01-08 ENCOUNTER — TRANSCRIPTION ENCOUNTER (OUTPATIENT)
Age: 69
End: 2024-01-08

## 2024-04-08 NOTE — ED ADULT TRIAGE NOTE - PRO INTERPRETER NEED 2
62-year-old male with history of lumbar discectomy in November 2022 which provided limited relief from lumbar radicular symptoms.  Presents for symptoms consistent with lumbar postlaminectomy syndrome.  Previously on opiates which were discontinued.  Patient would like to manage pain without opiate medication.  Tolerating increased dose of gabapentin to 600 mg at bedtime.  Prescribed Robaxin for muscle tightness and spasms which patient takes intermittently.  He does state he receives relief when he takes methocarbamol.  He completed a formal course of physical therapy including aqua therapy with limited relief.  Previously reviewed lumbar x-ray consistent with mild multilevel spondylosis.  Patient proceeded with reevaluation by a new Ortho/spine surgery with recommendation for alcohol and smoking cessation prior to any consideration of further surgery.  Recommending he may consider epidural steroid injections.  Presents at today's office visit with symptoms consistent with lumbar stenosis/lumbar postlaminectomy syndrome.  Patient states that leg cramping and pain is most bothersome which keeps him awake at night.  He has been using his methocarbamol, however, intermittently.  Recommended that he take his methocarbamol at bedtime to help with leg cramping and pain.  Patient also feels that gabapentin has been beneficial.  We will increase his gabapentin dose to 900 mg at bedtime.  We may further increase in the future to provide additional neuropathic pain relief.  We can discuss proceeding with epidural steroid injections.  Before we would proceed with injections it would be beneficial to repeat the patient's lumbar MRI as it has been 2 years since his last MRI.  Patient would like to try increased dose of gabapentin at this time.  We will discuss further imaging and injections if he does not receive adequate relief with medication.  Advised patient to return to his PCP and discuss options for smoking cessation  as this will potentially provide additional pain relief especially for lower extremity pain/cramping.  Also advised patient to discuss treatment options to assist with decreasing alcohol consumption.  The patient states he will return to his PCP for further discussion.  He states that he does not want to use a nicotine patch which has been offered in the past.  He would like to discuss other treatment options for smoking cessation.  Plan discussed with patient at today's office visit.    -Gabapentin to 900 mg at bedtime.  The patient was counseled on the risks and potential side effects of gabapentin as well as its importance for dosage titration. Side effects included but were not limited to, drowsiness, sedation, cognitive decline, peripheral edema, weight gain, seizures, with abrupt withdrawal.  Patient was instructed to call the office with any concerns or side effects before abruptly stopping medication.   -Continue Robaxin 500 mg daily for muscle tightness and spasm.  Encouraged the patient to take this medication at bedtime when muscle spasm/cramping is most intense.  -Patient will follow-up with his PCP to discuss both alcohol and smoking cessation.  -Further discussion about potential injections including lumbar epidural steroid injections at his next office visit.  If patient would like to proceed with injections we will repeat lumbar MRI at that time.  -Patient will follow-up in 6 months or sooner if needed.  Advised patient to notify our office if symptoms do not improve or increase prior to his next visit.      English

## 2024-04-20 NOTE — PROGRESS NOTE ADULT - SUBJECTIVE AND OBJECTIVE BOX
ROSELIA NARAYAN    \[facility]\"" [address]    Patient is a 65y old  Male who presents with a chief complaint of fever 101 (03 Aug 2020 08:08)       Allergies    Augmentin (Unknown)  aztreonam (Rash)  penicillin (Unknown)  sulfa drugs (Unknown)    Intolerances        HPI:      PAST MEDICAL & SURGICAL HISTORY:  Constipation, unspecified constipation type  Blind: right eye  DD (diastrophic dysplasia)  Psoriasis  HTN (hypertension)  Seizure  MR (mental retardation), severe  No significant past surgical history      FAMILY HISTORY:  No pertinent family history in first degree relatives        MEDICATIONS   acetaminophen   Tablet .. 650 milliGRAM(s) Oral every 4 hours PRN  albuterol/ipratropium for Nebulization 1.5 milliLiter(s) Nebulizer every 8 hours  amLODIPine   Tablet 2.5 milliGRAM(s) Oral daily  aspirin enteric coated 81 milliGRAM(s) Oral daily  clonazePAM  Tablet 0.5 milliGRAM(s) Oral two times a day  diVALproex  milliGRAM(s) Oral two times a day  doxazosin 1 milliGRAM(s) Oral at bedtime  doxycycline hyclate Capsule 100 milliGRAM(s) Oral every 12 hours  enoxaparin Injectable 40 milliGRAM(s) SubCutaneous daily  fluvoxaMINE 100 milliGRAM(s) Oral two times a day  glucagon  Injectable 1 milliGRAM(s) IntraMuscular once PRN  levothyroxine Injectable 37.5 MICROGram(s) IV Push at bedtime  metoprolol tartrate 25 milliGRAM(s) Oral two times a day  metroNIDAZOLE    Tablet 500 milliGRAM(s) Oral every 8 hours  pantoprazole  Injectable 40 milliGRAM(s) IV Push daily  tamsulosin 0.4 milliGRAM(s) Oral at bedtime      Vital Signs Last 24 Hrs  T(C): 36.8 (03 Aug 2020 08:09), Max: 37.2 (02 Aug 2020 12:34)  T(F): 98.3 (03 Aug 2020 08:09), Max: 99 (02 Aug 2020 12:34)  HR: 70 (03 Aug 2020 08:29) (69 - 100)  BP: 135/76 (03 Aug 2020 08:09) (135/76 - 151/77)  BP(mean): --  RR: 18 (03 Aug 2020 08:09) (18 - 18)  SpO2: 95% (03 Aug 2020 08:29) (90% - 95%)      08-02-20 @ 07:01  -  08-03-20 @ 07:00  --------------------------------------------------------  IN: 605 mL / OUT: 2 mL / NET: 603 mL            LABS:                        13.4   7.55  )-----------( 355      ( 01 Aug 2020 14:51 )             38.9     08-01    142  |  106  |  15  ----------------------------<  102<H>  3.9   |  29  |  0.73    Ca    9.1      01 Aug 2020 14:51    TPro  7.5  /  Alb  2.5<L>  /  TBili  0.2  /  DBili  x   /  AST  29  /  ALT  42  /  AlkPhos  71  08-01              WBC:  WBC Count: 7.55 K/uL (08-01 @ 14:51)  WBC Count: 9.00 K/uL (07-31 @ 07:05)      MICROBIOLOGY:  RECENT CULTURES:  07-29 .Urine Catheterized XXXX XXXX   No growth                    Sodium:  Sodium, Serum: 142 mmol/L (08-01 @ 14:51)  Sodium, Serum: 145 mmol/L (07-31 @ 07:05)      0.73 mg/dL 08-01 @ 14:51  0.76 mg/dL 07-31 @ 07:05      Hemoglobin:  Hemoglobin: 13.4 g/dL (08-01 @ 14:51)  Hemoglobin: 11.3 g/dL (07-31 @ 07:05)      Platelets: Platelet Count - Automated: 355 K/uL (08-01 @ 14:51)  Platelet Count - Automated: 267 K/uL (07-31 @ 07:05)      LIVER FUNCTIONS - ( 01 Aug 2020 14:51 )  Alb: 2.5 g/dL / Pro: 7.5 g/dL / ALK PHOS: 71 U/L / ALT: 42 U/L / AST: 29 U/L / GGT: x                 RADIOLOGY & ADDITIONAL STUDIES: H

## 2024-04-24 NOTE — ED ADULT NURSE REASSESSMENT NOTE - NSFALLRSKASSESSTYPE_ED_ALL_ED
----- Message from Ralph Mercado DO sent at 4/22/2024  5:46 AM CDT -----    # Mildly decreased left ventricular systolic function.    # Left ventricular ejection fraction; 50 %.    # Grade I diastolic dysfunction of the left ventricle (impaired relaxation  pattern).    # Normal right ventricular size and systolic function.    # Catheter/pacemaker wire visualized in the right ventricle.    # Normal right ventricular systolic pressure; 18 mmHg.    # s/p ASD closure - agitated saline study did not demonstrate any evidence  of interatrial shunt.    # Mild-moderate tricuspid valve regurgitation.    # Trivial aortic valve regurgitation.    # No pericardial effusion.      EF is slightly lower than previously reported, consider starting toprol 12.5mg daily to help with this  
Writer called the patient to update her with the following echocardiogram results and recommendations per Dr. Mercado. Metoprolol succinate 12.5 mg sent to the patient's preferred pharmacy per request. Educated the patient on possible side effects and when to contact the office. Patient verbalizes understanding with no further questions or concerns at this time.   
Routine Reassessment

## 2024-06-19 NOTE — CHART NOTE - NSCHARTNOTESELECT_GEN_ALL_CORE
"[facility]    Medicine Progress Note - Hospitalist Service    Date of Admission:  6/12/2024    Assessment & Plan     Summary:  65M admitted 6/12 with embolic appearing L hemispheric infarcts, new diagnosis of LVEF 15-20% and LV apical thrombus, started on warfarin with lovenox bridge. Other stroke workup: CTA w L vert occlusion at origin, <50% stenosis of bl cervical ICAs, LDL 82, A1c 6.4%. On 6/17 new stroke code for worsening expressive aphasia and RUE plegia. MRI shows new L anterior borderzone infarct, CTA with ?decreased distal L MCA arborization compared to prior imaging. Exam fluctuating a bit, not pressure dependent. Cardiology has switched pt from warfarin/lovenox-->DOAC.  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 In the ED, was afebrile, tachycardic and hypertensive with SBPs 170s. Was noted to have expressive/receptive deficits and right visual field cut. Code stroke was called. Labs notable for WBC 11.4, lytes/Cr stable, ,  proBNP 12k, trops elevated (57 -- 83). EKS showed ST with PVCs, possible LAE; no ST/T changes. Head CT neg. CTA head/neck showed an age-indeterminate lack of flow in the left vertebral artery of uncertain etiology and mild atherosclerotic narrowing  at the R ICA origin but was otherwise nl. CT perfusion study showed delayed contrast arrival in the area of the posterolateral aspect of the left cerebral hemisphere involving portions of the left temporal and left occipital lobes; findings suggestive of infarct.   A1c 6.4, FLP with tot cholest 135, HDL 41, LDL 82.   Seen by stroke team. Felt to " Event Note be out of the window for thrombolytics. Given ASA and Plavix load. Echo obtained and showed findings of an apical LV thrombus.  Findings were discussed with stroke service.  Aspirin and Plavix were stopped and he was placed on a low intensity heparin drip on 6/13 PM.  MRI brain 6/14 showed a moderate-sized evolving infarct in the temporal/occipital lobes with petechial hemorrhage and a similar zone of evolving infarct in the parasagittal left frontal lobe.  Stroke neurology service was made aware of these findings given recent initiation of anticoagulation.  Was continued on heparin drip.                Bedside sitter ordered on 6/14 AM after patient attempted to leave AMA x2. Less confused, less aphasic on 6/15, stop bedside sitter. Patient transitioned to warfarin + Lovenox. ---now has been transitioned to apixaban judy 6/1824 and tolerating    - Appreciate Neurology consultation   Keppra started for possible Felipe's palsy 6/17  - Cont atorvastatin 40mg daily  - Tighten BP control, goal -160  - PT/OT, on modified diet per SLP     2. Acute LV thrombus  Newly diagnosed systolic CHF, chronicity unclear  NSTEMI, suspect type II dt demand ischemia in setting of CVA  Pulmonary hypertension  No known hx of CAD, CHF. On admission this stay, noted to have bilateral LE edema, patient mentioned had been there for several days though unclear in regards to the specifics. Trop trend as above. ProBNP significant elevated.  EKG nonischemic. No reports of chest pain.    *Echo obtained this stay and showed EF 15-20% with severe global hypokinesia of the LV and an apical LV thrombus. LV apex clot 1.5x1.9cm, non-mobile. Also noted to have findings of biatrial enlargement, mod decreased RVSF and mod pulmonary hypertension.     - Appreciate Cardiology consult                - ischemic workup likely outpatient and not able to safely interrupt anticoagulation at this time                - has received several days of IV diuresis  "(discontinued on 6/18/24)                  - Entresto started  - Anticoagulation as above  - Increase Coreg now to 25mg BID (6/18/24) and is tolerating    6/19 - Holding jardiance dose this am with blood sugar <100     3. Prediabetes  A1c 6.4 this stay.   - using sliding scale insulin while hospitalized    As above, will need to continue to monitor blood sugar trend closely with jardiance    - follow up with PCP after discharge for ongoing BG mgmt        Diet: Room Service  Combination Diet Thin Liquids (level 0) (No straws); Easy to Chew (level 7); Thin Liquids (level 0) (No straws)  Fluid restriction 2000 ML FLUID    DVT Prophylaxis: DOAC  Ravi Catheter: Not present  Lines: None     Cardiac Monitoring: ACTIVE order. Indication: Stroke, acute (48 hours)  Code Status: Full Code      Clinically Significant Risk Factors                   # Acute heart failure with reduced ejection fraction: last echo with EF <40% and receiving IV diuretics             # Obesity: Estimated body mass index is 34.15 kg/m  as calculated from the following:    Height as of this encounter: 1.829 m (6').    Weight as of this encounter: 114.2 kg (251 lb 12.8 oz).             Disposition Plan     Medically Ready for Discharge: Anticipated Tomorrow ?             Anahi Mackey DO  Hospitalist Service  [facility]  Securely message with zappit (more info)  Text page via Medial[employer] Paging/Directory   ______________________________________________________________________    Interval History   Seen with bedside RN in the room.  Expressive aphasia limits his answers to my questions this am.  But does answer \"no\" to current HA, CP, SOB, N/V.  Currently up in the chair awaiting medications and breakfast.     No concerns per nursing overnight.    Physical Exam   Vital Signs: Temp: 98  F (36.7  C) Temp src: Oral BP: 127/74 Pulse: 54   Resp: 20 SpO2: 93 % O2 Device: None (Room air)    Weight: 251 lbs 12.8 oz    GEN:  " Alert, awake, frustrated with speech challenges but is otherwise calm, cooperative, smiling  HEENT:  Normocephalic/atraumatic, no scleral icterus, no nasal discharge  CV:  Regular rate and rhythm, no loud murmur or JVD.  S1 + S2 noted, no S3 or S4.  LUNGS:  Clear to auscultation ant/lat bilaterally without rales/rhonchi/wheezing/retractions.  Symmetric chest rise on inhalation noted.  ABD:  Active bowel sounds, soft, non-tender/non-distended.  No clear rebound/guarding/rigidity.  EXT:  No pretibial edema bilaterally.  No cyanosis.  No new joint synovitis noted.  SKIN:  Dry to touch, no rashes or cyanosis visualized.    Medical Decision Making       52 MINUTES SPENT BY ME on the date of service doing chart review, history, exam, documentation & further activities per the note.      Data   Medications   Current Facility-Administered Medications   Medication Dose Route Frequency Provider Last Rate Last Admin    medication instruction - No oral meds if patient didn't pass dysphagia screen   Does not apply Continuous PRN Cameron Frederick PA-C        Medication Instructions - Avoid dextrose in IV solutions.   Intravenous Continuous PRN Cameron Frederick PA-C         Current Facility-Administered Medications   Medication Dose Route Frequency Provider Last Rate Last Admin    apixaban ANTICOAGULANT (ELIQUIS) tablet 5 mg  5 mg Oral BID Steven Barraza MD   5 mg at 06/18/24 2012    atorvastatin (LIPITOR) tablet 40 mg  40 mg Oral QPM Radha Haney PA-C   40 mg at 06/18/24 2012    carvedilol (COREG) tablet 25 mg  25 mg Oral BID w/meals Ashley Yao PA-C   25 mg at 06/18/24 1849    empagliflozin (JARDIANCE) tablet 10 mg  10 mg Oral Daily Margarita Marvin CNP   10 mg at 06/17/24 0837    insulin aspart (NovoLOG) injection (RAPID ACTING)  1-7 Units Subcutaneous TID AC Louisa Townsend DO        insulin aspart (NovoLOG) injection (RAPID ACTING)  1-5 Units Subcutaneous At Bedtime Louisa Townsend  DO Preethi        iopamidol (ISOVUE-370) solution 500 mL  500 mL Intravenous Once Steven Barraza MD        levETIRAcetam (KEPPRA) 1,500 mg in sodium chloride 0.9 % 125 mL intermittent infusion  1,500 mg Intravenous Q12H Trista Glover  mL/hr at 06/19/24 0546 1,500 mg at 06/19/24 0546    sacubitril-valsartan (ENTRESTO) 49-51 MG per tablet 1 tablet  1 tablet Oral BID Ashley Yao PA-C   1 tablet at 06/18/24 2256    sodium chloride (PF) 0.9% PF flush 3 mL  3 mL Intracatheter Q8H Cameron Frederick PA-C   3 mL at 06/19/24 0207    sodium chloride (PF) 0.9% PF flush  mL   mL Intravenous Once Steven Barraza MD        spironolactone (ALDACTONE) tablet 25 mg  25 mg Oral Daily Ashley Yao PA-C   25 mg at 06/18/24 1205     Labs and Imaging results below reviewed today.  Recent Labs   Lab 06/17/24  1808 06/16/24  0731 06/15/24  0612   WBC 8.7 8.2 8.6   HGB 17.0 15.3 14.2   HCT 50.2 47.2 44.5   MCV 94 97 96    310 281     Recent Labs   Lab 06/19/24  0828 06/19/24  0819 06/19/24  0140 06/18/24  0803 06/18/24  0755 06/17/24  2117 06/17/24  1808   NA  --  142  --   --  142  --  140   POTASSIUM  --  2.9*  --   --  3.5  --  3.5   CHLORIDE  --  102  --   --  105  --  100   CO2  --  27  --   --  24  --  24   ANIONGAP  --  13  --   --  13  --  16*   GLC 99 111* 116*   < > 96   < > 126*   BUN  --  15.0  --   --  17.9  --  24.4*   CR  --  0.87  --   --  0.94  --  1.18*   GFRESTIMATED  --  >90  --   --  >90  --  69   PARISH  --  9.3  --   --  9.0  --  9.5    < > = values in this interval not displayed.     Recent Labs   Lab 06/19/24  0828 06/19/24  0819 06/19/24  0140 06/18/24  0803 06/18/24  0755 06/17/24  2117 06/17/24  1808   NA  --  142  --   --  142  --  140   POTASSIUM  --  2.9*  --   --  3.5  --  3.5   CHLORIDE  --  102  --   --  105  --  100   CO2  --  27  --   --  24  --  24   ANIONGAP  --  13  --   --  13  --  16*   GLC 99 111* 116*   < > 96   < > 126*   BUN  --  15.0  --   --   17.9  --  24.4*   CR  --  0.87  --   --  0.94  --  1.18*   GFRESTIMATED  --  >90  --   --  >90  --  69   PARISH  --  9.3  --   --  9.0  --  9.5   MAG  --  2.0  --   --   --   --   --     < > = values in this interval not displayed.       Recent Results (from the past 24 hour(s))   XR Video Swallow with SLP or OT    Narrative    VIDEO SWALLOWING EVALUATION   6/18/2024 10:53 AM     HISTORY: CVA +new CVA assess for silent aspiration.    COMPARISON: None.    FLUOROSCOPY TIME: 1.5 minutes.     Number of cine runs: 10    FINDINGS:    Thin: Piecemeal. Otherwise normal.    Slightly thick: Piecemeal. Otherwise normal.    Mildly thick: Not administered.    Moderately thick: Not administered.    Puree: Normal    Semisolid: Normal    Regular: Normal    This study only includes the cervical esophagus.    JONATHON ACOSTA MD         SYSTEM ID:  [id number]   Radiologist Consult For Cardiology    Narrative    RADIOLOGIST CONSULT FOR CARDIOLOGY 6/18/2024 4:07 PM    HISTORY: LV thrombus, myocardial infarction.    COMPARISON: None.      Impression    IMPRESSION: Trace right greater than left pleural effusions. The  visualized lungs are clear. Minimal ascites in the upper abdomen.    DAHIANA WHEELER MD         SYSTEM ID:  [id number]   CT Calcium Screening    Narrative    Procedure: CT CALCIUM SCREENING   Examination Date: 6/18/2024 4:07 PM   Clinical Information: New cardiomyopathy. LV thrombus   Indication: New cardiomyopathy, left ventricular thrombus.  Ordering Provider: Dr Barraza  Quality of the study: Good    PROCEDURE: High-resolution, ECG synchronized multi-slice computed  tomography was performed with a Siemens Dual Source Flash scanner  without incident. Coronary calcification was analyzed using [employer]  calcium scoring software. Scan protocol was optimized to minimize  radiation exposure. The total radiation exposure was calculated to be  45 DLP and 0.73 mSv.    IMPRESSIONS:  1.  Mild coronary calcifications.  2.  The total Agatston  "calcium score is 23.4 placing the patient in  the 37th percentile when compared to age and gender matched control  group.  3.  Recommend aggressive risk factor modification.  4.  Please review Radiology report for incidental noncardiac findings  that will follow separately.     FINDINGS:    CORONARY ARTERY CALCIUM SCORES:   Total calcium score: 23.4  Left main coronary artery: 0  Left anterior descending coronary artery: 16.8  Circumflex coronary artery: 2.42  Right coronary artery: 4.24    BACKGROUND  A coronary artery calcium (CAC) score is a measurement of the amount  of calcium (hard plaque) in the walls of the arteries that supply the  heart muscle. Numerous studies have indicated that this test is a  reliable measure of risk for adverse cardiovascular events, such as  heart attack and stroke.    MANAGEMENT  The American Heart Association/American College of Cardiology  (AHA/ACC) 2018 Guideline on the Management of Blood Cholesterol  states: \"If CAC is zero, treatment with statin therapy may be withheld  or delayed, except in certain very high risk individuals such as  cigarette smokers, those with diabetes mellitus, and those with a  strong family history of premature atherosclerotic disease. A CAC  score of 1 to 99 favors statin therapy, especially in those 55 years  of age or older. For any patient, if the CAC score is greater than or  equal to100 Agatston units or greater than or equal to 75th  percentile, statin therapy is indicated unless otherwise deferred by  the outcome of clinician-patient risk discussion.\"    The Society of Cardiovascular CT (SCCT) CAC guideline recommends the  following:  CAC score 0: statin generally not recommended  CAC score 1-99: moderate intensity statin generally recommended  CAC score 100-299: moderate to high intensity statin + Aspirin 81mg  CAC score >300: high intensity statin + Aspirin 81mg    GENERAL RECOMMENDATIONS  Adoption and maintenance of a healthy lifestyle is " recommended for all  people. This should include regular, appropriate exercise and  observance of a proper diet, to ensure balanced nutrition and weight  control. Tobacco use should be avoided. Cholesterol has been linked to  coronary atherosclerosis, and we strongly encourage adhering to the  recommendations of the 2018 ACC/AHA guidelines on the Management of  Blood Cholesterol. For primary prevention, these include calculation  of 10-year ASCVD risk and initiation of statin therapy based on  estimated ASCVD risk and LDL cholesterol. The BARTON risk score, which  combines traditional risk factors and CAC, is available online on the  BARTON website  (https://www.barton-nhlbi.org/MESACHDRisk/MesaRiskScore/RiskScore.aspx).  (Nancy RL, et al. J Am Aurelio Cardiol. 2015 Oct 13;66(15):1643-53.)    However note that these are general recommendations only, and as with  all such matters, the personal physician should be consulted regarding  recommendations appropriate for the individual. If further guidance is  needed, you can schedule an appointment with one of our Preventive  Cardiologists  ([email or web address]).    References:  1. 2018 AHA/ACC/AACVPR/AAPA/ABC/ACPM/ADA/AGS/APhA/ASPC/NLA/PCNA  Guideline on the Management of Blood Cholesterol  2. CAC-DRS: Coronary Artery Calcium Data and Reporting System. An  expert consensus document of the Society of Cardiovascular Computed  Tomography (SCCT)  3. Tucker GROSS et al. J Am Aurelio Cardiol. 2022 May 17;79(19):1834-7646;  associated calculator at https://www.cac-tools.com (used for  calculation of calcium score percentiles in patients ages 30-45)    THIERRY MAO MD         SYSTEM ID:  [id number]

## 2024-07-11 NOTE — PATIENT PROFILE ADULT. - PROVIDER NOTIFICATION
Cimetidine Counseling:  I discussed with the patient the risks of Cimetidine including but not limited to gynecomastia, headache, diarrhea, nausea, drowsiness, arrhythmias, pancreatitis, skin rashes, psychosis, bone marrow suppression and kidney toxicity. Methotrexate Counseling:  Patient counseled regarding adverse effects of methotrexate including but not limited to nausea, vomiting, abnormalities in liver function tests. Patients may develop mouth sores, rash, diarrhea, and abnormalities in blood counts. The patient understands that monitoring is required including LFT's and blood counts.  There is a rare possibility of scarring of the liver and lung problems that can occur when taking methotrexate. Persistent nausea, loss of appetite, pale stools, dark urine, cough, and shortness of breath should be reported immediately. Patient advised to discontinue methotrexate treatment at least three months before attempting to become pregnant.  I discussed the need for folate supplements while taking methotrexate.  These supplements can decrease side effects during methotrexate treatment. The patient verbalized understanding of the proper use and possible adverse effects of methotrexate.  All of the patient's questions and concerns were addressed. Clindamycin Pregnancy And Lactation Text: This medication can be used in pregnancy if certain situations. Clindamycin is also present in breast milk. Ivermectin Pregnancy And Lactation Text: This medication is Pregnancy Category C and it isn't known if it is safe during pregnancy. It is also excreted in breast milk. Simponi Counseling:  I discussed with the patient the risks of golimumab including but not limited to myelosuppression, immunosuppression, autoimmune hepatitis, demyelinating diseases, lymphoma, and serious infections.  The patient understands that monitoring is required including a PPD at baseline and must alert us or the primary physician if symptoms of infection or other concerning signs are noted. Dutasteride Male Counseling: Dutasteride Counseling:  I discussed with the patient the risks of use of dutasteride including but not limited to decreased libido, decreased ejaculate volume, and gynecomastia. Women who can become pregnant should not handle medication.  All of the patient's questions and concerns were addressed. Protopic Pregnancy And Lactation Text: This medication is Pregnancy Category C. It is unknown if this medication is excreted in breast milk when applied topically. Benzoyl Peroxide Pregnancy And Lactation Text: This medication is Pregnancy Category C. It is unknown if benzoyl peroxide is excreted in breast milk. Hyrimoz Pregnancy And Lactation Text: This medication is Pregnancy Category B and is considered safe during pregnancy. It is unknown if this medication is excreted in breast milk. Oxybutynin Pregnancy And Lactation Text: This medication is Pregnancy Category B and is considered safe during pregnancy. It is unknown if it is excreted in breast milk. Spironolactone Pregnancy And Lactation Text: This medication can cause feminization of the male fetus and should be avoided during pregnancy. The active metabolite is also found in breast milk. Elidel Counseling: Patient may experience a mild burning sensation during topical application. Elidel is not approved in children less than 2 years of age. There have been case reports of hematologic and skin malignancies in patients using topical calcineurin inhibitors although causality is questionable. Topical Retinoid counseling:  Patient advised to apply a pea-sized amount only at bedtime and wait 30 minutes after washing their face before applying.  If too drying, patient may add a non-comedogenic moisturizer. The patient verbalized understanding of the proper use and possible adverse effects of retinoids.  All of the patient's questions and concerns were addressed. Cimzia Pregnancy And Lactation Text: This medication crosses the placenta but can be considered safe in certain situations. Cimzia may be excreted in breast milk. Valtrex Counseling: I discussed with the patient the risks of valacyclovir including but not limited to kidney damage, nausea, vomiting and severe allergy.  The patient understands that if the infection seems to be worsening or is not improving, they are to call. Rinvoq Pregnancy And Lactation Text: Based on animal studies, Rinvoq may cause embryo-fetal harm when administered to pregnant women.  The medication should not be used in pregnancy.  Breastfeeding is not recommended during treatment and for 6 days after the last dose. Gabapentin Counseling: I discussed with the patient the risks of gabapentin including but not limited to dizziness, somnolence, fatigue and ataxia. Isotretinoin Counseling: Patient should get monthly blood tests, not donate blood, not drive at night if vision affected, not share medication, and not undergo elective surgery for 6 months after tx completed. Side effects reviewed, pt to contact office should one occur. Fluconazole Pregnancy And Lactation Text: This medication is Pregnancy Category C and it isn't know if it is safe during pregnancy. It is also excreted in breast milk. Xolair Counseling:  Patient informed of potential adverse effects including but not limited to fever, muscle aches, rash and allergic reactions.  The patient verbalized understanding of the proper use and possible adverse effects of Xolair.  All of the patient's questions and concerns were addressed. Valtrex Pregnancy And Lactation Text: this medication is Pregnancy Category B and is considered safe during pregnancy. This medication is not directly found in breast milk but it's metabolite acyclovir is present. Topical Retinoid Pregnancy And Lactation Text: This medication is Pregnancy Category C. It is unknown if this medication is excreted in breast milk. Cosentyx Counseling:  I discussed with the patient the risks of Cosentyx including but not limited to worsening of Crohn's disease, immunosuppression, allergic reactions and infections.  The patient understands that monitoring is required including a PPD at baseline and must alert us or the primary physician if symptoms of infection or other concerning signs are noted. Topical Metronidazole Counseling: Metronidazole is a topical antibiotic medication. You may experience burning, stinging, redness, or allergic reactions.  Please call our office if you develop any problems from using this medication. Sotyktu Counseling:  I discussed the most common side effects of Sotyktu including: common cold, sore throat, sinus infections, cold sores, canker sores, folliculitis, and acne.  I also discussed more serious side effects of Sotyktu including but not limited to: serious allergic reactions; increased risk for infections such as TB; cancers such as lymphomas; rhabdomyolysis and elevated CPK; and elevated triglycerides and liver enzymes.  Gabapentin Pregnancy And Lactation Text: This medication is Pregnancy Category C and isn't considered safe during pregnancy. It is excreted in breast milk. Nsaids Counseling: NSAID Counseling: I discussed with the patient that NSAIDs should be taken with food. Prolonged use of NSAIDs can result in the development of stomach ulcers.  Patient advised to stop taking NSAIDs if abdominal pain occurs.  The patient verbalized understanding of the proper use and possible adverse effects of NSAIDs.  All of the patient's questions and concerns were addressed. Winlevi Pregnancy And Lactation Text: This medication is considered safe during pregnancy and breastfeeding. Minoxidil Counseling: Minoxidil is a topical medication which can increase blood flow where it is applied. It is uncertain how this medication increases hair growth. Side effects are uncommon and include stinging and allergic reactions. Dutasteride Female Counseling: Dutasteride Counseling:  I discussed with the patient the risks of use of dutasteride including but not limited to decreased libido and sexual dysfunction. Explained the teratogenic nature of the medication and stressed the importance of not getting pregnant during treatment. All of the patient's questions and concerns were addressed. VTAMA Counseling: I discussed with the patient that VTAMA is not for use in the eyes, mouth or mouth. They should call the office if they develop any signs of allergic reactions to VTAMA. The patient verbalized understanding of the proper use and possible adverse effects of VTAMA.  All of the patient's questions and concerns were addressed. Griseofulvin Counseling:  I discussed with the patient the risks of griseofulvin including but not limited to photosensitivity, cytopenia, liver damage, nausea/vomiting and severe allergy.  The patient understands that this medication is best absorbed when taken with a fatty meal (e.g., ice cream or french fries). Propranolol Counseling:  I discussed with the patient the risks of propranolol including but not limited to low heart rate, low blood pressure, low blood sugar, restlessness and increased cold sensitivity. They should call the office if they experience any of these side effects. Simponi Pregnancy And Lactation Text: The risk during pregnancy and breastfeeding is uncertain with this medication. Methotrexate Pregnancy And Lactation Text: This medication is Pregnancy Category X and is known to cause fetal harm. This medication is excreted in breast milk. Doxycycline Counseling:  Patient counseled regarding possible photosensitivity and increased risk for sunburn.  Patient instructed to avoid sunlight, if possible.  When exposed to sunlight, patients should wear protective clothing, sunglasses, and sunscreen.  The patient was instructed to call the office immediately if the following severe adverse effects occur:  hearing changes, easy bruising/bleeding, severe headache, or vision changes.  The patient verbalized understanding of the proper use and possible adverse effects of doxycycline.  All of the patient's questions and concerns were addressed. Cimetidine Pregnancy And Lactation Text: This medication is Pregnancy Category B and is considered safe during pregnancy. It is also excreted in breast milk and breast feeding isn't recommended. Arava Counseling:  Patient counseled regarding adverse effects of Arava including but not limited to nausea, vomiting, abnormalities in liver function tests. Patients may develop mouth sores, rash, diarrhea, and abnormalities in blood counts. The patient understands that monitoring is required including LFTs and blood counts.  There is a rare possibility of scarring of the liver and lung problems that can occur when taking methotrexate. Persistent nausea, loss of appetite, pale stools, dark urine, cough, and shortness of breath should be reported immediately. Patient advised to discontinue Arava treatment and consult with a physician prior to attempting conception. The patient will have to undergo a treatment to eliminate Arava from the body prior to conception. Isotretinoin Pregnancy And Lactation Text: This medication is Pregnancy Category X and is considered extremely dangerous during pregnancy. It is unknown if it is excreted in breast milk. Carac Counseling:  I discussed with the patient the risks of Carac including but not limited to erythema, scaling, itching, weeping, crusting, and pain. Qbrexza Counseling:  I discussed with the patient the risks of Qbrexza including but not limited to headache, mydriasis, blurred vision, dry eyes, nasal dryness, dry mouth, dry throat, dry skin, urinary hesitation, and constipation.  Local skin reactions including erythema, burning, stinging, and itching can also occur. Rifampin Counseling: I discussed with the patient the risks of rifampin including but not limited to liver damage, kidney damage, red-orange body fluids, nausea/vomiting and severe allergy. Griseofulvin Pregnancy And Lactation Text: This medication is Pregnancy Category X and is known to cause serious birth defects. It is unknown if this medication is excreted in breast milk but breast feeding should be avoided. Skyrizi Counseling: I discussed with the patient the risks of risankizumab-rzaa including but not limited to immunosuppression, and serious infections.  The patient understands that monitoring is required including a PPD at baseline and must alert us or the primary physician if symptoms of infection or other concerning signs are noted. Doxycycline Pregnancy And Lactation Text: This medication is Pregnancy Category D and not consider safe during pregnancy. It is also excreted in breast milk but is considered safe for shorter treatment courses. Doxepin Counseling:  Patient advised that the medication is sedating and not to drive a car after taking this medication. Patient informed of potential adverse effects including but not limited to dry mouth, urinary retention, and blurry vision.  The patient verbalized understanding of the proper use and possible adverse effects of doxepin.  All of the patient's questions and concerns were addressed. Ilumya Counseling: I discussed with the patient the risks of tildrakizumab including but not limited to immunosuppression, malignancy, posterior leukoencephalopathy syndrome, and serious infections.  The patient understands that monitoring is required including a PPD at baseline and must alert us or the primary physician if symptoms of infection or other concerning signs are noted. Minoxidil Pregnancy And Lactation Text: This medication has not been assigned a Pregnancy Risk Category but animal studies failed to show danger with the topical medication. It is unknown if the medication is excreted in breast milk. Adbry Counseling: I discussed with the patient the risks of tralokinumab including but not limited to eye infection and irritation, cold sores, injection site reactions, worsening of asthma, allergic reactions and increased risk of parasitic infection.  Live vaccines should be avoided while taking tralokinumab. The patient understands that monitoring is required and they must alert us or the primary physician if symptoms of infection or other concerning signs are noted. Azathioprine Counseling:  I discussed with the patient the risks of azathioprine including but not limited to myelosuppression, immunosuppression, hepatotoxicity, lymphoma, and infections.  The patient understands that monitoring is required including baseline LFTs, Creatinine, possible TPMP genotyping and weekly CBCs for the first month and then every 2 weeks thereafter.  The patient verbalized understanding of the proper use and possible adverse effects of azathioprine.  All of the patient's questions and concerns were addressed. Nsaids Pregnancy And Lactation Text: These medications are considered safe up to 30 weeks gestation. It is excreted in breast milk. Tazorac Counseling:  Patient advised that medication is irritating and drying.  Patient may need to apply sparingly and wash off after an hour before eventually leaving it on overnight.  The patient verbalized understanding of the proper use and possible adverse effects of tazorac.  All of the patient's questions and concerns were addressed. Glycopyrrolate Counseling:  I discussed with the patient the risks of glycopyrrolate including but not limited to skin rash, drowsiness, dry mouth, difficulty urinating, and blurred vision. Eucrisa Counseling: Patient may experience a mild burning sensation during topical application. Eucrisa is not approved in children less than 2 years of age. Topical Metronidazole Pregnancy And Lactation Text: This medication is Pregnancy Category B and considered safe during pregnancy.  It is also considered safe to use while breastfeeding. Xolair Pregnancy And Lactation Text: This medication is Pregnancy Category B and is considered safe during pregnancy. This medication is excreted in breast milk. Use Enhanced Medication Counseling?: No Sotyktu Pregnancy And Lactation Text: There is insufficient data to evaluate whether or not Sotyktu is safe to use during pregnancy.   It is not known if Sotyktu passes into breast milk and whether or not it is safe to use when breastfeeding.   Azithromycin Counseling:  I discussed with the patient the risks of azithromycin including but not limited to GI upset, allergic reaction, drug rash, diarrhea, and yeast infections. Declines Mirvaso Counseling: Mirvaso is a topical medication which can decrease superficial blood flow where applied. Side effects are uncommon and include stinging, redness and allergic reactions. Vtama Pregnancy And Lactation Text: It is unknown if this medication can cause problems during pregnancy and breastfeeding. Azathioprine Pregnancy And Lactation Text: This medication is Pregnancy Category D and isn't considered safe during pregnancy. It is unknown if this medication is excreted in breast milk. Olanzapine Counseling- I discussed with the patient the common side effects of olanzapine including but are not limited to: lack of energy, dry mouth, increased appetite, sleepiness, tremor, constipation, dizziness, changes in behavior, or restlessness.  Explained that teenagers are more likely to experience headaches, abdominal pain, pain in the arms or legs, tiredness, and sleepiness.  Serious side effects include but are not limited: increased risk of death in elderly patients who are confused, have memory loss, or dementia-related psychosis; hyperglycemia; increased cholesterol and triglycerides; and weight gain. Qbrexza Pregnancy And Lactation Text: There is no available data on Qbrexza use in pregnant women.  There is no available data on Qbrexza use in lactation. Arava Pregnancy And Lactation Text: This medication is Pregnancy Category X and is absolutely contraindicated during pregnancy. It is unknown if it is excreted in breast milk. High Dose Vitamin A Counseling: Side effects reviewed, pt to contact office should one occur. Dutasteride Pregnancy And Lactation Text: This medication is absolutely contraindicated in women, especially during pregnancy and breast feeding. Feminization of male fetuses is possible if taking while pregnant. Prednisone Counseling:  I discussed with the patient the risks of prolonged use of prednisone including but not limited to weight gain, insomnia, osteoporosis, mood changes, diabetes, susceptibility to infection, glaucoma and high blood pressure.  In cases where prednisone use is prolonged, patients should be monitored with blood pressure checks, serum glucose levels and an eye exam.  Additionally, the patient may need to be placed on GI prophylaxis, PCP prophylaxis, and calcium and vitamin D supplementation and/or a bisphosphonate.  The patient verbalized understanding of the proper use and the possible adverse effects of prednisone.  All of the patient's questions and concerns were addressed. Carac Pregnancy And Lactation Text: This medication is Pregnancy Category X and contraindicated in pregnancy and in women who may become pregnant. It is unknown if this medication is excreted in breast milk. Propranolol Pregnancy And Lactation Text: This medication is Pregnancy Category C and it isn't known if it is safe during pregnancy. It is excreted in breast milk. Doxepin Pregnancy And Lactation Text: This medication is Pregnancy Category C and it isn't known if it is safe during pregnancy. It is also excreted in breast milk and breast feeding isn't recommended. Xeljanz Counseling: I discussed with the patient the risks of Xeljanz therapy including increased risk of infection, liver issues, headache, diarrhea, or cold symptoms. Live vaccines should be avoided. They were instructed to call if they have any problems. SSKI Counseling:  I discussed with the patient the risks of SSKI including but not limited to thyroid abnormalities, metallic taste, GI upset, fever, headache, acne, arthralgias, paraesthesias, lymphadenopathy, easy bleeding, arrhythmias, and allergic reaction. Prednisone Pregnancy And Lactation Text: This medication is Pregnancy Category C and it isn't know if it is safe during pregnancy. This medication is excreted in breast milk. Rhofade Counseling: Rhofade is a topical medication which can decrease superficial blood flow where applied. Side effects are uncommon and include stinging, redness and allergic reactions. High Dose Vitamin A Pregnancy And Lactation Text: High dose vitamin A therapy is contraindicated during pregnancy and breast feeding. Calcipotriene Counseling:  I discussed with the patient the risks of calcipotriene including but not limited to erythema, scaling, itching, and irritation. Erivedge Counseling- I discussed with the patient the risks of Erivedge including but not limited to nausea, vomiting, diarrhea, constipation, weight loss, changes in the sense of taste, decreased appetite, muscle spasms, and hair loss.  The patient verbalized understanding of the proper use and possible adverse effects of Erivedge.  All of the patient's questions and concerns were addressed. Clofazimine Counseling:  I discussed with the patient the risks of clofazimine including but not limited to skin and eye pigmentation, liver damage, nausea/vomiting, gastrointestinal bleeding and allergy. Tazorac Pregnancy And Lactation Text: This medication is not safe during pregnancy. It is unknown if this medication is excreted in breast milk. Glycopyrrolate Pregnancy And Lactation Text: This medication is Pregnancy Category B and is considered safe during pregnancy. It is unknown if it is excreted breast milk. Topical Steroids Counseling: I discussed with the patient that prolonged use of topical steroids can result in the increased appearance of superficial blood vessels (telangiectasias), lightening (hypopigmentation) and thinning of the skin (atrophy).  Patient understands to avoid using high potency steroids in skin folds, the groin or the face.  The patient verbalized understanding of the proper use and possible adverse effects of topical steroids.  All of the patient's questions and concerns were addressed. Adbry Pregnancy And Lactation Text: It is unknown if this medication will adversely affect pregnancy or breast feeding. Rifampin Pregnancy And Lactation Text: This medication is Pregnancy Category C and it isn't know if it is safe during pregnancy. It is also excreted in breast milk and should not be used if you are breast feeding. Itraconazole Counseling:  I discussed with the patient the risks of itraconazole including but not limited to liver damage, nausea/vomiting, neuropathy, and severe allergy.  The patient understands that this medication is best absorbed when taken with acidic beverages such as non-diet cola or ginger ale.  The patient understands that monitoring is required including baseline LFTs and repeat LFTs at intervals.  The patient understands that they are to contact us or the primary physician if concerning signs are noted. Dupixent Counseling: I discussed with the patient the risks of dupilumab including but not limited to eye infection and irritation, cold sores, injection site reactions, worsening of asthma, allergic reactions and increased risk of parasitic infection.  Live vaccines should be avoided while taking dupilumab. Dupilumab will also interact with certain medications such as warfarin and cyclosporine. The patient understands that monitoring is required and they must alert us or the primary physician if symptoms of infection or other concerning signs are noted. Calcipotriene Pregnancy And Lactation Text: The use of this medication during pregnancy or lactation is not recommended as there is insufficient data. Hydroxychloroquine Counseling:  I discussed with the patient that a baseline ophthalmologic exam is needed at the start of therapy and every year thereafter while on therapy. A CBC may also be warranted for monitoring.  The side effects of this medication were discussed with the patient, including but not limited to agranulocytosis, aplastic anemia, seizures, rashes, retinopathy, and liver toxicity. Patient instructed to call the office should any adverse effect occur.  The patient verbalized understanding of the proper use and possible adverse effects of Plaquenil.  All the patient's questions and concerns were addressed. Sarecycline Counseling: Patient advised regarding possible photosensitivity and discoloration of the teeth, skin, lips, tongue and gums.  Patient instructed to avoid sunlight, if possible.  When exposed to sunlight, patients should wear protective clothing, sunglasses, and sunscreen.  The patient was instructed to call the office immediately if the following severe adverse effects occur:  hearing changes, easy bruising/bleeding, severe headache, or vision changes.  The patient verbalized understanding of the proper use and possible adverse effects of sarecycline.  All of the patient's questions and concerns were addressed. Bimzelx Counseling:  I discussed with the patient the risks of Bimzelx including but not limited to depression, immunosuppression, allergic reactions and infections.  The patient understands that monitoring is required including a PPD at baseline and must alert us or the primary physician if symptoms of infection or other concerning signs are noted. Erythromycin Counseling:  I discussed with the patient the risks of erythromycin including but not limited to GI upset, allergic reaction, drug rash, diarrhea, increase in liver enzymes, and yeast infections. Cellcept Counseling:  I discussed with the patient the risks of mycophenolate mofetil including but not limited to infection/immunosuppression, GI upset, hypokalemia, hypercholesterolemia, bone marrow suppression, lymphoproliferative disorders, malignancy, GI ulceration/bleed/perforation, colitis, interstitial lung disease, kidney failure, progressive multifocal leukoencephalopathy, and birth defects.  The patient understands that monitoring is required including a baseline creatinine and regular CBC testing. In addition, patient must alert us immediately if symptoms of infection or other concerning signs are noted. Infliximab Counseling:  I discussed with the patient the risks of infliximab including but not limited to myelosuppression, immunosuppression, autoimmune hepatitis, demyelinating diseases, lymphoma, and serious infections.  The patient understands that monitoring is required including a PPD at baseline and must alert us or the primary physician if symptoms of infection or other concerning signs are noted. Olanzapine Pregnancy And Lactation Text: This medication is pregnancy category C.   There are no adequate and well controlled trials with olanzapine in pregnant females.  Olanzapine should be used during pregnancy only if the potential benefit justifies the potential risk to the fetus.   In a study in lactating healthy women, olanzapine was excreted in breast milk.  It is recommended that women taking olanzapine should not breast feed. Azithromycin Pregnancy And Lactation Text: This medication is considered safe during pregnancy and is also secreted in breast milk. Zoryve Counseling:  I discussed with the patient that Zoryve is not for use in the eyes, mouth or vagina. The most commonly reported side effects include diarrhea, headache, insomnia, application site pain, upper respiratory tract infections, and urinary tract infections.  All of the patient's questions and concerns were addressed. Finasteride Male Counseling: Finasteride Counseling:  I discussed with the patient the risks of use of finasteride including but not limited to decreased libido, decreased ejaculate volume, gynecomastia, and depression. Women should not handle medication.  All of the patient's questions and concerns were addressed. Mirvaso Pregnancy And Lactation Text: This medication has not been assigned a Pregnancy Risk Category. It is unknown if the medication is excreted in breast milk. Erythromycin Pregnancy And Lactation Text: This medication is Pregnancy Category B and is considered safe during pregnancy. It is also excreted in breast milk. Finasteride Female Counseling: Finasteride Counseling:  I discussed with the patient the risks of use of finasteride including but not limited to decreased libido and sexual dysfunction. Explained the teratogenic nature of the medication and stressed the importance of not getting pregnant during treatment. All of the patient's questions and concerns were addressed. Dupixent Pregnancy And Lactation Text: This medication likely crosses the placenta but the risk for the fetus is uncertain. This medication is excreted in breast milk. Topical Steroids Applications Pregnancy And Lactation Text: Most topical steroids are considered safe to use during pregnancy and lactation.  Any topical steroid applied to the breast or nipple should be washed off before breastfeeding. Hydroxyzine Counseling: Patient advised that the medication is sedating and not to drive a car after taking this medication.  Patient informed of potential adverse effects including but not limited to dry mouth, urinary retention, and blurry vision.  The patient verbalized understanding of the proper use and possible adverse effects of hydroxyzine.  All of the patient's questions and concerns were addressed. Hydroquinone Counseling:  Patient advised that medication may result in skin irritation, lightening (hypopigmentation), dryness, and burning.  In the event of skin irritation, the patient was advised to reduce the amount of the drug applied or use it less frequently.  Rarely, spots that are treated with hydroquinone can become darker (pseudoochronosis).  Should this occur, patient instructed to stop medication and call the office. The patient verbalized understanding of the proper use and possible adverse effects of hydroquinone.  All of the patient's questions and concerns were addressed. Xeljiez Pregnancy And Lactation Text: This medication is Pregnancy Category D and is not considered safe during pregnancy.  The risk during breast feeding is also uncertain. Sski Pregnancy And Lactation Text: This medication is Pregnancy Category D and isn't considered safe during pregnancy. It is excreted in breast milk. Stelara Counseling:  I discussed with the patient the risks of ustekinumab including but not limited to immunosuppression, malignancy, posterior leukoencephalopathy syndrome, and serious infections.  The patient understands that monitoring is required including a PPD at baseline and must alert us or the primary physician if symptoms of infection or other concerning signs are noted. Cantharidin Counseling:  I discussed with the patient the risks of Cantharidin including but not limited to pain, redness, burning, itching, and blistering. Bimzelx Pregnancy And Lactation Text: This medication crosses the placenta and the safety is uncertain during pregnancy. It is unknown if this medication is present in breast milk. Cantharidin Pregnancy And Lactation Text: This medication has not been proven safe during pregnancy. It is unknown if this medication is excreted in breast milk. Sarecycline Pregnancy And Lactation Text: This medication is Pregnancy Category D and not consider safe during pregnancy. It is also excreted in breast milk. Enbrel Counseling:  I discussed with the patient the risks of etanercept including but not limited to myelosuppression, immunosuppression, autoimmune hepatitis, demyelinating diseases, lymphoma, and infections.  The patient understands that monitoring is required including a PPD at baseline and must alert us or the primary physician if symptoms of infection or other concerning signs are noted. Topical Sulfur Applications Counseling: Topical Sulfur Counseling: Patient counseled that this medication may cause skin irritation or allergic reactions.  In the event of skin irritation, the patient was advised to reduce the amount of the drug applied or use it less frequently.   The patient verbalized understanding of the proper use and possible adverse effects of topical sulfur application.  All of the patient's questions and concerns were addressed. Ketoconazole Counseling:   Patient counseled regarding improving absorption with orange juice.  Adverse effects include but are not limited to breast enlargement, headache, diarrhea, nausea, upset stomach, liver function test abnormalities, taste disturbance, and stomach pain.  There is a rare possibility of liver failure that can occur when taking ketoconazole. The patient understands that monitoring of LFTs may be required, especially at baseline. The patient verbalized understanding of the proper use and possible adverse effects of ketoconazole.  All of the patient's questions and concerns were addressed. Hydroxychloroquine Pregnancy And Lactation Text: This medication has been shown to cause fetal harm but it isn't assigned a Pregnancy Risk Category. There are small amounts excreted in breast milk. Bactrim Counseling:  I discussed with the patient the risks of sulfa antibiotics including but not limited to GI upset, allergic reaction, drug rash, diarrhea, dizziness, photosensitivity, and yeast infections.  Rarely, more serious reactions can occur including but not limited to aplastic anemia, agranulocytosis, methemoglobinemia, blood dyscrasias, liver or kidney failure, lung infiltrates or desquamative/blistering drug rashes. Opzelura Counseling:  I discussed with the patient the risks of Opzelura including but not limited to nasopharngitis, bronchitis, ear infection, eosinophila, hives, diarrhea, folliculitis, tonsillitis, and rhinorrhea.  Taken orally, this medication has been linked to serious infections; higher rate of mortality; malignancy and lymphoproliferative disorders; major adverse cardiovascular events; thrombosis; thrombocytopenia, anemia, and neutropenia; and lipid elevations. Cibinqo Counseling: I discussed with the patient the risks of Cibinqo therapy including but not limited to common cold, nausea, headache, cold sores, increased blood CPK levels, dizziness, UTIs, fatigue, acne, and vomitting. Live vaccines should be avoided.  This medication has been linked to serious infections; higher rate of mortality; malignancy and lymphoproliferative disorders; major adverse cardiovascular events; thrombosis; thrombocytopenia and lymphopenia; lipid elevations; and retinal detachment. Aklief counseling:  Patient advised to apply a pea-sized amount only at bedtime and wait 30 minutes after washing their face before applying.  If too drying, patient may add a non-comedogenic moisturizer.  The most commonly reported side effects including irritation, redness, scaling, dryness, stinging, burning, itching, and increased risk of sunburn.  The patient verbalized understanding of the proper use and possible adverse effects of retinoids.  All of the patient's questions and concerns were addressed. Oral Minoxidil Counseling- I discussed with the patient the risks of oral minoxidil including but not limited to shortness of breath, swelling of the feet or ankles, dizziness, lightheadedness, unwanted hair growth and allergic reaction.  The patient verbalized understanding of the proper use and possible adverse effects of oral minoxidil.  All of the patient's questions and concerns were addressed. Cyclophosphamide Counseling:  I discussed with the patient the risks of cyclophosphamide including but not limited to hair loss, hormonal abnormalities, decreased fertility, abdominal pain, diarrhea, nausea and vomiting, bone marrow suppression and infection. The patient understands that monitoring is required while taking this medication. Oral Minoxidil Pregnancy And Lactation Text: This medication should only be used when clearly needed if you are pregnant, attempting to become pregnant or breast feeding. Acitretin Counseling:  I discussed with the patient the risks of acitretin including but not limited to hair loss, dry lips/skin/eyes, liver damage, hyperlipidemia, depression/suicidal ideation, photosensitivity.  Serious rare side effects can include but are not limited to pancreatitis, pseudotumor cerebri, bony changes, clot formation/stroke/heart attack.  Patient understands that alcohol is contraindicated since it can result in liver toxicity and significantly prolong the elimination of the drug by many years. Opzelura Pregnancy And Lactation Text: There is insufficient data to evaluate drug-associated risk for major birth defects, miscarriage, or other adverse maternal or fetal outcomes.  There is a pregnancy registry that monitors pregnancy outcomes in pregnant persons exposed to the medication during pregnancy.  It is unknown if this medication is excreted in breast milk.  Do not breastfeed during treatment and for about 4 weeks after the last dose. Finasteride Pregnancy And Lactation Text: This medication is absolutely contraindicated during pregnancy. It is unknown if it is excreted in breast milk. Rituxan Counseling:  I discussed with the patient the risks of Rituxan infusions. Side effects can include infusion reactions, severe drug rashes including mucocutaneous reactions, reactivation of latent hepatitis and other infections and rarely progressive multifocal leukoencephalopathy.  All of the patient's questions and concerns were addressed. Aklief Pregnancy And Lactation Text: It is unknown if this medication is safe to use during pregnancy.  It is unknown if this medication is excreted in breast milk.  Breastfeeding women should use the topical cream on the smallest area of the skin for the shortest time needed while breastfeeding.  Do not apply to nipple and areola. Zyclara Counseling:  I discussed with the patient the risks of imiquimod including but not limited to erythema, scaling, itching, weeping, crusting, and pain.  Patient understands that the inflammatory response to imiquimod is variable from person to person and was educated regarded proper titration schedule.  If flu-like symptoms develop, patient knows to discontinue the medication and contact us. Cibinqo Pregnancy And Lactation Text: It is unknown if this medication will adversely affect pregnancy or breast feeding.  You should not take this medication if you are currently pregnant or planning a pregnancy or while breastfeeding. Solaraze Counseling:  I discussed with the patient the risks of Solaraze including but not limited to erythema, scaling, itching, weeping, crusting, and pain. Thalidomide Counseling: I discussed with the patient the risks of thalidomide including but not limited to birth defects, anxiety, weakness, chest pain, dizziness, cough and severe allergy. Colchicine Counseling:  Patient counseled regarding adverse effects including but not limited to stomach upset (nausea, vomiting, stomach pain, or diarrhea).  Patient instructed to limit alcohol consumption while taking this medication.  Colchicine may reduce blood counts especially with prolonged use.  The patient understands that monitoring of kidney function and blood counts may be required, especially at baseline. The patient verbalized understanding of the proper use and possible adverse effects of colchicine.  All of the patient's questions and concerns were addressed. Hydroxyzine Pregnancy And Lactation Text: This medication is not safe during pregnancy and should not be taken. It is also excreted in breast milk and breast feeding isn't recommended. Metronidazole Counseling:  I discussed with the patient the risks of metronidazole including but not limited to seizures, nausea/vomiting, a metallic taste in the mouth, nausea/vomiting and severe allergy. Libtayo Counseling- I discussed with the patient the risks of Libtayo including but not limited to nausea, vomiting, diarrhea, and bone or muscle pain.  The patient verbalized understanding of the proper use and possible adverse effects of Libtayo.  All of the patient's questions and concerns were addressed. Litfulo Pregnancy And Lactation Text: Based on animal studies, Lifulo may cause embryo-fetal harm when administered to pregnant women.  The medication should not be used in pregnancy.  Breastfeeding is not recommended during treatment. 5-Fu Counseling: 5-Fluorouracil Counseling:  I discussed with the patient the risks of 5-fluorouracil including but not limited to erythema, scaling, itching, weeping, crusting, and pain. Topical Sulfur Applications Pregnancy And Lactation Text: This medication is Pregnancy Category C and has an unknown safety profile during pregnancy. It is unknown if this topical medication is excreted in breast milk. Taltz Counseling: I discussed with the patient the risks of ixekizumab including but not limited to immunosuppression, serious infections, worsening of inflammatory bowel disease and drug reactions.  The patient understands that monitoring is required including a PPD at baseline and must alert us or the primary physician if symptoms of infection or other concerning signs are noted. Topical Clindamycin Counseling: Patient counseled that this medication may cause skin irritation or allergic reactions.  In the event of skin irritation, the patient was advised to reduce the amount of the drug applied or use it less frequently.   The patient verbalized understanding of the proper use and possible adverse effects of clindamycin.  All of the patient's questions and concerns were addressed. Bactrim Pregnancy And Lactation Text: This medication is Pregnancy Category D and is known to cause fetal risk.  It is also excreted in breast milk. Cimzia Counseling:  I discussed with the patient the risks of Cimzia including but not limited to immunosuppression, allergic reactions and infections.  The patient understands that monitoring is required including a PPD at baseline and must alert us or the primary physician if symptoms of infection or other concerning signs are noted. Tetracycline Counseling: Patient counseled regarding possible photosensitivity and increased risk for sunburn.  Patient instructed to avoid sunlight, if possible.  When exposed to sunlight, patients should wear protective clothing, sunglasses, and sunscreen.  The patient was instructed to call the office immediately if the following severe adverse effects occur:  hearing changes, easy bruising/bleeding, severe headache, or vision changes.  The patient verbalized understanding of the proper use and possible adverse effects of tetracycline.  All of the patient's questions and concerns were addressed. Patient understands to avoid pregnancy while on therapy due to potential birth defects. Low Dose Naltrexone Counseling- I discussed with the patient the potential risks and side effects of low dose naltrexone including but not limited to: more vivid dreams, headaches, nausea, vomiting, abdominal pain, fatigue, dizziness, and anxiety. Ketoconazole Pregnancy And Lactation Text: This medication is Pregnancy Category C and it isn't know if it is safe during pregnancy. It is also excreted in breast milk and breast feeding isn't recommended. Imiquimod Counseling:  I discussed with the patient the risks of imiquimod including but not limited to erythema, scaling, itching, weeping, crusting, and pain.  Patient understands that the inflammatory response to imiquimod is variable from person to person and was educated regarded proper titration schedule.  If flu-like symptoms develop, patient knows to discontinue the medication and contact us. Litfulo Counseling: I discussed with the patient the risks of Litfulo therapy including but not limited to upper respiratory tract infections, shingles, cold sores, and nausea. Live vaccines should be avoided.  This medication has been linked to serious infections; higher rate of mortality; malignancy and lymphoproliferative disorders; major adverse cardiovascular events; thrombosis; gastrointestinal perforations; neutropenia; lymphopenia; anemia; liver enzyme elevations; and lipid elevations. Cephalexin Counseling: I counseled the patient regarding use of cephalexin as an antibiotic for prophylactic and/or therapeutic purposes. Cephalexin (commonly prescribed under brand name Keflex) is a cephalosporin antibiotic which is active against numerous classes of bacteria, including most skin bacteria. Side effects may include nausea, diarrhea, gastrointestinal upset, rash, hives, yeast infections, and in rare cases, hepatitis, kidney disease, seizures, fever, confusion, neurologic symptoms, and others. Patients with severe allergies to penicillin medications are cautioned that there is about a 10% incidence of cross-reactivity with cephalosporins. When possible, patients with penicillin allergies should use alternatives to cephalosporins for antibiotic therapy. Olumiant Counseling: I discussed with the patient the risks of Olumiant therapy including but not limited to upper respiratory tract infections, shingles, cold sores, and nausea. Live vaccines should be avoided.  This medication has been linked to serious infections; higher rate of mortality; malignancy and lymphoproliferative disorders; major adverse cardiovascular events; thrombosis; gastrointestinal perforations; neutropenia; lymphopenia; anemia; liver enzyme elevations; and lipid elevations. Albendazole Counseling:  I discussed with the patient the risks of albendazole including but not limited to cytopenia, kidney damage, nausea/vomiting and severe allergy.  The patient understands that this medication is being used in an off-label manner. Solaraze Pregnancy And Lactation Text: This medication is Pregnancy Category B and is considered safe. There is some data to suggest avoiding during the third trimester. It is unknown if this medication is excreted in breast milk. Libtayo Pregnancy And Lactation Text: This medication is contraindicated in pregnancy and when breast feeding. Metronidazole Pregnancy And Lactation Text: This medication is Pregnancy Category B and considered safe during pregnancy.  It is also excreted in breast milk. Rituxan Pregnancy And Lactation Text: This medication is Pregnancy Category C and it isn't know if it is safe during pregnancy. It is unknown if this medication is excreted in breast milk but similar antibodies are known to be excreted. Cyclophosphamide Pregnancy And Lactation Text: This medication is Pregnancy Category D and it isn't considered safe during pregnancy. This medication is excreted in breast milk. Otezla Counseling: The side effects of Otezla were discussed with the patient, including but not limited to worsening or new depression, weight loss, diarrhea, nausea, upper respiratory tract infection, and headache. Patient instructed to call the office should any adverse effect occur.  The patient verbalized understanding of the proper use and possible adverse effects of Otezla.  All the patient's questions and concerns were addressed. Acitretin Pregnancy And Lactation Text: This medication is Pregnancy Category X and should not be given to women who are pregnant or may become pregnant in the future. This medication is excreted in breast milk. Birth Control Pills Counseling: Birth Control Pill Counseling: I discussed with the patient the potential side effects of OCPs including but not limited to increased risk of stroke, heart attack, thrombophlebitis, deep venous thrombosis, hepatic adenomas, breast changes, GI upset, headaches, and depression.  The patient verbalized understanding of the proper use and possible adverse effects of OCPs. All of the patient's questions and concerns were addressed. Azelaic Acid Counseling: Patient counseled that medicine may cause skin irritation and to avoid applying near the eyes.  In the event of skin irritation, the patient was advised to reduce the amount of the drug applied or use it less frequently.   The patient verbalized understanding of the proper use and possible adverse effects of azelaic acid.  All of the patient's questions and concerns were addressed. Picato Counseling:  I discussed with the patient the risks of Picato including but not limited to erythema, scaling, itching, weeping, crusting, and pain. Tranexamic Acid Counseling:  Patient advised of the small risk of bleeding problems with tranexamic acid. They were also instructed to call if they developed any nausea, vomiting or diarrhea. All of the patient's questions and concerns were addressed. Minocycline Counseling: Patient advised regarding possible photosensitivity and discoloration of the teeth, skin, lips, tongue and gums.  Patient instructed to avoid sunlight, if possible.  When exposed to sunlight, patients should wear protective clothing, sunglasses, and sunscreen.  The patient was instructed to call the office immediately if the following severe adverse effects occur:  hearing changes, easy bruising/bleeding, severe headache, or vision changes.  The patient verbalized understanding of the proper use and possible adverse effects of minocycline.  All of the patient's questions and concerns were addressed. Soolantra Counseling: I discussed with the patients the risks of topial Soolantra. This is a medicine which decreases the number of mites and inflammation in the skin. You experience burning, stinging, eye irritation or allergic reactions.  Please call our office if you develop any problems from using this medication. Birth Control Pills Pregnancy And Lactation Text: This medication should be avoided if pregnant and for the first 30 days post-partum. Drysol Counseling:  I discussed with the patient the risks of drysol/aluminum chloride including but not limited to skin rash, itching, irritation, burning. Detail Level: Simple Siliq Counseling:  I discussed with the patient the risks of Siliq including but not limited to new or worsening depression, suicidal thoughts and behavior, immunosuppression, malignancy, posterior leukoencephalopathy syndrome, and serious infections.  The patient understands that monitoring is required including a PPD at baseline and must alert us or the primary physician if symptoms of infection or other concerning signs are noted. There is also a special program designed to monitor depression which is required with Siliq. Odomzo Counseling- I discussed with the patient the risks of Odomzo including but not limited to nausea, vomiting, diarrhea, constipation, weight loss, changes in the sense of taste, decreased appetite, muscle spasms, and hair loss.  The patient verbalized understanding of the proper use and possible adverse effects of Odomzo.  All of the patient's questions and concerns were addressed. Dapsone Counseling: I discussed with the patient the risks of dapsone including but not limited to hemolytic anemia, agranulocytosis, rashes, methemoglobinemia, kidney failure, peripheral neuropathy, headaches, GI upset, and liver toxicity.  Patients who start dapsone require monitoring including baseline LFTs and weekly CBCs for the first month, then every month thereafter.  The patient verbalized understanding of the proper use and possible adverse effects of dapsone.  All of the patient's questions and concerns were addressed. Bexarotene Counseling:  I discussed with the patient the risks of bexarotene including but not limited to hair loss, dry lips/skin/eyes, liver abnormalities, hyperlipidemia, pancreatitis, depression/suicidal ideation, photosensitivity, drug rash/allergic reactions, hypothyroidism, anemia, leukopenia, infection, cataracts, and teratogenicity.  Patient understands that they will need regular blood tests to check lipid profile, liver function tests, white blood cell count, thyroid function tests and pregnancy test if applicable. Humira Counseling:  I discussed with the patient the risks of adalimumab including but not limited to myelosuppression, immunosuppression, autoimmune hepatitis, demyelinating diseases, lymphoma, and serious infections.  The patient understands that monitoring is required including a PPD at baseline and must alert us or the primary physician if symptoms of infection or other concerning signs are noted. Olumiant Pregnancy And Lactation Text: Based on animal studies, Olumiant may cause embryo-fetal harm when administered to pregnant women.  The medication should not be used in pregnancy.  Breastfeeding is not recommended during treatment. Wartpeel Counseling:  I discussed with the patient the risks of Wartpeel including but not limited to erythema, scaling, itching, weeping, crusting, and pain. Low Dose Naltrexone Pregnancy And Lactation Text: Naltrexone is pregnancy category C.  There have been no adequate and well-controlled studies in pregnant women.  It should be used in pregnancy only if the potential benefit justifies the potential risk to the fetus.   Limited data indicates that naltrexone is minimally excreted into breastmilk. Terbinafine Counseling: Patient counseling regarding adverse effects of terbinafine including but not limited to headache, diarrhea, rash, upset stomach, liver function test abnormalities, itching, taste/smell disturbance, nausea, abdominal pain, and flatulence.  There is a rare possibility of liver failure that can occur when taking terbinafine.  The patient understands that a baseline LFT and kidney function test may be required. The patient verbalized understanding of the proper use and possible adverse effects of terbinafine.  All of the patient's questions and concerns were addressed. Azelaic Acid Pregnancy And Lactation Text: This medication is considered safe during pregnancy and breast feeding. Klisyri Counseling:  I discussed with the patient the risks of Klisyri including but not limited to erythema, scaling, itching, weeping, crusting, and pain. Opioid Counseling: I discussed with the patient the potential side effects of opioids including but not limited to addiction, altered mental status, and depression. I stressed avoiding alcohol, benzodiazepines, muscle relaxants and sleep aids unless specifically okayed by a physician. The patient verbalized understanding of the proper use and possible adverse effects of opioids. All of the patient's questions and concerns were addressed. They were instructed to flush the remaining pills down the toilet if they did not need them for pain. Niacinamide Counseling: I recommended taking niacin or niacinamide, also know as vitamin B3, twice daily. Recent evidence suggests that taking vitamin B3 (500 mg twice daily) can reduce the risk of actinic keratoses and non-melanoma skin cancers. Side effects of vitamin B3 include flushing and headache. Cyclosporine Counseling:  I discussed with the patient the risks of cyclosporine including but not limited to hypertension, gingival hyperplasia,myelosuppression, immunosuppression, liver damage, kidney damage, neurotoxicity, lymphoma, and serious infections. The patient understands that monitoring is required including baseline blood pressure, CBC, CMP, lipid panel and uric acid, and then 1-2 times monthly CMP and blood pressure. Otezla Pregnancy And Lactation Text: This medication is Pregnancy Category C and it isn't known if it is safe during pregnancy. It is unknown if it is excreted in breast milk. Cephalexin Pregnancy And Lactation Text: This medication is Pregnancy Category B and considered safe during pregnancy.  It is also excreted in breast milk but can be used safely for shorter doses. Rinvoq Counseling: I discussed with the patient the risks of Rinvoq therapy including but not limited to upper respiratory tract infections, shingles, cold sores, bronchitis, nausea, cough, fever, acne, and headache. Live vaccines should be avoided.  This medication has been linked to serious infections; higher rate of mortality; malignancy and lymphoproliferative disorders; major adverse cardiovascular events; thrombosis; thrombocytopenia, anemia, and neutropenia; lipid elevations; liver enzyme elevations; and gastrointestinal perforations. Topical Ketoconazole Counseling: Patient counseled that this medication may cause skin irritation or allergic reactions.  In the event of skin irritation, the patient was advised to reduce the amount of the drug applied or use it less frequently.   The patient verbalized understanding of the proper use and possible adverse effects of ketoconazole.  All of the patient's questions and concerns were addressed. Bexarotene Pregnancy And Lactation Text: This medication is Pregnancy Category X and should not be given to women who are pregnant or may become pregnant. This medication should not be used if you are breast feeding. Spironolactone Counseling: Patient advised regarding risks of diarrhea, abdominal pain, hyperkalemia, birth defects (for female patients), liver toxicity and renal toxicity. The patient may need blood work to monitor liver and kidney function and potassium levels while on therapy. The patient verbalized understanding of the proper use and possible adverse effects of spironolactone.  All of the patient's questions and concerns were addressed. Tranexamic Acid Pregnancy And Lactation Text: It is unknown if this medication is safe during pregnancy or breast feeding. Dapsone Pregnancy And Lactation Text: This medication is Pregnancy Category C and is not considered safe during pregnancy or breast feeding. Tremfya Counseling: I discussed with the patient the risks of guselkumab including but not limited to immunosuppression, serious infections, and drug reactions.  The patient understands that monitoring is required including a PPD at baseline and must alert us or the primary physician if symptoms of infection or other concerning signs are noted. Soolantra Pregnancy And Lactation Text: This medication is Pregnancy Category C. This medication is considered safe during breast feeding. Fluconazole Counseling:  Patient counseled regarding adverse effects of fluconazole including but not limited to headache, diarrhea, nausea, upset stomach, liver function test abnormalities, taste disturbance, and stomach pain.  There is a rare possibility of liver failure that can occur when taking fluconazole.  The patient understands that monitoring of LFTs and kidney function test may be required, especially at baseline. The patient verbalized understanding of the proper use and possible adverse effects of fluconazole.  All of the patient's questions and concerns were addressed. Opioid Pregnancy And Lactation Text: These medications can lead to premature delivery and should be avoided during pregnancy. These medications are also present in breast milk in small amounts. Quinolones Counseling:  I discussed with the patient the risks of fluoroquinolones including but not limited to GI upset, allergic reaction, drug rash, diarrhea, dizziness, photosensitivity, yeast infections, liver function test abnormalities, tendonitis/tendon rupture. Clindamycin Counseling: I counseled the patient regarding use of clindamycin as an antibiotic for prophylactic and/or therapeutic purposes. Clindamycin is active against numerous classes of bacteria, including skin bacteria. Side effects may include nausea, diarrhea, gastrointestinal upset, rash, hives, yeast infections, and in rare cases, colitis. Ivermectin Counseling:  Patient instructed to take medication on an empty stomach with a full glass of water.  Patient informed of potential adverse effects including but not limited to nausea, diarrhea, dizziness, itching, and swelling of the extremities or lymph nodes.  The patient verbalized understanding of the proper use and possible adverse effects of ivermectin.  All of the patient's questions and concerns were addressed. Protopic Counseling: Patient may experience a mild burning sensation during topical application. Protopic is not approved in children less than 2 years of age. There have been case reports of hematologic and skin malignancies in patients using topical calcineurin inhibitors although causality is questionable. Benzoyl Peroxide Counseling: Patient counseled that medicine may cause skin irritation and bleach clothing.  In the event of skin irritation, the patient was advised to reduce the amount of the drug applied or use it less frequently.   The patient verbalized understanding of the proper use and possible adverse effects of benzoyl peroxide.  All of the patient's questions and concerns were addressed. Oxybutynin Counseling:  I discussed with the patient the risks of oxybutynin including but not limited to skin rash, drowsiness, dry mouth, difficulty urinating, and blurred vision. Hyrimoz Counseling:  I discussed with the patient the risks of adalimumab including but not limited to myelosuppression, immunosuppression, autoimmune hepatitis, demyelinating diseases, lymphoma, and serious infections.  The patient understands that monitoring is required including a PPD at baseline and must alert us or the primary physician if symptoms of infection or other concerning signs are noted. Niacinamide Pregnancy And Lactation Text: These medications are considered safe during pregnancy. Klisyri Pregnancy And Lactation Text: It is unknown if this medication can harm a developing fetus or if it is excreted in breast milk. Winlevi Counseling:  I discussed with the patient the risks of topical clascoterone including but not limited to erythema, scaling, itching, and stinging. Patient voiced their understanding.

## 2024-07-18 ENCOUNTER — APPOINTMENT (OUTPATIENT)
Dept: CT IMAGING | Facility: CLINIC | Age: 69
End: 2024-07-18

## 2024-08-02 NOTE — DISCHARGE NOTE PROVIDER - NSDCQMCOGNITION_NEU_ALL_CORE
Dragon dictation software. Although every effort was made to ensure the accuracy of this automated transcription, some errors in transcription may have occurred.     Grupo Sanchez MD  8/2/2024 12:48 PM    Difficulty concentrating/Difficulty remembering

## 2024-08-21 ENCOUNTER — TRANSCRIPTION ENCOUNTER (OUTPATIENT)
Age: 69
End: 2024-08-21

## 2024-09-13 ENCOUNTER — APPOINTMENT (OUTPATIENT)
Dept: CT IMAGING | Facility: CLINIC | Age: 69
End: 2024-09-13
Payer: MEDICARE

## 2024-09-13 PROCEDURE — 71250 CT THORAX DX C-: CPT | Mod: MH

## 2024-11-19 NOTE — ED ADULT TRIAGE NOTE - CHIEF COMPLAINT QUOTE
Name: Dylan Dolan  : 1965  MRN: [id number]    Oncology Navigation Follow-Up Note    Contact Type:  Telephone    Notes:   Writer receives call from patient's niece Carol (HCPOA) to check that RX requests were routed to correct provider. Writer confirms. Encouraged her to call Palliative med office if she does not hear back from them today.  Navigator following for continuity of care.        Electronically signed by Bree Spears RN on 2024 at 9:02 AM   Right sided head injury from a suspected fall. pt non-verbal from group home

## 2024-11-21 ENCOUNTER — RESULT REVIEW (OUTPATIENT)
Age: 69
End: 2024-11-21

## 2024-11-21 ENCOUNTER — INPATIENT (INPATIENT)
Facility: HOSPITAL | Age: 69
LOS: 2 days | Discharge: OTHER INSTITUTION | DRG: 96 | End: 2024-11-24
Attending: NEUROLOGICAL SURGERY | Admitting: NEUROLOGICAL SURGERY
Payer: MEDICARE

## 2024-11-21 VITALS
DIASTOLIC BLOOD PRESSURE: 81 MMHG | OXYGEN SATURATION: 99 % | TEMPERATURE: 101 F | HEIGHT: 65 IN | WEIGHT: 153.22 LBS | HEART RATE: 109 BPM | RESPIRATION RATE: 21 BRPM | SYSTOLIC BLOOD PRESSURE: 149 MMHG

## 2024-11-21 DIAGNOSIS — G06.2 EXTRADURAL AND SUBDURAL ABSCESS, UNSPECIFIED: ICD-10-CM

## 2024-11-21 LAB
ALBUMIN SERPL ELPH-MCNC: 2.1 G/DL — LOW (ref 3.3–5.2)
ALP SERPL-CCNC: 46 U/L — SIGNIFICANT CHANGE UP (ref 40–120)
ALT FLD-CCNC: <5 U/L — SIGNIFICANT CHANGE UP
ANION GAP SERPL CALC-SCNC: 8 MMOL/L — SIGNIFICANT CHANGE UP (ref 5–17)
APPEARANCE UR: CLEAR — SIGNIFICANT CHANGE UP
APTT BLD: 27.9 SEC — SIGNIFICANT CHANGE UP (ref 24.5–35.6)
AST SERPL-CCNC: 12 U/L — SIGNIFICANT CHANGE UP
BACTERIA # UR AUTO: NEGATIVE /HPF — SIGNIFICANT CHANGE UP
BASOPHILS # BLD AUTO: 0.03 K/UL — SIGNIFICANT CHANGE UP (ref 0–0.2)
BASOPHILS NFR BLD AUTO: 0.4 % — SIGNIFICANT CHANGE UP (ref 0–2)
BILIRUB SERPL-MCNC: <0.2 MG/DL — LOW (ref 0.4–2)
BILIRUB UR-MCNC: NEGATIVE — SIGNIFICANT CHANGE UP
BLD GP AB SCN SERPL QL: SIGNIFICANT CHANGE UP
BUN SERPL-MCNC: 18 MG/DL — SIGNIFICANT CHANGE UP (ref 8–20)
CALCIUM SERPL-MCNC: 7.5 MG/DL — LOW (ref 8.4–10.5)
CAST: 2 /LPF — SIGNIFICANT CHANGE UP (ref 0–4)
CHLORIDE SERPL-SCNC: 106 MMOL/L — SIGNIFICANT CHANGE UP (ref 96–108)
CO2 SERPL-SCNC: 25 MMOL/L — SIGNIFICANT CHANGE UP (ref 22–29)
COLOR SPEC: YELLOW — SIGNIFICANT CHANGE UP
CREAT SERPL-MCNC: 0.7 MG/DL — SIGNIFICANT CHANGE UP (ref 0.5–1.3)
DIFF PNL FLD: ABNORMAL
EGFR: 100 ML/MIN/1.73M2 — SIGNIFICANT CHANGE UP
EOSINOPHIL # BLD AUTO: 0.21 K/UL — SIGNIFICANT CHANGE UP (ref 0–0.5)
EOSINOPHIL NFR BLD AUTO: 2.8 % — SIGNIFICANT CHANGE UP (ref 0–6)
GLUCOSE SERPL-MCNC: 107 MG/DL — HIGH (ref 70–99)
GLUCOSE UR QL: NEGATIVE MG/DL — SIGNIFICANT CHANGE UP
HCT VFR BLD CALC: 24.6 % — LOW (ref 39–50)
HGB BLD-MCNC: 7.6 G/DL — LOW (ref 13–17)
IMM GRANULOCYTES NFR BLD AUTO: 1.2 % — HIGH (ref 0–0.9)
INR BLD: 1.08 RATIO — SIGNIFICANT CHANGE UP (ref 0.85–1.16)
KETONES UR-MCNC: NEGATIVE MG/DL — SIGNIFICANT CHANGE UP
LACTATE SERPL-SCNC: 1.4 MMOL/L — SIGNIFICANT CHANGE UP (ref 0.5–2)
LEUKOCYTE ESTERASE UR-ACNC: ABNORMAL
LYMPHOCYTES # BLD AUTO: 1.75 K/UL — SIGNIFICANT CHANGE UP (ref 1–3.3)
LYMPHOCYTES # BLD AUTO: 23.2 % — SIGNIFICANT CHANGE UP (ref 13–44)
MAGNESIUM SERPL-MCNC: 2.1 MG/DL — SIGNIFICANT CHANGE UP (ref 1.6–2.6)
MCHC RBC-ENTMCNC: 30.9 G/DL — LOW (ref 32–36)
MCHC RBC-ENTMCNC: 31.1 PG — SIGNIFICANT CHANGE UP (ref 27–34)
MCV RBC AUTO: 100.8 FL — HIGH (ref 80–100)
MONOCYTES # BLD AUTO: 0.7 K/UL — SIGNIFICANT CHANGE UP (ref 0–0.9)
MONOCYTES NFR BLD AUTO: 9.3 % — SIGNIFICANT CHANGE UP (ref 2–14)
MRSA PCR RESULT.: DETECTED
NEUTROPHILS # BLD AUTO: 4.75 K/UL — SIGNIFICANT CHANGE UP (ref 1.8–7.4)
NEUTROPHILS NFR BLD AUTO: 63.1 % — SIGNIFICANT CHANGE UP (ref 43–77)
NITRITE UR-MCNC: NEGATIVE — SIGNIFICANT CHANGE UP
PH UR: 6.5 — SIGNIFICANT CHANGE UP (ref 5–8)
PHOSPHATE SERPL-MCNC: 2.1 MG/DL — LOW (ref 2.4–4.7)
PLATELET # BLD AUTO: 441 K/UL — HIGH (ref 150–400)
POTASSIUM SERPL-MCNC: 4.2 MMOL/L — SIGNIFICANT CHANGE UP (ref 3.5–5.3)
POTASSIUM SERPL-SCNC: 4.2 MMOL/L — SIGNIFICANT CHANGE UP (ref 3.5–5.3)
PROCALCITONIN SERPL-MCNC: 0.05 NG/ML — SIGNIFICANT CHANGE UP (ref 0.02–0.1)
PROT SERPL-MCNC: 6.1 G/DL — LOW (ref 6.6–8.7)
PROT UR-MCNC: 100 MG/DL
PROTHROM AB SERPL-ACNC: 12.5 SEC — SIGNIFICANT CHANGE UP (ref 9.9–13.4)
RAPID RVP RESULT: SIGNIFICANT CHANGE UP
RBC # BLD: 2.44 M/UL — LOW (ref 4.2–5.8)
RBC # FLD: 14.9 % — HIGH (ref 10.3–14.5)
RBC CASTS # UR COMP ASSIST: 37 /HPF — HIGH (ref 0–4)
S AUREUS DNA NOSE QL NAA+PROBE: DETECTED
SARS-COV-2 RNA SPEC QL NAA+PROBE: SIGNIFICANT CHANGE UP
SODIUM SERPL-SCNC: 139 MMOL/L — SIGNIFICANT CHANGE UP (ref 135–145)
SP GR SPEC: 1.02 — SIGNIFICANT CHANGE UP (ref 1–1.03)
SQUAMOUS # UR AUTO: 3 /HPF — SIGNIFICANT CHANGE UP (ref 0–5)
UROBILINOGEN FLD QL: 0.2 MG/DL — SIGNIFICANT CHANGE UP (ref 0.2–1)
WBC # BLD: 7.53 K/UL — SIGNIFICANT CHANGE UP (ref 3.8–10.5)
WBC # FLD AUTO: 7.53 K/UL — SIGNIFICANT CHANGE UP (ref 3.8–10.5)
WBC UR QL: 13 /HPF — HIGH (ref 0–5)
YEAST-LIKE CELLS: PRESENT

## 2024-11-21 PROCEDURE — 93010 ELECTROCARDIOGRAM REPORT: CPT

## 2024-11-21 PROCEDURE — 99221 1ST HOSP IP/OBS SF/LOW 40: CPT

## 2024-11-21 PROCEDURE — 99223 1ST HOSP IP/OBS HIGH 75: CPT

## 2024-11-21 PROCEDURE — G0316 PROLONG INPT EVAL ADD15 M: CPT

## 2024-11-21 PROCEDURE — 93306 TTE W/DOPPLER COMPLETE: CPT | Mod: 26

## 2024-11-21 PROCEDURE — 93970 EXTREMITY STUDY: CPT | Mod: 26

## 2024-11-21 PROCEDURE — 99222 1ST HOSP IP/OBS MODERATE 55: CPT

## 2024-11-21 PROCEDURE — 71045 X-RAY EXAM CHEST 1 VIEW: CPT | Mod: 26,77

## 2024-11-21 PROCEDURE — 99291 CRITICAL CARE FIRST HOUR: CPT

## 2024-11-21 RX ORDER — DILTIAZEM HYDROCHLORIDE 240 MG/1
60 CAPSULE, COATED, EXTENDED RELEASE ORAL EVERY 6 HOURS
Refills: 0 | Status: DISCONTINUED | OUTPATIENT
Start: 2024-11-21 | End: 2024-11-24

## 2024-11-21 RX ORDER — ERTAPENEM 1 G/1
1000 INJECTION, POWDER, LYOPHILIZED, FOR SOLUTION INTRAMUSCULAR; INTRAVENOUS EVERY 24 HOURS
Refills: 0 | Status: DISCONTINUED | OUTPATIENT
Start: 2024-11-21 | End: 2024-11-21

## 2024-11-21 RX ORDER — SENNOSIDES 8.6 MG
2 TABLET ORAL AT BEDTIME
Refills: 0 | Status: DISCONTINUED | OUTPATIENT
Start: 2024-11-21 | End: 2024-11-23

## 2024-11-21 RX ORDER — DIVALPROEX SODIUM 500 MG
250 TABLET, DELAYED RELEASE (ENTERIC COATED) ORAL EVERY 8 HOURS
Refills: 0 | Status: DISCONTINUED | OUTPATIENT
Start: 2024-11-21 | End: 2024-11-21

## 2024-11-21 RX ORDER — ACETAMINOPHEN 500MG 500 MG/1
650 TABLET, COATED ORAL EVERY 6 HOURS
Refills: 0 | Status: DISCONTINUED | OUTPATIENT
Start: 2024-11-21 | End: 2024-11-24

## 2024-11-21 RX ORDER — ZINC OXIDE/BENZETHONIUM CHLOR
1 OINTMENT (GRAM) TOPICAL EVERY 12 HOURS
Refills: 0 | Status: DISCONTINUED | OUTPATIENT
Start: 2024-11-21 | End: 2024-11-24

## 2024-11-21 RX ORDER — TAMSULOSIN HYDROCHLORIDE 0.4 MG/1
0.8 CAPSULE ORAL AT BEDTIME
Refills: 0 | Status: DISCONTINUED | OUTPATIENT
Start: 2024-11-21 | End: 2024-11-24

## 2024-11-21 RX ORDER — POVIDONE-IODINE 7.5 %
1 SPONGE TOPICAL ONCE
Refills: 0 | Status: DISCONTINUED | OUTPATIENT
Start: 2024-11-21 | End: 2024-11-24

## 2024-11-21 RX ORDER — CHOLECALCIFEROL (VITAMIN D3) 10MCG/0.25
2000 DROPS ORAL DAILY
Refills: 0 | Status: DISCONTINUED | OUTPATIENT
Start: 2024-11-21 | End: 2024-11-24

## 2024-11-21 RX ORDER — HEPARIN SODIUM 5000 [USP'U]/.5ML
30 INJECTION, SOLUTION INTRAVENOUS; SUBCUTANEOUS ONCE
Refills: 0 | Status: COMPLETED | OUTPATIENT
Start: 2024-11-21 | End: 2024-11-22

## 2024-11-21 RX ORDER — METOPROLOL TARTRATE 100 MG/1
50 TABLET, FILM COATED ORAL
Refills: 0 | Status: DISCONTINUED | OUTPATIENT
Start: 2024-11-21 | End: 2024-11-21

## 2024-11-21 RX ORDER — METOPROLOL TARTRATE 100 MG/1
5 TABLET, FILM COATED ORAL EVERY 6 HOURS
Refills: 0 | Status: DISCONTINUED | OUTPATIENT
Start: 2024-11-21 | End: 2024-11-23

## 2024-11-21 RX ORDER — 0.9 % SODIUM CHLORIDE 0.9 %
1000 INTRAVENOUS SOLUTION INTRAVENOUS
Refills: 0 | Status: DISCONTINUED | OUTPATIENT
Start: 2024-11-21 | End: 2024-11-23

## 2024-11-21 RX ORDER — VALPROIC ACID 250 MG/5ML
250 SOLUTION ORAL EVERY 8 HOURS
Refills: 0 | Status: DISCONTINUED | OUTPATIENT
Start: 2024-11-21 | End: 2024-11-24

## 2024-11-21 RX ORDER — ACETAMINOPHEN 500MG 500 MG/1
1000 TABLET, COATED ORAL ONCE
Refills: 0 | Status: COMPLETED | OUTPATIENT
Start: 2024-11-21 | End: 2024-11-21

## 2024-11-21 RX ORDER — NAFCILLIN 10 G/100ML
2 INJECTION, POWDER, FOR SOLUTION INTRAVENOUS EVERY 4 HOURS
Refills: 0 | Status: DISCONTINUED | OUTPATIENT
Start: 2024-11-21 | End: 2024-11-24

## 2024-11-21 RX ORDER — OLANZAPINE 20 MG/1
20 TABLET ORAL AT BEDTIME
Refills: 0 | Status: DISCONTINUED | OUTPATIENT
Start: 2024-11-21 | End: 2024-11-22

## 2024-11-21 RX ORDER — FLUVOXAMINE MALEATE 100 MG
100 TABLET ORAL
Refills: 0 | Status: DISCONTINUED | OUTPATIENT
Start: 2024-11-21 | End: 2024-11-24

## 2024-11-21 RX ORDER — LEVOTHYROXINE SODIUM 150 MCG
75 TABLET ORAL DAILY
Refills: 0 | Status: DISCONTINUED | OUTPATIENT
Start: 2024-11-21 | End: 2024-11-21

## 2024-11-21 RX ORDER — LEVOTHYROXINE SODIUM 150 MCG
37.5 TABLET ORAL AT BEDTIME
Refills: 0 | Status: DISCONTINUED | OUTPATIENT
Start: 2024-11-21 | End: 2024-11-22

## 2024-11-21 RX ORDER — CALCIUM CARBONATE 500(1250)
1 TABLET ORAL EVERY 12 HOURS
Refills: 0 | Status: DISCONTINUED | OUTPATIENT
Start: 2024-11-21 | End: 2024-11-24

## 2024-11-21 RX ORDER — FLUTICASONE PROPIONATE AND SALMETEROL XINAFOATE 45; 21 UG/1; UG/1
1 AEROSOL, METERED RESPIRATORY (INHALATION)
Refills: 0 | Status: DISCONTINUED | OUTPATIENT
Start: 2024-11-21 | End: 2024-11-24

## 2024-11-21 RX ORDER — PANTOPRAZOLE SODIUM 40 MG/1
40 TABLET, DELAYED RELEASE ORAL
Refills: 0 | Status: DISCONTINUED | OUTPATIENT
Start: 2024-11-21 | End: 2024-11-21

## 2024-11-21 RX ORDER — PANTOPRAZOLE SODIUM 40 MG/1
40 TABLET, DELAYED RELEASE ORAL DAILY
Refills: 0 | Status: DISCONTINUED | OUTPATIENT
Start: 2024-11-21 | End: 2024-11-24

## 2024-11-21 RX ORDER — CHLORHEXIDINE GLUCONATE 1.2 MG/ML
1 RINSE ORAL
Refills: 0 | Status: DISCONTINUED | OUTPATIENT
Start: 2024-11-21 | End: 2024-11-24

## 2024-11-21 RX ORDER — POLYETHYLENE GLYCOL 3350 17 G/17G
17 POWDER, FOR SOLUTION ORAL DAILY
Refills: 0 | Status: DISCONTINUED | OUTPATIENT
Start: 2024-11-21 | End: 2024-11-23

## 2024-11-21 RX ADMIN — TAMSULOSIN HYDROCHLORIDE 0.8 MILLIGRAM(S): 0.4 CAPSULE ORAL at 21:13

## 2024-11-21 RX ADMIN — Medication 37.5 MICROGRAM(S): at 21:15

## 2024-11-21 RX ADMIN — NAFCILLIN 200 GRAM(S): 10 INJECTION, POWDER, FOR SOLUTION INTRAVENOUS at 21:14

## 2024-11-21 RX ADMIN — Medication 2 TABLET(S): at 21:12

## 2024-11-21 RX ADMIN — OLANZAPINE 20 MILLIGRAM(S): 20 TABLET ORAL at 21:15

## 2024-11-21 RX ADMIN — Medication 75 MILLILITER(S): at 18:16

## 2024-11-21 RX ADMIN — ACETAMINOPHEN 500MG 400 MILLIGRAM(S): 500 TABLET, COATED ORAL at 18:16

## 2024-11-21 RX ADMIN — Medication 100 MILLIGRAM(S): at 20:38

## 2024-11-21 RX ADMIN — ACETAMINOPHEN 500MG 1000 MILLIGRAM(S): 500 TABLET, COATED ORAL at 18:43

## 2024-11-21 RX ADMIN — NAFCILLIN 200 GRAM(S): 10 INJECTION, POWDER, FOR SOLUTION INTRAVENOUS at 18:16

## 2024-11-21 RX ADMIN — CHLORHEXIDINE GLUCONATE 1 APPLICATION(S): 1.2 RINSE ORAL at 18:16

## 2024-11-21 RX ADMIN — VALPROIC ACID 52.5 MILLIGRAM(S): 250 SOLUTION ORAL at 21:14

## 2024-11-21 RX ADMIN — DILTIAZEM HYDROCHLORIDE 60 MILLIGRAM(S): 240 CAPSULE, COATED, EXTENDED RELEASE ORAL at 20:38

## 2024-11-21 RX ADMIN — CHLORHEXIDINE GLUCONATE 1 APPLICATION(S): 1.2 RINSE ORAL at 21:51

## 2024-11-21 NOTE — H&P ADULT - NSHPLABSRESULTS_GEN_ALL_CORE
LABS:                        7.6    7.53  )-----------( 441      ( 2024 17:30 )             24.6         139  |  106  |  18.0  ----------------------------<  107[H]  4.2   |  25.0  |  0.70    Ca    7.5[L]      2024 17:30  Phos  2.1       Mg     2.1         TPro  6.1[L]  /  Alb  2.1[L]  /  TBili  <0.2[L]  /  DBili  x   /  AST  12  /  ALT  <5  /  AlkPhos  46      PT/INR - ( 2024 17:30 )   PT: 12.5 sec;   INR: 1.08 ratio         PTT - ( 2024 17:30 )  PTT:27.9 sec  Urinalysis Basic - ( 2024 17:30 )    Color: Yellow / Appearance: Clear / S.022 / pH: x  Gluc: 107 mg/dL / Ketone: Negative mg/dL  / Bili: Negative / Urobili: 0.2 mg/dL   Blood: x / Protein: 100 mg/dL / Nitrite: Negative   Leuk Esterase: Trace / RBC: 37 /HPF / WBC 13 /HPF   Sq Epi: x / Non Sq Epi: 3 /HPF / Bacteria: Negative /HPF        RADIOLOGY & ADDITIONAL TESTS:      FELIX ([place]): EF 62%, Negative for vegetations     MR C-spine ([place]): C4-C5 posterior disc protrusion impinging on spinal cord, resulting in moderate-severe central canal stenosis      MR T-spine ([place]): T6-T7 discitis-osteomyelitis. 8 cm length probable epidural phlegmon at T5-T7, moderate-severe central canal stenosis, without obvious abnormal cord signal. Right hemithorax, possibly empyema.      MR L-spine ([place]): L4-L5 anterolisthesis with a left paracentral disc protrusion     CXR ([place]): Pigtail catheter in place in right lower lung field. Decreasing right-sided pleural effusion. No pneumothorax.     CT T-spine ([place]): T6-7 discitis-OM. Few punctate sites of intraosseous gas in the T6 body and small erosive change of superior T7 endplate.

## 2024-11-21 NOTE — PATIENT PROFILE ADULT - STATED REASON FOR ADMISSION

## 2024-11-21 NOTE — CONSULT NOTE ADULT - SUBJECTIVE AND OBJECTIVE BOX
SUBJECTIVE   unable to offer     INTERIM HISTORY SIGNIFICANT FOR   patient tx with chest tube   placed for effusion rule out empyema - cultures negative to date on review of [place] chart   TPA instilled for loculation     Patient is a 69y old  Male who presents with a chief complaint of   HPI: 69 YOM w/PMHx severe developmental delay (nonverbal at baseline) who was transferred from [place] for epidural spinal abscess now tx for surgical evaluation for epidural abscess for neuro surgery     OBJECTIVE  PAST MEDICAL & SURGICAL HISTORY:  MR (mental retardation), severe      Seizure      HTN (hypertension)      Psoriasis      DD (diastrophic dysplasia)      Blind  right eye      Constipation, unspecified constipation type      No significant past surgical history        Augmentin (Unknown)  sulfa drugs (Unknown)  penicillin (Unknown)  aztreonam ([place])    Home Medications:  acetaminophen 500 mg oral tablet: 2 tab(s) orally every 8 hours (25 Dec 2022 15:37)  amLODIPine 2.5 mg oral tablet: 1 tab(s) orally once a day (17 Dec 2022 15:03)  aspirin 81 mg oral tablet: 1 tab(s) orally once a day (17 Dec 2022 15:03)  calcipotriene 0.005% topical cream: Apply topically to affected area 2 times a day (17 Dec 2022 15:03)  clonazePAM 1 mg oral tablet: 0.5 tab(s) orally 2 times a day (17 Dec 2022 15:03)  Depakote  mg oral tablet, extended release: 1 tab(s) orally 3 times a day (17 Dec 2022 15:03)  fluvoxaMINE 100 mg oral tablet: 1 tab(s) orally 2 times a day (17 Dec 2022 15:03)  ketoconazole 2% topical shampoo: Apply topically to affected area two times a week (17 Dec 2022 15:03)  lactulose 10 g/15 mL oral solution: 30 milliliter(s) orally once a day (17 Dec 2022 15:03)  levothyroxine 75 mcg (0.075 mg) oral tablet: 1 tab(s) orally once a day (17 Dec 2022 15:03)  Lovenox 40 mg/0.4 mL injectable solution: 1 application injectable once a day (29 Dec 2022 14:36)  metoprolol tartrate 50 mg oral tablet: 1 tab(s) orally 2 times a day (29 Dec 2022 14:36)  multivitamin with minerals: 1 tab(s) orally once a day (17 Dec 2022 15:03)  OLANZapine 15 mg oral tablet: 1 tab(s) orally once a day (at bedtime) (17 Dec 2022 15:03)  omeprazole 20 mg oral delayed release capsule: 1 cap(s) orally once a day (17 Dec 2022 15:03)  oxyCODONE 5 mg oral tablet: 1 tab(s) orally every 6 to 8 hours, As Needed - 6) (25 Dec 2022 15:37)  Oyster Betito 1250 mg (500 mg elemental calcium) oral tablet: 1 tab(s) orally 3 times a day (17 Dec 2022 15:03)  petrolatum topical ointment: Apply topically to affected area once a day (17 Dec 2022 15:03)  tamsulosin 0.4 mg oral capsule: 2 cap(s) orally once a day (at bedtime) (17 Dec 2022 15:03)  tolnaftate 1% topical cream: Apply topically to affected area once a day (17 Dec 2022 15:03)    VITALS  Currently in sinus rhythm with vitals as below  ICU Vital Signs Last 24 Hrs  T(C): 38.2 (21 Nov 2024 18:00), Max: 38.2 (21 Nov 2024 18:00)  T(F): 100.8 (21 Nov 2024 18:00), Max: 100.8 (21 Nov 2024 18:00)  HR: 107 (21 Nov 2024 18:00) (107 - 115)  BP: 143/90 (21 Nov 2024 18:00) (143/90 - 160/94)  BP(mean): 107 (21 Nov 2024 18:00) (98 - 116)  ABP: --  ABP(mean): --  RR: 27 (21 Nov 2024 18:00) (19 - 27)  SpO2: 93% (21 Nov 2024 18:00) (93% - 99%)    O2 Parameters below as of 21 Nov 2024 16:30  Patient On (Oxygen Delivery Method): nasal cannula  O2 Flow (L/min): 3        Adult Advanced Hemodynamics Last 24 Hrs  CVP(mm Hg): --  CVP(cm H2O): --  CO: --  CI: --  PA: --  PA(mean): --  PCWP: --  SVR: --  SVRI: --  PVR: --  PVRI: --  LABS                        7.6    7.53  )-----------( 441      ( 21 Nov 2024 17:30 )             24.6   PT/INR - ( 21 Nov 2024 17:30 )   PT: 12.5 sec;   INR: 1.08 ratio         PTT - ( 21 Nov 2024 17:30 )  PTT:27.9 mgw54-25    139  |  106  |  18.0  ----------------------------<  107[H]  4.2   |  25.0  |  0.70    Ca    7.5[L]      21 Nov 2024 17:30  Phos  2.1     11-21  Mg     2.1     11-21    TPro  6.1[L]  /  Alb  2.1[L]  /  TBili  <0.2[L]  /  DBili  x   /  AST  12  /  ALT  <5  /  AlkPhos  46  11-21  CAPILLARY BLOOD GLUCOSE              IN/OUT    IMAGING  personally reviewed imaging     CURRENT MEDICATIONS  MEDICATIONS  (STANDING):  chlorhexidine 2% Cloths 1 Application(s) Topical <User Schedule>  cholecalciferol 2000 Unit(s) Oral daily  diltiazem    Tablet 60 milliGRAM(s) Oral every 6 hours  divalproex  milliGRAM(s) Oral every 8 hours  finasteride 5 milliGRAM(s) Oral daily  fluticasone propionate/ salmeterol 100-50 MICROgram(s) Diskus 1 Dose(s) Inhalation two times a day  fluvoxaMINE 100 milliGRAM(s) Oral <User Schedule>  levothyroxine 75 MICROGram(s) Oral daily  metoprolol tartrate 50 milliGRAM(s) Oral two times a day  multiple electrolytes Injection Type 1 1000 milliLiter(s) (75 mL/Hr) IV Continuous <Continuous>  mupirocin 2% Nasal 1 Application(s) Both Nostrils every 12 hours  nafcillin  IVPB 2 Gram(s) IV Intermittent every 4 hours  OLANZapine 20 milliGRAM(s) Oral at bedtime  pantoprazole    Tablet 40 milliGRAM(s) Oral before breakfast  polyethylene glycol 3350 17 Gram(s) Oral daily  senna 2 Tablet(s) Oral at bedtime  tamsulosin 0.8 milliGRAM(s) Oral at bedtime  zinc oxide 20% Ointment 1 Application(s) Topical every 12 hours    MEDICATIONS  (PRN):  acetaminophen     Tablet .. 650 milliGRAM(s) Oral every 6 hours PRN Temp greater or equal to 38C (100.4F), Mild Pain (1 - 3)  bisacodyl Suppository 10 milliGRAM(s) Rectal daily PRN Constipation

## 2024-11-21 NOTE — H&P ADULT - HISTORY OF PRESENT ILLNESS
69-year-old male with PMH CAD (on ASA81), GERD, psoriasis, hypothyroidism, UTI, right eye cataract, intellectual disability, autism, epilepsy (on Divalproex), HTN, anxiety, venous stasis dermatitis b/l LE, oropharyngeal dysphagia presented to [facility] on 10/28 from group home for fever, found to have pneumonia. Patient's hospital course was complicated by anemia, right sided loculated pleural effusion s/p thoracentesis (11/12) with 500 ml fluid removed, now s/p right chest tube (placed on 11/20) and TPA given via chest tube to break up loculation (11/21), MSSA bacteremia, new onset afib with RVR (10/31). T6-T7 osteomyelitis and phlegmon. ID following at prior hospital and patient had completed  Ancef, Vancomycin, Invanz, Azactam and Flagyl, Cefepime, however patient still remained persistently febrile and bacteremic. Patient was then transferred to [facility] on 11/21 for neurosurgical evaluation to consider laminectomy for evacuation of T6-T7 spinal epidural phlegmon and will be admitted to the NSICU under Dr. Love. Per chart patient is nonverbal, wheelchair bound at baseline and is under the care of [facility] for developmental delay, Ethics team has been consulted to follow.

## 2024-11-21 NOTE — CONSULT NOTE ADULT - SUBJECTIVE AND OBJECTIVE BOX
HPI:    The patient is a 69 year old male with a past medical history of  intellectual disability (non verbal at baseline from a Group home, hypertension, epilespy, psoriatic arthritis, dysphagia, hypothyroidism who was admitted to  [facility] on 10/28/2024 for fever. Was admitted for Sepsis secondary to MSSA bacteremia. Blood cultures were positive on 10/28 and 10/30 with negative blood cultures on . He remained on IV antibiotics for bacteremia with pneumonia and stero colitis. Course was complicated by new onset Afib with RVR. YAMILEX VASC 3 was started on Lovenox BID with cardizem and metoprolol for rate control. TTE with EF of 65% with no IE. FELIX was done on  negative for IE. He was noted to have a pleural effusion requiring thoracentesis with removal of 500cc of fluid consistent with an exudate by Light's criteria. Pleural fluid cultures were negative. Patient developed a fever again for which blood cultures were repeated. on  again positive for MSSA; negative on . CT of the thoracic, lumbar and sacral spines were positive for T6-T7 diskitis and osteomyelitis. CT chest with a loculated right pleura effusion with a mass in the right hemithorax. Lovenox was held; status post Chest tube placement by IR on . MRI of the spine on  showed t6-t7 OM/Diskitis with mild to moderate stenosis with a paravertebral abscess.  Patient transferred here for Neurosurgical evaluation with plan for OR tomorrow for T5-T7 decompression    At the time of my examination, patient is awake but non verbal does not follow commands. On RA Spo2 96% at rest with right chest tube in place. Noted to be tachycardic on monitor. Tmax of 100        PAST MEDICAL & SURGICAL HISTORY:  MR (mental retardation), severe      Seizure      HTN (hypertension)      Psoriasis      DD (diastrophic dysplasia)      Blind  right eye      Constipation, unspecified constipation type      No significant past surgical history          Social History:  Resides at Group home   Tabacco - unable to obtain   ETOH - unable to obtain   Illicit drug abuse - unable to obtain     FAMILY HISTORY:unable to obtain       Allergies    Augmentin (Unknown)  sulfa drugs (Unknown)  penicillin (Unknown)  aztreonam (Rash)    Intolerances        HOME MEDICATIONS :     diltiazem    Tablet 60 milliGRAM(s) Oral every 6 hours  divalproex  milliGRAM(s) Oral every 8 hours  finasteride 5 milliGRAM(s) Oral daily  fluticasone propionate/ salmeterol 100-50 MICROgram(s) Diskus 1 Dose(s) Inhalation two times a day  fluvoxaMINE 100 milliGRAM(s) Oral <User Schedule>  levothyroxine 75 MICROGram(s) Oral daily  metoprolol tartrate 50 milliGRAM(s) Oral two times a day  OLANZapine 20 milliGRAM(s) Oral at bedtime  pantoprazole    Tablet 40 milliGRAM(s) Oral before breakfast  polyethylene glycol 3350 17 Gram(s) Oral daily  senna 2 Tablet(s) Oral at bedtime  tamsulosin 0.8 milliGRAM(s) Oral at bedtime  zinc oxide 20% Ointment 1 Application(s) Topical every 12 hours      REVIEW OF SYSTEMS:    Unable to obtain due to patient's mental status     MEDICATIONS  (STANDING):  chlorhexidine 2% Cloths 1 Application(s) Topical <User Schedule>  cholecalciferol 2000 Unit(s) Oral daily  diltiazem    Tablet 60 milliGRAM(s) Oral every 6 hours  divalproex  milliGRAM(s) Oral every 8 hours  finasteride 5 milliGRAM(s) Oral daily  fluticasone propionate/ salmeterol 100-50 MICROgram(s) Diskus 1 Dose(s) Inhalation two times a day  fluvoxaMINE 100 milliGRAM(s) Oral <User Schedule>  levothyroxine 75 MICROGram(s) Oral daily  metoprolol tartrate 50 milliGRAM(s) Oral two times a day  multiple electrolytes Injection Type 1 1000 milliLiter(s) (75 mL/Hr) IV Continuous <Continuous>  mupirocin 2% Nasal 1 Application(s) Both Nostrils every 12 hours  nafcillin  IVPB 2 Gram(s) IV Intermittent every 4 hours  OLANZapine 20 milliGRAM(s) Oral at bedtime  pantoprazole    Tablet 40 milliGRAM(s) Oral before breakfast  polyethylene glycol 3350 17 Gram(s) Oral daily  senna 2 Tablet(s) Oral at bedtime  tamsulosin 0.8 milliGRAM(s) Oral at bedtime  zinc oxide 20% Ointment 1 Application(s) Topical every 12 hours    MEDICATIONS  (PRN):  acetaminophen     Tablet .. 650 milliGRAM(s) Oral every 6 hours PRN Temp greater or equal to 38C (100.4F), Mild Pain (1 - 3)  bisacodyl Suppository 10 milliGRAM(s) Rectal daily PRN Constipation      Vital Signs Last 24 Hrs  T(C): 38.2 (2024 18:00), Max: 38.2 (2024 18:00)  T(F): 100.8 (2024 18:00), Max: 100.8 (2024 18:00)  HR: 107 (2024 18:00) (107 - 115)  BP: 143/90 (2024 18:00) (143/90 - 160/94)  BP(mean): 107 (2024 18:00) (98 - 116)  RR: 27 (2024 18:00) (19 - 27)  SpO2: 93% (2024 18:00) (93% - 99%)    Parameters below as of 2024 16:30  Patient On (Oxygen Delivery Method): nasal cannula  O2 Flow (L/min): 3      PHYSICAL EXAM:    GENERAL: NAD  HEAD:  Atraumatic, Normocephalic  EYES:conjunctiva and sclera clear  NECK: Supple, No JVD  CHEST/LUNG: CTA  b/l,  no rales, rhonchi, wheezing, or rubs  Right Chest tube   HEART: Tachycardia ; No murmurs, rubs, or gallops  ABDOMEN: Soft, Nontender, Nondistended; Bowel sounds present  EXTREMITIES:  2+ Peripheral Pulses, No clubbing, cyanosis, or edema ,     LABS:                        7.6    7.53  )-----------( 441      ( 2024 17:30 )             24.6           PT/INR - ( 2024 17:30 )   PT: 12.5 sec;   INR: 1.08 ratio         PTT - ( 2024 17:30 )  PTT:27.9 sec  Urinalysis Basic - ( 2024 17:30 )    Color: Yellow / Appearance: Clear / S.022 / pH: x  Gluc: x / Ketone: Negative mg/dL  / Bili: Negative / Urobili: 0.2 mg/dL   Blood: x / Protein: 100 mg/dL / Nitrite: Negative   Leuk Esterase: Trace / RBC: 37 /HPF / WBC 13 /HPF   Sq Epi: x / Non Sq Epi: 3 /HPF / Bacteria: Negative /HPF          RADIOLOGY & ADDITIONAL STUDIES:

## 2024-11-21 NOTE — CONSULT NOTE ADULT - ASSESSMENT
69 YOM w/PMHx severe developmental delay (nonverbal at baseline) who was transferred from [place] for epidural spinal abscess.     PLAN:  - Admit to NSICU  - Plan for emergent OR decompression tomorrow (T5-7) with Dr. Love  - Rec MICU / CTICU consult for chest tube  - Rec ID consult for antibiotic management   - Rest of care per primary team  - Case discussed with Dr. Love, Dr. Bear

## 2024-11-21 NOTE — CONSULT NOTE ADULT - NSCONSULTADDITIONALINFOA_GEN_ALL_CORE
Patient is under the care of the [facility] and unable to consent for himself. I agree that surgical decompression of T5-T7 is an emergent procedure and provide the second physician consent.

## 2024-11-21 NOTE — CONSULT NOTE ADULT - PROBLEM SELECTOR PROBLEM 1
Pleural effusion Stelara Counseling:  I discussed with the patient the risks of ustekinumab including but not limited to immunosuppression, malignancy, posterior leukoencephalopathy syndrome, and serious infections.  The patient understands that monitoring is required including a PPD at baseline and must alert us or the primary physician if symptoms of infection or other concerning signs are noted.

## 2024-11-21 NOTE — CONSULT NOTE ADULT - TIME BILLING
Chart/notes review, interpretation of laboratory and imaging results, patient assessment, documentation, case discussion with Primary Team  Inter-facility transfer  Review of 3 weeks of records from outside hospital   Case discussion with ID Dr Bowman from outside hospital   Review of microbiological data with Clinical Pharmacist and adjustment of antibiotics where necessary Chart/notes review, interpretation of laboratory and imaging results, patient assessment, documentation, case discussion with Primary Team  Inter-facility transfer  Review of more than 3 weeks of records from outside hospital   Case discussion with ID Dr Bowman from outside hospital   Review of microbiological data with Clinical Pharmacist and adjustment of antibiotics where necessary

## 2024-11-21 NOTE — PROGRESS NOTE ADULT - SUBJECTIVE AND OBJECTIVE BOX
The patient is a 69 year old male with a past medical history of  intellectual disability (non verbal at baseline from a Group home, hypertension, epilespy, psoriatic arthritis, dysphagia, hypothyroidism who was admitted to  [facility] on 10/28/2024 for fever. Was admitted for Sepsis secondary to MSSA bacteremia. Blood cultures were positive on 10/28 and 10/30 with negative blood cultures on 11/1. He remained on IV antibiotics for bacteremia with pneumonia and stero colitis. Course was complicated by new onset Afib with RVR. YAMILEX VASC 3 was started on Lovenox BID with cardizem and metoprolol for rate control. TTE with EF of 65% with no IE.  MRI of the spine on 11/20 showed t6-t7 OM/Diskitis with mild to moderate stenosis with a paravertebral abscess.  Patient transferred here for Neurosurgical evaluation with plan for OR tomorrow for T5-T7 decompression.      After thorough chart review, no further work-up necessary.  2 uPRBCs should be on hold.  Ultimately, decision to proceed with surgery is deferred to anesthesiologist on the day of surgery.    Justyn Davila DO  Attending Anesthesiologist

## 2024-11-21 NOTE — H&P ADULT - NSHPPHYSICALEXAM_GEN_ALL_CORE
GENERAL: NAD  HEAD: Atraumatic, normocephalic  MENTAL STATUS: Nonverbal (baseline), awake; opens eyes spontaneously  CRANIAL NERVES: Pupils L 3mm reactive. 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 EOMI. Face grossly symmetric.   MOTOR: B/l UE antigravity moving spontaneously. B/l LE contracted (baseline) but withdraws to noxious.   SENSATION: Grossly intact to light touch all extremities  CHEST/LUNG: Non-labored breathing  ABDOMEN: Soft, nontender, nondistended  SKIN: RLQ abdominal ecchymosis, venous stasis dermatitis on b/l LE  DRAINS: Right sided pigtail catheter GENERAL: NAD  HEAD: Atraumatic, normocephalic  MENTAL STATUS: Nonverbal (baseline), awake; opens eyes spontaneously, does not follow commands  CRANIAL NERVES: Pupils L 3mm reactive. 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 EOMI. Face grossly symmetric.   MOTOR: B/l UE antigravity moving spontaneously. B/l LE contracted (baseline) but withdraws to noxious.   SENSATION: Grossly intact to light touch all extremities  CHEST/LUNG: Non-labored breathing  ABDOMEN: Soft, nontender, nondistended  SKIN: RLQ abdominal ecchymosis, venous stasis dermatitis on b/l LE  DRAINS: Right sided pigtail catheter

## 2024-11-21 NOTE — H&P ADULT - ASSESSMENT
ASSESSMENT:  69M with PMH CAD (on ASA81), GERD, psoriasis, hypothyroidism, UTI, right eye cataract, intellectual disability, autism, epilepsy, HTN, anxiety, venous stasis dermatitis b/l LE, oropharyngeal dysphagia presented to [facility] on 10/28 from group home for fever, found to have pneumonia. Hospital course complicated by right sided loculated pleural effusion s/p thoracentesis (11/12), s/p right chest tube (placed on 11/20), TPA given via chest tube to break up loculation (11/21), +MSSA bacteremia, new onset afib with RVR (10/31). T6-T7 osteomyelitis and phlegmon on imaging s/p completion of multiple antibiotics with persistent fever and bacteremia. Patient transferred to [facility] on 11/21 for neurosurgical eval to evacuate T6-T7 spinal epidural phlegmon. Patient admitted to NSICU. Patient is under the care of [facility].      PLAN:    NEURO:  #Epilepsy  - Q1h neuro checks  - Depakote 250mg q8 for epilepsy  - Fluvoxamine 100 BID  - Olanzapine 20 HS    blood cx 16 pos  18 neg  19 neg  - Mobilize as tolerated    PULM:  - SpO2 goal > 92%  - Incentive spirometry  - Advair 100/50    CV:   # Right loculated pleural effusion s/p R chest tube (11/20), s/p TPA via chest tube (11/21)  # Afib with RVR  - SBP goal   - TTE pending for OR clearance (FELIX on 11/1 at prior hospital EF 62% on per chart)  - EKG pending  - Diltiazem 60 q6  - Metoprolol 50 BID  - CT surgery following for right sided chest tube management appreciate recs  - Discussed with CT surgery PA- will keep chest tube to low wall suction x24h post-TPA, will follow-up additional recs    RENAL:  - Fluids: Plasmalyte @ 75  - Voiding  - Finasteride 5mg daily    GI:  - Diet: Puree, no liquid  - NPO after midnight for anticipated OR  - GI prophylaxis:   - Bowel regimen: dulcolax suppository    ENDO:   #Hypothyroidism   - Goal euglycemia (-180)  - Levothyroxine 75mcg    HEME/ONC:    - VTE prophylaxis: [] SCDs [] chemoprophylaxis [] hold chemoprophylaxis due to: [] high risk of DVT/PE on admission due to:    ID:   #MSSA bacteremia  #T6-T7 phlegmon   - ID following, appreciate recs  - Started on Nafacillin 2g   - Mupirocin 2% BID x 5 days for +MSSA      MISC:    SOCIAL/FAMILY:  [] awaiting [] updated at bedside [] family meeting    CODE STATUS:  [] Full Code [] DNR [] DNI [] Palliative/Comfort Care    DISPOSITION:  [] ICU [] Stroke Unit [] Floor [] EMU [] RCU [] PCU    [] Patient is at high risk of neurologic deterioration/death due to:     Time spent: ___ [] critical care minutes   ASSESSMENT:  69M with PMH CAD (on ASA81), GERD, psoriasis, hypothyroidism, UTI, right eye cataract, intellectual disability, autism, epilepsy, HTN, anxiety, venous stasis dermatitis b/l LE, oropharyngeal dysphagia presented to [facility] on 10/28 from group home for fever, found to have pneumonia. Hospital course complicated by right sided loculated pleural effusion s/p thoracentesis (11/12), s/p right chest tube (placed on 11/20), TPA given via chest tube to break up loculation (11/21), +MSSA bacteremia, new onset afib with RVR (10/31). T6-T7 osteomyelitis and phlegmon on imaging s/p completion of multiple antibiotics with persistent fever and bacteremia. Patient transferred to [facility] on 11/21 for neurosurgical eval to evacuate T6-T7 spinal epidural phlegmon. Patient admitted to NSICU. Patient is under the care of OPW[facility].      PLAN:    NEURO:  #Epilepsy  - Q1h neuro checks  - Valproate 250 q8 for epilepsy  - Fluvoxamine 100 BID  - Olanzapine 20 HS  - Mobilize as tolerated    PULM:  - SpO2 goal > 92%  - Incentive spirometry  - Advair 100/50    CV:   # Right loculated pleural effusion s/p R chest tube (11/20), s/p TPA via chest tube (11/21)  # New onset Afib with RVR  - SBP goal   - TTE pending for OR clearance (FELIX on 11/1 at prior hospital EF 62% on per chart)  - EKG pending  - Diltiazem 60 q6  - Metoprolol 50 BID  - CXR in AM  - CT surgery following for right sided chest tube management appreciate recs  - Discussed with CT surgery PA- will keep chest tube to low wall suction (set at -20) x24h post-TPA, will follow-up additional recs    RENAL:  - Fluids: Plasmalyte @ 75  - Voiding   - Finasteride 5mg daily  - Flomax 0.8  - Monitor kidney function daily while on IV antibiotics   - Vitamin D3 2000U daily  - Calcium carbonate 1250 mg    GI:  - Diet: Puree, no liquid  - NPO after midnight for anticipated OR  - GI prophylaxis:   - Bowel regimen: dulcolax suppository, senna, miralax    ENDO:   #Hypothyroidism   - Goal euglycemia (-180)  - Levothyroxine 75mcg    HEME/ONC:  - VTE prophylaxis: SCDs, hold chemoprophylaxis due to anticipated OR in AM  - Baseline LED pending  - Lovenox BID on hold for the OR  - Check Iron panel, B12 folate LDH, haptoglobin and retic count     ID:   #MSSA bacteremia  #T6-T7 phlegmon   - ID following, appreciate recs  - Continue Nafcillin 2g IV Q4h for MSSA bacteremia   - Pending blood cultures   - Mupirocin 2% BID x 5 days for +MSSA  - Monitor fever curve and white count    MISC:  - Ethics consulted for [facility]    Discussed with Dr. Bear and Dr. Love ASSESSMENT:  69M with PMH CAD (on ASA81), GERD, psoriasis, hypothyroidism, UTI, right eye cataract, intellectual disability, autism, epilepsy, HTN, anxiety, venous stasis dermatitis b/l LE, oropharyngeal dysphagia presented to [facility] on 10/28 from group home for fever, found to have pneumonia. Hospital course complicated by right sided loculated pleural effusion s/p thoracentesis (11/12), s/p right chest tube (placed on 11/20), TPA given via chest tube to break up loculation (11/21), +MSSA bacteremia, new onset afib with RVR (10/31). T6-T7 osteomyelitis and phlegmon on imaging s/p completion of multiple antibiotics with persistent fever and bacteremia. Patient transferred to [facility] on 11/21 for neurosurgical eval to evacuate T6-T7 spinal epidural phlegmon. Patient admitted to NSICU. Patient is under the care of OPW[facility].      PLAN:    NEURO:  #Epilepsy  - Q1h neuro checks  - Valproate 250 q8 for epilepsy  - Fluvoxamine 100 BID  - Olanzapine 20 HS  - Mobilize as tolerated    PULM:  - SpO2 goal > 92%  - Incentive spirometry  - Advair 100/50    CV:   # Right loculated pleural effusion s/p R chest tube (11/20), s/p TPA via chest tube (11/21)  # New onset Afib with RVR  - SBP goal   - TTE pending for OR clearance (FELIX on 11/1 at prior hospital EF 62% on per chart)  - EKG pending  - Diltiazem 60 q6h  - Metoprolol IV 5mg q6h  - CXR in AM  - CT surgery following for right sided chest tube management appreciate recs  - Discussed with CT surgery PA- will keep chest tube to low wall suction (set at -20) x24h post-TPA, will follow-up additional recs    RENAL:  - Fluids: Plasmalyte @ 75  - Voiding   - Finasteride 5mg daily  - Flomax 0.8  - Monitor kidney function daily while on IV antibiotics   - Vitamin D3 2000U daily  - Calcium carbonate 1250 mg    GI:  - Diet: Puree, no liquid  - NPO after midnight for anticipated OR  - GI prophylaxis:   - Bowel regimen: dulcolax suppository, senna, miralax    ENDO:   #Hypothyroidism   - Goal euglycemia (-180)  - Levothyroxine 75mcg    HEME/ONC:  - VTE prophylaxis: SCDs, hold chemoprophylaxis due to anticipated OR in AM  - Baseline LED pending  - Lovenox BID on hold for the OR  - Check Iron panel, B12 folate LDH, haptoglobin and retic count     ID:   #MSSA bacteremia  #T6-T7 phlegmon   - ID following, appreciate recs  - Continue Nafcillin 2g IV Q4h for MSSA bacteremia   - Pending blood cultures   - Mupirocin 2% BID x 5 days for +MSSA  - Monitor fever curve and white count    MISC:  - Ethics consulted for [facility]    Discussed with Dr. Bear and Dr. Love

## 2024-11-21 NOTE — CONSULT NOTE ADULT - ASSESSMENT
69 year old male with a past medical history of  intellectual disability (non verbal at baseline from a Group home, hypertension, epilespy, psoriatic arthritis, dysphagia, hypothyroidism who was admitted to  [facility] on 10/28/2024 for fever. Was admitted for Sepsis secondary to MSSA bacteremia. Blood cultures were positive on 10/28 and 10/30 with negative blood cultures on 11/1. He remained on IV antibiotics for bacteremia with pneumonia and stero colitis. Course was complicated by new onset Afib with RVR. YAMILEX VASC 3 was started on Lovenox BID with cardizem and metoprolol for rate control. TTE with EF of 65% with no IE. FELIX was done on 11/1 negative for IE. He was noted to have a pleural effusion requiring thoracentesis with removal of 500cc of fluid consistent with an exudate by Light's criteria. Pleural fluid cultures were negative. Patient developed a fever again for which blood cultures were repeated. on 11/16 again positive for MSSA; negative on 11/18. CT of the thoracic, lumbar and sacral spines were positive for T6-T7 diskitis and osteomyelitis. CT chest with a loculated right pleura effusion with a mass in the right hemithorax. Lovenox was held; status post Chest tube placement by IR on 11/20. MRI of the spine on 11/20 showed t6-t7 OM/Diskitis with mild to moderate stenosis with a paravertebral abscess.  Patient transferred here for Neurosurgical evaluation with plan for OR tomorrow for T5-T7 decompression    Assessment/Plan:    1. Thoracic spine Osteomyelitis and Diskitis in the setting of MSSA bacteremia  - Blood cultures from 11/18 negative   - CT and MRI results reviewed  - ID Consulted; on Nafcillin   - Repeat blood cultures x 2  - Check TTE  - FELIX on 11/11 negative for IE    2. Pre Op Assessment  Patient is at moderate to high risk for cardiovascular event during this high Patient is non verbal and bedbound at baseline. Recommend Pre Op TTE and EKG     3. Right empyema  - Currently on RA  - Right chest tube in place by IR on 11/20   - FOllow up pleural fluid cultures sent at [place]  - Recommend [facility] surgery consult    4. Afib  - YAMILEX VASC of 3  - Lovenox BID on hold for the oR  - Continue Cardizem and Metoprolol    5. Dysphagia  - Pureed diet with mildly thick fluid   - Aspiration precautions     6. Intellectual disability  - Total feed and assist with all ADLS  - To continue Divalproex and zyprexa  - Discussed with Ethics, patient with no HCP or family  - Ethics consulted     7. BPH  - Finasteride and Flomax    8. Hypothyroidism  - On Synthroid     9. Anemia  - No acute s/s of bleeding  - Hb on admission to [place] at 11-12 now at 7  - Check Iron panel, B12 folate LDH, haptoglobin and retic count     VTE_ SCDS      Patient is at moderate to high risk for cardiovascular event during this high Patient is non verbal and bedbound at baseline. Recommend Pre Op TTE and EKG

## 2024-11-21 NOTE — CONSULT NOTE ADULT - ASSESSMENT
69 M PMH intellectual disability (non verbal), HTN, epilepsy, venous stasis dermatitis, dysphagia, arthritis, hypothyroidism, who initially presented to [facility] on Oct 28th for fevers.     Fevers   MSSA bacteremia (+ blood cultures Oct 28th and 30th, cleared Nov 1st)  Multiple blood cultures between Nov 1st and 11th were negative  FELIX Nov 1st negative for vegetations  S/p multiple IV antibiotics (including Nafcillin later on in his hospital course)  Course c/b loculated R sided pleural effusion  S/p thoracentesis ? Nov 9th (culture was negative), and s/p chest tube Nov 20th (exudative, no evidence of empyema, culture negative to date)  Course further c/b recurrence of MSSA bacteremia  Positive blood cultures on Nov 16th, cleared on Nov 18th  Due to recurrence of MSSA bacteremia patient underwent MRI spine on Nov 19th  MRI showed T6-7 discitis/OM as well as well 8cm epidural phlegmon T5-T7  Transferred here with plan for OR tomorrow for T5-T7 decompression    Plan:  Continue Nafcillin 2g IV Q4h for MSSA bacteremia   Check 2 sets of blood cultures   Check TTE  Thoracic Surgery consult given recent chest tube placement   Follow up OR findings and cultures tomorrow    Monitor fever curve and white count  Monitor kidney function daily while on IV antibiotics

## 2024-11-21 NOTE — CONSULT NOTE ADULT - ASSESSMENT
69 YOM w/PMHx severe developmental delay (nonverbal at baseline) who was transferred from [place] for epidural spinal abscess thoracic surgery consulted for management of chest tube   placed to RULE OUT EMPYEMA - cultures negative to date - cxr overall clear s.p tpa instillation to break residual adhesions

## 2024-11-21 NOTE — CONSULT NOTE ADULT - SUBJECTIVE AND OBJECTIVE BOX
[facility]  INFECTIOUS DISEASES at [place] / [place] / [place]  =======================================================                              Shay Parks MD                              Professor Emeritus:  Dr Max Cuevas MD            Diplomates American Board of Internal Medicine & Infectious Diseases                                   Tel  [phone] Fax [phone]                                  Hospital Consult line:  [phone]  =======================================================    Patient is a 69 Male who presents with a chief complaint of transfer for surgical evaluation     HPI:  69 M PMH intellectual disability (non verbal), HTN, epilepsy, venous stasis dermatitis, dysphagia, arthritis, hypothyroidism, who initially presented to [facility] on Oct 28th for fevers.     He was found to have MSSA bacteremia (+ blood cultures Oct 28th and 30th, cleared Nov 1st). He had multiple blood cultures between Nov 1st and 11th that were negative. FELIX Nov 1st was negative for vegetations. He was treated with multiple IV antibiotics (including Nafcillin later on in his hospital course). His course was complicated by a loculated R sided pleural effusion, for which he had thoracentesis done on ? Nov 9th (culture was negative), and had a chest tube placed by Thoracic Surgery on Nov 20th (exudative, no evidence of empyema, culture negative to date). Course was also complicated by recurrence of MSSA bacteremia with + blood cultures on Nov 16th (blood cultures cleared on Nov 18th). Due to recurrence of MSSA bacteremia patient underwent MRI spine on Nov 19th which showed T6-7 discitis/OM as well as well 8cm epidural phlegmon T5-T7. Patient transferred here for Neurosurgical evaluation with plan for OR tomorrow for T5-T7 decompression. ID consulted for recommendations.     REVIEW OF SYSTEMS  Unable to obtain as pt non verbal     prior hospital charts reviewed [V]  primary team notes reviewed [V]  other consultant notes reviewed [V]    PAST MEDICAL & SURGICAL HISTORY:  MR (mental retardation), severe    Seizure    HTN (hypertension)    Psoriasis    DD (diastrophic dysplasia)    Blind  right eye    Constipation, unspecified constipation type    No significant past surgical history    SOCIAL HISTORY:  Unable to obtain as pt non verbal     FAMILY HISTORY:  Unable to obtain as pt non verbal     Allergies  Augmentin (Unknown)  sulfa drugs (Unknown)  penicillin (Unknown)  aztreonam (Rash)    ANTIMICROBIALS:  nafcillin  IVPB 2 every 4 hours    ANTIMICROBIALS (past 90 days):  MEDICATIONS  (STANDING):    OTHER MEDS:   MEDICATIONS  (STANDING):  acetaminophen   IVPB .. 1000 once    VITALS:  Vital Signs Last 24 Hrs  T(F): 100.6 (11-21-24 @ 16:30), Max: 100.6 (11-21-24 @ 16:30)    Vital Signs Last 24 Hrs  HR: 109 (11-21-24 @ 16:30) (109 - 109)  BP: 149/81 (11-21-24 @ 16:30) (149/81 - 149/81)  RR: 21 (11-21-24 @ 16:30)  SpO2: 99% (11-21-24 @ 16:30) (99% - 99%)  Wt(kg): --    EXAM:  General: NAD   HEENT: NCAT  CV: S1+S2  Lungs: No acute respiratory distress  Chest tube in place   Abd: Soft  Ext: No cyanosis  Neuro: Calm   Skin: No rash     Labs:    WBC Trend:    Creatine Trend:    Liver Biochemical Testing Trend:    MICROBIOLOGY:    Culture - Urine (collected 29 Dec 2022 14:00)  Source: Catheterized Catheterized  Final Report:    >100,000 CFU/ml Proteus mirabilis  Organism: Proteus mirabilis  Organism: Proteus mirabilis    Sensitivities:      Method Type: EMANUEL      -  Amikacin: S <=16      -  Amoxicillin/Clavulanic Acid: S <=8/4      -  Ampicillin: S <=8 These ampicillin results predict results for amoxicillin      -  Ampicillin/Sulbactam: S <=4/2 Enterobacter, Klebsiella aerogenes, Citrobacter, and Serratia may develop resistance during prolonged therapy (3-4 days)      -  Aztreonam: S <=4      -  Cefazolin: S <=2 For uncomplicated UTI with K. pneumoniae, E. coli, or P. mirablis: EMANUEL <=16 is sensitive and EMANUEL >=32 is resistant. This also predicts results for oral agents cefaclor, cefdinir, cefpodoxime, cefprozil, cefuroxime axetil, cephalexin and locarbef for uncomplicated UTI. Note that some isolates may be susceptible to these agents while testing resistant to cefazolin.      -  Cefepime: S <=2      -  Cefoxitin: S <=8      -  Ceftriaxone: S <=1 Enterobacter, Klebsiella aerogenes, Citrobacter, and Serratia may develop resistance during prolonged therapy      -  Cefuroxime: S <=4      -  Ciprofloxacin: S <=0.25      -  Ertapenem: S <=0.5      -  Gentamicin: S <=2      -  Levofloxacin: S <=0.5      -  Meropenem: S <=1      -  Nitrofurantoin: R >64 Should not be used to treat pyelonephritis      -  Piperacillin/Tazobactam: S <=8      -  Tobramycin: S <=2      -  Trimethoprim/Sulfamethoxazole: S <=0.5/9.5    Culture - Blood (collected 28 Dec 2022 16:36)  Source: .Blood Blood  Final Report:    No Growth Final    Culture - Blood (collected 28 Dec 2022 16:36)  Source: .Blood Blood  Final Report:    No Growth Final    Culture - Urine (collected 21 Dec 2022 14:51)  Source: Clean Catch Clean Catch (Midstream)  Final Report:    No growth    Culture - Blood (collected 21 Dec 2022 12:28)  Source: .Blood Blood  Final Report:    No Growth Final    Culture - Blood (collected 21 Dec 2022 12:28)  Source: .Blood Blood  Final Report:    No Growth Final    Culture - Blood (collected 16 Dec 2022 21:06)  Source: .Blood Blood  Final Report:    No Growth Final    Culture - Blood (collected 16 Dec 2022 21:00)  Source: .Blood Blood  Final Report:    No Growth Final    RADIOLOGY:  imaging below personally reviewed

## 2024-11-21 NOTE — H&P ADULT - CRITICAL CARE ATTENDING COMMENT
Pt seen and examined. Agree with above assessment and plan.    69M with PMH CAD (on ASA81), GERD, psoriasis, hypothyroidism, UTI, right eye cataract, intellectual disability, autism, epilepsy, HTN, anxiety, venous stasis dermatitis b/l LE, oropharyngeal dysphagia presented to [facility] on 10/28 from group home for fever, found to have pneumonia.    Nonverbal (baseline), awake; opens eyes spontaneously, does not follow commands  CRANIAL NERVES: Pupils L 3mm reactive. 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 EOMI. Face grossly symmetric.   MOTOR: B/l UE antigravity moving spontaneously. B/l LE contracted (baseline) but withdraws to noxious.    - Q2h neuro checks  - Valproate 250 q8 for epilepsy  - Fluvoxamine 100 BID  - Olanzapine 20 HS  - Mobilize as tolerated  - Chest tube per CT surgery  - ID consult- continue nafcillin  - plan for OR thoracic decompression

## 2024-11-21 NOTE — CONSULT NOTE ADULT - SUBJECTIVE AND OBJECTIVE BOX
HISTORY OF PRESENT ILLNESS:   69yM transfer from [place], nonverbal at baseline so history obtained via chart and [place] providers (Patient [facility]ulk[facility] of the [facility]). Patient was at [place] for four weeks in ICU, recently found to have empyema, CTICU inserted chest tube through which tPA being pushed to break up. Neurosurgery consulted for epidural abscess and phlegmon found on MRI thoracic spine. Pt has BLE contracture.     PAST MEDICAL & SURGICAL HISTORY:  MR (mental retardation), severe  Seizure  HTN (hypertension)  Psoriasis  DD (diastrophic dysplasia)  Blind right eye  Constipation, unspecified constipation type      FAMILY HISTORY:  No pertinent family history in first degree relatives    Allergies  Augmentin (Unknown)  sulfa drugs (Unknown)  penicillin (Unknown)  aztreonam (Rash)    REVIEW OF SYSTEMS  As noted in HPI    HOME MEDICATIONS:  Home Medications:  acetaminophen 500 mg oral tablet: 2 tab(s) orally every 8 hours (25 Dec 2022 15:37)  amLODIPine 2.5 mg oral tablet: 1 tab(s) orally once a day (17 Dec 2022 15:03)  aspirin 81 mg oral tablet: 1 tab(s) orally once a day (17 Dec 2022 15:03)  calcipotriene 0.005% topical cream: Apply topically to affected area 2 times a day (17 Dec 2022 15:03)  clonazePAM 1 mg oral tablet: 0.5 tab(s) orally 2 times a day (17 Dec 2022 15:03)  Depakote  mg oral tablet, extended release: 1 tab(s) orally 3 times a day (17 Dec 2022 15:03)  fluvoxaMINE 100 mg oral tablet: 1 tab(s) orally 2 times a day (17 Dec 2022 15:03)  ketoconazole 2% topical shampoo: Apply topically to affected area two times a week (17 Dec 2022 15:03)  lactulose 10 g/15 mL oral solution: 30 milliliter(s) orally once a day (17 Dec 2022 15:03)  levothyroxine 75 mcg (0.075 mg) oral tablet: 1 tab(s) orally once a day (17 Dec 2022 15:03)  Lovenox 40 mg/0.4 mL injectable solution: 1 application injectable once a day (29 Dec 2022 14:36)  metoprolol tartrate 50 mg oral tablet: 1 tab(s) orally 2 times a day (29 Dec 2022 14:36)  multivitamin with minerals: 1 tab(s) orally once a day (17 Dec 2022 15:03)  OLANZapine 15 mg oral tablet: 1 tab(s) orally once a day (at bedtime) (17 Dec 2022 15:03)  omeprazole 20 mg oral delayed release capsule: 1 cap(s) orally once a day (17 Dec 2022 15:03)  oxyCODONE 5 mg oral tablet: 1 tab(s) orally every 6 to 8 hours, As Needed - 6) (25 Dec 2022 15:37)  Oyster Betito 1250 mg (500 mg elemental calcium) oral tablet: 1 tab(s) orally 3 times a day (17 Dec 2022 15:03)  petrolatum topical ointment: Apply topically to affected area once a day (17 Dec 2022 15:03)  tamsulosin 0.4 mg oral capsule: 2 cap(s) orally once a day (at bedtime) (17 Dec 2022 15:03)  tolnaftate 1% topical cream: Apply topically to affected area once a day (17 Dec 2022 15:03)      MEDICATIONS:  Antibiotics:  ertapenem  IVPB 1000 milliGRAM(s) IV Intermittent every 24 hours  nafcillin  IVPB 2 Gram(s) IV Intermittent every 4 hours    PHYSICAL EXAM:  GENERAL: NAD  HEAD:  Atraumatic  EYES: Conjunctiva and sclera clear; corneal reflex intact  ENMT: No tonsillar erythema, exudates, or enlargement; moist mucous membranes, good dentition, no lesions  NECK: Supple, nontender to palpation  JOHANNE COMA SCORE: E-4 V-1 M-4 = 9  MENTAL STATUS: AAO x0 (baseline nonverbal); Awake; Opens eyes spontaneously; Nonverbal; not following commands  MOTOR: BLE contracture, withdraws from noxious stimulus  CHEST/LUNG: nonlabored breathing on room air  HEART: Regular rate and rhythm  ABDOMEN: Soft, nontender, nondistended  SKIN: Warm, dry; no rashes or lesions    RADIOLOGY & ADDITIONAL STUDIES:  MRI full spine reviewed with Dr. Love.   MRI Cervical/Thoracic/Lumbar 11/19/2024    IMPRESSION:  MRI CERVICAL SPINE  1. No evidence of discitis-osteomyelitis, epidural or paraspinal abscess.  2. C4-C5 posterior disc protrusion superimposed upon a disc bulge, impinging  upon the cord, resulting in moderate-severe central canal and severe  bilateral neural foramen stenosis with uncovertebral spurring. No gross  abnormal cord signal, within the limitations of this exam.  3. Additional findings, including those degenerative, described in detail  above.    MRI THORACIC SPINE  1. Findings most concordant with T6-T7 discitis-osteomyelitis, eccentric to  the left, as above. Approximate 8 cm length probable epidural phlegmon at  T5-T7, eccentric dorsally and towards the left, extending through the  ipsilateral T5-T6, T6-T7 and T7-T8 neural foramen, resulting in deformity of  the thoracic cord and moderate-severe central canal stenosis, without  obvious abnormal cord signal within the limitations of this exam. Findings  concordant with circumferential paravertebral phlegmon is also present at  this level. No definitive drainable epidural-paraspinal fluid collections  are evident. Although not definitively excluded, neoplasm is considered less  likely as an etiology.  2. Increased multiloculated collection and/or mass right hemithorax,  incompletely evaluated on this exam, possibly empyema. Recommend dedicated  CT imaging of the thorax, preferably with intravenous contrast, provided  there are no medical contraindications.  3. T10-T11 left paracentral and left foraminal disc protrusions superimposed  upon a disc bulge, impinging upon the cord, resulting in mild central canal  and moderate left neural foramen stenosis with endplate spurring and facet  arthrosis. Symmetric foci of T2-STIR prolongation within the right left cord  at this level, likely cord gliosis and/or edema. Correlate for signs of  compressive myelopathy.  4. T11-T12 left foraminal disc protrusion superimposed upon a disc bulge,  effacing the subarachnoid space, resulting in mild central canal, severe  left and mild right neural foramen stenosis with endplate spurring and facet  arthrosis. Evidence of mildly decreased cord caliber with central T2-STIR  prolongation, likely myelomalacia.  5. Additional findings described in detail above.    MRI LUMBAR SPINE  1. No evidence of discitis-osteomyelitis, epidural or paraspinal abscess.  2. L4-L5 anterolisthesis with a left paracentral disc protrusion  superimposed upon a disc bulge, impinging upon the thecal sac, resulting in  moderate central canal, bilateral lateral recess and moderate bilateral  neural foramen stenosis with facet arthrosis and ligamentum flavum  hypertrophy.  3. Additional findings, including those degenerative, described in detail  above.    --- End of Report ---    TONG PERALES MD; Attending Radiologist  This document has been electronically signed. Nov 19 2024 1:33PM

## 2024-11-21 NOTE — CONSULT NOTE ADULT - PROBLEM SELECTOR RECOMMENDATION 9
with chest tube in place   on review of [place] chart patient had tpa through chest tube - would place chest tube to suction at this time   cxr in am   without evidence of empyema   for surgical evaluation by primary team   rest of care per primary team

## 2024-11-22 LAB
AMMONIA BLD-MCNC: 24 UMOL/L — SIGNIFICANT CHANGE UP (ref 11–55)
ANION GAP SERPL CALC-SCNC: 8 MMOL/L — SIGNIFICANT CHANGE UP (ref 5–17)
APTT BLD: 31 SEC — SIGNIFICANT CHANGE UP (ref 24.5–35.6)
BUN SERPL-MCNC: 15.6 MG/DL — SIGNIFICANT CHANGE UP (ref 8–20)
CALCIUM SERPL-MCNC: 7.3 MG/DL — LOW (ref 8.4–10.5)
CHLORIDE SERPL-SCNC: 106 MMOL/L — SIGNIFICANT CHANGE UP (ref 96–108)
CO2 SERPL-SCNC: 25 MMOL/L — SIGNIFICANT CHANGE UP (ref 22–29)
CREAT SERPL-MCNC: 0.6 MG/DL — SIGNIFICANT CHANGE UP (ref 0.5–1.3)
EGFR: 105 ML/MIN/1.73M2 — SIGNIFICANT CHANGE UP
FERRITIN SERPL-MCNC: 222 NG/ML — SIGNIFICANT CHANGE UP (ref 30–400)
FOLATE SERPL-MCNC: 8.6 NG/ML — SIGNIFICANT CHANGE UP
GLUCOSE BLDC GLUCOMTR-MCNC: 98 MG/DL — SIGNIFICANT CHANGE UP (ref 70–99)
GLUCOSE SERPL-MCNC: 94 MG/DL — SIGNIFICANT CHANGE UP (ref 70–99)
HAPTOGLOB SERPL-MCNC: 344 MG/DL — HIGH (ref 34–200)
HCT VFR BLD CALC: 24.1 % — LOW (ref 39–50)
HGB BLD-MCNC: 7.5 G/DL — LOW (ref 13–17)
INR BLD: 1.17 RATIO — HIGH (ref 0.85–1.16)
IRON SATN MFR SERPL: 15 % — LOW (ref 16–55)
IRON SATN MFR SERPL: 21 UG/DL — LOW (ref 59–158)
LDH SERPL L TO P-CCNC: 160 U/L — SIGNIFICANT CHANGE UP (ref 98–192)
MAGNESIUM SERPL-MCNC: 2.1 MG/DL — SIGNIFICANT CHANGE UP (ref 1.6–2.6)
MCHC RBC-ENTMCNC: 31.1 G/DL — LOW (ref 32–36)
MCHC RBC-ENTMCNC: 31.4 PG — SIGNIFICANT CHANGE UP (ref 27–34)
MCV RBC AUTO: 100.8 FL — HIGH (ref 80–100)
PHOSPHATE SERPL-MCNC: 3.5 MG/DL — SIGNIFICANT CHANGE UP (ref 2.4–4.7)
PLATELET # BLD AUTO: 416 K/UL — HIGH (ref 150–400)
POTASSIUM SERPL-MCNC: 3.8 MMOL/L — SIGNIFICANT CHANGE UP (ref 3.5–5.3)
POTASSIUM SERPL-SCNC: 3.8 MMOL/L — SIGNIFICANT CHANGE UP (ref 3.5–5.3)
PROTHROM AB SERPL-ACNC: 13.2 SEC — SIGNIFICANT CHANGE UP (ref 9.9–13.4)
RBC # BLD: 2.39 M/UL — LOW (ref 4.2–5.8)
RBC # BLD: 2.39 M/UL — LOW (ref 4.2–5.8)
RBC # FLD: 14.8 % — HIGH (ref 10.3–14.5)
RETICS #: 97 K/UL — SIGNIFICANT CHANGE UP (ref 25–125)
RETICS/RBC NFR: 4.1 % — HIGH (ref 0.5–2.5)
SODIUM SERPL-SCNC: 139 MMOL/L — SIGNIFICANT CHANGE UP (ref 135–145)
TIBC SERPL-MCNC: 136 UG/DL — LOW (ref 220–430)
TRANSFERRIN SERPL-MCNC: 95 MG/DL — LOW (ref 180–329)
VALPROATE SERPL-MCNC: 26.3 UG/ML — LOW (ref 50–100)
VIT B12 SERPL-MCNC: 413 PG/ML — SIGNIFICANT CHANGE UP (ref 232–1245)
WBC # BLD: 8.43 K/UL — SIGNIFICANT CHANGE UP (ref 3.8–10.5)
WBC # FLD AUTO: 8.43 K/UL — SIGNIFICANT CHANGE UP (ref 3.8–10.5)

## 2024-11-22 PROCEDURE — 99223 1ST HOSP IP/OBS HIGH 75: CPT | Mod: GC

## 2024-11-22 PROCEDURE — 74018 RADEX ABDOMEN 1 VIEW: CPT | Mod: 26

## 2024-11-22 PROCEDURE — 99291 CRITICAL CARE FIRST HOUR: CPT

## 2024-11-22 PROCEDURE — 76000 FLUOROSCOPY <1 HR PHYS/QHP: CPT | Mod: 26,AS,59

## 2024-11-22 PROCEDURE — 71045 X-RAY EXAM CHEST 1 VIEW: CPT | Mod: 26,76

## 2024-11-22 PROCEDURE — 99232 SBSQ HOSP IP/OBS MODERATE 35: CPT

## 2024-11-22 PROCEDURE — 63266 EXCISE INTRSPINL LESION THRC: CPT | Mod: AS

## 2024-11-22 PROCEDURE — 93010 ELECTROCARDIOGRAM REPORT: CPT

## 2024-11-22 DEVICE — SURGIFOAM PAD 8CM X 12.5CM X 10MM (100): Type: IMPLANTABLE DEVICE | Status: FUNCTIONAL

## 2024-11-22 DEVICE — SURGIFLO MATRIX WITH THROMBIN KIT: Type: IMPLANTABLE DEVICE | Status: FUNCTIONAL

## 2024-11-22 RX ORDER — LEVOTHYROXINE SODIUM 150 MCG
55 TABLET ORAL AT BEDTIME
Refills: 0 | Status: DISCONTINUED | OUTPATIENT
Start: 2024-11-22 | End: 2024-11-23

## 2024-11-22 RX ORDER — POTASSIUM CHLORIDE 600 MG/1
10 TABLET, EXTENDED RELEASE ORAL
Refills: 0 | Status: COMPLETED | OUTPATIENT
Start: 2024-11-22 | End: 2024-11-22

## 2024-11-22 RX ORDER — POTASSIUM CHLORIDE 600 MG/1
10 TABLET, EXTENDED RELEASE ORAL ONCE
Refills: 0 | Status: DISCONTINUED | OUTPATIENT
Start: 2024-11-22 | End: 2024-11-22

## 2024-11-22 RX ORDER — OLANZAPINE 20 MG/1
20 TABLET ORAL AT BEDTIME
Refills: 0 | Status: DISCONTINUED | OUTPATIENT
Start: 2024-11-22 | End: 2024-11-22

## 2024-11-22 RX ORDER — ACETAMINOPHEN 500MG 500 MG/1
1000 TABLET, COATED ORAL ONCE
Refills: 0 | Status: COMPLETED | OUTPATIENT
Start: 2024-11-22 | End: 2024-11-22

## 2024-11-22 RX ORDER — OLANZAPINE 20 MG/1
10 TABLET ORAL EVERY 12 HOURS
Refills: 0 | Status: DISCONTINUED | OUTPATIENT
Start: 2024-11-22 | End: 2024-11-24

## 2024-11-22 RX ADMIN — OLANZAPINE 10 MILLIGRAM(S): 20 TABLET ORAL at 20:19

## 2024-11-22 RX ADMIN — NAFCILLIN 200 GRAM(S): 10 INJECTION, POWDER, FOR SOLUTION INTRAVENOUS at 21:05

## 2024-11-22 RX ADMIN — NAFCILLIN 200 GRAM(S): 10 INJECTION, POWDER, FOR SOLUTION INTRAVENOUS at 05:47

## 2024-11-22 RX ADMIN — Medication 55 MICROGRAM(S): at 21:05

## 2024-11-22 RX ADMIN — Medication 1 TABLET(S): at 05:51

## 2024-11-22 RX ADMIN — FLUTICASONE PROPIONATE AND SALMETEROL XINAFOATE 1 DOSE(S): 45; 21 AEROSOL, METERED RESPIRATORY (INHALATION) at 08:08

## 2024-11-22 RX ADMIN — CHLORHEXIDINE GLUCONATE 1 APPLICATION(S): 1.2 RINSE ORAL at 04:31

## 2024-11-22 RX ADMIN — VALPROIC ACID 52.5 MILLIGRAM(S): 250 SOLUTION ORAL at 13:47

## 2024-11-22 RX ADMIN — Medication 1 APPLICATION(S): at 19:14

## 2024-11-22 RX ADMIN — POTASSIUM CHLORIDE 100 MILLIEQUIVALENT(S): 600 TABLET, EXTENDED RELEASE ORAL at 06:27

## 2024-11-22 RX ADMIN — NAFCILLIN 200 GRAM(S): 10 INJECTION, POWDER, FOR SOLUTION INTRAVENOUS at 18:49

## 2024-11-22 RX ADMIN — VALPROIC ACID 52.5 MILLIGRAM(S): 250 SOLUTION ORAL at 05:52

## 2024-11-22 RX ADMIN — NAFCILLIN 200 GRAM(S): 10 INJECTION, POWDER, FOR SOLUTION INTRAVENOUS at 02:45

## 2024-11-22 RX ADMIN — Medication 1 TABLET(S): at 20:22

## 2024-11-22 RX ADMIN — Medication 100 MILLIGRAM(S): at 20:22

## 2024-11-22 RX ADMIN — POTASSIUM CHLORIDE 100 MILLIEQUIVALENT(S): 600 TABLET, EXTENDED RELEASE ORAL at 05:44

## 2024-11-22 RX ADMIN — METOPROLOL TARTRATE 5 MILLIGRAM(S): 100 TABLET, FILM COATED ORAL at 05:53

## 2024-11-22 RX ADMIN — Medication 1 APPLICATION(S): at 05:51

## 2024-11-22 RX ADMIN — POTASSIUM CHLORIDE 100 MILLIEQUIVALENT(S): 600 TABLET, EXTENDED RELEASE ORAL at 07:19

## 2024-11-22 RX ADMIN — METOPROLOL TARTRATE 5 MILLIGRAM(S): 100 TABLET, FILM COATED ORAL at 00:25

## 2024-11-22 RX ADMIN — METOPROLOL TARTRATE 5 MILLIGRAM(S): 100 TABLET, FILM COATED ORAL at 11:20

## 2024-11-22 RX ADMIN — METOPROLOL TARTRATE 5 MILLIGRAM(S): 100 TABLET, FILM COATED ORAL at 18:50

## 2024-11-22 RX ADMIN — CHLORHEXIDINE GLUCONATE 1 APPLICATION(S): 1.2 RINSE ORAL at 05:47

## 2024-11-22 RX ADMIN — ACETAMINOPHEN 500MG 650 MILLIGRAM(S): 500 TABLET, COATED ORAL at 04:06

## 2024-11-22 RX ADMIN — Medication 100 MILLIGRAM(S): at 05:50

## 2024-11-22 RX ADMIN — ACETAMINOPHEN 500MG 400 MILLIGRAM(S): 500 TABLET, COATED ORAL at 13:47

## 2024-11-22 RX ADMIN — Medication 1 APPLICATION(S): at 19:15

## 2024-11-22 RX ADMIN — DILTIAZEM HYDROCHLORIDE 60 MILLIGRAM(S): 240 CAPSULE, COATED, EXTENDED RELEASE ORAL at 02:45

## 2024-11-22 RX ADMIN — NAFCILLIN 200 GRAM(S): 10 INJECTION, POWDER, FOR SOLUTION INTRAVENOUS at 10:45

## 2024-11-22 RX ADMIN — TAMSULOSIN HYDROCHLORIDE 0.8 MILLIGRAM(S): 0.4 CAPSULE ORAL at 21:05

## 2024-11-22 RX ADMIN — Medication 100 GRAM(S): at 05:52

## 2024-11-22 RX ADMIN — ACETAMINOPHEN 500MG 1000 MILLIGRAM(S): 500 TABLET, COATED ORAL at 14:30

## 2024-11-22 RX ADMIN — ACETAMINOPHEN 500MG 650 MILLIGRAM(S): 500 TABLET, COATED ORAL at 03:11

## 2024-11-22 RX ADMIN — HEPARIN SODIUM 85 MILLIMOLE(S): 5000 INJECTION, SOLUTION INTRAVENOUS; SUBCUTANEOUS at 00:24

## 2024-11-22 RX ADMIN — PANTOPRAZOLE SODIUM 40 MILLIGRAM(S): 40 TABLET, DELAYED RELEASE ORAL at 11:20

## 2024-11-22 RX ADMIN — NAFCILLIN 200 GRAM(S): 10 INJECTION, POWDER, FOR SOLUTION INTRAVENOUS at 14:45

## 2024-11-22 RX ADMIN — VALPROIC ACID 52.5 MILLIGRAM(S): 250 SOLUTION ORAL at 21:04

## 2024-11-22 NOTE — CONSULT NOTE ADULT - ASSESSMENT
At this time, this -year-old man with developmental disability has no surrogate nor legal guardian. The appropriate decision maker is the Surrogate Decision Making Committee (SDMC) at [facility].    For non-emergency major medical treatment, the SDMC is the appropriate surrogate of this patient.     Should a medical situation become urgent or an emergency, a two-physician consent is appropriate. At this time, two-physician consent is appropriate as the patient needs emergent surgery for an epidural abscess.     Angela Mena MD  Ethics Attending  [phone] RECOMMENDATION:: At this time, this 69-year-old man with developmental disability has no surrogate nor legal guardian. The appropriate decision maker is the Surrogate Decision Making Committee (SDMC) at [facility].    For non-emergency major medical treatment, the SDMC is the appropriate surrogate of this patient.     Should a medical situation become urgent or an emergency, a two-physician consent is appropriate. At this time, two-physician consent is appropriate as the patient needs emergent surgery for an epidural abscess.     Thank you for including the Ethics Consultation Service. Ethics commends the team for their virtue in the care and support for Mr. Westbrook. Ethics will remain available for any discussions and decision points that may occur.     MORE THAN 50% OF THE TIME OF THIS CONSULTATION WAS SPENT IN COORDINATION OF CARE OF PATIENT														  REFERENCES  (i)Jolene TL & Saida JF. Principles of Biomedical Ethics. [place]: [employer]; 2019.   (ii)[address] § 633.11.   (iii) DINESH SANDOVAL (2001). A GLOSSARY FOR SOCIAL EPIDEMIOLOGY. J EPIDEMIOL COMMUNITY HEALTH, 55(10): 693–700.

## 2024-11-22 NOTE — PHARMACOTHERAPY INTERVENTION NOTE - COMMENTS
Recommended ordering VPA total, VPA free, and ammonia levels for monitoring of patient in setting of valproic acid therapy. Recommended ordering VPA total, VPA free, and ammonia levels for monitoring of patient in setting of valproic acid therapy.    Calculated patient's corrected total VPA level based on current Albumin of 2.1 to be 116.93 mcg/mL. Additionally, calculated estimated free VPA level to be 10.33 mg/L. Both values are considered within therapeutic range for this patient, recommend routine monitoring of LFTs and signs of toxicity. No dose adjustments currently indicated.

## 2024-11-22 NOTE — PROGRESS NOTE ADULT - ASSESSMENT
ASSESSMENT:  69M with PMH CAD (on ASA81), GERD, psoriasis, hypothyroidism, UTI, right eye cataract, intellectual disability, autism, epilepsy, HTN, anxiety, venous stasis dermatitis b/l LE, oropharyngeal dysphagia presented to [facility] on 10/28 from group home for fever, found to have pneumonia. Hospital course complicated by right sided loculated pleural effusion s/p thoracentesis (11/12), s/p right chest tube (placed on 11/20), TPA given via chest tube to break up loculation (11/21), +MSSA bacteremia, new onset afib with RVR (10/31). T6-T7 osteomyelitis and phlegmon on imaging s/p completion of multiple antibiotics with persistent fever and bacteremia. Patient transferred to [facility] on 11/21 for neurosurgical eval to evacuate T6-T7 spinal epidural phlegmon. Patient admitted to NSICU. Patient is under the care of OPWDD.      PLAN:    NEURO:  #Epilepsy  - Q2h neuro checks  - Valproate 250 q8 for epilepsy  - Fluvoxamine 100 BID  - Olanzapine 20 HS  - Mobilize as tolerated    PULM:  - SpO2 goal > 92%  - Incentive spirometry  - Advair 100/50    CV:   # Right loculated pleural effusion s/p R chest tube (11/20), s/p TPA via chest tube (11/21)  # New onset Afib with RVR  - SBP goal   - TTE pending for OR clearance (FELIX on 11/1 at prior hospital EF 62% on per chart)  - EKG pending  - Diltiazem 60 q6h  - Metoprolol IV 5mg q6h  - CT surgery following for right sided chest tube- will keep chest tube to low wall suction (set at -20) x24h post-TPA, will follow-up additional recs    RENAL:  - Fluids: Plasmalyte @ 75  - Voiding   - Finasteride 5mg daily  - Flomax 0.8  - Monitor kidney function daily while on IV antibiotics   - Vitamin D3 2000U daily  - Calcium carbonate 1250 mg    GI:  - Diet:   - NPO after midnight for anticipated OR  - GI prophylaxis:   - Bowel regimen: dulcolax suppository, senna, miralax    ENDO:   #Hypothyroidism   - Goal euglycemia (-180)  - Levothyroxine 75mcg    HEME/ONC:  - VTE prophylaxis: SCDs, hold chemoprophylaxis due to anticipated OR in AM  - Baseline LED pending  - Lovenox BID on hold for the OR  - Check Iron panel, B12 folate LDH, haptoglobin and retic count     ID:   #MSSA bacteremia  #T6-T7 phlegmon   - ID following, appreciate recs  - Continue Nafcillin 2g IV Q4h for MSSA bacteremia   - Pending blood cultures   - Mupirocin 2% BID x 5 days for +MSSA  - Monitor fever curve and white count    MISC:  - Ethics consulted for OPWDD   ASSESSMENT:  69M with PMH CAD (on ASA81), GERD, psoriasis, hypothyroidism, UTI, right eye cataract, intellectual disability, autism, epilepsy, HTN, anxiety, venous stasis dermatitis b/l LE, oropharyngeal dysphagia presented to [facility] on 10/28 from group home for fever, found to have pneumonia. Hospital course complicated by right sided loculated pleural effusion s/p thoracentesis (11/12), s/p right chest tube (placed on 11/20), TPA given via chest tube to break up loculation (11/21), +MSSA bacteremia, new onset afib with RVR (10/31). T6-T7 osteomyelitis and phlegmon on imaging s/p completion of multiple antibiotics with persistent fever and bacteremia. Patient transferred to [facility] on 11/21 for neurosurgical eval to evacuate T6-T7 spinal epidural phlegmon. Patient admitted to NSICU. Patient is under the care of OPWDD.      PLAN:    NEURO:  #Epilepsy  - Q4h neuro checks  - Valproate 250 q8 for epilepsy- check VPA, freeVPA, and ammonia  - Fluvoxamine 100 BID  - Olanzapine 20 HS  - Mobilize as tolerated    PULM:  - SpO2 goal > 92%  - Incentive spirometry  - Advair 100/50    CV:   # Right loculated pleural effusion s/p R chest tube (11/20), s/p TPA via chest tube (11/21)  # New onset Afib with RVR  - SBP goal   - TTE - EF 55-65%  - hold Diltiazem 60 q6h  - Metoprolol IV 5mg q6h  - CT surgery following for right sided chest tube- will keep chest tube to low wall suction (set at -20) x24h post-TPA, will follow-up additional recs    RENAL:  - Fluids: Plasmalyte @ 75  - Voiding   - Finasteride 5mg daily  - Flomax 0.8  - Monitor kidney function daily while on IV antibiotics   - Vitamin D3 2000U daily  - Calcium carbonate 1250 mg    GI:  - Diet:   - NPO after midnight for anticipated OR  - Bowel regimen:  senna, miralax  LBM 11/22    ENDO:   #Hypothyroidism   - Goal euglycemia (-180)  - Levothyroxine 75mcg    HEME/ONC:  - VTE prophylaxis: SCDs, hold chemoprophylaxis due to anticipated OR in AM  - Baseline LED- negative  - Lovenox BID on hold for the OR  - chronic anemia  - preempt 1 unit PRBCs    ID:   #MSSA bacteremia  #T6-T7 phlegmon   - ID following, appreciate recs  - Continue Nafcillin 2g IV Q4h for MSSA bacteremia    - Pending blood cultures- neg since 11/16   - Mupirocin 2% BID x 5 days for +MSSA  - Monitor fever curve and white count    MISC:  - Ethics consulted for OPWDD

## 2024-11-22 NOTE — PROGRESS NOTE ADULT - SUBJECTIVE AND OBJECTIVE BOX
CC: Follow up     INTERVAL HPI/OVERNIGHT EVENTS: Patient seen and examined,  noted to be tachycardic. In no acute distress. Tmax of 100 overnight.       Vital Signs Last 24 Hrs  T(C): 37.6 (22 Nov 2024 07:00), Max: 38.2 (21 Nov 2024 18:00)  T(F): 99.7 (22 Nov 2024 07:00), Max: 100.8 (21 Nov 2024 18:00)  HR: 88 (22 Nov 2024 07:00) (85 - 120)  BP: 110/59 (22 Nov 2024 07:00) (102/55 - 160/94)  BP(mean): 72 (22 Nov 2024 07:00) (68 - 116)  RR: 13 (22 Nov 2024 07:00) (13 - 27)  SpO2: 100% (22 Nov 2024 07:00) (92% - 100%)    Parameters below as of 22 Nov 2024 04:00  Patient On (Oxygen Delivery Method): nasal cannula  O2 Flow (L/min): 3      PHYSICAL EXAM:    GENERAL: NAD  HEAD:  Atraumatic, Normocephalic  EYES: conjunctiva and sclera clear  CHEST/LUNG: Clear to auscultation bilaterally; No rales, rhonchi, wheezing, or rubs  + Right sided chest tube  HEART: Sinus tachycardia  No murmurs, rubs, or gallops  ABDOMEN: Soft, Nontender, Nondistended; Bowel sounds present  EXTREMITIES:  2+ Peripheral Pulses, No clubbing, cyanosis, or edema        MEDICATIONS  (STANDING):  calcium carbonate   1250 mG (OsCal) 1 Tablet(s) Oral every 12 hours  chlorhexidine 2% Cloths 1 Application(s) Topical <User Schedule>  chlorhexidine 4% Liquid 1 Application(s) Topical <User Schedule>  cholecalciferol 2000 Unit(s) Oral daily  diltiazem    Tablet 60 milliGRAM(s) Oral every 6 hours  finasteride 5 milliGRAM(s) Oral daily  fluticasone propionate/ salmeterol 100-50 MICROgram(s) Diskus 1 Dose(s) Inhalation two times a day  fluvoxaMINE 100 milliGRAM(s) Oral <User Schedule>  levothyroxine Injectable 37.5 MICROGram(s) IV Push at bedtime  metoprolol tartrate Injectable 5 milliGRAM(s) IV Push every 6 hours  multiple electrolytes Injection Type 1 1000 milliLiter(s) (75 mL/Hr) IV Continuous <Continuous>  mupirocin 2% Nasal 1 Application(s) Both Nostrils every 12 hours  nafcillin  IVPB 2 Gram(s) IV Intermittent every 4 hours  OLANZapine Injectable 20 milliGRAM(s) IntraMuscular at bedtime  pantoprazole  Injectable 40 milliGRAM(s) IV Push daily  polyethylene glycol 3350 17 Gram(s) Oral daily  povidone iodine 10% Nasal Swab 1 Application(s) Both Nostrils once  senna 2 Tablet(s) Oral at bedtime  tamsulosin 0.8 milliGRAM(s) Oral at bedtime  valproate sodium   IVPB 250 milliGRAM(s) IV Intermittent every 8 hours  zinc oxide 20% Ointment 1 Application(s) Topical every 12 hours    MEDICATIONS  (PRN):  acetaminophen     Tablet .. 650 milliGRAM(s) Oral every 6 hours PRN Temp greater or equal to 38C (100.4F), Mild Pain (1 - 3)  bisacodyl Suppository 10 milliGRAM(s) Rectal daily PRN Constipation      Allergies    Augmentin (Unknown)  sulfa drugs (Unknown)  penicillin (Unknown)  aztreonam (Rash)    Intolerances          LABS:                          7.5    8.43  )-----------( 416      ( 22 Nov 2024 02:45 )             24.1     11-22    139  |  106  |  15.6  ----------------------------<  94  3.8   |  25.0  |  0.60    Ca    7.3[L]      22 Nov 2024 02:45  Phos  3.5     11-22  Mg     2.1     11-22    TPro  6.1[L]  /  Alb  2.1[L]  /  TBili  <0.2[L]  /  DBili  x   /  AST  12  /  ALT  <5  /  AlkPhos  46  11-21    PT/INR - ( 22 Nov 2024 02:45 )   PT: 13.2 sec;   INR: 1.17 ratio         PTT - ( 22 Nov 2024 02:45 )  PTT:31.0 sec  Urinalysis Basic - ( 22 Nov 2024 02:45 )    Color: x / Appearance: x / SG: x / pH: x  Gluc: 94 mg/dL / Ketone: x  / Bili: x / Urobili: x   Blood: x / Protein: x / Nitrite: x   Leuk Esterase: x / RBC: x / WBC x   Sq Epi: x / Non Sq Epi: x / Bacteria: x        RADIOLOGY & ADDITIONAL TESTS:

## 2024-11-22 NOTE — PROGRESS NOTE ADULT - SUBJECTIVE AND OBJECTIVE BOX
Chief complaint:   Patient is a 69y old  Male who presents with a chief complaint of Medical optimization for the OR (22 Nov 2024 08:12)    HPI:  69-year-old male with PMH CAD (on ASA81), GERD, psoriasis, hypothyroidism, UTI, right eye cataract, intellectual disability, autism, epilepsy (on Divalproex), HTN, anxiety, venous stasis dermatitis b/l LE, oropharyngeal dysphagia presented to [facility] on 10/28 from group home for fever, found to have pneumonia. Patient's hospital course was complicated by anemia, right sided loculated pleural effusion s/p thoracentesis (11/12) with 500 ml fluid removed, now s/p right chest tube (placed on 11/20) and TPA given via chest tube to break up loculation (11/21), MSSA bacteremia, new onset afib with RVR (10/31). T6-T7 osteomyelitis and phlegmon. ID following at prior hospital and patient had completed  Ancef, Vancomycin, Invanz, Azactam and Flagyl, Cefepime, however patient still remained persistently febrile and bacteremic. Patient was then transferred to [facility] on 11/21 for neurosurgical evaluation to consider laminectomy for evacuation of T6-T7 spinal epidural phlegmon and will be admitted to the NSICU under Dr. Love. Per chart patient is nonverbal, wheelchair bound at baseline and is under the care of [facility] for developmental delay, Ethics team has been consulted to follow.  (21 Nov 2024 18:10)        24hr EVENTS:      ROS: [ ]  Unable to assess due to mental status   All other systems negative    -----------------------------------------------------------------------------------------------------------------------------------------------------------------------------------  ICU Vital Signs Last 24 Hrs  T(C): 37.6 (22 Nov 2024 08:00), Max: 38.2 (21 Nov 2024 18:00)  T(F): 99.7 (22 Nov 2024 08:00), Max: 100.8 (21 Nov 2024 18:00)  HR: 82 (22 Nov 2024 08:00) (82 - 120)  BP: 109/58 (22 Nov 2024 08:00) (102/55 - 160/94)  BP(mean): 73 (22 Nov 2024 08:00) (68 - 116)  ABP: --  ABP(mean): --  RR: 14 (22 Nov 2024 08:00) (13 - 27)  SpO2: 100% (22 Nov 2024 08:00) (92% - 100%)    O2 Parameters below as of 22 Nov 2024 08:00  Patient On (Oxygen Delivery Method): room air            I&O's Summary    21 Nov 2024 07:01  -  22 Nov 2024 07:00  --------------------------------------------------------  IN: 2874.8 mL / OUT: 1140 mL / NET: 1734.8 mL        MEDICATIONS  (STANDING):  calcium carbonate   1250 mG (OsCal) 1 Tablet(s) Oral every 12 hours  chlorhexidine 2% Cloths 1 Application(s) Topical <User Schedule>  chlorhexidine 4% Liquid 1 Application(s) Topical <User Schedule>  cholecalciferol 2000 Unit(s) Oral daily  diltiazem    Tablet 60 milliGRAM(s) Oral every 6 hours  finasteride 5 milliGRAM(s) Oral daily  fluticasone propionate/ salmeterol 100-50 MICROgram(s) Diskus 1 Dose(s) Inhalation two times a day  fluvoxaMINE 100 milliGRAM(s) Oral <User Schedule>  levothyroxine Injectable 37.5 MICROGram(s) IV Push at bedtime  metoprolol tartrate Injectable 5 milliGRAM(s) IV Push every 6 hours  multiple electrolytes Injection Type 1 1000 milliLiter(s) (75 mL/Hr) IV Continuous <Continuous>  mupirocin 2% Nasal 1 Application(s) Both Nostrils every 12 hours  nafcillin  IVPB 2 Gram(s) IV Intermittent every 4 hours  OLANZapine Injectable 20 milliGRAM(s) IntraMuscular at bedtime  pantoprazole  Injectable 40 milliGRAM(s) IV Push daily  polyethylene glycol 3350 17 Gram(s) Oral daily  povidone iodine 10% Nasal Swab 1 Application(s) Both Nostrils once  senna 2 Tablet(s) Oral at bedtime  tamsulosin 0.8 milliGRAM(s) Oral at bedtime  valproate sodium   IVPB 250 milliGRAM(s) IV Intermittent every 8 hours  zinc oxide 20% Ointment 1 Application(s) Topical every 12 hours      RESPIRATORY:        IMAGING:   Recent imaging studies were reviewed.    LAB RESULTS:                          7.5    8.43  )-----------( 416      ( 22 Nov 2024 02:45 )             24.1       PT/INR - ( 22 Nov 2024 02:45 )   PT: 13.2 sec;   INR: 1.17 ratio         PTT - ( 22 Nov 2024 02:45 )  PTT:31.0 sec    11-22    139  |  106  |  15.6  ----------------------------<  94  3.8   |  25.0  |  0.60    Ca    7.3[L]      22 Nov 2024 02:45  Phos  3.5     11-22  Mg     2.1     11-22    TPro  6.1[L]  /  Alb  2.1[L]  /  TBili  <0.2[L]  /  DBili  x   /  AST  12  /  ALT  <5  /  AlkPhos  46  11-21                -----------------------------------------------------------------------------------------------------------------------------------------------------------------------------------    PHYSICAL EXAM:  General: Calm,   HEENT: MMM  Neuro:  -Mental status- No acute distress  -CN- PERRL 3mm, EOMI, tongue midline, face symmetric    CV: RRR  Pulm: clear to auscultation  Abd: Soft, nontender, nondistended  Ext: no noted edema in lower ext  Skin: warm, dry       Chief complaint:   Patient is a 69y old  Male who presents with a chief complaint of Medical optimization for the OR (22 Nov 2024 08:12)    HPI:  69-year-old male with PMH CAD (on ASA81), GERD, psoriasis, hypothyroidism, UTI, right eye cataract, intellectual disability, autism, epilepsy (on Divalproex), HTN, anxiety, venous stasis dermatitis b/l LE, oropharyngeal dysphagia presented to [facility] on 10/28 from group home for fever, found to have pneumonia. Patient's hospital course was complicated by anemia, right sided loculated pleural effusion s/p thoracentesis (11/12) with 500 ml fluid removed, now s/p right chest tube (placed on 11/20) and TPA given via chest tube to break up loculation (11/21), MSSA bacteremia, new onset afib with RVR (10/31). T6-T7 osteomyelitis and phlegmon. ID following at prior hospital and patient had completed  Ancef, Vancomycin, Invanz, Azactam and Flagyl, Cefepime, however patient still remained persistently febrile and bacteremic. Patient was then transferred to [facility] on 11/21 for neurosurgical evaluation to consider laminectomy for evacuation of T6-T7 spinal epidural phlegmon and will be admitted to the NSICU under Dr. Love. Per chart patient is nonverbal, wheelchair bound at baseline and is under the care of [facility] for developmental delay, Ethics team has been consulted to follow.  (21 Nov 2024 18:10)    24hr EVENTS:  no acute issues    ROS: [x ]  Unable to assess due to mental status   All other systems negative    -----------------------------------------------------------------------------------------------------------------------------------------------------------------------------------  ICU Vital Signs Last 24 Hrs  T(C): 37.6 (22 Nov 2024 08:00), Max: 38.2 (21 Nov 2024 18:00)  T(F): 99.7 (22 Nov 2024 08:00), Max: 100.8 (21 Nov 2024 18:00)  HR: 82 (22 Nov 2024 08:00) (82 - 120)  BP: 109/58 (22 Nov 2024 08:00) (102/55 - 160/94)  BP(mean): 73 (22 Nov 2024 08:00) (68 - 116)  ABP: --  ABP(mean): --  RR: 14 (22 Nov 2024 08:00) (13 - 27)  SpO2: 100% (22 Nov 2024 08:00) (92% - 100%)    O2 Parameters below as of 22 Nov 2024 08:00  Patient On (Oxygen Delivery Method): room air            I&O's Summary    21 Nov 2024 07:01  -  22 Nov 2024 07:00  --------------------------------------------------------  IN: 2874.8 mL / OUT: 1140 mL / NET: 1734.8 mL        MEDICATIONS  (STANDING):  calcium carbonate   1250 mG (OsCal) 1 Tablet(s) Oral every 12 hours  chlorhexidine 2% Cloths 1 Application(s) Topical <User Schedule>  chlorhexidine 4% Liquid 1 Application(s) Topical <User Schedule>  cholecalciferol 2000 Unit(s) Oral daily  diltiazem    Tablet 60 milliGRAM(s) Oral every 6 hours  finasteride 5 milliGRAM(s) Oral daily  fluticasone propionate/ salmeterol 100-50 MICROgram(s) Diskus 1 Dose(s) Inhalation two times a day  fluvoxaMINE 100 milliGRAM(s) Oral <User Schedule>  levothyroxine Injectable 37.5 MICROGram(s) IV Push at bedtime  metoprolol tartrate Injectable 5 milliGRAM(s) IV Push every 6 hours  multiple electrolytes Injection Type 1 1000 milliLiter(s) (75 mL/Hr) IV Continuous <Continuous>  mupirocin 2% Nasal 1 Application(s) Both Nostrils every 12 hours  nafcillin  IVPB 2 Gram(s) IV Intermittent every 4 hours  OLANZapine Injectable 20 milliGRAM(s) IntraMuscular at bedtime  pantoprazole  Injectable 40 milliGRAM(s) IV Push daily  polyethylene glycol 3350 17 Gram(s) Oral daily  povidone iodine 10% Nasal Swab 1 Application(s) Both Nostrils once  senna 2 Tablet(s) Oral at bedtime  tamsulosin 0.8 milliGRAM(s) Oral at bedtime  valproate sodium   IVPB 250 milliGRAM(s) IV Intermittent every 8 hours  zinc oxide 20% Ointment 1 Application(s) Topical every 12 hours      IMAGING:   Recent imaging studies were reviewed.    LAB RESULTS:                          7.5    8.43  )-----------( 416      ( 22 Nov 2024 02:45 )             24.1       PT/INR - ( 22 Nov 2024 02:45 )   PT: 13.2 sec;   INR: 1.17 ratio         PTT - ( 22 Nov 2024 02:45 )  PTT:31.0 sec    11-22    139  |  106  |  15.6  ----------------------------<  94  3.8   |  25.0  |  0.60    Ca    7.3[L]      22 Nov 2024 02:45  Phos  3.5     11-22  Mg     2.1     11-22    TPro  6.1[L]  /  Alb  2.1[L]  /  TBili  <0.2[L]  /  DBili  x   /  AST  12  /  ALT  <5  /  AlkPhos  46  11-21        -----------------------------------------------------------------------------------------------------------------------------------------------------------------------------------    PHYSICAL EXAM:  General: Calm,   HEENT: MMM  Neuro:  -Mental status- No acute distress, EO spont, no FC baseline  mute  -CN- PERRL 3mm, EOMI, tongue midline, face symmetric  B/l UE antigravity moving spontaneously. B/l LE contracted (baseline) but withdraws to noxious    CV: RRR  Pulm: clear to auscultation  Abd: Soft, nontender, mildy distended  + bowel sounds  Ext: no noted edema in lower ext  Skin: warm, dry  RLQ abdominal ecchymosis, venous stasis dermatitis on b/l LE

## 2024-11-22 NOTE — PROGRESS NOTE ADULT - ASSESSMENT
Assessement:  69 YOM w/PMHx severe developmental delay (nonverbal at baseline) who was transferred from [place] for epidural spinal abscess.     -Neuro q1  - Pre-op for decompression T5-7  - transfuse 1 u PRBC in am pre-OP - pending 2 physician consent   - Continue Psych meds  - Cont metoprolo 5 q 6hrs for afib  - F/u CTICU for chest tube management for loculated pleural effusion RT side to suction   - NPO after MN  - voiding w/ condom cath  - ID following for MSSA bacrtremia and concern for T6-7  osteomyeltitis - f/u with recommendation con [facility]  -Hold AC for OR tomorrow   - Rest of care  primary Team    Case to be discussed with Dr Love in am

## 2024-11-22 NOTE — CONSULT NOTE ADULT - SUBJECTIVE AND OBJECTIVE BOX
HPI:  69-year-old male with PMH CAD (on ASA81), GERD, psoriasis, hypothyroidism, UTI, right eye cataract, intellectual disability, autism, epilepsy (on Divalproex), HTN, anxiety, venous stasis dermatitis b/l LE, oropharyngeal dysphagia presented to [facility] on 10/28 from group home for fever, found to have pneumonia. Patient's hospital course was complicated by anemia, right sided loculated pleural effusion s/p thoracentesis (11/12) with 500 ml fluid removed, now s/p right chest tube (placed on 11/20) and TPA given via chest tube to break up loculation (11/21), MSSA bacteremia, new onset afib with RVR (10/31). T6-T7 osteomyelitis and phlegmon. ID following at prior hospital and patient had completed  Ancef, Vancomycin, Invanz, Azactam and Flagyl, Cefepime, however patient still remained persistently febrile and bacteremic. Patient was then transferred to [facility] on 11/21 for neurosurgical evaluation to consider laminectomy for evacuation of T6-T7 spinal epidural phlegmon. Per chart patient is nonverbal, wheel chair user at baseline and is under the care of [facility] for developmental delay, Ethics team has been consulted to follow.       Imaging Reviewed Today:  ACC: [id number] EXAM:  US DPLX LWR EXT VEINS COMPL BI   ORDERED BY: GILMA ECHEVARRIA     PROCEDURE DATE:  11/21/2024    IMPRESSION:  No evidence of deep venous thrombosis in either lower extremity.        ACC: [id number]      EXAM: MRI 1008 _ MRI T SPINE W/O,W/ [id number]  PROCEDURE DATE:  Nov 19 2024    CLINICAL STATEMENT: Persistent bacteremia. Fever.    IMPRESSION:    MRI CERVICAL SPINE  1. No evidence of discitis-osteomyelitis, epidural or paraspinal abscess.  2. C4-C5 posterior disc protrusion superimposed upon a disc bulge, impinging  upon the cord, resulting in moderate-severe central canal and severe  bilateral neural foramen stenosis with uncovertebral spurring. No gross  abnormal cord signal, within the limitations of this exam.  3. Additional findings, including those degenerative, described in detail  above.    MRI THORACIC SPINE  1. Findings most concordant with T6-T7 discitis-osteomyelitis, eccentric to  the left, as above. Approximate 8 cm length probable epidural phlegmon at  T5-T7, eccentric dorsally and towards the left, extending through the  ipsilateral T5-T6, T6-T7 and T7-T8 neural foramen, resulting in deformity of  the thoracic cord and moderate-severe central canal stenosis, without  obvious abnormal cord signal within the limitations of this exam. Findings  concordant with circumferential paravertebral phlegmon is also present at  this level. No definitive drainable epidural-paraspinal fluid collections  are evident. Although not definitively excluded, neoplasm is considered less  likely as an etiology.  2. Increased multiloculated collection and/or mass right hemithorax,  incompletely evaluated on this exam, possibly empyema. Recommend dedicated  CT imaging of the thorax, preferably with intravenous contrast, provided  there are no medical contraindications.  3. T10-T11 left paracentral and left foraminal disc protrusions superimposed  upon a disc bulge, impinging upon the cord, resulting in mild central canal  and moderate left neural foramen stenosis with endplate spurring and facet  arthrosis. Symmetric foci of T2-STIR prolongation within the right left cord  at this level, likely cord gliosis and/or edema. Correlate for signs of  compressive myelopathy.  4. T11-T12 left foraminal disc protrusion superimposed upon a disc bulge,  effacing the subarachnoid space, resulting in mild central canal, severe  left and mild right neural foramen stenosis with endplate spurring and facet  arthrosis. Evidence of mildly decreased cord caliber with central T2-STIR  prolongation, likely myelomalacia.  5. Additional findings described in detail above.    MRI LUMBAR SPINE  1. No evidence of discitis-osteomyelitis, epidural or paraspinal abscess.  2. L4-L5 anterolisthesis with a left paracentral disc protrusion  superimposed upon a disc bulge, impinging upon the thecal sac, resulting in  moderate central canal, bilateral lateral recess and moderate bilateral  neural foramen stenosis with facet arthrosis and ligamentum flavum  hypertrophy.  3. Additional findings, including those degenerative, described in detail  above.    TTE 11/21  CONCLUSIONS:      1. Left ventricular systolic function is normal with an ejection fraction of 59 % by Linder's method of disks with an ejection fraction visually estimated at 55 to 60 %. There are no regional wall motion abnormalities seen.   2. The left ventricular diastolic function is indeterminate. Analysis of left ventricular diastolic function and filling pressure is made challenging by the presence of tachycardia.   3. Normal right ventricular cavity size and normal right ventricular systolic function.   4. Mild tricuspid regurgitation.   5. Estimated pulmonary artery systolic pressure is 39 mmHg, consistent with mild pulmonary hypertension.   6. Mild mitral regurgitation.   7. Trileaflet aortic valve with normal systolic excursion.   8. No pericardial effusion seen.   9. No prior echocardiogram is available for comparison.      ----------------------------------------------------------------------------------------  CHIEF COMPLAINT          ----------------------------------------------------------------------------------------  VITALS  T(C): 37.6 (11-22-24 @ 07:00), Max: 38.2 (11-21-24 @ 18:00)  HR: 88 (11-22-24 @ 07:00) (85 - 120)  BP: 110/59 (11-22-24 @ 07:00) (102/55 - 160/94)  RR: 13 (11-22-24 @ 07:00) (13 - 27)  SpO2: 100% (11-22-24 @ 07:00) (92% - 100%)  Wt(kg): --    PAST MEDICAL & SURGICAL HISTORY  MR (mental retardation), severe    Seizure    HTN (hypertension)    Psoriasis    DD (diastrophic dysplasia)    Blind    Constipation, unspecified constipation type    No significant past surgical history          LABS  REVIEWED    CBC Full  -  ( 22 Nov 2024 02:45 )  WBC Count : 8.43 K/uL  RBC Count : 2.39 M/uL  Hemoglobin : 7.5 g/dL  Hematocrit : 24.1 %  Platelet Count - Automated : 416 K/uL  Mean Cell Volume : 100.8 fl  Mean Cell Hemoglobin : 31.4 pg  Mean Cell Hemoglobin Concentration : 31.1 g/dL  Auto Neutrophil # : x  Auto Lymphocyte # : x  Auto Monocyte # : x  Auto Eosinophil # : x  Auto Basophil # : x  Auto Neutrophil % : x  Auto Lymphocyte % : x  Auto Monocyte % : x  Auto Eosinophil % : x  Auto Basophil % : x    11-22    139  |  106  |  15.6  ----------------------------<  94  3.8   |  25.0  |  0.60    Ca    7.3[L]      22 Nov 2024 02:45  Phos  3.5     11-22  Mg     2.1     11-22    TPro  6.1[L]  /  Alb  2.1[L]  /  TBili  <0.2[L]  /  DBili  x   /  AST  12  /  ALT  <5  /  AlkPhos  46  11-21    Urinalysis Basic - ( 22 Nov 2024 02:45 )    Color: x / Appearance: x / SG: x / pH: x  Gluc: 94 mg/dL / Ketone: x  / Bili: x / Urobili: x   Blood: x / Protein: x / Nitrite: x   Leuk Esterase: x / RBC: x / WBC x   Sq Epi: x / Non Sq Epi: x / Bacteria: x        ALLERGIES  Augmentin (Unknown)  sulfa drugs (Unknown)  penicillin (Unknown)  aztreonam (Rash)      MEDICATIONS   acetaminophen     Tablet .. 650 milliGRAM(s) Oral every 6 hours PRN  bisacodyl Suppository 10 milliGRAM(s) Rectal daily PRN  calcium carbonate   1250 mG (OsCal) 1 Tablet(s) Oral every 12 hours  chlorhexidine 2% Cloths 1 Application(s) Topical <User Schedule>  chlorhexidine 4% Liquid 1 Application(s) Topical <User Schedule>  cholecalciferol 2000 Unit(s) Oral daily  diltiazem    Tablet 60 milliGRAM(s) Oral every 6 hours  finasteride 5 milliGRAM(s) Oral daily  fluticasone propionate/ salmeterol 100-50 MICROgram(s) Diskus 1 Dose(s) Inhalation two times a day  fluvoxaMINE 100 milliGRAM(s) Oral <User Schedule>  levothyroxine Injectable 37.5 MICROGram(s) IV Push at bedtime  metoprolol tartrate Injectable 5 milliGRAM(s) IV Push every 6 hours  multiple electrolytes Injection Type 1 1000 milliLiter(s) IV Continuous <Continuous>  mupirocin 2% Nasal 1 Application(s) Both Nostrils every 12 hours  nafcillin  IVPB 2 Gram(s) IV Intermittent every 4 hours  OLANZapine Injectable 20 milliGRAM(s) IntraMuscular at bedtime  pantoprazole  Injectable 40 milliGRAM(s) IV Push daily  polyethylene glycol 3350 17 Gram(s) Oral daily  povidone iodine 10% Nasal Swab 1 Application(s) Both Nostrils once  senna 2 Tablet(s) Oral at bedtime  tamsulosin 0.8 milliGRAM(s) Oral at bedtime  valproate sodium   IVPB 250 milliGRAM(s) IV Intermittent every 8 hours  zinc oxide 20% Ointment 1 Application(s) Topical every 12 hours        ----------------------------------------------------------------------------------------  FUNCTIONAL HISTORY  Lives   Independent    CURRENT FUNCTIONAL STATUS      ----------------------------------------------------------------------------------------  PHYSICAL EXAM  Constitutional - NAD, Comfortable  HEENT - NCAT, EOMI  Neck - Supple, No limited ROM  Chest - Breathing comfortably, No wheezing  Cardiovascular - S1S2   Abdomen - Soft   Extremities - No C/C/E, No calf tenderness   Neurologic Exam -                    Cognitive - AAO to self, place, date, year, situation     Communication - Fluent, No dysarthria     Cranial Nerves - CN 2-12 intact     FUNCTIONAL MOTOR EXAM - No focal deficits                    LEFT    UE - ShAB 5/5, EF 5/5, EE 5/5, WE 5/5,  5/5                    RIGHT UE - ShAB 5/5, EF 5/5, EE 5/5, WE 5/5,  5/5                    LEFT    LE - HF 5/5, KE 5/5, DF 5/5, PF 5/5                    RIGHT LE - HF 5/5, KE 5/5, DF 5/5, PF 5/5        Sensory - Intact to LT     Reflexes - DTR Intact, No primitive reflexive     Coordination - FTN intact     OculoVestibular - No saccades, No nystagmus, VOR         Balance - WNL Static  Psychiatric - Mood stable, Affect WNL  ----------------------------------------------------------------------------------------  ASSESSMENT/PLAN  69yMale with functional deficits after  Pain - Tylenol  DVT PPX - SCDs  Impaired Mobility/Function/Rehab Recommendations -  HPI:  69-year-old male with PMH CAD (on ASA81), GERD, psoriasis, hypothyroidism, UTI, right eye cataract, intellectual disability, autism, epilepsy (on Divalproex), HTN, anxiety, venous stasis dermatitis b/l LE, oropharyngeal dysphagia presented to [facility] on 10/28 from group home for fever, found to have pneumonia. Patient's hospital course was complicated by anemia, right sided loculated pleural effusion s/p thoracentesis (11/12) with 500 ml fluid removed, now s/p right chest tube (placed on 11/20) and TPA given via chest tube to break up loculation (11/21), MSSA bacteremia, new onset afib with RVR (10/31). T6-T7 osteomyelitis and phlegmon. ID following at prior hospital and patient had completed  Ancef, Vancomycin, Invanz, Azactam and Flagyl, Cefepime, however patient still remained persistently febrile and bacteremic. Patient was then transferred to [facility] on 11/21 for neurosurgical evaluation to consider laminectomy for evacuation of T6-T7 spinal epidural phlegmon. Per chart patient is nonverbal, wheel chair user at baseline and is under the care of [facility] for developmental delay, Ethics following.    Group home number [phone]  [facility] Nurse (Kristi) [phone]      Imaging Reviewed Today:  ACC: [id number] EXAM:  US DPLX LWR EXT VEINS COMPL BI   ORDERED BY: GILMA ECHEVARRIA     PROCEDURE DATE:  11/21/2024    IMPRESSION:  No evidence of deep venous thrombosis in either lower extremity.        ACC: [id number]      EXAM: MRI 1008 _ MRI T SPINE W/O,W/ [id number]  PROCEDURE DATE:  Nov 19 2024    CLINICAL STATEMENT: Persistent bacteremia. Fever.    IMPRESSION:    MRI CERVICAL SPINE  1. No evidence of discitis-osteomyelitis, epidural or paraspinal abscess.  2. C4-C5 posterior disc protrusion superimposed upon a disc bulge, impinging  upon the cord, resulting in moderate-severe central canal and severe  bilateral neural foramen stenosis with uncovertebral spurring. No gross  abnormal cord signal, within the limitations of this exam.  3. Additional findings, including those degenerative, described in detail  above.    MRI THORACIC SPINE  1. Findings most concordant with T6-T7 discitis-osteomyelitis, eccentric to  the left, as above. Approximate 8 cm length probable epidural phlegmon at  T5-T7, eccentric dorsally and towards the left, extending through the  ipsilateral T5-T6, T6-T7 and T7-T8 neural foramen, resulting in deformity of  the thoracic cord and moderate-severe central canal stenosis, without  obvious abnormal cord signal within the limitations of this exam. Findings  concordant with circumferential paravertebral phlegmon is also present at  this level. No definitive drainable epidural-paraspinal fluid collections  are evident. Although not definitively excluded, neoplasm is considered less  likely as an etiology.  2. Increased multiloculated collection and/or mass right hemithorax,  incompletely evaluated on this exam, possibly empyema. Recommend dedicated  CT imaging of the thorax, preferably with intravenous contrast, provided  there are no medical contraindications.  3. T10-T11 left paracentral and left foraminal disc protrusions superimposed  upon a disc bulge, impinging upon the cord, resulting in mild central canal  and moderate left neural foramen stenosis with endplate spurring and facet  arthrosis. Symmetric foci of T2-STIR prolongation within the right left cord  at this level, likely cord gliosis and/or edema. Correlate for signs of  compressive myelopathy.  4. T11-T12 left foraminal disc protrusion superimposed upon a disc bulge,  effacing the subarachnoid space, resulting in mild central canal, severe  left and mild right neural foramen stenosis with endplate spurring and facet  arthrosis. Evidence of mildly decreased cord caliber with central T2-STIR  prolongation, likely myelomalacia.  5. Additional findings described in detail above.    MRI LUMBAR SPINE  1. No evidence of discitis-osteomyelitis, epidural or paraspinal abscess.  2. L4-L5 anterolisthesis with a left paracentral disc protrusion  superimposed upon a disc bulge, impinging upon the thecal sac, resulting in  moderate central canal, bilateral lateral recess and moderate bilateral  neural foramen stenosis with facet arthrosis and ligamentum flavum  hypertrophy.  3. Additional findings, including those degenerative, described in detail  above.    TTE 11/21  CONCLUSIONS:      1. Left ventricular systolic function is normal with an ejection fraction of 59 % by Linder's method of disks with an ejection fraction visually estimated at 55 to 60 %. There are no regional wall motion abnormalities seen.   2. The left ventricular diastolic function is indeterminate. Analysis of left ventricular diastolic function and filling pressure is made challenging by the presence of tachycardia.   3. Normal right ventricular cavity size and normal right ventricular systolic function.   4. Mild tricuspid regurgitation.   5. Estimated pulmonary artery systolic pressure is 39 mmHg, consistent with mild pulmonary hypertension.   6. Mild mitral regurgitation.   7. Trileaflet aortic valve with normal systolic excursion.   8. No pericardial effusion seen.   9. No prior echocardiogram is available for comparison.      ----------------------------------------------------------------------------------------  CHIEF COMPLAINT    Patient seen and examined at bedside this morning. NAD distress. Patient is nonverbal at baseline. Home health aid from Group home is present and relays most of history. She notes that patient is generally pleasant, easy going, total care but can feed himself. States he is generally not as tight in his extremities as he is currently but does have some Knee flexor and ankle contractures. Does not follow with physiatry as outpatient.      ----------------------------------------------------------------------------------------  VITALS  T(C): 37.6 (11-22-24 @ 07:00), Max: 38.2 (11-21-24 @ 18:00)  HR: 88 (11-22-24 @ 07:00) (85 - 120)  BP: 110/59 (11-22-24 @ 07:00) (102/55 - 160/94)  RR: 13 (11-22-24 @ 07:00) (13 - 27)  SpO2: 100% (11-22-24 @ 07:00) (92% - 100%)  Wt(kg): --    PAST MEDICAL & SURGICAL HISTORY  MR (mental retardation), severe    Seizure    HTN (hypertension)    Psoriasis    DD (diastrophic dysplasia)    Blind    Constipation, unspecified constipation type    No significant past surgical history          LABS  REVIEWED    CBC Full  -  ( 22 Nov 2024 02:45 )  WBC Count : 8.43 K/uL  RBC Count : 2.39 M/uL  Hemoglobin : 7.5 g/dL  Hematocrit : 24.1 %  Platelet Count - Automated : 416 K/uL  Mean Cell Volume : 100.8 fl  Mean Cell Hemoglobin : 31.4 pg  Mean Cell Hemoglobin Concentration : 31.1 g/dL  Auto Neutrophil # : x  Auto Lymphocyte # : x  Auto Monocyte # : x  Auto Eosinophil # : x  Auto Basophil # : x  Auto Neutrophil % : x  Auto Lymphocyte % : x  Auto Monocyte % : x  Auto Eosinophil % : x  Auto Basophil % : x    11-22    139  |  106  |  15.6  ----------------------------<  94  3.8   |  25.0  |  0.60    Ca    7.3[L]      22 Nov 2024 02:45  Phos  3.5     11-22  Mg     2.1     11-22    TPro  6.1[L]  /  Alb  2.1[L]  /  TBili  <0.2[L]  /  DBili  x   /  AST  12  /  ALT  <5  /  AlkPhos  46  11-21    Urinalysis Basic - ( 22 Nov 2024 02:45 )    Color: x / Appearance: x / SG: x / pH: x  Gluc: 94 mg/dL / Ketone: x  / Bili: x / Urobili: x   Blood: x / Protein: x / Nitrite: x   Leuk Esterase: x / RBC: x / WBC x   Sq Epi: x / Non Sq Epi: x / Bacteria: x        ALLERGIES  Augmentin (Unknown)  sulfa drugs (Unknown)  penicillin (Unknown)  aztreonam (Rash)      MEDICATIONS   acetaminophen     Tablet .. 650 milliGRAM(s) Oral every 6 hours PRN  bisacodyl Suppository 10 milliGRAM(s) Rectal daily PRN  calcium carbonate   1250 mG (OsCal) 1 Tablet(s) Oral every 12 hours  chlorhexidine 2% Cloths 1 Application(s) Topical <User Schedule>  chlorhexidine 4% Liquid 1 Application(s) Topical <User Schedule>  cholecalciferol 2000 Unit(s) Oral daily  diltiazem    Tablet 60 milliGRAM(s) Oral every 6 hours  finasteride 5 milliGRAM(s) Oral daily  fluticasone propionate/ salmeterol 100-50 MICROgram(s) Diskus 1 Dose(s) Inhalation two times a day  fluvoxaMINE 100 milliGRAM(s) Oral <User Schedule>  levothyroxine Injectable 37.5 MICROGram(s) IV Push at bedtime  metoprolol tartrate Injectable 5 milliGRAM(s) IV Push every 6 hours  multiple electrolytes Injection Type 1 1000 milliLiter(s) IV Continuous <Continuous>  mupirocin 2% Nasal 1 Application(s) Both Nostrils every 12 hours  nafcillin  IVPB 2 Gram(s) IV Intermittent every 4 hours  OLANZapine Injectable 20 milliGRAM(s) IntraMuscular at bedtime  pantoprazole  Injectable 40 milliGRAM(s) IV Push daily  polyethylene glycol 3350 17 Gram(s) Oral daily  povidone iodine 10% Nasal Swab 1 Application(s) Both Nostrils once  senna 2 Tablet(s) Oral at bedtime  tamsulosin 0.8 milliGRAM(s) Oral at bedtime  valproate sodium   IVPB 250 milliGRAM(s) IV Intermittent every 8 hours  zinc oxide 20% Ointment 1 Application(s) Topical every 12 hours        ----------------------------------------------------------------------------------------  FUNCTIONAL HISTORY  Lives in a group home with other people with severe disabilities. First floor wheel chair accessible set up. Shares a room with one other person. Uses a push wheel chair at baseline. Dependent on all ADLS and IADLS except feeding at baseline.  Can intermittently follow commands; minimal/unreliable communicative ability using non verbal cues.       CURRENT FUNCTIONAL STATUS  Pending evals by OT/PT; should follow up after surgery  Appears to be at baseline functional status     ----------------------------------------------------------------------------------------  PHYSICAL EXAM  Constitutional - NAD, Comfortable  HEENT - Psoriasis on scalp, NCAT  Neck - Supple, No limited ROM  Chest - Breathing comfortably, No wheezing; chest tube in place  Cardiovascular - S1S2   Abdomen - Soft   Extremities - No C/C/E, No calf tenderness   Neurologic Exam -                    Cognitive - AAOx0; intermittent command following     Communication - Non verbal at baseline     Cranial Nerves - PERRLA, Hearing intact b/l     FUNCTIONAL MOTOR EXAM- Moving all limbs independently, can reach for your hand but does not squeeze or follow more complex commands.      Spasticity: Contractures in Shoulder flexors, Shoulder abduction, hip adductors, Knee flexors, and ankles dorsiflexors. MAS 3 in elbow flexors; MAS 1+ in b/l wrists                    Sensory - responsed to painful stimulin in all extremities      Reflexes - DTR Intact, No primitive reflexive     Coordination - unable to test     OculoVestibular - No saccades, No nystagmus, VOR         Balance - Not test  Psychiatric - Mood stable, Affect WNL  ----------------------------------------------------------------------------------------  ASSESSMENT/PLAN  69yMale with functional deficits after T6-7 osteomyelitis with severe canal stenosis and thoracic cord compression due to 8 cm phlegmon from T5-T7.    #Thoracic Spinal Cord Compression  #Thoracic Osteomyelitis and Diskitis   #Increased spasticity likely in setting of acute illness   - Imaging reviewed; multilevel cord compression at T5-7 and T10-11; multiple levels of disc bulge with severe foraminal stenosis in Cervical thoracic and lumbar spine  - Id following, on nafcillin; repeat Bcx pending   - Plan for OT today for Thoracic Lami and evacuation of phlegmon  - Obtain Pt and OT evals after surgery    #R side Empyema  - CT Surgery following  - Chest tube in place    #Dysphagia  #Intellectual Disability  -Pureed diet with mildly thick liquids  -Near functional baseline currently  -OT eval to assess feeding ability, this was patients only independent ADL    #AFIB  - to Restart lovenox after OR  -Continue Cardizem and metoprolol     #Anemia  -iron panel reviewed, appear to be anemia or chronic disease likely exacerbated by acute illness  -Should improve with treatment of infection  -Getting on unit PRBC pre-op  -Continue to monitor    Pain - Tylenol  DVT PPX - SCDs, Lovenox on hold for OR  Impaired Mobility/Function/Rehab Recommendations - Patient is near his functional level of independence at this time, total assist except feeding. Will likely not improve past this level with inpatient therapy. Will re-evaluate after surgery. Plan for DC back to Group home. Would recommend PMR referral at discharge to address spasticity.

## 2024-11-22 NOTE — CONSULT NOTE ADULT - SUBJECTIVE AND OBJECTIVE BOX
FULL CONSULT TO FOLLOW - SEE BELOW Consult requested by: ELANA Bear MD Role: Attending    Service: Neuro ICU  OTHER MD: SHARRON HENRY, Medicine Attending; RADHA Love MD, Neurosurgery Attending  Consultant: Angela Mena MS, MD, [facility] Medical Ethicist Contact # [phone] (c) [phone]    CONSULT PURPOSE:  Assist the team in ethical dilemma posed by a 69-year-old man with a complex medical history, now with spinal epidural phlegmon requiring surgical intervention, regarding surrogacy.     CLINICAL SUMMARY: This is a 69 year old man with history of CAD (on ASA81), Diastrophic dysplasia (DTD), GERD, psoriasis, hypothyroidism, UTI, right eye cataract, intellectual disability (severe), autism, nonverbal and wheelchair bound at baseline (per chart), epilepsy (on Divalproex), HTN, anxiety, venous stasis dermatitis bilateral LE, constipation, oropharyngeal dysphagia presented to [facility] on 10/28 from [facility] (under the auspices of [facility]) for fever and found to have pneumonia. Patient was admitted for sepsis secondary to MSSA bacteremia. Patient's hospital course was complicated by anemia, right sided loculated pleural effusion s/p thoracentesis (11/12/24) with 500 ml fluid removed, now s/p right chest tube (placed on 11/20/24) and TPA given via chest tube to break up loculation (11/21/24), MSSA bacteremia, stercoral colitis, new onset atrial fibrillation with RVR (started on Lovenox,10/31/24). T6-T7 osteomyelitis and phlegmon. ID following at prior hospital and patient had completed course of antibiotics (Ancef, Vancomycin, Invanz, Azactam and Flagyl, Cefepime), however patient still remained persistently febrile and bacteremic. Imaging at [place] included: 11/1 FELIX ([place]): EF 62%, Negative for vegetations; 11/19 MR C-spine ([place]): C4-C5 posterior disc protrusion impinging on spinal cord, resulting in moderate-severe central canal stenosis; 11/19 MR T-spine ([place]): T6-T7 discitis-osteomyelitis. 8 cm length probable epidural phlegmon at T5-T7, moderate-severe central canal stenosis, without obvious abnormal cord signal. Right hemithorax, possibly empyema; 11/19 MR L-spine ([place]): L4-L5 anterolisthesis with a left paracentral disc protrusion; 11/21 CXR ([place]): Pigtail catheter in place in right lower lung field. Decreasing right-sided pleural effusion. No pneumothorax and 11/21 CT T-spine ([place]): T6-7 discitis-OM. Few punctate sites of intraosseous gas in the T6 body and small erosive change of superior T7 endplate. Patient was then transferred to [facility] NSICU on 11/21/24 for further treatment and management including neurosurgical evaluation to consider laminectomy for evacuation of T6-T7 spinal epidural phlegmon. Neurosurgery evaluated the patient noting plan for emergent OR decompression (T5-7) on 11/22/24. On 11/21/24, Medicine evaluated patient for medical optimization for the OR. ID, CT Surgery and Medicine on consult. Ethics consult initiated to assist the team in identifying a surrogate decision maker and proper procedure regarding medical decisions in a patient with developmental disability and no surrogate.     Prognosis Estimate (survival in days, wks, mos, yrs): guarded						  Patient Decision-Making Capacity: Lacks capacity because chronic medical condition/DD	  Patient Aware of:  Diagnosis: No   Prognosis:  No       Name of medical decision-maker should patient lack capacity:   None   				     Role:  N/A			  Other ‘stakeholders’: LS; [facility]; Jinny BAGLEY [facility] [place], [phone]; [facility] [phone]  Other Decision-Maker (i.e., HCA or Surrogate) Aware of:  Diagnosis: Yes        Prognosis: Yes       Evidence of Patient’s Preference of Life-Sustaining Treatment (Written or Oral):				               	   Resuscitation status:   DNR: No      DNI:  No    The patient is under the auspices of [facility]    Discussions:  Discussion with STEWART Watkins LMSW (Director Social Work Services at [place]) on 11/21/24: Case discussed in detail. Patient is being transferred to [facility] today for neurosurgical intervention. STEWART Watkins has been in contact with the patient’s Group Home ([facility]) under [facility]. Patient without surrogates.     Discussion with ROSANGELA Mena MD (Ethics Attending), ELANA Bear MD (Neuro ICU Attending), ANGELICA KO RN, MBE (Ethics Fellow) and Neuro ICU Team on 11/21/24: Case discussed in detail. Team alerted to transfer during our discussion. Updated team that patient is under [facility] and without a surrogate. 2 MD consent for emergent interventions otherwise SDMC for non-urgent interventions.     Discussion with SHARRON HENRY (Medicine Attending) on 11/21/24: Case discussed in detail. Patient is from [facility] (under [facility]). Patient is without surrogates. 2 MD consent for emergent interventions.     Discussion with ELANA Bear MD (Neuro ICU Attending) on 11/22/24: Reviewed 2 MD consent for emergent interventions otherwise [facility] for non-urgent interventions.    Discussion with SHARRON Seo ([facility] ) on 11/22/24: Confirmed patient is under the auspices of [facility]. She provided his CM info which is [facility] ([facility]) ([phone]) as well as [facility] administrative office at [phone].     Discussion with ROSANGELA Mena MD (Ethics Attending), ANGELICA KO RN, MBE (Ethics Fellow), ROSANGELA Cho LCSW (Director SW Operations) and ROSANGELA Rm LMSW (Neuro ICU SW) on 11/22/24: Case discussed in detail. Group Home RN stated patient was under [place] class.     Discussion with Jovan Otriz ([facility] -[phone]) on 11/22/24: Case discussed. Patient was transferred to [facility] for neurosurgical intervention. Amanda Ortiz confirmed the patient is without surrogates. Confirmed that patient is not under [place] class.     Discussion with ROSANGELA Mena MD (Ethics Attending), ANGELICA KO RN, MBE (Ethics Fellow), ROSANGELA Cho LCSW (Director SW Operations) and ROSANGELA Rm LMSW (Neuro ICU SW) on 11/22/24: Updated team that patient is NOT under [place] class.     Discussion with ANGELICA KO RN MBE (Ethics Fellow) and Jinny WALLS RN ([phone]) on 11/22/24: Case discussed.     BIOETHICS ANALYSIS: The central ethical issues presented in this case are a) respecting patient autonomy and b) provider beneficence/non-maleficence and justice.    The principle of respect for autonomous persons acknowledges a patient’s right to hold views, to make choices and to take actions based on their values and beliefs(i). Furthermore, this principle acknowledges the patient’s dignity and bodily integrity(i). Essential to this principle is the capacity for autonomous choice(i). In other words, the patient’s capacity to decide what can and cannot be done to him according to his set of values. When a patient does not have the ability to make decisions for himself, we must protect his autonomy by finding an appropriate surrogate decision maker.     Medical decision making for patients with neither decision-making capacity (DMC) nor a surrogate decision-maker presents an ethical challenge for healthcare providers because there is no way to obtain informed consent for treatment.  This is particularly so when these decisions involve invasive/life-threatening procedures or withholding or withdrawing life-sustaining treatments and providing appropriate palliative care.  In this case, the health team is commended for their virtue and invoking justice – by providing fairness and equitable care to while attempting to locate a surrogate. Here, there is no surrogate to assist the medical team using the shared decision-making model to determine the level of care going forward.    This patient in under the auspices of the Office for People With Developmental Disabilities (OPW[facility]). [facility] is responsible for coordinating services for [facility] with developmental disabilities. When it comes to decisions for patients that lack capacity, reside in an [facility] licensed or operated facility, are temporarily in the hospital, and have no involved family or legal guardian, the appropriate decision maker is the Surrogate Decision Making Committee (SDMC).(ii) The SDMC is a creature of statute, an alternative to the court system. It is authorized to provide consent for non-emergency major medical treatment and end-of-life care decisions for patients that lack capacity, reside in an [facility] licensed or operated facility, are temporarily in the hospital, and have no involved family, guardian, or health care agent. For all types of requests, the patient must have been evaluated by a [facility]-licensed psychologist or psychiatrist who attests that they are unable to provide their own informed consent and have no authorized, available, or willing surrogate able to make the decision on the patient’s behalf. For non-emergency major medical treatment, the team should fill out the SDMC 200-Series forms. For and end-of-life care decisions, the team should fill out the SDMC 300-Series forms. Ethics can assist in this matter.    Should a medical situation become urgent or an emergency, a two-physician consent is appropriate for these patients.     This case also revolves around the practitioners’ beneficence and nonmaleficence. Beneficence calls on practitioners to promote the best possible health or quality of living with aggressive interventions when these help prolong life with “good quality.” Also, to provide comfort care when cure and remission are not possible, and the aim is to achieve the best “quality of dying.” The same is true for the clinician’s duty to nonmaleficence, avoiding medical interventions whose risk and burden exceed their benefit. Treatment considerations should be weighed in light of best interests, benefits, burdens and risk to the patient’s authentic self. In this case, Mr. Westbrook needs emergent neurosurgical intervention (laminectomy) to treat a spinal epidural phlegmon.    Autonomy: The Health Care Decisions Act (HDCA) for persons with MR (HCDA) [SCPA § 1750-b (4)] after discussions with treating physician, outlines the medical necessity criteria in order for the surrogate to decide to forgo life-sustaining treatment and at this time he does not meet that criteria.     Mr. Westbrook also has innate MORAL STATUS – that is, his distinct personhood, personal experience and interpretation of suffering, life-story, etc. – which must be respected. As he is unrepresented, the [facility] is a juan of his autonomy. The clinician’s domain of VIRTUE is the source of the bioethical principles of beneficence and non-maleficence – both of which should aim to honor a patient’s moral status: wanting to help a patient live optimally, while avoiding inflicting harm (which may be experienced psycho-spiritually and physically). Thus, the health care team must show respect for Mr. Westbrook as a person with dignity. This moral status requires careful consideration of his chance for recovery and a life he may come to enjoy.    Lastly, social justice, sometimes referred to as distributive justice, refers to the equitable distribution and utilization of treatment and resources that maximizes benefit to society and the respect to vulnerable populations, while considering the vulnerability of patients with special needs. In this case, particular attention needs to be paid to certain social determinants of health (SDOH) including potential social and/or moral health disparity. Social determinants of health refer to both specific features of and pathways by which societal conditions affect health and that potentially can be altered by informed action.(iii)

## 2024-11-22 NOTE — BRIEF OPERATIVE NOTE - COMMENTS
Neuromonitoring utilized throughout case. Signals in upper extremities but faint to no signals in lower extremities. These signals remained stable throughout the case.

## 2024-11-22 NOTE — CONSULT NOTE ADULT - ATTENDING COMMENTS
Patient seen and examined. Case discussed with Dr. Cobb and agree with recommendations outlined above. I have personally reviewed the imaging and labs listed above.    Rehab/Impaired mobility and function -  Patient continues to require hospitalization for the above diagnoses and ongoing active management of comorbid complications that are substantially posing a threat to bodily function, functional ability and quality of life.    RECOMMEND - Turn Q2 when needed, HOB >30 degrees    Pending surgical intervention.  As per aide from his facility, may be have had a decline in function.  Recommend optimizing skin care and positioning to prevent secondary complications with immobility.     A multidisciplinary approach, along with patient education and ongoing assessments, is crucial for reducing the risk of complications related to immobility during hospitalization. To prevent secondary complications recommend:  - Regular Repositioning: Ensure patients are repositioned every two hours to minimize the risk of pressure ulcers.  - Early Mobilization: Encourage ambulation and movement as soon as clinically appropriate to reduce the risk of DVT, tachycardia and orthostatic hypotension.   - Range-of-Motion Exercises: Incorporate both passive and active exercises to address muscle atrophy.  - Deep Breathing Exercises: Facilitate deep breathing and coughing exercises, utilizing incentive spirometry to prevent pneumonia.  - Fluid and Mobility Promotion: Encourage adequate fluid intake and mobility to manage constipation and UTIs.  - Contracture Prevention: Regularly perform exercises aimed at preventing contractures.  - Mental Health Support: Offer mental health resources and engaging activities to address depression and anxiety.    Patient is a long term patient in a nursing home with full dependence on others for his care.   When medically stable, expect patient to return there.     Will continue to follow. Rehab recommendations are dependent on how functional progress changes as well as how patient continues to participate and tolerate therapeutic interventions, which may change. Recommend ongoing mobilization by staff to maintain cardiopulmonary function and prevention of secondary complications related to debility. Discussed the specific rehabilitation management and recommendations above with rehab clinical care team/rehab liaison.      Total Time Spent on Encounter (reviewing clinical notes, labs, radiology and medications, reviewing patient history, discussing with resident, pre-rounding, physical exam, assessment and discussing rehabilitation options - with consideration of prior level of function, expected level of recovery and return to community living, rounding with team) - 75 minutes

## 2024-11-22 NOTE — PROGRESS NOTE ADULT - SUBJECTIVE AND OBJECTIVE BOX
HPI    69-year-old male with PMH CAD (on ASA81), GERD, psoriasis, hypothyroidism, UTI, right eye cataract, intellectual disability, autism, epilepsy (on Divalproex), HTN, anxiety, venous stasis dermatitis b/l LE, oropharyngeal dysphagia presented to [facility] on 10/28 from group home for fever, found to have pneumonia. Patient's hospital course was complicated by anemia, right sided loculated pleural effusion s/p thoracentesis () with 500 ml fluid removed, now s/p right chest tube (placed on ) and TPA given via chest tube to break up loculation (), MSSA bacteremia, new onset afib with RVR (10/31). T6-T7 osteomyelitis and phlegmon. ID following at prior hospital and patient had completed  Ancef, Vancomycin, Invanz, Azactam and Flagyl, Cefepime, however patient still remained persistently febrile and bacteremic. Patient was then transferred to [facility] on  for neurosurgical evaluation to consider laminectomy for evacuation of T6-T7 spinal epidural phlegmon and will be admitted to the NSICU under Dr. Love. Per chart patient is nonverbal, wheelchair bound at baseline and is under the care of [facility] for developmental delay, Ethics team has been consulted to follow.    Interval events: Patient seen at [facility] remains tachycardic  with mild fever 38.2 in no acute distress, hemodynamically stable. Pre- OP for decompression tomorrow       ICU Vital Signs Last 24 Hrs  T(C): 37.9 (2024 23:00), Max: 38.2 (2024 18:00)  T(F): 100.2 (2024 23:00), Max: 100.8 (2024 18:00)  HR: 104 (2024 23:00) (104 - 120)  BP: 110/67 (2024 23:00) (110/67 - 160/94)  BP(mean): 80 (2024 23:00) (75 - 116)  ABP: --  ABP(mean): --  RR: 16 (2024 23:00) (16 - 27)  SpO2: 100% (2024 23:00) (92% - 100%)    O2 Parameters below as of 2024 20:00  Patient On (Oxygen Delivery Method): nasal cannula  O2 Flow (L/min): 3          Physical Exam:    GENERAL: NAD  HEAD: Atraumatic, normocephalic  MENTAL STATUS: Nonverbal (baseline), awake; opens eyes spontaneously, does not follow commands  CRANIAL NERVES: Pupils L 3mm reactive. 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 EOMI. Face grossly symmetric.   MOTOR: B/l UE antigravity moving spontaneously. B/l LE contracted (baseline) but withdraws to noxious.   SENSATION: Grossly intact to light touch all extremities  CHEST/LUNG: Non-labored breathing  ABDOMEN: Soft, nontender, nondistended  SKIN: RLQ abdominal ecchymosis, venous stasis dermatitis on b/l LE  DRAINS: Right sided pigtail catheter    Labs:                          7.6    7.53  )-----------( 441      ( 2024 17:30 )             24.6     -    139  |  106  |  18.0  ----------------------------<  107[H]  4.2   |  25.0  |  0.70    Ca    7.5[L]      2024 17:30  Phos  2.1       Mg     2.1         TPro  6.1[L]  /  Alb  2.1[L]  /  TBili  <0.2[L]  /  DBili  x   /  AST  12  /  ALT  <5  /  AlkPhos  46      PT/INR - ( 2024 17:30 )   PT: 12.5 sec;   INR: 1.08 ratio         PTT - ( 2024 17:30 )  PTT:27.9 sec  Urinalysis Basic - ( 2024 17:30 )    Color: Yellow / Appearance: Clear / S.022 / pH: x  Gluc: 107 mg/dL / Ketone: Negative mg/dL  / Bili: Negative / Urobili: 0.2 mg/dL   Blood: x / Protein: 100 mg/dL / Nitrite: Negative   Leuk Esterase: Trace / RBC: 37 /HPF / WBC 13 /HPF   Sq Epi: x / Non Sq Epi: 3 /HPF / Bacteria: Negative /HPF        Lactate, Blood: 1.4 mmol/L ( @ 17:30)      RADIOLOGY, EKG & ADDITIONAL TESTS: Reviewed.

## 2024-11-22 NOTE — PROGRESS NOTE ADULT - ASSESSMENT
69 M PMH intellectual disability (non verbal), HTN, epilepsy, venous stasis dermatitis, dysphagia, arthritis, hypothyroidism, who initially presented to [facility] on Oct 28th for fevers.     Fevers   MSSA bacteremia (+ blood cultures Oct 28th and 30th, cleared Nov 1st)  Multiple blood cultures between Nov 1st and 11th were negative  FELIX Nov 1st negative for vegetations  S/p multiple IV antibiotics (including Nafcillin later on in his hospital course)  Course c/b loculated R sided pleural effusion  S/p thoracentesis ?Nov 9th (culture was negative), and s/p chest tube Nov 20th (exudative, no evidence of empyema, culture negative to date)  Course further c/b recurrence of MSSA bacteremia  Positive blood cultures on Nov 16th, cleared on Nov 18th  Due to recurrence of MSSA bacteremia patient underwent MRI spine on Nov 19th  MRI showed T6-7 discitis/OM as well as well 8cm epidural phlegmon T5-T7  Transferred here with plan for OR today for T5-T7 decompression    Plan:  Continue Nafcillin 2g IV Q4h for MSSA bacteremia   Follow up 2 sets of blood cultures sent on Nov 21st   TTE Nov 21st no vegetations mentioned   Thoracic Surgery consult given recent chest tube placement   Follow up OR findings and cultures from today   Monitor fever curve and white count  Monitor kidney function daily while on IV antibiotics

## 2024-11-22 NOTE — PROGRESS NOTE ADULT - ASSESSMENT
69 year old male with a past medical history of  intellectual disability (non verbal at baseline from a Group home, hypertension, epilespy, psoriatic arthritis, dysphagia, hypothyroidism who was admitted to  [facility] on 10/28/2024 for fever. Was admitted for Sepsis secondary to MSSA bacteremia. Blood cultures were positive on 10/28 and 10/30 with negative blood cultures on 11/1. He remained on IV antibiotics for bacteremia with pneumonia and stero colitis. Course was complicated by new onset Afib with RVR. YAMILEX VASC 3 was started on Lovenox BID with cardizem and metoprolol for rate control. TTE with EF of 65% with no IE. FELIX was done on 11/1 negative for IE. He was noted to have a pleural effusion requiring thoracentesis with removal of 500cc of fluid consistent with an exudate by Light's criteria. Pleural fluid cultures were negative. Patient developed a fever again for which blood cultures were repeated. on 11/16 again positive for MSSA; negative on 11/18. CT of the thoracic, lumbar and sacral spines were positive for T6-T7 diskitis and osteomyelitis. CT chest with a loculated right pleura effusion with a mass in the right hemithorax. Lovenox was held; status post Chest tube placement by IR on 11/20. MRI of the spine on 11/20 showed t6-t7 OM/Diskitis with mild to moderate stenosis with a paravertebral abscess.  Patient transferred here for Neurosurgical evaluation with plan for OR tomorrow for T5-T7 decompression    Assessment/Plan:    1. Thoracic spine Osteomyelitis and Diskitis in the setting of MSSA bacteremia  - Blood cultures from 11/18 negative   - CT and MRI results reviewed  - ID Consulted; on Nafcillin   - Repeat blood cultures x 2  - FELIX on 11/11 negative for IE  - Repeat TTE with no evidence of IE     2. Pre Op Assessment  Patient is at moderate to high risk for cardiovascular event during this high Patient is non verbal and bedbound at baseline.  Patient medically optimized to proceed with planned emergent procedure     3. Right empyema  - Currently on RA  - Right chest tube in place by IR on 11/20   - Follow up pleural fluid cultures sent at [place]  - [facility] surgery following     4. Afib  - YAMILEX VASC of 3  - Lovenox BID on hold for the oR  - Continue Cardizem and Metoprolol  - EKG with Sinus tachycardia     5. Dysphagia  - Pureed diet with mildly thick fluid   - Aspiration precautions     6. Intellectual disability  - Total feed and assist with all ADLS  - To continue Divalproex and zyprexa  - Discussed with Ethics, patient with no HCP or family  - Ethics following     7. BPH  - Finasteride and Flomax    8. Hypothyroidism  - On Synthroid     9. Iron defiency anemia   - No acute s/s of bleeding  - Hb on admission to [place] at 11-12 now at 7  - Iron panel reviewed  - No evidence of hemolysis   - Defer pre op transfusion to surgical team     VTE_ SCDS    Patient is at moderate to high risk for cardiovascular event during this high Patient is non verbal and bedbound at baseline.  Patient medically optimized to proceed with planned emergent procedure. EKG and TTE were reviewed with no acute changes

## 2024-11-22 NOTE — PROGRESS NOTE ADULT - SUBJECTIVE AND OBJECTIVE BOX
NEUROSURGERY POST OP NOTE:    POD# 0 S/P T5-7 posterior laminectomy removal of phlegmon    S: Pt seen and examined at bedside in NSICU. Pt at his neurologic baseline, nonverbal, VSS.      T(C): 37.7 (11-22-24 @ 19:00), Max: 38.6 (11-22-24 @ 14:00)  HR: 83 (11-22-24 @ 19:00) (82 - 120)  BP: 128/74 (11-22-24 @ 19:00) (102/55 - 155/92)  RR: 17 (11-22-24 @ 19:00) (13 - 27)  SpO2: 97% (11-22-24 @ 19:00) (93% - 100%)      11-21-24 @ 07:01  -  11-22-24 @ 07:00  --------------------------------------------------------  IN: 2874.8 mL / OUT: 1140 mL / NET: 1734.8 mL    11-22-24 @ 07:01  -  11-22-24 @ 20:20  --------------------------------------------------------  IN: 1125 mL / OUT: 600 mL / NET: 525 mL        acetaminophen     Tablet .. 650 milliGRAM(s) Oral every 6 hours PRN  calcium carbonate   1250 mG (OsCal) 1 Tablet(s) Oral every 12 hours  chlorhexidine 2% Cloths 1 Application(s) Topical <User Schedule>  chlorhexidine 4% Liquid 1 Application(s) Topical <User Schedule>  cholecalciferol 2000 Unit(s) Oral daily  diltiazem    Tablet 60 milliGRAM(s) Oral every 6 hours  finasteride 5 milliGRAM(s) Oral daily  fluticasone propionate/ salmeterol 100-50 MICROgram(s) Diskus 1 Dose(s) Inhalation two times a day  fluvoxaMINE 100 milliGRAM(s) Oral <User Schedule>  levothyroxine Injectable 55 MICROGram(s) IV Push at bedtime  metoprolol tartrate Injectable 5 milliGRAM(s) IV Push every 6 hours  multiple electrolytes Injection Type 1 1000 milliLiter(s) IV Continuous <Continuous>  mupirocin 2% Nasal 1 Application(s) Both Nostrils every 12 hours  nafcillin  IVPB 2 Gram(s) IV Intermittent every 4 hours  OLANZapine Injectable 10 milliGRAM(s) IntraMuscular every 12 hours  pantoprazole  Injectable 40 milliGRAM(s) IV Push daily  polyethylene glycol 3350 17 Gram(s) Oral daily  povidone iodine 10% Nasal Swab 1 Application(s) Both Nostrils once  senna 2 Tablet(s) Oral at bedtime  tamsulosin 0.8 milliGRAM(s) Oral at bedtime  valproate sodium   IVPB 250 milliGRAM(s) IV Intermittent every 8 hours  zinc oxide 20% Ointment 1 Application(s) Topical every 12 hours    Exam:  GENERAL: NAD  HEAD: Atraumatic, normocephalic  MENTAL STATUS: Nonverbal (baseline), awake; opens eyes spontaneously, does not follow commands  CRANIAL NERVES: Pupils L 3mm reactive. 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 EOMI. Face grossly symmetric.   MOTOR: B/l UE antigravity moving spontaneously. B/l LE contracted (baseline) but withdraws to noxious.   CHEST/LUNG: Non-labored breathing  ABDOMEN: Soft, nontender, nondistended  SKIN: RLQ abdominal ecchymosis, venous stasis dermatitis on b/l LE  WOUND/DRAINS: Posterior thoracic MANOLO drain in place to full suction  Right sided pigtail catheter    Assessment: 69 YOM w/PMHx severe developmental delay (nonverbal at baseline) who was transferred from [facility] for epidural spinal abscess, now s/p T5-7 posterior laminectomy removal of phlegmon    Plan:  -neuro checks q1h  -MRI thoracic postop pending  -pain control prn tylenol  -h/o epilepsy: continue valproate  -SBP goal   -Cont metoprolol 5mg IV q 6hrs for afib, cardizem 60mg q6h  -chest tube for loculated pleural effusion per CT surgery- to suction -20  -ADAT to pureed mildly thick liquid   -bowel regimen: miralax  -voiding, continue flomax and proscar  -DVT ppx: SCDs for now as patient is fresh postop  -ID following for MSSA bacteremia and T6-7 osteomyeltitis - continue Nafcillin  -continue synthroid for hypothyroidism  Final plan to be discussed in AM rounds with Dr. Love

## 2024-11-22 NOTE — CONSULT NOTE ADULT - CONSULT REQUESTED DATE/TIME
21-Nov-2024
21-Nov-2024 17:00
21-Nov-2024 17:22
21-Nov-2024 18:32
21-Nov-2024 17:44
21-Nov-2024 18:40

## 2024-11-22 NOTE — PROGRESS NOTE ADULT - SUBJECTIVE AND OBJECTIVE BOX
[facility]  INFECTIOUS DISEASES at [place] / [place] / [place]  =======================================================                              Shay Parks MD                              Professor Emeritus:  Dr Max Cuevas MD            Diplomates American Board of Internal Medicine & Infectious Diseases                                   Tel  [phone] Fax [phone]                                  Hospital Consult line:  [phone]  =======================================================    Follow Up: MSSA bacteremia     Interval History/ROS: Seen at bedside. Plan for OR today.     REVIEW OF SYSTEMS  Unchanged from prior     Allergies  Augmentin (Unknown)  sulfa drugs (Unknown)  penicillin (Unknown)  aztreonam (Rash)    ANTIMICROBIALS:    nafcillin  IVPB 2 every 4 hours    OTHER MEDS: MEDICATIONS  (STANDING):  acetaminophen     Tablet .. 650 every 6 hours PRN  diltiazem    Tablet 60 every 6 hours  finasteride 5 daily  fluticasone propionate/ salmeterol 100-50 MICROgram(s) Diskus 1 two times a day  fluvoxaMINE 100 <User Schedule>  levothyroxine Injectable 55 at bedtime  metoprolol tartrate Injectable 5 every 6 hours  OLANZapine Injectable 10 every 12 hours  pantoprazole  Injectable 40 daily  polyethylene glycol 3350 17 daily  senna 2 at bedtime  tamsulosin 0.8 at bedtime  valproate sodium   IVPB 250 every 8 hours    Vital Signs Last 24 Hrs  T(F): 100.2 (11-22-24 @ 10:00), Max: 100.8 (11-21-24 @ 18:00)    Vital Signs Last 24 Hrs  HR: 92 (11-22-24 @ 10:00) (82 - 120)  BP: 155/92 (11-22-24 @ 10:00) (102/55 - 160/94)  RR: 16 (11-22-24 @ 10:00)  SpO2: 100% (11-22-24 @ 10:00) (92% - 100%)  Wt(kg): --    EXAM:  General: In NAD   HEENT: NCAT  CV: S1+S2  Lungs: No acute respiratory distress  Chest tube in place   Abd: Soft  Ext: No cyanosis  Neuro: Calm   IV: No phlebitis     Labs:                        7.5    8.43  )-----------( 416      ( 22 Nov 2024 02:45 )             24.1     11-22    139  |  106  |  15.6  ----------------------------<  94  3.8   |  25.0  |  0.60    Ca    7.3[L]      22 Nov 2024 02:45  Phos  3.5     11-22  Mg     2.1     11-22    TPro  6.1[L]  /  Alb  2.1[L]  /  TBili  <0.2[L]  /  DBili  x   /  AST  12  /  ALT  <5  /  AlkPhos  46  11-21    WBC Trend:  WBC Count: 8.43 (11-22-24 @ 02:45)  WBC Count: 7.53 (11-21-24 @ 17:30)    Creatine Trend:  Creatinine: 0.60 (11-22)  Creatinine: 0.70 (11-21)    Liver Biochemical Testing Trend:  Alanine Aminotransferase (ALT/SGPT): <5 (11-21)  Aspartate Aminotransferase (AST/SGOT): 12 (11-21-24 @ 17:30)  Bilirubin Total: <0.2 (11-21)    Trend LDH  11-22-24 @ 02:45  160    Urinalysis Basic - ( 22 Nov 2024 02:45 )    Color: x / Appearance: x / SG: x / pH: x  Gluc: 94 mg/dL / Ketone: x  / Bili: x / Urobili: x   Blood: x / Protein: x / Nitrite: x   Leuk Esterase: x / RBC: x / WBC x   Sq Epi: x / Non Sq Epi: x / Bacteria: x    MICROBIOLOGY:    MRSA PCR Result.: Detected (11-21-24 @ 17:45)    Culture - Urine (collected 29 Dec 2022 14:00)  Source: Catheterized Catheterized  Final Report:    >100,000 CFU/ml Proteus mirabilis  Organism: Proteus mirabilis  Organism: Proteus mirabilis    Sensitivities:      -  Levofloxacin: S <=0.5      -  Tobramycin: S <=2      -  Nitrofurantoin: R >64 Should not be used to treat pyelonephritis      -  Aztreonam: S <=4      -  Gentamicin: S <=2      -  Cefazolin: S <=2 For uncomplicated UTI with K. pneumoniae, E. coli, or P. mirablis: EMANUEL <=16 is sensitive and EMANUEL >=32 is resistant. This also predicts results for oral agents cefaclor, cefdinir, cefpodoxime, cefprozil, cefuroxime axetil, cephalexin and locarbef for uncomplicated UTI. Note that some isolates may be susceptible to these agents while testing resistant to cefazolin.      -  Cefepime: S <=2      -  Piperacillin/Tazobactam: S <=8      -  Ciprofloxacin: S <=0.25      -  Ceftriaxone: S <=1 Enterobacter, Klebsiella aerogenes, Citrobacter, and Serratia may develop resistance during prolonged therapy      -  Ampicillin: S <=8 These ampicillin results predict results for amoxicillin      Method Type: EMANUEL      -  Meropenem: S <=1      -  Ampicillin/Sulbactam: S <=4/2 Enterobacter, Klebsiella aerogenes, Citrobacter, and Serratia may develop resistance during prolonged therapy (3-4 days)      -  Cefoxitin: S <=8      -  Cefuroxime: S <=4      -  Amikacin: S <=16      -  Amoxicillin/Clavulanic Acid: S <=8/4      -  Trimethoprim/Sulfamethoxazole: S <=0.5/9.5      -  Ertapenem: S <=0.5    Culture - Blood (collected 28 Dec 2022 16:36)  Source: .Blood Blood  Final Report:    No Growth Final    Culture - Blood (collected 28 Dec 2022 16:36)  Source: .Blood Blood  Final Report:    No Growth Final    Culture - Urine (collected 21 Dec 2022 14:51)  Source: Clean Catch Clean Catch (Midstream)  Final Report:    No growth    Culture - Blood (collected 21 Dec 2022 12:28)  Source: .Blood Blood  Final Report:    No Growth Final    Culture - Blood (collected 21 Dec 2022 12:28)  Source: .Blood Blood  Final Report:    No Growth Final    Culture - Blood (collected 16 Dec 2022 21:06)  Source: .Blood Blood  Final Report:    No Growth Final    Culture - Blood (collected 16 Dec 2022 21:00)  Source: .Blood Blood  Final Report:    No Growth Final    Rapid RVP Result: NotDetec (11-21 @ 17:30)    Procalcitonin: 0.05 (11-21)    Ferritin: 222 (11-22)    Lactate Dehydrogenase, Serum: 160 (11-22)    Lactate, Blood: 1.4 (11-21 @ 17:30)    RADIOLOGY:  imaging below personally reviewed    < from: US Duplex Venous Lower Ext Complete, Bilateral (11.21.24 @ 22:08) >    IMPRESSION:  No evidence of deep venous thrombosis in either lower extremity.    < end of copied text >    < from: Xray Chest 1 View- PORTABLE-Urgent (Xray Chest 1 View- PORTABLE-Urgent .) (12.27.22 @ 12:36) >  IMPRESSION:  No acute radiographic findings and no change    < end of copied text >

## 2024-11-22 NOTE — BRIEF OPERATIVE NOTE - NSICDXBRIEFOPLAUNCH_GEN_ALL_CORE
Message routed to Dr. Bernal for notification of smaller quantity dispensed.    Dr. Bernal, SILVANO   <--- Click to Launch ICDx for PreOp, PostOp and Procedure

## 2024-11-22 NOTE — CONSULT NOTE ADULT - CONSULT REASON
Surrogacy Assist the team in ethical dilemma posed by a 69-year-old man with a complex medical history, now with spinal epidural phlegmon requiring surgical intervention, regarding surrogacy.

## 2024-11-23 LAB
ANION GAP SERPL CALC-SCNC: 9 MMOL/L — SIGNIFICANT CHANGE UP (ref 5–17)
BUN SERPL-MCNC: 12.6 MG/DL — SIGNIFICANT CHANGE UP (ref 8–20)
CALCIUM SERPL-MCNC: 7.1 MG/DL — LOW (ref 8.4–10.5)
CHLORIDE SERPL-SCNC: 103 MMOL/L — SIGNIFICANT CHANGE UP (ref 96–108)
CO2 SERPL-SCNC: 24 MMOL/L — SIGNIFICANT CHANGE UP (ref 22–29)
CREAT SERPL-MCNC: 0.57 MG/DL — SIGNIFICANT CHANGE UP (ref 0.5–1.3)
EGFR: 107 ML/MIN/1.73M2 — SIGNIFICANT CHANGE UP
GLUCOSE SERPL-MCNC: 88 MG/DL — SIGNIFICANT CHANGE UP (ref 70–99)
GRAM STN FLD: SIGNIFICANT CHANGE UP
GRAM STN FLD: SIGNIFICANT CHANGE UP
HCT VFR BLD CALC: 27.6 % — LOW (ref 39–50)
HGB BLD-MCNC: 8.9 G/DL — LOW (ref 13–17)
MAGNESIUM SERPL-MCNC: 2 MG/DL — SIGNIFICANT CHANGE UP (ref 1.6–2.6)
MCHC RBC-ENTMCNC: 30.6 PG — SIGNIFICANT CHANGE UP (ref 27–34)
MCHC RBC-ENTMCNC: 32.2 G/DL — SIGNIFICANT CHANGE UP (ref 32–36)
MCV RBC AUTO: 94.8 FL — SIGNIFICANT CHANGE UP (ref 80–100)
PHOSPHATE SERPL-MCNC: 3.4 MG/DL — SIGNIFICANT CHANGE UP (ref 2.4–4.7)
PLATELET # BLD AUTO: 380 K/UL — SIGNIFICANT CHANGE UP (ref 150–400)
POTASSIUM SERPL-MCNC: 3.9 MMOL/L — SIGNIFICANT CHANGE UP (ref 3.5–5.3)
POTASSIUM SERPL-SCNC: 3.9 MMOL/L — SIGNIFICANT CHANGE UP (ref 3.5–5.3)
RBC # BLD: 2.91 M/UL — LOW (ref 4.2–5.8)
RBC # FLD: 16.5 % — HIGH (ref 10.3–14.5)
SODIUM SERPL-SCNC: 136 MMOL/L — SIGNIFICANT CHANGE UP (ref 135–145)
SPECIMEN SOURCE: SIGNIFICANT CHANGE UP
SPECIMEN SOURCE: SIGNIFICANT CHANGE UP
WBC # BLD: 7.83 K/UL — SIGNIFICANT CHANGE UP (ref 3.8–10.5)
WBC # FLD AUTO: 7.83 K/UL — SIGNIFICANT CHANGE UP (ref 3.8–10.5)

## 2024-11-23 PROCEDURE — 99232 SBSQ HOSP IP/OBS MODERATE 35: CPT | Mod: FS

## 2024-11-23 PROCEDURE — 99291 CRITICAL CARE FIRST HOUR: CPT

## 2024-11-23 PROCEDURE — 71045 X-RAY EXAM CHEST 1 VIEW: CPT | Mod: 26

## 2024-11-23 RX ORDER — METOPROLOL TARTRATE 100 MG/1
50 TABLET, FILM COATED ORAL
Refills: 0 | Status: DISCONTINUED | OUTPATIENT
Start: 2024-11-23 | End: 2024-11-24

## 2024-11-23 RX ORDER — ACETAMINOPHEN 500MG 500 MG/1
1000 TABLET, COATED ORAL ONCE
Refills: 0 | Status: COMPLETED | OUTPATIENT
Start: 2024-11-23 | End: 2024-11-23

## 2024-11-23 RX ORDER — LEVOTHYROXINE SODIUM 150 MCG
75 TABLET ORAL DAILY
Refills: 0 | Status: DISCONTINUED | OUTPATIENT
Start: 2024-11-23 | End: 2024-11-24

## 2024-11-23 RX ORDER — CLONAZEPAM 0.12 MG/1
0.5 TABLET, ORALLY DISINTEGRATING ORAL
Refills: 0 | Status: DISCONTINUED | OUTPATIENT
Start: 2024-11-23 | End: 2024-11-24

## 2024-11-23 RX ORDER — ENOXAPARIN SODIUM 30 MG/.3ML
40 INJECTION SUBCUTANEOUS EVERY 24 HOURS
Refills: 0 | Status: DISCONTINUED | OUTPATIENT
Start: 2024-11-23 | End: 2024-11-24

## 2024-11-23 RX ADMIN — Medication 1 APPLICATION(S): at 05:16

## 2024-11-23 RX ADMIN — ACETAMINOPHEN 500MG 650 MILLIGRAM(S): 500 TABLET, COATED ORAL at 06:39

## 2024-11-23 RX ADMIN — ACETAMINOPHEN 500MG 650 MILLIGRAM(S): 500 TABLET, COATED ORAL at 05:55

## 2024-11-23 RX ADMIN — METOPROLOL TARTRATE 5 MILLIGRAM(S): 100 TABLET, FILM COATED ORAL at 05:16

## 2024-11-23 RX ADMIN — ACETAMINOPHEN 500MG 650 MILLIGRAM(S): 500 TABLET, COATED ORAL at 19:24

## 2024-11-23 RX ADMIN — Medication 1 TABLET(S): at 17:52

## 2024-11-23 RX ADMIN — VALPROIC ACID 52.5 MILLIGRAM(S): 250 SOLUTION ORAL at 05:17

## 2024-11-23 RX ADMIN — ACETAMINOPHEN 500MG 400 MILLIGRAM(S): 500 TABLET, COATED ORAL at 19:58

## 2024-11-23 RX ADMIN — NAFCILLIN 200 GRAM(S): 10 INJECTION, POWDER, FOR SOLUTION INTRAVENOUS at 21:57

## 2024-11-23 RX ADMIN — OLANZAPINE 10 MILLIGRAM(S): 20 TABLET ORAL at 08:40

## 2024-11-23 RX ADMIN — Medication 1 APPLICATION(S): at 17:47

## 2024-11-23 RX ADMIN — ACETAMINOPHEN 500MG 1000 MILLIGRAM(S): 500 TABLET, COATED ORAL at 21:00

## 2024-11-23 RX ADMIN — FLUTICASONE PROPIONATE AND SALMETEROL XINAFOATE 1 DOSE(S): 45; 21 AEROSOL, METERED RESPIRATORY (INHALATION) at 09:43

## 2024-11-23 RX ADMIN — TAMSULOSIN HYDROCHLORIDE 0.8 MILLIGRAM(S): 0.4 CAPSULE ORAL at 21:57

## 2024-11-23 RX ADMIN — DILTIAZEM HYDROCHLORIDE 60 MILLIGRAM(S): 240 CAPSULE, COATED, EXTENDED RELEASE ORAL at 05:16

## 2024-11-23 RX ADMIN — NAFCILLIN 200 GRAM(S): 10 INJECTION, POWDER, FOR SOLUTION INTRAVENOUS at 05:17

## 2024-11-23 RX ADMIN — FLUTICASONE PROPIONATE AND SALMETEROL XINAFOATE 1 DOSE(S): 45; 21 AEROSOL, METERED RESPIRATORY (INHALATION) at 20:43

## 2024-11-23 RX ADMIN — PANTOPRAZOLE SODIUM 40 MILLIGRAM(S): 40 TABLET, DELAYED RELEASE ORAL at 12:30

## 2024-11-23 RX ADMIN — ACETAMINOPHEN 500MG 650 MILLIGRAM(S): 500 TABLET, COATED ORAL at 13:04

## 2024-11-23 RX ADMIN — NAFCILLIN 200 GRAM(S): 10 INJECTION, POWDER, FOR SOLUTION INTRAVENOUS at 14:20

## 2024-11-23 RX ADMIN — ACETAMINOPHEN 500MG 650 MILLIGRAM(S): 500 TABLET, COATED ORAL at 14:00

## 2024-11-23 RX ADMIN — Medication 2000 UNIT(S): at 12:30

## 2024-11-23 RX ADMIN — Medication 100 MILLIGRAM(S): at 17:45

## 2024-11-23 RX ADMIN — Medication 100 MILLIGRAM(S): at 05:16

## 2024-11-23 RX ADMIN — CLONAZEPAM 0.5 MILLIGRAM(S): 0.12 TABLET, ORALLY DISINTEGRATING ORAL at 17:47

## 2024-11-23 RX ADMIN — DILTIAZEM HYDROCHLORIDE 60 MILLIGRAM(S): 240 CAPSULE, COATED, EXTENDED RELEASE ORAL at 17:45

## 2024-11-23 RX ADMIN — NAFCILLIN 200 GRAM(S): 10 INJECTION, POWDER, FOR SOLUTION INTRAVENOUS at 02:57

## 2024-11-23 RX ADMIN — VALPROIC ACID 52.5 MILLIGRAM(S): 250 SOLUTION ORAL at 15:12

## 2024-11-23 RX ADMIN — Medication 1 APPLICATION(S): at 17:46

## 2024-11-23 RX ADMIN — METOPROLOL TARTRATE 50 MILLIGRAM(S): 100 TABLET, FILM COATED ORAL at 17:45

## 2024-11-23 RX ADMIN — Medication 5 MILLIGRAM(S): at 12:30

## 2024-11-23 RX ADMIN — NAFCILLIN 200 GRAM(S): 10 INJECTION, POWDER, FOR SOLUTION INTRAVENOUS at 09:55

## 2024-11-23 RX ADMIN — DILTIAZEM HYDROCHLORIDE 60 MILLIGRAM(S): 240 CAPSULE, COATED, EXTENDED RELEASE ORAL at 00:09

## 2024-11-23 RX ADMIN — DILTIAZEM HYDROCHLORIDE 60 MILLIGRAM(S): 240 CAPSULE, COATED, EXTENDED RELEASE ORAL at 12:30

## 2024-11-23 RX ADMIN — CHLORHEXIDINE GLUCONATE 1 APPLICATION(S): 1.2 RINSE ORAL at 05:17

## 2024-11-23 RX ADMIN — OLANZAPINE 10 MILLIGRAM(S): 20 TABLET ORAL at 21:57

## 2024-11-23 RX ADMIN — Medication 1 TABLET(S): at 05:16

## 2024-11-23 RX ADMIN — VALPROIC ACID 52.5 MILLIGRAM(S): 250 SOLUTION ORAL at 21:57

## 2024-11-23 RX ADMIN — ACETAMINOPHEN 500MG 650 MILLIGRAM(S): 500 TABLET, COATED ORAL at 19:51

## 2024-11-23 RX ADMIN — Medication 1 APPLICATION(S): at 05:17

## 2024-11-23 RX ADMIN — METOPROLOL TARTRATE 5 MILLIGRAM(S): 100 TABLET, FILM COATED ORAL at 00:09

## 2024-11-23 RX ADMIN — ENOXAPARIN SODIUM 40 MILLIGRAM(S): 30 INJECTION SUBCUTANEOUS at 12:30

## 2024-11-23 RX ADMIN — NAFCILLIN 200 GRAM(S): 10 INJECTION, POWDER, FOR SOLUTION INTRAVENOUS at 17:45

## 2024-11-23 NOTE — DIETITIAN INITIAL EVALUATION ADULT - ADD RECOMMEND
- Monitor weights daily for trend/accuracy  - Continue diet as tolerated.   - Provide encouragement/assistance as needed during mealtimes to inc PO  - Rx: MVI daily, vitamin C (500mg daily), and zinc sulfate (220mg daily x 10 days) to aid in wound healing.

## 2024-11-23 NOTE — SWALLOW BEDSIDE ASSESSMENT ADULT - SLP GENERAL OBSERVATIONS
Pt recd awake in bed, nonverbal, reduced cognition, +02 via nc, Sp02 99% baseline and throughout, aide present at bedside, nonverbal pain 0/10 pre/post

## 2024-11-23 NOTE — PROGRESS NOTE ADULT - CRITICAL CARE ATTENDING COMMENT
I spent 60 minutes of critical care time examining patient, reviewing vitals, labs, medications, imaging and discussing with the team goals of care to prevent life-threatening in this patient who is at high risk for deterioration or death due to:  bacteremia and osteomyelitis
I spent 60 minutes of critical care time examining patient, reviewing vitals, labs, medications, imaging and discussing with the team goals of care to prevent life-threatening in this patient who is at high risk for deterioration or death due to:  bacteremia

## 2024-11-23 NOTE — SWALLOW BEDSIDE ASSESSMENT ADULT - SLP PERTINENT HISTORY OF CURRENT PROBLEM
Pt's caregiver at bedside reports h/o oropharyngeal dysphagia with baseline diet of puree/mildly thick liquids. Per aide, pt has a good appetite and has been eating 100% of offered meals. Overt s/s aspiration deined

## 2024-11-23 NOTE — PROGRESS NOTE ADULT - ASSESSMENT
70 YO M w/PMHx severe developmental delay (nonverbal at baseline) who was transferred from [place] for epidural spinal abscess who had pigtail placed at OSH for empyema> Thoracic surgery following for tube management  11/22 TPA instilled pigtail for suspected atelectasis ,loculated collection (drained 500 ml @ OSH)

## 2024-11-23 NOTE — DIETITIAN INITIAL EVALUATION ADULT - NSFNSGIIOFT_GEN_A_CORE
11-22-24 @ 07:01  -  11-23-24 @ 07:00  --------------------------------------------------------  OUT:    Chest Tube (mL): 100 mL  Total OUT: 100 mL    Total NET: -100 mL

## 2024-11-23 NOTE — SWALLOW BEDSIDE ASSESSMENT ADULT - COMMENTS
As per MD note: "69-year-old male with PMH CAD (on ASA81), GERD, psoriasis, hypothyroidism, UTI, right eye cataract, intellectual disability, autism, epilepsy (on Divalproex), HTN, anxiety, venous stasis dermatitis b/l LE, oropharyngeal dysphagia presented to [facility] on 10/28 from group home for fever, found to have pneumonia. Patient's hospital course was complicated by anemia, right sided loculated pleural effusion s/p thoracentesis (11/12) with 500 ml fluid removed, now s/p right chest tube (placed on 11/20) and TPA given via chest tube to break up loculation (11/21), MSSA bacteremia, new onset afib with RVR (10/31). T6-T7 osteomyelitis and phlegmon. ID following at prior hospital and patient had completed  Ancef, Vancomycin, Invanz, Azactam and Flagyl, Cefepime, however patient still remained persistently febrile and bacteremic. Patient was then transferred to [facility] on 11/21 for neurosurgical evaluation to consider laminectomy for evacuation of T6-T7 spinal epidural phlegmon and will be admitted to the NSICU under Dr. Love. Per chart patient is nonverbal, wheelchair bound at baseline and is under the care of OPWDD for developmental delay, Ethics team has been consulted to follow."

## 2024-11-23 NOTE — PROGRESS NOTE ADULT - SUBJECTIVE AND OBJECTIVE BOX
Subjective: Non verbal moaning      V/S  T(C): 38.3 (11-23-24 @ 13:00), Max: 38.3 (11-23-24 @ 13:00)  HR: 100 (11-23-24 @ 13:00) (74 - 105)  BP: 156/82 (11-23-24 @ 13:00) (102/56 - 156/82)  RR: 18 (11-23-24 @ 13:00) (12 - 23)  SpO2: 100% (11-23-24 @ 13:00) (94% - 100%)    CHEST TUBES:    Right CT   [ x ]LWS  [  ]H2O seal    I&O's Detail    22 Nov 2024 07:01  -  23 Nov 2024 07:00  --------------------------------------------------------  IN:    IV PiggyBack: 100 mL    IV PiggyBack: 500 mL    IV PiggyBack: 150 mL    multiple electrolytes Injection Type 1.: 1575 mL  Total IN: 2325 mL    OUT:    Bulb (mL): 90 mL    Chest Tube (mL): 100 mL    Voided (mL): 900 mL  Total OUT: 1090 mL    Total NET: 1235 mL      23 Nov 2024 07:01  -  23 Nov 2024 16:00  --------------------------------------------------------  IN:    IV PiggyBack: 100 mL    multiple electrolytes Injection Type 1.: 225 mL    Oral Fluid: 100 mL  Total IN: 425 mL    OUT:    Bulb (mL): 40 mL    Voided (mL): 150 mL  Total OUT: 190 mL    Total NET: 235 mL          11-22-24 @ 07:01  -  11-23-24 @ 07:00  --------------------------------------------------------  IN: 2325 mL / OUT: 1090 mL / NET: 1235 mL    11-23-24 @ 07:01  -  11-23-24 @ 16:00  --------------------------------------------------------  IN: 425 mL / OUT: 190 mL / NET: 235 mL      MEDICATIONS  (STANDING):  calcium carbonate   1250 mG (OsCal) 1 Tablet(s) Oral every 12 hours  chlorhexidine 2% Cloths 1 Application(s) Topical <User Schedule>  chlorhexidine 4% Liquid 1 Application(s) Topical <User Schedule>  cholecalciferol 2000 Unit(s) Oral daily  clonazePAM  Tablet 0.5 milliGRAM(s) Oral two times a day  diltiazem    Tablet 60 milliGRAM(s) Oral every 6 hours  enoxaparin Injectable 40 milliGRAM(s) SubCutaneous every 24 hours  finasteride 5 milliGRAM(s) Oral daily  fluticasone propionate/ salmeterol 100-50 MICROgram(s) Diskus 1 Dose(s) Inhalation two times a day  fluvoxaMINE 100 milliGRAM(s) Oral <User Schedule>  levothyroxine 75 MICROGram(s) Oral daily  metoprolol tartrate 50 milliGRAM(s) Oral two times a day  mupirocin 2% Nasal 1 Application(s) Both Nostrils every 12 hours  nafcillin  IVPB 2 Gram(s) IV Intermittent every 4 hours  OLANZapine Injectable 10 milliGRAM(s) IntraMuscular every 12 hours  pantoprazole  Injectable 40 milliGRAM(s) IV Push daily  povidone iodine 10% Nasal Swab 1 Application(s) Both Nostrils once  tamsulosin 0.8 milliGRAM(s) Oral at bedtime  valproate sodium   IVPB 250 milliGRAM(s) IV Intermittent every 8 hours  zinc oxide 20% Ointment 1 Application(s) Topical every 12 hours      11-23    136  |  103  |  12.6  ----------------------------<  88  3.9   |  24.0  |  0.57    Ca    7.1[L]      23 Nov 2024 04:55  Phos  3.4     11-23  Mg     2.0     11-23    TPro  6.1[L]  /  Alb  2.1[L]  /  TBili  <0.2[L]  /  DBili  x   /  AST  12  /  ALT  <5  /  AlkPhos  46  11-21                               8.9    7.83  )-----------( 380      ( 23 Nov 2024 04:55 )             27.6        PT/INR - ( 22 Nov 2024 02:45 )   PT: 13.2 sec;   INR: 1.17 ratio         PTT - ( 22 Nov 2024 02:45 )  PTT:31.0 sec         CAPILLARY BLOOD GLUCOSE      POCT Blood Glucose.: 98 mg/dL (22 Nov 2024 18:50)           CXR:      Physical Exam:    GENERAL: NAD non verbal, moaning  CHEST/LUNG: CTA  b/l,  no rales, rhonchi, wheezing, or rubs  Right pigtail  HEART: Tachycardia ; No murmurs, rubs, or gallops  ABDOMEN: Soft, Nontender, Nondistended; Bowel sounds present  EXTREMITIES:  2+ Peripheral Pulses, No clubbing, cyanosis, or edema ,    rodriguez katiuska urine      PAST MEDICAL & SURGICAL HISTORY:  MR (mental retardation), severe      Seizure      HTN (hypertension)      Psoriasis      DD (diastrophic dysplasia)      Blind  right eye      Constipation, unspecified constipation type      No significant past surgical history

## 2024-11-23 NOTE — PROGRESS NOTE ADULT - SUBJECTIVE AND OBJECTIVE BOX
Chief complaint:   Patient is a 69y old  Male who presents with a chief complaint of Medical optimization for the OR (22 Nov 2024 08:12)    HPI:  69-year-old male with PMH CAD (on ASA81), GERD, psoriasis, hypothyroidism, UTI, right eye cataract, intellectual disability, autism, epilepsy (on Divalproex), HTN, anxiety, venous stasis dermatitis b/l LE, oropharyngeal dysphagia presented to [facility] on 10/28 from group home for fever, found to have pneumonia. Patient's hospital course was complicated by anemia, right sided loculated pleural effusion s/p thoracentesis (11/12) with 500 ml fluid removed, now s/p right chest tube (placed on 11/20) and TPA given via chest tube to break up loculation (11/21), MSSA bacteremia, new onset afib with RVR (10/31). T6-T7 osteomyelitis and phlegmon. ID following at prior hospital and patient had completed  Ancef, Vancomycin, Invanz, Azactam and Flagyl, Cefepime, however patient still remained persistently febrile and bacteremic. Patient was then transferred to [facility] on 11/21 for neurosurgical evaluation to consider laminectomy for evacuation of T6-T7 spinal epidural phlegmon and will be admitted to the NSICU under Dr. Love. Per chart patient is nonverbal, wheelchair bound at baseline and is under the care of OPWDD for developmental delay, Ethics team has been consulted to follow.  (21 Nov 2024 18:10)      24hr EVENTS: POD 1 lami and evac      ROS: [x ]  Unable to assess due to mental status   All other systems negative    -----------------------------------------------------------------------------------------------------------------------------------------------------------------------------------  ICU Vital Signs Last 24 Hrs  T(C): 38 (23 Nov 2024 10:00), Max: 38.6 (22 Nov 2024 14:00)  T(F): 100.4 (23 Nov 2024 10:00), Max: 101.5 (22 Nov 2024 14:00)  HR: 95 (23 Nov 2024 10:00) (74 - 105)  BP: 119/77 (23 Nov 2024 10:00) (102/56 - 152/84)  BP(mean): 89 (23 Nov 2024 10:00) (69 - 118)  ABP: --  ABP(mean): --  RR: 19 (23 Nov 2024 10:00) (12 - 23)  SpO2: 100% (23 Nov 2024 10:00) (94% - 100%)    O2 Parameters below as of 23 Nov 2024 09:47  Patient On (Oxygen Delivery Method): nasal cannula      I&O's Summary    22 Nov 2024 07:01  -  23 Nov 2024 07:00  --------------------------------------------------------  IN: 2325 mL / OUT: 1090 mL / NET: 1235 mL    23 Nov 2024 07:01  -  23 Nov 2024 11:02  --------------------------------------------------------  IN: 425 mL / OUT: 190 mL / NET: 235 mL      MEDICATIONS  (STANDING):  calcium carbonate   1250 mG (OsCal) 1 Tablet(s) Oral every 12 hours  chlorhexidine 2% Cloths 1 Application(s) Topical <User Schedule>  chlorhexidine 4% Liquid 1 Application(s) Topical <User Schedule>  cholecalciferol 2000 Unit(s) Oral daily  diltiazem    Tablet 60 milliGRAM(s) Oral every 6 hours  finasteride 5 milliGRAM(s) Oral daily  fluticasone propionate/ salmeterol 100-50 MICROgram(s) Diskus 1 Dose(s) Inhalation two times a day  fluvoxaMINE 100 milliGRAM(s) Oral <User Schedule>  levothyroxine Injectable 55 MICROGram(s) IV Push at bedtime  metoprolol tartrate Injectable 5 milliGRAM(s) IV Push every 6 hours  multiple electrolytes Injection Type 1 1000 milliLiter(s) (75 mL/Hr) IV Continuous <Continuous>  mupirocin 2% Nasal 1 Application(s) Both Nostrils every 12 hours  nafcillin  IVPB 2 Gram(s) IV Intermittent every 4 hours  OLANZapine Injectable 10 milliGRAM(s) IntraMuscular every 12 hours  pantoprazole  Injectable 40 milliGRAM(s) IV Push daily  polyethylene glycol 3350 17 Gram(s) Oral daily  povidone iodine 10% Nasal Swab 1 Application(s) Both Nostrils once  senna 2 Tablet(s) Oral at bedtime  tamsulosin 0.8 milliGRAM(s) Oral at bedtime  valproate sodium   IVPB 250 milliGRAM(s) IV Intermittent every 8 hours  zinc oxide 20% Ointment 1 Application(s) Topical every 12 hours      IMAGING:   Recent imaging studies were reviewed.    LAB RESULTS:                          8.9    7.83  )-----------( 380      ( 23 Nov 2024 04:55 )             27.6       PT/INR - ( 22 Nov 2024 02:45 )   PT: 13.2 sec;   INR: 1.17 ratio         PTT - ( 22 Nov 2024 02:45 )  PTT:31.0 sec    11-23    136  |  103  |  12.6  ----------------------------<  88  3.9   |  24.0  |  0.57    Ca    7.1[L]      23 Nov 2024 04:55  Phos  3.4     11-23  Mg     2.0     11-23    TPro  6.1[L]  /  Alb  2.1[L]  /  TBili  <0.2[L]  /  DBili  x   /  AST  12  /  ALT  <5  /  AlkPhos  46  11-21          -----------------------------------------------------------------------------------------------------------------------------------------------------------------------------------      PHYSICAL EXAM:  General: Calm,   HEENT: MMM  Neuro:  -Mental status- No acute distress, EO spont, no FC baseline, tracking  mute  -CN- R pupil cataracts unable to visualize, L reactive 3mm, EOMI, tongue midline, face symmetric  B/l UE antigravity moving spontaneously. B/l LE contracted (baseline) but withdraws to noxious    CV: RRR  Pulm:   + wheeze  Abd: Soft, nontender, mildy distended  + bowel sounds  Ext: no noted edema in lower ext  Skin: warm, dry  RLQ abdominal ecchymosis, venous stasis dermatitis on b/l LE

## 2024-11-23 NOTE — DIETITIAN INITIAL EVALUATION ADULT - OTHER INFO
69M with PMH intellectual disability, autism, venous stasis dermatitis b/l LE, oropharyngeal dysphagia presented to [facility] on 10/28 from group home for fever, found to have pneumonia. Right loculated pleural effusion s/p R chest tube (11/20), s/p TPA via chest tube (11/21). Pt transferred to [facility] on 11/21 for neurosurgical eval to evacuate T6-T7 spinal epidural phlegmon.   #POD 1 S/P T5-7 posterior laminectomy removal of phlegmon

## 2024-11-23 NOTE — DIETITIAN INITIAL EVALUATION ADULT - PERTINENT MEDS FT
MEDICATIONS  (STANDING):  calcium carbonate   1250 mG (OsCal) 1 Tablet(s) Oral every 12 hours  cholecalciferol 2000 Unit(s) Oral daily  levothyroxine 75 MICROGram(s) Oral daily  zinc oxide 20% Ointment 1 Application(s) Topical every 12 hours

## 2024-11-23 NOTE — DIETITIAN INITIAL EVALUATION ADULT - PERTINENT LABORATORY DATA
11-23    136  |  103  |  12.6  ----------------------------<  88  3.9   |  24.0  |  0.57    Ca    7.1[L]      23 Nov 2024 04:55  Phos  3.4     11-23  Mg     2.0     11-23    TPro  6.1[L]  /  Alb  2.1[L]  /  TBili  <0.2[L]  /  DBili  x   /  AST  12  /  ALT  <5  /  AlkPhos  46  11-21  POCT Blood Glucose.: 98 mg/dL (11-22-24 @ 18:50)

## 2024-11-23 NOTE — PROGRESS NOTE ADULT - SUBJECTIVE AND OBJECTIVE BOX
HPI:  69-year-old male with PMH CAD (on ASA81), GERD, psoriasis, hypothyroidism, UTI, right eye cataract, intellectual disability, autism, epilepsy (on Divalproex), HTN, anxiety, venous stasis dermatitis b/l LE, oropharyngeal dysphagia presented to [facility] on 10/28 from group home for fever, found to have pneumonia. Patient's hospital course was complicated by anemia, right sided loculated pleural effusion s/p thoracentesis (11/12) with 500 ml fluid removed, now s/p right chest tube (placed on 11/20) and TPA given via chest tube to break up loculation (11/21), MSSA bacteremia, new onset afib with RVR (10/31). T6-T7 osteomyelitis and phlegmon. ID following at prior hospital and patient had completed  Ancef, Vancomycin, Invanz, Azactam and Flagyl, Cefepime, however patient still remained persistently febrile and bacteremic. Patient was then transferred to [facility] on 11/21 for neurosurgical evaluation to consider laminectomy for evacuation of T6-T7 spinal epidural phlegmon and will be admitted to the NSICU under Dr. Love. Per chart patient is nonverbal, wheelchair bound at baseline and is under the care of OPWDD for developmental delay, Ethics team has been consulted to follow.  (21 Nov 2024 18:10)    OVERNIGHT EVENTS:  ZEE overnight. Neuro exam stable.    Vital Signs Last 24 Hrs  T(C): 37.1 (22 Nov 2024 23:00), Max: 38.6 (22 Nov 2024 14:00)  T(F): 98.8 (22 Nov 2024 23:00), Max: 101.5 (22 Nov 2024 14:00)  HR: 77 (22 Nov 2024 23:00) (74 - 107)  BP: 111/62 (22 Nov 2024 23:00) (109/48 - 155/92)  BP(mean): 75 (22 Nov 2024 23:00) (62 - 118)  RR: 20 (22 Nov 2024 23:00) (12 - 27)  SpO2: 100% (22 Nov 2024 23:00) (93% - 100%)    Parameters below as of 22 Nov 2024 20:00  Patient On (Oxygen Delivery Method): nasal cannula  O2 Flow (L/min): 2      I&O's Summary    21 Nov 2024 07:01  -  22 Nov 2024 07:00  --------------------------------------------------------  IN: 2874.8 mL / OUT: 1140 mL / NET: 1734.8 mL    22 Nov 2024 07:01  -  23 Nov 2024 01:14  --------------------------------------------------------  IN: 1575 mL / OUT: 600 mL / NET: 975 mL        PHYSICAL EXAM:  GENERAL: NAD  HEAD: Atraumatic, normocephalic  MENTAL STATUS: Nonverbal (baseline), awake; opens eyes spontaneously, does not follow commands  CRANIAL NERVES: Pupils L 3mm reactive. 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 EOMI. Face grossly symmetric.   MOTOR: B/l UE antigravity moving spontaneously. B/l LE contracted (baseline) but withdraws to noxious.   CHEST/LUNG: Non-labored breathing  ABDOMEN: Soft, nontender, nondistended  SKIN: RLQ abdominal ecchymosis, venous stasis dermatitis on b/l LE  WOUND/DRAINS: Posterior thoracic incision site C/D/I, MANOLO drain in place to full suction draining serosanguinous fluid  Right sided pigtail catheter    LABS:                        7.5    8.43  )-----------( 416      ( 22 Nov 2024 02:45 )             24.1     11-22    139  |  106  |  15.6  ----------------------------<  94  3.8   |  25.0  |  0.60    Ca    7.3[L]      22 Nov 2024 02:45  Phos  3.5     11-22  Mg     2.1     11-22    TPro  6.1[L]  /  Alb  2.1[L]  /  TBili  <0.2[L]  /  DBili  x   /  AST  12  /  ALT  <5  /  AlkPhos  46  11-21    PT/INR - ( 22 Nov 2024 02:45 )   PT: 13.2 sec;   INR: 1.17 ratio         PTT - ( 22 Nov 2024 02:45 )  PTT:31.0 sec  Urinalysis Basic - ( 22 Nov 2024 02:45 )    Color: x / Appearance: x / SG: x / pH: x  Gluc: 94 mg/dL / Ketone: x  / Bili: x / Urobili: x   Blood: x / Protein: x / Nitrite: x   Leuk Esterase: x / RBC: x / WBC x   Sq Epi: x / Non Sq Epi: x / Bacteria: x          CAPILLARY BLOOD GLUCOSE      POCT Blood Glucose.: 98 mg/dL (22 Nov 2024 18:50)      Drug Levels: [] N/A  Valproic Acid Level, Serum: 26.3 ug/mL (11-22 @ 11:05)    CSF Analysis: [] N/A      Allergies    Augmentin (Unknown)  sulfa drugs (Unknown)  penicillin (Unknown)  aztreonam (Rash)    Intolerances      MEDICATIONS:  Antibiotics:  nafcillin  IVPB 2 Gram(s) IV Intermittent every 4 hours    Neuro:  acetaminophen     Tablet .. 650 milliGRAM(s) Oral every 6 hours PRN  fluvoxaMINE 100 milliGRAM(s) Oral <User Schedule>  OLANZapine Injectable 10 milliGRAM(s) IntraMuscular every 12 hours  valproate sodium   IVPB 250 milliGRAM(s) IV Intermittent every 8 hours    Anticoagulation:    OTHER:  chlorhexidine 2% Cloths 1 Application(s) Topical <User Schedule>  chlorhexidine 4% Liquid 1 Application(s) Topical <User Schedule>  diltiazem    Tablet 60 milliGRAM(s) Oral every 6 hours  finasteride 5 milliGRAM(s) Oral daily  fluticasone propionate/ salmeterol 100-50 MICROgram(s) Diskus 1 Dose(s) Inhalation two times a day  levothyroxine Injectable 55 MICROGram(s) IV Push at bedtime  metoprolol tartrate Injectable 5 milliGRAM(s) IV Push every 6 hours  mupirocin 2% Nasal 1 Application(s) Both Nostrils every 12 hours  pantoprazole  Injectable 40 milliGRAM(s) IV Push daily  polyethylene glycol 3350 17 Gram(s) Oral daily  povidone iodine 10% Nasal Swab 1 Application(s) Both Nostrils once  senna 2 Tablet(s) Oral at bedtime  tamsulosin 0.8 milliGRAM(s) Oral at bedtime  zinc oxide 20% Ointment 1 Application(s) Topical every 12 hours    IVF:  calcium carbonate   1250 mG (OsCal) 1 Tablet(s) Oral every 12 hours  cholecalciferol 2000 Unit(s) Oral daily  multiple electrolytes Injection Type 1 1000 milliLiter(s) IV Continuous <Continuous>    CULTURES:  Culture Results:   No growth at 24 hours (11-21 @ 17:30)    RADIOLOGY & ADDITIONAL TESTS:      ASSESSMENT:  69 YOM w/PMHx severe developmental delay (nonverbal at baseline) who was transferred from [facility] for epidural spinal abscess, now POD1 s/p T5-7 posterior laminectomy removal of phlegmon    PLAN:  -neuro checks q1h  -MRI thoracic postop pending  -pain control prn tylenol  -h/o epilepsy: continue valproate  -SBP goal   -Cont metoprolol 5mg IV q 6hrs for afib, cardizem 60mg q6h  -chest tube for loculated pleural effusion per CT surgery- to suction -20  -ADAT to pureed mildly thick liquid   -bowel regimen: miralax  -voiding, continue flomax and proscar  -DVT ppx: SCDs for now as patient is fresh postop  -ID following for MSSA bacteremia and T6-7 osteomyeltitis - continue Nafcillin  -continue synthroid for hypothyroidism  Final plan to be discussed in AM rounds with Dr. Love

## 2024-11-23 NOTE — DIETITIAN INITIAL EVALUATION ADULT - ORAL INTAKE PTA/DIET HISTORY
nutrition consult received for MST Score = />2, PI >Stage 2. Does not meet criteria for malnutrition at this time. Pt nonverbal at baseline. Pt under the care of [facility]. [facility] is the appropriate surrogate of this patient, at bedside. Good po intake in house and PTA. Unknown UBW or if any recent weight change. Last BM 11/23. NKFA. Nutrition/diet education not appropriate at this time. Will add ensure BID. RD to remain available.

## 2024-11-23 NOTE — DIETITIAN INITIAL EVALUATION ADULT - SIGNS/SYMPTOMS
as evidenced by POD#1 S/P T5-7 posterior laminectomy removal of phlegmon, stage II pressure injuries

## 2024-11-23 NOTE — SWALLOW BEDSIDE ASSESSMENT ADULT - SWALLOW EVAL: DIAGNOSIS
Oral and pharyngeal stages of swallow appear functional for baseline diet of pureed solids and mildly thick liquids, which is pt's baseline diet. No overt s/s aspiration noted at bedside

## 2024-11-23 NOTE — PROGRESS NOTE ADULT - ASSESSMENT
ASSESSMENT:  69M with PMH CAD (on ASA81), GERD, psoriasis, hypothyroidism, UTI, right eye cataract, intellectual disability, autism, epilepsy, HTN, anxiety, venous stasis dermatitis b/l LE, oropharyngeal dysphagia presented to [facility] on 10/28 from group home for fever, found to have pneumonia. Hospital course complicated by right sided loculated pleural effusion s/p thoracentesis (11/12), s/p right chest tube (placed on 11/20), TPA given via chest tube to break up loculation (11/21), +MSSA bacteremia, new onset afib with RVR (10/31). T6-T7 osteomyelitis and phlegmon on imaging s/p completion of multiple antibiotics with persistent fever and bacteremia. Patient transferred to [facility] on 11/21 for neurosurgical eval to evacuate T6-T7 spinal epidural phlegmon. Patient admitted to NSICU. Patient is under the care of OPWDD.      PLAN:    NEURO:  - Q4h neuro checks  - Valproate 250 q8 for epilepsy- therapeutic level  - Fluvoxamine 100 BID  - Olanzapine 20 HS  - Mobilize as tolerated  - MRI spine post op? Will need OPWDD consent    PULM:  - SpO2 goal > 92%  - Incentive spirometry  - Advair 100/50    CV:   # Right loculated pleural effusion s/p R chest tube (11/20), s/p TPA via chest tube (11/21)  # New onset Afib with RVR  - SBP goal   - TTE - EF 55-65%  - Diltiazem 60 q6h  - Metoprolol 50mg bid  - hold home norvasc  - CT surgery following for right sided chest tube- will keep chest tube to low wall suction (set at -20) x24h post-TPA, will follow-up additional recs    RENAL:  - Voiding   - Finasteride 5mg daily  - Flomax 0.8  - Monitor kidney function daily while on IV antibiotics   - Vitamin D3 2000U daily  - Calcium carbonate 1250 mg    GI:  - Diet: puree with mildly thick  - Bowel regimen:  hold senna, miralax  LBM 11/23- loose stools  concern for c Diff with long term abx    ENDO:   #Hypothyroidism   - Goal euglycemia (-180)  - Levothyroxine 75mcg    HEME/ONC:  - VTE prophylaxis: SCDs   - Baseline LED- negative  - Lovenox    - chronic anemia     ID:   #MSSA bacteremia  #T6-T7 phlegmon   - ID following, appreciate recs  - Continue Nafcillin 2g IV Q4h for MSSA bacteremia    - follow up blood cultures- neg since 11/16   - Mupirocin 2% BID x 5 days for +MSSA  - Monitor fever curve and white count    MISC:  - Ethics consulted for OPWDD  - Likely return to [place] ICU tomorrow

## 2024-11-24 VITALS
RESPIRATION RATE: 20 BRPM | HEART RATE: 82 BPM | DIASTOLIC BLOOD PRESSURE: 52 MMHG | OXYGEN SATURATION: 95 % | SYSTOLIC BLOOD PRESSURE: 104 MMHG

## 2024-11-24 LAB
ANION GAP SERPL CALC-SCNC: 9 MMOL/L — SIGNIFICANT CHANGE UP (ref 5–17)
BUN SERPL-MCNC: 9.7 MG/DL — SIGNIFICANT CHANGE UP (ref 8–20)
CALCIUM SERPL-MCNC: 7 MG/DL — LOW (ref 8.4–10.5)
CHLORIDE SERPL-SCNC: 105 MMOL/L — SIGNIFICANT CHANGE UP (ref 96–108)
CO2 SERPL-SCNC: 25 MMOL/L — SIGNIFICANT CHANGE UP (ref 22–29)
CREAT SERPL-MCNC: 0.66 MG/DL — SIGNIFICANT CHANGE UP (ref 0.5–1.3)
EGFR: 102 ML/MIN/1.73M2 — SIGNIFICANT CHANGE UP
GLUCOSE SERPL-MCNC: 94 MG/DL — SIGNIFICANT CHANGE UP (ref 70–99)
HCT VFR BLD CALC: 27.4 % — LOW (ref 39–50)
HGB BLD-MCNC: 8.9 G/DL — LOW (ref 13–17)
MAGNESIUM SERPL-MCNC: 2.1 MG/DL — SIGNIFICANT CHANGE UP (ref 1.6–2.6)
MCHC RBC-ENTMCNC: 31.1 PG — SIGNIFICANT CHANGE UP (ref 27–34)
MCHC RBC-ENTMCNC: 32.5 G/DL — SIGNIFICANT CHANGE UP (ref 32–36)
MCV RBC AUTO: 95.8 FL — SIGNIFICANT CHANGE UP (ref 80–100)
PHOSPHATE SERPL-MCNC: 2.7 MG/DL — SIGNIFICANT CHANGE UP (ref 2.4–4.7)
PLATELET # BLD AUTO: 381 K/UL — SIGNIFICANT CHANGE UP (ref 150–400)
POTASSIUM SERPL-MCNC: 4 MMOL/L — SIGNIFICANT CHANGE UP (ref 3.5–5.3)
POTASSIUM SERPL-SCNC: 4 MMOL/L — SIGNIFICANT CHANGE UP (ref 3.5–5.3)
RBC # BLD: 2.86 M/UL — LOW (ref 4.2–5.8)
RBC # FLD: 16 % — HIGH (ref 10.3–14.5)
SODIUM SERPL-SCNC: 138 MMOL/L — SIGNIFICANT CHANGE UP (ref 135–145)
WBC # BLD: 8.24 K/UL — SIGNIFICANT CHANGE UP (ref 3.8–10.5)
WBC # FLD AUTO: 8.24 K/UL — SIGNIFICANT CHANGE UP (ref 3.8–10.5)

## 2024-11-24 PROCEDURE — 83540 ASSAY OF IRON: CPT

## 2024-11-24 PROCEDURE — 82962 GLUCOSE BLOOD TEST: CPT

## 2024-11-24 PROCEDURE — 82607 VITAMIN B-12: CPT

## 2024-11-24 PROCEDURE — C1889: CPT

## 2024-11-24 PROCEDURE — 80165 DIPROPYLACETIC ACID FREE: CPT

## 2024-11-24 PROCEDURE — 83605 ASSAY OF LACTIC ACID: CPT

## 2024-11-24 PROCEDURE — 87040 BLOOD CULTURE FOR BACTERIA: CPT

## 2024-11-24 PROCEDURE — 82140 ASSAY OF AMMONIA: CPT

## 2024-11-24 PROCEDURE — 87205 SMEAR GRAM STAIN: CPT

## 2024-11-24 PROCEDURE — 82728 ASSAY OF FERRITIN: CPT

## 2024-11-24 PROCEDURE — 80053 COMPREHEN METABOLIC PANEL: CPT

## 2024-11-24 PROCEDURE — 81001 URINALYSIS AUTO W/SCOPE: CPT

## 2024-11-24 PROCEDURE — C9399: CPT

## 2024-11-24 PROCEDURE — 80164 ASSAY DIPROPYLACETIC ACD TOT: CPT

## 2024-11-24 PROCEDURE — 93970 EXTREMITY STUDY: CPT

## 2024-11-24 PROCEDURE — 71045 X-RAY EXAM CHEST 1 VIEW: CPT | Mod: 26,77

## 2024-11-24 PROCEDURE — 87640 STAPH A DNA AMP PROBE: CPT

## 2024-11-24 PROCEDURE — 83615 LACTATE (LD) (LDH) ENZYME: CPT

## 2024-11-24 PROCEDURE — 83550 IRON BINDING TEST: CPT

## 2024-11-24 PROCEDURE — 84145 PROCALCITONIN (PCT): CPT

## 2024-11-24 PROCEDURE — 71045 X-RAY EXAM CHEST 1 VIEW: CPT | Mod: 26

## 2024-11-24 PROCEDURE — 87075 CULTR BACTERIA EXCEPT BLOOD: CPT

## 2024-11-24 PROCEDURE — 87070 CULTURE OTHR SPECIMN AEROBIC: CPT

## 2024-11-24 PROCEDURE — 93005 ELECTROCARDIOGRAM TRACING: CPT

## 2024-11-24 PROCEDURE — 86901 BLOOD TYPING SEROLOGIC RH(D): CPT

## 2024-11-24 PROCEDURE — 99233 SBSQ HOSP IP/OBS HIGH 50: CPT

## 2024-11-24 PROCEDURE — 85730 THROMBOPLASTIN TIME PARTIAL: CPT

## 2024-11-24 PROCEDURE — 84100 ASSAY OF PHOSPHORUS: CPT

## 2024-11-24 PROCEDURE — 76000 FLUOROSCOPY <1 HR PHYS/QHP: CPT

## 2024-11-24 PROCEDURE — 83735 ASSAY OF MAGNESIUM: CPT

## 2024-11-24 PROCEDURE — 87641 MR-STAPH DNA AMP PROBE: CPT

## 2024-11-24 PROCEDURE — 85045 AUTOMATED RETICULOCYTE COUNT: CPT

## 2024-11-24 PROCEDURE — 86900 BLOOD TYPING SEROLOGIC ABO: CPT

## 2024-11-24 PROCEDURE — P9016: CPT

## 2024-11-24 PROCEDURE — 36415 COLL VENOUS BLD VENIPUNCTURE: CPT

## 2024-11-24 PROCEDURE — 82746 ASSAY OF FOLIC ACID SERUM: CPT

## 2024-11-24 PROCEDURE — 94640 AIRWAY INHALATION TREATMENT: CPT

## 2024-11-24 PROCEDURE — 84466 ASSAY OF TRANSFERRIN: CPT

## 2024-11-24 PROCEDURE — 80048 BASIC METABOLIC PNL TOTAL CA: CPT

## 2024-11-24 PROCEDURE — 99232 SBSQ HOSP IP/OBS MODERATE 35: CPT | Mod: FS

## 2024-11-24 PROCEDURE — 0225U NFCT DS DNA&RNA 21 SARSCOV2: CPT

## 2024-11-24 PROCEDURE — 74018 RADEX ABDOMEN 1 VIEW: CPT

## 2024-11-24 PROCEDURE — C8929: CPT

## 2024-11-24 PROCEDURE — 71045 X-RAY EXAM CHEST 1 VIEW: CPT

## 2024-11-24 PROCEDURE — 85027 COMPLETE CBC AUTOMATED: CPT

## 2024-11-24 PROCEDURE — 83010 ASSAY OF HAPTOGLOBIN QUANT: CPT

## 2024-11-24 PROCEDURE — 86923 COMPATIBILITY TEST ELECTRIC: CPT

## 2024-11-24 PROCEDURE — 85025 COMPLETE CBC W/AUTO DIFF WBC: CPT

## 2024-11-24 PROCEDURE — 36430 TRANSFUSION BLD/BLD COMPNT: CPT

## 2024-11-24 PROCEDURE — 86850 RBC ANTIBODY SCREEN: CPT

## 2024-11-24 PROCEDURE — 85610 PROTHROMBIN TIME: CPT

## 2024-11-24 RX ORDER — FLUTICASONE PROPIONATE AND SALMETEROL XINAFOATE 45; 21 UG/1; UG/1
1 AEROSOL, METERED RESPIRATORY (INHALATION)
Qty: 1 | Refills: 0
Start: 2024-11-24

## 2024-11-24 RX ORDER — FLUTICASONE PROPIONATE AND SALMETEROL XINAFOATE 45; 21 UG/1; UG/1
1 AEROSOL, METERED RESPIRATORY (INHALATION)
Qty: 0 | Refills: 0 | DISCHARGE
Start: 2024-11-24

## 2024-11-24 RX ORDER — VALPROIC ACID 250 MG/5ML
2.5 SOLUTION ORAL
Qty: 0 | Refills: 0 | DISCHARGE
Start: 2024-11-24

## 2024-11-24 RX ORDER — SODIUM,POTASSIUM PHOSPHATES 278-250MG
1 POWDER IN PACKET (EA) ORAL ONCE
Refills: 0 | Status: COMPLETED | OUTPATIENT
Start: 2024-11-24 | End: 2024-11-24

## 2024-11-24 RX ORDER — ACETAMINOPHEN 500MG 500 MG/1
2 TABLET, COATED ORAL
Qty: 0 | Refills: 0 | DISCHARGE
Start: 2024-11-24

## 2024-11-24 RX ORDER — NAFCILLIN 10 G/100ML
2 INJECTION, POWDER, FOR SOLUTION INTRAVENOUS
Qty: 0 | Refills: 0 | DISCHARGE
Start: 2024-11-24

## 2024-11-24 RX ORDER — CHLORHEXIDINE GLUCONATE 1.2 MG/ML
1 RINSE ORAL
Qty: 2 | Refills: 0
Start: 2024-11-24

## 2024-11-24 RX ORDER — ZINC OXIDE/BENZETHONIUM CHLOR
1 OINTMENT (GRAM) TOPICAL
Qty: 0 | Refills: 0 | DISCHARGE
Start: 2024-11-24

## 2024-11-24 RX ORDER — PANTOPRAZOLE SODIUM 40 MG/1
40 TABLET, DELAYED RELEASE ORAL
Qty: 0 | Refills: 0 | DISCHARGE
Start: 2024-11-24

## 2024-11-24 RX ORDER — OLANZAPINE 20 MG/1
10 TABLET ORAL
Qty: 0 | Refills: 0 | DISCHARGE
Start: 2024-11-24

## 2024-11-24 RX ORDER — CHOLECALCIFEROL (VITAMIN D3) 10MCG/0.25
2000 DROPS ORAL
Qty: 0 | Refills: 0 | DISCHARGE
Start: 2024-11-24

## 2024-11-24 RX ORDER — CHLORHEXIDINE GLUCONATE 1.2 MG/ML
0 RINSE ORAL
Qty: 0 | Refills: 0 | DISCHARGE
Start: 2024-11-24

## 2024-11-24 RX ORDER — ACETAMINOPHEN 500MG 500 MG/1
1000 TABLET, COATED ORAL ONCE
Refills: 0 | Status: COMPLETED | OUTPATIENT
Start: 2024-11-24 | End: 2024-11-24

## 2024-11-24 RX ORDER — DILTIAZEM HYDROCHLORIDE 240 MG/1
1 CAPSULE, COATED, EXTENDED RELEASE ORAL
Qty: 0 | Refills: 0 | DISCHARGE
Start: 2024-11-24

## 2024-11-24 RX ORDER — NAFCILLIN 10 G/100ML
2 INJECTION, POWDER, FOR SOLUTION INTRAVENOUS
Qty: 1 | Refills: 0
Start: 2024-11-24

## 2024-11-24 RX ADMIN — NAFCILLIN 200 GRAM(S): 10 INJECTION, POWDER, FOR SOLUTION INTRAVENOUS at 13:14

## 2024-11-24 RX ADMIN — Medication 75 MICROGRAM(S): at 06:27

## 2024-11-24 RX ADMIN — VALPROIC ACID 52.5 MILLIGRAM(S): 250 SOLUTION ORAL at 05:22

## 2024-11-24 RX ADMIN — TAMSULOSIN HYDROCHLORIDE 0.8 MILLIGRAM(S): 0.4 CAPSULE ORAL at 21:18

## 2024-11-24 RX ADMIN — METOPROLOL TARTRATE 50 MILLIGRAM(S): 100 TABLET, FILM COATED ORAL at 05:21

## 2024-11-24 RX ADMIN — Medication 1 APPLICATION(S): at 17:11

## 2024-11-24 RX ADMIN — NAFCILLIN 200 GRAM(S): 10 INJECTION, POWDER, FOR SOLUTION INTRAVENOUS at 05:23

## 2024-11-24 RX ADMIN — VALPROIC ACID 52.5 MILLIGRAM(S): 250 SOLUTION ORAL at 21:17

## 2024-11-24 RX ADMIN — DILTIAZEM HYDROCHLORIDE 60 MILLIGRAM(S): 240 CAPSULE, COATED, EXTENDED RELEASE ORAL at 00:59

## 2024-11-24 RX ADMIN — OLANZAPINE 10 MILLIGRAM(S): 20 TABLET ORAL at 21:17

## 2024-11-24 RX ADMIN — PANTOPRAZOLE SODIUM 40 MILLIGRAM(S): 40 TABLET, DELAYED RELEASE ORAL at 11:10

## 2024-11-24 RX ADMIN — Medication 100 MILLIGRAM(S): at 17:12

## 2024-11-24 RX ADMIN — NAFCILLIN 200 GRAM(S): 10 INJECTION, POWDER, FOR SOLUTION INTRAVENOUS at 17:11

## 2024-11-24 RX ADMIN — OLANZAPINE 10 MILLIGRAM(S): 20 TABLET ORAL at 08:13

## 2024-11-24 RX ADMIN — Medication 1 TABLET(S): at 05:21

## 2024-11-24 RX ADMIN — CHLORHEXIDINE GLUCONATE 1 APPLICATION(S): 1.2 RINSE ORAL at 05:22

## 2024-11-24 RX ADMIN — ENOXAPARIN SODIUM 40 MILLIGRAM(S): 30 INJECTION SUBCUTANEOUS at 12:34

## 2024-11-24 RX ADMIN — NAFCILLIN 200 GRAM(S): 10 INJECTION, POWDER, FOR SOLUTION INTRAVENOUS at 21:18

## 2024-11-24 RX ADMIN — VALPROIC ACID 52.5 MILLIGRAM(S): 250 SOLUTION ORAL at 13:27

## 2024-11-24 RX ADMIN — Medication 1 PACKET(S): at 06:27

## 2024-11-24 RX ADMIN — Medication 1 TABLET(S): at 18:32

## 2024-11-24 RX ADMIN — Medication 100 MILLIGRAM(S): at 05:21

## 2024-11-24 RX ADMIN — FLUTICASONE PROPIONATE AND SALMETEROL XINAFOATE 1 DOSE(S): 45; 21 AEROSOL, METERED RESPIRATORY (INHALATION) at 09:03

## 2024-11-24 RX ADMIN — CLONAZEPAM 0.5 MILLIGRAM(S): 0.12 TABLET, ORALLY DISINTEGRATING ORAL at 05:20

## 2024-11-24 RX ADMIN — Medication 1 APPLICATION(S): at 05:21

## 2024-11-24 RX ADMIN — CLONAZEPAM 0.5 MILLIGRAM(S): 0.12 TABLET, ORALLY DISINTEGRATING ORAL at 17:12

## 2024-11-24 RX ADMIN — Medication 2000 UNIT(S): at 11:10

## 2024-11-24 RX ADMIN — DILTIAZEM HYDROCHLORIDE 60 MILLIGRAM(S): 240 CAPSULE, COATED, EXTENDED RELEASE ORAL at 05:20

## 2024-11-24 RX ADMIN — NAFCILLIN 200 GRAM(S): 10 INJECTION, POWDER, FOR SOLUTION INTRAVENOUS at 10:14

## 2024-11-24 RX ADMIN — METOPROLOL TARTRATE 50 MILLIGRAM(S): 100 TABLET, FILM COATED ORAL at 17:12

## 2024-11-24 RX ADMIN — DILTIAZEM HYDROCHLORIDE 60 MILLIGRAM(S): 240 CAPSULE, COATED, EXTENDED RELEASE ORAL at 11:10

## 2024-11-24 RX ADMIN — NAFCILLIN 200 GRAM(S): 10 INJECTION, POWDER, FOR SOLUTION INTRAVENOUS at 02:45

## 2024-11-24 RX ADMIN — Medication 5 MILLIGRAM(S): at 11:24

## 2024-11-24 RX ADMIN — DILTIAZEM HYDROCHLORIDE 60 MILLIGRAM(S): 240 CAPSULE, COATED, EXTENDED RELEASE ORAL at 17:11

## 2024-11-24 NOTE — DISCHARGE NOTE NURSING/CASE MANAGEMENT/SOCIAL WORK - PATIENT PORTAL LINK FT
You can access the FollowMyHealth Patient Portal offered by [facility] by registering at the following website: [email or web address]. By joining [employer]’s FollowMyHealth portal, you will also be able to view your health information using other applications (apps) compatible with our system.

## 2024-11-24 NOTE — PROGRESS NOTE ADULT - SUBJECTIVE AND OBJECTIVE BOX
Subjective: Awake  Non verbal      V/S  T(C): 37.7 (11-24-24 @ 12:00), Max: 38.8 (11-23-24 @ 20:00)  HR: 92 (11-24-24 @ 14:00) (70 - 117)  BP: 107/60 (11-24-24 @ 14:00) (91/59 - 159/81)  RR: 18 (11-24-24 @ 14:00) (12 - 26)  SpO2: 95% (11-24-24 @ 14:00) (91% - 100%)    CHEST TUBES:    Right CT   [  ]LWS  [ x ]H2O seal    I&O's Detail    23 Nov 2024 07:01  -  24 Nov 2024 07:00  --------------------------------------------------------  IN:    IV PiggyBack: 100 mL    IV PiggyBack: 150 mL    IV PiggyBack: 500 mL    multiple electrolytes Injection Type 1.: 225 mL    Oral Fluid: 940 mL  Total IN: 1915 mL    OUT:    Bulb (mL): 100 mL    Chest Tube (mL): 0 mL    Voided (mL): 1400 mL  Total OUT: 1500 mL    Total NET: 415 mL      24 Nov 2024 07:01  -  24 Nov 2024 14:49  --------------------------------------------------------  IN:    IV PiggyBack: 55 mL    IV PiggyBack: 200 mL  Total IN: 255 mL    OUT:    Bulb (mL): 10 mL    Voided (mL): 100 mL  Total OUT: 110 mL    Total NET: 145 mL          11-23-24 @ 07:01  -  11-24-24 @ 07:00  --------------------------------------------------------  IN: 1915 mL / OUT: 1500 mL / NET: 415 mL    11-24-24 @ 07:01  -  11-24-24 @ 14:49  --------------------------------------------------------  IN: 255 mL / OUT: 110 mL / NET: 145 mL      MEDICATIONS  (STANDING):  calcium carbonate   1250 mG (OsCal) 1 Tablet(s) Oral every 12 hours  chlorhexidine 2% Cloths 1 Application(s) Topical <User Schedule>  cholecalciferol 2000 Unit(s) Oral daily  clonazePAM  Tablet 0.5 milliGRAM(s) Oral two times a day  diltiazem    Tablet 60 milliGRAM(s) Oral every 6 hours  enoxaparin Injectable 40 milliGRAM(s) SubCutaneous every 24 hours  finasteride 5 milliGRAM(s) Oral daily  fluticasone propionate/ salmeterol 100-50 MICROgram(s) Diskus 1 Dose(s) Inhalation two times a day  fluvoxaMINE 100 milliGRAM(s) Oral <User Schedule>  levothyroxine 75 MICROGram(s) Oral daily  metoprolol tartrate 50 milliGRAM(s) Oral two times a day  mupirocin 2% Nasal 1 Application(s) Both Nostrils every 12 hours  nafcillin  IVPB 2 Gram(s) IV Intermittent every 4 hours  OLANZapine Injectable 10 milliGRAM(s) IntraMuscular every 12 hours  pantoprazole  Injectable 40 milliGRAM(s) IV Push daily  povidone iodine 10% Nasal Swab 1 Application(s) Both Nostrils once  tamsulosin 0.8 milliGRAM(s) Oral at bedtime  valproate sodium   IVPB 250 milliGRAM(s) IV Intermittent every 8 hours  zinc oxide 20% Ointment 1 Application(s) Topical every 12 hours      11-24    138  |  105  |  9.7  ----------------------------<  94  4.0   |  25.0  |  0.66    Ca    7.0[L]      24 Nov 2024 03:43  Phos  2.7     11-24  Mg     2.1     11-24                                 8.9    8.24  )-----------( 381      ( 24 Nov 2024 03:43 )             27.4               Physical Exam:    GENERAL: NAD non verbal, moaning  CHEST/LUNG: CTA  b/l,  no rales, rhonchi, wheezing, or rubs  Right pigtail> removed  HEART: Tachycardia ; No murmurs, rubs, or gallops  ABDOMEN: Soft, Nontender, Nondistended; Bowel sounds present  EXTREMITIES:  2+ Peripheral Pulses, No clubbing, cyanosis, or edema ,    rodriguez katiuska urine          PAST MEDICAL & SURGICAL HISTORY:  MR (mental retardation), severe      Seizure      HTN (hypertension)      Psoriasis      DD (diastrophic dysplasia)      Blind  right eye      Constipation, unspecified constipation type      No significant past surgical history

## 2024-11-24 NOTE — PROGRESS NOTE ADULT - ASSESSMENT
69 M PMH intellectual disability (non verbal), HTN, epilepsy, venous stasis dermatitis, dysphagia, arthritis, hypothyroidism, who initially presented to [facility] on Oct 28th for fevers.     Fevers   MSSA bacteremia (+ blood cultures Oct 28th and 30th, cleared Nov 1st)  Multiple blood cultures between Nov 1st and 11th were negative  FELIX Nov 1st negative for vegetations  S/p multiple IV antibiotics (including Nafcillin later on in his hospital course)  Course c/b loculated R sided pleural effusion  S/p thoracentesis ?Nov 9th (culture was negative), and s/p chest tube Nov 20th (exudative, no evidence of empyema, culture negative to date)  Course further c/b recurrence of MSSA bacteremia  Positive blood cultures on Nov 16th, cleared on Nov 18th  Due to recurrence of MSSA bacteremia patient underwent MRI spine on Nov 19th  MRI showed T6-7 discitis/OM as well as well 8cm epidural phlegmon T5-T7  Transferred to [facility] now s/p Posterior thoracic laminectomy 11/22/24 - found to have epidural phlegmon at thoracic spinal levels T5-7  Febrile post op    Plan:  Continue Nafcillin 2g IV Q4h   f/u BCX x 2 11/21 are ngtd  TTE Nov 21st no vegetations mentioned   Thoracic Surgery following given recent chest tube placement s/p TPA on 11/22  f/u pleural fluid CX 11/20/24 from [place] are NGTD  f/u OR Cx from 11/22 are ngtd  Monitor fever curve and white count  Monitor kidney function daily while on IV antibiotics       ID will f/u

## 2024-11-24 NOTE — DISCHARGE NOTE PROVIDER - NSDCFUSCHEDAPPT_GEN_ALL_CORE_FT
[facility] Physician [facility]  OPHTHALM [id number]  Scheduled Appointment: 12/20/2024

## 2024-11-24 NOTE — PROGRESS NOTE ADULT - SUBJECTIVE AND OBJECTIVE BOX
HPI:  69-year-old male with PMH CAD (on ASA81), GERD, psoriasis, hypothyroidism, UTI, right eye cataract, intellectual disability, autism, epilepsy (on Divalproex), HTN, anxiety, venous stasis dermatitis b/l LE, oropharyngeal dysphagia presented to [facility] on 10/28 from group home for fever, found to have pneumonia. Patient's hospital course was complicated by anemia, right sided loculated pleural effusion s/p thoracentesis (11/12) with 500 ml fluid removed, now s/p right chest tube (placed on 11/20) and TPA given via chest tube to break up loculation (11/21), MSSA bacteremia, new onset afib with RVR (10/31). T6-T7 osteomyelitis and phlegmon. ID following at prior hospital and patient had completed  Ancef, Vancomycin, Invanz, Azactam and Flagyl, Cefepime, however patient still remained persistently febrile and bacteremic. Patient was then transferred to [facility] on 11/21 for neurosurgical evaluation to consider laminectomy for evacuation of T6-T7 spinal epidural phlegmon and will be admitted to the NSICU under Dr. Love. Per chart patient is nonverbal, wheelchair bound at baseline and is under the care of OPWDD for developmental delay, Ethics team has been consulted to follow.  (21 Nov 2024 18:10)    OVERNIGHT EVENTS:  ZEE overnight. Neuro exam stable.    Vital Signs Last 24 Hrs  T(C): 37.2 (24 Nov 2024 00:00), Max: 38.8 (23 Nov 2024 20:00)  T(F): 99 (24 Nov 2024 00:00), Max: 101.8 (23 Nov 2024 20:00)  HR: 70 (24 Nov 2024 00:00) (70 - 117)  BP: 125/56 (24 Nov 2024 00:00) (91/59 - 159/81)  BP(mean): 76 (24 Nov 2024 00:00) (69 - 115)  RR: 13 (24 Nov 2024 00:00) (12 - 26)  SpO2: 99% (24 Nov 2024 00:00) (91% - 100%)    Parameters below as of 24 Nov 2024 00:00  Patient On (Oxygen Delivery Method): nasal cannula  O2 Flow (L/min): 2      I&O's Summary    22 Nov 2024 07:01  -  23 Nov 2024 07:00  --------------------------------------------------------  IN: 2325 mL / OUT: 1090 mL / NET: 1235 mL    23 Nov 2024 07:01  -  24 Nov 2024 01:43  --------------------------------------------------------  IN: 1515 mL / OUT: 470 mL / NET: 1045 mL      PHYSICAL EXAM:  GENERAL: NAD  HEAD: Atraumatic, normocephalic  MENTAL STATUS: Nonverbal (baseline), awake; opens eyes spontaneously, does not follow commands  CRANIAL NERVES: Pupils L 3mm reactive. 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 EOMI. Face grossly symmetric.   MOTOR: B/l UE antigravity moving spontaneously. B/l LE contracted (baseline) but withdraws to noxious.   CHEST/LUNG: Non-labored breathing  ABDOMEN: Soft, nontender, nondistended  SKIN: RLQ abdominal ecchymosis, venous stasis dermatitis on b/l LE  WOUND/DRAINS: Posterior thoracic incision site C/D/I, MANOLO drain in place to full suction draining serosanguinous fluid  Right sided pigtail catheter    LABS:                        8.9    7.83  )-----------( 380      ( 23 Nov 2024 04:55 )             27.6     11-23    136  |  103  |  12.6  ----------------------------<  88  3.9   |  24.0  |  0.57    Ca    7.1[L]      23 Nov 2024 04:55  Phos  3.4     11-23  Mg     2.0     11-23      PT/INR - ( 22 Nov 2024 02:45 )   PT: 13.2 sec;   INR: 1.17 ratio         PTT - ( 22 Nov 2024 02:45 )  PTT:31.0 sec  Urinalysis Basic - ( 23 Nov 2024 04:55 )    Color: x / Appearance: x / SG: x / pH: x  Gluc: 88 mg/dL / Ketone: x  / Bili: x / Urobili: x   Blood: x / Protein: x / Nitrite: x   Leuk Esterase: x / RBC: x / WBC x   Sq Epi: x / Non Sq Epi: x / Bacteria: x          CAPILLARY BLOOD GLUCOSE          Drug Levels: [] N/A  Valproic Acid Level, Serum: 26.3 ug/mL (11-22 @ 11:05)    CSF Analysis: [] N/A      Allergies    Augmentin (Unknown)  sulfa drugs (Unknown)  penicillin (Unknown)  aztreonam (Rash)    Intolerances      MEDICATIONS:  Antibiotics:  nafcillin  IVPB 2 Gram(s) IV Intermittent every 4 hours    Neuro:  acetaminophen     Tablet .. 650 milliGRAM(s) Oral every 6 hours PRN  clonazePAM  Tablet 0.5 milliGRAM(s) Oral two times a day  fluvoxaMINE 100 milliGRAM(s) Oral <User Schedule>  OLANZapine Injectable 10 milliGRAM(s) IntraMuscular every 12 hours  valproate sodium   IVPB 250 milliGRAM(s) IV Intermittent every 8 hours    Anticoagulation:  enoxaparin Injectable 40 milliGRAM(s) SubCutaneous every 24 hours    OTHER:  chlorhexidine 2% Cloths 1 Application(s) Topical <User Schedule>  chlorhexidine 4% Liquid 1 Application(s) Topical <User Schedule>  diltiazem    Tablet 60 milliGRAM(s) Oral every 6 hours  finasteride 5 milliGRAM(s) Oral daily  fluticasone propionate/ salmeterol 100-50 MICROgram(s) Diskus 1 Dose(s) Inhalation two times a day  levothyroxine 75 MICROGram(s) Oral daily  metoprolol tartrate 50 milliGRAM(s) Oral two times a day  mupirocin 2% Nasal 1 Application(s) Both Nostrils every 12 hours  pantoprazole  Injectable 40 milliGRAM(s) IV Push daily  povidone iodine 10% Nasal Swab 1 Application(s) Both Nostrils once  tamsulosin 0.8 milliGRAM(s) Oral at bedtime  zinc oxide 20% Ointment 1 Application(s) Topical every 12 hours    IVF:  calcium carbonate   1250 mG (OsCal) 1 Tablet(s) Oral every 12 hours  cholecalciferol 2000 Unit(s) Oral daily    CULTURES:  Culture Results:   No growth at 24 hours (11-21 @ 17:35)  Culture Results:   No growth at 48 Hours (11-21 @ 17:30)    RADIOLOGY & ADDITIONAL TESTS:      ASSESSMENT:  69y Male s/p    Extradural or subdural abscess    No pertinent family history in first degree relatives    No pertinent family history in first degree relatives    Handoff    MEWS Score    MR (mental retardation), severe    Seizure    HTN (hypertension)    Psoriasis    DD (diastrophic dysplasia)    Blind    Constipation, unspecified constipation type    Spinal epidural abscess    Spinal epidural abscess    Pleural effusion    Posterior thoracic laminectomy    No significant past surgical history    EXTRADURAL AND SUBDURAL ABSCES    [id number]        PLAN:  NEURO:    CARDIOVASCULAR:    PULMONARY:    RENAL:    GI:    HEME:    ID:    ENDO:    DVT PROPHYLAXIS:  [] Venodynes                                [] Heparin/Lovenox    DISPOSITION:    Assessment:  Present when checked    []  GCS  E   V  M     Heart Failure: []Acute, [] acute on chronic , []chronic  Heart Failure:  [] Diastolic (HFpEF), [] Systolic (HFrEF), []Combined (HFpEF and HFrEF), [] RHF, [] Pulm HTN, [] Other    [] YOLANDA, [] ATN, [] AIN, [] other  [] CKD1, [] CKD2, [] CKD 3, [] CKD 4, [] CKD 5, []ESRD    Encephalopathy: [] Metabolic, [] Hepatic, [] toxic, [] Neurological, [] Other    Abnormal Nurtitional Status: [] malnurtition (see nutrition note), [ ]underweight: BMI < 19, [] morbid obesity: BMI >40, [] Cachexia    [] Sepsis  [] hypovolemic shock,[] cardiogenic shock, [] hemorrhagic shock, [] neuogenic shock  [] Acute Respiratory Failure  []Cerebral edema, [] Brain compression/ herniation,   [] Functional quadriplegia  [] Acute blood loss anemia HPI:  69-year-old male with PMH CAD (on ASA81), GERD, psoriasis, hypothyroidism, UTI, right eye cataract, intellectual disability, autism, epilepsy (on Divalproex), HTN, anxiety, venous stasis dermatitis b/l LE, oropharyngeal dysphagia presented to [facility] on 10/28 from group home for fever, found to have pneumonia. Patient's hospital course was complicated by anemia, right sided loculated pleural effusion s/p thoracentesis (11/12) with 500 ml fluid removed, now s/p right chest tube (placed on 11/20) and TPA given via chest tube to break up loculation (11/21), MSSA bacteremia, new onset afib with RVR (10/31). T6-T7 osteomyelitis and phlegmon. ID following at prior hospital and patient had completed  Ancef, Vancomycin, Invanz, Azactam and Flagyl, Cefepime, however patient still remained persistently febrile and bacteremic. Patient was then transferred to [facility] on 11/21 for neurosurgical evaluation to consider laminectomy for evacuation of T6-T7 spinal epidural phlegmon and will be admitted to the NSICU under Dr. Love. Per chart patient is nonverbal, wheelchair bound at baseline and is under the care of OPWDD for developmental delay, Ethics team has been consulted to follow.  (21 Nov 2024 18:10)    OVERNIGHT EVENTS:  ZEE overnight. Neuro exam stable.    Vital Signs Last 24 Hrs  T(C): 37.2 (24 Nov 2024 00:00), Max: 38.8 (23 Nov 2024 20:00)  T(F): 99 (24 Nov 2024 00:00), Max: 101.8 (23 Nov 2024 20:00)  HR: 70 (24 Nov 2024 00:00) (70 - 117)  BP: 125/56 (24 Nov 2024 00:00) (91/59 - 159/81)  BP(mean): 76 (24 Nov 2024 00:00) (69 - 115)  RR: 13 (24 Nov 2024 00:00) (12 - 26)  SpO2: 99% (24 Nov 2024 00:00) (91% - 100%)    Parameters below as of 24 Nov 2024 00:00  Patient On (Oxygen Delivery Method): nasal cannula  O2 Flow (L/min): 2      I&O's Summary    22 Nov 2024 07:01  -  23 Nov 2024 07:00  --------------------------------------------------------  IN: 2325 mL / OUT: 1090 mL / NET: 1235 mL    23 Nov 2024 07:01  -  24 Nov 2024 01:43  --------------------------------------------------------  IN: 1515 mL / OUT: 470 mL / NET: 1045 mL      PHYSICAL EXAM:  GENERAL: NAD  HEAD: Atraumatic, normocephalic  MENTAL STATUS: Nonverbal (baseline), awake; opens eyes spontaneously, does not follow commands  CRANIAL NERVES: Pupils L 3mm reactive. 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 EOMI. Face grossly symmetric.   MOTOR: B/l UE antigravity moving spontaneously. B/l LE contracted (baseline) but withdraws to noxious.   CHEST/LUNG: Non-labored breathing  ABDOMEN: Soft, nontender, nondistended  SKIN: RLQ abdominal ecchymosis, venous stasis dermatitis on b/l LE  WOUND/DRAINS: Posterior thoracic incision site C/D/I, MANOLO drain in place to full suction draining serosanguinous fluid  Right sided pigtail catheter    LABS:                        8.9    7.83  )-----------( 380      ( 23 Nov 2024 04:55 )             27.6     11-23    136  |  103  |  12.6  ----------------------------<  88  3.9   |  24.0  |  0.57    Ca    7.1[L]      23 Nov 2024 04:55  Phos  3.4     11-23  Mg     2.0     11-23      PT/INR - ( 22 Nov 2024 02:45 )   PT: 13.2 sec;   INR: 1.17 ratio         PTT - ( 22 Nov 2024 02:45 )  PTT:31.0 sec  Urinalysis Basic - ( 23 Nov 2024 04:55 )    Color: x / Appearance: x / SG: x / pH: x  Gluc: 88 mg/dL / Ketone: x  / Bili: x / Urobili: x   Blood: x / Protein: x / Nitrite: x   Leuk Esterase: x / RBC: x / WBC x   Sq Epi: x / Non Sq Epi: x / Bacteria: x          CAPILLARY BLOOD GLUCOSE          Drug Levels: [] N/A  Valproic Acid Level, Serum: 26.3 ug/mL (11-22 @ 11:05)    CSF Analysis: [] N/A      Allergies    Augmentin (Unknown)  sulfa drugs (Unknown)  penicillin (Unknown)  aztreonam (Rash)    Intolerances      MEDICATIONS:  Antibiotics:  nafcillin  IVPB 2 Gram(s) IV Intermittent every 4 hours    Neuro:  acetaminophen     Tablet .. 650 milliGRAM(s) Oral every 6 hours PRN  clonazePAM  Tablet 0.5 milliGRAM(s) Oral two times a day  fluvoxaMINE 100 milliGRAM(s) Oral <User Schedule>  OLANZapine Injectable 10 milliGRAM(s) IntraMuscular every 12 hours  valproate sodium   IVPB 250 milliGRAM(s) IV Intermittent every 8 hours    Anticoagulation:  enoxaparin Injectable 40 milliGRAM(s) SubCutaneous every 24 hours    OTHER:  chlorhexidine 2% Cloths 1 Application(s) Topical <User Schedule>  chlorhexidine 4% Liquid 1 Application(s) Topical <User Schedule>  diltiazem    Tablet 60 milliGRAM(s) Oral every 6 hours  finasteride 5 milliGRAM(s) Oral daily  fluticasone propionate/ salmeterol 100-50 MICROgram(s) Diskus 1 Dose(s) Inhalation two times a day  levothyroxine 75 MICROGram(s) Oral daily  metoprolol tartrate 50 milliGRAM(s) Oral two times a day  mupirocin 2% Nasal 1 Application(s) Both Nostrils every 12 hours  pantoprazole  Injectable 40 milliGRAM(s) IV Push daily  povidone iodine 10% Nasal Swab 1 Application(s) Both Nostrils once  tamsulosin 0.8 milliGRAM(s) Oral at bedtime  zinc oxide 20% Ointment 1 Application(s) Topical every 12 hours    IVF:  calcium carbonate   1250 mG (OsCal) 1 Tablet(s) Oral every 12 hours  cholecalciferol 2000 Unit(s) Oral daily    CULTURES:  Culture Results:   No growth at 24 hours (11-21 @ 17:35)  Culture Results:   No growth at 48 Hours (11-21 @ 17:30)    RADIOLOGY & ADDITIONAL TESTS:      ASSESSMENT:  69 YOM w/PMHx severe developmental delay (nonverbal at baseline) who was transferred from [facility] for epidural spinal abscess, now POD1 s/p T5-7 posterior laminectomy removal of phlegmon    PLAN:  -neuro checks q1h  -pain control prn tylenol  -h/o epilepsy: continue valproate  -SBP goal   -Cont metoprolol 50mg PO bid for afib, cardizem 60mg q6h  -chest tube for loculated pleural effusion per CT surgery- to suction -20  -diet: pureed mildly thick liquid   -bowel regimen: miralax  -voiding, continue flomax and proscar  -DVT ppx: SCDs, SQL  -ID following for MSSA bacteremia and T6-7 osteomyeltitis - continue Nafcillin, f/u OR cultures from 11/22  -continue synthroid for hypothyroidism  Final plan to be discussed in AM rounds

## 2024-11-24 NOTE — PROGRESS NOTE ADULT - ASSESSMENT
68 YO M w/PMHx severe developmental delay (nonverbal at baseline) who was transferred from [place] for epidural spinal abscess who had pigtail placed at OSH for empyema> Thoracic surgery following for tube management  11/22 TPA instilled pigtail for suspected atelectasis ,loculated collection (drained 500 ml @ OSH)  11/23 maintain R pigtail suction  11/24 R  pigtail placed waterseal  Pigtail removed at request of neuro team to facilitate transfer to [facility] for further care Premature infant, [id number] gm

## 2024-11-24 NOTE — DISCHARGE NOTE PROVIDER - NSDCCPCAREPLAN_GEN_ALL_CORE_FT
PRINCIPAL DISCHARGE DIAGNOSIS  Diagnosis: OM (osteomyelitis)  Assessment and Plan of Treatment: Patient admitted to [facility] on 10/28/24 with extensive hospital course, patient noted to have T6-T7 osteo refractory to medical management and was transferred to [facility] only for neurosurgical intervention.  Patient is POD#2 from T5-T7 posterior lami for phlegmon removal on 11/22/24.  POD#1 chemical DVT ppx was started and patient liberalized to q4 general neuro checks.  POD#2 patient downgraded to tele level of care, post-op MANOLO drain removed without issue. NSICU/NSx teams discussed with Dr. Love from a neurosurgery perspective patient is cleared to resume Full Dose AC for recent afib dx at [facility] [place] on 10/31/24 on Post-OP Day 5.  Remainder of care per primary [facility]/ Medicine team.  NSICU team has updated the transfer record to reflect this -- [facility] team awaiting callback from transfer center/[facility] regarding acceptance back to original admitting facility.

## 2024-11-24 NOTE — CHART NOTE - NSCHARTNOTEFT_GEN_A_CORE
70yo M with PMH of MR, seizure, HTN, R eye blind ness, Psoriasis DD, and constipation admitted to [facility] from his group home, s/p R pigtail chest tube placement and intrapleural TPA/Dornase for fibrinolysis x 1 dose on 11/21. Transferred to [facility] for NSX intervention for spinal abscess.    Attempted to see patient but currently in OR  CXR improving  Maintain chest tube to -20 suction  Will follow  Daily CXR  D/W Dr. Mcconnell
Patient placed in left lateral decubitus positioning with assistance. MANOLO drain exit site identified and prepped with Chlorhexidine. Anchoring sutures cut. MANOLO drain removed from suction. Drain removed slowly without resistance, catheter intact upon removal. Exit site covered with gauze and tegaderm. Patient tolerated well, no complications.

## 2024-11-24 NOTE — DISCHARGE NOTE PROVIDER - NSDCMRMEDTOKEN_GEN_ALL_CORE_FT
acetaminophen 500 mg oral tablet: 2 tab(s) orally every 8 hours  amLODIPine 2.5 mg oral tablet: 1 tab(s) orally once a day  aspirin 81 mg oral tablet: 1 tab(s) orally once a day  calcipotriene 0.005% topical cream: Apply topically to affected area 2 times a day  clonazePAM 1 mg oral tablet: 0.5 tab(s) orally 2 times a day  Depakote  mg oral tablet, extended release: 1 tab(s) orally 3 times a day  fluvoxaMINE 100 mg oral tablet: 1 tab(s) orally 2 times a day  ketoconazole 2% topical shampoo: Apply topically to affected area two times a week  lactulose 10 g/15 mL oral solution: 30 milliliter(s) orally once a day  levothyroxine 75 mcg (0.075 mg) oral tablet: 1 tab(s) orally once a day  Lovenox 40 mg/0.4 mL injectable solution: 1 application injectable once a day  metoprolol tartrate 50 mg oral tablet: 1 tab(s) orally 2 times a day  multivitamin with minerals: 1 tab(s) orally once a day  OLANZapine 15 mg oral tablet: 1 tab(s) orally once a day (at bedtime)  omeprazole 20 mg oral delayed release capsule: 1 cap(s) orally once a day  oxyCODONE 5 mg oral tablet: 1 tab(s) orally every 6 to 8 hours, As Needed - 6)  Oyster Betito 1250 mg (500 mg elemental calcium) oral tablet: 1 tab(s) orally 3 times a day  petrolatum topical ointment: Apply topically to affected area once a day  tamsulosin 0.4 mg oral capsule: 2 cap(s) orally once a day (at bedtime)  tolnaftate 1% topical cream: Apply topically to affected area once a day   acetaminophen 500 mg oral tablet: 2 tab(s) orally every 8 hours  aspirin 81 mg oral tablet: 1 tab(s) orally once a day  chlorhexidine 2% topical pad: 1 Apply topically to affected area daily per protocol  cholecalciferol oral tablet: 2000 unit(s) orally once a day  clonazePAM 1 mg oral tablet: 0.5 tab(s) orally 2 times a day  dilTIAZem 60 mg oral tablet: 1 tab(s) orally every 6 hours  finasteride 5 mg oral tablet: 1 tab(s) orally once a day  fluvoxaMINE 100 mg oral tablet: 1 tab(s) orally 2 times a day  levothyroxine 75 mcg (0.075 mg) oral tablet: 1 tab(s) orally once a day  Lovenox 40 mg/0.4 mL injectable solution: 1 application injectable once a day  metoprolol tartrate 50 mg oral tablet: 1 tab(s) orally 2 times a day  OLANZapine 10 mg intramuscular injection: 10 milligram(s) intramuscular every 12 hours  Oyster Betito 1250 mg (500 mg elemental calcium) oral tablet: 1 tab(s) orally 3 times a day  pantoprazole 40 mg intravenous injection: 40 milligram(s) intravenous once a day  petrolatum topical ointment: Apply topically to affected area once a day  tamsulosin 0.4 mg oral capsule: 2 cap(s) orally once a day (at bedtime)  valproic acid (as valproate sodium) 100 mg/mL intravenous solution: 2.5 milliliter(s) intravenous every 8 hours  zinc oxide 20% topical ointment: 1 Apply topically to affected area every 12 hours   acetaminophen 325 mg oral tablet: 2 tab(s) orally every 6 hours As needed Temp greater or equal to 38C (100.4F), Mild Pain (1 - 3)  aspirin 81 mg oral tablet: 1 tab(s) orally once a day  chlorhexidine 2% topical pad: 1 Apply topically to affected area daily per protocol  cholecalciferol oral tablet: 2000 unit(s) orally once a day  clonazePAM 1 mg oral tablet: 0.5 tab(s) orally 2 times a day  dilTIAZem 60 mg oral tablet: 1 tab(s) orally every 6 hours  finasteride 5 mg oral tablet: 1 tab(s) orally once a day  fluticasone-salmeterol: 1 dose(s) inhalations two times daily  fluvoxaMINE 100 mg oral tablet: 1 tab(s) orally 2 times a day  levothyroxine 75 mcg (0.075 mg) oral tablet: 1 tab(s) orally once a day  Lovenox 40 mg/0.4 mL injectable solution: 1 application injectable once a day  metoprolol tartrate 50 mg oral tablet: 1 tab(s) orally 2 times a day  nafcillin: 2g in dextrose 5% 100ml, IV intermittent every 4 hours, infuse over 30 minutes  OLANZapine 10 mg intramuscular injection: 10 milligram(s) intramuscular every 12 hours  Oyster Betito 1250 mg (500 mg elemental calcium) oral tablet: 1 tab(s) orally 3 times a day  pantoprazole 40 mg intravenous injection: 40 milligram(s) intravenous once a day  petrolatum topical ointment: Apply topically to affected area once a day  tamsulosin 0.4 mg oral capsule: 2 cap(s) orally once a day (at bedtime)  valproic acid (as valproate sodium) 100 mg/mL intravenous solution: 2.5 milliliter(s) intravenous every 8 hours  zinc oxide 20% topical ointment: 1 Apply topically to affected area every 12 hours   acetaminophen 325 mg oral tablet: 2 tab(s) orally every 6 hours As needed Temp greater or equal to 38C (100.4F), Mild Pain (1 - 3)  chlorhexidine 2% topical pad: 1 Apply topically to affected area daily per protocol; apply daily  cholecalciferol oral tablet: 2000 unit(s) orally once a day  clonazePAM 1 mg oral tablet: 0.5 tab(s) orally 2 times a day  dilTIAZem 60 mg oral tablet: 1 tab(s) orally every 6 hours  finasteride 5 mg oral tablet: 1 tab(s) orally once a day  fluticasone-salmeterol: 1 dose(s) inhalations two times daily  fluvoxaMINE 100 mg oral tablet: 1 tab(s) orally 2 times a day  levothyroxine 75 mcg (0.075 mg) oral tablet: 1 tab(s) orally once a day  Lovenox 40 mg/0.4 mL injectable solution: 1 application injectable once a day  metoprolol tartrate 50 mg oral tablet: 1 tab(s) orally 2 times a day  nafcillin: 2g in dextrose 5% 100ml, IV intermittent every 4 hours, infuse over 30 minutes  OLANZapine 10 mg intramuscular injection: 10 milligram(s) intramuscular every 12 hours  Oyster Betito 1250 mg (500 mg elemental calcium) oral tablet: 1 tab(s) orally 3 times a day  pantoprazole 40 mg intravenous injection: 40 milligram(s) intravenous once a day  petrolatum topical ointment: Apply topically to affected area once a day  tamsulosin 0.4 mg oral capsule: 2 cap(s) orally once a day (at bedtime)  valproic acid (as valproate sodium) 100 mg/mL intravenous solution: 2.5 milliliter(s) intravenous every 8 hours  zinc oxide 20% topical ointment: 1 Apply topically to affected area every 12 hours   acetaminophen 325 mg oral tablet: 2 tab(s) orally every 6 hours As needed Temp greater or equal to 38C (100.4F), Mild Pain (1 - 3)  chlorhexidine 2% topical pad: Apply topically to affected area once a day (at bedtime) 1 Apply topically to affected area daily per protocol; apply daily  cholecalciferol oral tablet: 2000 unit(s) orally once a day  clonazePAM 1 mg oral tablet: 0.5 tab(s) orally 2 times a day  dilTIAZem 60 mg oral tablet: 1 tab(s) orally every 6 hours  finasteride 5 mg oral tablet: 1 tab(s) orally once a day  fluticasone-salmeterol: 1 application inhaled 2 times a day 1 dose(s) inhalations two times daily  fluvoxaMINE 100 mg oral tablet: 1 tab(s) orally 2 times a day  levothyroxine 75 mcg (0.075 mg) oral tablet: 1 tab(s) orally once a day  Lovenox 40 mg/0.4 mL injectable solution: 1 application injectable once a day  metoprolol tartrate 50 mg oral tablet: 1 tab(s) orally 2 times a day  nafcillin: 2 gram(s) intravenously every 4 hours (5 times/day) 2g in dextrose 5% 100ml, IV intermittent every 4 hours, infuse over 30 minutes  OLANZapine 10 mg intramuscular injection: 10 milligram(s) intramuscular every 12 hours  Oyster Betito 1250 mg (500 mg elemental calcium) oral tablet: 1 tab(s) orally 3 times a day  pantoprazole 40 mg intravenous injection: 40 milligram(s) intravenous once a day  petrolatum topical ointment: Apply topically to affected area once a day  tamsulosin 0.4 mg oral capsule: 2 cap(s) orally once a day (at bedtime)  valproic acid (as valproate sodium) 100 mg/mL intravenous solution: 2.5 milliliter(s) intravenous every 8 hours  zinc oxide 20% topical ointment: 1 Apply topically to affected area every 12 hours

## 2024-11-24 NOTE — PROGRESS NOTE ADULT - PROVIDER SPECIALTY LIST ADULT
Infectious Disease
Infectious Disease
Neurosurgery
Anesthesia
Hospitalist
Neurosurgery
Thoracic Surgery
Neurosurgery
Neurosurgery
NSICU
NSICU
Thoracic Surgery

## 2024-11-24 NOTE — DISCHARGE NOTE PROVIDER - HOSPITAL COURSE
HPI: 69-year-old male with PMH CAD (on ASA81), GERD, psoriasis, hypothyroidism, UTI, right eye cataract, intellectual disability, autism, epilepsy (on Divalproex), HTN, anxiety, venous stasis dermatitis b/l LE, oropharyngeal dysphagia presented to [facility] on 10/28 from group home for fever, found to have pneumonia. Patient's hospital course was complicated by anemia, right sided loculated pleural effusion s/p thoracentesis (11/12) with 500 ml fluid removed, now s/p right chest tube (placed on 11/20) and TPA given via chest tube to break up loculation (11/21), MSSA bacteremia, new onset afib with RVR (10/31). T6-T7 osteomyelitis and phlegmon. ID following at prior hospital and patient had completed  Ancef, Vancomycin, Invanz, Azactam and Flagyl, Cefepime, however patient still remained persistently febrile and bacteremic. Patient was then transferred to [facility] on 11/21 for neurosurgical evaluation to consider laminectomy for evacuation of T6-T7 spinal epidural phlegmon and was admitted to the NSICU under Dr. Love. Per chart patient is nonverbal, wheelchair bound at baseline and is under the care of OPWDD for developmental delay, Ethics team has been consulted to follow.     Interval History: Patient is s/p POD#2 for T5-7 posterior lami for phlegmon removal on 11/22 by Dr. Love.  Patient currently has one MANOLO to full suction with down-trending drain outputs.  Patient was started on chemical dvt ppx on POD#1.  Dr. Love, on-call nsx Dr. Westbrook and NSICU team all in agreement patient no longer requires specialty care at [facility] and is cleared to be transferred back to [facility].  Patient has been q4 general neuro checks since POD#1 without any issues.  NSICU team spoke with neurosurgeon Dr. Ivan petersen for drain removal and downgrade to tele level of care. ROS unable to be obtained given patient's baseline MS.     Vital Signs Last 24 Hrs  T(C): 37.9 (24 Nov 2024 07:00), Max: 38.8 (23 Nov 2024 20:00)  T(F): 100.2 (24 Nov 2024 07:00), Max: 101.8 (23 Nov 2024 20:00)  HR: 74 (24 Nov 2024 07:00) (70 - 117)  BP: 110/60 (24 Nov 2024 07:00) (91/59 - 159/81)  BP(mean): 74 (24 Nov 2024 07:00) (69 - 115)  RR: 17 (24 Nov 2024 07:00) (12 - 26)  SpO2: 98% (24 Nov 2024 07:00) (91% - 100%)    Parameters below as of 24 Nov 2024 04:00  Patient On (Oxygen Delivery Method): room air    Physical Exam:  General: NAD, NCAT, resting comfortably in bed  Cardio: NSR on monitor  Lungs: respirations unlabored on RA; right chest tube in place with no obvious abnormality - to be managed by Ct sx  Abdomen: soft, non-distended  Extremities: no pedal edema appreciated b/l  Neuro: AOx0, non-verbal at baseline, OE spontaneously, Pupils 3m left reactive. 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  BUE AG spontaneously, BLE contracted at baseline, BLE withdraws to pain.   Incision/Dressing: C/D/I     LABS:                        8.9    8.24  )-----------( 381      ( 24 Nov 2024 03:43 )             27.4       11-24    138  |  105  |  9.7  ----------------------------<  94  4.0   |  25.0  |  0.66    Ca    7.0[L]      24 Nov 2024 03:43 (corrected calcium for albumin level is 8.5).  Phos  2.7     11-24  Mg     2.1     11-24    Urinalysis Basic - ( 24 Nov 2024 03:43 )  Color: x / Appearance: x / SG: x / pH: x  Gluc: 94 mg/dL / Ketone: x  / Bili: x / Urobili: x   Blood: x / Protein: x / Nitrite: x   Leuk Esterase: x / RBC: x / WBC x   Sq Epi: x / Non Sq Epi: x / Bacteria: x    CAPILLARY BLOOD GLUCOSE  POCT Blood Glucose.: 98 mg/dL (22 Nov 2024 18:50)    ASSESSMENT:  69 YOM w/PMHx severe developmental delay (nonverbal at baseline) who was transferred from [facility] for epidural spinal abscess, now POD2 s/p T5-7 posterior laminectomy removal of phlegmon. Chemical DVT ppx started on POD#1. MANOLO drain removed POD# 2 without complications.     PLAN:  -neuro general neuro checks q4 / tele level of care.   -pain control PRN  -h/o epilepsy: continue baseline valproate  -SBP goal   -Cont metoprolol 50mg PO bid for afib, cardizem 60mg q6h  -chest tube for loculated pleural effusion per CT surgery- to suction -20  -diet: pureed mildly thick liquid   -bowel regimen: miralax  -voiding, continue flomax and proscar  -DVT ppx: SCDs, SQL  -ID following for MSSA bacteremia and T6-7 osteomyeltitis - continue Nafcillin, f/u OR cultures from 11/22  -continue synthroid for hypothyroidism  -Full dose AC cleared to be restarting by a neurosurgical perspective on ________________________.      The above patient and plan was discussed with NSICU and Neurosurgery teams.          HPI: 69-year-old male with PMH CAD (on ASA81), GERD, psoriasis, hypothyroidism, UTI, right eye cataract, intellectual disability, autism, epilepsy (on Divalproex), HTN, anxiety, venous stasis dermatitis b/l LE, oropharyngeal dysphagia presented to [facility] on 10/28 from group home for fever, found to have pneumonia. Patient's hospital course was complicated by anemia, right sided loculated pleural effusion s/p thoracentesis (11/12) with 500 ml fluid removed, now s/p right chest tube (placed on 11/20) and TPA given via chest tube to break up loculation (11/21), MSSA bacteremia, new onset afib with RVR (10/31). T6-T7 osteomyelitis and phlegmon. ID following at prior hospital and patient had completed  Ancef, Vancomycin, Invanz, Azactam and Flagyl, Cefepime, however patient still remained persistently febrile and bacteremic. Patient was then transferred to [facility] on 11/21 for neurosurgical evaluation to consider laminectomy for evacuation of T6-T7 spinal epidural phlegmon and was admitted to the NSICU under Dr. Love. Per chart patient is nonverbal, wheelchair bound at baseline and is under the care of OPWDD for developmental delay, Ethics team has been consulted to follow.     Interval History: Patient is s/p POD#2 for T5-7 posterior lami for phlegmon removal on 11/22 by Dr. Love.  Patient currently has one MANOLO to full suction with down-trending drain outputs.  Patient was started on chemical dvt ppx on POD#1.  Dr. Love, on-call nsx Dr. Westbrook and NSICU team all in agreement patient no longer requires specialty care at [facility] and is cleared to be transferred back to [facility].  Patient has been q4 general neuro checks since POD#1 without any issues.  NSICU team spoke with neurosurgeon Dr. Ivan petersen for drain removal and downgrade to tele level of care. ROS unable to be obtained given patient's baseline MS.     Vital Signs Last 24 Hrs  T(C): 37.9 (24 Nov 2024 07:00), Max: 38.8 (23 Nov 2024 20:00)  T(F): 100.2 (24 Nov 2024 07:00), Max: 101.8 (23 Nov 2024 20:00)  HR: 74 (24 Nov 2024 07:00) (70 - 117)  BP: 110/60 (24 Nov 2024 07:00) (91/59 - 159/81)  BP(mean): 74 (24 Nov 2024 07:00) (69 - 115)  RR: 17 (24 Nov 2024 07:00) (12 - 26)  SpO2: 98% (24 Nov 2024 07:00) (91% - 100%)    Parameters below as of 24 Nov 2024 04:00  Patient On (Oxygen Delivery Method): room air    Physical Exam:  General: NAD, NCAT, resting comfortably in bed  Cardio: NSR on monitor  Lungs: respirations unlabored on RA; right chest tube in place with no obvious abnormality - to be managed by Ct sx  Abdomen: soft, non-distended  Extremities: no pedal edema appreciated b/l  Neuro: AOx0, non-verbal at baseline, OE spontaneously, Pupils 3m left reactive. 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  BUE AG spontaneously, BLE contracted at baseline, BLE withdraws to pain.   Incision/Dressing: C/D/I     LABS:                        8.9    8.24  )-----------( 381      ( 24 Nov 2024 03:43 )             27.4       11-24    138  |  105  |  9.7  ----------------------------<  94  4.0   |  25.0  |  0.66    Ca    7.0[L]      24 Nov 2024 03:43 (corrected calcium for albumin level is 8.5).  Phos  2.7     11-24  Mg     2.1     11-24    Urinalysis Basic - ( 24 Nov 2024 03:43 )  Color: x / Appearance: x / SG: x / pH: x  Gluc: 94 mg/dL / Ketone: x  / Bili: x / Urobili: x   Blood: x / Protein: x / Nitrite: x   Leuk Esterase: x / RBC: x / WBC x   Sq Epi: x / Non Sq Epi: x / Bacteria: x      ASSESSMENT:  69 YOM w/PMHx severe developmental delay (nonverbal at baseline) who was transferred from [facility] for epidural spinal abscess, now POD2 s/p T5-7 posterior laminectomy removal of phlegmon. Chemical DVT ppx started on POD#1. MANOLO drain removed POD# 2 without complications.     PLAN:  -neuro general neuro checks q4 / tele level of care.   -pain control PRN  -h/o epilepsy: continue baseline valproate  -SBP goal   -Cont metoprolol 50mg PO bid for afib, cardizem 60mg q6h  -chest tube for loculated pleural effusion per CT surgery- to suction -20  -diet: pureed mildly thick liquid   -bowel regimen: miralax  -voiding, continue flomax and proscar  -DVT ppx: SCDs, SQL  -ID following for MSSA bacteremia and T6-7 osteomyeltitis - continue Nafcillin, f/u OR cultures from 11/22  -continue synthroid for hypothyroidism  -Full dose AC cleared to be restarted from a neurosurgical perspective on Post Op Day #5. [place] medicine team will need to touch base with their CT sx for full dose AC clearance, given [place] CT sx is who placed the chest tube.       The above patient and plan was discussed with NSICU and Neurosurgery teams. Transfer center just called again - provider spoke with Vern to update the transfer record to reflect that the patient's post-op MANOLO was removed without issue, patient was downgraded to tele level of care, and Full dose AC can be resumed on Post-Op Day 5 from a neurosurgical perspective. Remainder of management as per [place] medicine team --> [facility] NSICU team awaiting call back from [place] medicine team/ transfer center regarding patient acceptance back to original admitting facility.          HPI: 69-year-old male with PMH CAD (on ASA81), GERD, psoriasis, hypothyroidism, UTI, right eye cataract, intellectual disability, autism, epilepsy (on Divalproex), HTN, anxiety, venous stasis dermatitis b/l LE, oropharyngeal dysphagia presented to [facility] on 10/28 from group home for fever, found to have pneumonia. Patient's hospital course was complicated by anemia, right sided loculated pleural effusion s/p thoracentesis (11/12) with 500 ml fluid removed, now s/p right chest tube (placed on 11/20) and TPA given via chest tube to break up loculation (11/21), MSSA bacteremia, new onset afib with RVR (10/31). T6-T7 osteomyelitis and phlegmon. ID following at prior hospital and patient had completed  Ancef, Vancomycin, Invanz, Azactam and Flagyl, Cefepime, however patient still remained persistently febrile and bacteremic. Patient was then transferred to [facility] on 11/21 for neurosurgical evaluation to consider laminectomy for evacuation of T6-T7 spinal epidural phlegmon and was admitted to the NSICU under Dr. Love. Per chart patient is nonverbal, wheelchair bound at baseline and is under the care of OPWDD for developmental delay, Ethics team has been consulted to follow.     Interval History: Patient is s/p POD#2 for T5-7 posterior lami for phlegmon removal on 11/22 by Dr. Love.  Patient currently has one MANOLO to full suction with down-trending drain outputs.  Patient was started on chemical dvt ppx on POD#1.  Dr. Love, on-call nsx Dr. Westbrook and NSICU team all in agreement patient no longer requires specialty care at [facility] and is cleared to be transferred back to [facility].  Patient has been q4 general neuro checks since POD#1 without any issues.  NSICU team spoke with neurosurgeon Dr. Ivan petersen for drain removal and downgrade to tele level of care. ROS unable to be obtained given patient's baseline MS.     Vital Signs Last 24 Hrs  T(C): 37.9 (24 Nov 2024 07:00), Max: 38.8 (23 Nov 2024 20:00)  T(F): 100.2 (24 Nov 2024 07:00), Max: 101.8 (23 Nov 2024 20:00)  HR: 74 (24 Nov 2024 07:00) (70 - 117)  BP: 110/60 (24 Nov 2024 07:00) (91/59 - 159/81)  BP(mean): 74 (24 Nov 2024 07:00) (69 - 115)  RR: 17 (24 Nov 2024 07:00) (12 - 26)  SpO2: 98% (24 Nov 2024 07:00) (91% - 100%)    Parameters below as of 24 Nov 2024 04:00  Patient On (Oxygen Delivery Method): room air    Physical Exam:  General: NAD, NCAT, resting comfortably in bed  Cardio: NSR on monitor  Lungs: respirations unlabored on RA; right chest tube in place with no obvious abnormality - to be managed by Ct sx  Abdomen: soft, non-distended  Extremities: no pedal edema appreciated b/l  Neuro: AOx0, non-verbal at baseline, OE spontaneously, Pupils 3m left reactive. 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  BUE AG spontaneously, BLE contracted at baseline, BLE withdraws to pain.   Incision/Dressing: C/D/I     LABS:                        8.9    8.24  )-----------( 381      ( 24 Nov 2024 03:43 )             27.4       11-24    138  |  105  |  9.7  ----------------------------<  94  4.0   |  25.0  |  0.66    Ca    7.0[L]      24 Nov 2024 03:43 (corrected calcium for albumin level is 8.5).  Phos  2.7     11-24  Mg     2.1     11-24    Urinalysis Basic - ( 24 Nov 2024 03:43 )  Color: x / Appearance: x / SG: x / pH: x  Gluc: 94 mg/dL / Ketone: x  / Bili: x / Urobili: x   Blood: x / Protein: x / Nitrite: x   Leuk Esterase: x / RBC: x / WBC x   Sq Epi: x / Non Sq Epi: x / Bacteria: x      ASSESSMENT:  69 YOM w/PMHx severe developmental delay (nonverbal at baseline) who was transferred from [facility] for epidural spinal abscess, now POD2 s/p T5-7 posterior laminectomy removal of phlegmon. Chemical DVT ppx started on POD#1. MANOLO drain removed POD# 2 without complications.     PLAN:  -neuro general neuro checks q4 / tele level of care.   -pain control PRN  -h/o epilepsy: continue baseline valproate  -SBP goal   -Cont metoprolol 50mg PO bid for afib, cardizem 60mg q6h  -chest tube for loculated pleural effusion per CT surgery- to suction -20  -diet: pureed mildly thick liquid   -bowel regimen: miralax  -voiding, continue flomax and proscar  -DVT ppx: SCDs, SQL  -ID following for MSSA bacteremia and T6-7 osteomyeltitis - continue Nafcillin, f/u OR cultures from 11/22  -continue synthroid for hypothyroidism  -Full dose AC and home baby Aspirin 81mg QD cleared to be restarted from a neurosurgical perspective on Post Op Day #5. [place] medicine team will need to touch base with their CT sx for full dose AC clearance, given [place] CT sx is who placed the chest tube.       The above patient and plan was discussed with NSICU and Neurosurgery teams. Transfer center just called again - provider spoke with Vern to update the transfer record to reflect that the patient's post-op MANOLO was removed without issue, patient was downgraded to tele level of care, and Full dose AC can be resumed on Post-Op Day 5 from a neurosurgical perspective. Remainder of management as per [place] medicine team --> [facility] NSICU team awaiting call back from [place] medicine team/ transfer center regarding patient acceptance back to original admitting facility.          HPI: 69-year-old male with PMH CAD (on ASA81), GERD, psoriasis, hypothyroidism, UTI, right eye cataract, intellectual disability, autism, epilepsy (on Divalproex), HTN, anxiety, venous stasis dermatitis b/l LE, oropharyngeal dysphagia presented to [facility] on 10/28 from group home for fever, found to have pneumonia. Patient's hospital course was complicated by anemia, right sided loculated pleural effusion s/p thoracentesis (11/12) with 500 ml fluid removed, now s/p right chest tube (placed on 11/20) and TPA given via chest tube to break up loculation (11/21), MSSA bacteremia, new onset afib with RVR (10/31). T6-T7 osteomyelitis and phlegmon. ID following at prior hospital and patient had completed  Ancef, Vancomycin, Invanz, Azactam and Flagyl, Cefepime, however patient still remained persistently febrile and bacteremic. Patient was then transferred to [facility] on 11/21 for neurosurgical evaluation to consider laminectomy for evacuation of T6-T7 spinal epidural phlegmon and was admitted to the NSICU under Dr. Love. Per chart patient is nonverbal, wheelchair bound at baseline and is under the care of OPWDD for developmental delay, Ethics team has been consulted to follow.     Interval History: Patient is s/p POD#2 for T5-7 posterior lami for phlegmon removal on 11/22 by Dr. Love.  Patient currently has one MANOLO to full suction with down-trending drain outputs.  Patient was started on chemical dvt ppx on POD#1.  Dr. Love, on-call nsx Dr. Westbrook and NSICU team all in agreement patient no longer requires specialty care at [facility] and is cleared to be transferred back to [facility].  Patient has been q4 general neuro checks since POD#1 without any issues.  NSICU team spoke with neurosurgeon Dr. Ivan petersen for drain removal and downgrade to tele level of care. ROS unable to be obtained given patient's baseline MS.     Vital Signs Last 24 Hrs  T(C): 37.9 (24 Nov 2024 07:00), Max: 38.8 (23 Nov 2024 20:00)  T(F): 100.2 (24 Nov 2024 07:00), Max: 101.8 (23 Nov 2024 20:00)  HR: 74 (24 Nov 2024 07:00) (70 - 117)  BP: 110/60 (24 Nov 2024 07:00) (91/59 - 159/81)  BP(mean): 74 (24 Nov 2024 07:00) (69 - 115)  RR: 17 (24 Nov 2024 07:00) (12 - 26)  SpO2: 98% (24 Nov 2024 07:00) (91% - 100%)    Parameters below as of 24 Nov 2024 04:00  Patient On (Oxygen Delivery Method): room air    Physical Exam:  General: NAD, NCAT, resting comfortably in bed  Cardio: NSR on monitor  Lungs: respirations unlabored on RA; right chest tube in place with no obvious abnormality - to be managed by Ct sx  Abdomen: soft, non-distended  Extremities: no pedal edema appreciated b/l  Neuro: AOx0, non-verbal at baseline, OE spontaneously, Pupils 3m left reactive. 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  BUE AG spontaneously, BLE contracted at baseline, BLE withdraws to pain.   Incision/Dressing: C/D/I     LABS:                        8.9    8.24  )-----------( 381      ( 24 Nov 2024 03:43 )             27.4       11-24    138  |  105  |  9.7  ----------------------------<  94  4.0   |  25.0  |  0.66    Ca    7.0[L]      24 Nov 2024 03:43 (corrected calcium for albumin level is 8.5).  Phos  2.7     11-24  Mg     2.1     11-24    Urinalysis Basic - ( 24 Nov 2024 03:43 )  Color: x / Appearance: x / SG: x / pH: x  Gluc: 94 mg/dL / Ketone: x  / Bili: x / Urobili: x   Blood: x / Protein: x / Nitrite: x   Leuk Esterase: x / RBC: x / WBC x   Sq Epi: x / Non Sq Epi: x / Bacteria: x      ASSESSMENT:  69 YOM w/PMHx severe developmental delay (nonverbal at baseline) who was transferred from [facility] for epidural spinal abscess, now POD2 s/p T5-7 posterior laminectomy removal of phlegmon. Chemical DVT ppx started on POD#1. MANOLO drain removed POD# 2 without complications.     PLAN:  -neuro general neuro checks q4 / tele level of care.   -pain control PRN  -h/o epilepsy: continue baseline valproate  -SBP goal   -Cont metoprolol 50mg PO bid for afib, cardizem 60mg q6h  -chest tube for loculated pleural effusion per CT surgery- to suction -20  -diet: pureed mildly thick liquid   -bowel regimen: miralax  -voiding, continue flomax and proscar  -DVT ppx: SCDs, SQL  -ID following for MSSA bacteremia and T6-7 osteomyeltitis - continue Nafcillin, f/u OR cultures from 11/22  -continue synthroid for hypothyroidism  -Full dose AC and home baby Aspirin 81mg QD cleared to be restarted from a neurosurgical perspective on Post Op Day #5. [employer] medicine team will need to touch base with their CT sx for full dose AC clearance, given [employer] CT sx is who placed the chest tube.       The above patient and plan was discussed with NSICU and Neurosurgery teams. Transfer center just called again - provider spoke with Vern to update the transfer record to reflect that the patient's post-op MANOLO was removed without issue, patient was downgraded to tele level of care, and Full dose AC and home baby Aspirin 81mg QD can be resumed on Post-Op Day 5 from a neurosurgical perspective. Remainder of management as per [place] medicine team --> [facility] NSICU notified [employer] medicine team officially accepted the patient back on 11/24/24 @13:30.  Discharge paperwork finalized, discharge /transfer orders placed, logistics notified.

## 2024-11-24 NOTE — PROGRESS NOTE ADULT - PROBLEM SELECTOR PLAN 1
11/21  S/P TPA instillation @ OSH> unclear of reason> not documented on transfer    - Right pigtail in place by IR @ OSH on 11/20   - Follow up pleural fluid cultures sent at [place]  - Currently on RA  Daily CXR  DC pigatil as per Dr Russell> pt to be transferred back to [facility] for further care and they   are requesting tube removal prior to transport  No acute Thoracic surgery needs  Care as per Neuro team
11/22 S/P TPA instillation  - Currently on RA  - Right pigtail in place by IR @ OSH on 11/20   - Follow up pleural fluid cultures sent at [place]  Daily CXR

## 2024-11-24 NOTE — DISCHARGE NOTE NURSING/CASE MANAGEMENT/SOCIAL WORK - FINANCIAL ASSISTANCE
[facility] provides services at a reduced cost to those who are determined to be eligible through [facility]’s financial assistance program. Information regarding [facility]’s financial assistance program can be found by going to [email or web address] or by calling [phone].

## 2024-11-24 NOTE — DISCHARGE NOTE PROVIDER - CARE PROVIDER_API CALL
Jones Love  Neurosurgery  [address], [address]  [place] [address]  Phone: [phone]  Fax: [phone]  Follow Up Time: 2 weeks

## 2024-11-24 NOTE — DISCHARGE NOTE PROVIDER - NSDCCAREPROVSEEN_GEN_ALL_CORE_FT
Stephanie, Herminia Allison, Anastacio Bear, Aggie Jackson, Johnnie Camarena, Jane Love, Jones Selby, Lizette Villagran, Marya Arzate, Wyatt Mena, Angela

## 2024-11-24 NOTE — PROGRESS NOTE ADULT - SUBJECTIVE AND OBJECTIVE BOX
[facility]                                                INFECTIOUS DISEASES  =======================================================                   Max Cuevas#   Shay Stapleton MD#    Rupal Arce MD*                           Herminia Brown MD*   Jessica Mayberry MD*   Deon Parks *           Diplomates American Board of Internal Medicine & Infectious Diseases                  # [place] Office - Appt - Tel  [phone] Fax [phone]                * [place] Office - Appt - Tel [phone] Fax [phone]                                  Hospital Consult line:  [phone]  =======================================================      [id number]  ROSELIA NARAYAN   follow up for: epidural abscess, mssa bacteremia  s/p thoracic laminectomy    tmax 101.8 /24h    thoracic mary drain removed this am    patient seen and examined.       I have personally reviewed the labs and data; pertinent labs and data are listed in this note; please see below.   ===================================================  REVIEW OF SYSTEMS:  cannot obtain    =======================================================  Allergies    Augmentin (Unknown)  sulfa drugs (Unknown)  penicillin (Unknown)  aztreonam (Rash)    Intolerances    Antibiotics:  nafcillin  IVPB 2 Gram(s) IV Intermittent every 4 hours    Other medications:  acetaminophen   IVPB .. 1000 milliGRAM(s) IV Intermittent once  calcium carbonate   1250 mG (OsCal) 1 Tablet(s) Oral every 12 hours  chlorhexidine 2% Cloths 1 Application(s) Topical <User Schedule>  chlorhexidine 4% Liquid 1 Application(s) Topical <User Schedule>  cholecalciferol 2000 Unit(s) Oral daily  clonazePAM  Tablet 0.5 milliGRAM(s) Oral two times a day  diltiazem    Tablet 60 milliGRAM(s) Oral every 6 hours  enoxaparin Injectable 40 milliGRAM(s) SubCutaneous every 24 hours  finasteride 5 milliGRAM(s) Oral daily  fluticasone propionate/ salmeterol 100-50 MICROgram(s) Diskus 1 Dose(s) Inhalation two times a day  fluvoxaMINE 100 milliGRAM(s) Oral <User Schedule>  levothyroxine 75 MICROGram(s) Oral daily  metoprolol tartrate 50 milliGRAM(s) Oral two times a day  mupirocin 2% Nasal 1 Application(s) Both Nostrils every 12 hours  OLANZapine Injectable 10 milliGRAM(s) IntraMuscular every 12 hours  pantoprazole  Injectable 40 milliGRAM(s) IV Push daily  povidone iodine 10% Nasal Swab 1 Application(s) Both Nostrils once  tamsulosin 0.8 milliGRAM(s) Oral at bedtime  valproate sodium   IVPB 250 milliGRAM(s) IV Intermittent every 8 hours  zinc oxide 20% Ointment 1 Application(s) Topical every 12 hours    ======================================================  Physical Exam:  ============  T(F): 99.9 (24 Nov 2024 10:00), Max: 101.8 (23 Nov 2024 20:00)  HR: 78 (24 Nov 2024 10:00)  BP: 109/58 (24 Nov 2024 10:00)  RR: 15 (24 Nov 2024 10:00)  SpO2: 98% (24 Nov 2024 10:00) (91% - 100%)  temp max in last 48H T(F): , Max: 101.8 (11-23-24 @ 20:00)    General:  No acute distress laying in bed awake  Eye: no conjunctival pallor, no scleral icterus  HENT: dry oral mucosa.  Respiratory: rt posterior chest tube, decreased BS to auscultation on right , Respirations are non-labored.  Cardiovascular: Normal rate, Regular rhythm,  s1+s2  Gastrointestinal: Soft, Non-tender, Non-distended, Normal bowel sounds.  Integumentary: No rash. thoracic spinal dressing intact    =======================================================  Labs:                        8.9    8.24  )-----------( 381      ( 24 Nov 2024 03:43 )             27.4     11-24    138  |  105  |  9.7  ----------------------------<  94  4.0   |  25.0  |  0.66    Ca    7.0[L]      24 Nov 2024 03:43  Phos  2.7     11-24  Mg     2.1     11-24        Culture - Abscess with Gram Stain (collected 11-22-24 @ 17:16)  Source: .Abscess  Gram Stain (11-23-24 @ 12:39):    No polymorphonuclear cells seen per low power field    No organisms seen per oil power field  Preliminary Report (11-24-24 @ 08:58):    No growth    Culture - Abscess with Gram Stain (collected 11-22-24 @ 17:05)  Source: .Abscess  Gram Stain (11-23-24 @ 12:39):    No polymorphonuclear cells seen per low power field    No organisms seen per oil power field  Preliminary Report (11-24-24 @ 08:57):    No growth    Culture - Blood (collected 11-21-24 @ 17:35)  Source: .Blood BLOOD  Preliminary Report (11-24-24 @ 02:01):    No growth at 48 Hours    Culture - Blood (collected 11-21-24 @ 17:30)  Source: .Blood BLOOD  Preliminary Report (11-24-24 @ 01:01):    No growth at 48 Hours    Culture - Urine (collected 12-29-22 @ 14:00)  Source: Catheterized Catheterized  Final Report (01-01-23 @ 10:44):    >100,000 CFU/ml Proteus mirabilis  Organism: Proteus mirabilis (01-01-23 @ 10:44)  Organism: Proteus mirabilis (01-01-23 @ 10:44)    Sensitivities:      Method Type: EMANUEL      -  Amikacin: S <=16      -  Amoxicillin/Clavulanic Acid: S <=8/4      -  Ampicillin: S <=8 These ampicillin results predict results for amoxicillin      -  Ampicillin/Sulbactam: S <=4/2 Enterobacter, Klebsiella aerogenes, Citrobacter, and Serratia may develop resistance during prolonged therapy (3-4 days)      -  Aztreonam: S <=4      -  Cefazolin: S <=2 For uncomplicated UTI with K. pneumoniae, E. coli, or P. mirablis: EMANUEL <=16 is sensitive and EMANUEL >=32 is resistant. This also predicts results for oral agents cefaclor, cefdinir, cefpodoxime, cefprozil, cefuroxime axetil, cephalexin and locarbef for uncomplicated UTI. Note that some isolates may be susceptible to these agents while testing resistant to cefazolin.      -  Cefepime: S <=2      -  Cefoxitin: S <=8      -  Ceftriaxone: S <=1 Enterobacter, Klebsiella aerogenes, Citrobacter, and Serratia may develop resistance during prolonged therapy      -  Cefuroxime: S <=4      -  Ciprofloxacin: S <=0.25      -  Ertapenem: S <=0.5      -  Gentamicin: S <=2      -  Levofloxacin: S <=0.5      -  Meropenem: S <=1      -  Nitrofurantoin: R >64 Should not be used to treat pyelonephritis      -  Piperacillin/Tazobactam: S <=8      -  Tobramycin: S <=2      -  Trimethoprim/Sulfamethoxazole: S <=0.5/9.5    Culture - Blood (collected 12-28-22 @ 16:36)  Source: .Blood Blood  Final Report (01-03-23 @ 03:00):    No Growth Final    Culture - Blood (collected 12-28-22 @ 16:36)  Source: .Blood Blood  Final Report (01-03-23 @ 03:00):    No Growth Final    Culture - Urine (collected 12-21-22 @ 14:51)  Source: Clean Catch Clean Catch (Midstream)  Final Report (12-23-22 @ 07:33):    No growth    Culture - Blood (collected 12-21-22 @ 12:28)  Source: .Blood Blood  Final Report (12-26-22 @ 16:00):    No Growth Final    Culture - Blood (collected 12-21-22 @ 12:28)  Source: .Blood Blood  Final Report (12-26-22 @ 16:00):    No Growth Final    Culture - Blood (collected 12-16-22 @ 21:06)  Source: .Blood Blood  Final Report (12-22-22 @ 01:00):    No Growth Final    Culture - Blood (collected 12-16-22 @ 21:00)  Source: .Blood Blood  Final Report (12-22-22 @ 01:00):    No Growth Final

## 2024-11-24 NOTE — DISCHARGE NOTE NURSING/CASE MANAGEMENT/SOCIAL WORK - NSDCVIVACCINE_GEN_ALL_CORE_FT
Tdap; 18-Oct-2020 22:33; Karissa Blanco (RN); [employer]; [id number] (Exp. Date: 21-Jul-2022); IntraMuscular; Deltoid Right.; 0.5 milliLiter(s); VIS (VIS Published: 09-May-2013, VIS Presented: 18-Oct-2020);   Tdap; 21-Apr-2021 22:37; Farhana Cardoso (KEVYN); [employer]; [id number] (Exp. Date: 10-Sep-2022); IntraMuscular; Deltoid Left.; 0.5 milliLiter(s); VIS (VIS Published: 09-May-2013, VIS Presented: 21-Apr-2021);

## 2024-11-25 LAB — VALPROATE FREE SERPL-MCNC: 9.8 UG/ML — SIGNIFICANT CHANGE UP (ref 6–22)

## 2024-12-16 NOTE — PRE-OP CHECKLIST - ORDERS/MEDICATION ADMINISTRATION RECORD ON CHART
EYAM                                               done

## 2024-12-20 ENCOUNTER — APPOINTMENT (OUTPATIENT)
Dept: OPHTHALMOLOGY | Facility: CLINIC | Age: 69
End: 2024-12-20

## 2025-01-23 NOTE — ED ADULT NURSE NOTE - NURSING SKIN BRUISING LOCATION
----- Message from Kirti DOHERTY sent at 1/23/2025  1:12 PM EST -----  Regarding: [facility] Appointment Request  [facility] Appointment Request    Patient needs appointment for [facility] Appointment Type: Annual Visit.    Patient Requested Dates(s):3rd or last week of April  Patient Requested Time:morning  Provider Name:June CliftonROMAN CNP    Reason for Appointment Request: Established Patient - No appointments available during search  --------------------------------------------------------------------------------------------------------------------------    Relationship to Patient: Spouse/Partner jodee luz     Call Back Information: OK to leave message on voicemail  Preferred Call Back Number: [phone]   flank

## 2025-02-25 ENCOUNTER — APPOINTMENT (OUTPATIENT)
Dept: INFECTIOUS DISEASE | Facility: CLINIC | Age: 70
End: 2025-02-25

## 2025-03-01 NOTE — ED PROVIDER NOTE - CONSTITUTIONAL MANNER
Problem: Safety - Medical Restraint  Goal: Remains free of injury from restraints (Restraint for Interference with Medical Device)  Description: INTERVENTIONS:  1. Determine that other, less restrictive measures have been tried or would not be effective before applying the restraint  2. Evaluate the patient's condition at the time of restraint application  3. Inform patient/family regarding the reason for restraint  4. Q2H: Monitor safety, psychosocial status, comfort, nutrition and hydration  Outcome: Not Progressing  Flowsheets (Taken 3/1/2025 0001)  Remains free of injury from restraints (restraint for interference with medical device):   Determine that other, less restrictive measures have been tried or would not be effective before applying the restraint   Evaluate the patient's condition at the time of restraint application   Inform patient/family regarding the reason for restraint   Every 2 hours: Monitor safety, psychosocial status, comfort, nutrition and hydration      appropriate for situation

## 2025-03-12 ENCOUNTER — APPOINTMENT (OUTPATIENT)
Dept: INFECTIOUS DISEASE | Facility: CLINIC | Age: 70
End: 2025-03-12
Payer: MEDICARE

## 2025-03-12 VITALS
OXYGEN SATURATION: 96 % | HEART RATE: 75 BPM | DIASTOLIC BLOOD PRESSURE: 70 MMHG | TEMPERATURE: 98.7 F | SYSTOLIC BLOOD PRESSURE: 108 MMHG

## 2025-03-12 DIAGNOSIS — R78.81 BACTEREMIA: ICD-10-CM

## 2025-03-12 DIAGNOSIS — B96.5 URINARY TRACT INFECTION, SITE NOT SPECIFIED: ICD-10-CM

## 2025-03-12 DIAGNOSIS — N39.0 URINARY TRACT INFECTION, SITE NOT SPECIFIED: ICD-10-CM

## 2025-03-12 DIAGNOSIS — B95.61 BACTEREMIA: ICD-10-CM

## 2025-03-12 PROCEDURE — 99214 OFFICE O/P EST MOD 30 MIN: CPT

## 2025-03-12 RX ORDER — WHITE PETROLATUM 1.75 OZ
OINTMENT TOPICAL
Refills: 0 | Status: ACTIVE | COMMUNITY

## 2025-03-12 RX ORDER — ACETAMINOPHEN 325 MG/1
325 TABLET ORAL
Refills: 0 | Status: ACTIVE | COMMUNITY

## 2025-03-12 RX ORDER — CLONAZEPAM 0.5 MG/1
0.5 TABLET ORAL
Refills: 0 | Status: ACTIVE | COMMUNITY

## 2025-03-12 RX ORDER — LEVOTHYROXINE SODIUM 88 UG/1
88 CAPSULE ORAL
Refills: 0 | Status: ACTIVE | COMMUNITY

## 2025-03-12 RX ORDER — METOPROLOL SUCCINATE 50 MG/1
50 TABLET, EXTENDED RELEASE ORAL
Refills: 0 | Status: ACTIVE | COMMUNITY

## 2025-03-12 RX ORDER — OMEPRAZOLE 20 MG/1
20 CAPSULE, DELAYED RELEASE ORAL
Refills: 0 | Status: ACTIVE | COMMUNITY

## 2025-03-12 RX ORDER — APIXABAN 5 MG/1
5 TABLET, FILM COATED ORAL
Refills: 0 | Status: ACTIVE | COMMUNITY

## 2025-03-12 RX ORDER — COPPER GLUCONATE 2 MG
250 TABLET ORAL
Refills: 0 | Status: ACTIVE | COMMUNITY

## 2025-03-12 RX ORDER — BUDESONIDE AND FORMOTEROL FUMARATE DIHYDRATE 160; 4.5 UG/1; UG/1
AEROSOL RESPIRATORY (INHALATION)
Refills: 0 | Status: ACTIVE | COMMUNITY

## 2025-03-12 RX ORDER — LORATADINE 10 MG
CAPSULE ORAL
Refills: 0 | Status: ACTIVE | COMMUNITY

## 2025-03-12 RX ORDER — SPIRONOLACTONE 50 MG/1
TABLET ORAL
Refills: 0 | Status: ACTIVE | COMMUNITY

## 2025-03-12 RX ORDER — FINASTERIDE 1 MG/1
TABLET ORAL
Refills: 0 | Status: ACTIVE | COMMUNITY

## 2025-03-12 RX ORDER — DILTIAZEM HYDROCHLORIDE 60 MG/1
60 TABLET ORAL
Refills: 0 | Status: ACTIVE | COMMUNITY

## 2025-03-12 RX ORDER — CALCIUM CARBONATE/VITAMIN D3 500 MG-10
TABLET ORAL
Refills: 0 | Status: ACTIVE | COMMUNITY

## 2025-03-12 RX ORDER — KETOCONAZOLE 20 MG/ML
2 SUSPENSION TOPICAL
Refills: 0 | Status: ACTIVE | COMMUNITY

## 2025-03-12 RX ORDER — CHOLECALCIFEROL (VITAMIN D3) 125 MCG
TABLET ORAL
Refills: 0 | Status: ACTIVE | COMMUNITY

## 2025-03-12 RX ORDER — SECUKINUMAB 150 MG/ML
150 INJECTION SUBCUTANEOUS
Refills: 0 | Status: ACTIVE | COMMUNITY

## 2025-03-12 RX ORDER — TAMSULOSIN HYDROCHLORIDE 0.4 MG/1
0.4 CAPSULE ORAL
Refills: 0 | Status: ACTIVE | COMMUNITY

## 2025-03-12 RX ORDER — DIVALPROEX SODIUM 125 MG/1
125 CAPSULE ORAL
Refills: 0 | Status: ACTIVE | COMMUNITY

## 2025-03-12 RX ORDER — IPRATROPIUM BROMIDE AND ALBUTEROL SULFATE .5; 3 MG/3ML; MG/3ML
2.5-0.5 SOLUTION RESPIRATORY (INHALATION)
Refills: 0 | Status: ACTIVE | COMMUNITY

## 2025-03-12 RX ORDER — OLANZAPINE 20 MG/1
20 TABLET, FILM COATED ORAL
Refills: 0 | Status: ACTIVE | COMMUNITY

## 2025-03-24 NOTE — ED ADULT NURSE NOTE - TEMPERATURE IN FAHRENHEIT (DEGREES F)
Medication:   Requested Prescriptions     Pending Prescriptions Disp Refills    rosuvastatin (CRESTOR) 5 MG tablet 45 tablet 0     Sig: Take 1 tablet by mouth every other day     Last Filled:  12.23.24    Last appt: 2/24/2025   Next appt: 8/25/2025    Last Lipid:   Lab Results   Component Value Date/Time    CHOL 158 11/22/2024 09:24 AM    TRIG 67 11/22/2024 09:24 AM    HDL 55 11/22/2024 09:24 AM         
98.7

## 2025-04-15 NOTE — ED ADULT TRIAGE NOTE - NSWEIGHTCALCTOOLDRUG_GEN_A_CORE
Access removed from right wrist. TR band placed with 14ml of air.  
Right radial access obtained with 5 fr sheath per Dr. Aquino  
 used

## 2025-05-06 NOTE — ED ADULT NURSE NOTE - CHPI ED NUR AGGRAVATING FX
-Hypopigmented/hyperpigmented macules coalescing into patches previously treated with oral medication and unknown shampoo.   none

## 2025-06-04 NOTE — PHYSICAL THERAPY INITIAL EVALUATION ADULT - BALANCE DISTURBANCE, IDENTIFIED IMPAIRMENT CONTRIBUTE, REHAB EVAL
----- Message from Dio Christianson DO sent at 6/4/2025  5:54 AM EDT -----  Call pt labs ok except for low TSH. New dose sent in and will need repeat TSH in six weeks.   ----- Message -----  From: Lab, Background User  Sent: 6/3/2025   9:00 PM EDT  To: Dio Christianson DO     decreased strength

## 2025-06-11 ENCOUNTER — APPOINTMENT (OUTPATIENT)
Dept: OPHTHALMOLOGY | Facility: CLINIC | Age: 70
End: 2025-06-11

## 2025-06-13 NOTE — PROGRESS NOTE ADULT - SUBJECTIVE AND OBJECTIVE BOX
ROSELIA NARAYAN    [address]    Patient is a 65y old  Male who presents with a chief complaint of fever 101 (2020 10:20)       Allergies    Augmentin (Unknown)  aztreonam (Rash)  penicillin (Unknown)  sulfa drugs (Unknown)    Intolerances        HPI:      PAST MEDICAL & SURGICAL HISTORY:  Constipation, unspecified constipation type  Blind: right eye  DD (diastrophic dysplasia)  Psoriasis  HTN (hypertension)  Seizure  MR (mental retardation), severe  No significant past surgical history      FAMILY HISTORY:  No pertinent family history in first degree relatives        MEDICATIONS   acetaminophen   Tablet .. 650 milliGRAM(s) Oral every 4 hours PRN  albuterol/ipratropium for Nebulization 3 milliLiter(s) Nebulizer every 6 hours  aspirin enteric coated 81 milliGRAM(s) Oral daily  clonazePAM  Tablet 0.5 milliGRAM(s) Oral two times a day  dextrose 40% Gel 15 Gram(s) Oral once PRN  dextrose 5% + sodium chloride 0.9%. 1000 milliLiter(s) IV Continuous <Continuous>  dextrose 5%. 1000 milliLiter(s) IV Continuous <Continuous>  dextrose 50% Injectable 12.5 Gram(s) IV Push once  dextrose 50% Injectable 25 Gram(s) IV Push once  dextrose 50% Injectable 25 Gram(s) IV Push once  diVALproex  milliGRAM(s) Oral three times a day  doxazosin 1 milliGRAM(s) Oral at bedtime  enoxaparin Injectable 40 milliGRAM(s) SubCutaneous daily  fluvoxaMINE 100 milliGRAM(s) Oral two times a day  glucagon  Injectable 1 milliGRAM(s) IntraMuscular once PRN  levothyroxine 75 MICROGram(s) Oral daily  meropenem  IVPB 1000 milliGRAM(s) IV Intermittent every 8 hours  metoprolol tartrate 25 milliGRAM(s) Oral two times a day  pantoprazole  Injectable 40 milliGRAM(s) IV Push daily  tamsulosin 0.4 milliGRAM(s) Oral at bedtime      Vital Signs Last 24 Hrs  T(C): 37.7 (2020 07:57), Max: 38.2 (2020 20:45)  T(F): 99.9 (2020 07:57), Max: 100.8 (2020 04:55)  HR: 90 (2020 08:10) (77 - 106)  BP: 153/79 (2020 07:57) (115/60 - 153/79)  BP(mean): --  RR: 20 (2020 07:57) (16 - 20)  SpO2: 96% (2020 08:10) (92% - 97%)      20 @ 07:01  -  20 @ 07:00  --------------------------------------------------------  IN: 160 mL / OUT: 1200 mL / NET: -1040 mL    20 @ 07:01  -  20 @ 12:13  --------------------------------------------------------  IN: 240 mL / OUT: 0 mL / NET: 240 mL            LABS:                        13.1   16.04 )-----------( 156      ( 2020 07:17 )             37.3     07-27    145  |  111<H>  |  12  ----------------------------<  101<H>  3.5   |  28  |  0.85    Ca    8.5      2020 07:17  Phos  1.6     07-27    TPro  7.3  /  Alb  2.6<L>  /  TBili  0.5  /  DBili  x   /  AST  34  /  ALT  24  /  AlkPhos  67  07-27    PT/INR - ( 2020 07:18 )   PT: 13.2 sec;   INR: 1.14 ratio         PTT - ( 2020 12:41 )  PTT:30.3 sec  Urinalysis Basic - ( 2020 15:37 )    Color: Yellow / Appearance: Clear / S.010 / pH: x  Gluc: x / Ketone: Negative  / Bili: Negative / Urobili: Negative   Blood: x / Protein: 15 / Nitrite: Negative   Leuk Esterase: Negative / RBC: 3-5 /HPF / WBC 3-5   Sq Epi: x / Non Sq Epi: Few / Bacteria: Few            WBC:  WBC Count: 16.04 K/uL ( @ 07:17)  WBC Count: 20.51 K/uL ( @ 12:41)      MICROBIOLOGY:  RECENT CULTURES:        CARDIAC MARKERS ( 2020 12:41 )  <.015 ng/mL / x     / 529 U/L / x     / x            PT/INR - ( 2020 07:18 )   PT: 13.2 sec;   INR: 1.14 ratio         PTT - ( 2020 12:41 )  PTT:30.3 sec    Sodium:  Sodium, Serum: 145 mmol/L ( @ 07:17)  Sodium, Serum: 137 mmol/L ( @ 12:41)      0.85 mg/dL  @ 07:17  1.20 mg/dL  @ 12:41      Hemoglobin:  Hemoglobin: 13.1 g/dL ( @ 07:17)  Hemoglobin: 13.0 g/dL ( @ 12:41)      Platelets: Platelet Count - Automated: 156 K/uL ( @ 07:17)  Platelet Count - Automated: 174 K/uL ( @ 12:41)      LIVER FUNCTIONS - ( 2020 07:17 )  Alb: 2.6 g/dL / Pro: 7.3 g/dL / ALK PHOS: 67 U/L / ALT: 24 U/L / AST: 34 U/L / GGT: x             Urinalysis Basic - ( 2020 15:37 )    Color: Yellow / Appearance: Clear / S.010 / pH: x  Gluc: x / Ketone: Negative  / Bili: Negative / Urobili: Negative   Blood: x / Protein: 15 / Nitrite: Negative   Leuk Esterase: Negative / RBC: 3-5 /HPF / WBC 3-5   Sq Epi: x / Non Sq Epi: Few / Bacteria: Few        RADIOLOGY & ADDITIONAL STUDIES: 37.1

## 2025-07-23 ENCOUNTER — NON-APPOINTMENT (OUTPATIENT)
Age: 70
End: 2025-07-23

## 2025-07-23 ENCOUNTER — APPOINTMENT (OUTPATIENT)
Dept: OPHTHALMOLOGY | Facility: CLINIC | Age: 70
End: 2025-07-23
Payer: MEDICARE

## 2025-07-23 PROCEDURE — 99203 OFFICE O/P NEW LOW 30 MIN: CPT

## (undated) DEVICE — POSITIONER FOAM EGG CRATE ULNAR 2PCS (PINK)

## (undated) DEVICE — PREP DURAPREP 26CC

## (undated) DEVICE — VENODYNE/SCD SLEEVE CALF LARGE

## (undated) DEVICE — DRAPE TOWEL BLUE 17" X 24"

## (undated) DEVICE — STAPLER SKIN VISI-STAT 35 WIDE

## (undated) DEVICE — DRAPE C ARM UNIVERSAL

## (undated) DEVICE — SUT POLYSORB 2-0 30" GS-21 UNDYED

## (undated) DEVICE — SUT POLYSORB 0 30" GS-21 UNDYED

## (undated) DEVICE — WARMING BLANKET UPPER ADULT

## (undated) DEVICE — PACK MINOR

## (undated) DEVICE — SOL IRR POUR H2O 1000ML

## (undated) DEVICE — PACK NEURO

## (undated) DEVICE — DRSG STERISTRIPS 0.5 X 4"

## (undated) DEVICE — TUBING BIPOLAR IRRIGATOR AND CORD SET

## (undated) DEVICE — DRSG COBAN 4"

## (undated) DEVICE — DRAPE SPLIT SHEET 77" X 108"

## (undated) DEVICE — DRAPE ISOLATION 125X83"

## (undated) DEVICE — SUT MONOCRYL 3-0 27" PS-2 UNDYED

## (undated) DEVICE — DRSG XEROFORM 5 X 9"

## (undated) DEVICE — GUIDEWIRE SYNTHES 3.2MM X 400MM

## (undated) DEVICE — SYNTHES REAMING ROD WITH BALL TIP AND EXTENSION 2.5MM 950MM

## (undated) DEVICE — DRAPE 3/4 SHEET 52X76"

## (undated) DEVICE — ELCTR BOVIE PENCIL BLADE 10FT

## (undated) DEVICE — TUBING FOR SMOKE EVACUATOR (PURPLE END)

## (undated) DEVICE — VENODYNE/SCD SLEEVE CALF MEDIUM

## (undated) DEVICE — GLV 7.5 PROTEXIS (WHITE)

## (undated) DEVICE — DRAIN JACKSON PRATT 3 SPRING RESERVOIR W 10FR PVC DRAIN

## (undated) DEVICE — SUT POLYSORB 1 36" GS-21 UNDYED

## (undated) DEVICE — ELCTR GROUNDING PAD ADULT COVIDIEN

## (undated) DEVICE — POSITIONER FOAM HEAD CRADLE (PINK)

## (undated) DEVICE — FOLEY TRAY 16FR SURESTEP LTX BG

## (undated) DEVICE — WOUND IRR SURGIPHOR

## (undated) DEVICE — SOLIDIFIER CANN EXPRESS 3K

## (undated) DEVICE — DRSG TELFA 3 X 4

## (undated) DEVICE — GLV 8 PROTEXIS (WHITE)

## (undated) DEVICE — DRAPE 1/2 SHEET 40X57"

## (undated) DEVICE — PREP CHLORAPREP HI-LITE ORANGE 26ML

## (undated) DEVICE — POSITIONER STRAP ARMBOARD VELCRO TS-30

## (undated) DEVICE — CANISTER SUCTION LID GUARD 3000CC

## (undated) DEVICE — DRSG ACE BANDAGE 4" NS

## (undated) DEVICE — GLV 8 PROTEXIS (BLUE)

## (undated) DEVICE — DRAPE XL SHEET 77X98"

## (undated) DEVICE — POSITIONER JACKSON TABLE PRONEVIEW CUSHION, CHEST, HIP, THIGH PADS

## (undated) DEVICE — GLV 6.5 PROTEXIS (BLUE)

## (undated) DEVICE — SYNTHES REAMING ROD WITH BALL TIP 2.5MM 950MM

## (undated) DEVICE — DRAPE INSTRUMENT POUCH 6.75" X 11"

## (undated) DEVICE — SUT VICRYL PLUS 2-0 18" CP-2 UNDYED (POP-OFF)

## (undated) DEVICE — SOL IRR POUR NS 0.9% 1000ML

## (undated) DEVICE — FRAZIER CONNECTING TUBE 2FT 5MM

## (undated) DEVICE — DRSG CURITY GAUZE SPONGE 4 X 4" 12-PLY

## (undated) DEVICE — MARKING PEN W RULER

## (undated) DEVICE — SUT VICRYL PLUS 0 27" CT-2 UNDYED

## (undated) DEVICE — PACK FOOT CUST

## (undated) DEVICE — DRSG DERMABOND 0.7ML

## (undated) DEVICE — GLV 6.5 PROTEXIS (WHITE)

## (undated) DEVICE — DRILL BIT SYNTHES ORTHO 4.2X145MM